# Patient Record
Sex: FEMALE | Race: WHITE | Employment: UNEMPLOYED | URBAN - METROPOLITAN AREA
[De-identification: names, ages, dates, MRNs, and addresses within clinical notes are randomized per-mention and may not be internally consistent; named-entity substitution may affect disease eponyms.]

---

## 2017-02-01 ENCOUNTER — OFFICE VISIT (OUTPATIENT)
Dept: OBGYN CLINIC | Facility: CLINIC | Age: 57
End: 2017-02-01

## 2017-02-01 ENCOUNTER — TELEPHONE (OUTPATIENT)
Dept: OBGYN CLINIC | Facility: CLINIC | Age: 57
End: 2017-02-01

## 2017-02-01 VITALS — BODY MASS INDEX: 28 KG/M2 | WEIGHT: 145 LBS

## 2017-02-01 DIAGNOSIS — N95.0 POSTMENOPAUSAL BLEEDING: Primary | ICD-10-CM

## 2017-02-01 DIAGNOSIS — Z12.4 SCREENING FOR MALIGNANT NEOPLASM OF CERVIX: ICD-10-CM

## 2017-02-01 PROCEDURE — 88305 TISSUE EXAM BY PATHOLOGIST: CPT | Performed by: OBSTETRICS & GYNECOLOGY

## 2017-02-01 PROCEDURE — 58100 BIOPSY OF UTERUS LINING: CPT | Performed by: OBSTETRICS & GYNECOLOGY

## 2017-02-01 RX ORDER — DOXYCYCLINE HYCLATE 100 MG
100 TABLET ORAL 2 TIMES DAILY
Qty: 60 TABLET | Refills: 1 | Status: ON HOLD | OUTPATIENT
Start: 2017-02-01 | End: 2018-09-28

## 2017-02-01 RX ORDER — CITALOPRAM 20 MG/1
TABLET ORAL
COMMUNITY
Start: 2017-01-20 | End: 2018-09-27

## 2017-02-01 NOTE — TELEPHONE ENCOUNTER
NO ANSWER AT HOME NUMBER, TALKED WITH PT AT WORK #. STATES SHE HAS NOT HAD A PERIOD FOR SEVERAL YEARS AND IS NOW BLEEDING LIKE A PERIOD. STARTED YESTERDAY AND IS HAVING SOME CRAMPING. ADVISED TO BE SEEN.   THERE WAS AN OPENING THIS EVENING WHICH SHE ACCE

## 2017-02-02 LAB — HPV I/H RISK 1 DNA SPEC QL NAA+PROBE: NEGATIVE

## 2017-02-03 NOTE — PROGRESS NOTES
Endometrial Biopsy    Pre-Procedure Care:   Consent was obtained. Procedure/risks were explained. Questions were answered. Correct patient was identified. Correct side and site were confirmed.     Pregnancy Results: n/a  Birth control method(s) used: PM

## 2017-02-06 ENCOUNTER — TELEPHONE (OUTPATIENT)
Dept: OBGYN CLINIC | Facility: CLINIC | Age: 57
End: 2017-02-06

## 2017-02-06 DIAGNOSIS — N95.0 POST-MENOPAUSAL BLEEDING: Primary | ICD-10-CM

## 2017-02-06 LAB
LAST PAP RESULT: NORMAL
PAP HISTORY (OTHER THAN LAST PAP): NORMAL

## 2017-02-06 NOTE — TELEPHONE ENCOUNTER
Spoke with Joshua El and discussed benign and path and scant tissue as expected. US ordered and we'll discuss plan after that.

## 2017-02-06 NOTE — TELEPHONE ENCOUNTER
Pt is calling for her EMBX results that she had done on 2/1 by MICHELLE. Pt also stated she is still having some vaginal bleeding. Pt informed it is not uncommon to have some bleeding after having an EMBX.  Pt stated the bleeding is not heavy and the flow is usu

## 2017-02-09 ENCOUNTER — HOSPITAL ENCOUNTER (OUTPATIENT)
Dept: ULTRASOUND IMAGING | Age: 57
Discharge: HOME | End: 2017-02-09
Attending: OBSTETRICS & GYNECOLOGY
Payer: COMMERCIAL

## 2017-02-09 DIAGNOSIS — N95.0 POST-MENOPAUSAL BLEEDING: ICD-10-CM

## 2017-02-09 PROCEDURE — 76856 US EXAM PELVIC COMPLETE: CPT

## 2017-02-09 PROCEDURE — 76830 TRANSVAGINAL US NON-OB: CPT

## 2017-02-24 ENCOUNTER — TELEPHONE (OUTPATIENT)
Dept: OBGYN CLINIC | Facility: CLINIC | Age: 57
End: 2017-02-24

## 2017-02-24 NOTE — TELEPHONE ENCOUNTER
Informed pt that MICHELLE will be back on Monday, 2/27/17.   Informed pt that a message will be sent to MICHELLE.

## 2017-09-05 ENCOUNTER — LAB ENCOUNTER (OUTPATIENT)
Dept: LAB | Facility: HOSPITAL | Age: 57
End: 2017-09-05
Attending: INTERNAL MEDICINE
Payer: COMMERCIAL

## 2017-09-05 DIAGNOSIS — I10 ESSENTIAL HYPERTENSION, MALIGNANT: ICD-10-CM

## 2017-09-05 DIAGNOSIS — E78.00 PURE HYPERCHOLESTEROLEMIA: Primary | ICD-10-CM

## 2017-09-05 LAB
ALBUMIN SERPL BCP-MCNC: 4.2 G/DL (ref 3.5–4.8)
ALBUMIN/GLOB SERPL: 1.7 {RATIO} (ref 1–2)
ALP SERPL-CCNC: 67 U/L (ref 32–100)
ALT SERPL-CCNC: 67 U/L (ref 14–54)
ANION GAP SERPL CALC-SCNC: 7 MMOL/L (ref 0–18)
AST SERPL-CCNC: 70 U/L (ref 15–41)
BASOPHILS # BLD: 0 K/UL (ref 0–0.2)
BASOPHILS NFR BLD: 1 %
BILIRUB SERPL-MCNC: 1.2 MG/DL (ref 0.3–1.2)
BUN SERPL-MCNC: 14 MG/DL (ref 8–20)
BUN/CREAT SERPL: 17.7 (ref 10–20)
CALCIUM SERPL-MCNC: 9.3 MG/DL (ref 8.5–10.5)
CHLORIDE SERPL-SCNC: 104 MMOL/L (ref 95–110)
CHOLEST SERPL-MCNC: 332 MG/DL (ref 110–200)
CO2 SERPL-SCNC: 27 MMOL/L (ref 22–32)
CREAT SERPL-MCNC: 0.79 MG/DL (ref 0.5–1.5)
EOSINOPHIL # BLD: 0.1 K/UL (ref 0–0.7)
EOSINOPHIL NFR BLD: 2 %
ERYTHROCYTE [DISTWIDTH] IN BLOOD BY AUTOMATED COUNT: 13.8 % (ref 11–15)
GLOBULIN PLAS-MCNC: 2.5 G/DL (ref 2.5–3.7)
GLUCOSE SERPL-MCNC: 84 MG/DL (ref 70–99)
HCT VFR BLD AUTO: 45.2 % (ref 35–48)
HDLC SERPL-MCNC: 80 MG/DL
HGB BLD-MCNC: 15 G/DL (ref 12–16)
LDLC SERPL CALC-MCNC: 238 MG/DL (ref 0–99)
LYMPHOCYTES # BLD: 0.8 K/UL (ref 1–4)
LYMPHOCYTES NFR BLD: 18 %
MCH RBC QN AUTO: 30.2 PG (ref 27–32)
MCHC RBC AUTO-ENTMCNC: 33.1 G/DL (ref 32–37)
MCV RBC AUTO: 91.2 FL (ref 80–100)
MONOCYTES # BLD: 0.4 K/UL (ref 0–1)
MONOCYTES NFR BLD: 9 %
NEUTROPHILS # BLD AUTO: 3.3 K/UL (ref 1.8–7.7)
NEUTROPHILS NFR BLD: 72 %
NONHDLC SERPL-MCNC: 252 MG/DL
OSMOLALITY UR CALC.SUM OF ELEC: 286 MOSM/KG (ref 275–295)
PLATELET # BLD AUTO: 219 K/UL (ref 140–400)
PMV BLD AUTO: 7.6 FL (ref 7.4–10.3)
POTASSIUM SERPL-SCNC: 4.6 MMOL/L (ref 3.3–5.1)
PROT SERPL-MCNC: 6.7 G/DL (ref 5.9–8.4)
RBC # BLD AUTO: 4.95 M/UL (ref 3.7–5.4)
SODIUM SERPL-SCNC: 138 MMOL/L (ref 136–144)
TRIGL SERPL-MCNC: 71 MG/DL (ref 1–149)
TSH SERPL-ACNC: 0.39 UIU/ML (ref 0.45–5.33)
WBC # BLD AUTO: 4.6 K/UL (ref 4–11)

## 2017-09-05 PROCEDURE — 85025 COMPLETE CBC W/AUTO DIFF WBC: CPT

## 2017-09-05 PROCEDURE — 84443 ASSAY THYROID STIM HORMONE: CPT

## 2017-09-05 PROCEDURE — 80053 COMPREHEN METABOLIC PANEL: CPT

## 2017-09-05 PROCEDURE — 36415 COLL VENOUS BLD VENIPUNCTURE: CPT

## 2017-09-05 PROCEDURE — 80061 LIPID PANEL: CPT

## 2018-09-27 ENCOUNTER — HOSPITAL ENCOUNTER (INPATIENT)
Facility: HOSPITAL | Age: 58
LOS: 1 days | Discharge: HOME | DRG: 087 | End: 2018-09-28
Attending: EMERGENCY MEDICINE | Admitting: INTERNAL MEDICINE
Payer: COMMERCIAL

## 2018-09-27 ENCOUNTER — APPOINTMENT (OUTPATIENT)
Dept: CT IMAGING | Facility: HOSPITAL | Age: 58
DRG: 087 | End: 2018-09-27
Attending: EMERGENCY MEDICINE
Payer: COMMERCIAL

## 2018-09-27 DIAGNOSIS — S01.81XA FOREHEAD LACERATION, INITIAL ENCOUNTER: ICD-10-CM

## 2018-09-27 DIAGNOSIS — S06.5X9A ACUTE SUBDURAL HEMATOMA (HCC): Primary | ICD-10-CM

## 2018-09-27 PROBLEM — S06.5XAA ACUTE SUBDURAL HEMATOMA (HCC): Status: ACTIVE | Noted: 2018-09-27

## 2018-09-27 PROBLEM — S06.5XAA ACUTE SUBDURAL HEMATOMA: Status: ACTIVE | Noted: 2018-09-27

## 2018-09-27 PROCEDURE — 70450 CT HEAD/BRAIN W/O DYE: CPT | Performed by: EMERGENCY MEDICINE

## 2018-09-27 PROCEDURE — 3E0234Z INTRODUCTION OF SERUM, TOXOID AND VACCINE INTO MUSCLE, PERCUTANEOUS APPROACH: ICD-10-PCS | Performed by: INTERNAL MEDICINE

## 2018-09-27 PROCEDURE — 99223 1ST HOSP IP/OBS HIGH 75: CPT | Performed by: INTERNAL MEDICINE

## 2018-09-27 RX ORDER — ACETAMINOPHEN 325 MG/1
650 TABLET ORAL EVERY 6 HOURS PRN
Status: DISCONTINUED | OUTPATIENT
Start: 2018-09-27 | End: 2018-09-28

## 2018-09-27 RX ORDER — ATORVASTATIN CALCIUM 20 MG/1
20 TABLET, FILM COATED ORAL DAILY
COMMUNITY

## 2018-09-27 RX ORDER — LOSARTAN POTASSIUM 100 MG/1
TABLET ORAL DAILY
COMMUNITY

## 2018-09-27 RX ORDER — SODIUM CHLORIDE 0.9 % (FLUSH) 0.9 %
3 SYRINGE (ML) INJECTION AS NEEDED
Status: DISCONTINUED | OUTPATIENT
Start: 2018-09-27 | End: 2018-09-28

## 2018-09-27 RX ORDER — ACETAMINOPHEN 500 MG
1000 TABLET ORAL ONCE
Status: COMPLETED | OUTPATIENT
Start: 2018-09-27 | End: 2018-09-27

## 2018-09-27 RX ORDER — LIDOCAINE HYDROCHLORIDE AND EPINEPHRINE 20; 5 MG/ML; UG/ML
20 INJECTION, SOLUTION EPIDURAL; INFILTRATION; INTRACAUDAL; PERINEURAL ONCE
Status: COMPLETED | OUTPATIENT
Start: 2018-09-27 | End: 2018-09-27

## 2018-09-27 RX ORDER — MORPHINE SULFATE 2 MG/ML
2 INJECTION, SOLUTION INTRAMUSCULAR; INTRAVENOUS EVERY 2 HOUR PRN
Status: DISCONTINUED | OUTPATIENT
Start: 2018-09-27 | End: 2018-09-28

## 2018-09-27 RX ORDER — MINOCYCLINE HYDROCHLORIDE 50 MG/1
50 CAPSULE ORAL 2 TIMES DAILY
COMMUNITY

## 2018-09-27 RX ORDER — LIDOCAINE HYDROCHLORIDE AND EPINEPHRINE 20; 5 MG/ML; UG/ML
INJECTION, SOLUTION EPIDURAL; INFILTRATION; INTRACAUDAL; PERINEURAL
Status: COMPLETED
Start: 2018-09-27 | End: 2018-09-27

## 2018-09-27 NOTE — H&P
[facility] \[facility] [place] [facility]\"" - [facility]    History & Physical    Date:  9/27/2018   Date of Admission:  9/27/2018      Chief Complaint:   Kathi Post is a(n) 63year old female with question of intracranial hemorrhage and laceration.     HPI:   The patient is a history Maternal Aunt 76   • Breast Cancer Paternal Grandmother      Social History: Single, no children, no tobacco, alcohol as above, RN  Social History    Tobacco Use      Smoking status: Never Smoker      Smokeless tobacco: Never Used    Alcohol use:  Yes intraparenchymal contusion, or intracranial hemorrhage. 2. Numerous cutaneous and subcutaneous lesions are noted throughout the scalp. Correlation with physical examination findings is suggested. 3. Large vessel atherosclerosis.      4. Lesser incid

## 2018-09-27 NOTE — CONSULTS
Chief Complaint:   Kathi Post is a(n) 63year old female with question of intracranial hemorrhage and laceration.     HPI:   The patient is a history of alcohol consumption drinking up to a bottle of wine per evening.   She fell in her bathtub yesterday e    Social History: Single, no children, no tobacco, alcohol as above, RN  Social History    Tobacco Use      Smoking status: Never Smoker      Smokeless tobacco: Never Used    Alcohol use:  Yes      Alcohol/week: 3.0 oz      Types: 5 Standard drinks or eq coup/contrecoup intraparenchymal contusion, or intracranial hemorrhage.     2. Numerous cutaneous and subcutaneous lesions are noted throughout the scalp. Correlation with physical examination findings is suggested.     3. Large vessel atherosclerosis.

## 2018-09-27 NOTE — ED NOTES
Assumed care of patient. Resting on cart, bandage intact to forehead. Awake and alert. C/o mild headache. Patient made aware of admission to PCU. Dr. Chris Durbin currently at bedside. VSS.

## 2018-09-27 NOTE — CM/SW NOTE
9/27-MD orders received in regards to ETOH/Substance abuse. This Writer made a referral to psych liaison for consult.      [id number]

## 2018-09-27 NOTE — PAYOR COMM NOTE
--------------  ADMISSION REVIEW     Payor: Dolores Davis  Subscriber #:  [id number]  Authorization Number: [id number]    Admit date: 9/27/18  Admit time: 4459       Admitting Physician: Yelena Tse MD  Attending Physician:  Saritha Lerma History:  2008: BREAST RECONSTRUCTION; Right      Comment:  right breast reconstructed - ductal, BRCA neg  2007: CHEMOTHERAPY  No date: FOOT SURGERY;  Right      Comment:  tumor rt foot / excision/excision of bone                spur/arthrodesis w/screw fix Alcohol/week: 3.0 oz      Types: 5 Standard drinks or equivalent per week      Comment: Socially.   (Per [employer]:  5 days weekly.)    Drug use: No      Review of Systems  Constitutional: no fever  Cardiovascular: no chest pain  Respiratory: no shortness of subdural hematoma without any obvious midline shift or swelling. I recommended admission we establish IV we will draw blood and do EKG. Procedure note: Patient was anesthetized with total 11 mL of 1% lidocaine with epinephrine.   I placed a total of 13 axes as noted on EKG Report.   Rate: EKG shows rate 74 normal sinus rhythm  no ST elevation no ST depression seen              MDM     98% Normal  Pulse oximetry    Cardiac Monitor: Normal sinus rhythm    Disposition and Plan     We recommend that you Breast cancer ([facility] [address] 03/02/2012    MASTECTOMY / RECONSTRUCTION   • Encounter for insertion of venous access port 2007    portacath insertion / venous access   • History of breast cancer in female    • Menorrhagia 2007    HYSTEROSCOPY / D&C / endomentrial bi 1\" (1.549 m), weight 130 lb (59 kg), SpO2 99 %. Alert white female  HEENT examination is unremarkable with pupils equal round and reactive to light and accommodation. There is a 5 inch laceration on the right brow. Sutured.   Neck without adenopathy, taking. 3.  Neurofibromatosis    4. DVT prophylaxis–SCDs    I am delighted to assist with Aleisha's care.           Jerry Salazar MD  Medical Director, Critical Care, [facility]  Medical Director, [facility]  Pager: [phone]    Palmira Cheadle

## 2018-09-27 NOTE — ED PROVIDER NOTES
Patient Seen in: [facility] AND [facility] Emergency Department    History   Patient presents with:  Fall (musculoskeletal, neurologic)    Stated Complaint: Fall    HPI    Patient presents to the emergency department after fall last night.   At approximately 9 PM Oral Tab,  Take 1 tablet (100 mg total) by mouth 2 (two) times daily. Tamoxifen Citrate 20 MG Oral Tab,  Take 1 tablet (20 mg total) by mouth daily. There will be no additional refills unless seen by Dr. Ilda Yates.    Multiple Vitamin (MULTIVITAMINS) Oral Ca HPI.    Physical Exam     ED Triage Vitals [09/27/18 0549]   BP (!) 145/96   Pulse 82   Resp 16   Temp 98.7 °F (37.1 °C)   Temp src Oral   SpO2 99 %   O2 Device None (Room air)       Current:BP (!) 155/91   Pulse 84   Temp 98.7 °F (37.1 °C) (Oral)   Resp 2 obtaining history, performing a physical exam, bedside monitoring of interventions, collecting and interpreting tests and discussion with consultants but not including time spent performing separately billable procedures.         ED Course     Labs Reviewed specified.     Medications Prescribed:  Current Discharge Medication List        Present on Admission  Date Reviewed: 1/16/2015          ICD-10-CM Noted POA    Acute subdural hematoma ([facility] [address] S06.5X9A 9/27/2018 Unknown

## 2018-09-28 ENCOUNTER — APPOINTMENT (OUTPATIENT)
Dept: CT IMAGING | Facility: HOSPITAL | Age: 58
DRG: 087 | End: 2018-09-28
Attending: INTERNAL MEDICINE
Payer: COMMERCIAL

## 2018-09-28 VITALS
BODY MASS INDEX: 24.55 KG/M2 | TEMPERATURE: 99 F | SYSTOLIC BLOOD PRESSURE: 147 MMHG | RESPIRATION RATE: 15 BRPM | WEIGHT: 130 LBS | HEIGHT: 61 IN | OXYGEN SATURATION: 100 % | DIASTOLIC BLOOD PRESSURE: 93 MMHG | HEART RATE: 101 BPM

## 2018-09-28 PROCEDURE — 99233 SBSQ HOSP IP/OBS HIGH 50: CPT | Performed by: INTERNAL MEDICINE

## 2018-09-28 PROCEDURE — 70450 CT HEAD/BRAIN W/O DYE: CPT | Performed by: INTERNAL MEDICINE

## 2018-09-28 RX ORDER — ACETAMINOPHEN 325 MG/1
650 TABLET ORAL EVERY 6 HOURS PRN
Refills: 0 | Status: SHIPPED | COMMUNITY
Start: 2018-09-28 | End: 2020-09-21

## 2018-09-28 RX ORDER — LOSARTAN POTASSIUM 100 MG/1
100 TABLET ORAL DAILY
Status: DISCONTINUED | OUTPATIENT
Start: 2018-09-28 | End: 2018-09-28

## 2018-09-28 RX ORDER — ATORVASTATIN CALCIUM 20 MG/1
20 TABLET, FILM COATED ORAL DAILY
Status: DISCONTINUED | OUTPATIENT
Start: 2018-09-28 | End: 2018-09-28

## 2018-09-28 NOTE — DISCHARGE SUMMARY
[place] [facility] - [facility]  Discharge Summary    Saurav Love Patient Status:  Inpatient    3/1/1960 MRN [id number]   Location [facility] 2W/SW Attending Jose Miguel Pinto MD   Hosp Day # 1 PCP Jocy Sanchez     Date of Admission: 2018    Raymond Huyen Oseguera  9/28/2018  3:38 PM

## 2018-09-28 NOTE — BH PROGRESS NOTE
Behavioral Health Note  CHIEF COMPLAINT   Fall  REASON FOR ADMISSION  Fall    SOCIAL HISTORY  Shana Weber lives alone and works full time. The patient has a h/o etoh use.    PAST PSYCHIATRIC HISTORY  Shana Weber reports a mental health history, but we did not discuss at

## 2018-09-28 NOTE — PROGRESS NOTES
Pulmonary/Critical Care Follow Up Note    HPI:   Christopher Gonzalez is a 63year old female with Patient presents with:  Fall (musculoskeletal, neurologic)      PCP Cortney Johnson  Admission Attending Anna Avalos MD    Hospital Day #1    Mild HA- not worse  N 25 09/28/2018    GLU 93 09/28/2018    CA 9.0 09/28/2018           ASSESSMENT/PLAN:     1.  Subdural hematoma  S/p fall while intoxicated  CT head stable/improved today  Plan no ASA or anti plt agents   Follow exam   Other recs per neurosurgery   Scalp woun

## 2018-10-01 NOTE — PAYOR COMM NOTE
--------------  DISCHARGE REVIEW    Payor: Joesph Arreola  Subscriber #:  [id number]  Authorization Number: [id number]    Admit date: 9/27/18  Admit time:  [address]  Discharge Date: 9/28/2018  5:30 PM     Admitting Physician: Marc Fernandez Tab  Take by mouth daily. Multiple Vitamin (MULTIVITAMINS) Oral Cap  Take  by mouth. take 1 capsule by oral route  every day    Calcium-Vitamin D (EQL CALCIUM/VITAMIN D) 600-400 MG-UNIT Oral Tab  Take  by mouth.  Take one tablet by mouth two times per da

## 2018-10-04 ENCOUNTER — APPOINTMENT (OUTPATIENT)
Dept: LAB | Facility: HOSPITAL | Age: 58
End: 2018-10-04
Attending: PHYSICIAN ASSISTANT
Payer: COMMERCIAL

## 2018-10-04 DIAGNOSIS — F10.10 ALCOHOL ABUSE, CONTINUOUS: ICD-10-CM

## 2018-10-04 PROCEDURE — 36415 COLL VENOUS BLD VENIPUNCTURE: CPT

## 2018-10-04 PROCEDURE — 80076 HEPATIC FUNCTION PANEL: CPT

## 2018-10-12 ENCOUNTER — HOSPITAL ENCOUNTER (OUTPATIENT)
Dept: CT IMAGING | Facility: HOSPITAL | Age: 58
Discharge: HOME | End: 2018-10-12
Attending: INTERNAL MEDICINE
Payer: COMMERCIAL

## 2018-10-12 DIAGNOSIS — S06.5X9A SUBDURAL HEMATOMA (HCC): ICD-10-CM

## 2018-10-12 PROCEDURE — 70450 CT HEAD/BRAIN W/O DYE: CPT | Performed by: INTERNAL MEDICINE

## 2020-09-18 ENCOUNTER — APPOINTMENT (OUTPATIENT)
Dept: CT IMAGING | Facility: HOSPITAL | Age: 60
End: 2020-09-18
Attending: EMERGENCY MEDICINE
Payer: COMMERCIAL

## 2020-09-18 ENCOUNTER — TELEPHONE (OUTPATIENT)
Dept: HEMATOLOGY/ONCOLOGY | Facility: HOSPITAL | Age: 60
End: 2020-09-18

## 2020-09-18 ENCOUNTER — HOSPITAL ENCOUNTER (EMERGENCY)
Facility: HOSPITAL | Age: 60
Discharge: HOME | End: 2020-09-18
Attending: EMERGENCY MEDICINE
Payer: COMMERCIAL

## 2020-09-18 VITALS
SYSTOLIC BLOOD PRESSURE: 160 MMHG | HEART RATE: 78 BPM | DIASTOLIC BLOOD PRESSURE: 92 MMHG | OXYGEN SATURATION: 96 % | BODY MASS INDEX: 25.86 KG/M2 | HEIGHT: 61 IN | RESPIRATION RATE: 18 BRPM | WEIGHT: 137 LBS | TEMPERATURE: 98 F

## 2020-09-18 DIAGNOSIS — R91.8 LUNG MASS: ICD-10-CM

## 2020-09-18 DIAGNOSIS — R22.1 NECK MASS: Primary | ICD-10-CM

## 2020-09-18 LAB
ANION GAP SERPL CALC-SCNC: 6 MMOL/L (ref 0–18)
BASOPHILS # BLD AUTO: 0.04 X10(3) UL (ref 0–0.2)
BASOPHILS NFR BLD AUTO: 0.6 %
BUN BLD-MCNC: 14 MG/DL (ref 7–18)
BUN/CREAT SERPL: 13.7 (ref 10–20)
CALCIUM BLD-MCNC: 9.4 MG/DL (ref 8.5–10.1)
CHLORIDE SERPL-SCNC: 108 MMOL/L (ref 98–112)
CO2 SERPL-SCNC: 28 MMOL/L (ref 21–32)
CREAT BLD-MCNC: 1.02 MG/DL (ref 0.55–1.02)
DEPRECATED RDW RBC AUTO: 45.1 FL (ref 35.1–46.3)
EOSINOPHIL # BLD AUTO: 0.06 X10(3) UL (ref 0–0.7)
EOSINOPHIL NFR BLD AUTO: 1 %
ERYTHROCYTE [DISTWIDTH] IN BLOOD BY AUTOMATED COUNT: 13.2 % (ref 11–15)
GLUCOSE BLD-MCNC: 84 MG/DL (ref 70–99)
HCT VFR BLD AUTO: 45.7 % (ref 35–48)
HGB BLD-MCNC: 14.8 G/DL (ref 12–16)
IMM GRANULOCYTES # BLD AUTO: 0.04 X10(3) UL (ref 0–1)
IMM GRANULOCYTES NFR BLD: 0.6 %
LYMPHOCYTES # BLD AUTO: 0.6 X10(3) UL (ref 1–4)
LYMPHOCYTES NFR BLD AUTO: 9.7 %
MCH RBC QN AUTO: 30.3 PG (ref 26–34)
MCHC RBC AUTO-ENTMCNC: 32.4 G/DL (ref 31–37)
MCV RBC AUTO: 93.5 FL (ref 80–100)
MONOCYTES # BLD AUTO: 0.51 X10(3) UL (ref 0.1–1)
MONOCYTES NFR BLD AUTO: 8.2 %
NEUTROPHILS # BLD AUTO: 4.96 X10 (3) UL (ref 1.5–7.7)
NEUTROPHILS # BLD AUTO: 4.96 X10(3) UL (ref 1.5–7.7)
NEUTROPHILS NFR BLD AUTO: 79.9 %
OSMOLALITY SERPL CALC.SUM OF ELEC: 294 MOSM/KG (ref 275–295)
PLATELET # BLD AUTO: 274 10(3)UL (ref 150–450)
POTASSIUM SERPL-SCNC: 4.2 MMOL/L (ref 3.5–5.1)
RBC # BLD AUTO: 4.89 X10(6)UL (ref 3.8–5.3)
SODIUM SERPL-SCNC: 142 MMOL/L (ref 136–145)
WBC # BLD AUTO: 6.2 X10(3) UL (ref 4–11)

## 2020-09-18 PROCEDURE — 80048 BASIC METABOLIC PNL TOTAL CA: CPT | Performed by: EMERGENCY MEDICINE

## 2020-09-18 PROCEDURE — 70491 CT SOFT TISSUE NECK W/DYE: CPT | Performed by: EMERGENCY MEDICINE

## 2020-09-18 PROCEDURE — 71260 CT THORAX DX C+: CPT | Performed by: EMERGENCY MEDICINE

## 2020-09-18 PROCEDURE — 36415 COLL VENOUS BLD VENIPUNCTURE: CPT

## 2020-09-18 PROCEDURE — 85025 COMPLETE CBC W/AUTO DIFF WBC: CPT | Performed by: EMERGENCY MEDICINE

## 2020-09-18 PROCEDURE — 99284 EMERGENCY DEPT VISIT MOD MDM: CPT

## 2020-09-18 NOTE — TELEPHONE ENCOUNTER
Call from Dr. Elina Altman in the ER - patient seen for neck pain and new onset cough   History of breast cancer 9/11/2007 treated here   Now with abnormal CT scans  Can she be seen at 3:30 Monday?    RN inpatient

## 2020-09-18 NOTE — ED PROVIDER NOTES
Patient Seen in: [facility] AND [facility] Emergency Department      History   Patient presents with:  Neck Pain    Stated Complaint: growth on neck +pain     HPI    The patient is a 52-year-old female with a history of breast cancer and neurofibromatosis who pr Positive for stated complaint: growth on neck +pain   Other systems are as noted in HPI. Constitutional and vital signs reviewed. All other systems reviewed and negative except as noted above.     Physical Exam     ED Triage Vitals [09/18/20 1027]   B General: No focal deficit present. Mental Status: She is alert and oriented to person, place, and time. Deep Tendon Reflexes: Reflexes are normal and symmetric.    Psychiatric:         Judgment: Judgment normal.       Differential diagnosis inc CONCLUSION:  1. Development of a spiculated soft tissue density in the right supraclavicular region extending to the lower right side of the neck, as detailed above.   There is involvement of the right scalene and sternocleidomastoid muscles with abutment o CONCLUSION:  1. No evidence of pulmonary embolism. 2. CHF with pulmonary interstitial edema and left greater than right pleural effusions. 3. Mass or masslike region of consolidation in the lingula.   If patient clinically has pneumonia, short interval foll

## 2020-09-18 NOTE — ED INITIAL ASSESSMENT (HPI)
Patient presents to ER with c/o cough and neck pain over the last few weeks. Patient c/o swelling to right sided of neck that has worsened this week.  Denies fevers

## 2020-09-18 NOTE — ED NOTES
Patient cleared for discharge by MD. Angeless with patient. Patient discharge instructions reviewed with patient including when and how to follow up  with healthcare provider and when to seek medical treatment. Peripheral IV removed.

## 2020-09-21 ENCOUNTER — OFFICE VISIT (OUTPATIENT)
Dept: HEMATOLOGY/ONCOLOGY | Facility: HOSPITAL | Age: 60
End: 2020-09-21
Attending: INTERNAL MEDICINE
Payer: COMMERCIAL

## 2020-09-21 ENCOUNTER — TELEPHONE (OUTPATIENT)
Dept: HEMATOLOGY/ONCOLOGY | Facility: HOSPITAL | Age: 60
End: 2020-09-21

## 2020-09-21 VITALS
TEMPERATURE: 98 F | RESPIRATION RATE: 16 BRPM | WEIGHT: 136 LBS | HEART RATE: 96 BPM | OXYGEN SATURATION: 98 % | HEIGHT: 61 IN | BODY MASS INDEX: 25.68 KG/M2 | SYSTOLIC BLOOD PRESSURE: 146 MMHG | DIASTOLIC BLOOD PRESSURE: 85 MMHG

## 2020-09-21 DIAGNOSIS — Z90.11 HISTORY OF RIGHT MASTECTOMY: ICD-10-CM

## 2020-09-21 DIAGNOSIS — J90 CHRONIC BILATERAL PLEURAL EFFUSIONS: ICD-10-CM

## 2020-09-21 DIAGNOSIS — R05.9 COUGH: Primary | ICD-10-CM

## 2020-09-21 DIAGNOSIS — R22.1 MASS OF RIGHT SIDE OF NECK: ICD-10-CM

## 2020-09-21 DIAGNOSIS — Q85.00 CLINICAL NEUROFIBROMATOSIS (HCC): ICD-10-CM

## 2020-09-21 DIAGNOSIS — C50.411 MALIGNANT NEOPLASM OF UPPER-OUTER QUADRANT OF RIGHT BREAST IN FEMALE, ESTROGEN RECEPTOR POSITIVE (HCC): ICD-10-CM

## 2020-09-21 DIAGNOSIS — Z17.0 MALIGNANT NEOPLASM OF UPPER-OUTER QUADRANT OF RIGHT BREAST IN FEMALE, ESTROGEN RECEPTOR POSITIVE (HCC): ICD-10-CM

## 2020-09-21 PROCEDURE — 99205 OFFICE O/P NEW HI 60 MIN: CPT | Performed by: INTERNAL MEDICINE

## 2020-09-21 RX ORDER — IBUPROFEN 600 MG/1
600 TABLET ORAL EVERY 6 HOURS PRN
COMMUNITY
End: 2020-12-24 | Stop reason: ALTCHOICE

## 2020-09-21 RX ORDER — HYDROCODONE POLISTIREX AND CHLORPHENIRAMINE POLISTIREX 10; 8 MG/5ML; MG/5ML
5 SUSPENSION, EXTENDED RELEASE ORAL 2 TIMES DAILY PRN
Qty: 120 ML | Refills: 0 | Status: SHIPPED | OUTPATIENT
Start: 2020-09-21 | End: 2020-09-21

## 2020-09-21 RX ORDER — HYDROCODONE POLISTIREX AND CHLORPHENIRAMINE POLISTIREX 10; 8 MG/5ML; MG/5ML
5 SUSPENSION, EXTENDED RELEASE ORAL 2 TIMES DAILY PRN
Status: DISCONTINUED | OUTPATIENT
Start: 2020-09-21 | End: 2020-09-21

## 2020-09-21 RX ORDER — HYDROCODONE POLISTIREX AND CHLORPHENIRAMINE POLISTIREX 10; 8 MG/5ML; MG/5ML
5 SUSPENSION, EXTENDED RELEASE ORAL 2 TIMES DAILY PRN
Qty: 120 ML | Refills: 0 | Status: SHIPPED | OUTPATIENT
Start: 2020-09-21 | End: 2020-10-02

## 2020-09-21 NOTE — CONSULTS
HPI   9/21/2020  Christopher Gonzalez is a 62year old female who presents with a history of right neck swelling and nonproductive cough beginning approximately 3 weeks ago.   Patient saw her primary care physician Dr. Meg Goncalves and was scheduled to have CT sc Completion right mastectomy, axillary node sampling, immediate reconstruction with a tissue expander -2 separate foci of microinvasive ductal carcinoma, moderately differentiated measuring 1 mm in maximum dimension, residual multifocal ductal carcinoma in Neurofibromatosis lesions with associated itching   Psychiatric/Behavioral: Positive for dysphoric mood. The patient is nervous/anxious.          Patient obviously anxious and upset with the CT scan findings         Current Outpatient Medications   Med tumor rt foot / excision/excision of bone spur/arthrodesis w/screw fixation   • IR PORT A CATH PROCEDURE  2007    Right subclavian port of cath.    • MASTECTOMY,SIMPLE  2007     Social History    Socioeconomic History      Marital status: Single      Spous Stress Concern: Not Asked        Weight Concern: Not Asked        Special Diet: Not Asked        Back Care: Not Asked        Exercise: Not Asked        Bike Helmet: Not Asked        Seat Belt: Not Asked        Self-Exams: Not Asked    Social History Neurological: She is alert and oriented to person, place, and time. No cranial nerve deficit. Skin: Skin is warm and dry. neurofibromatosis diffuse   Psychiatric: She has a normal mood and affect.  Her behavior is normal. Judgment and thought content no Basophils Absolute      0.00 - 0.20 x10(3) uL 0.04   Immature Granulocyte Absolute      0.00 - 1.00 x10(3) uL 0.04   Neutrophils %      % 79.9   Lymphocytes %      % 9.7   Monocytes %      % 8.2   Eosinophils %      % 1.0   Basophils %      % 0.6   Immatur 1. No evidence of pulmonary embolism. 2. CHF with pulmonary interstitial edema and left greater than right pleural effusions. 3. Mass or masslike region of consolidation in the lingula.   If patient clinically has pneumonia, short interval follow-up CT sc

## 2020-09-21 NOTE — H&P (VIEW-ONLY)
HPI   9/21/2020  Meryl Barnett is a 62year old female who presents with a history of right neck swelling and nonproductive cough beginning approximately 3 weeks ago.   Patient saw her primary care physician Dr. Arielle Saini and was scheduled to have CT sc Completion right mastectomy, axillary node sampling, immediate reconstruction with a tissue expander -2 separate foci of microinvasive ductal carcinoma, moderately differentiated measuring 1 mm in maximum dimension, residual multifocal ductal carcinoma in Neurofibromatosis lesions with associated itching   Psychiatric/Behavioral: Positive for dysphoric mood. The patient is nervous/anxious.          Patient obviously anxious and upset with the CT scan findings         Current Outpatient Medications   Med tumor rt foot / excision/excision of bone spur/arthrodesis w/screw fixation   • IR PORT A CATH PROCEDURE  2007    Right subclavian port of cath.    • MASTECTOMY,SIMPLE  2007     Social History    Socioeconomic History      Marital status: Single      Spous Stress Concern: Not Asked        Weight Concern: Not Asked        Special Diet: Not Asked        Back Care: Not Asked        Exercise: Not Asked        Bike Helmet: Not Asked        Seat Belt: Not Asked        Self-Exams: Not Asked    Social History Neurological: She is alert and oriented to person, place, and time. No cranial nerve deficit. Skin: Skin is warm and dry. neurofibromatosis diffuse   Psychiatric: She has a normal mood and affect.  Her behavior is normal. Judgment and thought content no Basophils Absolute      0.00 - 0.20 x10(3) uL 0.04   Immature Granulocyte Absolute      0.00 - 1.00 x10(3) uL 0.04   Neutrophils %      % 79.9   Lymphocytes %      % 9.7   Monocytes %      % 8.2   Eosinophils %      % 1.0   Basophils %      % 0.6   Immatur 1. No evidence of pulmonary embolism. 2. CHF with pulmonary interstitial edema and left greater than right pleural effusions. 3. Mass or masslike region of consolidation in the lingula.   If patient clinically has pneumonia, short interval follow-up CT sc

## 2020-09-21 NOTE — TELEPHONE ENCOUNTER
Patient called saying she was told to make an appointment today. I would like to confirm what time to place the patienyt on the schedule.

## 2020-09-22 ENCOUNTER — TELEPHONE (OUTPATIENT)
Dept: PULMONOLOGY | Facility: CLINIC | Age: 60
End: 2020-09-22

## 2020-09-22 ENCOUNTER — APPOINTMENT (OUTPATIENT)
Dept: LAB | Age: 60
End: 2020-09-22
Attending: INTERNAL MEDICINE
Payer: COMMERCIAL

## 2020-09-22 DIAGNOSIS — J90 PLEURAL EFFUSION: Primary | ICD-10-CM

## 2020-09-22 DIAGNOSIS — Z11.59 ENCOUNTER FOR SCREENING FOR OTHER VIRAL DISEASES: ICD-10-CM

## 2020-09-22 LAB — SARS-COV-2 RNA RESP QL NAA+PROBE: NOT DETECTED

## 2020-09-22 NOTE — TELEPHONE ENCOUNTER
Darlin Spence spoke with Evelio Khan who was able to schedule this procedure per below. Spoke with Evelio Khan who states that pt will need a rapid COVID test today before procedure. Spoke with pt who was informed of procedure time and date per blow.  Pt informe

## 2020-09-22 NOTE — TELEPHONE ENCOUNTER
Per Kathy Andujar, pt to be scheduled for Left Thora on 9/23 at 10A. M  Reached out to Dianne/[facility]adi[facility], Left message to call back.

## 2020-09-23 ENCOUNTER — HOSPITAL ENCOUNTER (OUTPATIENT)
Dept: GENERAL RADIOLOGY | Facility: HOSPITAL | Age: 60
Discharge: HOME | End: 2020-09-23
Attending: INTERNAL MEDICINE
Payer: COMMERCIAL

## 2020-09-23 ENCOUNTER — TELEPHONE (OUTPATIENT)
Dept: PULMONOLOGY | Facility: CLINIC | Age: 60
End: 2020-09-23

## 2020-09-23 ENCOUNTER — HOSPITAL ENCOUNTER (OUTPATIENT)
Dept: CT IMAGING | Facility: HOSPITAL | Age: 60
End: 2020-09-23
Attending: INTERNAL MEDICINE
Payer: COMMERCIAL

## 2020-09-23 ENCOUNTER — HOSPITAL ENCOUNTER (OUTPATIENT)
Dept: ULTRASOUND IMAGING | Facility: HOSPITAL | Age: 60
Discharge: HOME | End: 2020-09-23
Attending: INTERNAL MEDICINE
Payer: COMMERCIAL

## 2020-09-23 VITALS
DIASTOLIC BLOOD PRESSURE: 87 MMHG | BODY MASS INDEX: 25.49 KG/M2 | HEART RATE: 89 BPM | OXYGEN SATURATION: 96 % | SYSTOLIC BLOOD PRESSURE: 127 MMHG | WEIGHT: 135 LBS | HEIGHT: 61 IN

## 2020-09-23 DIAGNOSIS — J90 PLEURAL EFFUSION: ICD-10-CM

## 2020-09-23 LAB
BASOPHILS NFR FLD: 0 %
COLOR FLD: YELLOW
EOSINOPHIL NFR FLD: 0 %
GLUCOSE PLR-MCNC: 82 MG/DL
LDH FLD L TO P-CCNC: 250 U/L
LYMPHOCYTES NFR FLD: 62 %
MONOCYTES NFR FLD: 27 %
NEUTROPHILS NFR FLD: 10 %
PROT PLR-MCNC: 3.5 G/DL
RBC # FLD: 324 /CUMM (ref ?–1)
TURBIDITY CSF QL: CLEAR
WBC # FLD: 344 /CUMM (ref ?–1)
WBC OTHER NFR FLD: 1 %

## 2020-09-23 PROCEDURE — 99214 OFFICE O/P EST MOD 30 MIN: CPT | Performed by: INTERNAL MEDICINE

## 2020-09-23 PROCEDURE — 32555 ASPIRATE PLEURA W/ IMAGING: CPT | Performed by: INTERNAL MEDICINE

## 2020-09-23 PROCEDURE — 71045 X-RAY EXAM CHEST 1 VIEW: CPT | Performed by: INTERNAL MEDICINE

## 2020-09-23 RX ORDER — BENZONATATE 100 MG/1
100 CAPSULE ORAL 3 TIMES DAILY PRN
COMMUNITY
End: 2020-10-02

## 2020-09-23 NOTE — IMAGING NOTE
PT TO ULTRASOUND ROOM  SCANS COMPLETED BY Na Suarez RT     HX TAKEN PROCEDURE EXPLAINED QUESTIONS ANSWERED. Dana Jones is a 62year old female who presents with a history of right neck swelling and nonproductive cough beginning approximately 3 weeks glasses/Normal vision: sees adequately in most situations; can see medication labels, newsprint

## 2020-09-23 NOTE — CONSULTS
[facility]    Consult Note    Date:  9/23/2020  Date of Admission:  9/23/2020      Chief Complaint:   Elliott Wu is a(n) 62year old female with pleural effusion.     HPI:   The patient has a history of breast cancer status post mastectomy Right subclavian port of cath.    • MASTECTOMY,SIMPLE  2007     Family History   Problem Relation Age of Onset   • Neurological Disorder Other         neurofibromatosis   • Breast Cancer Mother 79        bilateral mastectomy   • Breast Cancer Maternal Aunt the lingula. If patient clinically has pneumonia, short interval follow-up CT scan in 4-6 weeks recommended after treatment for pneumonia.   4. Few small pulmonary nodules in the right lower lung some of which are stable relative to 207, and 1 which is new

## 2020-09-23 NOTE — PROCEDURES
[place] [facility] - [facility]  Procedure Note  20    Medhat Rose Patient Status:  Outpatient    3/1/1960 MRN [id number]   Location [place] [address] Attending Ivan Hernandez MD   Hosp Day # 0 PCP Maurice Mathews     Procedure: Ultrasound-

## 2020-09-24 ENCOUNTER — TELEPHONE (OUTPATIENT)
Dept: PULMONOLOGY | Facility: CLINIC | Age: 60
End: 2020-09-24

## 2020-09-24 DIAGNOSIS — R06.02 SOB (SHORTNESS OF BREATH): Primary | ICD-10-CM

## 2020-09-24 NOTE — TELEPHONE ENCOUNTER
Patient is calling in again to get test results. She would like to get the results as soon as possible.  Please advise thank you

## 2020-09-24 NOTE — TELEPHONE ENCOUNTER
RN, I spoke with the patient regarding results of her pleural fluid cytology which demonstrated findings consistent with metastatic breast cancer.   Please place orders for a PA and lateral chest x-ray to be performed on the day of her follow-up office visi

## 2020-09-25 ENCOUNTER — HOSPITAL ENCOUNTER (OUTPATIENT)
Dept: MAMMOGRAPHY | Facility: HOSPITAL | Age: 60
Discharge: HOME | End: 2020-09-25
Attending: INTERNAL MEDICINE
Payer: COMMERCIAL

## 2020-09-25 ENCOUNTER — LAB ENCOUNTER (OUTPATIENT)
Dept: LAB | Facility: HOSPITAL | Age: 60
End: 2020-09-25
Attending: RADIOLOGY
Payer: COMMERCIAL

## 2020-09-25 ENCOUNTER — APPOINTMENT (OUTPATIENT)
Dept: LAB | Facility: HOSPITAL | Age: 60
End: 2020-09-25
Attending: INTERNAL MEDICINE
Payer: COMMERCIAL

## 2020-09-25 DIAGNOSIS — R22.1 MASS OF RIGHT SIDE OF NECK: ICD-10-CM

## 2020-09-25 DIAGNOSIS — Z90.11 HISTORY OF RIGHT MASTECTOMY: ICD-10-CM

## 2020-09-25 DIAGNOSIS — Z17.0 MALIGNANT NEOPLASM OF UPPER-OUTER QUADRANT OF RIGHT BREAST IN FEMALE, ESTROGEN RECEPTOR POSITIVE (HCC): ICD-10-CM

## 2020-09-25 DIAGNOSIS — C50.411 MALIGNANT NEOPLASM OF UPPER-OUTER QUADRANT OF RIGHT BREAST IN FEMALE, ESTROGEN RECEPTOR POSITIVE (HCC): ICD-10-CM

## 2020-09-25 DIAGNOSIS — J91.0 MALIGNANT PLEURAL EFFUSION: ICD-10-CM

## 2020-09-25 DIAGNOSIS — Z01.812 PRE-PROCEDURAL LABORATORY EXAMINATION: ICD-10-CM

## 2020-09-25 DIAGNOSIS — Q85.00 CLINICAL NEUROFIBROMATOSIS (HCC): ICD-10-CM

## 2020-09-25 DIAGNOSIS — Z01.818 PRE-OP TESTING: ICD-10-CM

## 2020-09-25 PROCEDURE — 86300 IMMUNOASSAY TUMOR CA 15-3: CPT

## 2020-09-25 PROCEDURE — 85610 PROTHROMBIN TIME: CPT

## 2020-09-25 PROCEDURE — 77066 DX MAMMO INCL CAD BI: CPT | Performed by: INTERNAL MEDICINE

## 2020-09-25 PROCEDURE — 85730 THROMBOPLASTIN TIME PARTIAL: CPT

## 2020-09-25 PROCEDURE — 36415 COLL VENOUS BLD VENIPUNCTURE: CPT

## 2020-09-25 PROCEDURE — 77062 BREAST TOMOSYNTHESIS BI: CPT | Performed by: INTERNAL MEDICINE

## 2020-09-25 PROCEDURE — 83615 LACTATE (LD) (LDH) ENZYME: CPT

## 2020-09-25 PROCEDURE — 80053 COMPREHEN METABOLIC PANEL: CPT

## 2020-09-25 NOTE — TELEPHONE ENCOUNTER
Discussed Dr. Iglesia Rivers orders below w/ pt. She stts she spoke to oncologist this am & she is having mammogram now as well as blood work drawn. Scheduled pt f/u appt w/ Dr. Zuleima Ruiz on 10/26 4:45 pm [facility]. Appt info given.  Explained she will have to schedule CXR

## 2020-09-28 ENCOUNTER — HOSPITAL ENCOUNTER (OUTPATIENT)
Dept: INTERVENTIONAL RADIOLOGY/VASCULAR | Facility: HOSPITAL | Age: 60
Discharge: HOME | End: 2020-09-28
Attending: INTERNAL MEDICINE | Admitting: RADIOLOGY
Payer: COMMERCIAL

## 2020-09-28 VITALS
TEMPERATURE: 98 F | RESPIRATION RATE: 20 BRPM | SYSTOLIC BLOOD PRESSURE: 113 MMHG | OXYGEN SATURATION: 97 % | BODY MASS INDEX: 26 KG/M2 | DIASTOLIC BLOOD PRESSURE: 78 MMHG | HEART RATE: 75 BPM | WEIGHT: 135 LBS

## 2020-09-28 DIAGNOSIS — R22.2 SUPRACLAVICULAR MASS: ICD-10-CM

## 2020-09-28 DIAGNOSIS — Z01.818 PRE-OP TESTING: Primary | ICD-10-CM

## 2020-09-28 PROCEDURE — 36415 COLL VENOUS BLD VENIPUNCTURE: CPT

## 2020-09-28 PROCEDURE — 88305 TISSUE EXAM BY PATHOLOGIST: CPT | Performed by: INTERNAL MEDICINE

## 2020-09-28 PROCEDURE — 88341 IMHCHEM/IMCYTCHM EA ADD ANTB: CPT | Performed by: INTERNAL MEDICINE

## 2020-09-28 PROCEDURE — 38505 NEEDLE BIOPSY LYMPH NODES: CPT

## 2020-09-28 PROCEDURE — 88333 PATH CONSLTJ SURG CYTO XM 1: CPT | Performed by: INTERNAL MEDICINE

## 2020-09-28 PROCEDURE — 88360 TUMOR IMMUNOHISTOCHEM/MANUAL: CPT | Performed by: INTERNAL MEDICINE

## 2020-09-28 PROCEDURE — 76942 ECHO GUIDE FOR BIOPSY: CPT

## 2020-09-28 PROCEDURE — 88342 IMHCHEM/IMCYTCHM 1ST ANTB: CPT | Performed by: INTERNAL MEDICINE

## 2020-09-28 PROCEDURE — 07B13ZX EXCISION OF RIGHT NECK LYMPHATIC, PERCUTANEOUS APPROACH, DIAGNOSTIC: ICD-10-PCS | Performed by: RADIOLOGY

## 2020-09-28 PROCEDURE — 99152 MOD SED SAME PHYS/QHP 5/>YRS: CPT

## 2020-09-28 PROCEDURE — BW4FZZZ ULTRASONOGRAPHY OF NECK: ICD-10-PCS | Performed by: RADIOLOGY

## 2020-09-28 RX ORDER — MIDAZOLAM HYDROCHLORIDE 1 MG/ML
INJECTION INTRAMUSCULAR; INTRAVENOUS
Status: COMPLETED
Start: 2020-09-28 | End: 2020-09-28

## 2020-09-28 RX ORDER — MIDAZOLAM HYDROCHLORIDE 1 MG/ML
INJECTION INTRAMUSCULAR; INTRAVENOUS
Status: DISCONTINUED
Start: 2020-09-28 | End: 2020-09-28

## 2020-09-28 RX ORDER — SODIUM CHLORIDE 9 MG/ML
INJECTION, SOLUTION INTRAVENOUS CONTINUOUS
Status: DISCONTINUED | OUTPATIENT
Start: 2020-09-28 | End: 2020-09-28

## 2020-09-28 NOTE — INTERVAL H&P NOTE
The above referenced H&P was reviewed by Munir Baxter MD on 9/28/2020, the patient was examined and no significant changes have occurred in the patient's condition since the H&P was performed.   Risks, benefits, alternative treatments and consequences

## 2020-09-28 NOTE — PROCEDURES
[facility] - [facility]  Procedure Note    Arthurine Irvine Patient Status:  Outpatient in a Bed    3/1/1960 MRN [id number]   Location [facility] Attending Bettina Zacarias MD   Hosp Day # 0 PCP Shital Apodaca     Procedure:

## 2020-09-28 NOTE — PRE-SEDATION ASSESSMENT
[facility]  IR Pre-Procedure Sedation Assessment    History of snoring or sleep or apnea?    No    History of previous problems with anesthesia or sedation  No    Physical Findings:  Neck: Limited ROM due to right supraclavicular mass  CV: R

## 2020-09-29 ENCOUNTER — TELEPHONE (OUTPATIENT)
Dept: HEMATOLOGY/ONCOLOGY | Facility: HOSPITAL | Age: 60
End: 2020-09-29

## 2020-09-30 ENCOUNTER — TELEPHONE (OUTPATIENT)
Dept: HEMATOLOGY/ONCOLOGY | Facility: HOSPITAL | Age: 60
End: 2020-09-30

## 2020-09-30 ENCOUNTER — SOCIAL WORK SERVICES (OUTPATIENT)
Dept: HEMATOLOGY/ONCOLOGY | Facility: HOSPITAL | Age: 60
End: 2020-09-30

## 2020-09-30 NOTE — TELEPHONE ENCOUNTER
Answering service message from patient that she missed her MRI. She rescheduled to Monday 10/5, and is asking if there is any way  could help her to get in sooner.  Call back number  [phone]

## 2020-09-30 NOTE — TELEPHONE ENCOUNTER
Phoned patient.   Breast MRI scheduled for tomorrow at [address] patient to arrive at 7:30am.  Patient denies questions concerning exam.

## 2020-09-30 NOTE — PROGRESS NOTES
SW received paperwork from [facility] for pt's request of absence beginning 09/25/2020. The pt came to [facility] ED a few weeks ago on 09/18 for neck pain and throat swelling, and had several tests at that point and onward, also thoracentesis by [facility] 09/23.

## 2020-10-01 ENCOUNTER — HOSPITAL ENCOUNTER (OUTPATIENT)
Dept: MRI IMAGING | Facility: HOSPITAL | Age: 60
Discharge: HOME | End: 2020-10-01
Attending: INTERNAL MEDICINE
Payer: COMMERCIAL

## 2020-10-01 ENCOUNTER — GENETICS ENCOUNTER (OUTPATIENT)
Dept: GENETICS | Facility: HOSPITAL | Age: 60
End: 2020-10-01
Attending: INTERNAL MEDICINE
Payer: COMMERCIAL

## 2020-10-01 ENCOUNTER — SOCIAL WORK SERVICES (OUTPATIENT)
Dept: HEMATOLOGY/ONCOLOGY | Facility: HOSPITAL | Age: 60
End: 2020-10-01

## 2020-10-01 ENCOUNTER — NURSE ONLY (OUTPATIENT)
Dept: HEMATOLOGY/ONCOLOGY | Facility: HOSPITAL | Age: 60
End: 2020-10-01
Attending: INTERNAL MEDICINE
Payer: COMMERCIAL

## 2020-10-01 DIAGNOSIS — Z17.0 MALIGNANT NEOPLASM OF RIGHT BREAST IN FEMALE, ESTROGEN RECEPTOR POSITIVE, UNSPECIFIED SITE OF BREAST (HCC): ICD-10-CM

## 2020-10-01 DIAGNOSIS — Z17.0 MALIGNANT NEOPLASM OF UPPER-OUTER QUADRANT OF RIGHT BREAST IN FEMALE, ESTROGEN RECEPTOR POSITIVE (HCC): ICD-10-CM

## 2020-10-01 DIAGNOSIS — Z80.3 FAMILY HISTORY OF MALIGNANT NEOPLASM OF BREAST: ICD-10-CM

## 2020-10-01 DIAGNOSIS — C50.919 BREAST CANCER (HCC): Primary | ICD-10-CM

## 2020-10-01 DIAGNOSIS — C50.911 MALIGNANT NEOPLASM OF RIGHT BREAST IN FEMALE, ESTROGEN RECEPTOR POSITIVE, UNSPECIFIED SITE OF BREAST (HCC): ICD-10-CM

## 2020-10-01 DIAGNOSIS — C50.411 MALIGNANT NEOPLASM OF UPPER-OUTER QUADRANT OF RIGHT BREAST IN FEMALE, ESTROGEN RECEPTOR POSITIVE (HCC): ICD-10-CM

## 2020-10-01 DIAGNOSIS — J91.0 MALIGNANT PLEURAL EFFUSION: ICD-10-CM

## 2020-10-01 DIAGNOSIS — R22.1 MASS OF RIGHT SIDE OF NECK: ICD-10-CM

## 2020-10-01 PROCEDURE — A9575 INJ GADOTERATE MEGLUMI 0.1ML: HCPCS | Performed by: INTERNAL MEDICINE

## 2020-10-01 PROCEDURE — 36415 COLL VENOUS BLD VENIPUNCTURE: CPT

## 2020-10-01 PROCEDURE — 96040 HC GENETIC COUNSELING EA 30 MIN: CPT | Performed by: GENETIC COUNSELOR, MS

## 2020-10-01 PROCEDURE — 77049 MRI BREAST C-+ W/CAD BI: CPT | Performed by: INTERNAL MEDICINE

## 2020-10-01 NOTE — PROGRESS NOTES
Medical History: Ms. Waleska Rivers is a 60-year-old woman referred due to her recent diagnosis of metastatic breast cancer.   Ms. Waleska Rivers was diagnosed at age 47 with a right-sided ER+ breast cancer and underwent a unilateral mastectomy, reconstruction and chemothera aunt with breast cancer, a maternal uncle with bladder cancer, a maternal uncle with prostate cancer and two maternal cousins with cancer; one with breast cancer and the other with a childhood-onset leukemia.   On her paternal side, she reports her paternal gene mutations can exist in males and females and can be passed on to both male and female offspring.   We did review that genetic testing performed prior to 2013 is considered obsolete based upon additional genes that are now available for testing as well these mutations.   There are panels that assess for mutations in only “high risk” and clinically actionable breast cancer genes as well as larger panels that assess for mutations in high, moderate and unknown-penetrance breast cancer (and other cancer) gene follow-up counseling may be pursued at that time. 3. Recommendations for Ms. Carlin and family members will depend on above test results. Send to: Dr. Chelly Rivas  Time spent with patient: 50 minutes.

## 2020-10-01 NOTE — PROGRESS NOTES
SW met with the pt in the consultation room to discuss her [facility] paperwork. Pt states she has not been to work since 9/25 as she is waiting on results of her tests and a plan going forward.  Pt is wondering if she may need a surgery regarding the mass in her will be included in the adjustments to her paperwork for the leave of absence, as appropriate. Pt states she was at work when she got a call about some of the other results, and went home and \"manuel jocelyn'd\" and then called her family to discuss it also.

## 2020-10-02 ENCOUNTER — OFFICE VISIT (OUTPATIENT)
Dept: HEMATOLOGY/ONCOLOGY | Facility: HOSPITAL | Age: 60
End: 2020-10-02
Attending: INTERNAL MEDICINE
Payer: COMMERCIAL

## 2020-10-02 VITALS
DIASTOLIC BLOOD PRESSURE: 88 MMHG | OXYGEN SATURATION: 95 % | WEIGHT: 132 LBS | HEIGHT: 61 IN | BODY MASS INDEX: 24.92 KG/M2 | TEMPERATURE: 97 F | HEART RATE: 110 BPM | SYSTOLIC BLOOD PRESSURE: 123 MMHG | RESPIRATION RATE: 16 BRPM

## 2020-10-02 VITALS
BODY MASS INDEX: 25 KG/M2 | SYSTOLIC BLOOD PRESSURE: 123 MMHG | RESPIRATION RATE: 16 BRPM | DIASTOLIC BLOOD PRESSURE: 88 MMHG | OXYGEN SATURATION: 95 % | TEMPERATURE: 97 F | WEIGHT: 132 LBS | HEART RATE: 110 BPM

## 2020-10-02 DIAGNOSIS — C50.811 MALIGNANT NEOPLASM OF OVERLAPPING SITES OF RIGHT BREAST IN FEMALE, ESTROGEN RECEPTOR POSITIVE (HCC): ICD-10-CM

## 2020-10-02 DIAGNOSIS — Z90.11 HISTORY OF RIGHT MASTECTOMY: ICD-10-CM

## 2020-10-02 DIAGNOSIS — J91.0 MALIGNANT PLEURAL EFFUSION: Primary | ICD-10-CM

## 2020-10-02 DIAGNOSIS — R22.1 MASS OF RIGHT SIDE OF NECK: ICD-10-CM

## 2020-10-02 DIAGNOSIS — C50.919 METASTATIC BREAST CANCER (HCC): ICD-10-CM

## 2020-10-02 DIAGNOSIS — J91.0 MALIGNANT PLEURAL EFFUSION: ICD-10-CM

## 2020-10-02 DIAGNOSIS — C50.411 MALIGNANT NEOPLASM OF UPPER-OUTER QUADRANT OF RIGHT BREAST IN FEMALE, ESTROGEN RECEPTOR POSITIVE (HCC): Primary | ICD-10-CM

## 2020-10-02 DIAGNOSIS — Z17.0 MALIGNANT NEOPLASM OF UPPER-OUTER QUADRANT OF RIGHT BREAST IN FEMALE, ESTROGEN RECEPTOR POSITIVE (HCC): Primary | ICD-10-CM

## 2020-10-02 DIAGNOSIS — Z17.0 MALIGNANT NEOPLASM OF OVERLAPPING SITES OF RIGHT BREAST IN FEMALE, ESTROGEN RECEPTOR POSITIVE (HCC): ICD-10-CM

## 2020-10-02 DIAGNOSIS — Q85.00 CLINICAL NEUROFIBROMATOSIS (HCC): ICD-10-CM

## 2020-10-02 DIAGNOSIS — Z45.2 ENCOUNTER FOR ADJUSTMENT AND MANAGEMENT OF VASCULAR ACCESS DEVICE: ICD-10-CM

## 2020-10-02 PROCEDURE — 99214 OFFICE O/P EST MOD 30 MIN: CPT | Performed by: INTERNAL MEDICINE

## 2020-10-02 PROCEDURE — 96523 IRRIG DRUG DELIVERY DEVICE: CPT

## 2020-10-02 PROCEDURE — 96402 CHEMO HORMON ANTINEOPL SQ/IM: CPT

## 2020-10-02 RX ORDER — 0.9 % SODIUM CHLORIDE 0.9 %
10 VIAL (ML) INJECTION ONCE
Status: CANCELLED | OUTPATIENT
Start: 2020-10-02

## 2020-10-02 RX ORDER — LAMOTRIGINE 25 MG/1
500 TABLET ORAL ONCE
Status: COMPLETED | OUTPATIENT
Start: 2020-10-02 | End: 2020-10-02

## 2020-10-02 RX ORDER — HEPARIN SODIUM (PORCINE) LOCK FLUSH IV SOLN 100 UNIT/ML 100 UNIT/ML
5 SOLUTION INTRAVENOUS ONCE
Status: COMPLETED | OUTPATIENT
Start: 2020-10-02 | End: 2020-10-02

## 2020-10-02 RX ORDER — BENZONATATE 100 MG/1
100 CAPSULE ORAL
Qty: 90 CAPSULE | Refills: 0 | Status: SHIPPED | OUTPATIENT
Start: 2020-10-02 | End: 2020-10-13

## 2020-10-02 RX ORDER — LAMOTRIGINE 25 MG/1
500 TABLET ORAL ONCE
Status: CANCELLED | OUTPATIENT
Start: 2020-10-02

## 2020-10-02 RX ORDER — HEPARIN SODIUM (PORCINE) LOCK FLUSH IV SOLN 100 UNIT/ML 100 UNIT/ML
SOLUTION INTRAVENOUS
Status: DISCONTINUED
Start: 2020-10-02 | End: 2020-10-02

## 2020-10-02 RX ORDER — HYDROCODONE POLISTIREX AND CHLORPHENIRAMINE POLISTIREX 10; 8 MG/5ML; MG/5ML
5 SUSPENSION, EXTENDED RELEASE ORAL 2 TIMES DAILY
Qty: 300 ML | Refills: 0 | Status: SHIPPED | OUTPATIENT
Start: 2020-10-02 | End: 2020-11-01

## 2020-10-02 RX ORDER — HEPARIN SODIUM (PORCINE) LOCK FLUSH IV SOLN 100 UNIT/ML 100 UNIT/ML
5 SOLUTION INTRAVENOUS ONCE
Status: CANCELLED | OUTPATIENT
Start: 2020-10-02

## 2020-10-02 RX ORDER — LAMOTRIGINE 25 MG/1
500 TABLET ORAL ONCE
Status: CANCELLED | OUTPATIENT
Start: 2020-10-16

## 2020-10-02 RX ORDER — 0.9 % SODIUM CHLORIDE 0.9 %
VIAL (ML) INJECTION
Status: DISCONTINUED
Start: 2020-10-02 | End: 2020-10-02

## 2020-10-02 RX ADMIN — HEPARIN SODIUM (PORCINE) LOCK FLUSH IV SOLN 100 UNIT/ML 500 UNITS: 100 SOLUTION INTRAVENOUS at 12:19:00

## 2020-10-02 RX ADMIN — LAMOTRIGINE 500 MG: 25 TABLET ORAL at 12:19:00

## 2020-10-02 NOTE — PROGRESS NOTES
Pt here for C1 D1 Faslodex and Port flush  Pt is an ortho RN here at [employer]  She was told briefly about Faslodex from the dr.  Has had her port x 10 years, last flush about 10 yrs ago as well.     Port flushed easily - no blood return despite flushing with 40m

## 2020-10-06 ENCOUNTER — SOCIAL WORK SERVICES (OUTPATIENT)
Dept: HEMATOLOGY/ONCOLOGY | Facility: HOSPITAL | Age: 60
End: 2020-10-06

## 2020-10-06 ENCOUNTER — TELEPHONE (OUTPATIENT)
Dept: HEMATOLOGY/ONCOLOGY | Facility: HOSPITAL | Age: 60
End: 2020-10-06

## 2020-10-06 ENCOUNTER — HOSPITAL ENCOUNTER (OUTPATIENT)
Dept: CT IMAGING | Facility: HOSPITAL | Age: 60
Discharge: HOME | End: 2020-10-06
Attending: INTERNAL MEDICINE
Payer: COMMERCIAL

## 2020-10-06 DIAGNOSIS — Z17.0 MALIGNANT NEOPLASM OF UPPER-OUTER QUADRANT OF RIGHT BREAST IN FEMALE, ESTROGEN RECEPTOR POSITIVE (HCC): Primary | ICD-10-CM

## 2020-10-06 DIAGNOSIS — C50.919 METASTATIC BREAST CANCER (HCC): ICD-10-CM

## 2020-10-06 DIAGNOSIS — C50.411 MALIGNANT NEOPLASM OF UPPER-OUTER QUADRANT OF RIGHT BREAST IN FEMALE, ESTROGEN RECEPTOR POSITIVE (HCC): ICD-10-CM

## 2020-10-06 DIAGNOSIS — J91.0 MALIGNANT PLEURAL EFFUSION: ICD-10-CM

## 2020-10-06 DIAGNOSIS — C50.411 MALIGNANT NEOPLASM OF UPPER-OUTER QUADRANT OF RIGHT BREAST IN FEMALE, ESTROGEN RECEPTOR POSITIVE (HCC): Primary | ICD-10-CM

## 2020-10-06 DIAGNOSIS — Z17.0 MALIGNANT NEOPLASM OF UPPER-OUTER QUADRANT OF RIGHT BREAST IN FEMALE, ESTROGEN RECEPTOR POSITIVE (HCC): ICD-10-CM

## 2020-10-06 PROCEDURE — 74177 CT ABD & PELVIS W/CONTRAST: CPT | Performed by: INTERNAL MEDICINE

## 2020-10-06 RX ORDER — MIRTAZAPINE 7.5 MG/1
7.5 TABLET, FILM COATED ORAL NIGHTLY
Qty: 30 TABLET | Refills: 3 | Status: SHIPPED | OUTPATIENT
Start: 2020-10-06 | End: 2020-10-20

## 2020-10-06 NOTE — PROGRESS NOTES
10/6: XIOMARA spoke with Oncologist, Onc RN, and RN Breast Navigator for current plans. It is currently indicated the pt will have oral chemotherapy Evelene Bhakta) and immunotherapy injections (faslodex). There is no current plan for surgery or radiation.  There are

## 2020-10-06 NOTE — TELEPHONE ENCOUNTER
Phoned patient regarding care coordination. Dr. Agnieszka Paez has placed orders for CT abdomen and pelvis. This exam has been authorized by insurance. Discussed scheduling. Patient asking if today is possible as she is going out of town.   Phoned Radiology a

## 2020-10-06 NOTE — TELEPHONE ENCOUNTER
Call to Ashley Hair to discuss the CT scan with 2 small liver lesions.     Dr. Roxy Major to review the CT tomorrow -  MRI with and without ordered presuming the biopsies would be difficult   palbociclib therapy ordered and pending   Patient will be taking time off

## 2020-10-08 ENCOUNTER — TELEPHONE (OUTPATIENT)
Dept: HEMATOLOGY/ONCOLOGY | Facility: HOSPITAL | Age: 60
End: 2020-10-08

## 2020-10-08 NOTE — TELEPHONE ENCOUNTER
[place] Must [employer] called asking for the Dx code and to clarify the cycle for the Ibrance.

## 2020-10-09 ENCOUNTER — APPOINTMENT (OUTPATIENT)
Dept: LAB | Age: 60
End: 2020-10-09
Attending: RADIOLOGY
Payer: COMMERCIAL

## 2020-10-09 DIAGNOSIS — Z01.818 PRE-OP TESTING: ICD-10-CM

## 2020-10-12 ENCOUNTER — TELEPHONE (OUTPATIENT)
Dept: HEMATOLOGY/ONCOLOGY | Facility: HOSPITAL | Age: 60
End: 2020-10-12

## 2020-10-12 ENCOUNTER — HOSPITAL ENCOUNTER (OUTPATIENT)
Dept: INTERVENTIONAL RADIOLOGY/VASCULAR | Facility: HOSPITAL | Age: 60
Discharge: HOME | End: 2020-10-12
Attending: RADIOLOGY | Admitting: RADIOLOGY
Payer: COMMERCIAL

## 2020-10-12 VITALS
TEMPERATURE: 99 F | HEART RATE: 88 BPM | OXYGEN SATURATION: 93 % | WEIGHT: 132 LBS | DIASTOLIC BLOOD PRESSURE: 64 MMHG | RESPIRATION RATE: 20 BRPM | BODY MASS INDEX: 25 KG/M2 | SYSTOLIC BLOOD PRESSURE: 97 MMHG

## 2020-10-12 VITALS
DIASTOLIC BLOOD PRESSURE: 68 MMHG | OXYGEN SATURATION: 95 % | HEART RATE: 85 BPM | RESPIRATION RATE: 18 BRPM | SYSTOLIC BLOOD PRESSURE: 100 MMHG

## 2020-10-12 DIAGNOSIS — J90 CHRONIC BILATERAL PLEURAL EFFUSIONS: ICD-10-CM

## 2020-10-12 DIAGNOSIS — K76.9 LIVER LESION: ICD-10-CM

## 2020-10-12 DIAGNOSIS — Z01.818 PRE-OP TESTING: Primary | ICD-10-CM

## 2020-10-12 PROCEDURE — 88333 PATH CONSLTJ SURG CYTO XM 1: CPT | Performed by: INTERNAL MEDICINE

## 2020-10-12 PROCEDURE — 99152 MOD SED SAME PHYS/QHP 5/>YRS: CPT

## 2020-10-12 PROCEDURE — BF45ZZZ ULTRASONOGRAPHY OF LIVER: ICD-10-PCS | Performed by: RADIOLOGY

## 2020-10-12 PROCEDURE — 47000 NEEDLE BIOPSY OF LIVER PERQ: CPT

## 2020-10-12 PROCEDURE — 88313 SPECIAL STAINS GROUP 2: CPT | Performed by: INTERNAL MEDICINE

## 2020-10-12 PROCEDURE — BB4BZZZ ULTRASONOGRAPHY OF PLEURA: ICD-10-PCS | Performed by: RADIOLOGY

## 2020-10-12 PROCEDURE — 76942 ECHO GUIDE FOR BIOPSY: CPT

## 2020-10-12 PROCEDURE — 0FD23ZX EXTRACTION OF LEFT LOBE LIVER, PERCUTANEOUS APPROACH, DIAGNOSTIC: ICD-10-PCS | Performed by: RADIOLOGY

## 2020-10-12 PROCEDURE — 88307 TISSUE EXAM BY PATHOLOGIST: CPT | Performed by: INTERNAL MEDICINE

## 2020-10-12 PROCEDURE — 32555 ASPIRATE PLEURA W/ IMAGING: CPT

## 2020-10-12 PROCEDURE — 0W9B3ZZ DRAINAGE OF LEFT PLEURAL CAVITY, PERCUTANEOUS APPROACH: ICD-10-PCS | Performed by: RADIOLOGY

## 2020-10-12 RX ORDER — HEPARIN SODIUM (PORCINE) LOCK FLUSH IV SOLN 100 UNIT/ML 100 UNIT/ML
SOLUTION INTRAVENOUS
Status: COMPLETED
Start: 2020-10-12 | End: 2020-10-12

## 2020-10-12 RX ORDER — SODIUM CHLORIDE 9 MG/ML
INJECTION, SOLUTION INTRAVENOUS CONTINUOUS
Status: DISCONTINUED | OUTPATIENT
Start: 2020-10-12 | End: 2020-10-12

## 2020-10-12 RX ORDER — MIDAZOLAM HYDROCHLORIDE 1 MG/ML
INJECTION INTRAMUSCULAR; INTRAVENOUS
Status: DISCONTINUED
Start: 2020-10-12 | End: 2020-10-12

## 2020-10-12 RX ADMIN — SODIUM CHLORIDE: 9 INJECTION, SOLUTION INTRAVENOUS at 11:00:00

## 2020-10-12 RX ADMIN — HEPARIN SODIUM (PORCINE) LOCK FLUSH IV SOLN 100 UNIT/ML 500 UNITS: 100 SOLUTION INTRAVENOUS at 13:45:00

## 2020-10-12 NOTE — PROCEDURES
[facility] - [facility]  Procedure Note    Loyd Heck Patient Status:  Outpatient in a Bed    3/1/1960 MRN [id number]   Location [facility] Attending Mary Falk MD   Hosp Day # 0 PCP AMAURI ERNANDEZ     Proced

## 2020-10-12 NOTE — PRE-SEDATION ASSESSMENT
[place] [facility]  IR Pre-Procedure Sedation Assessment    History of snoring or sleep or apnea?    No    History of previous problems with anesthesia or sedation  No    Physical Findings:  Neck: nl ROM  CV: RRR  PULM: normal respiratory rate/effor

## 2020-10-12 NOTE — INTERVAL H&P NOTE
Liver lesion now visualized on CT. Dr. Suzy Anand requests biopsy of liver lesion as it would alter planned medical therapy.     The above referenced H&P was reviewed by Jose Guadalupe Ace MD on 10/12/2020, the patient was examined and no significant changes h

## 2020-10-12 NOTE — PROGRESS NOTES
Discharge instructions reviewed with patient at this time. All questions and concerns addressed. All parties verbalized understanding of plan of care via teach back method.  At discharge vital signs were stable on room air, incision dressing was clean, dry,

## 2020-10-12 NOTE — TELEPHONE ENCOUNTER
Call to Domi Davis regarding the verbal report of positive pathology from the liver biopsy as well as the cytology from her lung and the supraclavicular node.     She will be seen tomorrow with her brother 10AM.

## 2020-10-12 NOTE — PROCEDURES
[facility] HOSP - [facility]  Procedure Note    Whitney Haile Patient Status:  Outpatient in a Bed    3/1/1960 MRN [id number]   Location [facility] Attending Rustam Wong MD   Hosp Day # 0 PCP AMAURI ERNANDEZ     Proced

## 2020-10-12 NOTE — INTERVAL H&P NOTE
The above referenced H&P was reviewed by Debbie English MD on 10/12/2020, the patient was examined and no significant changes have occurred in the patient's condition since the H&P was performed.   Risks, benefits, alternative treatments and consequence

## 2020-10-12 NOTE — PROGRESS NOTES
Patient in IR for Thoracentesis completed in holding rm 8. Timeout/universal protocol completed. 5ml of 2% lidocaine given by JORDAN Wise MD to Left mid back. Patient monitored, VSS during procedure. 800 ml of fluid removed.  Patient tolerated procedur

## 2020-10-13 ENCOUNTER — TELEPHONE (OUTPATIENT)
Dept: HEMATOLOGY/ONCOLOGY | Facility: HOSPITAL | Age: 60
End: 2020-10-13

## 2020-10-13 ENCOUNTER — OFFICE VISIT (OUTPATIENT)
Dept: HEMATOLOGY/ONCOLOGY | Facility: HOSPITAL | Age: 60
End: 2020-10-13
Attending: INTERNAL MEDICINE
Payer: COMMERCIAL

## 2020-10-13 VITALS
HEIGHT: 60.98 IN | WEIGHT: 133 LBS | RESPIRATION RATE: 20 BRPM | DIASTOLIC BLOOD PRESSURE: 85 MMHG | OXYGEN SATURATION: 97 % | BODY MASS INDEX: 25.11 KG/M2 | HEART RATE: 94 BPM | TEMPERATURE: 98 F | SYSTOLIC BLOOD PRESSURE: 164 MMHG

## 2020-10-13 DIAGNOSIS — J91.0 MALIGNANT PLEURAL EFFUSION: ICD-10-CM

## 2020-10-13 DIAGNOSIS — Q85.00 CLINICAL NEUROFIBROMATOSIS (HCC): ICD-10-CM

## 2020-10-13 DIAGNOSIS — C50.919 METASTATIC BREAST CANCER (HCC): ICD-10-CM

## 2020-10-13 DIAGNOSIS — R22.1 MASS OF RIGHT SIDE OF NECK: ICD-10-CM

## 2020-10-13 DIAGNOSIS — Z17.0 MALIGNANT NEOPLASM OF UPPER-OUTER QUADRANT OF RIGHT BREAST IN FEMALE, ESTROGEN RECEPTOR POSITIVE (HCC): Primary | ICD-10-CM

## 2020-10-13 DIAGNOSIS — C50.411 MALIGNANT NEOPLASM OF UPPER-OUTER QUADRANT OF RIGHT BREAST IN FEMALE, ESTROGEN RECEPTOR POSITIVE (HCC): Primary | ICD-10-CM

## 2020-10-13 DIAGNOSIS — Z90.11 HISTORY OF RIGHT MASTECTOMY: ICD-10-CM

## 2020-10-13 PROCEDURE — 99214 OFFICE O/P EST MOD 30 MIN: CPT | Performed by: INTERNAL MEDICINE

## 2020-10-13 RX ORDER — BENZONATATE 100 MG/1
100 CAPSULE ORAL
Qty: 90 CAPSULE | Refills: 3 | Status: SHIPPED | OUTPATIENT
Start: 2020-10-13 | End: 2020-12-24 | Stop reason: ALTCHOICE

## 2020-10-13 NOTE — TELEPHONE ENCOUNTER
Spoke with Jessica Blackman, let her know that her faslodex has been moved to 10/16 at 9 am-- MDV for Monday cancelled. Let pt know script for ibrance has been moved to [facility]-- she will call if she does not hear anything re: copay/delivery.

## 2020-10-13 NOTE — PROGRESS NOTES
HPI   10/2/2020  Kathryn Carias is a [address] old female with metastatic breast cancer as documented by biopsies of the right neck mass, cytology from left pleural fluid, and liver biopsy.   Patient is seen with her brother today to review treatment opti nodular density in the left upper outer quadrant  Ultrasound -highly suspicious areas in the right breast upper outer quadrant near the nipple     9/11/2007 Biopsy    Right breast aspirate and core biopsy -ductal carcinoma in situ, solid type     9/26/2007 Taxotere plus Cytoxan 4 cycles     4/4/2008 Surgery    Plastic surgery with removal of the right expander implant placement with silicone gel implant, left breast reconstruction with silicone implant, reduction of left nipple complex     9/18/2020 Progress carcinoma with metastasis to three axillary lymph nodes (pT1,N1) ([id number]; 11/16/07).   More recent examination of the patient's left pleural effusion ([id number]; 9/23/20) demonstrated metastatic adenocarcinoma consistent with the patient's known breast pr day. Patient has a malignant pleural effusion. 300 mL 0   • Cholecalciferol (VITAMIN D3) 250 MCG (77969 UT) Oral Cap Take 1 capsule by mouth daily. • ibuprofen 600 MG Oral Tab Take 600 mg by mouth every 6 (six) hours as needed for Pain.      • atorvasta tumor rt foot / excision/excision of bone spur/arthrodesis w/screw fixation   • IR PORT A CATH PROCEDURE  2007    Right subclavian port of cath.    • MASTECTOMY RIGHT      2012   • [facility]  2007     Social History    Socioeconomic History Concern: Not Asked        Stress Concern: Not Asked        Weight Concern: Not Asked        Special Diet: Not Asked        Back Care: Not Asked        Exercise: Not Asked        Bike Helmet: Not Asked        Seat Belt: Not Asked        Self-Exams: Not Aske Left breast exhibits no inverted nipple, no mass, no nipple discharge and no tenderness. Breasts are asymmetrical.   Decreased breath sounds in the bases left >>> right   Abdominal: Soft. Bowel sounds are normal. She exhibits no distension and no mass.  The chemotherapy treatment versus continuing hormonal therapy. Patient has not received the palbociclib and will be scheduled for the second dose of fulvestrant on 10/16/2020. Patient saw Riri Lee on 10/2/2020 and genetic testing is pending.     Mamm Dioxide, Total      21.0 - 32.0 mmol/L 28.0   BUN      7 - 18 mg/dL 14   CREATININE      0.55 - 1.02 mg/dL 1.02   CALCIUM      8.5 - 10.1 mg/dL 9.4   BUN/CREAT Ratio      10.0 - 20.0 13.7   ANION GAP      0 - 18 mmol/L 6   CALCULATED OSMOLALITY      275 - evaluated, follow-up thyroid ultrasound recommended. 9. Right Port-A-Cath with tip near the RA SVC junction. 10. Soft tissue fullness in the right supraclavicular region which is incompletely evaluated at the edge of the field of view.   Correlate with neck

## 2020-10-13 NOTE — PROGRESS NOTES
HPI   10/2/2020  Clement Zuleta is a 62year old female who presents with a history of right neck swelling and nonproductive cough beginning approximately 3 weeks ago.   Patient saw her primary care physician Dr. Mary Ann Suh and was scheduled to have C washout suspicious for residual neoplasm at the margins.   8 x 7.3 mm enhancing mass superior to the lumpectomy site in the 12 o'clock position mid right breast suspicious contrast-enhancement indeterminate [facility])     11/16/2007 Surgery    Completion right m drainable subcutaneous abscess. 3. Stable multinodular thyroid gland. 4. Development of findings at the visualized lung apices concerning for CHF/fluid overload. 5. Lesser incidental findings as above.            9/23/2020 Biopsy    Left pleural fluid; Right foot and ankle swelling   Gastrointestinal: Negative for constipation, diarrhea, nausea and vomiting. Genitourinary: Negative for dysuria and hematuria. \"Bladder leak\"   Musculoskeletal: Positive for arthralgias.         Right ankle in female    • Hyperlipidemia    • Malignant neoplasm of overlapping sites of breast in female, estrogen receptor positive ([facility] [place] [address] 1/16/2015   • Malignant neoplasm of upper-outer quadrant of right breast in female, estrogen receptor positive ([facility] [place] [address] 1/16/2015 Lifestyle      Physical activity        Days per week: Not on file        Minutes per session: Not on file      Stress: Not on file    Relationships      Social connections        Talks on phone: Not on file        Gets together: Not on file        Attends Position: Sitting, Cuff Size: adult)   Pulse 110   Temp 97.2 °F (36.2 °C) (Temporal)   Resp 16   Ht 1.549 m (5' 1\")   Wt 59.9 kg (132 lb)   SpO2 95%   BMI 24.94 kg/m²     Physical Exam   Constitutional: She is oriented to person, place, and time.  She appe had an increasing nonproductive cough. The CT scan of the chest on 9/18/2020 revealed masslike region of the consolidation in the lingula, interstitial edema with left greater than right pleural effusions and no evidence for pulmonary embolus.       Pathol VOLUME      7.4 - 10.3 fL    Glucose      70 - 99 mg/dL 84   Sodium      136 - 145 mmol/L 142   Potassium      3.5 - 5.1 mmol/L 4.2   Chloride      98 - 112 mmol/L 108   Carbon Dioxide, Total      21.0 - 32.0 mmol/L 28.0   BUN      7 - 18 mg/dL 14   CREATI compatible with neurofibromatosis. 8. Multiple thyroid nodules with the largest nodule in the thyroid isthmus measuring 1.9 cm and previously measuring 1.1 cm. Not previously evaluated, follow-up thyroid ultrasound recommended.  9. Right Port-A-Cath with t

## 2020-10-13 NOTE — PATIENT INSTRUCTIONS
Faslodex injection to be moved to 10/16    Please call when ibrance delivery is scheduled-- or if you have any issues with this process

## 2020-10-15 ENCOUNTER — SOCIAL WORK SERVICES (OUTPATIENT)
Dept: HEMATOLOGY/ONCOLOGY | Facility: HOSPITAL | Age: 60
End: 2020-10-15

## 2020-10-15 NOTE — PROGRESS NOTES
10/15: [facility] receipt for new paperwork. SW placed callback to [facility] [phone] who stated the pt requested leave until 11/30.  New paperwork obtained and drafted with the updated diagnoses of:

## 2020-10-16 ENCOUNTER — TELEPHONE (OUTPATIENT)
Dept: HEMATOLOGY/ONCOLOGY | Facility: HOSPITAL | Age: 60
End: 2020-10-16

## 2020-10-16 ENCOUNTER — NURSE ONLY (OUTPATIENT)
Dept: HEMATOLOGY/ONCOLOGY | Facility: HOSPITAL | Age: 60
End: 2020-10-16
Attending: INTERNAL MEDICINE
Payer: COMMERCIAL

## 2020-10-16 VITALS
HEART RATE: 89 BPM | TEMPERATURE: 98 F | OXYGEN SATURATION: 98 % | SYSTOLIC BLOOD PRESSURE: 131 MMHG | DIASTOLIC BLOOD PRESSURE: 90 MMHG | RESPIRATION RATE: 16 BRPM

## 2020-10-16 DIAGNOSIS — J91.0 MALIGNANT PLEURAL EFFUSION: Primary | ICD-10-CM

## 2020-10-16 DIAGNOSIS — Z17.0 MALIGNANT NEOPLASM OF OVERLAPPING SITES OF RIGHT BREAST IN FEMALE, ESTROGEN RECEPTOR POSITIVE (HCC): ICD-10-CM

## 2020-10-16 DIAGNOSIS — R22.1 MASS OF RIGHT SIDE OF NECK: ICD-10-CM

## 2020-10-16 DIAGNOSIS — C50.811 MALIGNANT NEOPLASM OF OVERLAPPING SITES OF RIGHT BREAST IN FEMALE, ESTROGEN RECEPTOR POSITIVE (HCC): ICD-10-CM

## 2020-10-16 DIAGNOSIS — Z90.11 HISTORY OF RIGHT MASTECTOMY: ICD-10-CM

## 2020-10-16 PROCEDURE — 96523 IRRIG DRUG DELIVERY DEVICE: CPT

## 2020-10-16 PROCEDURE — 96402 CHEMO HORMON ANTINEOPL SQ/IM: CPT

## 2020-10-16 RX ORDER — 0.9 % SODIUM CHLORIDE 0.9 %
VIAL (ML) INJECTION
Status: DISCONTINUED
Start: 2020-10-16 | End: 2020-10-16

## 2020-10-16 RX ORDER — LAMOTRIGINE 25 MG/1
500 TABLET ORAL ONCE
Status: COMPLETED | OUTPATIENT
Start: 2020-10-16 | End: 2020-10-16

## 2020-10-16 RX ADMIN — LAMOTRIGINE 500 MG: 25 TABLET ORAL at 09:18:00

## 2020-10-16 NOTE — PROGRESS NOTES
Pt here for C1 D15 Faslodex and Port flush    Michaelsoni Pily arrived ambulatory, she reports SOB which is baseline for her. Oxygen Sats 97%   She had concerns about not receiving her Ibrance  via mail order. Contact Laura Walker Rn.  She will follow up and contac

## 2020-10-16 NOTE — TELEPHONE ENCOUNTER
Called [facility]-- was on hold for 1 hr 29 mins prior to being disconnected-- called again -- spoke with pharmacist and verified script-- 3 refills-- states pt will be called in 24 hours to set up copay assist/delivery.

## 2020-10-19 ENCOUNTER — APPOINTMENT (OUTPATIENT)
Dept: HEMATOLOGY/ONCOLOGY | Facility: HOSPITAL | Age: 60
End: 2020-10-19
Attending: INTERNAL MEDICINE
Payer: COMMERCIAL

## 2020-10-19 ENCOUNTER — TELEPHONE (OUTPATIENT)
Dept: GENETICS | Facility: HOSPITAL | Age: 60
End: 2020-10-19

## 2020-10-19 ENCOUNTER — HOSPITAL ENCOUNTER (OUTPATIENT)
Dept: INTERVENTIONAL RADIOLOGY/VASCULAR | Facility: HOSPITAL | Age: 60
Discharge: HOME | End: 2020-10-19
Attending: RADIOLOGY | Admitting: RADIOLOGY
Payer: COMMERCIAL

## 2020-10-19 VITALS
WEIGHT: 130 LBS | HEART RATE: 88 BPM | DIASTOLIC BLOOD PRESSURE: 70 MMHG | BODY MASS INDEX: 24.55 KG/M2 | RESPIRATION RATE: 16 BRPM | HEIGHT: 61 IN | TEMPERATURE: 98 F | SYSTOLIC BLOOD PRESSURE: 106 MMHG | OXYGEN SATURATION: 94 %

## 2020-10-19 DIAGNOSIS — J90 PLEURAL EFFUSION: ICD-10-CM

## 2020-10-19 PROCEDURE — 99153 MOD SED SAME PHYS/QHP EA: CPT

## 2020-10-19 PROCEDURE — 99152 MOD SED SAME PHYS/QHP 5/>YRS: CPT

## 2020-10-19 PROCEDURE — 32550 INSERT PLEURAL CATH: CPT

## 2020-10-19 PROCEDURE — 75989 ABSCESS DRAINAGE UNDER X-RAY: CPT

## 2020-10-19 PROCEDURE — 0W9B00Z DRAINAGE OF LEFT PLEURAL CAVITY WITH DRAINAGE DEVICE, OPEN APPROACH: ICD-10-PCS | Performed by: RADIOLOGY

## 2020-10-19 RX ORDER — HYDROCODONE BITARTRATE AND ACETAMINOPHEN 7.5; 325 MG/1; MG/1
TABLET ORAL
Status: DISCONTINUED
Start: 2020-10-19 | End: 2020-10-19

## 2020-10-19 RX ORDER — HYDROCODONE BITARTRATE AND ACETAMINOPHEN 7.5; 325 MG/1; MG/1
1 TABLET ORAL EVERY 6 HOURS PRN
Status: DISCONTINUED | OUTPATIENT
Start: 2020-10-19 | End: 2020-10-19

## 2020-10-19 RX ORDER — HYDROCODONE BITARTRATE AND ACETAMINOPHEN 10; 325 MG/1; MG/1
1 TABLET ORAL EVERY 4 HOURS PRN
Status: DISCONTINUED | OUTPATIENT
Start: 2020-10-19 | End: 2020-10-19

## 2020-10-19 RX ORDER — MIDAZOLAM HYDROCHLORIDE 1 MG/ML
INJECTION INTRAMUSCULAR; INTRAVENOUS
Status: COMPLETED
Start: 2020-10-19 | End: 2020-10-19

## 2020-10-19 RX ORDER — HEPARIN SODIUM (PORCINE) LOCK FLUSH IV SOLN 100 UNIT/ML 100 UNIT/ML
SOLUTION INTRAVENOUS
Status: DISCONTINUED
Start: 2020-10-19 | End: 2020-10-19

## 2020-10-19 RX ORDER — HYDROCODONE BITARTRATE AND ACETAMINOPHEN 5; 325 MG/1; MG/1
1-2 TABLET ORAL EVERY 6 HOURS PRN
Qty: 20 TABLET | Refills: 0 | Status: SHIPPED | OUTPATIENT
Start: 2020-10-19 | End: 2020-10-20

## 2020-10-19 RX ORDER — LIDOCAINE HYDROCHLORIDE 20 MG/ML
INJECTION, SOLUTION EPIDURAL; INFILTRATION; INTRACAUDAL; PERINEURAL
Status: COMPLETED
Start: 2020-10-19 | End: 2020-10-19

## 2020-10-19 RX ORDER — SODIUM CHLORIDE 9 MG/ML
INJECTION, SOLUTION INTRAVENOUS CONTINUOUS
Status: DISCONTINUED | OUTPATIENT
Start: 2020-10-19 | End: 2020-10-19

## 2020-10-19 RX ADMIN — HYDROCODONE BITARTRATE AND ACETAMINOPHEN 1 TABLET: 7.5; 325 TABLET ORAL at 15:47:00

## 2020-10-19 NOTE — TELEPHONE ENCOUNTER
LM for Kirby Merida that her genetic testing results have yielded negative testing results for all 35 genes studied. She opted for a myRisk panel through [employer]. I will mail her a copy of these results. Asked her to call me with any questions.

## 2020-10-19 NOTE — PRE-SEDATION ASSESSMENT
[facility]  IR Pre-Procedure Sedation Assessment    History of snoring or sleep or apnea?    No    History of previous problems with anesthesia or sedation  No    Physical Findings:  Neck: nl ROM  CV: RRR  PULM: diminished L side breath soun

## 2020-10-19 NOTE — INTERVAL H&P NOTE
The above referenced H&P was reviewed by Gunnar Taylor MD on 10/19/2020, the patient was examined and no significant changes have occurred in the patient's condition since the H&P was performed.   Risks, benefits, alternative treatments and consequence

## 2020-10-19 NOTE — PROCEDURES
[facility] - [facility]  Procedure Note    Junior Holliday Patient Status:  Outpatient in a Bed    3/1/1960 MRN [id number]   Location [facility] Attending Anika Rocha MD   Hosp Day # 0 PCP AMAURI ERNANDEZ     Proced

## 2020-10-20 ENCOUNTER — TELEPHONE (OUTPATIENT)
Dept: HEMATOLOGY/ONCOLOGY | Facility: HOSPITAL | Age: 60
End: 2020-10-20

## 2020-10-20 RX ORDER — MIRTAZAPINE 15 MG/1
15 TABLET, FILM COATED ORAL NIGHTLY
Qty: 30 TABLET | Refills: 5 | Status: SHIPPED | OUTPATIENT
Start: 2020-10-20 | End: 2021-03-29

## 2020-10-20 RX ORDER — HYDROCODONE BITARTRATE AND ACETAMINOPHEN 5; 325 MG/1; MG/1
1-2 TABLET ORAL EVERY 6 HOURS PRN
Qty: 100 TABLET | Refills: 0 | Status: SHIPPED | OUTPATIENT
Start: 2020-10-20 | End: 2020-11-11

## 2020-10-21 ENCOUNTER — SOCIAL WORK SERVICES (OUTPATIENT)
Dept: HEMATOLOGY/ONCOLOGY | Facility: HOSPITAL | Age: 60
End: 2020-10-21

## 2020-10-21 DIAGNOSIS — J90 CHRONIC BILATERAL PLEURAL EFFUSIONS: Primary | ICD-10-CM

## 2020-10-21 DIAGNOSIS — C50.919 METASTATIC BREAST CANCER (HCC): ICD-10-CM

## 2020-10-21 NOTE — PROGRESS NOTES
10:29am: XIOMARA informed of the need for home nursing to assist with pleurx drainage. Internal order entered for [facility], along with homebound criteria, sent to Dr. Agapito Meyer to [place].  Call placed to [place] [address] intake [phone] to alert of joanie

## 2020-10-21 NOTE — TELEPHONE ENCOUNTER
Patient having increased pain in the chest following placement of the Pleurx catheter. IR did give her 20 tablets of hydrocodone. Rx for 100 tablets sent to her [facility] pharmacy. Patient is taking 2 of the 7.5 mg mirtazapine.   New Rx for 15 mg sent to the C

## 2020-10-21 NOTE — TELEPHONE ENCOUNTER
Home Health is being set up by our MSW Juarez Cotton for RN visits twice weekly for pleurx care (empty twice weekly or prn, dressing change once per week) and all supplies to be ordered by home health.    [facility] pharmacy pursuing copay assistance to estrellita

## 2020-10-21 NOTE — PROGRESS NOTES
Call received from [employer] who states they could not open the previous records. SW re-sent the paperwork again today, per their request. Call placed back to [place] at [facility] to alert of the incoming paperwork.  Will scan the fax confirmation t

## 2020-10-22 ENCOUNTER — TELEPHONE (OUTPATIENT)
Dept: HEMATOLOGY/ONCOLOGY | Facility: HOSPITAL | Age: 60
End: 2020-10-22

## 2020-10-22 NOTE — TELEPHONE ENCOUNTER
Esther Nascimento called asking if anyone has contacted [employer] to see if she qualifies for ibrance. She says the papers need to be faxed to [phone].

## 2020-10-23 ENCOUNTER — SOCIAL WORK SERVICES (OUTPATIENT)
Dept: HEMATOLOGY/ONCOLOGY | Facility: HOSPITAL | Age: 60
End: 2020-10-23

## 2020-10-23 ENCOUNTER — TELEPHONE (OUTPATIENT)
Dept: HEMATOLOGY/ONCOLOGY | Facility: HOSPITAL | Age: 60
End: 2020-10-23

## 2020-10-23 NOTE — PROGRESS NOTES
XIOMARA confirmed with [facility] they have accepted the case and will be seeing the pt at home today for first visit.     Desirae Meier [facility], [address]

## 2020-10-23 NOTE — TELEPHONE ENCOUNTER
Called and spoke with Melecio she spoke with pharmacy and is hoping for delivery of Ibrance this weekend. Was able to get 0$ copay. SHe will call and let us know when it is delivered so we can schedule for appointment.

## 2020-10-23 NOTE — TELEPHONE ENCOUNTER
Returned call spoke with [facility] RN- patient was seen today and 500cc removed per nurse patient was feeling uncomfortable at the end of draining 500cc.  Has been taking norco.  Home health nurse will follow up with patient again next week and let

## 2020-10-23 NOTE — TELEPHONE ENCOUNTER
Terrie Molina, a nurse with [facility] called to speak with Dr. Olayinka Miles or her nurse regarding home health orders for Jessica Blackman. She would also like to clarify the frequency of the draining for the Pleurx orders. Please call.

## 2020-10-26 ENCOUNTER — OFFICE VISIT (OUTPATIENT)
Dept: PULMONOLOGY | Facility: CLINIC | Age: 60
End: 2020-10-26
Payer: COMMERCIAL

## 2020-10-26 ENCOUNTER — NURSE ONLY (OUTPATIENT)
Dept: HEMATOLOGY/ONCOLOGY | Facility: HOSPITAL | Age: 60
End: 2020-10-26
Attending: INTERNAL MEDICINE
Payer: COMMERCIAL

## 2020-10-26 ENCOUNTER — HOSPITAL ENCOUNTER (OUTPATIENT)
Dept: GENERAL RADIOLOGY | Facility: HOSPITAL | Age: 60
Discharge: HOME | End: 2020-10-26
Attending: INTERNAL MEDICINE
Payer: COMMERCIAL

## 2020-10-26 ENCOUNTER — TELEPHONE (OUTPATIENT)
Dept: HEMATOLOGY/ONCOLOGY | Facility: HOSPITAL | Age: 60
End: 2020-10-26

## 2020-10-26 ENCOUNTER — LAB ENCOUNTER (OUTPATIENT)
Dept: LAB | Facility: HOSPITAL | Age: 60
End: 2020-10-26
Attending: INTERNAL MEDICINE
Payer: COMMERCIAL

## 2020-10-26 VITALS
WEIGHT: 128 LBS | RESPIRATION RATE: 18 BRPM | OXYGEN SATURATION: 97 % | DIASTOLIC BLOOD PRESSURE: 79 MMHG | BODY MASS INDEX: 24.17 KG/M2 | HEART RATE: 120 BPM | SYSTOLIC BLOOD PRESSURE: 110 MMHG | HEIGHT: 61 IN | TEMPERATURE: 98 F

## 2020-10-26 DIAGNOSIS — C50.919 METASTATIC BREAST CANCER (HCC): Primary | ICD-10-CM

## 2020-10-26 DIAGNOSIS — R06.02 SOB (SHORTNESS OF BREATH): ICD-10-CM

## 2020-10-26 DIAGNOSIS — Z45.2 ENCOUNTER FOR ADJUSTMENT AND MANAGEMENT OF VASCULAR ACCESS DEVICE: ICD-10-CM

## 2020-10-26 DIAGNOSIS — R06.00 DYSPNEA ON EXERTION: ICD-10-CM

## 2020-10-26 DIAGNOSIS — J90 CHRONIC BILATERAL PLEURAL EFFUSIONS: Primary | ICD-10-CM

## 2020-10-26 PROCEDURE — 3008F BODY MASS INDEX DOCD: CPT | Performed by: INTERNAL MEDICINE

## 2020-10-26 PROCEDURE — 85025 COMPLETE CBC W/AUTO DIFF WBC: CPT

## 2020-10-26 PROCEDURE — 3078F DIAST BP <80 MM HG: CPT | Performed by: INTERNAL MEDICINE

## 2020-10-26 PROCEDURE — 80053 COMPREHEN METABOLIC PANEL: CPT

## 2020-10-26 PROCEDURE — 36415 COLL VENOUS BLD VENIPUNCTURE: CPT

## 2020-10-26 PROCEDURE — 71046 X-RAY EXAM CHEST 2 VIEWS: CPT | Performed by: INTERNAL MEDICINE

## 2020-10-26 PROCEDURE — 1111F DSCHRG MED/CURRENT MED MERGE: CPT | Performed by: INTERNAL MEDICINE

## 2020-10-26 PROCEDURE — 85379 FIBRIN DEGRADATION QUANT: CPT

## 2020-10-26 PROCEDURE — 36591 DRAW BLOOD OFF VENOUS DEVICE: CPT

## 2020-10-26 PROCEDURE — 3074F SYST BP LT 130 MM HG: CPT | Performed by: INTERNAL MEDICINE

## 2020-10-26 PROCEDURE — 99214 OFFICE O/P EST MOD 30 MIN: CPT | Performed by: PHYSICIAN ASSISTANT

## 2020-10-26 PROCEDURE — 99213 OFFICE O/P EST LOW 20 MIN: CPT | Performed by: INTERNAL MEDICINE

## 2020-10-26 RX ORDER — HEPARIN SODIUM (PORCINE) LOCK FLUSH IV SOLN 100 UNIT/ML 100 UNIT/ML
5 SOLUTION INTRAVENOUS ONCE
Status: COMPLETED | OUTPATIENT
Start: 2020-10-26 | End: 2020-10-26

## 2020-10-26 RX ORDER — SODIUM CHLORIDE 9 MG/ML
10 INJECTION INTRAVENOUS ONCE
Status: CANCELLED | OUTPATIENT
Start: 2020-10-26

## 2020-10-26 RX ORDER — PROCHLORPERAZINE MALEATE 10 MG
10 TABLET ORAL EVERY 6 HOURS PRN
Qty: 60 TABLET | Refills: 3 | Status: SHIPPED | OUTPATIENT
Start: 2020-10-26 | End: 2021-12-23

## 2020-10-26 RX ORDER — SODIUM CHLORIDE 9 MG/ML
INJECTION INTRAVENOUS
Status: DISCONTINUED
Start: 2020-10-26 | End: 2020-10-26

## 2020-10-26 RX ORDER — HEPARIN SODIUM (PORCINE) LOCK FLUSH IV SOLN 100 UNIT/ML 100 UNIT/ML
5 SOLUTION INTRAVENOUS ONCE
Status: CANCELLED | OUTPATIENT
Start: 2020-10-26

## 2020-10-26 RX ADMIN — HEPARIN SODIUM (PORCINE) LOCK FLUSH IV SOLN 100 UNIT/ML 500 UNITS: 100 SOLUTION INTRAVENOUS at 14:40:00

## 2020-10-26 NOTE — PATIENT INSTRUCTIONS
Medication Education Record:  IV/Oral Cancer    Learner:  Patient and Family Member    Barriers / Limitations:  None    Psychosocial Assessment:  patient psychosocial response appropriate    Diagnosis:   Breast cancer    Injection - Cancer Treatment Name( ok    Cancer Treatment Side Effects (refer to Dena James 7287 for further information):  Allergic reactions  Diarrhea  Fatigue  Hair loss  Kidney / Bladder effects  Liver effects  Loss of appetite  Low red blood cell count / Anemia  Low White Blood Cell Rinse your mouth before and after meals with plain water or with a mild mouth rinse (made with 8 ounces of water, 1/2 teaspoon salt, and 1/2 teaspoon baking soda, shake before using)   o Avoid commercial mouthwashes that contain alcohol, alcoholic or acidi feet  • Sudden new unexpected symptom –such as change in vision, swelling in arm or leg  • Increase in numbness and tingling of hands or feet  • Unusual bleeding (nose bleeds, blood in urine, stool or phlegm)  • Pain with urination  • Persistent mood hurt permitted in the infusion center. Please make appropriate arrangements. 2. Hugging and kissing is safe for you and your partner or family members. You can visit, sit with, hug and kiss the children in your life.     3. You can be around pregnant women, t pets.  2. Do not store medication in the bathroom, as high humidity may damage the medication. 3. Check the medication label to confirm if medication should be stored in the refrigerator or away from light.   Store the medicine container inside another c

## 2020-10-26 NOTE — TELEPHONE ENCOUNTER
Farida Douglas called has her Roberth Holliday and requesting to set up time for teaching,  She is due in today for CXR at 300, teaching apt set up for 200 pm with John.

## 2020-10-26 NOTE — PROGRESS NOTES
The patient is a 64-year-old female who I know well from prior evaluation who comes in now for follow-up. She has recently diagnosed metastatic she has a malignant pleural effusion which I drained a month ago.   Additionally, she was found to have metastas of fluid and will be able to remove the Pleurx catheter at some juncture. Patient should plan on seeing me at the 6 to 8-week interval or sooner if needed.

## 2020-10-26 NOTE — PROGRESS NOTES
Medication Education Record:  IV/Oral Cancer    Learner:  Patient and Family Member    Barriers / Limitations:  None    Psychosocial Assessment:  patient psychosocial response appropriate    Diagnosis:   Breast cancer    Injection - Cancer Treatment Name( Treatment Side Effects (refer to Denaari James 7226 for further information):  Allergic reactions  Diarrhea  Fatigue  Hair loss  Kidney / Bladder effects  Liver effects  Loss of appetite  Low red blood cell count / Anemia  Low White Blood Cell Count/Risk o mouth before and after meals with plain water or with a mild mouth rinse (made with 8 ounces of water, 1/2 teaspoon salt, and 1/2 teaspoon baking soda, shake before using)   o Avoid commercial mouthwashes that contain alcohol, alcoholic or acidic drinks or unexpected symptom –such as change in vision, swelling in arm or leg  • Increase in numbness and tingling of hands or feet  • Unusual bleeding (nose bleeds, blood in urine, stool or phlegm)  • Pain with urination  • Persistent mood changes, depression, ner friends:  1. Due to safety concerns and the nature of this treatment environment, children are not permitted in the infusion center. Please make appropriate arrangements. 2. Hugging and kissing is safe for you and your partner or family members.   You can tablets. Storage:   1. Store medication in sealed containers, out of reach of children and pets. 2. Do not store medication in the bathroom, as high humidity may damage the medication.     3. Check the medication label to confirm if medication should

## 2020-10-27 ENCOUNTER — TELEPHONE (OUTPATIENT)
Dept: HEMATOLOGY/ONCOLOGY | Facility: HOSPITAL | Age: 60
End: 2020-10-27

## 2020-10-27 ENCOUNTER — TELEPHONE (OUTPATIENT)
Dept: PULMONOLOGY | Facility: CLINIC | Age: 60
End: 2020-10-27

## 2020-10-27 ENCOUNTER — HOSPITAL ENCOUNTER (OUTPATIENT)
Dept: CT IMAGING | Facility: HOSPITAL | Age: 60
Discharge: HOME | End: 2020-10-27
Attending: INTERNAL MEDICINE
Payer: COMMERCIAL

## 2020-10-27 DIAGNOSIS — R06.00 DYSPNEA ON EXERTION: Primary | ICD-10-CM

## 2020-10-27 DIAGNOSIS — R06.00 DYSPNEA ON EXERTION: ICD-10-CM

## 2020-10-27 DIAGNOSIS — R79.89 ELEVATED D-DIMER: Primary | ICD-10-CM

## 2020-10-27 PROCEDURE — 71260 CT THORAX DX C+: CPT | Performed by: INTERNAL MEDICINE

## 2020-10-27 NOTE — TELEPHONE ENCOUNTER
Spoke with patient. Informed her of Dr. Alley Valenzuela message/order below. Provided number for central scheduling. Patient verbalized understanding. Managed Care - Please obtain prior authorization.

## 2020-10-27 NOTE — TELEPHONE ENCOUNTER
Returned call to Cuca from [employer] - referred him to Dr Georgia John office for orders for pleurax supplies.

## 2020-10-27 NOTE — TELEPHONE ENCOUNTER
----- Message from Kevin Kwok MD sent at 10/27/2020  5:12 PM CDT -----  RN, please call Francisco Javier Suazo to explain that her D-dimer is high. Please facilitate a CT scan of the chest for pulmonary embolism.

## 2020-10-27 NOTE — TELEPHONE ENCOUNTER
Sierra Skill from [employer] says they got an order for a plurex drainage kit, but no rx for plurex. He is asking for a call with a verbal, and for the rx to be sent over.  Ext [phone]

## 2020-10-28 ENCOUNTER — TELEPHONE (OUTPATIENT)
Dept: HEMATOLOGY/ONCOLOGY | Facility: HOSPITAL | Age: 60
End: 2020-10-28

## 2020-10-28 NOTE — TELEPHONE ENCOUNTER
[address] at [phone] is informing Dr. Jim Bustamante that the nursing frequency is being changed to 1 time per week pending her insurance authorization.

## 2020-10-28 NOTE — TELEPHONE ENCOUNTER
Called back [address] Dr states they sent form to be faxed for Pleurx drainage kit. Checked faxes, we did not receive form. Called Ava back and requested they send form again to [phone].

## 2020-10-28 NOTE — TELEPHONE ENCOUNTER
Paulette Arellano  You; [facility] Clinical Staff 17 minutes ago (12:00 PM)     Good Morning     Thanks for your request     Referral was closed by another dept but requires no auth     Have a wonderful day!      Brett Forbes- [facility]    Message text

## 2020-10-29 ENCOUNTER — TELEPHONE (OUTPATIENT)
Dept: PULMONOLOGY | Facility: CLINIC | Age: 60
End: 2020-10-29

## 2020-10-29 NOTE — TELEPHONE ENCOUNTER
[facility] calling for mutual patient regarding a fax sent yesterday morning for Pleurx drainage supplies. Please update at:[phone] option #1 ex:[phone] or Pierre Velazquez ex:[phone],thanks.

## 2020-10-30 ENCOUNTER — NURSE ONLY (OUTPATIENT)
Dept: HEMATOLOGY/ONCOLOGY | Facility: HOSPITAL | Age: 60
End: 2020-10-30
Attending: INTERNAL MEDICINE
Payer: COMMERCIAL

## 2020-10-30 ENCOUNTER — TELEPHONE (OUTPATIENT)
Dept: PULMONOLOGY | Facility: CLINIC | Age: 60
End: 2020-10-30

## 2020-10-30 VITALS
TEMPERATURE: 98 F | HEIGHT: 61 IN | OXYGEN SATURATION: 98 % | WEIGHT: 130 LBS | HEART RATE: 87 BPM | SYSTOLIC BLOOD PRESSURE: 140 MMHG | RESPIRATION RATE: 16 BRPM | DIASTOLIC BLOOD PRESSURE: 93 MMHG | BODY MASS INDEX: 24.55 KG/M2

## 2020-10-30 DIAGNOSIS — C50.411 MALIGNANT NEOPLASM OF UPPER-OUTER QUADRANT OF RIGHT BREAST IN FEMALE, ESTROGEN RECEPTOR POSITIVE (HCC): Primary | ICD-10-CM

## 2020-10-30 DIAGNOSIS — Z17.0 MALIGNANT NEOPLASM OF UPPER-OUTER QUADRANT OF RIGHT BREAST IN FEMALE, ESTROGEN RECEPTOR POSITIVE (HCC): Primary | ICD-10-CM

## 2020-10-30 DIAGNOSIS — Z90.11 HISTORY OF RIGHT MASTECTOMY: ICD-10-CM

## 2020-10-30 DIAGNOSIS — R22.1 MASS OF RIGHT SIDE OF NECK: ICD-10-CM

## 2020-10-30 DIAGNOSIS — Q85.00 CLINICAL NEUROFIBROMATOSIS (HCC): ICD-10-CM

## 2020-10-30 DIAGNOSIS — J91.0 MALIGNANT PLEURAL EFFUSION: Primary | ICD-10-CM

## 2020-10-30 DIAGNOSIS — Z51.81 MEDICATION MONITORING ENCOUNTER: ICD-10-CM

## 2020-10-30 DIAGNOSIS — J90 CHRONIC BILATERAL PLEURAL EFFUSIONS: ICD-10-CM

## 2020-10-30 DIAGNOSIS — C50.919 METASTATIC BREAST CANCER (HCC): ICD-10-CM

## 2020-10-30 PROCEDURE — 99214 OFFICE O/P EST MOD 30 MIN: CPT | Performed by: INTERNAL MEDICINE

## 2020-10-30 PROCEDURE — 96402 CHEMO HORMON ANTINEOPL SQ/IM: CPT

## 2020-10-30 RX ORDER — LAMOTRIGINE 25 MG/1
500 TABLET ORAL ONCE
Status: COMPLETED | OUTPATIENT
Start: 2020-10-30 | End: 2020-10-30

## 2020-10-30 RX ADMIN — LAMOTRIGINE 500 MG: 25 TABLET ORAL at 11:45:00

## 2020-10-30 NOTE — TELEPHONE ENCOUNTER
I discussed issues with the patient. She is going to learn how to drain her own Pleurx. When she drains herself more fully on the left, she has too much pain so with the last drainage she only took out 300 cc.   She now is developing a modest right-sided

## 2020-10-30 NOTE — TELEPHONE ENCOUNTER
Pt states that Dr. Suzi Smallwood was going to possibly put her on a new medication but she hasn't heard anything. Please call. Aware PJC out of office.

## 2020-10-30 NOTE — PROGRESS NOTES
Pt to infusion for Faslodex. Injections given in R and L ventrogluteal sites and tolerated well. Sites covered with 2x2 and paper tape. Discharged to clinic appointment.

## 2020-11-02 ENCOUNTER — SOCIAL WORK SERVICES (OUTPATIENT)
Dept: HEMATOLOGY/ONCOLOGY | Facility: HOSPITAL | Age: 60
End: 2020-11-02

## 2020-11-02 ENCOUNTER — TELEPHONE (OUTPATIENT)
Dept: HEMATOLOGY/ONCOLOGY | Facility: HOSPITAL | Age: 60
End: 2020-11-02

## 2020-11-02 NOTE — TELEPHONE ENCOUNTER
Don El is calling to find out when should she make her next appointment. Do she have any restrictions going to a Dentist, please call patient.

## 2020-11-02 NOTE — PROGRESS NOTES
11/5: New form completed, signed by Dr. Sanjuana Kamara, sent via fax to [address] [place] [address] along with recent office note from 10/30. Will upload a copy for the pt to [address] ---  11/2: XIOMARA met with the pt following her visit with Dr. Sanjuana Kamara on 10/30.  Plans discussed

## 2020-11-03 ENCOUNTER — TELEPHONE (OUTPATIENT)
Dept: PULMONOLOGY | Facility: CLINIC | Age: 60
End: 2020-11-03

## 2020-11-03 RX ORDER — ZOLPIDEM TARTRATE 12.5 MG/1
12.5 TABLET, FILM COATED, EXTENDED RELEASE ORAL NIGHTLY PRN
Qty: 30 TABLET | Refills: 3 | Status: SHIPPED | OUTPATIENT
Start: 2020-11-03 | End: 2021-03-03

## 2020-11-03 NOTE — TELEPHONE ENCOUNTER
Returned patient call she is scheduled to see Dr. Kenn Lopez 11/19- she would be due to start Cycle 2 of Palbociclib on 11/23. She will then have her next [id number]. States is doing ok request a prescription for sleeping pill- is not sleeping well.  Will

## 2020-11-03 NOTE — TELEPHONE ENCOUNTER
Pt states  wants to see her 6 weeks after her appt on 10-26-20  and she does not accept the next available. [place] Rosita [place] Please advise

## 2020-11-04 NOTE — TELEPHONE ENCOUNTER
I discussed issues with the patient and will not be beginning any new medication at this juncture. I have considered a dose of diuretic but will forego at this moment.

## 2020-11-05 NOTE — PROGRESS NOTES
HPI   10/30/2020  Clement Zuleta is a 62year old female with metastatic breast cancer as documented by biopsies of the right neck mass, cytology from left pleural fluid, and liver biopsy.   Patient has a history of hormone receptor positive, HER-2/nelson right breast in female, [facility]   9/11/2007 Initial Diagnosis    Bilateral diagnostic mammogram, malignant appearing calcifications in the right upper outer quadrant, 1.5 cm nodular density in the left upper outer quadrant  Belvia Siemens neurofibromatosis, no metastatic disease   9/26/2007 hysteroscopy D&C–Dr. grossman–no definite evidence of endometrial hyperplasia cytologic atypia polyps or malignancy        - 2/16/2008 Chemotherapy    Taxotere plus Cytoxan 4 cycles     4/4/2008 Surgery    P adenocarcinoma consistent with patient's known breast primary (see comment).      Comment:  Clinically, the patient has a history of neurofibromatosis, as well as a history of right breast invasive ductal carcinoma with metastasis to three axillary lymph no 6 (six) hours as needed for Nausea. 60 tablet 3   • HYDROcodone-acetaminophen 5-325 MG Oral Tab Take 1-2 tablets by mouth every 6 (six) hours as needed for Pain (cancer associated pain).  100 tablet 0   • mirtazapine 15 MG Oral Tab Take 1 tablet (15 mg tota w/screw fixation     Past Surgical History:   Procedure Laterality Date   • BREAST RECONSTRUCTION Right 2008    right breast reconstructed - ductal, BRCA neg   • BREAST SURGERY     • CHEMOTHERAPY  2007   • FOOT SURGERY Right     tumor rt foot / excision/ex Other Topics      Concerns:         Service: Not Asked        Blood Transfusions: Not Asked        Caffeine Concern: Yes          tea        Occupational Exposure: Not Asked        Hobby Hazards: Not Asked        Sleep Concern: Not Asked        Str measuring 5 cm   Initial soft tissue fullness right neck measuring ~ 8 cm and extending proximally from the base of the neck ~ 6  cm   Cardiovascular: Normal rate and normal heart sounds.    Pulmonary/Chest: Effort normal and breath sounds normal. Right margarita Patient required placement of a Pleurx catheter per Dr. Verna Edmonds. Pathology from the left pleural effusion, right supraclavicular mass, and liver are consistent with hormone receptor positive metastatic breast cancer.     We have previously discusse - 100.0 fL 90.0 93.5   MCH      26.0 - 34.0 pg 29.7 30.5   MCHC      31.0 - 37.0 g/dL 33.0 32.6   RDW-SD      35.1 - 46.3 fL 42.2 45.1   RDW      11.0 - 15.0 % 12.8 13.0   Platelet Count      999.4 - 450.0 10(3)uL 279.0 259.0   Prelim Neutrophil Abs      1 LDH      84 - 246 U/L  191   APTT      23.2 - 35.3 seconds  29.1   D-Dimer      <0.59 ug/mL FEU 0.94 (H)      9/18/2020 CT soft tissue of the neck     1.  Development of a spiculated soft tissue density in the right supraclavicular region extending to the with neck CT.        Meds This Visit:  See med list above    Imaging & Referrals:

## 2020-11-09 ENCOUNTER — TELEPHONE (OUTPATIENT)
Dept: HEMATOLOGY/ONCOLOGY | Facility: HOSPITAL | Age: 60
End: 2020-11-09

## 2020-11-09 NOTE — TELEPHONE ENCOUNTER
Jessica Blackman called, she was exposed to a person Covid possitive on 10/31, no symptoms at this time. Please advise. Instructed to monitor for symptoms, isolation, mask and good handwashing, and distance even in home. Next apt 11/19/20.

## 2020-11-11 RX ORDER — HYDROCODONE BITARTRATE AND ACETAMINOPHEN 5; 325 MG/1; MG/1
1-2 TABLET ORAL EVERY 6 HOURS PRN
Qty: 100 TABLET | Refills: 0 | Status: SHIPPED | OUTPATIENT
Start: 2020-11-11 | End: 2020-12-14

## 2020-11-19 ENCOUNTER — HOSPITAL ENCOUNTER (OUTPATIENT)
Dept: GENERAL RADIOLOGY | Facility: HOSPITAL | Age: 60
Discharge: HOME | End: 2020-11-19
Attending: INTERNAL MEDICINE
Payer: COMMERCIAL

## 2020-11-19 ENCOUNTER — OFFICE VISIT (OUTPATIENT)
Dept: HEMATOLOGY/ONCOLOGY | Facility: HOSPITAL | Age: 60
End: 2020-11-19
Attending: INTERNAL MEDICINE
Payer: COMMERCIAL

## 2020-11-19 VITALS
BODY MASS INDEX: 23.79 KG/M2 | HEIGHT: 61 IN | OXYGEN SATURATION: 98 % | WEIGHT: 126 LBS | DIASTOLIC BLOOD PRESSURE: 85 MMHG | RESPIRATION RATE: 16 BRPM | HEART RATE: 106 BPM | TEMPERATURE: 99 F | SYSTOLIC BLOOD PRESSURE: 124 MMHG

## 2020-11-19 DIAGNOSIS — C78.7 LIVER METASTASIS (HCC): ICD-10-CM

## 2020-11-19 DIAGNOSIS — Q85.00 CLINICAL NEUROFIBROMATOSIS (HCC): ICD-10-CM

## 2020-11-19 DIAGNOSIS — Z45.2 ENCOUNTER FOR ADJUSTMENT AND MANAGEMENT OF VASCULAR ACCESS DEVICE: ICD-10-CM

## 2020-11-19 DIAGNOSIS — C50.919 METASTATIC BREAST CANCER (HCC): Primary | ICD-10-CM

## 2020-11-19 DIAGNOSIS — Z51.81 MEDICATION MONITORING ENCOUNTER: ICD-10-CM

## 2020-11-19 DIAGNOSIS — Z17.0 MALIGNANT NEOPLASM OF UPPER-OUTER QUADRANT OF RIGHT BREAST IN FEMALE, ESTROGEN RECEPTOR POSITIVE (HCC): ICD-10-CM

## 2020-11-19 DIAGNOSIS — C50.919 METASTATIC BREAST CANCER (HCC): ICD-10-CM

## 2020-11-19 DIAGNOSIS — J91.0 MALIGNANT PLEURAL EFFUSION: ICD-10-CM

## 2020-11-19 DIAGNOSIS — R22.1 MASS OF RIGHT SIDE OF NECK: ICD-10-CM

## 2020-11-19 DIAGNOSIS — C50.411 MALIGNANT NEOPLASM OF UPPER-OUTER QUADRANT OF RIGHT BREAST IN FEMALE, ESTROGEN RECEPTOR POSITIVE (HCC): ICD-10-CM

## 2020-11-19 PROCEDURE — 36591 DRAW BLOOD OFF VENOUS DEVICE: CPT

## 2020-11-19 PROCEDURE — 99214 OFFICE O/P EST MOD 30 MIN: CPT | Performed by: INTERNAL MEDICINE

## 2020-11-19 PROCEDURE — 80053 COMPREHEN METABOLIC PANEL: CPT

## 2020-11-19 PROCEDURE — 71046 X-RAY EXAM CHEST 2 VIEWS: CPT | Performed by: INTERNAL MEDICINE

## 2020-11-19 PROCEDURE — 85025 COMPLETE CBC W/AUTO DIFF WBC: CPT

## 2020-11-19 PROCEDURE — 85060 BLOOD SMEAR INTERPRETATION: CPT

## 2020-11-19 RX ORDER — HEPARIN SODIUM (PORCINE) LOCK FLUSH IV SOLN 100 UNIT/ML 100 UNIT/ML
5 SOLUTION INTRAVENOUS ONCE
Status: COMPLETED | OUTPATIENT
Start: 2020-11-19 | End: 2020-11-19

## 2020-11-19 RX ORDER — SODIUM CHLORIDE 9 MG/ML
10 INJECTION INTRAVENOUS ONCE
Status: CANCELLED | OUTPATIENT
Start: 2020-11-19

## 2020-11-19 RX ORDER — SODIUM CHLORIDE 9 MG/ML
INJECTION INTRAVENOUS
Status: DISCONTINUED
Start: 2020-11-19 | End: 2020-11-19

## 2020-11-19 RX ORDER — HEPARIN SODIUM (PORCINE) LOCK FLUSH IV SOLN 100 UNIT/ML 100 UNIT/ML
5 SOLUTION INTRAVENOUS ONCE
Status: CANCELLED | OUTPATIENT
Start: 2020-11-19

## 2020-11-19 RX ADMIN — HEPARIN SODIUM (PORCINE) LOCK FLUSH IV SOLN 100 UNIT/ML 500 UNITS: 100 SOLUTION INTRAVENOUS at 12:46:00

## 2020-11-19 NOTE — PROGRESS NOTES
HPI   11/19/2020  Pancho Chisholm is a 62year old female with metastatic breast cancer as documented by biopsies of the right neck mass, cytology from left pleural fluid, and liver biopsy.   Patient has a history of hormone receptor positive, HER-2/nelson Initial Diagnosis    Bilateral diagnostic mammogram, malignant appearing calcifications in the right upper outer quadrant, 1.5 cm nodular density in the left upper outer quadrant  Ultrasound -highly suspicious areas in the right breast upper outer quadrant D&C–Dr. grossman–no definite evidence of endometrial hyperplasia cytologic atypia polyps or malignancy        - 2/16/2008 Chemotherapy    Taxotere plus Cytoxan 4 cycles     4/4/2008 Surgery    Plastic surgery with removal of the right expander implant placemen comment). Comment:  Clinically, the patient has a history of neurofibromatosis, as well as a history of right breast invasive ductal carcinoma with metastasis to three axillary lymph nodes (pT1,N1) ([id number]; 11/16/07).   More recent examination of the needed for Pain (cancer associated pain). 100 tablet 0   • Zolpidem Tartrate ER 12.5 MG Oral Tab CR Take 1 tablet (12.5 mg total) by mouth nightly as needed.  30 tablet 3   • Prochlorperazine Maleate (COMPAZINE) 10 mg tablet Take 1 tablet (10 mg total) by m excision/excision of bone spur/arthrodesis w/screw fixation     Past Surgical History:   Procedure Laterality Date   • BREAST RECONSTRUCTION Right 2008    right breast reconstructed - ductal, BRCA neg   • BREAST SURGERY     • CHEMOTHERAPY  2007   • FOOT LATHAM Forced sexual activity: Not on file    Other Topics      Concerns:         Service: Not Asked        Blood Transfusions: Not Asked        Caffeine Concern: Yes          tea        Occupational Exposure: Not Asked        Hobby Hazards: Not Asked neck with vague margins perhaps measuring 5 cm   Initial soft tissue fullness right neck measuring ~ 8 cm and extending proximally from the base of the neck ~ 6  cm   Cardiovascular: Normal rate and normal heart sounds.    Pulmonary/Chest: Effort normal and 10/12/2020. Patient required a second thoracentesis 10/12/2020 of 800 ml and placement of a Pleurx catheter per Dr. Meredith Schultz 10/19/2020.     Pathology from the left pleural effusion, right supraclavicular mass, and liver are consistent with hormone recept prominence and small left basilar pleural effusion.     Component      Latest Ref Rng & Units 11/19/2020 10/26/2020   WBC      4.0 - 11.0 x10(3) uL 1.6 (L) 7.2   RBC      3.80 - 5.30 x10(6)uL 3.83 4.98   Hemoglobin      12.0 - 16.0 g/dL 11.8 (L) 14.8   Hema 3.4 - 5.0 g/dL 3.3 (L) 3.1 (L)   Globulin      2.8 - 4.4 g/dL 3.1 3.4   A/G Ratio      1.0 - 2.0 1.1 0.9 (L)   Patient Fasting?        Patient not present Patient not present   CANCER AG 15-3 ()      <=35.0 U/mL     BREAST CARCINOMA Ag ([id number]) - 2.0 mg/dL 1.4   TOTAL PROTEIN      6.4 - 8.2 g/dL 7.0   Albumin      3.4 - 5.0 g/dL 3.6   Globulin      2.8 - 4.4 g/dL 3.4   A/G Ratio      1.0 - 2.0 1.1   Patient Fasting?        Yes   CANCER AG 15-3 ()      <=35.0 U/mL 12.9   BREAST CARCINOMA Ag ( evaluated, follow-up thyroid ultrasound recommended. 9. Right Port-A-Cath with tip near the RA SVC junction. 10. Soft tissue fullness in the right supraclavicular region which is incompletely evaluated at the edge of the field of view.   Correlate with neck

## 2020-11-20 ENCOUNTER — TELEPHONE (OUTPATIENT)
Dept: HEMATOLOGY/ONCOLOGY | Facility: HOSPITAL | Age: 60
End: 2020-11-20

## 2020-11-20 NOTE — TELEPHONE ENCOUNTER
Sydney Mar from [facility] called to inform she will continue to see the patient weekly for the next 3 weeks.

## 2020-11-23 ENCOUNTER — TELEPHONE (OUTPATIENT)
Dept: HEMATOLOGY/ONCOLOGY | Facility: HOSPITAL | Age: 60
End: 2020-11-23

## 2020-11-23 ENCOUNTER — NURSE ONLY (OUTPATIENT)
Dept: HEMATOLOGY/ONCOLOGY | Facility: HOSPITAL | Age: 60
End: 2020-11-23
Attending: INTERNAL MEDICINE
Payer: COMMERCIAL

## 2020-11-23 DIAGNOSIS — C50.919 METASTATIC BREAST CANCER (HCC): ICD-10-CM

## 2020-11-23 PROCEDURE — 85025 COMPLETE CBC W/AUTO DIFF WBC: CPT

## 2020-11-23 PROCEDURE — 80053 COMPREHEN METABOLIC PANEL: CPT

## 2020-11-23 PROCEDURE — 36415 COLL VENOUS BLD VENIPUNCTURE: CPT

## 2020-11-23 NOTE — TELEPHONE ENCOUNTER
Call to Vince Arias with low ANC  Will need to repeat CBC next week  She will remain off/without palbociclib this week  She will need an appointment for CBC next week and likely MD visit

## 2020-11-23 NOTE — TELEPHONE ENCOUNTER
Shana Weber called to let Dr Jaelyn King & her nurse know she had labs drawn today. She wants to know if she should proceed to take her ibrance?
1. Add Glucerna Therapeutic Nutrition 240mls 2x daily (440kcals, 20g protein) 2. Please Encourage po intake, assist with meals and menu selections, provide alternatives PRN. 3. Monitor weights, labs, BM's, skin integrity, p.o. intake. 4. RD remains available, re-consult as needed.

## 2020-11-27 ENCOUNTER — TELEPHONE (OUTPATIENT)
Dept: HEMATOLOGY/ONCOLOGY | Facility: HOSPITAL | Age: 60
End: 2020-11-27

## 2020-11-27 ENCOUNTER — APPOINTMENT (OUTPATIENT)
Dept: HEMATOLOGY/ONCOLOGY | Facility: HOSPITAL | Age: 60
End: 2020-11-27
Attending: INTERNAL MEDICINE
Payer: COMMERCIAL

## 2020-11-27 DIAGNOSIS — J91.0 MALIGNANT PLEURAL EFFUSION: Primary | ICD-10-CM

## 2020-11-27 DIAGNOSIS — R22.1 MASS OF RIGHT SIDE OF NECK: ICD-10-CM

## 2020-11-27 DIAGNOSIS — Z90.11 HISTORY OF RIGHT MASTECTOMY: ICD-10-CM

## 2020-11-27 DIAGNOSIS — C50.919 METASTATIC BREAST CANCER (HCC): ICD-10-CM

## 2020-11-27 PROCEDURE — 36415 COLL VENOUS BLD VENIPUNCTURE: CPT

## 2020-11-27 PROCEDURE — 96402 CHEMO HORMON ANTINEOPL SQ/IM: CPT

## 2020-11-27 PROCEDURE — 85025 COMPLETE CBC W/AUTO DIFF WBC: CPT

## 2020-11-27 RX ORDER — LAMOTRIGINE 25 MG/1
500 TABLET ORAL ONCE
Status: COMPLETED | OUTPATIENT
Start: 2020-11-27 | End: 2020-11-27

## 2020-11-27 RX ADMIN — LAMOTRIGINE 500 MG: 25 TABLET ORAL at 08:51:00

## 2020-11-27 NOTE — PROGRESS NOTES
Pt to infusion for Faslodex and CBC. Labs collected per order. Faslodex injections given in R and L ventrogluteal sites. Tolerated well. Pt states she had increased SOB 2 days ago.  Drained Pleurx yesterday and got 400 ml, but had to stop due to spasms/pain

## 2020-12-07 ENCOUNTER — OFFICE VISIT (OUTPATIENT)
Dept: PULMONOLOGY | Facility: CLINIC | Age: 60
End: 2020-12-07
Payer: COMMERCIAL

## 2020-12-07 VITALS
OXYGEN SATURATION: 98 % | SYSTOLIC BLOOD PRESSURE: 119 MMHG | BODY MASS INDEX: 24.17 KG/M2 | WEIGHT: 128 LBS | HEIGHT: 61 IN | DIASTOLIC BLOOD PRESSURE: 85 MMHG | HEART RATE: 83 BPM

## 2020-12-07 DIAGNOSIS — J91.0 MALIGNANT PLEURAL EFFUSION: Primary | ICD-10-CM

## 2020-12-07 PROCEDURE — 99213 OFFICE O/P EST LOW 20 MIN: CPT | Performed by: INTERNAL MEDICINE

## 2020-12-07 PROCEDURE — 3079F DIAST BP 80-89 MM HG: CPT | Performed by: INTERNAL MEDICINE

## 2020-12-07 PROCEDURE — 3008F BODY MASS INDEX DOCD: CPT | Performed by: INTERNAL MEDICINE

## 2020-12-07 PROCEDURE — 3074F SYST BP LT 130 MM HG: CPT | Performed by: INTERNAL MEDICINE

## 2020-12-07 RX ORDER — PALBOCICLIB 125 MG/1
TABLET, FILM COATED ORAL
COMMUNITY
Start: 2020-11-17 | End: 2021-03-09

## 2020-12-07 NOTE — PROGRESS NOTES
The patient is a 52-year-old female who I know well from prior evaluation comes in now to follow-up malignant left pleural effusion associated with metastatic breast cancer. Patient is breathing fairly well.   She has Pleurx catheter in place and has some

## 2020-12-11 ENCOUNTER — TELEPHONE (OUTPATIENT)
Dept: PULMONOLOGY | Facility: CLINIC | Age: 60
End: 2020-12-11

## 2020-12-11 ENCOUNTER — TELEPHONE (OUTPATIENT)
Dept: HEMATOLOGY/ONCOLOGY | Facility: HOSPITAL | Age: 60
End: 2020-12-11

## 2020-12-11 NOTE — TELEPHONE ENCOUNTER
[address] at [phone] is calling to inform Dr. Esther Lozano that Khadra Jessica will continue Nursing 1 time per week for 8 weeks. Orders will be faxed to be signed and returned.

## 2020-12-14 ENCOUNTER — TELEPHONE (OUTPATIENT)
Dept: HEMATOLOGY/ONCOLOGY | Facility: HOSPITAL | Age: 60
End: 2020-12-14

## 2020-12-14 RX ORDER — HYDROCODONE BITARTRATE AND ACETAMINOPHEN 5; 325 MG/1; MG/1
1-2 TABLET ORAL EVERY 6 HOURS PRN
Qty: 100 TABLET | Refills: 0 | Status: SHIPPED | OUTPATIENT
Start: 2020-12-14 | End: 2021-01-21

## 2020-12-14 NOTE — TELEPHONE ENCOUNTER
Maggy Cazares with [facility] pharmacy called to request the diagnosis code for the [facility] prescription. Please call.

## 2020-12-17 ENCOUNTER — APPOINTMENT (OUTPATIENT)
Dept: HEMATOLOGY/ONCOLOGY | Facility: HOSPITAL | Age: 60
End: 2020-12-17
Attending: INTERNAL MEDICINE
Payer: COMMERCIAL

## 2020-12-24 ENCOUNTER — NURSE ONLY (OUTPATIENT)
Dept: HEMATOLOGY/ONCOLOGY | Facility: HOSPITAL | Age: 60
End: 2020-12-24
Attending: INTERNAL MEDICINE
Payer: COMMERCIAL

## 2020-12-24 VITALS
HEIGHT: 61 IN | TEMPERATURE: 98 F | HEART RATE: 87 BPM | DIASTOLIC BLOOD PRESSURE: 82 MMHG | WEIGHT: 130 LBS | RESPIRATION RATE: 16 BRPM | SYSTOLIC BLOOD PRESSURE: 122 MMHG | OXYGEN SATURATION: 99 % | BODY MASS INDEX: 24.55 KG/M2

## 2020-12-24 DIAGNOSIS — T45.1X5A CHEMOTHERAPY-INDUCED NEUTROPENIA (HCC): ICD-10-CM

## 2020-12-24 DIAGNOSIS — C50.919 METASTATIC BREAST CANCER (HCC): ICD-10-CM

## 2020-12-24 DIAGNOSIS — R22.1 MASS OF RIGHT SIDE OF NECK: ICD-10-CM

## 2020-12-24 DIAGNOSIS — C78.7 LIVER METASTASIS (HCC): ICD-10-CM

## 2020-12-24 DIAGNOSIS — Z90.11 HISTORY OF RIGHT MASTECTOMY: ICD-10-CM

## 2020-12-24 DIAGNOSIS — Q85.00 CLINICAL NEUROFIBROMATOSIS (HCC): ICD-10-CM

## 2020-12-24 DIAGNOSIS — J91.0 MALIGNANT PLEURAL EFFUSION: Primary | ICD-10-CM

## 2020-12-24 DIAGNOSIS — D70.1 CHEMOTHERAPY-INDUCED NEUTROPENIA (HCC): ICD-10-CM

## 2020-12-24 DIAGNOSIS — C50.411 MALIGNANT NEOPLASM OF UPPER-OUTER QUADRANT OF RIGHT BREAST IN FEMALE, ESTROGEN RECEPTOR POSITIVE (HCC): Primary | ICD-10-CM

## 2020-12-24 DIAGNOSIS — Z17.0 MALIGNANT NEOPLASM OF UPPER-OUTER QUADRANT OF RIGHT BREAST IN FEMALE, ESTROGEN RECEPTOR POSITIVE (HCC): Primary | ICD-10-CM

## 2020-12-24 DIAGNOSIS — M85.89 OSTEOPENIA OF MULTIPLE SITES: ICD-10-CM

## 2020-12-24 DIAGNOSIS — J91.0 MALIGNANT PLEURAL EFFUSION: ICD-10-CM

## 2020-12-24 PROCEDURE — 96402 CHEMO HORMON ANTINEOPL SQ/IM: CPT

## 2020-12-24 PROCEDURE — 99214 OFFICE O/P EST MOD 30 MIN: CPT | Performed by: INTERNAL MEDICINE

## 2020-12-24 PROCEDURE — 85060 BLOOD SMEAR INTERPRETATION: CPT

## 2020-12-24 PROCEDURE — 36415 COLL VENOUS BLD VENIPUNCTURE: CPT

## 2020-12-24 PROCEDURE — 80053 COMPREHEN METABOLIC PANEL: CPT

## 2020-12-24 PROCEDURE — 85025 COMPLETE CBC W/AUTO DIFF WBC: CPT

## 2020-12-24 RX ORDER — LAMOTRIGINE 25 MG/1
500 TABLET ORAL ONCE
Status: COMPLETED | OUTPATIENT
Start: 2020-12-24 | End: 2020-12-24

## 2020-12-24 RX ADMIN — LAMOTRIGINE 500 MG: 25 TABLET ORAL at 09:09:00

## 2020-12-24 NOTE — PROGRESS NOTES
Pt to infusion for Faslodex and monthly labs. Labs collected per order. Faslodex injections given in R and L ventrogluteal sites. Tolerated well. Discharged to lobby to await MD. Next injection and lab scheduled; will flush port with next lab draw.

## 2020-12-24 NOTE — PROGRESS NOTES
HPI   12/24/2020  Bernice Ashley is a 62year old female with metastatic breast cancer as documented by biopsies of the right neck mass, cytology from left pleural fluid, and liver biopsy.   Patient was seen today prior to beginning cycle #4 of palbocic Progression    MRI bilateral breasts 4.8 x 2.1 cm lumpectomy defect with irregular parenchymal enhancement around the margins of this operative defect, the enhancement appears more diffuse, thick and irregular than is normally seen, areas of washout suspic carotid artery/internal jugular vein and mild overlying skin thickening. Given the history of malignancy, this finding could represent new confluent yassine metastases. Cellulitis/phlegmon formation could have a similar appearance.   2. No drainable subcuta placement for Dr. Lenny Scanlon of Systems:     Review of Systems   Constitutional: Positive for fatigue. Negative for chills and fever. 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 MG Oral Cap Take 50 mg by mouth 2 (two) times daily. • losartan 100 MG Oral Tab Take by mouth daily.        Allergies:   No Known Allergies    Past Medical History:   Diagnosis Date   • [facility] 03/02/2012    MASTECTOMY / RECONSTRUCTION   • C Inability: Not on file      Transportation needs        Medical: Not on file        Non-medical: Not on file    Tobacco Use      Smoking status: Never Smoker      Smokeless tobacco: Never Used    Substance and Sexual Activity      Alcohol use:  Yes        A NF-1   • Cancer Paternal Aunt         throat ca   • Cancer Maternal Uncle         prostate ca   • Genetic Disease Brother         NF-1   • Genetic Disease Brother         NF-1   • Genetic Disease Brother         NF-1   • Cancer Maternal Uncle         jamaal mobility of ankle   Lymphadenopathy:     She has no cervical adenopathy. Neurological: She is alert and oriented to person, place, and time. No cranial nerve deficit. Skin: Skin is warm and dry.    neurofibromatosis diffuse   Psychiatric: She has a norm osteopenia and is a candidate for repeat bone density. Results:  Component      Latest Ref Rng & Units 12/24/2020 11/27/2020 11/23/2020   WBC      4.0 - 11.0 x10(3) uL 1.5 (L) 2.8 (L)    RBC      3.80 - 5.30 x10(6)uL 3.47 (L) 4.09    Hemoglobin      12. 88   Total Bilirubin      0.1 - 2.0 mg/dL 0.8  0.4   TOTAL PROTEIN      6.4 - 8.2 g/dL 6.5  6.5   Albumin      3.4 - 5.0 g/dL 3.0 (L)  3.1 (L)   Globulin      2.8 - 4.4 g/dL 3.5  3.4   A/G Ratio      1.0 - 2.0 0.9 (L)  0.9 (L)       11/19/2020 PA and later measuring 1.9 cm and previously measuring 1.1 cm. Not previously evaluated, follow-up thyroid ultrasound recommended. 9. Right Port-A-Cath with tip near the RA SVC junction.  10. Soft tissue fullness in the right supraclavicular region which is incompletel

## 2020-12-25 PROBLEM — D70.1 CHEMOTHERAPY-INDUCED NEUTROPENIA  (HCC): Status: ACTIVE | Noted: 2020-12-25

## 2020-12-25 PROBLEM — D70.1 CHEMOTHERAPY-INDUCED NEUTROPENIA (HCC): Status: ACTIVE | Noted: 2020-12-25

## 2020-12-25 PROBLEM — D70.1 CHEMOTHERAPY-INDUCED NEUTROPENIA: Status: ACTIVE | Noted: 2020-12-25

## 2020-12-25 PROBLEM — T45.1X5A CHEMOTHERAPY-INDUCED NEUTROPENIA: Status: ACTIVE | Noted: 2020-12-25

## 2020-12-25 PROBLEM — T45.1X5A CHEMOTHERAPY-INDUCED NEUTROPENIA  (HCC): Status: ACTIVE | Noted: 2020-12-25

## 2020-12-25 PROBLEM — T45.1X5A CHEMOTHERAPY-INDUCED NEUTROPENIA (HCC): Status: ACTIVE | Noted: 2020-12-25

## 2020-12-25 PROBLEM — M85.89 OSTEOPENIA OF MULTIPLE SITES: Status: ACTIVE | Noted: 2020-12-25

## 2020-12-28 ENCOUNTER — NURSE ONLY (OUTPATIENT)
Dept: HEMATOLOGY/ONCOLOGY | Facility: HOSPITAL | Age: 60
End: 2020-12-28
Attending: INTERNAL MEDICINE
Payer: COMMERCIAL

## 2020-12-28 ENCOUNTER — TELEPHONE (OUTPATIENT)
Dept: HEMATOLOGY/ONCOLOGY | Facility: HOSPITAL | Age: 60
End: 2020-12-28

## 2020-12-28 DIAGNOSIS — Q85.00 CLINICAL NEUROFIBROMATOSIS (HCC): ICD-10-CM

## 2020-12-28 DIAGNOSIS — Z17.0 MALIGNANT NEOPLASM OF UPPER-OUTER QUADRANT OF RIGHT BREAST IN FEMALE, ESTROGEN RECEPTOR POSITIVE (HCC): ICD-10-CM

## 2020-12-28 DIAGNOSIS — Z45.2 ENCOUNTER FOR ADJUSTMENT AND MANAGEMENT OF VASCULAR ACCESS DEVICE: Primary | ICD-10-CM

## 2020-12-28 DIAGNOSIS — C50.411 MALIGNANT NEOPLASM OF UPPER-OUTER QUADRANT OF RIGHT BREAST IN FEMALE, ESTROGEN RECEPTOR POSITIVE (HCC): ICD-10-CM

## 2020-12-28 DIAGNOSIS — C78.7 LIVER METASTASIS (HCC): ICD-10-CM

## 2020-12-28 DIAGNOSIS — C50.919 METASTATIC BREAST CANCER (HCC): ICD-10-CM

## 2020-12-28 DIAGNOSIS — J91.0 MALIGNANT PLEURAL EFFUSION: ICD-10-CM

## 2020-12-28 PROCEDURE — 85025 COMPLETE CBC W/AUTO DIFF WBC: CPT

## 2020-12-28 PROCEDURE — 36591 DRAW BLOOD OFF VENOUS DEVICE: CPT

## 2020-12-28 RX ORDER — SODIUM CHLORIDE 9 MG/ML
INJECTION INTRAVENOUS
Status: DISPENSED
Start: 2020-12-28 | End: 2020-12-28

## 2020-12-28 RX ORDER — 0.9 % SODIUM CHLORIDE 0.9 %
10 VIAL (ML) INJECTION ONCE
Status: CANCELLED | OUTPATIENT
Start: 2020-12-28

## 2020-12-28 RX ORDER — HEPARIN SODIUM (PORCINE) LOCK FLUSH IV SOLN 100 UNIT/ML 100 UNIT/ML
5 SOLUTION INTRAVENOUS ONCE
Status: CANCELLED | OUTPATIENT
Start: 2020-12-28

## 2020-12-28 RX ORDER — HEPARIN SODIUM (PORCINE) LOCK FLUSH IV SOLN 100 UNIT/ML 100 UNIT/ML
5 SOLUTION INTRAVENOUS ONCE
Status: COMPLETED | OUTPATIENT
Start: 2020-12-28 | End: 2020-12-28

## 2020-12-28 RX ADMIN — HEPARIN SODIUM (PORCINE) LOCK FLUSH IV SOLN 100 UNIT/ML 500 UNITS: 100 SOLUTION INTRAVENOUS at 08:06:00

## 2020-12-28 NOTE — TELEPHONE ENCOUNTER
Call to patient to recheck CBC on 12/31/2020 and then consider restarting palbociclib - discussed potential decrease in dose based on several delays in therapy  She is potentially a candidate for COVID vaccine, but is currently not working

## 2020-12-28 NOTE — TELEPHONE ENCOUNTER
Melecio called to let Dr Agnieszka Paez know that she had her labs drawn today. She wants to know if it is ok to go ahead and take her Ibrance? Melecio can be reached at [phone] or [phone].

## 2020-12-29 DIAGNOSIS — T45.1X5A CHEMOTHERAPY-INDUCED NEUTROPENIA (HCC): ICD-10-CM

## 2020-12-29 DIAGNOSIS — Z45.2 ENCOUNTER FOR ADJUSTMENT AND MANAGEMENT OF VASCULAR ACCESS DEVICE: Primary | ICD-10-CM

## 2020-12-29 DIAGNOSIS — D70.1 CHEMOTHERAPY-INDUCED NEUTROPENIA (HCC): ICD-10-CM

## 2020-12-31 ENCOUNTER — NURSE ONLY (OUTPATIENT)
Dept: HEMATOLOGY/ONCOLOGY | Facility: HOSPITAL | Age: 60
End: 2020-12-31
Attending: INTERNAL MEDICINE
Payer: COMMERCIAL

## 2020-12-31 ENCOUNTER — TELEPHONE (OUTPATIENT)
Dept: HEMATOLOGY/ONCOLOGY | Facility: HOSPITAL | Age: 60
End: 2020-12-31

## 2020-12-31 DIAGNOSIS — D70.1 CHEMOTHERAPY-INDUCED NEUTROPENIA (HCC): Primary | ICD-10-CM

## 2020-12-31 DIAGNOSIS — Z45.2 ENCOUNTER FOR ADJUSTMENT AND MANAGEMENT OF VASCULAR ACCESS DEVICE: ICD-10-CM

## 2020-12-31 DIAGNOSIS — T45.1X5A CHEMOTHERAPY-INDUCED NEUTROPENIA (HCC): Primary | ICD-10-CM

## 2020-12-31 PROCEDURE — 85025 COMPLETE CBC W/AUTO DIFF WBC: CPT

## 2020-12-31 PROCEDURE — 36415 COLL VENOUS BLD VENIPUNCTURE: CPT

## 2020-12-31 RX ORDER — 0.9 % SODIUM CHLORIDE 0.9 %
10 VIAL (ML) INJECTION ONCE
Status: CANCELLED | OUTPATIENT
Start: 2020-12-31

## 2020-12-31 RX ORDER — HEPARIN SODIUM (PORCINE) LOCK FLUSH IV SOLN 100 UNIT/ML 100 UNIT/ML
5 SOLUTION INTRAVENOUS ONCE
Status: DISCONTINUED | OUTPATIENT
Start: 2020-12-31 | End: 2020-12-31

## 2020-12-31 RX ORDER — HEPARIN SODIUM (PORCINE) LOCK FLUSH IV SOLN 100 UNIT/ML 100 UNIT/ML
5 SOLUTION INTRAVENOUS ONCE
Status: CANCELLED | OUTPATIENT
Start: 2020-12-31

## 2020-12-31 NOTE — TELEPHONE ENCOUNTER
Spoke with Saverio Mcardle of CBC results-ANC 1.43 and WBC 2.9. Per Dr. Esther Lozano Patient to Re Start Roberth [place]. Return in two weeks for CBC. Farida Douglas verbalized understanding.

## 2021-01-06 ENCOUNTER — SOCIAL WORK SERVICES (OUTPATIENT)
Dept: HEMATOLOGY/ONCOLOGY | Facility: HOSPITAL | Age: 61
End: 2021-01-06

## 2021-01-06 ENCOUNTER — TELEPHONE (OUTPATIENT)
Dept: HEMATOLOGY/ONCOLOGY | Facility: HOSPITAL | Age: 61
End: 2021-01-06

## 2021-01-06 NOTE — TELEPHONE ENCOUNTER
Returned call to patient reviewed when to come in for labs and when to see MD.  Also discussed disability paperwork the Dr. Iglesia Mcmullen needs to sign.   Patient stated understanding of date and times of appointments will see us on 1/13 for lab work will call if

## 2021-01-06 NOTE — PROGRESS NOTES
01/06: XIOMARA received paperwork from [facility] for records request from Physician. During previous discussion with the pt in Nov 2020, pt's intent was to request shorter timeframes as she was hopeful to return to work.  XIOMARA left message to the pt's cell to inf

## 2021-01-13 ENCOUNTER — NURSE ONLY (OUTPATIENT)
Dept: HEMATOLOGY/ONCOLOGY | Facility: HOSPITAL | Age: 61
End: 2021-01-13
Attending: INTERNAL MEDICINE
Payer: COMMERCIAL

## 2021-01-13 ENCOUNTER — TELEPHONE (OUTPATIENT)
Dept: HEMATOLOGY/ONCOLOGY | Facility: HOSPITAL | Age: 61
End: 2021-01-13

## 2021-01-13 DIAGNOSIS — Z45.2 ENCOUNTER FOR ADJUSTMENT AND MANAGEMENT OF VASCULAR ACCESS DEVICE: Primary | ICD-10-CM

## 2021-01-13 DIAGNOSIS — C50.919 METASTATIC BREAST CANCER (HCC): ICD-10-CM

## 2021-01-13 LAB
ALBUMIN SERPL-MCNC: 3.2 G/DL (ref 3.4–5)
ALBUMIN/GLOB SERPL: 1 {RATIO} (ref 1–2)
ALP LIVER SERPL-CCNC: 88 U/L
ALT SERPL-CCNC: 28 U/L
ANION GAP SERPL CALC-SCNC: 3 MMOL/L (ref 0–18)
AST SERPL-CCNC: 17 U/L (ref 15–37)
BASOPHILS # BLD AUTO: 0.02 X10(3) UL (ref 0–0.2)
BASOPHILS NFR BLD AUTO: 0.9 %
BILIRUB SERPL-MCNC: 1.2 MG/DL (ref 0.1–2)
BUN BLD-MCNC: 23 MG/DL (ref 7–18)
BUN/CREAT SERPL: 20.5 (ref 10–20)
CALCIUM BLD-MCNC: 8.9 MG/DL (ref 8.5–10.1)
CHLORIDE SERPL-SCNC: 109 MMOL/L (ref 98–112)
CO2 SERPL-SCNC: 28 MMOL/L (ref 21–32)
CREAT BLD-MCNC: 1.12 MG/DL
DEPRECATED RDW RBC AUTO: 61 FL (ref 35.1–46.3)
EOSINOPHIL # BLD AUTO: 0.04 X10(3) UL (ref 0–0.7)
EOSINOPHIL NFR BLD AUTO: 1.9 %
ERYTHROCYTE [DISTWIDTH] IN BLOOD BY AUTOMATED COUNT: 16.5 % (ref 11–15)
GLOBULIN PLAS-MCNC: 3.3 G/DL (ref 2.8–4.4)
GLUCOSE BLD-MCNC: 82 MG/DL (ref 70–99)
HCT VFR BLD AUTO: 35.7 %
HGB BLD-MCNC: 11.8 G/DL
IMM GRANULOCYTES # BLD AUTO: 0.01 X10(3) UL (ref 0–1)
IMM GRANULOCYTES NFR BLD: 0.5 %
LYMPHOCYTES # BLD AUTO: 0.59 X10(3) UL (ref 1–4)
LYMPHOCYTES NFR BLD AUTO: 27.6 %
M PROTEIN MFR SERPL ELPH: 6.5 G/DL (ref 6.4–8.2)
MCH RBC QN AUTO: 33 PG (ref 26–34)
MCHC RBC AUTO-ENTMCNC: 33.1 G/DL (ref 31–37)
MCV RBC AUTO: 99.7 FL
MONOCYTES # BLD AUTO: 0.16 X10(3) UL (ref 0.1–1)
MONOCYTES NFR BLD AUTO: 7.5 %
NEUTROPHILS # BLD AUTO: 1.32 X10 (3) UL (ref 1.5–7.7)
NEUTROPHILS # BLD AUTO: 1.32 X10(3) UL (ref 1.5–7.7)
NEUTROPHILS NFR BLD AUTO: 61.6 %
OSMOLALITY SERPL CALC.SUM OF ELEC: 293 MOSM/KG (ref 275–295)
PLATELET # BLD AUTO: 278 10(3)UL (ref 150–450)
POTASSIUM SERPL-SCNC: 3.8 MMOL/L (ref 3.5–5.1)
RBC # BLD AUTO: 3.58 X10(6)UL
SODIUM SERPL-SCNC: 140 MMOL/L (ref 136–145)
WBC # BLD AUTO: 2.1 X10(3) UL (ref 4–11)

## 2021-01-13 PROCEDURE — 80053 COMPREHEN METABOLIC PANEL: CPT

## 2021-01-13 PROCEDURE — 85025 COMPLETE CBC W/AUTO DIFF WBC: CPT

## 2021-01-13 PROCEDURE — 36415 COLL VENOUS BLD VENIPUNCTURE: CPT

## 2021-01-13 RX ORDER — 0.9 % SODIUM CHLORIDE 0.9 %
10 VIAL (ML) INJECTION ONCE
Status: CANCELLED | OUTPATIENT
Start: 2021-01-13

## 2021-01-13 RX ORDER — HEPARIN SODIUM (PORCINE) LOCK FLUSH IV SOLN 100 UNIT/ML 100 UNIT/ML
5 SOLUTION INTRAVENOUS ONCE
Status: DISCONTINUED | OUTPATIENT
Start: 2021-01-13 | End: 2021-01-13

## 2021-01-13 RX ORDER — HEPARIN SODIUM (PORCINE) LOCK FLUSH IV SOLN 100 UNIT/ML 100 UNIT/ML
5 SOLUTION INTRAVENOUS ONCE
Status: CANCELLED | OUTPATIENT
Start: 2021-01-13

## 2021-01-13 RX ORDER — SODIUM CHLORIDE 9 MG/ML
INJECTION INTRAVENOUS
Status: DISCONTINUED
Start: 2021-01-13 | End: 2021-01-13 | Stop reason: WASHOUT

## 2021-01-13 NOTE — TELEPHONE ENCOUNTER
Letha Garay with results of her CBC ok to continue on Ibrance- also reviewed Breast MRI which is scheduled tomorrow was to be in six months but due to patient having some new pain in her breast she scheduled early. Dr. Agapito Meyer is aware.   Reviewed next appo

## 2021-01-14 ENCOUNTER — HOSPITAL ENCOUNTER (OUTPATIENT)
Dept: MRI IMAGING | Facility: HOSPITAL | Age: 61
Discharge: HOME | End: 2021-01-14
Attending: INTERNAL MEDICINE
Payer: COMMERCIAL

## 2021-01-14 DIAGNOSIS — T85.43XD RUPTURE OF IMPLANT OF RIGHT BREAST, SUBSEQUENT ENCOUNTER: ICD-10-CM

## 2021-01-14 PROCEDURE — 77047 MRI BREAST C- BILATERAL: CPT | Performed by: INTERNAL MEDICINE

## 2021-01-17 ENCOUNTER — IMMUNIZATION (OUTPATIENT)
Dept: LAB | Facility: HOSPITAL | Age: 61
End: 2021-01-17
Attending: EMERGENCY MEDICINE
Payer: COMMERCIAL

## 2021-01-17 DIAGNOSIS — Z23 NEED FOR VACCINATION: Primary | ICD-10-CM

## 2021-01-17 PROCEDURE — 0011A SARSCOV2 VAC 100MCG/0.5ML IM: CPT

## 2021-01-19 ENCOUNTER — TELEPHONE (OUTPATIENT)
Dept: HEMATOLOGY/ONCOLOGY | Facility: HOSPITAL | Age: 61
End: 2021-01-19

## 2021-01-19 NOTE — TELEPHONE ENCOUNTER
After hours message- Re seeing PT 1x/week for 2 weeks and plan to discharge due to limited insurance issues.

## 2021-01-20 RX ORDER — LAMOTRIGINE 25 MG/1
500 TABLET ORAL ONCE
Status: CANCELLED | OUTPATIENT
Start: 2021-01-22

## 2021-01-21 ENCOUNTER — OFFICE VISIT (OUTPATIENT)
Dept: HEMATOLOGY/ONCOLOGY | Facility: HOSPITAL | Age: 61
End: 2021-01-21
Attending: INTERNAL MEDICINE
Payer: COMMERCIAL

## 2021-01-21 VITALS
WEIGHT: 128 LBS | HEIGHT: 61 IN | RESPIRATION RATE: 16 BRPM | BODY MASS INDEX: 24.17 KG/M2 | HEART RATE: 76 BPM | TEMPERATURE: 98 F | OXYGEN SATURATION: 100 % | DIASTOLIC BLOOD PRESSURE: 82 MMHG | SYSTOLIC BLOOD PRESSURE: 128 MMHG

## 2021-01-21 DIAGNOSIS — Z90.11 HISTORY OF RIGHT MASTECTOMY: ICD-10-CM

## 2021-01-21 DIAGNOSIS — J91.0 MALIGNANT PLEURAL EFFUSION: Primary | ICD-10-CM

## 2021-01-21 DIAGNOSIS — R22.1 MASS OF RIGHT SIDE OF NECK: Primary | ICD-10-CM

## 2021-01-21 DIAGNOSIS — C50.411 MALIGNANT NEOPLASM OF UPPER-OUTER QUADRANT OF RIGHT BREAST IN FEMALE, ESTROGEN RECEPTOR POSITIVE (HCC): ICD-10-CM

## 2021-01-21 DIAGNOSIS — Q85.00 CLINICAL NEUROFIBROMATOSIS (HCC): ICD-10-CM

## 2021-01-21 DIAGNOSIS — T45.1X5A CHEMOTHERAPY-INDUCED NEUTROPENIA (HCC): ICD-10-CM

## 2021-01-21 DIAGNOSIS — R22.1 MASS OF RIGHT SIDE OF NECK: ICD-10-CM

## 2021-01-21 DIAGNOSIS — M85.89 OSTEOPENIA OF MULTIPLE SITES: ICD-10-CM

## 2021-01-21 DIAGNOSIS — C78.7 LIVER METASTASIS (HCC): ICD-10-CM

## 2021-01-21 DIAGNOSIS — C50.919 METASTATIC BREAST CANCER (HCC): ICD-10-CM

## 2021-01-21 DIAGNOSIS — J91.0 MALIGNANT PLEURAL EFFUSION: ICD-10-CM

## 2021-01-21 DIAGNOSIS — Z17.0 MALIGNANT NEOPLASM OF UPPER-OUTER QUADRANT OF RIGHT BREAST IN FEMALE, ESTROGEN RECEPTOR POSITIVE (HCC): ICD-10-CM

## 2021-01-21 DIAGNOSIS — Z17.0 MALIGNANT NEOPLASM OF UPPER-OUTER QUADRANT OF RIGHT BREAST IN FEMALE, ESTROGEN RECEPTOR POSITIVE (HCC): Primary | ICD-10-CM

## 2021-01-21 DIAGNOSIS — C50.411 MALIGNANT NEOPLASM OF UPPER-OUTER QUADRANT OF RIGHT BREAST IN FEMALE, ESTROGEN RECEPTOR POSITIVE (HCC): Primary | ICD-10-CM

## 2021-01-21 DIAGNOSIS — D70.1 CHEMOTHERAPY-INDUCED NEUTROPENIA (HCC): ICD-10-CM

## 2021-01-21 LAB
ALBUMIN SERPL-MCNC: 3 G/DL (ref 3.4–5)
ALBUMIN/GLOB SERPL: 0.9 {RATIO} (ref 1–2)
ALP LIVER SERPL-CCNC: 96 U/L
ALT SERPL-CCNC: 25 U/L
ANION GAP SERPL CALC-SCNC: 7 MMOL/L (ref 0–18)
AST SERPL-CCNC: 15 U/L (ref 15–37)
BASOPHILS # BLD AUTO: 0.02 X10(3) UL (ref 0–0.2)
BASOPHILS NFR BLD AUTO: 1.4 %
BILIRUB SERPL-MCNC: 1 MG/DL (ref 0.1–2)
BUN BLD-MCNC: 20 MG/DL (ref 7–18)
BUN/CREAT SERPL: 17.2 (ref 10–20)
CALCIUM BLD-MCNC: 9 MG/DL (ref 8.5–10.1)
CHLORIDE SERPL-SCNC: 109 MMOL/L (ref 98–112)
CO2 SERPL-SCNC: 25 MMOL/L (ref 21–32)
CREAT BLD-MCNC: 1.16 MG/DL
DEPRECATED RDW RBC AUTO: 60.1 FL (ref 35.1–46.3)
EOSINOPHIL # BLD AUTO: 0.04 X10(3) UL (ref 0–0.7)
EOSINOPHIL NFR BLD AUTO: 2.9 %
ERYTHROCYTE [DISTWIDTH] IN BLOOD BY AUTOMATED COUNT: 16.3 % (ref 11–15)
GLOBULIN PLAS-MCNC: 3.5 G/DL (ref 2.8–4.4)
GLUCOSE BLD-MCNC: 106 MG/DL (ref 70–99)
HCT VFR BLD AUTO: 35.5 %
HGB BLD-MCNC: 11.7 G/DL
IMM GRANULOCYTES # BLD AUTO: 0 X10(3) UL (ref 0–1)
IMM GRANULOCYTES NFR BLD: 0 %
LYMPHOCYTES # BLD AUTO: 0.36 X10(3) UL (ref 1–4)
LYMPHOCYTES NFR BLD AUTO: 25.9 %
M PROTEIN MFR SERPL ELPH: 6.5 G/DL (ref 6.4–8.2)
MCH RBC QN AUTO: 33.4 PG (ref 26–34)
MCHC RBC AUTO-ENTMCNC: 33 G/DL (ref 31–37)
MCV RBC AUTO: 101.4 FL
MONOCYTES # BLD AUTO: 0.15 X10(3) UL (ref 0.1–1)
MONOCYTES NFR BLD AUTO: 10.8 %
NEUTROPHILS # BLD AUTO: 0.82 X10 (3) UL (ref 1.5–7.7)
NEUTROPHILS # BLD AUTO: 0.82 X10(3) UL (ref 1.5–7.7)
NEUTROPHILS NFR BLD AUTO: 59 %
OSMOLALITY SERPL CALC.SUM OF ELEC: 295 MOSM/KG (ref 275–295)
PLATELET # BLD AUTO: 147 10(3)UL (ref 150–450)
POTASSIUM SERPL-SCNC: 4 MMOL/L (ref 3.5–5.1)
RBC # BLD AUTO: 3.5 X10(6)UL
SODIUM SERPL-SCNC: 141 MMOL/L (ref 136–145)
WBC # BLD AUTO: 1.4 X10(3) UL (ref 4–11)

## 2021-01-21 PROCEDURE — 96402 CHEMO HORMON ANTINEOPL SQ/IM: CPT

## 2021-01-21 PROCEDURE — 36415 COLL VENOUS BLD VENIPUNCTURE: CPT

## 2021-01-21 PROCEDURE — 99214 OFFICE O/P EST MOD 30 MIN: CPT | Performed by: INTERNAL MEDICINE

## 2021-01-21 PROCEDURE — 85025 COMPLETE CBC W/AUTO DIFF WBC: CPT

## 2021-01-21 PROCEDURE — 80053 COMPREHEN METABOLIC PANEL: CPT

## 2021-01-21 RX ORDER — LAMOTRIGINE 25 MG/1
500 TABLET ORAL ONCE
Status: COMPLETED | OUTPATIENT
Start: 2021-01-21 | End: 2021-01-21

## 2021-01-21 RX ORDER — HYDROCODONE BITARTRATE AND ACETAMINOPHEN 5; 325 MG/1; MG/1
1-2 TABLET ORAL EVERY 6 HOURS PRN
Qty: 100 TABLET | Refills: 0 | Status: SHIPPED | OUTPATIENT
Start: 2021-01-21 | End: 2021-07-08

## 2021-01-21 RX ADMIN — LAMOTRIGINE 500 MG: 25 TABLET ORAL at 09:08:00

## 2021-01-21 NOTE — PROGRESS NOTES
HPI   1/21/2021  Alisia Boas is a 62year old female with metastatic breast cancer as documented by biopsies of the right neck mass, cytology from left pleural fluid, and liver biopsy.   Patient received Faslodex today 500 mg IM and took her last pal defect with irregular parenchymal enhancement around the margins of this operative defect, the enhancement appears more diffuse, thick and irregular than is normally seen, areas of washout suspicious for residual neoplasm at the margins.   8 x 7.3 mm enhanc thickening. Given the history of malignancy, this finding could represent new confluent yassine metastases. Cellulitis/phlegmon formation could have a similar appearance. 2. No drainable subcutaneous abscess. 3. Stable multinodular thyroid gland.    4. [employer]   Constitutional: Positive for fatigue. Negative for chills and fever. 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 HENT: Negative for dental problem.     Eyes: Po 5   • Cholecalciferol (VITAMIN D3) 250 MCG (11144 UT) Oral Cap Take 1 capsule by mouth daily. • atorvastatin 20 MG Oral Tab Take 20 mg by mouth daily. • Minocycline HCl 50 MG Oral Cap Take 50 mg by mouth 2 (two) times daily.      • losartan 100 MG O education level: Not on file    Occupational History      Not on file    Social Needs      Financial resource strain: Not on file      Food insecurity        Worry: Not on file        Inability: Not on file      Transportation needs        Medical: Not on bilateral mastectomy   • Genetic Disease Mother         NF-1   • Breast Cancer Maternal Aunt 76   • Breast Cancer Paternal Grandmother    • Breast Cancer Self 46   • Genetic Disease Self         NF-1   • Cancer Paternal Aunt         throat ca   • Cancer Ma distension and no mass. There is no abdominal tenderness. Musculoskeletal:         General: No edema.       Comments: Right ankle deformity - extensive surgical scars -limited mobility of ankle  Right hip discomfort - Would not take the hydrocodone for th MRI of the breast April 2021 for follow-up of possible intracapsular rupture of the implant on the right breast and repeat evaluation of the left implant. Patient does not have discomfort.   Patient's mammogram 9/25/2020 was benign    Patient has a history and 1 which is new. These could be re-evaluated on follow-up CT. 5. Small hiatal hernia. 6. Small pericardial effusion. 7. Cutaneous and subcutaneous nodules compatible with neurofibromatosis.  8. Multiple thyroid nodules with the largest nodule in the thy

## 2021-01-26 ENCOUNTER — TELEPHONE (OUTPATIENT)
Dept: HEMATOLOGY/ONCOLOGY | Facility: HOSPITAL | Age: 61
End: 2021-01-26

## 2021-01-26 NOTE — TELEPHONE ENCOUNTER
[facility] [facility] at [phone] is discharging this patient from Home health as of 1/26/21.

## 2021-01-27 ENCOUNTER — NURSE ONLY (OUTPATIENT)
Dept: HEMATOLOGY/ONCOLOGY | Facility: HOSPITAL | Age: 61
End: 2021-01-27
Attending: INTERNAL MEDICINE
Payer: COMMERCIAL

## 2021-01-27 ENCOUNTER — TELEPHONE (OUTPATIENT)
Dept: HEMATOLOGY/ONCOLOGY | Facility: HOSPITAL | Age: 61
End: 2021-01-27

## 2021-01-27 DIAGNOSIS — D70.1 CHEMOTHERAPY-INDUCED NEUTROPENIA (HCC): ICD-10-CM

## 2021-01-27 DIAGNOSIS — Z17.0 MALIGNANT NEOPLASM OF UPPER-OUTER QUADRANT OF RIGHT BREAST IN FEMALE, ESTROGEN RECEPTOR POSITIVE (HCC): ICD-10-CM

## 2021-01-27 DIAGNOSIS — C50.411 MALIGNANT NEOPLASM OF UPPER-OUTER QUADRANT OF RIGHT BREAST IN FEMALE, ESTROGEN RECEPTOR POSITIVE (HCC): ICD-10-CM

## 2021-01-27 DIAGNOSIS — T45.1X5A CHEMOTHERAPY-INDUCED NEUTROPENIA (HCC): ICD-10-CM

## 2021-01-27 DIAGNOSIS — Q85.00 CLINICAL NEUROFIBROMATOSIS (HCC): ICD-10-CM

## 2021-01-27 DIAGNOSIS — J91.0 MALIGNANT PLEURAL EFFUSION: ICD-10-CM

## 2021-01-27 DIAGNOSIS — C78.7 LIVER METASTASIS (HCC): ICD-10-CM

## 2021-01-27 LAB
BASOPHILS # BLD AUTO: 0.02 X10(3) UL (ref 0–0.2)
BASOPHILS NFR BLD AUTO: 0.9 %
DEPRECATED RDW RBC AUTO: 59.8 FL (ref 35.1–46.3)
EOSINOPHIL # BLD AUTO: 0.03 X10(3) UL (ref 0–0.7)
EOSINOPHIL NFR BLD AUTO: 1.4 %
ERYTHROCYTE [DISTWIDTH] IN BLOOD BY AUTOMATED COUNT: 16.2 % (ref 11–15)
HCT VFR BLD AUTO: 35.5 %
HGB BLD-MCNC: 12 G/DL
IMM GRANULOCYTES # BLD AUTO: 0 X10(3) UL (ref 0–1)
IMM GRANULOCYTES NFR BLD: 0 %
LYMPHOCYTES # BLD AUTO: 0.79 X10(3) UL (ref 1–4)
LYMPHOCYTES NFR BLD AUTO: 36.9 %
MCH RBC QN AUTO: 34 PG (ref 26–34)
MCHC RBC AUTO-ENTMCNC: 33.8 G/DL (ref 31–37)
MCV RBC AUTO: 100.6 FL
MONOCYTES # BLD AUTO: 0.32 X10(3) UL (ref 0.1–1)
MONOCYTES NFR BLD AUTO: 15 %
NEUTROPHILS # BLD AUTO: 0.98 X10 (3) UL (ref 1.5–7.7)
NEUTROPHILS # BLD AUTO: 0.98 X10(3) UL (ref 1.5–7.7)
NEUTROPHILS NFR BLD AUTO: 45.8 %
PLATELET # BLD AUTO: 163 10(3)UL (ref 150–450)
RBC # BLD AUTO: 3.53 X10(6)UL
WBC # BLD AUTO: 2.1 X10(3) UL (ref 4–11)

## 2021-01-27 PROCEDURE — 85025 COMPLETE CBC W/AUTO DIFF WBC: CPT

## 2021-01-27 PROCEDURE — 36415 COLL VENOUS BLD VENIPUNCTURE: CPT

## 2021-01-27 RX ORDER — SODIUM CHLORIDE 9 MG/ML
INJECTION INTRAVENOUS
Status: DISCONTINUED
Start: 2021-01-27 | End: 2021-01-27 | Stop reason: WASHOUT

## 2021-01-27 NOTE — TELEPHONE ENCOUNTER
Joey Funes after reviewing CBC with Dr. Agnieszka Paez- continue to hold palbociclib- scheduled patient for CBC and MD follow up 2/2 to review CT scan scheduled for 2/1.   Patient stated understanding and is in agreement with date and times of appointments

## 2021-02-01 ENCOUNTER — HOSPITAL ENCOUNTER (OUTPATIENT)
Dept: BONE DENSITY | Facility: HOSPITAL | Age: 61
Discharge: HOME | End: 2021-02-01
Attending: INTERNAL MEDICINE
Payer: COMMERCIAL

## 2021-02-01 ENCOUNTER — HOSPITAL ENCOUNTER (OUTPATIENT)
Dept: CT IMAGING | Facility: HOSPITAL | Age: 61
Discharge: HOME | End: 2021-02-01
Attending: INTERNAL MEDICINE
Payer: COMMERCIAL

## 2021-02-01 DIAGNOSIS — T45.1X5A CHEMOTHERAPY-INDUCED NEUTROPENIA (HCC): ICD-10-CM

## 2021-02-01 DIAGNOSIS — Z17.0 MALIGNANT NEOPLASM OF UPPER-OUTER QUADRANT OF RIGHT BREAST IN FEMALE, ESTROGEN RECEPTOR POSITIVE (HCC): ICD-10-CM

## 2021-02-01 DIAGNOSIS — D70.1 CHEMOTHERAPY-INDUCED NEUTROPENIA (HCC): ICD-10-CM

## 2021-02-01 DIAGNOSIS — R22.1 MASS OF RIGHT SIDE OF NECK: ICD-10-CM

## 2021-02-01 DIAGNOSIS — M85.89 OSTEOPENIA OF MULTIPLE SITES: ICD-10-CM

## 2021-02-01 DIAGNOSIS — C50.411 MALIGNANT NEOPLASM OF UPPER-OUTER QUADRANT OF RIGHT BREAST IN FEMALE, ESTROGEN RECEPTOR POSITIVE (HCC): Primary | ICD-10-CM

## 2021-02-01 DIAGNOSIS — C50.919 METASTATIC BREAST CANCER (HCC): ICD-10-CM

## 2021-02-01 DIAGNOSIS — J91.0 MALIGNANT PLEURAL EFFUSION: ICD-10-CM

## 2021-02-01 DIAGNOSIS — Z17.0 MALIGNANT NEOPLASM OF UPPER-OUTER QUADRANT OF RIGHT BREAST IN FEMALE, ESTROGEN RECEPTOR POSITIVE (HCC): Primary | ICD-10-CM

## 2021-02-01 DIAGNOSIS — C50.411 MALIGNANT NEOPLASM OF UPPER-OUTER QUADRANT OF RIGHT BREAST IN FEMALE, ESTROGEN RECEPTOR POSITIVE (HCC): ICD-10-CM

## 2021-02-01 PROCEDURE — 71260 CT THORAX DX C+: CPT | Performed by: INTERNAL MEDICINE

## 2021-02-01 PROCEDURE — 70491 CT SOFT TISSUE NECK W/DYE: CPT | Performed by: INTERNAL MEDICINE

## 2021-02-01 PROCEDURE — 74177 CT ABD & PELVIS W/CONTRAST: CPT | Performed by: INTERNAL MEDICINE

## 2021-02-01 PROCEDURE — 77080 DXA BONE DENSITY AXIAL: CPT | Performed by: INTERNAL MEDICINE

## 2021-02-02 ENCOUNTER — OFFICE VISIT (OUTPATIENT)
Dept: HEMATOLOGY/ONCOLOGY | Facility: HOSPITAL | Age: 61
End: 2021-02-02
Attending: INTERNAL MEDICINE
Payer: COMMERCIAL

## 2021-02-02 ENCOUNTER — SOCIAL WORK SERVICES (OUTPATIENT)
Dept: HEMATOLOGY/ONCOLOGY | Facility: HOSPITAL | Age: 61
End: 2021-02-02

## 2021-02-02 VITALS
BODY MASS INDEX: 24.92 KG/M2 | OXYGEN SATURATION: 98 % | RESPIRATION RATE: 16 BRPM | TEMPERATURE: 98 F | HEART RATE: 68 BPM | WEIGHT: 132 LBS | SYSTOLIC BLOOD PRESSURE: 121 MMHG | HEIGHT: 61 IN | DIASTOLIC BLOOD PRESSURE: 76 MMHG

## 2021-02-02 DIAGNOSIS — Q85.00 CLINICAL NEUROFIBROMATOSIS (HCC): ICD-10-CM

## 2021-02-02 DIAGNOSIS — D70.1 CHEMOTHERAPY-INDUCED NEUTROPENIA (HCC): ICD-10-CM

## 2021-02-02 DIAGNOSIS — C50.411 MALIGNANT NEOPLASM OF UPPER-OUTER QUADRANT OF RIGHT BREAST IN FEMALE, ESTROGEN RECEPTOR POSITIVE (HCC): ICD-10-CM

## 2021-02-02 DIAGNOSIS — M85.89 OSTEOPENIA OF MULTIPLE SITES: ICD-10-CM

## 2021-02-02 DIAGNOSIS — Z17.0 MALIGNANT NEOPLASM OF UPPER-OUTER QUADRANT OF RIGHT BREAST IN FEMALE, ESTROGEN RECEPTOR POSITIVE (HCC): Primary | ICD-10-CM

## 2021-02-02 DIAGNOSIS — T45.1X5A CHEMOTHERAPY-INDUCED NEUTROPENIA (HCC): ICD-10-CM

## 2021-02-02 DIAGNOSIS — C50.411 MALIGNANT NEOPLASM OF UPPER-OUTER QUADRANT OF RIGHT BREAST IN FEMALE, ESTROGEN RECEPTOR POSITIVE (HCC): Primary | ICD-10-CM

## 2021-02-02 DIAGNOSIS — Z90.11 HISTORY OF RIGHT MASTECTOMY: ICD-10-CM

## 2021-02-02 DIAGNOSIS — R22.1 MASS OF RIGHT SIDE OF NECK: ICD-10-CM

## 2021-02-02 DIAGNOSIS — C50.919 METASTATIC BREAST CANCER (HCC): ICD-10-CM

## 2021-02-02 DIAGNOSIS — C78.7 LIVER METASTASIS (HCC): ICD-10-CM

## 2021-02-02 DIAGNOSIS — J91.0 MALIGNANT PLEURAL EFFUSION: ICD-10-CM

## 2021-02-02 DIAGNOSIS — Z17.0 MALIGNANT NEOPLASM OF UPPER-OUTER QUADRANT OF RIGHT BREAST IN FEMALE, ESTROGEN RECEPTOR POSITIVE (HCC): ICD-10-CM

## 2021-02-02 PROBLEM — M81.8 OTHER OSTEOPOROSIS WITHOUT CURRENT PATHOLOGICAL FRACTURE: Status: ACTIVE | Noted: 2021-02-02

## 2021-02-02 LAB
BASOPHILS # BLD AUTO: 0.05 X10(3) UL (ref 0–0.2)
BASOPHILS NFR BLD AUTO: 1.6 %
DEPRECATED RDW RBC AUTO: 60.7 FL (ref 35.1–46.3)
EOSINOPHIL # BLD AUTO: 0.02 X10(3) UL (ref 0–0.7)
EOSINOPHIL NFR BLD AUTO: 0.6 %
ERYTHROCYTE [DISTWIDTH] IN BLOOD BY AUTOMATED COUNT: 15.9 % (ref 11–15)
HCT VFR BLD AUTO: 37.4 %
HGB BLD-MCNC: 12.3 G/DL
IMM GRANULOCYTES # BLD AUTO: 0.02 X10(3) UL (ref 0–1)
IMM GRANULOCYTES NFR BLD: 0.6 %
LYMPHOCYTES # BLD AUTO: 0.63 X10(3) UL (ref 1–4)
LYMPHOCYTES NFR BLD AUTO: 19.6 %
MCH RBC QN AUTO: 33.5 PG (ref 26–34)
MCHC RBC AUTO-ENTMCNC: 32.9 G/DL (ref 31–37)
MCV RBC AUTO: 101.9 FL
MONOCYTES # BLD AUTO: 0.56 X10(3) UL (ref 0.1–1)
MONOCYTES NFR BLD AUTO: 17.4 %
NEUTROPHILS # BLD AUTO: 1.94 X10 (3) UL (ref 1.5–7.7)
NEUTROPHILS # BLD AUTO: 1.94 X10(3) UL (ref 1.5–7.7)
NEUTROPHILS NFR BLD AUTO: 60.2 %
PLATELET # BLD AUTO: 237 10(3)UL (ref 150–450)
RBC # BLD AUTO: 3.67 X10(6)UL
WBC # BLD AUTO: 3.2 X10(3) UL (ref 4–11)

## 2021-02-02 PROCEDURE — 85025 COMPLETE CBC W/AUTO DIFF WBC: CPT

## 2021-02-02 PROCEDURE — 99215 OFFICE O/P EST HI 40 MIN: CPT | Performed by: INTERNAL MEDICINE

## 2021-02-02 PROCEDURE — 36415 COLL VENOUS BLD VENIPUNCTURE: CPT

## 2021-02-02 NOTE — PROGRESS NOTES
HPI   2/2/2021  Bear Moya is a 62year old female with metastatic breast cancer as documented by biopsies of the right neck mass, cytology from left pleural fluid, and liver biopsy. Patient received Faslodex today 500 mg IM.   Palbociclib has been the enhancement appears more diffuse, thick and irregular than is normally seen, areas of washout suspicious for residual neoplasm at the margins.   8 x 7.3 mm enhancing mass superior to the lumpectomy site in the 12 o'clock position mid right breast suspic metastases. Cellulitis/phlegmon formation could have a similar appearance. 2. No drainable subcutaneous abscess. 3. Stable multinodular thyroid gland. 4. Development of findings at the visualized lung apices concerning for CHF/fluid overload.   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 HENT: Negative for dental problem. Eyes: Positive for visual disturbance.         Glaucoma, cataracts, vitreous hemorrhage   Respiratory: • atorvastatin 20 MG Oral Tab Take 20 mg by mouth daily. • Minocycline HCl 50 MG Oral Cap Take 50 mg by mouth 2 (two) times daily. • losartan 100 MG Oral Tab Take by mouth daily.        Allergies:   No Known Allergies    Past Medical History: resource strain: Not on file      Food insecurity        Worry: Not on file        Inability: Not on file      Transportation needs        Medical: Not on file        Non-medical: Not on file    Tobacco Use      Smoking status: Never Smoker      Smokeless Cancer Paternal Grandmother    • Breast Cancer Self 46   • Genetic Disease Self         NF-1   • Cancer Paternal Aunt         throat ca   • Cancer Maternal Uncle         prostate ca   • Genetic Disease Brother         NF-1   • Genetic Disease Brother mass. There is no abdominal tenderness. Musculoskeletal:         General: No edema.       Comments: Right ankle deformity - extensive surgical scars -limited mobility of ankle  Right hip discomfort - Would not take the hydrocodone for this  Partial fusion based on the laboratory results. Will be delayed an extra week if the CBC reveals neutropenia.   Fulvestrant is scheduled for 2/19/2021    The patient is advised to have a repeat MRI of the breast April 2021 for follow-up of possible intracapsular rupture 4.00 x10(3) uL 0.63 (L) 0.79 (L) 0.36 (L)   Monocytes Absolute      0.10 - 1.00 x10(3) uL 0.56 0.32 0.15   Eosinophils Absolute      0.00 - 0.70 x10(3) uL 0.02 0.03 0.04   Basophils Absolute      0.00 - 0.20 x10(3) uL 0.05 0.02 0.02   Immature Granulocyte Multiple heterogeneous low-density thyroid nodules, the largest of which measures up to 1.5 x 1.4 cm in the left inferior isthmus. Some nodules demonstrate marginal calcification. No significant interval change since prior.   Submandibular   and parotid g in extent since prior. No pneumothorax. Tiny 2 mm right apical perivascular nodule (series 6, image 24),   which is likely retrospectively unchanged. Likewise, an additional right upper lobe subpleural micronodule (series 6, image 38), also unchanged. since prior. This includes a reference 0.8 cm sclerotic focus within the posterior aspect of the L4 vertebral body (series 11, image 58).   There is also a   reference 0.8 cm sclerotic focus within the anterior aspect of the T11 vertebral body (series 11, possibility of lymphangitic spread of neoplasm is raised. Overall, these findings have likely slightly improved since prior with no definite progression. Continued close attention on   anticipated follow-up is advised.   4. Small sclerotic foci throughout Stable appearance of diffuse interstitial prominence and small left basilar pleural effusion.  =================================================  9/18/2020 CT soft tissue of the neck     1.  Development of a spiculated soft tissue density in the right supra edge of the field of view. Correlate with neck CT.        Meds This Visit:  See med list above    Imaging & Referrals:

## 2021-02-02 NOTE — PROGRESS NOTES
SW met with the pt, per her request, in the clinic area. Pt states she does not think she will be able to return to work, and is requesting more information about Longterm Disability benefits.  The previous Short Term Disability had been indicated until 03/

## 2021-02-04 RX ORDER — PALBOCICLIB 125 MG/1
TABLET, FILM COATED ORAL
OUTPATIENT
Start: 2021-02-04

## 2021-02-04 NOTE — TELEPHONE ENCOUNTER
[address] [place] , [address] [phone], [phone] is asking for a new Rx for Ibrance. Paperwork in Dr. Citlalli Chin.

## 2021-02-05 ENCOUNTER — TELEPHONE (OUTPATIENT)
Dept: HEMATOLOGY/ONCOLOGY | Facility: HOSPITAL | Age: 61
End: 2021-02-05

## 2021-02-05 NOTE — TELEPHONE ENCOUNTER
[address] with pharmacist advised that Cloteal Asencio cycle is 5 weeks. Leo Larry takes Kaitlin for 3 weeks and then off two weeks. Phamacist verbalized understanding and had no further questions.

## 2021-02-05 NOTE — TELEPHONE ENCOUNTER
[facility] pharmacy sent a fax for clarification on the patient's Ibrance. It would like to verify the cycle days/resting period. Please call.

## 2021-02-12 ENCOUNTER — HOSPITAL ENCOUNTER (OUTPATIENT)
Dept: GENERAL RADIOLOGY | Age: 61
Discharge: HOME | End: 2021-02-12
Attending: INTERNAL MEDICINE
Payer: COMMERCIAL

## 2021-02-12 DIAGNOSIS — J91.0 MALIGNANT PLEURAL EFFUSION: ICD-10-CM

## 2021-02-12 PROCEDURE — 71046 X-RAY EXAM CHEST 2 VIEWS: CPT | Performed by: INTERNAL MEDICINE

## 2021-02-14 ENCOUNTER — IMMUNIZATION (OUTPATIENT)
Dept: LAB | Facility: HOSPITAL | Age: 61
End: 2021-02-14
Attending: PREVENTIVE MEDICINE
Payer: COMMERCIAL

## 2021-02-14 DIAGNOSIS — Z23 NEED FOR VACCINATION: Primary | ICD-10-CM

## 2021-02-14 PROCEDURE — 0012A SARSCOV2 VAC 100MCG/0.5ML IM: CPT

## 2021-02-18 ENCOUNTER — OFFICE VISIT (OUTPATIENT)
Dept: HEMATOLOGY/ONCOLOGY | Facility: HOSPITAL | Age: 61
End: 2021-02-18
Attending: INTERNAL MEDICINE
Payer: COMMERCIAL

## 2021-02-18 ENCOUNTER — SOCIAL WORK SERVICES (OUTPATIENT)
Dept: HEMATOLOGY/ONCOLOGY | Facility: HOSPITAL | Age: 61
End: 2021-02-18

## 2021-02-18 VITALS
RESPIRATION RATE: 16 BRPM | TEMPERATURE: 98 F | SYSTOLIC BLOOD PRESSURE: 112 MMHG | DIASTOLIC BLOOD PRESSURE: 71 MMHG | HEART RATE: 89 BPM | OXYGEN SATURATION: 100 %

## 2021-02-18 DIAGNOSIS — J91.0 MALIGNANT PLEURAL EFFUSION: ICD-10-CM

## 2021-02-18 DIAGNOSIS — M81.8 OTHER OSTEOPOROSIS WITHOUT CURRENT PATHOLOGICAL FRACTURE: Primary | ICD-10-CM

## 2021-02-18 DIAGNOSIS — Z45.2 ENCOUNTER FOR ADJUSTMENT AND MANAGEMENT OF VASCULAR ACCESS DEVICE: ICD-10-CM

## 2021-02-18 DIAGNOSIS — Z90.11 HISTORY OF RIGHT MASTECTOMY: ICD-10-CM

## 2021-02-18 DIAGNOSIS — M85.89 OSTEOPENIA OF MULTIPLE SITES: ICD-10-CM

## 2021-02-18 DIAGNOSIS — R22.1 MASS OF RIGHT SIDE OF NECK: ICD-10-CM

## 2021-02-18 DIAGNOSIS — C50.919 METASTATIC BREAST CANCER (HCC): ICD-10-CM

## 2021-02-18 LAB
ALBUMIN SERPL-MCNC: 3 G/DL (ref 3.4–5)
ALBUMIN/GLOB SERPL: 0.9 {RATIO} (ref 1–2)
ALP LIVER SERPL-CCNC: 111 U/L
ALT SERPL-CCNC: 29 U/L
ANION GAP SERPL CALC-SCNC: 4 MMOL/L (ref 0–18)
AST SERPL-CCNC: 17 U/L (ref 15–37)
BASOPHILS # BLD AUTO: 0.02 X10(3) UL (ref 0–0.2)
BASOPHILS NFR BLD AUTO: 0.9 %
BILIRUB SERPL-MCNC: 0.8 MG/DL (ref 0.1–2)
BUN BLD-MCNC: 21 MG/DL (ref 7–18)
BUN/CREAT SERPL: 22.1 (ref 10–20)
CALCIUM BLD-MCNC: 9.3 MG/DL (ref 8.5–10.1)
CHLORIDE SERPL-SCNC: 107 MMOL/L (ref 98–112)
CO2 SERPL-SCNC: 27 MMOL/L (ref 21–32)
CREAT BLD-MCNC: 0.95 MG/DL
DEPRECATED RDW RBC AUTO: 52 FL (ref 35.1–46.3)
EOSINOPHIL # BLD AUTO: 0.06 X10(3) UL (ref 0–0.7)
EOSINOPHIL NFR BLD AUTO: 2.8 %
ERYTHROCYTE [DISTWIDTH] IN BLOOD BY AUTOMATED COUNT: 14.2 % (ref 11–15)
GLOBULIN PLAS-MCNC: 3.2 G/DL (ref 2.8–4.4)
GLUCOSE BLD-MCNC: 80 MG/DL (ref 70–99)
HCT VFR BLD AUTO: 37.1 %
HGB BLD-MCNC: 12.1 G/DL
IMM GRANULOCYTES # BLD AUTO: 0.01 X10(3) UL (ref 0–1)
IMM GRANULOCYTES NFR BLD: 0.5 %
LYMPHOCYTES # BLD AUTO: 0.45 X10(3) UL (ref 1–4)
LYMPHOCYTES NFR BLD AUTO: 20.7 %
M PROTEIN MFR SERPL ELPH: 6.2 G/DL (ref 6.4–8.2)
MCH RBC QN AUTO: 32.8 PG (ref 26–34)
MCHC RBC AUTO-ENTMCNC: 32.6 G/DL (ref 31–37)
MCV RBC AUTO: 100.5 FL
MONOCYTES # BLD AUTO: 0.16 X10(3) UL (ref 0.1–1)
MONOCYTES NFR BLD AUTO: 7.4 %
NEUTROPHILS # BLD AUTO: 1.47 X10 (3) UL (ref 1.5–7.7)
NEUTROPHILS # BLD AUTO: 1.47 X10(3) UL (ref 1.5–7.7)
NEUTROPHILS NFR BLD AUTO: 67.7 %
OSMOLALITY SERPL CALC.SUM OF ELEC: 288 MOSM/KG (ref 275–295)
PATIENT FASTING Y/N/NP: NO
PLATELET # BLD AUTO: 236 10(3)UL (ref 150–450)
POTASSIUM SERPL-SCNC: 3.9 MMOL/L (ref 3.5–5.1)
RBC # BLD AUTO: 3.69 X10(6)UL
SODIUM SERPL-SCNC: 138 MMOL/L (ref 136–145)
WBC # BLD AUTO: 2.2 X10(3) UL (ref 4–11)

## 2021-02-18 PROCEDURE — 80053 COMPREHEN METABOLIC PANEL: CPT

## 2021-02-18 PROCEDURE — A4216 STERILE WATER/SALINE, 10 ML: HCPCS

## 2021-02-18 PROCEDURE — 96365 THER/PROPH/DIAG IV INF INIT: CPT

## 2021-02-18 PROCEDURE — 96402 CHEMO HORMON ANTINEOPL SQ/IM: CPT

## 2021-02-18 PROCEDURE — 36593 DECLOT VASCULAR DEVICE: CPT

## 2021-02-18 PROCEDURE — 85060 BLOOD SMEAR INTERPRETATION: CPT

## 2021-02-18 PROCEDURE — 85025 COMPLETE CBC W/AUTO DIFF WBC: CPT

## 2021-02-18 PROCEDURE — 96375 TX/PRO/DX INJ NEW DRUG ADDON: CPT

## 2021-02-18 RX ORDER — 0.9 % SODIUM CHLORIDE 0.9 %
10 VIAL (ML) INJECTION ONCE
Status: CANCELLED | OUTPATIENT
Start: 2021-02-18

## 2021-02-18 RX ORDER — HEPARIN SODIUM (PORCINE) LOCK FLUSH IV SOLN 100 UNIT/ML 100 UNIT/ML
SOLUTION INTRAVENOUS
Status: DISCONTINUED
Start: 2021-02-18 | End: 2021-02-18

## 2021-02-18 RX ORDER — SODIUM CHLORIDE 9 MG/ML
INJECTION INTRAVENOUS
Status: DISCONTINUED
Start: 2021-02-18 | End: 2021-02-18

## 2021-02-18 RX ORDER — LAMOTRIGINE 25 MG/1
500 TABLET ORAL ONCE
Status: COMPLETED | OUTPATIENT
Start: 2021-02-18 | End: 2021-02-18

## 2021-02-18 RX ORDER — HEPARIN SODIUM (PORCINE) LOCK FLUSH IV SOLN 100 UNIT/ML 100 UNIT/ML
5 SOLUTION INTRAVENOUS ONCE
Status: CANCELLED | OUTPATIENT
Start: 2021-02-18

## 2021-02-18 RX ADMIN — LAMOTRIGINE 500 MG: 25 TABLET ORAL at 10:44:00

## 2021-02-18 NOTE — PROGRESS NOTES
Pt to infusion for Faslodex , Labs collected and Zometa   discuss medication profile , possible SE   Paty Astudillo denies any upcoming dental work , jaw pain  Alok in formation sheet provided   Port accessed, flushed well with no blood return  0950  TPA placed an

## 2021-02-18 NOTE — PROGRESS NOTES
XIOMARA received paperwork from [facility] requesting updated office notes. Last notes sent 01/07/2021. XIOMARA sent office notes and lab visits from 01/07/2021 until today via fax to [facility] [phone].      GRAY Stauffer, [facility]  Outpatient Oncology [facility]

## 2021-02-23 ENCOUNTER — OFFICE VISIT (OUTPATIENT)
Dept: HEMATOLOGY/ONCOLOGY | Facility: HOSPITAL | Age: 61
End: 2021-02-23
Attending: INTERNAL MEDICINE
Payer: COMMERCIAL

## 2021-02-23 VITALS
HEIGHT: 61 IN | OXYGEN SATURATION: 100 % | TEMPERATURE: 98 F | DIASTOLIC BLOOD PRESSURE: 81 MMHG | HEART RATE: 96 BPM | WEIGHT: 130 LBS | SYSTOLIC BLOOD PRESSURE: 119 MMHG | RESPIRATION RATE: 16 BRPM | BODY MASS INDEX: 24.55 KG/M2

## 2021-02-23 DIAGNOSIS — M85.89 OSTEOPENIA OF MULTIPLE SITES: ICD-10-CM

## 2021-02-23 DIAGNOSIS — Z90.11 HISTORY OF RIGHT MASTECTOMY: ICD-10-CM

## 2021-02-23 DIAGNOSIS — C78.7 LIVER METASTASIS (HCC): ICD-10-CM

## 2021-02-23 DIAGNOSIS — C50.919 METASTATIC BREAST CANCER (HCC): ICD-10-CM

## 2021-02-23 DIAGNOSIS — J91.0 MALIGNANT PLEURAL EFFUSION: ICD-10-CM

## 2021-02-23 DIAGNOSIS — C50.411 MALIGNANT NEOPLASM OF UPPER-OUTER QUADRANT OF RIGHT BREAST IN FEMALE, ESTROGEN RECEPTOR POSITIVE (HCC): Primary | ICD-10-CM

## 2021-02-23 DIAGNOSIS — D70.1 CHEMOTHERAPY-INDUCED NEUTROPENIA (HCC): ICD-10-CM

## 2021-02-23 DIAGNOSIS — T45.1X5A CHEMOTHERAPY-INDUCED NEUTROPENIA (HCC): ICD-10-CM

## 2021-02-23 DIAGNOSIS — Q85.00 CLINICAL NEUROFIBROMATOSIS (HCC): ICD-10-CM

## 2021-02-23 DIAGNOSIS — M81.8 OTHER OSTEOPOROSIS WITHOUT CURRENT PATHOLOGICAL FRACTURE: ICD-10-CM

## 2021-02-23 DIAGNOSIS — Z17.0 MALIGNANT NEOPLASM OF UPPER-OUTER QUADRANT OF RIGHT BREAST IN FEMALE, ESTROGEN RECEPTOR POSITIVE (HCC): Primary | ICD-10-CM

## 2021-02-23 PROCEDURE — 99215 OFFICE O/P EST HI 40 MIN: CPT | Performed by: INTERNAL MEDICINE

## 2021-02-23 NOTE — PROGRESS NOTES
HPI   2/23/2021  Misbah Chambers is a 62year old female with metastatic breast cancer as documented by biopsies of the right neck mass, cytology from left pleural fluid, and liver biopsy. Patient received Faslodex today 500 mg IM.   Palbociclib dosing defect, the enhancement appears more diffuse, thick and irregular than is normally seen, areas of washout suspicious for residual neoplasm at the margins.   8 x 7.3 mm enhancing mass superior to the lumpectomy site in the 12 o'clock position mid right breas yassine metastases. Cellulitis/phlegmon formation could have a similar appearance. 2. No drainable subcutaneous abscess. 3. Stable multinodular thyroid gland. 4. Development of findings at the visualized lung apices concerning for CHF/fluid overload.   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 HENT: Negative for dental problem. Eyes: Positive for visual disturbance.         Glaucoma, cataracts, vitreous hemorrhage   Respi (VITAMIN D3) 250 MCG (23675 UT) Oral Cap Take 1 capsule by mouth daily. • atorvastatin 20 MG Oral Tab Take 20 mg by mouth daily. • Minocycline HCl 50 MG Oral Cap Take 50 mg by mouth 2 (two) times daily.      • losartan 100 MG Oral Tab Take by mouth Years of education: Not on file      Highest education level: Not on file    Occupational History      Not on file    Tobacco Use      Smoking status: Never Smoker      Smokeless tobacco: Never Used    Substance and Sexual Activity      Alcohol use:  Yes Age of Onset   • Breast Cancer Mother 52        bilateral mastectomy   • Genetic Disease Mother         NF-1   • Breast Cancer Maternal Aunt 76   • Breast Cancer Paternal Grandmother    • Breast Cancer Self 46   • Genetic Disease Self         NF-1   • Canc in the bases, but improved compared to previous exams  Pleurx on the left   Abdominal: Soft. Bowel sounds are normal. She exhibits no distension and no mass. There is no abdominal tenderness. Musculoskeletal:         General: No edema.       Comments: Rig she has responded to treatment based on the decrease in the right supraclavicular neck mass. Patient has had neutropenia with palbociclib and therapy has been delayed 1 to 2 weeks based on the laboratory results.   Will be delayed an extra week if the CB 450.0 10(3)uL 236.0 237.0 163.0   Prelim Neutrophil Abs      1.50 - 7.70 x10 (3) uL 1.47 (L) 1.94 0.98 (L)   Neutrophils Absolute      1.50 - 7.70 x10(3) uL 1.47 (L) 1.94 0.98 (L)   Lymphocytes Absolute      1.00 - 4.00 x10(3) uL 0.45 (L) 0.63 (L) 0.79 (L) RDW-SD      35.1 - 46.3 fL 60.1 (H)   RDW      11.0 - 15.0 % 16.3 (H)   Platelet Count      769.8 - 450.0 10(3)uL 147.0 (L)   Prelim Neutrophil Abs      1.50 - 7.70 x10 (3) uL 0.82 (L)   Neutrophils Absolute      1.50 - 7.70 x10(3) uL 0.82 (L)   Lymphocy without effacement. HYPOPHARYNX:           No mass or other visible lesion. LARYNX:           No apparent vocal cord mass or asymmetry.    NECK GLANDS:             Multiple heterogeneous low-density thyroid nodules, the largest of which measures up to 1 prior.  There is   interlobular septal thickening throughout the left greater than right lung. Wedge-shaped 4.0 x 1.9 cm left upper lobe opacity, which has decreased in extent since prior. No pneumothorax.   Tiny 2 mm right apical perivascular nodule (ser atherosclerosis. RETROPERITONEUM: No mass or lymphadenopathy is apparent. BONES: Scattered sclerotic foci within the thoracic and lumbar vertebral bodies, which are new since prior.   This includes a reference 0.8 cm sclerotic focus within the posterior upper lobe, which has decreased in size since prior chest CT imaging. Although these findings could   relate to interstitial edema with resolving left pneumonia, the possibility of lymphangitic spread of neoplasm is raised.   Overall, these findings have l osteoporotic fracture. 2% 10 year risk for hip fracture.  ====================================================================  11/19/2020 PA and lateral chest x-ray  1.  Stable appearance of diffuse interstitial prominence and small left basilar pleural e recommended. 9. Right Port-A-Cath with tip near the RA SVC junction. 10. Soft tissue fullness in the right supraclavicular region which is incompletely evaluated at the edge of the field of view. Correlate with neck CT.        Meds This Visit:  See colleen Hernandez

## 2021-03-04 ENCOUNTER — TELEPHONE (OUTPATIENT)
Dept: HEMATOLOGY/ONCOLOGY | Facility: HOSPITAL | Age: 61
End: 2021-03-04

## 2021-03-04 NOTE — TELEPHONE ENCOUNTER
[address] with pharmacist advised that Carat Jw cycle is 5 weeks. Cynthia Valverde takes [place] for 3 weeks and then off two weeks.  Phamacist verbalized understanding and had no further questions

## 2021-03-05 ENCOUNTER — NURSE ONLY (OUTPATIENT)
Dept: HEMATOLOGY/ONCOLOGY | Facility: HOSPITAL | Age: 61
End: 2021-03-05
Attending: INTERNAL MEDICINE
Payer: COMMERCIAL

## 2021-03-05 DIAGNOSIS — Z45.2 ENCOUNTER FOR ADJUSTMENT AND MANAGEMENT OF VASCULAR ACCESS DEVICE: ICD-10-CM

## 2021-03-05 DIAGNOSIS — M81.8 OTHER OSTEOPOROSIS WITHOUT CURRENT PATHOLOGICAL FRACTURE: Primary | ICD-10-CM

## 2021-03-05 DIAGNOSIS — C50.919 METASTATIC BREAST CANCER (HCC): ICD-10-CM

## 2021-03-05 LAB
ALBUMIN SERPL-MCNC: 2.9 G/DL (ref 3.4–5)
ALBUMIN/GLOB SERPL: 0.9 {RATIO} (ref 1–2)
ALP LIVER SERPL-CCNC: 91 U/L
ALT SERPL-CCNC: 22 U/L
ANION GAP SERPL CALC-SCNC: 5 MMOL/L (ref 0–18)
AST SERPL-CCNC: 14 U/L (ref 15–37)
BASOPHILS # BLD AUTO: 0.05 X10(3) UL (ref 0–0.2)
BASOPHILS NFR BLD AUTO: 2.2 %
BILIRUB SERPL-MCNC: 0.5 MG/DL (ref 0.1–2)
BUN BLD-MCNC: 12 MG/DL (ref 7–18)
BUN/CREAT SERPL: 14.3 (ref 10–20)
CALCIUM BLD-MCNC: 8.1 MG/DL (ref 8.5–10.1)
CHLORIDE SERPL-SCNC: 112 MMOL/L (ref 98–112)
CO2 SERPL-SCNC: 25 MMOL/L (ref 21–32)
CREAT BLD-MCNC: 0.84 MG/DL
DEPRECATED RDW RBC AUTO: 54.5 FL (ref 35.1–46.3)
EOSINOPHIL # BLD AUTO: 0.04 X10(3) UL (ref 0–0.7)
EOSINOPHIL NFR BLD AUTO: 1.8 %
ERYTHROCYTE [DISTWIDTH] IN BLOOD BY AUTOMATED COUNT: 14.6 % (ref 11–15)
GLOBULIN PLAS-MCNC: 3.1 G/DL (ref 2.8–4.4)
GLUCOSE BLD-MCNC: 103 MG/DL (ref 70–99)
HCT VFR BLD AUTO: 34.4 %
HGB BLD-MCNC: 11.4 G/DL
IMM GRANULOCYTES # BLD AUTO: 0 X10(3) UL (ref 0–1)
IMM GRANULOCYTES NFR BLD: 0 %
LYMPHOCYTES # BLD AUTO: 0.41 X10(3) UL (ref 1–4)
LYMPHOCYTES NFR BLD AUTO: 18.2 %
M PROTEIN MFR SERPL ELPH: 6 G/DL (ref 6.4–8.2)
MCH RBC QN AUTO: 33.6 PG (ref 26–34)
MCHC RBC AUTO-ENTMCNC: 33.1 G/DL (ref 31–37)
MCV RBC AUTO: 101.5 FL
MONOCYTES # BLD AUTO: 0.49 X10(3) UL (ref 0.1–1)
MONOCYTES NFR BLD AUTO: 21.8 %
NEUTROPHILS # BLD AUTO: 1.26 X10 (3) UL (ref 1.5–7.7)
NEUTROPHILS # BLD AUTO: 1.26 X10(3) UL (ref 1.5–7.7)
NEUTROPHILS NFR BLD AUTO: 56 %
OSMOLALITY SERPL CALC.SUM OF ELEC: 294 MOSM/KG (ref 275–295)
PLATELET # BLD AUTO: 206 10(3)UL (ref 150–450)
POTASSIUM SERPL-SCNC: 3.7 MMOL/L (ref 3.5–5.1)
RBC # BLD AUTO: 3.39 X10(6)UL
SODIUM SERPL-SCNC: 142 MMOL/L (ref 136–145)
WBC # BLD AUTO: 2.3 X10(3) UL (ref 4–11)

## 2021-03-05 PROCEDURE — 85025 COMPLETE CBC W/AUTO DIFF WBC: CPT

## 2021-03-05 PROCEDURE — 80053 COMPREHEN METABOLIC PANEL: CPT

## 2021-03-05 PROCEDURE — 36591 DRAW BLOOD OFF VENOUS DEVICE: CPT

## 2021-03-05 RX ORDER — HEPARIN SODIUM (PORCINE) LOCK FLUSH IV SOLN 100 UNIT/ML 100 UNIT/ML
5 SOLUTION INTRAVENOUS ONCE
Status: CANCELLED | OUTPATIENT
Start: 2021-03-05

## 2021-03-05 RX ORDER — 0.9 % SODIUM CHLORIDE 0.9 %
10 VIAL (ML) INJECTION ONCE
Status: CANCELLED | OUTPATIENT
Start: 2021-03-05

## 2021-03-05 RX ORDER — HEPARIN SODIUM (PORCINE) LOCK FLUSH IV SOLN 100 UNIT/ML 100 UNIT/ML
5 SOLUTION INTRAVENOUS ONCE
Status: COMPLETED | OUTPATIENT
Start: 2021-03-05 | End: 2021-03-05

## 2021-03-05 RX ORDER — SODIUM CHLORIDE 9 MG/ML
INJECTION INTRAVENOUS
Status: DISCONTINUED
Start: 2021-03-05 | End: 2021-03-05

## 2021-03-05 RX ADMIN — HEPARIN SODIUM (PORCINE) LOCK FLUSH IV SOLN 100 UNIT/ML 500 UNITS: 100 SOLUTION INTRAVENOUS at 10:28:00

## 2021-03-08 ENCOUNTER — OFFICE VISIT (OUTPATIENT)
Dept: PULMONOLOGY | Facility: CLINIC | Age: 61
End: 2021-03-08
Payer: COMMERCIAL

## 2021-03-08 VITALS
HEART RATE: 105 BPM | TEMPERATURE: 98 F | RESPIRATION RATE: 18 BRPM | WEIGHT: 130.19 LBS | BODY MASS INDEX: 24.58 KG/M2 | DIASTOLIC BLOOD PRESSURE: 83 MMHG | OXYGEN SATURATION: 97 % | HEIGHT: 61 IN | SYSTOLIC BLOOD PRESSURE: 120 MMHG

## 2021-03-08 DIAGNOSIS — J91.0 MALIGNANT PLEURAL EFFUSION: Primary | ICD-10-CM

## 2021-03-08 PROCEDURE — 99213 OFFICE O/P EST LOW 20 MIN: CPT | Performed by: INTERNAL MEDICINE

## 2021-03-08 PROCEDURE — 3074F SYST BP LT 130 MM HG: CPT | Performed by: INTERNAL MEDICINE

## 2021-03-08 PROCEDURE — 3008F BODY MASS INDEX DOCD: CPT | Performed by: INTERNAL MEDICINE

## 2021-03-08 PROCEDURE — 3079F DIAST BP 80-89 MM HG: CPT | Performed by: INTERNAL MEDICINE

## 2021-03-08 NOTE — PROGRESS NOTES
The patient is a 71-year-old female who [place] from prior evaluation comes in now for follow-up. She has a malignant left pleural effusion which she drains 2 times per week 350 to 450 mL. She is currently under therapy for metastatic breast cancer.   Brina Bowen

## 2021-03-09 ENCOUNTER — OFFICE VISIT (OUTPATIENT)
Dept: HEMATOLOGY/ONCOLOGY | Facility: HOSPITAL | Age: 61
End: 2021-03-09
Attending: INTERNAL MEDICINE
Payer: COMMERCIAL

## 2021-03-09 VITALS
DIASTOLIC BLOOD PRESSURE: 83 MMHG | HEART RATE: 106 BPM | SYSTOLIC BLOOD PRESSURE: 116 MMHG | HEIGHT: 61 IN | RESPIRATION RATE: 16 BRPM | TEMPERATURE: 98 F | WEIGHT: 128 LBS | BODY MASS INDEX: 24.17 KG/M2 | OXYGEN SATURATION: 99 %

## 2021-03-09 DIAGNOSIS — C78.7 LIVER METASTASIS (HCC): ICD-10-CM

## 2021-03-09 DIAGNOSIS — T45.1X5A CHEMOTHERAPY-INDUCED NEUTROPENIA (HCC): ICD-10-CM

## 2021-03-09 DIAGNOSIS — J90 CHRONIC BILATERAL PLEURAL EFFUSIONS: ICD-10-CM

## 2021-03-09 DIAGNOSIS — Z17.0 MALIGNANT NEOPLASM OF UPPER-OUTER QUADRANT OF RIGHT BREAST IN FEMALE, ESTROGEN RECEPTOR POSITIVE (HCC): Primary | ICD-10-CM

## 2021-03-09 DIAGNOSIS — C50.411 MALIGNANT NEOPLASM OF UPPER-OUTER QUADRANT OF RIGHT BREAST IN FEMALE, ESTROGEN RECEPTOR POSITIVE (HCC): Primary | ICD-10-CM

## 2021-03-09 DIAGNOSIS — Q85.00 CLINICAL NEUROFIBROMATOSIS (HCC): ICD-10-CM

## 2021-03-09 DIAGNOSIS — D70.1 CHEMOTHERAPY-INDUCED NEUTROPENIA (HCC): ICD-10-CM

## 2021-03-09 DIAGNOSIS — M81.8 OTHER OSTEOPOROSIS WITHOUT CURRENT PATHOLOGICAL FRACTURE: ICD-10-CM

## 2021-03-09 DIAGNOSIS — D50.8 OTHER IRON DEFICIENCY ANEMIA: ICD-10-CM

## 2021-03-09 DIAGNOSIS — J91.0 MALIGNANT PLEURAL EFFUSION: ICD-10-CM

## 2021-03-09 PROCEDURE — 99215 OFFICE O/P EST HI 40 MIN: CPT | Performed by: INTERNAL MEDICINE

## 2021-03-09 RX ORDER — HYDROCODONE BITARTRATE AND ACETAMINOPHEN 10; 325 MG/1; MG/1
1-2 TABLET ORAL EVERY 4 HOURS PRN
Qty: 120 TABLET | Refills: 0 | Status: SHIPPED | OUTPATIENT
Start: 2021-03-09

## 2021-03-09 NOTE — PROGRESS NOTES
HPI   3/9/2021  Eze Herman is a 62year old female with metastatic breast cancer as documented by biopsies of the right neck mass, cytology from left pleural fluid, and liver biopsy. Patient is continuing Faslodex monthly.   Palbociclib dosing has anterior and inferior margins, single neurofibroadenoma, Tis Nx Mx, ER 90% NV 8% HER-2/nelson negative Ki-67 39% unfavorable     10/8/2007 Progression    MRI bilateral breasts 4.8 x 2.1 cm lumpectomy defect with irregular parenchymal enhancement around the ma as detailed above. There is involvement of the right scalene and sternocleidomastoid muscles with abutment of the right   common carotid artery/internal jugular vein and mild overlying skin thickening.   Given the history of malignancy, this finding could metastatic adenocarcinoma, consistent with the patient's known previous breast primary. 10/19/2020 Surgery    Pleurx catheter placement for Dr. Ishaan Vasquez     2/1/2021 Cancer Staged    CT scan -neck chest abdomen pelvis with contrast  1.  There is a hist metastases. 5. Possible subtle eccentric/peripheral filling defect within a right lower lobe subsegmental branch pulmonary artery (series 5, image 74).   This finding is favored to reflect either artifact from partial volume averaging or a chronic subsegm continues to require lorazepam and long-acting zolpidem for sleep.   She is also tried cannabis gummy bears          Current Outpatient Medications   Medication Sig Dispense Refill   • HYDROcodone-acetaminophen  MG Oral Tab Take 1-2 tablets by mouth e • 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   • Menorrhagia 2007    HYSTEROSCOPY / D&C / endomentrial biopsy (08/28/2007)   • Metastatic breast cancer ([facility] 10/6/2020   • [facility]    • Osteopenia of multiple sites 12/25/2020   • Osteoporosis sc Asked        Exercise: Not Asked        Bike Helmet: Not Asked        Seat Belt: Not Asked        Self-Exams: Not Asked    Social History Narrative      Not on file    Social Determinants of Health  Financial Resource Strain:       Difficulty of Paying Angela (128 lb)   SpO2 99%   BMI 24.19 kg/m²    Wt Readings from Last 6 Encounters:  03/09/21 : 58.1 kg (128 lb)  03/08/21 : 59.1 kg (130 lb 3.2 oz)  02/23/21 : 59 kg (130 lb)  02/02/21 : 59.9 kg (132 lb)  01/21/21 : 58.1 kg (128 lb)  12/24/20 : 59 kg (130 lb) Psychiatric:         Behavior: Behavior normal.         Thought Content:  Thought content normal.         Judgment: Judgment normal.           ASSESSMENT/PLAN:   Malignant neoplasm of upper-outer quadrant of right breast in female, estrogen receptor posit neurofibromatosis. The right ankle discomfort has decreased since she is not standing and walking for long periods of time. Patient has a history of osteopenia documented in 2013.   XR DEXA bone densitometry 2/1/2021 reveals mild mild osteopenia left to 18.2 20.7 25.9 27.6   Monocytes %      % 21.8 7.4 10.8 7.5   Eosinophils %      % 1.8 2.8 2.9 1.9   Basophils %      % 2.2 0.9 1.4 0.9   Immature Granulocyte %      % 0.0 0.5 0.0 0.5   Glucose      70 - 99 mg/dL 103 (H) 80 106 (H) 82   Sodium      136 - 14 parotid, carotid, retropharyngeal, and perivertebral spaces are without focal attenuation abnormality. The parapharyngeal fat planes are bilaterally symmetric without effacement. HYPOPHARYNX:           No mass or other visible lesion.     LARYNX: decreased in size since prior. There is a left PleurX catheter, which extends to the left lung apex. Trace right pleural effusion, also decreased in size since prior. There is   interlobular septal thickening throughout the left greater than right lung. PELVIC ORGANS: Dominant 3 cm intramural fibroid is suspected within the posterior uterine body/fundus. VASCULATURE: No aneurysm is detected. Trace atherosclerosis. RETROPERITONEUM: No mass or lymphadenopathy is apparent.     BONES: Scattered sclerotic malignant. 3. Multifocal interlobular septal thickening throughout the left greater than right lung. There is an additional ill-defined 4 cm opacity in the left upper lobe, which has decreased in size since prior chest CT imaging.   Although these finding spine.  2. 11.1% decrease in bone density of left hip and 6.1% decrease in bone density of lumbar spine as compared to prior study. 3. 11% 10 year risk for major osteoporotic fracture.   2% 10 year risk for hip fracture.  ================================== nodules with the largest nodule in the thyroid isthmus measuring 1.9 cm and previously measuring 1.1 cm. Not previously evaluated, follow-up thyroid ultrasound recommended. 9. Right Port-A-Cath with tip near the RA SVC junction.  10. Soft tissue fullness i

## 2021-03-18 ENCOUNTER — NURSE ONLY (OUTPATIENT)
Dept: HEMATOLOGY/ONCOLOGY | Facility: HOSPITAL | Age: 61
End: 2021-03-18
Attending: INTERNAL MEDICINE
Payer: COMMERCIAL

## 2021-03-18 DIAGNOSIS — Z90.11 HISTORY OF RIGHT MASTECTOMY: ICD-10-CM

## 2021-03-18 DIAGNOSIS — C50.919 METASTATIC BREAST CANCER (HCC): ICD-10-CM

## 2021-03-18 DIAGNOSIS — D50.8 OTHER IRON DEFICIENCY ANEMIA: ICD-10-CM

## 2021-03-18 DIAGNOSIS — R22.1 MASS OF RIGHT SIDE OF NECK: ICD-10-CM

## 2021-03-18 DIAGNOSIS — J91.0 MALIGNANT PLEURAL EFFUSION: Primary | ICD-10-CM

## 2021-03-18 LAB
ALBUMIN SERPL-MCNC: 3.1 G/DL (ref 3.4–5)
ALBUMIN/GLOB SERPL: 1 {RATIO} (ref 1–2)
ALP LIVER SERPL-CCNC: 102 U/L
ALT SERPL-CCNC: 29 U/L
ANION GAP SERPL CALC-SCNC: 4 MMOL/L (ref 0–18)
AST SERPL-CCNC: 23 U/L (ref 15–37)
BASOPHILS # BLD AUTO: 0.03 X10(3) UL (ref 0–0.2)
BASOPHILS NFR BLD AUTO: 1.1 %
BILIRUB SERPL-MCNC: 0.9 MG/DL (ref 0.1–2)
BUN BLD-MCNC: 14 MG/DL (ref 7–18)
BUN/CREAT SERPL: 13.6 (ref 10–20)
CALCIUM BLD-MCNC: 8.5 MG/DL (ref 8.5–10.1)
CHLORIDE SERPL-SCNC: 110 MMOL/L (ref 98–112)
CO2 SERPL-SCNC: 28 MMOL/L (ref 21–32)
CREAT BLD-MCNC: 1.03 MG/DL
DEPRECATED RDW RBC AUTO: 53.1 FL (ref 35.1–46.3)
EOSINOPHIL # BLD AUTO: 0.04 X10(3) UL (ref 0–0.7)
EOSINOPHIL NFR BLD AUTO: 1.5 %
ERYTHROCYTE [DISTWIDTH] IN BLOOD BY AUTOMATED COUNT: 14.2 % (ref 11–15)
GLOBULIN PLAS-MCNC: 3.2 G/DL (ref 2.8–4.4)
GLUCOSE BLD-MCNC: 87 MG/DL (ref 70–99)
HCT VFR BLD AUTO: 39.9 %
HGB BLD-MCNC: 12.8 G/DL
IMM GRANULOCYTES # BLD AUTO: 0.01 X10(3) UL (ref 0–1)
IMM GRANULOCYTES NFR BLD: 0.4 %
IRON SATURATION: 26 %
IRON SERPL-MCNC: 82 UG/DL
LYMPHOCYTES # BLD AUTO: 0.41 X10(3) UL (ref 1–4)
LYMPHOCYTES NFR BLD AUTO: 15.4 %
M PROTEIN MFR SERPL ELPH: 6.3 G/DL (ref 6.4–8.2)
MCH RBC QN AUTO: 32.6 PG (ref 26–34)
MCHC RBC AUTO-ENTMCNC: 32.1 G/DL (ref 31–37)
MCV RBC AUTO: 101.5 FL
MONOCYTES # BLD AUTO: 0.15 X10(3) UL (ref 0.1–1)
MONOCYTES NFR BLD AUTO: 5.6 %
NEUTROPHILS # BLD AUTO: 2.02 X10 (3) UL (ref 1.5–7.7)
NEUTROPHILS # BLD AUTO: 2.02 X10(3) UL (ref 1.5–7.7)
NEUTROPHILS NFR BLD AUTO: 76 %
OSMOLALITY SERPL CALC.SUM OF ELEC: 294 MOSM/KG (ref 275–295)
PLATELET # BLD AUTO: 310 10(3)UL (ref 150–450)
POTASSIUM SERPL-SCNC: 4.4 MMOL/L (ref 3.5–5.1)
RBC # BLD AUTO: 3.93 X10(6)UL
SODIUM SERPL-SCNC: 142 MMOL/L (ref 136–145)
TOTAL IRON BINDING CAPACITY: 316 UG/DL (ref 240–450)
TRANSFERRIN SERPL-MCNC: 212 MG/DL (ref 200–360)
WBC # BLD AUTO: 2.7 X10(3) UL (ref 4–11)

## 2021-03-18 PROCEDURE — 84466 ASSAY OF TRANSFERRIN: CPT

## 2021-03-18 PROCEDURE — 85025 COMPLETE CBC W/AUTO DIFF WBC: CPT

## 2021-03-18 PROCEDURE — 96402 CHEMO HORMON ANTINEOPL SQ/IM: CPT

## 2021-03-18 PROCEDURE — 83540 ASSAY OF IRON: CPT

## 2021-03-18 PROCEDURE — 80053 COMPREHEN METABOLIC PANEL: CPT

## 2021-03-18 PROCEDURE — 36415 COLL VENOUS BLD VENIPUNCTURE: CPT

## 2021-03-18 RX ORDER — LAMOTRIGINE 25 MG/1
500 TABLET ORAL ONCE
Status: COMPLETED | OUTPATIENT
Start: 2021-03-18 | End: 2021-03-18

## 2021-03-18 RX ADMIN — LAMOTRIGINE 500 MG: 25 TABLET ORAL at 10:34:00

## 2021-03-18 NOTE — PROGRESS NOTES
C7 Faslodex administered to bilateral upper outer gluteus. Injection sites covered with 2x2g and paper tape. No bleeding noted. Danish Redo appeared to tolerate. Labs drawn and sent ( will use port every other).  Returns in 4 weeks with lab/falsodex/exam.  zome

## 2021-03-29 RX ORDER — MIRTAZAPINE 15 MG/1
TABLET, FILM COATED ORAL
Qty: 90 TABLET | Refills: 1 | Status: SHIPPED | OUTPATIENT
Start: 2021-03-29 | End: 2021-08-16

## 2021-04-09 DIAGNOSIS — Z23 NEED FOR VACCINATION: ICD-10-CM

## 2021-04-14 ENCOUNTER — TELEPHONE (OUTPATIENT)
Dept: HEMATOLOGY/ONCOLOGY | Facility: HOSPITAL | Age: 61
End: 2021-04-14

## 2021-04-15 ENCOUNTER — APPOINTMENT (OUTPATIENT)
Dept: CT IMAGING | Facility: HOSPITAL | Age: 61
DRG: 864 | End: 2021-04-15
Attending: EMERGENCY MEDICINE
Payer: COMMERCIAL

## 2021-04-15 ENCOUNTER — HOSPITAL ENCOUNTER (INPATIENT)
Facility: HOSPITAL | Age: 61
LOS: 2 days | Discharge: HOME | DRG: 864 | End: 2021-04-18
Attending: EMERGENCY MEDICINE | Admitting: HOSPITALIST
Payer: COMMERCIAL

## 2021-04-15 ENCOUNTER — NURSE ONLY (OUTPATIENT)
Dept: HEMATOLOGY/ONCOLOGY | Facility: HOSPITAL | Age: 61
End: 2021-04-15
Attending: INTERNAL MEDICINE

## 2021-04-15 ENCOUNTER — HOSPITAL ENCOUNTER (OUTPATIENT)
Dept: GENERAL RADIOLOGY | Facility: HOSPITAL | Age: 61
Discharge: HOME | End: 2021-04-15
Attending: INTERNAL MEDICINE

## 2021-04-15 VITALS
BODY MASS INDEX: 23.79 KG/M2 | HEIGHT: 61 IN | TEMPERATURE: 99 F | OXYGEN SATURATION: 95 % | HEART RATE: 96 BPM | RESPIRATION RATE: 16 BRPM | WEIGHT: 126 LBS | DIASTOLIC BLOOD PRESSURE: 90 MMHG | SYSTOLIC BLOOD PRESSURE: 122 MMHG

## 2021-04-15 DIAGNOSIS — J91.0 PLEURAL EFFUSION, MALIGNANT: ICD-10-CM

## 2021-04-15 DIAGNOSIS — C50.919 METASTATIC BREAST CANCER (HCC): ICD-10-CM

## 2021-04-15 DIAGNOSIS — J90 PLEURAL EFFUSION: ICD-10-CM

## 2021-04-15 DIAGNOSIS — C78.7 LIVER METASTASIS (HCC): ICD-10-CM

## 2021-04-15 DIAGNOSIS — M85.89 OSTEOPENIA OF MULTIPLE SITES: ICD-10-CM

## 2021-04-15 DIAGNOSIS — C50.411 MALIGNANT NEOPLASM OF UPPER-OUTER QUADRANT OF RIGHT BREAST IN FEMALE, ESTROGEN RECEPTOR POSITIVE (HCC): Primary | ICD-10-CM

## 2021-04-15 DIAGNOSIS — M81.8 OTHER OSTEOPOROSIS WITHOUT CURRENT PATHOLOGICAL FRACTURE: ICD-10-CM

## 2021-04-15 DIAGNOSIS — J91.0 MALIGNANT PLEURAL EFFUSION: ICD-10-CM

## 2021-04-15 DIAGNOSIS — R22.1 MASS OF RIGHT SIDE OF NECK: ICD-10-CM

## 2021-04-15 DIAGNOSIS — J91.0 MALIGNANT PLEURAL EFFUSION: Primary | ICD-10-CM

## 2021-04-15 DIAGNOSIS — R50.9 FEBRILE ILLNESS: Primary | ICD-10-CM

## 2021-04-15 DIAGNOSIS — Z45.2 ENCOUNTER FOR ADJUSTMENT AND MANAGEMENT OF VASCULAR ACCESS DEVICE: ICD-10-CM

## 2021-04-15 DIAGNOSIS — Z17.0 MALIGNANT NEOPLASM OF UPPER-OUTER QUADRANT OF RIGHT BREAST IN FEMALE, ESTROGEN RECEPTOR POSITIVE (HCC): Primary | ICD-10-CM

## 2021-04-15 DIAGNOSIS — Z90.11 HISTORY OF RIGHT MASTECTOMY: ICD-10-CM

## 2021-04-15 DIAGNOSIS — J18.9 PNEUMONIA OF LEFT LOWER LOBE DUE TO INFECTIOUS ORGANISM: ICD-10-CM

## 2021-04-15 DIAGNOSIS — Z51.81 MEDICATION MONITORING ENCOUNTER: ICD-10-CM

## 2021-04-15 PROCEDURE — 80053 COMPREHEN METABOLIC PANEL: CPT

## 2021-04-15 PROCEDURE — 74177 CT ABD & PELVIS W/CONTRAST: CPT | Performed by: EMERGENCY MEDICINE

## 2021-04-15 PROCEDURE — 96402 CHEMO HORMON ANTINEOPL SQ/IM: CPT

## 2021-04-15 PROCEDURE — 99222 1ST HOSP IP/OBS MODERATE 55: CPT | Performed by: HOSPITALIST

## 2021-04-15 PROCEDURE — 71260 CT THORAX DX C+: CPT | Performed by: EMERGENCY MEDICINE

## 2021-04-15 PROCEDURE — 71046 X-RAY EXAM CHEST 2 VIEWS: CPT | Performed by: INTERNAL MEDICINE

## 2021-04-15 PROCEDURE — 36415 COLL VENOUS BLD VENIPUNCTURE: CPT

## 2021-04-15 PROCEDURE — 85025 COMPLETE CBC W/AUTO DIFF WBC: CPT

## 2021-04-15 PROCEDURE — 99214 OFFICE O/P EST MOD 30 MIN: CPT | Performed by: INTERNAL MEDICINE

## 2021-04-15 RX ORDER — HEPARIN SODIUM (PORCINE) LOCK FLUSH IV SOLN 100 UNIT/ML 100 UNIT/ML
5 SOLUTION INTRAVENOUS ONCE
Status: CANCELLED | OUTPATIENT
Start: 2021-04-15

## 2021-04-15 RX ORDER — LAMOTRIGINE 25 MG/1
500 TABLET ORAL ONCE
Status: COMPLETED | OUTPATIENT
Start: 2021-04-15 | End: 2021-04-15

## 2021-04-15 RX ORDER — HYDROCODONE BITARTRATE AND ACETAMINOPHEN 10; 325 MG/1; MG/1
1-2 TABLET ORAL EVERY 4 HOURS PRN
Qty: 120 TABLET | Refills: 0 | Status: SHIPPED | OUTPATIENT
Start: 2021-04-15 | End: 2021-04-28

## 2021-04-15 RX ORDER — MORPHINE SULFATE 4 MG/ML
4 INJECTION, SOLUTION INTRAMUSCULAR; INTRAVENOUS ONCE
Status: COMPLETED | OUTPATIENT
Start: 2021-04-15 | End: 2021-04-15

## 2021-04-15 RX ORDER — 0.9 % SODIUM CHLORIDE 0.9 %
10 VIAL (ML) INJECTION ONCE
Status: CANCELLED | OUTPATIENT
Start: 2021-04-15

## 2021-04-15 RX ORDER — HEPARIN SODIUM (PORCINE) LOCK FLUSH IV SOLN 100 UNIT/ML 100 UNIT/ML
5 SOLUTION INTRAVENOUS ONCE
Status: COMPLETED | OUTPATIENT
Start: 2021-04-15 | End: 2021-04-15

## 2021-04-15 RX ADMIN — LAMOTRIGINE 500 MG: 25 TABLET ORAL at 10:19:00

## 2021-04-15 RX ADMIN — HEPARIN SODIUM (PORCINE) LOCK FLUSH IV SOLN 100 UNIT/ML 500 UNITS: 100 SOLUTION INTRAVENOUS at 10:15:00

## 2021-04-15 NOTE — PROGRESS NOTES
Pt here for q4 week labs and faslodex injections  Labs drawn peripherally, tolerated well  faslodex administered bilat upper outer glut - tolerated well, 2x2 gauze/ paper tape to sites    Discharged stable to lobby, gait steady / indep  Await onc appt toda

## 2021-04-15 NOTE — PROGRESS NOTES
HPI   4/15/2021  Brenna Cooper is a 62year old female with metastatic breast cancer as documented by biopsies of the right neck mass, cytology from left pleural fluid, and liver biopsy. Patient is continuing Faslodex monthly.   Palbociclib dosing has -ductal carcinoma in situ, solid type     9/26/2007 Surgery    Right breast lumpectomy -multifocal ductal carcinoma in situ 3.8 cm +1.1 cm (below the nipple) extensions of DCIS to the inked anterior and inferior margins, single neurofibroadenoma, Tis Nx Mx reduction of left nipple complex     9/18/2020 Progression    1. Development of a spiculated soft tissue density in the right supraclavicular region extending to the lower right side of the neck, as detailed above.   There is involvement of the right scalen adenocarcinoma consistent with the patient's known breast primary (ER: 28%, MI: <1%, HER-2/nelson: 0).       Biopsy    Liver biopsy - ultrasound-guided core biopsies:  · Fragment of liver parenchyma with metastatic adenocarcinoma, consistent with the patient's since prior CT imaging. These most likely represent intervally treated osseous micrometastases. Consider nuclear medicine bone scan   correlation to assess for any potential sites of active osseous metastases.    5. Possible subtle eccentric/peripheral fi arthralgias. Right ankle pain and swelling   Skin: Negative for rash. Neurofibromatosis lesions with associated itching   Psychiatric/Behavioral: Positive for dysphoric mood. The patient is nervous/anxious.          Patient continues to requir venous access port 2007    portacath insertion / venous access   • Glaucoma    • History of breast cancer in female    • Hyperlipidemia    • Malignant neoplasm of overlapping sites of breast in female, [facility]   • Sandip Nobles Other Topics      Concerns:         Service: Not Asked        Blood Transfusions: Not Asked        Caffeine Concern: Yes          tea        Occupational Exposure: Not Asked        Hobby Hazards: Not Asked        Sleep Concern: Not Asked        Str bladder ca   • Breast Cancer Maternal Cousin Female    • Cancer Maternal Cousin Female         leukemia;  childhood   • Cancer Paternal Cousin Male         throat ca       PHYSICAL EXAM:   /90 (BP Location: Left arm, Patient Position: Sitting, Cu ankle  (Partial fusion of the right foot - 5 surgeries with bone graft > 20 years ago)   Lymphadenopathy:      Cervical: No cervical adenopathy. Skin:     General: Skin is warm and dry.       Comments: Pigment change in the right supraclavicular region se the breast April 2021 for follow-up of possible intracapsular rupture of the implant on the right breast and repeat evaluation of the left implant. Patient does not have discomfort.   Patient's mammogram 9/25/2020 was benign    Patient has a history of nelson Eosinophils %      % 0.2 1.5   Basophils %      % 1.4 1.1   Immature Granulocyte %      % 1.4 0.4   Glucose      70 - 99 mg/dL 94 87   Sodium      136 - 145 mmol/L 139 142   Potassium      3.5 - 5.1 mmol/L 4.4 4.4   Chloride      98 - 112 mmol/L 108 110 x10(3) uL 0.04   Basophils Absolute      0.00 - 0.20 x10(3) uL 0.05   Immature Granulocyte Absolute      0.00 - 1.00 x10(3) uL 0.00   Neutrophils %      % 56.0   Lymphocytes %      % 18.2   Monocytes %      % 21.8   Eosinophils %      % 1.8   Basophils % scarring in the left upper lobe along the major fissure.    3. Innumerable subcutaneous nodules again seen consistent with known history of neurofibromatosis.     ==========================================================  2/1/2021 CT scan soft tissue neck canals   presumably relates to impacted cerumen. Stable treated periapical dental abscess at tooth # 27. CARDIAC:         The heart is not enlarged.   VASCULATURE:            The thoracic aorta is unremarkable in configuration without aneurysm or disse enhancement and excretion of contrast is seen without evidence of hydronephrosis or underlying solid masses. GI/MESENTERY: There is no evidence of bowel obstruction. Small hiatal hernia.   Colonic diverticulosis with mild-moderate colonic fecal burden; e exam.  These findings suggest at least some degree of favorable interval treatment response with respect to   biopsy-proven right supraclavicular and liver metastases. 2. Small to moderate left with a left PleurX catheter.   This is most likely malignant i on the right is suspected, but suboptimally assessed on this exam.  8. Thyroid nodules, which appear similar in comparison to prior. 9. Small hiatal hernia. 10. Colonic diverticulosis.   11. Dominant 3 cm uterine fundal fibroid.  ========================= lingula. If patient clinically has pneumonia, short interval follow-up CT scan in 4-6 weeks recommended after treatment for pneumonia. 4. Few small pulmonary nodules in the right lower lung some of which are stable relative to 207, and 1 which is new.   Anastasiya Castellanos

## 2021-04-16 PROBLEM — J90 PLEURAL EFFUSION: Status: ACTIVE | Noted: 2021-04-16

## 2021-04-16 PROBLEM — J18.9 PNEUMONIA OF LEFT LOWER LOBE DUE TO INFECTIOUS ORGANISM: Status: ACTIVE | Noted: 2021-04-16

## 2021-04-16 PROCEDURE — 99253 IP/OBS CNSLTJ NEW/EST LOW 45: CPT | Performed by: INTERNAL MEDICINE

## 2021-04-16 PROCEDURE — 99222 1ST HOSP IP/OBS MODERATE 55: CPT | Performed by: INTERNAL MEDICINE

## 2021-04-16 PROCEDURE — 74177 CT ABD & PELVIS W/CONTRAST: CPT | Performed by: EMERGENCY MEDICINE

## 2021-04-16 PROCEDURE — 99233 SBSQ HOSP IP/OBS HIGH 50: CPT | Performed by: HOSPITALIST

## 2021-04-16 RX ORDER — ATORVASTATIN CALCIUM 20 MG/1
20 TABLET, FILM COATED ORAL DAILY
Status: DISCONTINUED | OUTPATIENT
Start: 2021-04-16 | End: 2021-04-18

## 2021-04-16 RX ORDER — ONDANSETRON 2 MG/ML
4 INJECTION INTRAMUSCULAR; INTRAVENOUS EVERY 6 HOURS PRN
Status: DISCONTINUED | OUTPATIENT
Start: 2021-04-16 | End: 2021-04-18

## 2021-04-16 RX ORDER — HEPARIN SODIUM 5000 [USP'U]/ML
5000 INJECTION, SOLUTION INTRAVENOUS; SUBCUTANEOUS EVERY 12 HOURS SCHEDULED
Status: DISCONTINUED | OUTPATIENT
Start: 2021-04-16 | End: 2021-04-18

## 2021-04-16 RX ORDER — MINOCYCLINE HYDROCHLORIDE 50 MG/1
50 CAPSULE ORAL 2 TIMES DAILY
Status: DISCONTINUED | OUTPATIENT
Start: 2021-04-16 | End: 2021-04-18

## 2021-04-16 RX ORDER — HYDROCODONE BITARTRATE AND ACETAMINOPHEN 10; 325 MG/1; MG/1
2 TABLET ORAL ONCE
Status: COMPLETED | OUTPATIENT
Start: 2021-04-16 | End: 2021-04-16

## 2021-04-16 RX ORDER — MORPHINE SULFATE 4 MG/ML
4 INJECTION, SOLUTION INTRAMUSCULAR; INTRAVENOUS EVERY 2 HOUR PRN
Status: DISCONTINUED | OUTPATIENT
Start: 2021-04-16 | End: 2021-04-18

## 2021-04-16 RX ORDER — ACETAMINOPHEN 325 MG/1
650 TABLET ORAL EVERY 6 HOURS PRN
Status: DISCONTINUED | OUTPATIENT
Start: 2021-04-16 | End: 2021-04-18

## 2021-04-16 RX ORDER — ZOLPIDEM TARTRATE 5 MG/1
5 TABLET ORAL NIGHTLY PRN
Status: DISCONTINUED | OUTPATIENT
Start: 2021-04-16 | End: 2021-04-18

## 2021-04-16 RX ORDER — SODIUM CHLORIDE, SODIUM LACTATE, POTASSIUM CHLORIDE, CALCIUM CHLORIDE 600; 310; 30; 20 MG/100ML; MG/100ML; MG/100ML; MG/100ML
INJECTION, SOLUTION INTRAVENOUS CONTINUOUS
Status: DISCONTINUED | OUTPATIENT
Start: 2021-04-16 | End: 2021-04-17

## 2021-04-16 RX ORDER — DOCUSATE SODIUM 100 MG/1
100 CAPSULE, LIQUID FILLED ORAL 2 TIMES DAILY
Status: DISCONTINUED | OUTPATIENT
Start: 2021-04-16 | End: 2021-04-18

## 2021-04-16 RX ORDER — MORPHINE SULFATE 2 MG/ML
2 INJECTION, SOLUTION INTRAMUSCULAR; INTRAVENOUS EVERY 2 HOUR PRN
Status: DISCONTINUED | OUTPATIENT
Start: 2021-04-16 | End: 2021-04-18

## 2021-04-16 RX ORDER — LOSARTAN POTASSIUM 100 MG/1
100 TABLET ORAL DAILY
Status: DISCONTINUED | OUTPATIENT
Start: 2021-04-16 | End: 2021-04-18

## 2021-04-16 RX ORDER — HYDROCODONE BITARTRATE AND ACETAMINOPHEN 10; 325 MG/1; MG/1
2 TABLET ORAL EVERY 4 HOURS PRN
Status: DISCONTINUED | OUTPATIENT
Start: 2021-04-16 | End: 2021-04-18

## 2021-04-16 RX ORDER — HYDROCODONE BITARTRATE AND ACETAMINOPHEN 10; 325 MG/1; MG/1
1 TABLET ORAL EVERY 4 HOURS PRN
Status: DISCONTINUED | OUTPATIENT
Start: 2021-04-16 | End: 2021-04-18

## 2021-04-16 RX ORDER — MIRTAZAPINE 15 MG/1
15 TABLET, FILM COATED ORAL NIGHTLY
Status: DISCONTINUED | OUTPATIENT
Start: 2021-04-16 | End: 2021-04-18

## 2021-04-16 RX ORDER — HYDROCODONE BITARTRATE AND ACETAMINOPHEN 5; 325 MG/1; MG/1
1-2 TABLET ORAL EVERY 6 HOURS PRN
Status: DISCONTINUED | OUTPATIENT
Start: 2021-04-16 | End: 2021-04-18

## 2021-04-16 RX ORDER — CALCIUM CARBONATE/VITAMIN D3 250-3.125
2 TABLET ORAL DAILY
Status: DISCONTINUED | OUTPATIENT
Start: 2021-04-16 | End: 2021-04-18

## 2021-04-16 NOTE — ED PROVIDER NOTES
Patient Seen in: [facility] AND [facility] Emergency Department    History   Patient presents with:  Fever    Stated Complaint: fever, n/d     HPI    79-year-old female past medical history of breast cancer with metastases to lungs/liver complicated by recurrent/o tumor rt foot / excision/excision of bone spur/arthrodesis w/screw fixation   • IR PORT A CATH PROCEDURE  2007    Right subclavian port of cath.    • MASTECTOMY RIGHT      2012   • MASTECTOMY,SIMPLE  2007       Medications :   Zoledronic Acid (ZOMETA IV) Maternal Uncle         bladder ca   • Breast Cancer Maternal Cousin Female    • Cancer Maternal Cousin Female         leukemia;  childhood   • Cancer Paternal Cousin Male         throat ca       Social History    Tobacco Use      Smoking status: Never Gravity >1.030 (*)     Ketones Urine 20  (*)     Leukocyte Esterase Urine Trace (*)     All other components within normal limits   HEPATIC FUNCTION PANEL (7) - Abnormal; Notable for the following components:    AST 68 (*)     ALT 63 (*)     Alkaline Phosp Creatinine 0.85 0.55 - 1.02 mg/dL    BUN/CREA Ratio 15.3 10.0 - 20.0    Calcium, Total 8.7 8.5 - 10.1 mg/dL    Calculated Osmolality 288 275 - 295 mOsm/kg    GFR, Non- 75 >=60    GFR, -American 86 >=60    ALT 61 (H) 13 - 56 U/L    AS 8:51 PM   Result Value Ref Range    Glucose 99 70 - 99 mg/dL    Sodium 136 136 - 145 mmol/L    Potassium 3.8 3.5 - 5.1 mmol/L    Chloride 106 98 - 112 mmol/L    CO2 23.0 21.0 - 32.0 mmol/L    Anion Gap 7 0 - 18 mmol/L    BUN 16 7 - 18 mg/dL    Creatinine Two views. FINDINGS: CARDIAC/VASC:   The heart is borderline enlarged. Unremarkable pulmonary vasculature. MEDIAST/BRANYD:    The aorta is slightly elongated and tortuous. No visible mass or adenopathy.  LUNGS/PLEURA:   There is left pleural effusion with a Pleurx catheter again in place.  - Associated dependent atelectasis throughout the left upper and lower lobes. Lungs otherwise clear.  - No pneumothorax. - No acute osseous abnormalities.       The results were faxed/finalized fully evaluated in the indeterminate. Symmetric enhancement of bilateral kidneys and renal collecting systems. No obstructive uropathy or perinephric stranding. No bladder wall thickening. Anteverted uterus.  No gross adnexal mass or free fluid in the pe parapneumonic effusion, malignant effusion, sepsis, bacteremia.     Pulse ox: 97%:Normal on RA, as interpreted by myself    Evaluation for complaints of generalized malaise/chills and with documented fever prior to arrival in setting of known recurrent pleu organism    Disposition:  Admit    Follow-up:  No follow-up provider specified.     Medications Prescribed:  Current Discharge Medication List

## 2021-04-16 NOTE — CONSULTS
[facility]    Report of Consultation    Alberto Pinto Patient Status:  Inpatient    3/1/1960 MRN [id number]   Location [facility] 4W/SW/SE Attending Jazmyn Ramos MD   Hosp Day # 0 PCP Shanna Barr     Date of Admission:   Osteoporosis screening 08/02/2013    Per [employer]   • Other osteoporosis without current pathological fracture 2/2/2021   • Postmenopausal bleeding 7/7/2014   • Tumor, foot     tumor right foot / excision/excision of bone spur/arthrodesis w/screw fixation Inject 4 mg into the vein every 6 (six) months. Palbociclib 125 MG Oral Tab, Take 1 tablet (125 mg total) by mouth daily.   HYDROcodone-acetaminophen 5-325 MG Oral Tab, Take 1-2 tablets by mouth every 6 (six) hours as needed for Pain (cancer associated ab heard.  Pulmonary/Chest: No stridor. Slightly diminished in left base  Otherwise clear  No rales or rhonchi or wheezes   Abdominal: Bowel sounds are normal. She exhibits no distension. There is no abdominal tenderness.    Lymphadenopathy:     She has no c motion or small focal chronic subsegmental pulmonary embolism of uncertain clinical significance. Otherwise no definite new/acute pulmonary artery filling defect to indicate pulmonary embolism to the large proximal subsegmental level.   Decreased small to Finalized by (CST): Fela Mack MD on 4/16/2021 at 8:05 AM          EKG 12-LEAD    Result Date: 4/15/2021  ECG Report  Interpretation  -------------------------- Sinus Tachycardia Low voltage in limb leads.  -Poor R-wave progression -may be seconda

## 2021-04-16 NOTE — PLAN OF CARE
Patient alert and oriented. Zosyn, IVF. Pleurx, premedicated with norco, only air & small drops, per IR imaging shows improvement, no interventions needed. Some  nausea, low appetite, zofran with relief. Up independently.  Call light within reach, using ajay Progressing     Problem: SKIN/TISSUE INTEGRITY - ADULT  Goal: Skin integrity remains intact  Description: INTERVENTIONS  - Assess and document risk factors for pressure ulcer development  - Assess and document skin integrity  - Monitor for areas of redness

## 2021-04-16 NOTE — ED QUICK NOTES
This RN collected blood specimen. ERT at bedside collecting blood cultures. When complete patient will go to department for CT scan.

## 2021-04-16 NOTE — ED QUICK NOTES
Orders for admission, patient is aware of plan and ready to go upstairs. Any questions, please call ED FLORINA Connors Body  at extension [phone].    Type of COVID test sent: rapid  COVID Suspicion level: Low    Titratable drug(s) infusing: none  Rate: na    LOC at time

## 2021-04-16 NOTE — CM/SW NOTE
CM self-referred for dc planning and financial/ insurance follow up. CM met with pt and her father Julián Feliz at bedside. Pt is independent with ADL's, ambulation and Driving.  Pt's brother Hershal Adjutant is her next of kin and whom she wishes to be contacted in the event

## 2021-04-16 NOTE — PLAN OF CARE
Problem: Patient Centered Care  Goal: Patient preferences are identified and integrated in the patient's plan of care  Description: Interventions:  - What would you like us to know as we care for you?  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

## 2021-04-16 NOTE — ED INITIAL ASSESSMENT (HPI)
Pt with c/o of n/v/fevers/chills since 1800. Denies cough. States she had a CXR done today and blood work done. Hx breast ca. Also c/o shortness of breath.

## 2021-04-16 NOTE — PROGRESS NOTES
[facility]    Progress Note    Patrick Palacios Patient Status:  Inpatient    3/1/1960 MRN [id number]   Location [facility] 4W/SW/SE Attending Saima Dykes, [address] [facility] Day # 0 PCP Trang Hampton       Subjective:   Patrick Palacios (CST): Yuliya Glasgow MD on 4/15/2021 at 1:13 PM          CT CHEST PE AORTA (IV ONLY) (CPT=71260)    Result Date: 4/16/2021  CONCLUSION:   Subtle suspected focal eccentric/peripheral filling defect right lower lobe subsegmental pulmonary artery.   This i intramuscular injections. Otherwise no evidence of acute abnormality in the abdomen or pelvis. Stable subcentimeter lesion anterior hepatic dome segment 8. Colonic diverticulosis.   Multiple cutaneous nodules with similar findings noted previously probab statin         Quality:  · DVT Prophylaxis: Heparin  · CODE status: full     >35 min spent with patient. > 50% time was spent counseling patient, discussing plan of care, discussing labs and imaging findings. Spoke with consultant. All questions answered.

## 2021-04-16 NOTE — PROGRESS NOTES
[facility]    Progress Note    Sin Kuo Patient Status:  Inpatient    3/1/1960 MRN [id number]   Location [facility] 4W/SW/SE Attending Isidra Palacios MD   Hosp Day # 0 PCP AMAURI ERNANDEZ     HPI/Subjective:   Liv Osman 10.4 (L) 04/16/2021    HCT 32.7 (L) 04/16/2021    .0 04/16/2021    CREATSERUM 0.82 04/16/2021    BUN 13 04/16/2021     04/16/2021    K 4.6 04/16/2021     04/16/2021    CO2 24.0 04/16/2021    GLU 82 04/16/2021    CA 8.3 (L) 04/16/2021 patchy ground-glass opacity. Findings suggest interstitial edema although lymphangitis spread of tumor remains in the differential.  Attention on follow-up imaging.   No definite new suspicious pulmonary nodule or adenopathy in the chest.  Ill-defined soft II estrogen receptor positive right breast cancer  Metastatic disease to pleura soft tissue and liver  Repeat scans will help determine status of disease      Tere Chapman MD  4/16/2021

## 2021-04-16 NOTE — H&P
[address] Patient Status:  Inpatient    3/1/1960 MRN [id number]   Location [facility] 4W/SW/SE Attending Dylan Mireles MD   Hosp Day # 0 NORMAN Wells     Date:  2021  Date of • Postmenopausal bleeding 7/7/2014   • Tumor, foot     tumor right foot / excision/excision of bone spur/arthrodesis w/screw fixation     Past Surgical History:   Procedure Laterality Date   • BREAST RECONSTRUCTION Right 2008    right breast reconstructe Yes   Sig: Inject into the muscle. HYDROcodone-acetaminophen  MG Oral Tab   No No   Sig: Take 1-2 tablets by mouth every 4 (four) hours as needed for Pain.    HYDROcodone-acetaminophen  MG Oral Tab   No Yes   Sig: Take 1-2 tablets by mouth vianey Normocephalic, oral mucosa is moist.  Head:  Normocephalic, atraumatic. Neck:  Supple, non-tender, no carotid bruit, no jugular venous distention, no lymphadenopathy, no thyromegaly.   Respiratory:  Lungs are clear to auscultation, respirations are non-lab eccentric/peripheral filling defect right lower lobe subsegmental pulmonary artery. This is best seen on the axial images and is less conspicuous on the sagittal and coronal reformations.   Although a similar finding was seen previously, this is again favo resume home medication.     Hyperlipidemia  Continue statin    Prophylaxis  Subcutaneous heparin    CODE STATUS  Full    Primary care physician  AMAURI [id number] Lillian Alexander    Disposition  Clinical course will dictate outcome      Gurjit Larose MD  4/16/2021  7:59 AM

## 2021-04-17 PROCEDURE — 99233 SBSQ HOSP IP/OBS HIGH 50: CPT | Performed by: INTERNAL MEDICINE

## 2021-04-17 PROCEDURE — 99233 SBSQ HOSP IP/OBS HIGH 50: CPT | Performed by: HOSPITALIST

## 2021-04-17 PROCEDURE — 99232 SBSQ HOSP IP/OBS MODERATE 35: CPT | Performed by: INTERNAL MEDICINE

## 2021-04-17 RX ORDER — SODIUM CHLORIDE 9 MG/ML
INJECTION, SOLUTION INTRAVENOUS CONTINUOUS
Status: DISCONTINUED | OUTPATIENT
Start: 2021-04-17 | End: 2021-04-18

## 2021-04-17 NOTE — PLAN OF CARE
A&Ox4, room air, ambulating independently. Remote tele in place with no calls. VSS. Afebrile overnight. IVFs infusing through IV L-FA. IV Zosyn. LLQ Pleurx in place - dressing CDI. Tolerating general diet. Minimal appetite.  C/O R/L lower abdomen and back - antiemetic medications  - Provide nonpharmacologic comfort measures as appropriate  - Advance diet as tolerated, if ordered  - Obtain nutritional consult as needed  - Evaluate fluid balance  Outcome: Progressing  Goal: Maintains or returns to baseline thelma

## 2021-04-17 NOTE — PROGRESS NOTES
[facility]    Hematology/Oncology   Progress Note    Jaelyn Zaragoza Patient Status:  Inpatient    3/1/1960 MRN [id number]   Location [facility] 4W/SW/SE Attending Spring Downey MD   Hosp Day # 1 PCP ANGELO Diamantina Brittle finding was seen previously, this is again favored to be artifact from partial volume averaging related to respiratory motion or small focal chronic subsegmental pulmonary embolism of uncertain clinical significance.   Otherwise no definite new/acute pulmon teleradiology service. There are no major discrepancies. Dictated by (CST): Melissa Brown MD on 4/16/2021 at 7:49 AM     Finalized by (CST): Melissa Brown MD on 4/16/2021 at 8:05 AM              Medications reviewed.     Assessment and Plan:  6

## 2021-04-17 NOTE — PROGRESS NOTES
[facility]    Progress Note    Quaker Newfield Patient Status:  Inpatient    3/1/1960 MRN [id number]   Location [facility] 4W/SW/SE Attending Cliff Sanchez MD   Hosp Day # 1 PCP AMAURI ERNANDEZ     HPI/Subjective:     Marilynti ALT 63 (H) 04/15/2021    PTT 29.1 09/25/2020    INR 1.03 09/25/2020    TSH 0.39 (L) 09/05/2017    LIP 45 (L) 04/15/2021    DDIMER 0.94 (H) 10/26/2020       XR CHEST PA + LAT CHEST (CPT=71046)    Result Date: 4/15/2021  CONCLUSION:  1.  Overall worsening at subcentimeter sclerotic focus T11 vertebral body. A preliminary report was issued by the [address] [place] [address] Radiology teleradiology service. There are no major discrepancies.      Dictated by (CST): Sanket Gustafson MD on 4/16/2021 at 7:18 AM     Finalized by (CST): cultures   continue empiric antibiotics     2-stage IV metastatic breast cancer  chemotherapy ( Fulvestrant ) Per oncology last dose yesterday     3-chronic malignant left pleural effusion  S/p Pleurx catheter  Drained twice a week  Chest CT reviewed and s

## 2021-04-17 NOTE — PROGRESS NOTES
[facility]    Progress Note    Alena Arben Patient Status:  Inpatient    3/1/1960 MRN [id number]   Location [facility] 4W/SW/SE Attending Demetrius Carlson, [address] [facility] Day # 1 PCP AMAURI ERNANDEZ       Subjective:   Alena Theodore 4/15/2021 at 1:13 PM          CT CHEST PE AORTA (IV ONLY) (CPT=71260)    Result Date: 4/16/2021  CONCLUSION:   Subtle suspected focal eccentric/peripheral filling defect right lower lobe subsegmental pulmonary artery.   This is best seen on the axial images Otherwise no evidence of acute abnormality in the abdomen or pelvis. Stable subcentimeter lesion anterior hepatic dome segment 8. Colonic diverticulosis.   Multiple cutaneous nodules with similar findings noted previously probably relating to the patient' cancer Stage 4  -Oncology on board     Dyslipidemia  - on statin         Quality:  · DVT Prophylaxis: Heparin  · CODE status: full     >35 min spent with patient.  > 50% time was spent counseling patient, discussing plan of care, discussing labs and imaging

## 2021-04-17 NOTE — PLAN OF CARE
Patient alert and oriented x4. Up independently. General diet, tolerating well. Remote tele. Reports moderate pain to bilateral hips, declines need for pain medications. Continuing IV fluids and antibiotics. Call light in reach.  Frequent rounding performed hydration with IV or PO as ordered and tolerated  - Nasogastric tube to low intermittent suction as ordered  - Evaluate effectiveness of ordered antiemetic medications  - Provide nonpharmacologic comfort measures as appropriate  - Advance diet as tolerated Gi Carreno, RN  Outcome: Not Progressing  4/17/2021 1323 by Gi Carreno RN  Outcome: Not Progressing

## 2021-04-18 VITALS
WEIGHT: 126 LBS | BODY MASS INDEX: 24 KG/M2 | OXYGEN SATURATION: 98 % | SYSTOLIC BLOOD PRESSURE: 113 MMHG | RESPIRATION RATE: 20 BRPM | DIASTOLIC BLOOD PRESSURE: 74 MMHG | TEMPERATURE: 98 F | HEART RATE: 78 BPM

## 2021-04-18 PROCEDURE — 99239 HOSP IP/OBS DSCHRG MGMT >30: CPT | Performed by: HOSPITALIST

## 2021-04-18 NOTE — PLAN OF CARE
Patient alert and oriented x4. Up independently. General diet, tolerating well. Remote tele. Reports moderate pain to bilateral hips, declines need for pain medications. IV fluids and antibiotics completed. Call light in reach. Frequent rounding performed. nonpharmacologic comfort measures as appropriate  - Advance diet as tolerated, if ordered  - Obtain nutritional consult as needed  - Evaluate fluid balance  Outcome: Adequate for Discharge  Goal: Maintains or returns to baseline bowel function  Description

## 2021-04-18 NOTE — PLAN OF CARE
Problem: Patient Centered Care  Goal: Patient preferences are identified and integrated in the patient's plan of care  Description: Interventions:  - What would you like us to know as we care for you?  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

## 2021-04-18 NOTE — DISCHARGE SUMMARY
[facility]    Discharge Summary    Corry Gallegos Patient Status:  Inpatient    3/1/1960 MRN [id number]   Location [facility] 4W/SW/SE Attending Yaz Jamison MD   Hosp Day # 2 PCP Cornelia Chiang     Date of Admission: 4/15/2021 Moves all extremities.     Hospital Course:     Febrile illness  Now resolved  Blood cultures negative  Most likely related to chemo  Patient unlikely has pna   Antibiotics disocontinued       Pleural effusion  Patient has a known malignant fusion loculated total) by mouth every 6 (six) hours as needed for Nausea. Cholecalciferol (VITAMIN D3) 250 MCG (41319 UT) Oral Cap  Take 1 capsule by mouth daily. atorvastatin 20 MG Oral Tab  Take 20 mg by mouth daily.     Minocycline HCl 50 MG Oral Cap  Take 50 mg b 90 tablet  Refills: 1     Palbociclib 125 MG Tabs  Commonly known as: IBRANCE      Take 1 tablet (125 mg total) by mouth daily.    Quantity: 21 tablet  Refills: 5     Prochlorperazine Maleate 10 mg tablet  Commonly known as: COMPAZINE      Take 1 tablet (10

## 2021-04-19 ENCOUNTER — PATIENT OUTREACH (OUTPATIENT)
Dept: CASE MANAGEMENT | Age: 61
End: 2021-04-19

## 2021-04-19 NOTE — PROGRESS NOTES
1st attempt [facility] apt request    [facility]  [address]  [phone]  [address]    Patient declined scheduling with [facility] at this time.  Patient request assistance scheduling apt with [facility]

## 2021-04-28 ENCOUNTER — SOCIAL WORK SERVICES (OUTPATIENT)
Dept: HEMATOLOGY/ONCOLOGY | Facility: HOSPITAL | Age: 61
End: 2021-04-28

## 2021-04-28 ENCOUNTER — OFFICE VISIT (OUTPATIENT)
Dept: HEMATOLOGY/ONCOLOGY | Facility: HOSPITAL | Age: 61
End: 2021-04-28
Attending: INTERNAL MEDICINE

## 2021-04-28 VITALS
SYSTOLIC BLOOD PRESSURE: 142 MMHG | OXYGEN SATURATION: 99 % | DIASTOLIC BLOOD PRESSURE: 90 MMHG | BODY MASS INDEX: 23.41 KG/M2 | TEMPERATURE: 97 F | HEIGHT: 61 IN | HEART RATE: 81 BPM | RESPIRATION RATE: 16 BRPM | WEIGHT: 124 LBS

## 2021-04-28 DIAGNOSIS — M85.89 OSTEOPENIA OF MULTIPLE SITES: ICD-10-CM

## 2021-04-28 DIAGNOSIS — C78.7 LIVER METASTASIS (HCC): ICD-10-CM

## 2021-04-28 DIAGNOSIS — R22.1 MASS OF RIGHT SIDE OF NECK: ICD-10-CM

## 2021-04-28 DIAGNOSIS — Z90.11 HISTORY OF RIGHT MASTECTOMY: ICD-10-CM

## 2021-04-28 DIAGNOSIS — J91.0 MALIGNANT PLEURAL EFFUSION: ICD-10-CM

## 2021-04-28 DIAGNOSIS — C50.919 METASTATIC BREAST CANCER (HCC): ICD-10-CM

## 2021-04-28 DIAGNOSIS — Z17.0 MALIGNANT NEOPLASM OF UPPER-OUTER QUADRANT OF RIGHT BREAST IN FEMALE, ESTROGEN RECEPTOR POSITIVE (HCC): Primary | ICD-10-CM

## 2021-04-28 DIAGNOSIS — C50.411 MALIGNANT NEOPLASM OF UPPER-OUTER QUADRANT OF RIGHT BREAST IN FEMALE, ESTROGEN RECEPTOR POSITIVE (HCC): Primary | ICD-10-CM

## 2021-04-28 DIAGNOSIS — Q85.00 CLINICAL NEUROFIBROMATOSIS (HCC): ICD-10-CM

## 2021-04-28 PROCEDURE — 99214 OFFICE O/P EST MOD 30 MIN: CPT | Performed by: INTERNAL MEDICINE

## 2021-04-28 NOTE — PROGRESS NOTES
HPI   4/28/2021  Alberto Pinto is a 62year old female with metastatic breast cancer as documented by biopsies of the right neck mass, cytology from left pleural fluid, and liver biopsy. Patient has continued Faslodex monthly.   Palbociclib dosing has BRCA2     Malignant neoplasm of upper-outer quadrant of right breast in female, estrogen receptor positive ([facility] [address]   9/11/2007 Initial Diagnosis    Bilateral diagnostic mammogram, malignant appearing calcifications in the right upper outer quadrant, 1.5 cm n bursitis about the greater trochanters  10/28/2007 CT chest neurofibromatosis, no metastatic disease   9/26/2007 hysteroscopy [facility]–Dr. grossman–no definite evidence of endometrial hyperplasia cytologic atypia polyps or malignancy        - 2/16/2008 Chemotherapy (Negative)    Right supraclavicular mass; core biopsy:  · Metastatic adenocarcinoma consistent with patient's known breast primary (see comment).      Comment:  Clinically, the patient has a history of neurofibromatosis, as well as a history of right breast size since prior chest CT imaging. Although these findings could   relate to interstitial edema with resolving left pneumonia, the possibility of lymphangitic spread of neoplasm is raised.   Overall, these findings have likely slightly improved since prior stairs or at the end of the day if on the phone  Patient denies significant discomfort on the right chest wall reconstruction and would like to delay MRI scan   Cardiovascular: Negative for chest pain.    Gastrointestinal: Negative for constipation, diarrhe mg by mouth 2 (two) times daily. • losartan 100 MG Oral Tab Take by mouth daily.        Allergies:   No Known Allergies    Past Medical History:   Diagnosis Date   • [facility] 03/02/2012    MASTECTOMY / RECONSTRUCTION   • Chemotherapy-induced tobacco: Never Used    Substance and Sexual Activity      Alcohol use: Yes        Alcohol/week: 5.0 standard drinks        Types: 5 Standard drinks or equivalent per week        Comment: Socially.   (Per [employer]:  5 days weekly.)      Drug use: No      Sexu Grandmother    • Breast Cancer Self 47   • Genetic Disease Self         NF-1   • Cancer Paternal Aunt         throat ca   • Cancer Maternal Uncle         prostate ca   • Genetic Disease Brother         NF-1   • Genetic Disease Brother         NF-1   • Gene General: Bowel sounds are normal. There is no distension. Palpations: Abdomen is soft. There is no mass. Tenderness: There is no abdominal tenderness. Musculoskeletal:      Cervical back: Normal range of motion and neck supple.       Comments: R was normal, urinalysis 1-5 WBCs with no bacteria, and blood cultures no growth 5 days.   Her alkaline phosphatase was elevated at 155 AST 68 ALT 63.  CT scan abdomen and pelvis did reveal a 0.8 cm sclerotic focus in the posterior aspect of L4 vertebral body every 6 months based on her bone density study but monthly if she develops lytic lesions. Social service saw patient today and will assist in obtaining disability.   Patricio Nichole will also contact employee assistance program to facilitate the insurance definite new suspicious pulmonary nodule or adenopathy in the chest.  Ill-defined soft tissue right supraclavicular region does not appear to be significantly changed since the prior study 02/01/2021.     Stable subcentimeter sclerotic focus T11 vertebral Carbon Dioxide, Total      21.0 - 32.0 mmol/L 25.0 28.0   ANION GAP      0 - 18 mmol/L 6 4   BUN      7 - 18 mg/dL 13 14   CREATININE      0.55 - 1.02 mg/dL 0.85 1.03 (H)   BUN/CREAT Ratio      10.0 - 20.0 15.3 13.6   CALCIUM      8.5 - 10.1 mg/dL 8.7 8. 2.2   Immature Granulocyte %      % 0.0   Glucose      70 - 99 mg/dL 103 (H)   Sodium      136 - 145 mmol/L 142   Potassium      3.5 - 5.1 mmol/L 3.7   Chloride      98 - 112 mmol/L 112   Carbon Dioxide, Total      21.0 - 32.0 mmol/L 25.0   ANION GAP or other visible lesion. LARYNX:           No apparent vocal cord mass or asymmetry. NECK GLANDS:             Multiple heterogeneous low-density thyroid nodules, the largest of which measures up to 1.5 x 1.4 cm in the left inferior isthmus.   Some nodu throughout the left greater than right lung. Wedge-shaped 4.0 x 1.9 cm left upper lobe opacity, which has decreased in extent since prior. No pneumothorax.   Tiny 2 mm right apical perivascular nodule (series 6, image 24),   which is likely retrospectivel lymphadenopathy is apparent. BONES: Scattered sclerotic foci within the thoracic and lumbar vertebral bodies, which are new since prior.   This includes a reference 0.8 cm sclerotic focus within the posterior aspect of the L4 vertebral body (series 11, i prior chest CT imaging. Although these findings could   relate to interstitial edema with resolving left pneumonia, the possibility of lymphangitic spread of neoplasm is raised.   Overall, these findings have likely slightly improved since prior with no de fracture.  ====================================================================  11/19/2020 PA and lateral chest x-ray  1.  Stable appearance of diffuse interstitial prominence and small left basilar pleural effusion.  ====================================== the RA SVC junction. 10. Soft tissue fullness in the right supraclavicular region which is incompletely evaluated at the edge of the field of view. Correlate with neck CT. Meds This Visit:  See med list above    Imaging & Referrals:   We will delay a

## 2021-04-28 NOTE — PROGRESS NOTES
XIOMARA met with the pt, per her request, to discuss her disability benefits. SW informed the pt had been hospitalized recently, and had been told her insurance was no longer active.  Pt states she had been working with [employer] for the continued insurance, but isn

## 2021-04-29 ENCOUNTER — TELEPHONE (OUTPATIENT)
Dept: PULMONOLOGY | Facility: CLINIC | Age: 61
End: 2021-04-29

## 2021-04-30 NOTE — TELEPHONE ENCOUNTER
Dr. Sherwin Stevens- patient requesting appointment to remove pleurx catheter. Should I make office visit appointment? Please advise.

## 2021-05-03 NOTE — TELEPHONE ENCOUNTER
Patient called back again and states she would need to change drain on Wednesday and doesn't have any more equipment. Patient reports decreased output for over 1 week. Dr. Austin Kaplan wondering if drain can be removed in the office? Please advise.

## 2021-05-04 NOTE — TELEPHONE ENCOUNTER
Per Dr. Felipe Fernandez notes dated 4/15/21 re: surgery- pleurx catheter placement for Dr. Meredith Schultz 10/19/20.

## 2021-05-04 NOTE — TELEPHONE ENCOUNTER
Pt informed of Dr. Sinclair Standing orders below. Explained per Dr. Hyde Spray notes dated 4/15/21 re: surgery- pleurx catheter placement was done by Dr. Tyler De Anda on 10/19/20 & she may further d/w Dr. Arnie Manrique. Provided pt w/ phone # for Dr. Loli Howard.  [place]

## 2021-05-06 ENCOUNTER — HOSPITAL ENCOUNTER (OUTPATIENT)
Dept: INTERVENTIONAL RADIOLOGY/VASCULAR | Facility: HOSPITAL | Age: 61
Discharge: HOME | End: 2021-05-06
Attending: CLINICAL NURSE SPECIALIST | Admitting: RADIOLOGY
Payer: COMMERCIAL

## 2021-05-06 ENCOUNTER — HOSPITAL ENCOUNTER (OUTPATIENT)
Dept: INTERVENTIONAL RADIOLOGY/VASCULAR | Facility: HOSPITAL | Age: 61
Discharge: HOME | End: 2021-05-06
Attending: RADIOLOGY | Admitting: RADIOLOGY
Payer: COMMERCIAL

## 2021-05-06 ENCOUNTER — HOSPITAL ENCOUNTER (OUTPATIENT)
Dept: GENERAL RADIOLOGY | Facility: HOSPITAL | Age: 61
Discharge: HOME | End: 2021-05-06
Attending: CLINICAL NURSE SPECIALIST | Admitting: RADIOLOGY
Payer: COMMERCIAL

## 2021-05-06 VITALS
DIASTOLIC BLOOD PRESSURE: 77 MMHG | WEIGHT: 124 LBS | HEART RATE: 82 BPM | SYSTOLIC BLOOD PRESSURE: 107 MMHG | OXYGEN SATURATION: 95 % | TEMPERATURE: 98 F | RESPIRATION RATE: 24 BRPM | BODY MASS INDEX: 23.41 KG/M2 | HEIGHT: 61 IN

## 2021-05-06 DIAGNOSIS — J90 PLEURAL EFFUSION: ICD-10-CM

## 2021-05-06 DIAGNOSIS — C50.919 METASTATIC BREAST CANCER (HCC): ICD-10-CM

## 2021-05-06 DIAGNOSIS — C50.919 BREAST CANCER (HCC): ICD-10-CM

## 2021-05-06 PROCEDURE — 32552 REMOVE LUNG CATHETER: CPT

## 2021-05-06 PROCEDURE — A4216 STERILE WATER/SALINE, 10 ML: HCPCS

## 2021-05-06 PROCEDURE — 0WPG03Z REMOVAL OF INFUSION DEVICE FROM PERITONEAL CAVITY, OPEN APPROACH: ICD-10-PCS | Performed by: RADIOLOGY

## 2021-05-06 PROCEDURE — 71045 X-RAY EXAM CHEST 1 VIEW: CPT | Performed by: CLINICAL NURSE SPECIALIST

## 2021-05-06 PROCEDURE — 99152 MOD SED SAME PHYS/QHP 5/>YRS: CPT

## 2021-05-06 RX ORDER — MIDAZOLAM HYDROCHLORIDE 1 MG/ML
INJECTION INTRAMUSCULAR; INTRAVENOUS
Status: COMPLETED
Start: 2021-05-06 | End: 2021-05-06

## 2021-05-06 RX ORDER — HEPARIN SODIUM (PORCINE) LOCK FLUSH IV SOLN 100 UNIT/ML 100 UNIT/ML
SOLUTION INTRAVENOUS
Status: DISCONTINUED
Start: 2021-05-06 | End: 2021-05-06

## 2021-05-06 RX ORDER — LIDOCAINE HYDROCHLORIDE 20 MG/ML
INJECTION, SOLUTION EPIDURAL; INFILTRATION; INTRACAUDAL; PERINEURAL
Status: COMPLETED
Start: 2021-05-06 | End: 2021-05-06

## 2021-05-06 NOTE — INTERVAL H&P NOTE
The above referenced H&P was reviewed by Sandy Larose MD on 5/6/2021, the patient was examined and no significant changes have occurred in the patient's condition since the H&P was performed.   Risks, benefits, alternative treatments and consequences of no

## 2021-05-06 NOTE — IVS NOTE
DISCHARGE NOTE     Pt is able to sit up and ambulate without difficulty. Pt voided and tolerated fluids and food. Procedural site remains dry and intact. No signs and symptoms of bleeding noted.    Instruction provided, patient/family verbalizes unders

## 2021-05-13 ENCOUNTER — NURSE ONLY (OUTPATIENT)
Dept: HEMATOLOGY/ONCOLOGY | Facility: HOSPITAL | Age: 61
End: 2021-05-13
Attending: INTERNAL MEDICINE
Payer: COMMERCIAL

## 2021-05-13 ENCOUNTER — TELEPHONE (OUTPATIENT)
Dept: HEMATOLOGY/ONCOLOGY | Facility: HOSPITAL | Age: 61
End: 2021-05-13

## 2021-05-13 DIAGNOSIS — Z90.11 HISTORY OF RIGHT MASTECTOMY: ICD-10-CM

## 2021-05-13 DIAGNOSIS — M81.8 OTHER OSTEOPOROSIS WITHOUT CURRENT PATHOLOGICAL FRACTURE: ICD-10-CM

## 2021-05-13 DIAGNOSIS — J91.0 MALIGNANT PLEURAL EFFUSION: Primary | ICD-10-CM

## 2021-05-13 DIAGNOSIS — Z45.2 ENCOUNTER FOR ADJUSTMENT AND MANAGEMENT OF VASCULAR ACCESS DEVICE: ICD-10-CM

## 2021-05-13 DIAGNOSIS — C50.919 METASTATIC BREAST CANCER (HCC): ICD-10-CM

## 2021-05-13 DIAGNOSIS — R22.1 MASS OF RIGHT SIDE OF NECK: ICD-10-CM

## 2021-05-13 PROCEDURE — 96402 CHEMO HORMON ANTINEOPL SQ/IM: CPT

## 2021-05-13 PROCEDURE — 36591 DRAW BLOOD OFF VENOUS DEVICE: CPT

## 2021-05-13 PROCEDURE — 85060 BLOOD SMEAR INTERPRETATION: CPT

## 2021-05-13 PROCEDURE — 85025 COMPLETE CBC W/AUTO DIFF WBC: CPT

## 2021-05-13 PROCEDURE — 80053 COMPREHEN METABOLIC PANEL: CPT

## 2021-05-13 RX ORDER — HEPARIN SODIUM (PORCINE) LOCK FLUSH IV SOLN 100 UNIT/ML 100 UNIT/ML
5 SOLUTION INTRAVENOUS ONCE
Status: COMPLETED | OUTPATIENT
Start: 2021-05-13 | End: 2021-05-13

## 2021-05-13 RX ORDER — 0.9 % SODIUM CHLORIDE 0.9 %
10 VIAL (ML) INJECTION ONCE
Status: CANCELLED | OUTPATIENT
Start: 2021-05-13

## 2021-05-13 RX ORDER — LAMOTRIGINE 25 MG/1
500 TABLET ORAL ONCE
Status: COMPLETED | OUTPATIENT
Start: 2021-05-13 | End: 2021-05-13

## 2021-05-13 RX ORDER — SODIUM CHLORIDE 9 MG/ML
INJECTION INTRAVENOUS
Status: DISCONTINUED
Start: 2021-05-13 | End: 2021-05-13

## 2021-05-13 RX ORDER — HEPARIN SODIUM (PORCINE) LOCK FLUSH IV SOLN 100 UNIT/ML 100 UNIT/ML
5 SOLUTION INTRAVENOUS ONCE
Status: CANCELLED | OUTPATIENT
Start: 2021-05-13

## 2021-05-13 RX ORDER — LAMOTRIGINE 25 MG/1
500 TABLET ORAL ONCE
Status: CANCELLED | OUTPATIENT
Start: 2021-05-14

## 2021-05-13 RX ADMIN — HEPARIN SODIUM (PORCINE) LOCK FLUSH IV SOLN 100 UNIT/ML 500 UNITS: 100 SOLUTION INTRAVENOUS at 10:25:00

## 2021-05-13 RX ADMIN — LAMOTRIGINE 500 MG: 25 TABLET ORAL at 10:39:00

## 2021-05-13 NOTE — PROGRESS NOTES
Patient here for Labs and Faslodex injection. Patient states after last Faslodex she was inpatient with fever. States buttocks was extremely sore after last Faslodex injection. Patient worried this might happen after today injection.   Patient reports pl

## 2021-06-10 ENCOUNTER — NURSE ONLY (OUTPATIENT)
Dept: HEMATOLOGY/ONCOLOGY | Facility: HOSPITAL | Age: 61
End: 2021-06-10
Attending: INTERNAL MEDICINE
Payer: COMMERCIAL

## 2021-06-10 VITALS
RESPIRATION RATE: 16 BRPM | HEART RATE: 63 BPM | BODY MASS INDEX: 23.79 KG/M2 | HEIGHT: 60.98 IN | SYSTOLIC BLOOD PRESSURE: 118 MMHG | OXYGEN SATURATION: 100 % | DIASTOLIC BLOOD PRESSURE: 81 MMHG | TEMPERATURE: 98 F | WEIGHT: 126 LBS

## 2021-06-10 DIAGNOSIS — M81.8 OTHER OSTEOPOROSIS WITHOUT CURRENT PATHOLOGICAL FRACTURE: ICD-10-CM

## 2021-06-10 DIAGNOSIS — Z51.81 MEDICATION MONITORING ENCOUNTER: ICD-10-CM

## 2021-06-10 DIAGNOSIS — J91.0 MALIGNANT PLEURAL EFFUSION: ICD-10-CM

## 2021-06-10 DIAGNOSIS — Z90.11 HISTORY OF RIGHT MASTECTOMY: ICD-10-CM

## 2021-06-10 DIAGNOSIS — C50.411 MALIGNANT NEOPLASM OF UPPER-OUTER QUADRANT OF RIGHT BREAST IN FEMALE, ESTROGEN RECEPTOR POSITIVE (HCC): Primary | ICD-10-CM

## 2021-06-10 DIAGNOSIS — R22.1 MASS OF RIGHT SIDE OF NECK: ICD-10-CM

## 2021-06-10 DIAGNOSIS — C50.919 METASTATIC BREAST CANCER (HCC): ICD-10-CM

## 2021-06-10 DIAGNOSIS — Q85.00 CLINICAL NEUROFIBROMATOSIS (HCC): ICD-10-CM

## 2021-06-10 DIAGNOSIS — J91.0 MALIGNANT PLEURAL EFFUSION: Primary | ICD-10-CM

## 2021-06-10 DIAGNOSIS — Z17.0 MALIGNANT NEOPLASM OF UPPER-OUTER QUADRANT OF RIGHT BREAST IN FEMALE, ESTROGEN RECEPTOR POSITIVE (HCC): Primary | ICD-10-CM

## 2021-06-10 DIAGNOSIS — C78.7 LIVER METASTASIS (HCC): ICD-10-CM

## 2021-06-10 DIAGNOSIS — Z45.2 ENCOUNTER FOR ADJUSTMENT AND MANAGEMENT OF VASCULAR ACCESS DEVICE: ICD-10-CM

## 2021-06-10 PROCEDURE — 96402 CHEMO HORMON ANTINEOPL SQ/IM: CPT

## 2021-06-10 PROCEDURE — 99214 OFFICE O/P EST MOD 30 MIN: CPT | Performed by: INTERNAL MEDICINE

## 2021-06-10 PROCEDURE — 85025 COMPLETE CBC W/AUTO DIFF WBC: CPT

## 2021-06-10 PROCEDURE — 80053 COMPREHEN METABOLIC PANEL: CPT

## 2021-06-10 PROCEDURE — 36415 COLL VENOUS BLD VENIPUNCTURE: CPT

## 2021-06-10 RX ORDER — HEPARIN SODIUM (PORCINE) LOCK FLUSH IV SOLN 100 UNIT/ML 100 UNIT/ML
5 SOLUTION INTRAVENOUS ONCE
Status: CANCELLED | OUTPATIENT
Start: 2021-06-10

## 2021-06-10 RX ORDER — LAMOTRIGINE 25 MG/1
500 TABLET ORAL ONCE
Status: COMPLETED | OUTPATIENT
Start: 2021-06-10 | End: 2021-06-10

## 2021-06-10 RX ORDER — 0.9 % SODIUM CHLORIDE 0.9 %
10 VIAL (ML) INJECTION ONCE
Status: CANCELLED | OUTPATIENT
Start: 2021-06-10

## 2021-06-10 RX ORDER — HEPARIN SODIUM (PORCINE) LOCK FLUSH IV SOLN 100 UNIT/ML 100 UNIT/ML
5 SOLUTION INTRAVENOUS ONCE
Status: DISCONTINUED | OUTPATIENT
Start: 2021-06-10 | End: 2021-06-10

## 2021-06-10 RX ORDER — SODIUM CHLORIDE 9 MG/ML
INJECTION INTRAVENOUS
Status: DISCONTINUED
Start: 2021-06-10 | End: 2021-06-10 | Stop reason: WASHOUT

## 2021-06-10 RX ORDER — ZOLPIDEM TARTRATE 5 MG/1
TABLET ORAL
Qty: 60 TABLET | Refills: 3 | Status: SHIPPED | OUTPATIENT
Start: 2021-06-10 | End: 2022-01-21 | Stop reason: DRUGHIGH

## 2021-06-10 RX ORDER — ZOLPIDEM TARTRATE 12.5 MG/1
TABLET, FILM COATED, EXTENDED RELEASE ORAL
COMMUNITY
Start: 2021-05-18 | End: 2021-06-10 | Stop reason: ALTCHOICE

## 2021-06-10 RX ADMIN — LAMOTRIGINE 500 MG: 25 TABLET ORAL at 10:14:00

## 2021-06-10 NOTE — PROGRESS NOTES
HPI   6/10/2021  Alisia Boas is a 62year old female with metastatic breast cancer as documented by biopsies of the right neck mass, cytology from left pleural fluid, and liver biopsy. Patient has continued Faslodex monthly and received #10 today. and core biopsy -ductal carcinoma in situ, solid type     9/26/2007 Surgery    Right breast lumpectomy -multifocal ductal carcinoma in situ 3.8 cm +1.1 cm (below the nipple) extensions of DCIS to the inked anterior and inferior margins, single neurofibroad silicone implant, reduction of left nipple complex     9/18/2020 Progression    1. Development of a spiculated soft tissue density in the right supraclavicular region extending to the lower right side of the neck, as detailed above.   There is involvement o metastatic adenocarcinoma consistent with the patient's known breast primary (ER: 28%, AR: <1%, HER-2/nelson: 0).       Biopsy    Liver biopsy - ultrasound-guided core biopsies:  · Fragment of liver parenchyma with metastatic adenocarcinoma, consistent with th but are new since prior CT imaging. These most likely represent intervally treated osseous micrometastases. Consider nuclear medicine bone scan   correlation to assess for any potential sites of active osseous metastases.    5. Possible subtle eccentric/p Right ankle pain and swelling   Skin: Negative for rash. Neurofibromatosis lesions with associated itching   Psychiatric/Behavioral: Positive for dysphoric mood. The patient is nervous/anxious.          Patient continues to require lorazepam a Malignant neoplasm of overlapping sites of breast in female, estrogen receptor positive ([facility] [address] 1/16/2015   • Malignant neoplasm of upper-outer quadrant of right breast in female, estrogen receptor positive ([facility] [address] 1/16/2015   • Malignant pleural effusion 9/25 Occupational Exposure: Not Asked        Hobby Hazards: Not Asked        Sleep Concern: Not Asked        Stress Concern: Not Asked        Weight Concern: Not Asked        Special Diet: Not Asked        Back Care: Not Asked        Exercise: Not Asked Cousin Male         throat ca       PHYSICAL EXAM:   /81 (BP Location: Left arm, Patient Position: Sitting, Cuff Size: adult)   Pulse 63   Temp 97.9 °F (36.6 °C) (Oral)   Resp 16   Ht 1.549 m (5' 0.98\")   Wt 57.2 kg (126 lb)   SpO2 100%   BMI 23.82 adenopathy. Skin:     General: Skin is warm and dry.       Comments: Pigment change in the right supraclavicular region secondary to radiation therapy  Neurofibromatosis diffuse nodules   Neurological:      Mental Status: She is alert and oriented to pers definite new suspicious pulmonary nodules or adenopathy. The soft tissue in the right supraclavicular region did not appear to be significantly changed compared to 2/1/2021.   Patient was seen by Dr. José Antonio Leon who indicated that she did not have increasing pu coverage.     Results:  Component      Latest Ref Rng & Units 6/10/2021 5/13/2021 4/15/2021   WBC      4.0 - 11.0 x10(3) uL 2.9 (L) 1.5 (L) 4.9   RBC      3.80 - 5.30 x10(6)uL 3.81 3.44 (L) 3.80   Hemoglobin      12.0 - 16.0 g/dL 12.0 10.9 (L) 12.5   Hemato 67 142 (H)   Total Bilirubin      0.1 - 2.0 mg/dL  1.0 1.2   TOTAL PROTEIN      6.4 - 8.2 g/dL  6.7 6.9   Albumin      3.4 - 5.0 g/dL  3.4 3.1 (L)   Globulin      2.8 - 4.4 g/dL  3.3 3.8   A/G Ratio      1.0 - 2.0  1.0 0.8 (L)       4/16/2021 CT abdomen pe adenopathy in the chest.  Ill-defined soft tissue right supraclavicular region does not appear to be significantly changed since the prior study 02/01/2021.     Stable subcentimeter sclerotic focus T11 vertebral body.   ==================================== tissue fullness in the right supraclavicular fossa has significantly decreased in extent, with residual now measuring approximately 4.7 x 2.2 x 3.1 cm as compared with 6.0 x 2.5 x 3.8 cm previously. No definite new or enlarging   cervical lymphadenopathy. upper lobe (series 6, image 49). AIRWAYS:         The tracheobronchial tree is without central mass or obstructing lesion. MEDIASTINUM/BRANDY:    No mass or lymphadenopathy.   CHEST WALL:  Bilateral breast implants with suspected intracapsular rupture on th focus within the right upper L3 vertebral body (series 10, image 42). Stable chronic deformity of the   right iliac wing. Sclerotic foci within both proximal femurs are unchanged and most likely represent bone islands.   ABDOMINAL WALL: No mass or hernia in size, but are new since prior CT imaging. These most likely represent intervally treated osseous micrometastases. Consider nuclear medicine bone scan   correlation to assess for any potential sites of active osseous metastases.   5. Possible subtle ecc neck, as detailed above. There is involvement of the right scalene and sternocleidomastoid muscles with abutment of the right  common carotid artery/internal jugular vein and mild overlying skin thickening.   Given the history of malignancy, this finding c

## 2021-06-10 NOTE — PROGRESS NOTES
Patient arrives for every 4 week faslodex and labs. Labs collected from left [facility], tolerated well. Patient reports she switches every other month for labs to be drawn from her port. Faslodex given in bilateral upper outer gluteus, patient tolerated well.  Dis

## 2021-07-08 ENCOUNTER — TELEPHONE (OUTPATIENT)
Dept: HEMATOLOGY/ONCOLOGY | Facility: HOSPITAL | Age: 61
End: 2021-07-08

## 2021-07-08 ENCOUNTER — NURSE ONLY (OUTPATIENT)
Dept: HEMATOLOGY/ONCOLOGY | Facility: HOSPITAL | Age: 61
End: 2021-07-08
Attending: INTERNAL MEDICINE
Payer: COMMERCIAL

## 2021-07-08 VITALS
WEIGHT: 128 LBS | SYSTOLIC BLOOD PRESSURE: 122 MMHG | HEART RATE: 67 BPM | HEIGHT: 61 IN | BODY MASS INDEX: 24.17 KG/M2 | DIASTOLIC BLOOD PRESSURE: 73 MMHG | OXYGEN SATURATION: 98 % | RESPIRATION RATE: 16 BRPM | TEMPERATURE: 98 F

## 2021-07-08 DIAGNOSIS — Q85.00 CLINICAL NEUROFIBROMATOSIS (HCC): ICD-10-CM

## 2021-07-08 DIAGNOSIS — T45.1X5A CHEMOTHERAPY-INDUCED NEUTROPENIA (HCC): ICD-10-CM

## 2021-07-08 DIAGNOSIS — C50.411 MALIGNANT NEOPLASM OF UPPER-OUTER QUADRANT OF RIGHT BREAST IN FEMALE, ESTROGEN RECEPTOR POSITIVE (HCC): Primary | ICD-10-CM

## 2021-07-08 DIAGNOSIS — C50.919 METASTATIC BREAST CANCER (HCC): ICD-10-CM

## 2021-07-08 DIAGNOSIS — Z51.81 MEDICATION MONITORING ENCOUNTER: ICD-10-CM

## 2021-07-08 DIAGNOSIS — R22.1 MASS OF RIGHT SIDE OF NECK: ICD-10-CM

## 2021-07-08 DIAGNOSIS — J91.0 MALIGNANT PLEURAL EFFUSION: ICD-10-CM

## 2021-07-08 DIAGNOSIS — Z90.11 HISTORY OF RIGHT MASTECTOMY: ICD-10-CM

## 2021-07-08 DIAGNOSIS — J91.0 MALIGNANT PLEURAL EFFUSION: Primary | ICD-10-CM

## 2021-07-08 DIAGNOSIS — C78.7 LIVER METASTASIS (HCC): ICD-10-CM

## 2021-07-08 DIAGNOSIS — Z17.0 MALIGNANT NEOPLASM OF UPPER-OUTER QUADRANT OF RIGHT BREAST IN FEMALE, ESTROGEN RECEPTOR POSITIVE (HCC): Primary | ICD-10-CM

## 2021-07-08 DIAGNOSIS — D70.1 CHEMOTHERAPY-INDUCED NEUTROPENIA (HCC): ICD-10-CM

## 2021-07-08 LAB
BASOPHILS # BLD AUTO: 0.04 X10(3) UL (ref 0–0.2)
BASOPHILS NFR BLD AUTO: 2.1 %
DEPRECATED RDW RBC AUTO: 64.6 FL (ref 35.1–46.3)
EOSINOPHIL # BLD AUTO: 0.01 X10(3) UL (ref 0–0.7)
EOSINOPHIL NFR BLD AUTO: 0.5 %
ERYTHROCYTE [DISTWIDTH] IN BLOOD BY AUTOMATED COUNT: 17.4 % (ref 11–15)
HCT VFR BLD AUTO: 37.3 %
HGB BLD-MCNC: 12.1 G/DL
IMM GRANULOCYTES # BLD AUTO: 0 X10(3) UL (ref 0–1)
IMM GRANULOCYTES NFR BLD: 0 %
LYMPHOCYTES # BLD AUTO: 0.61 X10(3) UL (ref 1–4)
LYMPHOCYTES NFR BLD AUTO: 31.9 %
MCH RBC QN AUTO: 32.3 PG (ref 26–34)
MCHC RBC AUTO-ENTMCNC: 32.4 G/DL (ref 31–37)
MCV RBC AUTO: 99.5 FL
MONOCYTES # BLD AUTO: 0.17 X10(3) UL (ref 0.1–1)
MONOCYTES NFR BLD AUTO: 8.9 %
NEUTROPHILS # BLD AUTO: 1.08 X10 (3) UL (ref 1.5–7.7)
NEUTROPHILS # BLD AUTO: 1.08 X10(3) UL (ref 1.5–7.7)
NEUTROPHILS NFR BLD AUTO: 56.6 %
PLATELET # BLD AUTO: 258 10(3)UL (ref 150–450)
RBC # BLD AUTO: 3.75 X10(6)UL
WBC # BLD AUTO: 1.9 X10(3) UL (ref 4–11)

## 2021-07-08 PROCEDURE — 96402 CHEMO HORMON ANTINEOPL SQ/IM: CPT

## 2021-07-08 PROCEDURE — 99215 OFFICE O/P EST HI 40 MIN: CPT | Performed by: INTERNAL MEDICINE

## 2021-07-08 PROCEDURE — 85060 BLOOD SMEAR INTERPRETATION: CPT

## 2021-07-08 PROCEDURE — 36415 COLL VENOUS BLD VENIPUNCTURE: CPT

## 2021-07-08 PROCEDURE — 85025 COMPLETE CBC W/AUTO DIFF WBC: CPT

## 2021-07-08 RX ORDER — SODIUM CHLORIDE 9 MG/ML
INJECTION INTRAVENOUS
Status: DISCONTINUED
Start: 2021-07-08 | End: 2021-07-08 | Stop reason: WASHOUT

## 2021-07-08 RX ORDER — LAMOTRIGINE 25 MG/1
500 TABLET ORAL ONCE
Status: CANCELLED | OUTPATIENT
Start: 2021-07-09

## 2021-07-08 RX ORDER — LAMOTRIGINE 25 MG/1
500 TABLET ORAL ONCE
Status: COMPLETED | OUTPATIENT
Start: 2021-07-08 | End: 2021-07-08

## 2021-07-08 RX ADMIN — LAMOTRIGINE 500 MG: 25 TABLET ORAL at 10:13:00

## 2021-07-08 NOTE — TELEPHONE ENCOUNTER
Patient called and said that she saw Dr. Val Giron this morning. Dr. Val Giron said that she would call in a prescription for the patient. Patient called at 1:30 on 7/8/21 and patient does not remember the name of the medication. Please call in medication.  Jose Garcia

## 2021-07-08 NOTE — PROGRESS NOTES
Patient here for Labs and Faslodex injection. Patient states she takes Benadryl and Tylenol to avoid reaction to Faslodex injections. Faslodex given in bilat Ventrogluteal muscle. Sites covered with gauze and paper tape per patient preference.   Lab draw

## 2021-07-08 NOTE — PROGRESS NOTES
HPI   7/8/2021  Amado Vega is a 62year old female with metastatic breast cancer as documented by biopsies of the right neck mass, cytology from left pleural fluid, and liver biopsy.   Patient also has a T11 vertebral body subcentimeter sclerotic fo having frustration with the disability and insurance.        Oncology History Overview Note   10/1/2020 genetic testing 35 genes studied [facility] panel through [employer] negative for BRCA1 and BRCA2     Malignant neoplasm of upper-outer quadrant of right breast lymph node     11/16/2007 Cancer Staged    pT1 N1 M0 Stage IIA  10/3/2007 Bone scan - no definite metastasis, mild plantar fasciitis left foot, traction injuries are mild trochanteric bursitis about the greater trochanters  10/28/2007 CT chest neurofibroma (Positive, strong intensity)  Progesterone receptor (Leica, monoclonal, clone 16) status:  < 1%  (Negative)  HER-2/nelson (Leica, monoclonal, clone CB11) status:  1+  (Negative)    Right supraclavicular mass; core biopsy:  · Metastatic adenocarcinoma consist interlobular septal thickening throughout the left greater than right lung. There is an additional ill-defined 4 cm opacity in the left upper lobe, which has decreased in size since prior chest CT imaging.   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 Negative for dental problem. Eyes: Positive for visual disturbance. Glaucoma, cataracts   Respiratory: Positive for cough.          SOB intermittently if extremely tablet (10 mg total) by mouth every 6 (six) hours as needed for Nausea. 60 tablet 3   • Cholecalciferol (VITAMIN D3) 250 MCG (56401 UT) Oral Cap Take 1 capsule by mouth daily. • atorvastatin 20 MG Oral Tab Take 20 mg by mouth daily.      • Minocycline H History      Marital status: Single      Spouse name: Not on file      Number of children: Not on file      Years of education: Not on file      Highest education level: Not on file    Occupational History      Not on file    Tobacco Use      Smoking statu     Emotionally Abused:       Physically Abused:       Sexually Abused:   Family History   Problem Relation Age of Onset   • Breast Cancer Mother 52        bilateral mastectomy   • Genetic Disease Mother         NF-1   • Breast Cancer Maternal Aunt 76 decreased breath sounds in the left posterior lower lung field  Chest:      Breasts: Breasts are asymmetrical.         Right: Skin change present. No mass. Left: Skin change present. No inverted nipple, mass, nipple discharge or tenderness.    Abdom liver are consistent with hormone receptor positive metastatic breast cancer. Patient's CT scan of the soft tissue neck chest abdomen and pelvis 2/1/2021 documented a response to treatment although she had the persistent but left effusion.   Patient had a possible intracapsular rupture of the implant on the right breast and repeat evaluation of the left implant. Patient does not have discomfort. Patient's mammogram 9/25/2020 was benign.    The MRI scan 1/14/2021 suggested an intracapsular rupture of the ri 0.70 x10(3) uL 0.01   Basophils Absolute      0.00 - 0.20 x10(3) uL 0.04   Immature Granulocyte Absolute      0.00 - 1.00 x10(3) uL 0.00   Neutrophils %      % 56.6   Lymphocytes %      % 31.9   Monocytes %      % 8.9   Eosinophils %      % 0.5   Basophils AMERICAN      >=60 61 67   eGFR       >=60 70 77   ALT (SGPT)      13 - 56 U/L 22 15   AST (SGOT)      15 - 37 U/L 12 (L) 11 (L)   ALKALINE PHOSPHATASE      46 - 118 U/L 70 67   Total Bilirubin      0.1 - 2.0 mg/dL 1.3 1.0   TOTAL PROTEIN uncertain clinical significance.  Otherwise no definite new/acute pulmonary artery   filling defect to indicate pulmonary embolism to the large proximal subsegmental level.     Decreased small to moderate partially loculated left pleural effusion with stabl retropharyngeal, and perivertebral spaces are without focal attenuation abnormality. The parapharyngeal fat planes are bilaterally symmetric without effacement. HYPOPHARYNX:           No mass or other visible lesion.     LARYNX:           No apparent vocal since prior. There is a left PleurX catheter, which extends to the left lung apex. Trace right pleural effusion, also decreased in size since prior. There is   interlobular septal thickening throughout the left greater than right lung. Wedge-shaped 4. 0 Dominant 3 cm intramural fibroid is suspected within the posterior uterine body/fundus. VASCULATURE: No aneurysm is detected. Trace atherosclerosis. RETROPERITONEUM: No mass or lymphadenopathy is apparent.     BONES: Scattered sclerotic foci within the t density of left hip and 6.1% decrease in bone density of lumbar spine as compared to prior study. 3. 11% 10 year risk for major osteoporotic fracture.   2% 10 year risk for hip fracture.  ==================================================================== in the thyroid isthmus measuring 1.9 cm and previously measuring 1.1 cm. Not previously evaluated, follow-up thyroid ultrasound recommended. 9. Right Port-A-Cath with tip near the RA SVC junction.  10. Soft tissue fullness in the right supraclavicular yolanda

## 2021-07-08 NOTE — TELEPHONE ENCOUNTER
Called patient -I am concerned with her multiple medications with zolpidem mirtazapine and adding lorazepam with a risk for falls. She understands and will continue her usual chronic medications including her zolpidem that she picked up today.

## 2021-07-21 ENCOUNTER — HOSPITAL ENCOUNTER (OUTPATIENT)
Dept: CT IMAGING | Facility: HOSPITAL | Age: 61
Discharge: HOME | End: 2021-07-21
Attending: INTERNAL MEDICINE
Payer: COMMERCIAL

## 2021-07-21 DIAGNOSIS — C78.7 LIVER METASTASIS (HCC): ICD-10-CM

## 2021-07-21 DIAGNOSIS — C50.919 METASTATIC BREAST CANCER (HCC): ICD-10-CM

## 2021-07-21 DIAGNOSIS — Z17.0 MALIGNANT NEOPLASM OF UPPER-OUTER QUADRANT OF RIGHT BREAST IN FEMALE, ESTROGEN RECEPTOR POSITIVE (HCC): ICD-10-CM

## 2021-07-21 DIAGNOSIS — C50.411 MALIGNANT NEOPLASM OF UPPER-OUTER QUADRANT OF RIGHT BREAST IN FEMALE, ESTROGEN RECEPTOR POSITIVE (HCC): ICD-10-CM

## 2021-07-21 LAB — CREAT BLD-MCNC: 1 MG/DL

## 2021-07-21 PROCEDURE — 74177 CT ABD & PELVIS W/CONTRAST: CPT | Performed by: INTERNAL MEDICINE

## 2021-07-21 PROCEDURE — 82565 ASSAY OF CREATININE: CPT

## 2021-07-21 PROCEDURE — 71260 CT THORAX DX C+: CPT | Performed by: INTERNAL MEDICINE

## 2021-07-22 ENCOUNTER — TELEPHONE (OUTPATIENT)
Dept: HEMATOLOGY/ONCOLOGY | Facility: HOSPITAL | Age: 61
End: 2021-07-22

## 2021-07-23 NOTE — TELEPHONE ENCOUNTER
Called Aleisha JIMENEZ advised her CT is stable.  Advised her to call our office if she has further questions or concerns

## 2021-08-04 DIAGNOSIS — Z17.0 MALIGNANT NEOPLASM OF UPPER-OUTER QUADRANT OF RIGHT BREAST IN FEMALE, ESTROGEN RECEPTOR POSITIVE (HCC): Primary | ICD-10-CM

## 2021-08-04 DIAGNOSIS — C50.411 MALIGNANT NEOPLASM OF UPPER-OUTER QUADRANT OF RIGHT BREAST IN FEMALE, ESTROGEN RECEPTOR POSITIVE (HCC): Primary | ICD-10-CM

## 2021-08-04 DIAGNOSIS — C50.919 METASTATIC BREAST CANCER (HCC): ICD-10-CM

## 2021-08-04 DIAGNOSIS — Z51.81 MEDICATION MONITORING ENCOUNTER: ICD-10-CM

## 2021-08-05 ENCOUNTER — OFFICE VISIT (OUTPATIENT)
Dept: HEMATOLOGY/ONCOLOGY | Facility: HOSPITAL | Age: 61
End: 2021-08-05
Attending: INTERNAL MEDICINE
Payer: COMMERCIAL

## 2021-08-05 VITALS
DIASTOLIC BLOOD PRESSURE: 79 MMHG | OXYGEN SATURATION: 100 % | HEART RATE: 69 BPM | TEMPERATURE: 98 F | RESPIRATION RATE: 16 BRPM | SYSTOLIC BLOOD PRESSURE: 130 MMHG

## 2021-08-05 VITALS
HEIGHT: 61 IN | TEMPERATURE: 98 F | SYSTOLIC BLOOD PRESSURE: 130 MMHG | DIASTOLIC BLOOD PRESSURE: 79 MMHG | BODY MASS INDEX: 25.3 KG/M2 | RESPIRATION RATE: 16 BRPM | HEART RATE: 69 BPM | OXYGEN SATURATION: 100 % | WEIGHT: 134 LBS

## 2021-08-05 DIAGNOSIS — Z87.01 HISTORY OF PNEUMONIA: ICD-10-CM

## 2021-08-05 DIAGNOSIS — M85.89 OSTEOPENIA OF MULTIPLE SITES: ICD-10-CM

## 2021-08-05 DIAGNOSIS — R93.89 ABNORMAL IMAGING OF THYROID: ICD-10-CM

## 2021-08-05 DIAGNOSIS — Z90.11 HISTORY OF RIGHT MASTECTOMY: ICD-10-CM

## 2021-08-05 DIAGNOSIS — R22.1 MASS OF RIGHT SIDE OF NECK: ICD-10-CM

## 2021-08-05 DIAGNOSIS — F51.04 PSYCHOPHYSIOLOGICAL INSOMNIA: ICD-10-CM

## 2021-08-05 DIAGNOSIS — Z17.0 MALIGNANT NEOPLASM OF UPPER-OUTER QUADRANT OF RIGHT BREAST IN FEMALE, ESTROGEN RECEPTOR POSITIVE (HCC): Primary | ICD-10-CM

## 2021-08-05 DIAGNOSIS — Z80.3 FAMILY HISTORY OF MALIGNANT NEOPLASM OF BREAST: ICD-10-CM

## 2021-08-05 DIAGNOSIS — C50.411 MALIGNANT NEOPLASM OF UPPER-OUTER QUADRANT OF RIGHT BREAST IN FEMALE, ESTROGEN RECEPTOR POSITIVE (HCC): Primary | ICD-10-CM

## 2021-08-05 DIAGNOSIS — Z51.81 MEDICATION MONITORING ENCOUNTER: ICD-10-CM

## 2021-08-05 DIAGNOSIS — C78.7 LIVER METASTASIS (HCC): ICD-10-CM

## 2021-08-05 DIAGNOSIS — J91.0 MALIGNANT PLEURAL EFFUSION: ICD-10-CM

## 2021-08-05 DIAGNOSIS — C50.919 METASTATIC BREAST CANCER (HCC): ICD-10-CM

## 2021-08-05 DIAGNOSIS — Q85.00 CLINICAL NEUROFIBROMATOSIS (HCC): ICD-10-CM

## 2021-08-05 DIAGNOSIS — T45.1X5A CHEMOTHERAPY-INDUCED NEUTROPENIA (HCC): ICD-10-CM

## 2021-08-05 DIAGNOSIS — Z45.2 ENCOUNTER FOR ADJUSTMENT AND MANAGEMENT OF VASCULAR ACCESS DEVICE: ICD-10-CM

## 2021-08-05 DIAGNOSIS — M81.8 OTHER OSTEOPOROSIS WITHOUT CURRENT PATHOLOGICAL FRACTURE: ICD-10-CM

## 2021-08-05 DIAGNOSIS — D70.1 CHEMOTHERAPY-INDUCED NEUTROPENIA (HCC): ICD-10-CM

## 2021-08-05 LAB
ALBUMIN SERPL-MCNC: 3.5 G/DL (ref 3.4–5)
ALBUMIN/GLOB SERPL: 1.1 {RATIO} (ref 1–2)
ALP LIVER SERPL-CCNC: 57 U/L
ALT SERPL-CCNC: 22 U/L
ANION GAP SERPL CALC-SCNC: 5 MMOL/L (ref 0–18)
AST SERPL-CCNC: 13 U/L (ref 15–37)
BASOPHILS # BLD AUTO: 0.05 X10(3) UL (ref 0–0.2)
BASOPHILS NFR BLD AUTO: 1.8 %
BILIRUB SERPL-MCNC: 1.4 MG/DL (ref 0.1–2)
BUN BLD-MCNC: 24 MG/DL (ref 7–18)
BUN/CREAT SERPL: 22.6 (ref 10–20)
CALCIUM BLD-MCNC: 8.1 MG/DL (ref 8.5–10.1)
CHLORIDE SERPL-SCNC: 109 MMOL/L (ref 98–112)
CO2 SERPL-SCNC: 26 MMOL/L (ref 21–32)
CREAT BLD-MCNC: 1.06 MG/DL
DEPRECATED RDW RBC AUTO: 54.4 FL (ref 35.1–46.3)
EOSINOPHIL # BLD AUTO: 0.06 X10(3) UL (ref 0–0.7)
EOSINOPHIL NFR BLD AUTO: 2.2 %
ERYTHROCYTE [DISTWIDTH] IN BLOOD BY AUTOMATED COUNT: 15.2 % (ref 11–15)
GLOBULIN PLAS-MCNC: 3.1 G/DL (ref 2.8–4.4)
GLUCOSE BLD-MCNC: 81 MG/DL (ref 70–99)
HCT VFR BLD AUTO: 33.3 %
HGB BLD-MCNC: 11.3 G/DL
IMM GRANULOCYTES # BLD AUTO: 0.01 X10(3) UL (ref 0–1)
IMM GRANULOCYTES NFR BLD: 0.4 %
LYMPHOCYTES # BLD AUTO: 0.55 X10(3) UL (ref 1–4)
LYMPHOCYTES NFR BLD AUTO: 20 %
M PROTEIN MFR SERPL ELPH: 6.6 G/DL (ref 6.4–8.2)
MCH RBC QN AUTO: 33.2 PG (ref 26–34)
MCHC RBC AUTO-ENTMCNC: 33.9 G/DL (ref 31–37)
MCV RBC AUTO: 97.9 FL
MONOCYTES # BLD AUTO: 0.15 X10(3) UL (ref 0.1–1)
MONOCYTES NFR BLD AUTO: 5.5 %
NEUTROPHILS # BLD AUTO: 1.93 X10 (3) UL (ref 1.5–7.7)
NEUTROPHILS # BLD AUTO: 1.93 X10(3) UL (ref 1.5–7.7)
NEUTROPHILS NFR BLD AUTO: 70.1 %
OSMOLALITY SERPL CALC.SUM OF ELEC: 293 MOSM/KG (ref 275–295)
PATIENT FASTING Y/N/NP: NO
PLATELET # BLD AUTO: 279 10(3)UL (ref 150–450)
POTASSIUM SERPL-SCNC: 4 MMOL/L (ref 3.5–5.1)
RBC # BLD AUTO: 3.4 X10(6)UL
SODIUM SERPL-SCNC: 140 MMOL/L (ref 136–145)
TSI SER-ACNC: 1.2 MIU/ML (ref 0.36–3.74)
WBC # BLD AUTO: 2.8 X10(3) UL (ref 4–11)

## 2021-08-05 PROCEDURE — 96365 THER/PROPH/DIAG IV INF INIT: CPT

## 2021-08-05 PROCEDURE — 85025 COMPLETE CBC W/AUTO DIFF WBC: CPT

## 2021-08-05 PROCEDURE — 96402 CHEMO HORMON ANTINEOPL SQ/IM: CPT

## 2021-08-05 PROCEDURE — 80053 COMPREHEN METABOLIC PANEL: CPT

## 2021-08-05 PROCEDURE — 99214 OFFICE O/P EST MOD 30 MIN: CPT | Performed by: INTERNAL MEDICINE

## 2021-08-05 RX ORDER — LAMOTRIGINE 25 MG/1
500 TABLET ORAL ONCE
Status: COMPLETED | OUTPATIENT
Start: 2021-08-05 | End: 2021-08-05

## 2021-08-05 RX ORDER — HEPARIN SODIUM (PORCINE) LOCK FLUSH IV SOLN 100 UNIT/ML 100 UNIT/ML
5 SOLUTION INTRAVENOUS ONCE
Status: COMPLETED | OUTPATIENT
Start: 2021-08-05 | End: 2021-08-05

## 2021-08-05 RX ORDER — HEPARIN SODIUM (PORCINE) LOCK FLUSH IV SOLN 100 UNIT/ML 100 UNIT/ML
5 SOLUTION INTRAVENOUS ONCE
Status: CANCELLED | OUTPATIENT
Start: 2021-08-05

## 2021-08-05 RX ORDER — HEPARIN SODIUM (PORCINE) LOCK FLUSH IV SOLN 100 UNIT/ML 100 UNIT/ML
SOLUTION INTRAVENOUS
Status: COMPLETED
Start: 2021-08-05 | End: 2021-08-05

## 2021-08-05 RX ORDER — LAMOTRIGINE 25 MG/1
500 TABLET ORAL ONCE
Status: CANCELLED | OUTPATIENT
Start: 2021-08-06

## 2021-08-05 RX ORDER — 0.9 % SODIUM CHLORIDE 0.9 %
10 VIAL (ML) INJECTION ONCE
Status: CANCELLED | OUTPATIENT
Start: 2021-08-05

## 2021-08-05 RX ORDER — SODIUM CHLORIDE 9 MG/ML
INJECTION INTRAVENOUS
Status: DISCONTINUED
Start: 2021-08-05 | End: 2021-08-05

## 2021-08-05 RX ORDER — ZOLPIDEM TARTRATE 10 MG/1
10 TABLET ORAL NIGHTLY PRN
Qty: 30 TABLET | Refills: 3 | Status: SHIPPED | OUTPATIENT
Start: 2021-08-05 | End: 2022-01-21

## 2021-08-05 RX ORDER — PALBOCICLIB 125 MG/1
125 TABLET, FILM COATED ORAL
COMMUNITY
Start: 2021-07-17 | End: 2021-09-20

## 2021-08-05 RX ADMIN — HEPARIN SODIUM (PORCINE) LOCK FLUSH IV SOLN 100 UNIT/ML 500 UNITS: 100 SOLUTION INTRAVENOUS at 10:18:00

## 2021-08-05 RX ADMIN — LAMOTRIGINE 500 MG: 25 TABLET ORAL at 11:38:00

## 2021-08-05 NOTE — PROGRESS NOTES
HPI   8/5/2021  Patrick Palacios is a 62year old female with metastatic breast cancer as documented by biopsies of the right neck mass, cytology from left pleural fluid, and liver biopsy. Patient also had multiple osseous lesions.   Patient has continue of neurofibromatosis, extensive surgery to her right foot / ankle beginning at age 7. 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 Patient is having frustration with the disability and insurance.        Oncol comedonecrosis largest focus measuring 1.1 cm, metastatic carcinoma involving 3 lymph nodes out of 5 sampled with the largest focus measuring 1.4 cm with extracapsular extension seen in 1 lymph node     11/16/2007 Cancer Staged    pT1 N1 M0 Stage IIA  10/3 using the ASCO/CAP scoring criteria, performed at [facility] on formalin-fixed paraffin-embedded tissue:      Estrogen receptor (Leica, monoclonal, clone 6 F11) status:  98%  (Positive, strong intensity)  Progesterone receptor (Leica, monoc decreased in extent since prior chest CT imaging from October, 2020. Trace right pleural effusion, which has also decreased in extent   since prior and is likely malignant.    3. Multifocal interlobular septal thickening throughout the left greater than rig 4/16/2021 ER  No clear progression of disease  7/21/2021 CT repeat CT scan chest abdomen and pelvis  No clear progression       Review of Systems:     Review of Systems   Constitutional: Positive for fatigue. Negative for chills and fever.         Patient o by mouth every 4 (four) hours as needed for Pain. 120 tablet 0   • Zoledronic Acid (ZOMETA IV) Inject 4 mg into the vein every 6 (six) months. • CALCIUM-MAGNESIUM OR Take 1 tablet by mouth daily. • Fulvestrant (FASLODEX IM) Inject into the muscle. fixation     Past Surgical History:   Procedure Laterality Date   • BREAST RECONSTRUCTION Right 2008    right breast reconstructed - ductal, BRCA neg   • BREAST SURGERY     • CHEMOTHERAPY  2007   • FOOT SURGERY Right     tumor rt foot / excision/excision o   Days of Exercise per Week:       Minutes of Exercise per Session:   Stress:       Feeling of Stress :   Social Connections:       Frequency of Communication with Friends and Family:       Frequency of Social Gatherings with Friends and Family:       Deondre Eyes:      General: No scleral icterus. Conjunctiva/sclera: Conjunctivae normal.      Pupils: Pupils are equal, round, and reactive to light.    Neck:      Comments: Now difficult to measure discrete mass in the right neck -appears to be a diffuse ful metastasis (hcc)  Mass of right side of neck  Chemotherapy-induced neutropenia (hcc)  Medication monitoring encounter  History of right mastectomy  Family history of malignant neoplasm of breast  Clinical neurofibromatosis (hcc)  Other osteoporosis without kristiee segment 8. Patient has had neutropenia with palbociclib and therapy has previously been delayed 1 to 2 weeks based on the laboratory results. Will complete the 2-week cycle of palbociclib which began on 6/30/2021.   She is well aware of the low ANC mg and allow patient to take 5 to 10 mg nightly as needed for sleep. Social service, Harrison Barajas will see patient today regarding Social Security and insurance.       Results:  Component      Latest Ref Rng & Units 8/5/2021 7/8/2021 6/10/2021 5/13/2021 CREATININE      0.55 - 1.02 mg/dL 1.06 (H)  1.01 0.93   BUN/CREAT Ratio      10.0 - 20.0 22.6 (H)  22.8 (H) 24.7 (H)   CALCIUM      8.5 - 10.1 mg/dL 8.1 (L)  8.6 8.8   CALCULATED OSMOLALITY      275 - 295 mOsm/kg 293  289 295   eGFR NON-AFR.  AMERICAN Multiple other incidental findings as described in the body of the report which are unchanged.   ========================================================  5/6/2021 removal of Pleurx catheter per IR  ======================================================= compressive passive lingula and left lower lobe.     Bilateral smooth appearing interlobular septal thickening with some mild patchy ground-glass opacity.  Findings suggest interstitial edema although lymphangitis spread of tumor remains in the differentia Multiple heterogeneous low-density thyroid nodules, the largest of which measures up to 1.5 x 1.4 cm in the left inferior isthmus. Some nodules demonstrate marginal calcification. No significant interval change since prior.   Submandibular   an has decreased in extent since prior. No pneumothorax. Tiny 2 mm right apical perivascular nodule (series 6, image 24),   which is likely retrospectively unchanged.   Likewise, an additional right upper lobe subpleural micronodule (series 6, image 38), als which are new since prior. This includes a reference 0.8 cm sclerotic focus within the posterior aspect of the L4 vertebral body (series 11, image 58).   There is also a   reference 0.8 cm sclerotic focus within the anterior aspect of the T11 vertebral bod pneumonia, the possibility of lymphangitic spread of neoplasm is raised. Overall, these findings have likely slightly improved since prior with no definite progression. Continued close attention on   anticipated follow-up is advised.   4. Small sclerotic x-ray  1. Stable appearance of diffuse interstitial prominence and small left basilar pleural effusion.  =================================================  9/18/2020 CT soft tissue of the neck     1.  Development of a spiculated soft tissue density in the r at the edge of the field of view. Correlate with neck CT. Meds This Visit:  See med list above    Imaging & Referrals: We will delay additional imaging pending symptom change or laboratory change.

## 2021-08-05 NOTE — PROGRESS NOTES
Patient here for labs from port, Zometa and Faslodex injections. Patient states she takes Benadryl and Tylenol to avoid reaction to Faslodex injections. Faslodex given in bilat Ventrogluteal muscle.   Sites covered with gauze and paper tape per patient pr

## 2021-08-16 RX ORDER — MIRTAZAPINE 15 MG/1
TABLET, FILM COATED ORAL
Qty: 90 TABLET | Refills: 1 | Status: SHIPPED | OUTPATIENT
Start: 2021-08-16 | End: 2022-01-21

## 2021-08-30 ENCOUNTER — OFFICE VISIT (OUTPATIENT)
Dept: PULMONOLOGY | Facility: CLINIC | Age: 61
End: 2021-08-30
Payer: COMMERCIAL

## 2021-08-30 VITALS
SYSTOLIC BLOOD PRESSURE: 109 MMHG | TEMPERATURE: 98 F | WEIGHT: 139 LBS | BODY MASS INDEX: 26.24 KG/M2 | OXYGEN SATURATION: 99 % | DIASTOLIC BLOOD PRESSURE: 72 MMHG | RESPIRATION RATE: 20 BRPM | HEART RATE: 74 BPM | HEIGHT: 61 IN

## 2021-08-30 DIAGNOSIS — J91.0 MALIGNANT PLEURAL EFFUSION: Primary | ICD-10-CM

## 2021-08-30 PROCEDURE — 99213 OFFICE O/P EST LOW 20 MIN: CPT | Performed by: INTERNAL MEDICINE

## 2021-08-30 PROCEDURE — 3074F SYST BP LT 130 MM HG: CPT | Performed by: INTERNAL MEDICINE

## 2021-08-30 PROCEDURE — 3008F BODY MASS INDEX DOCD: CPT | Performed by: INTERNAL MEDICINE

## 2021-08-30 PROCEDURE — 3078F DIAST BP <80 MM HG: CPT | Performed by: INTERNAL MEDICINE

## 2021-09-01 RX ORDER — LAMOTRIGINE 25 MG/1
500 TABLET ORAL ONCE
Status: CANCELLED | OUTPATIENT
Start: 2021-09-03

## 2021-09-02 ENCOUNTER — OFFICE VISIT (OUTPATIENT)
Dept: HEMATOLOGY/ONCOLOGY | Facility: HOSPITAL | Age: 61
End: 2021-09-02
Attending: INTERNAL MEDICINE
Payer: COMMERCIAL

## 2021-09-02 VITALS
SYSTOLIC BLOOD PRESSURE: 126 MMHG | RESPIRATION RATE: 16 BRPM | HEART RATE: 74 BPM | OXYGEN SATURATION: 100 % | TEMPERATURE: 98 F | WEIGHT: 138 LBS | HEIGHT: 61 IN | BODY MASS INDEX: 26.06 KG/M2 | DIASTOLIC BLOOD PRESSURE: 68 MMHG

## 2021-09-02 VITALS
DIASTOLIC BLOOD PRESSURE: 63 MMHG | OXYGEN SATURATION: 100 % | HEART RATE: 76 BPM | SYSTOLIC BLOOD PRESSURE: 116 MMHG | TEMPERATURE: 98 F

## 2021-09-02 DIAGNOSIS — C50.411 MALIGNANT NEOPLASM OF UPPER-OUTER QUADRANT OF RIGHT BREAST IN FEMALE, ESTROGEN RECEPTOR POSITIVE (HCC): Primary | ICD-10-CM

## 2021-09-02 DIAGNOSIS — J91.0 MALIGNANT PLEURAL EFFUSION: ICD-10-CM

## 2021-09-02 DIAGNOSIS — C78.7 LIVER METASTASIS (HCC): ICD-10-CM

## 2021-09-02 DIAGNOSIS — Q85.00 NEUROFIBROMATOSIS (HCC): ICD-10-CM

## 2021-09-02 DIAGNOSIS — R22.1 MASS OF RIGHT SIDE OF NECK: ICD-10-CM

## 2021-09-02 DIAGNOSIS — Z90.11 HISTORY OF RIGHT MASTECTOMY: ICD-10-CM

## 2021-09-02 DIAGNOSIS — G47.9 SLEEP DISORDER: ICD-10-CM

## 2021-09-02 DIAGNOSIS — M85.89 OSTEOPENIA OF MULTIPLE SITES: ICD-10-CM

## 2021-09-02 DIAGNOSIS — Z51.81 MEDICATION MONITORING ENCOUNTER: Primary | ICD-10-CM

## 2021-09-02 DIAGNOSIS — J91.0 MALIGNANT PLEURAL EFFUSION: Primary | ICD-10-CM

## 2021-09-02 DIAGNOSIS — Z51.81 MEDICATION MONITORING ENCOUNTER: ICD-10-CM

## 2021-09-02 DIAGNOSIS — Z17.0 MALIGNANT NEOPLASM OF UPPER-OUTER QUADRANT OF RIGHT BREAST IN FEMALE, ESTROGEN RECEPTOR POSITIVE (HCC): Primary | ICD-10-CM

## 2021-09-02 DIAGNOSIS — Z17.0 MALIGNANT NEOPLASM OF UPPER-OUTER QUADRANT OF RIGHT BREAST IN FEMALE, ESTROGEN RECEPTOR POSITIVE (HCC): ICD-10-CM

## 2021-09-02 DIAGNOSIS — C50.919 METASTATIC BREAST CANCER (HCC): ICD-10-CM

## 2021-09-02 DIAGNOSIS — C50.411 MALIGNANT NEOPLASM OF UPPER-OUTER QUADRANT OF RIGHT BREAST IN FEMALE, ESTROGEN RECEPTOR POSITIVE (HCC): ICD-10-CM

## 2021-09-02 LAB
ALBUMIN SERPL-MCNC: 3.5 G/DL (ref 3.4–5)
ALBUMIN/GLOB SERPL: 1.1 {RATIO} (ref 1–2)
ALP LIVER SERPL-CCNC: 60 U/L
ALT SERPL-CCNC: 29 U/L
ANION GAP SERPL CALC-SCNC: 3 MMOL/L (ref 0–18)
AST SERPL-CCNC: 17 U/L (ref 15–37)
BASOPHILS # BLD AUTO: 0.04 X10(3) UL (ref 0–0.2)
BASOPHILS NFR BLD AUTO: 1.6 %
BILIRUB SERPL-MCNC: 0.9 MG/DL (ref 0.1–2)
BUN BLD-MCNC: 19 MG/DL (ref 7–18)
BUN/CREAT SERPL: 18.3 (ref 10–20)
CALCIUM BLD-MCNC: 8.2 MG/DL (ref 8.5–10.1)
CHLORIDE SERPL-SCNC: 112 MMOL/L (ref 98–112)
CO2 SERPL-SCNC: 28 MMOL/L (ref 21–32)
CREAT BLD-MCNC: 1.04 MG/DL
DEPRECATED RDW RBC AUTO: 54.5 FL (ref 35.1–46.3)
EOSINOPHIL # BLD AUTO: 0.02 X10(3) UL (ref 0–0.7)
EOSINOPHIL NFR BLD AUTO: 0.8 %
ERYTHROCYTE [DISTWIDTH] IN BLOOD BY AUTOMATED COUNT: 14.6 % (ref 11–15)
GLOBULIN PLAS-MCNC: 3.1 G/DL (ref 2.8–4.4)
GLUCOSE BLD-MCNC: 91 MG/DL (ref 70–99)
HCT VFR BLD AUTO: 35.3 %
HGB BLD-MCNC: 11.9 G/DL
IMM GRANULOCYTES # BLD AUTO: 0.04 X10(3) UL (ref 0–1)
IMM GRANULOCYTES NFR BLD: 1.6 %
LYMPHOCYTES # BLD AUTO: 0.63 X10(3) UL (ref 1–4)
LYMPHOCYTES NFR BLD AUTO: 25.7 %
M PROTEIN MFR SERPL ELPH: 6.6 G/DL (ref 6.4–8.2)
MCH RBC QN AUTO: 34.2 PG (ref 26–34)
MCHC RBC AUTO-ENTMCNC: 33.7 G/DL (ref 31–37)
MCV RBC AUTO: 101.4 FL
MONOCYTES # BLD AUTO: 0.52 X10(3) UL (ref 0.1–1)
MONOCYTES NFR BLD AUTO: 21.2 %
NEUTROPHILS # BLD AUTO: 1.2 X10 (3) UL (ref 1.5–7.7)
NEUTROPHILS # BLD AUTO: 1.2 X10(3) UL (ref 1.5–7.7)
NEUTROPHILS NFR BLD AUTO: 49.1 %
OSMOLALITY SERPL CALC.SUM OF ELEC: 298 MOSM/KG (ref 275–295)
PLATELET # BLD AUTO: 234 10(3)UL (ref 150–450)
POTASSIUM SERPL-SCNC: 3.7 MMOL/L (ref 3.5–5.1)
RBC # BLD AUTO: 3.48 X10(6)UL
SODIUM SERPL-SCNC: 143 MMOL/L (ref 136–145)
WBC # BLD AUTO: 2.5 X10(3) UL (ref 4–11)

## 2021-09-02 PROCEDURE — 85025 COMPLETE CBC W/AUTO DIFF WBC: CPT

## 2021-09-02 PROCEDURE — 96402 CHEMO HORMON ANTINEOPL SQ/IM: CPT

## 2021-09-02 PROCEDURE — 99215 OFFICE O/P EST HI 40 MIN: CPT | Performed by: INTERNAL MEDICINE

## 2021-09-02 PROCEDURE — 80053 COMPREHEN METABOLIC PANEL: CPT

## 2021-09-02 PROCEDURE — 36415 COLL VENOUS BLD VENIPUNCTURE: CPT

## 2021-09-02 RX ORDER — LAMOTRIGINE 25 MG/1
500 TABLET ORAL ONCE
Status: COMPLETED | OUTPATIENT
Start: 2021-09-02 | End: 2021-09-02

## 2021-09-02 RX ORDER — LORAZEPAM 0.5 MG/1
0.5 TABLET ORAL EVERY 6 HOURS PRN
Qty: 30 TABLET | Refills: 1 | Status: SHIPPED | OUTPATIENT
Start: 2021-09-02

## 2021-09-02 RX ADMIN — LAMOTRIGINE 500 MG: 25 TABLET ORAL at 09:29:00

## 2021-09-02 NOTE — PROGRESS NOTES
HPI   9/2/2021  Alisia Boas is a 62year old female with metastatic breast cancer as documented by biopsies of the right neck mass, cytology from left pleural fluid, and liver biopsy. Patient also had multiple osseous lesions.   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        Oncology History Overview Note   10/1/2020 genetic testing 35 genes studied [facility] panel through Myr of 5 sampled with the largest focus measuring 1.4 cm with extracapsular extension seen in 1 lymph node     11/16/2007 Cancer Staged    pT1 N1 M0 Stage IIA  10/3/2007 Bone scan - no definite metastasis, mild plantar fasciitis left foot, traction injuries ar paraffin-embedded tissue:      Estrogen receptor (Leica, monoclonal, clone 6 F11) status:  98%  (Positive, strong intensity)  Progesterone receptor (Leica, monoclonal, clone 16) status:  < 1%  (Negative)  HER-2/nelson (Leica, monoclonal, clone CB11) status: which has also decreased in extent   since prior and is likely malignant. 3. Multifocal interlobular septal thickening throughout the left greater than right lung.   There is an additional ill-defined 4 cm opacity in the left upper lobe, which has decreas pelvis  No clear progression       Review of Systems:     Review of Systems   Constitutional: Positive for fatigue. Negative for chills and fever.         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 30 tablet 3   • zolpidem 5 MG Oral Tab Take one or two tablets at bedtime.  60 tablet 3   • HYDROcodone-acetaminophen  MG Oral Tab Take 1-2 tablets by mouth ever • Other osteoporosis without current pathological fracture 2/2/2021   • Postmenopausal bleeding 7/7/2014   • Psychophysiological insomnia 8/5/2021   • Tumor, foot     tumor right foot / excision/excision of bone spur/arthrodesis w/screw fixation     Past Insecurity:       Worried About [employer] [employer] in the Last Year:       Ran Out of Food in the Last Year:   Transportation Needs:       Lack of Transportation (Medical):       Lack of Transportation (Non-Medical):   Physical Activity:       Days of Exe and nursing note reviewed. Constitutional:       General: She is not in acute distress. Appearance: She is well-developed. HENT:      Head: Normocephalic and atraumatic. Mouth/Throat:      Pharynx: No oropharyngeal exudate.    Eyes:      Eris Benedict ASSESSMENT/PLAN:   Malignant neoplasm of upper-outer quadrant of right breast in female, estrogen receptor positive (hcc)  (primary encounter diagnosis)  Metastatic breast cancer (hcc)  Liver metastasis (hcc)  Malignant pleural effusion  Osteopen 8.    Patient has had neutropenia with palbociclib and therapy has previously been delayed 1 to 2 weeks based on the laboratory results.      Patient's repeat CT scan chest abdomen and pelvis to document the current status of her disease although clinically nightly as needed for sleep. Small dose of lorazepam was added. Inez Jarvis, , will see patient today regarding Social Security and insurance.       Results:    Component      Latest Ref Rng & Units 9/2/2021 8/5/2021 7/8/2021   WBC OSMOLALITY      275 - 295 mOsm/kg 298 (H) 293    eGFR NON-AFR.  AMERICAN      >=60 59 (L) 57 (L)    eGFR       >=60 67 66    ALT (SGPT)      13 - 56 U/L 29 22    AST (SGOT)      15 - 37 U/L 17 13 (L)    ALKALINE PHOSPHATASE      46 - 118 U/L =======================================================  5/6/2021 removal of Pleurx catheter per IR  =======================================================  5/6/2021 portable chest x-ray single view  1.  Extensive pleural parenchymal abnormality mid and thickening with some mild patchy ground-glass opacity.  Findings suggest interstitial edema although lymphangitis spread of tumor remains in the differential.  Attention on follow-up imaging.     No definite new suspicious pulmonary nodule or adenopathy in 1.5 x 1.4 cm in the left inferior isthmus. Some nodules demonstrate marginal calcification. No significant interval change since prior. Submandibular   and parotid glands appear unremarkable.   LYMPH NODES:            Soft tissue fullness in the right wong (series 6, image 24),   which is likely retrospectively unchanged. Likewise, an additional right upper lobe subpleural micronodule (series 6, image 38), also unchanged.   Stable additional subpleural micronodule in the right upper lobe (series 6, image 49) aspect of the L4 vertebral body (series 11, image 58). There is also a   reference 0.8 cm sclerotic focus within the anterior aspect of the T11 vertebral body (series 11, image 57).   There is also a reference 1 cm sclerotic focus within the right upper L3 likely slightly improved since prior with no definite progression. Continued close attention on   anticipated follow-up is advised.   4. Small sclerotic foci throughout the thoracic and lumbar spine, which measure 1 cm or less in size, but are new since pr effusion.  =================================================  9/18/2020 CT soft tissue of the neck     1. Development of a spiculated soft tissue density in the right supraclavicular region extending to the lower right side of the neck, as detailed above. list above    Imaging & Referrals: We will delay additional imaging pending symptom change or laboratory change.

## 2021-09-13 ENCOUNTER — IMMUNIZATION (OUTPATIENT)
Dept: LAB | Facility: HOSPITAL | Age: 61
End: 2021-09-13
Attending: EMERGENCY MEDICINE
Payer: OTHER GOVERNMENT

## 2021-09-13 DIAGNOSIS — Z23 NEED FOR VACCINATION: Primary | ICD-10-CM

## 2021-09-13 PROCEDURE — 0013A SARSCOV2 VAC 100MCG/0.5ML IM: CPT

## 2021-09-14 ENCOUNTER — HOSPITAL ENCOUNTER (OUTPATIENT)
Dept: MAMMOGRAPHY | Facility: HOSPITAL | Age: 61
Discharge: HOME | End: 2021-09-14
Attending: INTERNAL MEDICINE
Payer: COMMERCIAL

## 2021-09-14 ENCOUNTER — HOSPITAL ENCOUNTER (OUTPATIENT)
Dept: ULTRASOUND IMAGING | Facility: HOSPITAL | Age: 61
Discharge: HOME | End: 2021-09-14
Attending: INTERNAL MEDICINE
Payer: COMMERCIAL

## 2021-09-14 DIAGNOSIS — Q85.00 CLINICAL NEUROFIBROMATOSIS (HCC): ICD-10-CM

## 2021-09-14 DIAGNOSIS — C50.919 METASTATIC BREAST CANCER (HCC): ICD-10-CM

## 2021-09-14 DIAGNOSIS — Z17.0 MALIGNANT NEOPLASM OF UPPER-OUTER QUADRANT OF RIGHT BREAST IN FEMALE, ESTROGEN RECEPTOR POSITIVE (HCC): ICD-10-CM

## 2021-09-14 DIAGNOSIS — R93.89 ABNORMAL IMAGING OF THYROID: ICD-10-CM

## 2021-09-14 DIAGNOSIS — C50.411 MALIGNANT NEOPLASM OF UPPER-OUTER QUADRANT OF RIGHT BREAST IN FEMALE, ESTROGEN RECEPTOR POSITIVE (HCC): ICD-10-CM

## 2021-09-14 PROCEDURE — 77063 BREAST TOMOSYNTHESIS BI: CPT | Performed by: INTERNAL MEDICINE

## 2021-09-14 PROCEDURE — 77067 SCR MAMMO BI INCL CAD: CPT | Performed by: INTERNAL MEDICINE

## 2021-09-14 PROCEDURE — 76536 US EXAM OF HEAD AND NECK: CPT | Performed by: INTERNAL MEDICINE

## 2021-09-16 ENCOUNTER — HOSPITAL ENCOUNTER (OUTPATIENT)
Dept: CT IMAGING | Facility: HOSPITAL | Age: 61
Discharge: HOME | End: 2021-09-16
Attending: INTERNAL MEDICINE
Payer: COMMERCIAL

## 2021-09-16 DIAGNOSIS — C50.919 METASTATIC BREAST CANCER (HCC): ICD-10-CM

## 2021-09-16 PROCEDURE — 71260 CT THORAX DX C+: CPT | Performed by: INTERNAL MEDICINE

## 2021-09-16 PROCEDURE — 74177 CT ABD & PELVIS W/CONTRAST: CPT | Performed by: INTERNAL MEDICINE

## 2021-09-20 RX ORDER — PALBOCICLIB 125 MG/1
TABLET, FILM COATED ORAL
Qty: 21 TABLET | Refills: 0 | Status: SHIPPED | OUTPATIENT
Start: 2021-09-20 | End: 2022-01-10

## 2021-09-24 ENCOUNTER — TELEPHONE (OUTPATIENT)
Dept: HEMATOLOGY/ONCOLOGY | Facility: HOSPITAL | Age: 61
End: 2021-09-24

## 2021-09-28 ENCOUNTER — TELEPHONE (OUTPATIENT)
Dept: HEMATOLOGY/ONCOLOGY | Facility: HOSPITAL | Age: 61
End: 2021-09-28

## 2021-09-28 NOTE — TELEPHONE ENCOUNTER
09/28: [facility] from 03/01/2021 to present. XIOMARA sent these records via [employer] Fax to [phone], #[id number]. Will monitor printout tracking to verify completion.   --  09/29: Right Fax indicate

## 2021-09-29 ENCOUNTER — TELEPHONE (OUTPATIENT)
Dept: PULMONOLOGY | Facility: CLINIC | Age: 61
End: 2021-09-29

## 2021-09-29 DIAGNOSIS — Z51.81 MEDICATION MONITORING ENCOUNTER: Primary | ICD-10-CM

## 2021-09-29 RX ORDER — LAMOTRIGINE 25 MG/1
500 TABLET ORAL ONCE
Status: CANCELLED | OUTPATIENT
Start: 2021-09-30

## 2021-09-29 RX ORDER — 0.9 % SODIUM CHLORIDE 0.9 %
10 VIAL (ML) INJECTION ONCE
Status: CANCELLED | OUTPATIENT
Start: 2021-09-29

## 2021-09-29 RX ORDER — HEPARIN SODIUM (PORCINE) LOCK FLUSH IV SOLN 100 UNIT/ML 100 UNIT/ML
5 SOLUTION INTRAVENOUS ONCE
Status: CANCELLED | OUTPATIENT
Start: 2021-09-29

## 2021-09-29 NOTE — TELEPHONE ENCOUNTER
Received fax from [id number] [facility] #[id number]. Emailed fax to Forms Dept.   Original in [id number] SASCHA folder

## 2021-09-30 ENCOUNTER — OFFICE VISIT (OUTPATIENT)
Dept: HEMATOLOGY/ONCOLOGY | Facility: HOSPITAL | Age: 61
End: 2021-09-30
Attending: INTERNAL MEDICINE
Payer: COMMERCIAL

## 2021-09-30 VITALS
HEART RATE: 74 BPM | OXYGEN SATURATION: 99 % | HEIGHT: 61 IN | RESPIRATION RATE: 16 BRPM | BODY MASS INDEX: 25.49 KG/M2 | DIASTOLIC BLOOD PRESSURE: 79 MMHG | TEMPERATURE: 99 F | SYSTOLIC BLOOD PRESSURE: 121 MMHG | WEIGHT: 135 LBS

## 2021-09-30 DIAGNOSIS — C50.411 MALIGNANT NEOPLASM OF UPPER-OUTER QUADRANT OF RIGHT BREAST IN FEMALE, ESTROGEN RECEPTOR POSITIVE (HCC): Primary | ICD-10-CM

## 2021-09-30 DIAGNOSIS — F51.04 PSYCHOPHYSIOLOGICAL INSOMNIA: ICD-10-CM

## 2021-09-30 DIAGNOSIS — Z51.81 MEDICATION MONITORING ENCOUNTER: ICD-10-CM

## 2021-09-30 DIAGNOSIS — R22.1 MASS OF RIGHT SIDE OF NECK: ICD-10-CM

## 2021-09-30 DIAGNOSIS — Z45.2 ENCOUNTER FOR ADJUSTMENT AND MANAGEMENT OF VASCULAR ACCESS DEVICE: ICD-10-CM

## 2021-09-30 DIAGNOSIS — Z17.0 MALIGNANT NEOPLASM OF UPPER-OUTER QUADRANT OF RIGHT BREAST IN FEMALE, ESTROGEN RECEPTOR POSITIVE (HCC): Primary | ICD-10-CM

## 2021-09-30 DIAGNOSIS — C78.7 LIVER METASTASIS (HCC): ICD-10-CM

## 2021-09-30 DIAGNOSIS — Q85.00 CLINICAL NEUROFIBROMATOSIS (HCC): ICD-10-CM

## 2021-09-30 DIAGNOSIS — Z17.0 MALIGNANT NEOPLASM OF UPPER-OUTER QUADRANT OF RIGHT BREAST IN FEMALE, ESTROGEN RECEPTOR POSITIVE (HCC): ICD-10-CM

## 2021-09-30 DIAGNOSIS — C50.411 MALIGNANT NEOPLASM OF UPPER-OUTER QUADRANT OF RIGHT BREAST IN FEMALE, ESTROGEN RECEPTOR POSITIVE (HCC): ICD-10-CM

## 2021-09-30 DIAGNOSIS — M81.8 OTHER OSTEOPOROSIS WITHOUT CURRENT PATHOLOGICAL FRACTURE: ICD-10-CM

## 2021-09-30 DIAGNOSIS — J91.0 MALIGNANT PLEURAL EFFUSION: ICD-10-CM

## 2021-09-30 DIAGNOSIS — J91.0 MALIGNANT PLEURAL EFFUSION: Primary | ICD-10-CM

## 2021-09-30 DIAGNOSIS — Z90.11 HISTORY OF RIGHT MASTECTOMY: ICD-10-CM

## 2021-09-30 DIAGNOSIS — R93.89 ABNORMAL IMAGING OF THYROID: ICD-10-CM

## 2021-09-30 DIAGNOSIS — C50.919 METASTATIC BREAST CANCER (HCC): ICD-10-CM

## 2021-09-30 DIAGNOSIS — C79.9 METASTASIS FROM BREAST CANCER (HCC): ICD-10-CM

## 2021-09-30 DIAGNOSIS — C50.919 METASTASIS FROM BREAST CANCER (HCC): ICD-10-CM

## 2021-09-30 DIAGNOSIS — M85.89 OSTEOPENIA OF MULTIPLE SITES: ICD-10-CM

## 2021-09-30 LAB
ALBUMIN SERPL-MCNC: 3.3 G/DL (ref 3.4–5)
ALBUMIN/GLOB SERPL: 1 {RATIO} (ref 1–2)
ALP LIVER SERPL-CCNC: 48 U/L
ALT SERPL-CCNC: 15 U/L
ANION GAP SERPL CALC-SCNC: 6 MMOL/L (ref 0–18)
AST SERPL-CCNC: 14 U/L (ref 15–37)
BASOPHILS # BLD AUTO: 0.04 X10(3) UL (ref 0–0.2)
BASOPHILS NFR BLD AUTO: 2.4 %
BILIRUB SERPL-MCNC: 1 MG/DL (ref 0.1–2)
BUN BLD-MCNC: 20 MG/DL (ref 7–18)
BUN/CREAT SERPL: 22.7 (ref 10–20)
CALCIUM BLD-MCNC: 8.8 MG/DL (ref 8.5–10.1)
CHLORIDE SERPL-SCNC: 110 MMOL/L (ref 98–112)
CO2 SERPL-SCNC: 25 MMOL/L (ref 21–32)
CREAT BLD-MCNC: 0.88 MG/DL
DEPRECATED RDW RBC AUTO: 53.8 FL (ref 35.1–46.3)
EOSINOPHIL # BLD AUTO: 0.04 X10(3) UL (ref 0–0.7)
EOSINOPHIL NFR BLD AUTO: 2.4 %
ERYTHROCYTE [DISTWIDTH] IN BLOOD BY AUTOMATED COUNT: 14.4 % (ref 11–15)
GLOBULIN PLAS-MCNC: 3.2 G/DL (ref 2.8–4.4)
GLUCOSE BLD-MCNC: 74 MG/DL (ref 70–99)
HCT VFR BLD AUTO: 32.8 %
HGB BLD-MCNC: 11 G/DL
IMM GRANULOCYTES # BLD AUTO: 0.01 X10(3) UL (ref 0–1)
IMM GRANULOCYTES NFR BLD: 0.6 %
LYMPHOCYTES # BLD AUTO: 0.61 X10(3) UL (ref 1–4)
LYMPHOCYTES NFR BLD AUTO: 36.1 %
MCH RBC QN AUTO: 34 PG (ref 26–34)
MCHC RBC AUTO-ENTMCNC: 33.5 G/DL (ref 31–37)
MCV RBC AUTO: 101.2 FL
MONOCYTES # BLD AUTO: 0.33 X10(3) UL (ref 0.1–1)
MONOCYTES NFR BLD AUTO: 19.5 %
NEUTROPHILS # BLD AUTO: 0.66 X10 (3) UL (ref 1.5–7.7)
NEUTROPHILS # BLD AUTO: 0.66 X10(3) UL (ref 1.5–7.7)
NEUTROPHILS NFR BLD AUTO: 39 %
OSMOLALITY SERPL CALC.SUM OF ELEC: 293 MOSM/KG (ref 275–295)
PLATELET # BLD AUTO: 151 10(3)UL (ref 150–450)
POTASSIUM SERPL-SCNC: 3.7 MMOL/L (ref 3.5–5.1)
PROT SERPL-MCNC: 6.5 G/DL (ref 6.4–8.2)
RBC # BLD AUTO: 3.24 X10(6)UL
SODIUM SERPL-SCNC: 141 MMOL/L (ref 136–145)
WBC # BLD AUTO: 1.7 X10(3) UL (ref 4–11)

## 2021-09-30 PROCEDURE — 85025 COMPLETE CBC W/AUTO DIFF WBC: CPT

## 2021-09-30 PROCEDURE — 99214 OFFICE O/P EST MOD 30 MIN: CPT | Performed by: INTERNAL MEDICINE

## 2021-09-30 PROCEDURE — 96402 CHEMO HORMON ANTINEOPL SQ/IM: CPT

## 2021-09-30 PROCEDURE — 36591 DRAW BLOOD OFF VENOUS DEVICE: CPT

## 2021-09-30 PROCEDURE — 80053 COMPREHEN METABOLIC PANEL: CPT

## 2021-09-30 PROCEDURE — 85060 BLOOD SMEAR INTERPRETATION: CPT

## 2021-09-30 RX ORDER — LAMOTRIGINE 25 MG/1
500 TABLET ORAL ONCE
Status: COMPLETED | OUTPATIENT
Start: 2021-09-30 | End: 2021-09-30

## 2021-09-30 RX ORDER — HEPARIN SODIUM (PORCINE) LOCK FLUSH IV SOLN 100 UNIT/ML 100 UNIT/ML
5 SOLUTION INTRAVENOUS ONCE
Status: COMPLETED | OUTPATIENT
Start: 2021-09-30 | End: 2021-09-30

## 2021-09-30 RX ORDER — 0.9 % SODIUM CHLORIDE 0.9 %
10 VIAL (ML) INJECTION ONCE
Status: CANCELLED | OUTPATIENT
Start: 2021-09-30

## 2021-09-30 RX ORDER — SODIUM CHLORIDE 9 MG/ML
INJECTION INTRAVENOUS
Status: DISCONTINUED
Start: 2021-09-30 | End: 2021-09-30

## 2021-09-30 RX ORDER — HEPARIN SODIUM (PORCINE) LOCK FLUSH IV SOLN 100 UNIT/ML 100 UNIT/ML
5 SOLUTION INTRAVENOUS ONCE
Status: CANCELLED | OUTPATIENT
Start: 2021-09-30

## 2021-09-30 RX ADMIN — LAMOTRIGINE 500 MG: 25 TABLET ORAL at 09:11:00

## 2021-09-30 RX ADMIN — HEPARIN SODIUM (PORCINE) LOCK FLUSH IV SOLN 100 UNIT/ML 500 UNITS: 100 SOLUTION INTRAVENOUS at 09:00:00

## 2021-09-30 NOTE — PROGRESS NOTES
HPI   9/30/2021  Junior Holliday is a 62year old female with metastatic breast cancer as documented by biopsies of the right neck mass, cytology from left pleural fluid, and liver biopsy.   Patient also had multiple sclerotic osseous lesions per CT scan wing for reconstruction of her right ankle. Patient had the MRI of bilateral breast (implants) which suggested intracapsular rupture of the right implant on 1/15/2021. She is not having increased discomfort.       Patient has a history of neurofibromato reconstruction with a tissue expander -2 separate foci of microinvasive ductal carcinoma, moderately differentiated measuring 1 mm in maximum dimension, residual multifocal ductal carcinoma in situ solid and cribriform with multiple areas of comedonecrosis evaluation  General Category: Positive for malignancy  Diagnosis:  · Cytomorphologic features consistent with metastatic adenocarcinoma, compatible with breast primary      Biopsy    Tumor Markers by Immunostaining (Polymer based detection method) using th degree of favorable interval treatment response with respect to   biopsy-proven right supraclavicular and liver metastases. 2. Small to moderate left with a left PleurX catheter. This is most likely malignant in nature.   Pleural fluid has decreased in e suboptimally assessed on this exam.   8. Thyroid nodules, which appear similar in comparison to prior. 9. Small hiatal hernia. 10. Colonic diverticulosis. 11. Dominant 3 cm uterine fundal fibroid.    Repeat CT scans chest abdomen and pelvis 4/16/2021 DAYS ,EVERY  35 DAY CYCLE 21 tablet 0   • LORazepam 0.5 MG Oral Tab Take 1 tablet (0.5 mg total) by mouth every 6 (six) hours as needed for Anxiety.  30 tablet 1   • MIRTAZAPINE 15 MG Oral Tab TAKE 1 TABLET BY MOUTH EVERY DAY AT NIGHT 90 tablet 1   • zolpid HYSTEROSCOPY / D&C / endomentrial biopsy (08/28/2007)   • [facility] 10/6/2020   • Neurofibromatosis ([facility]    • Osteopenia of multiple sites 12/25/2020   • Osteopenia of multiple sites 2/2/2021   • Osteoporosis screening 8/2/13   • Oste Back Care: Not Asked        Exercise: Not Asked        Bike Helmet: Not Asked        Seat Belt: Not Asked        Self-Exams: Not Asked    Social History Narrative      Not on file    Social Determinants of Health  Financial Resource Strain:       Diffi NF-1   • Cancer Maternal Uncle         bladder ca   • Breast Cancer Maternal Cousin Female    • Cancer Maternal Cousin Female         leukemia;  childhood   • Cancer Paternal Cousin Male         throat ca     PHYSICAL EXAM:   Wt Readings from Last tenderness. There is no guarding. Musculoskeletal:      Cervical back: Normal range of motion and neck supple.       Comments: Right ankle deformity - extensive surgical scars -limited mobility of ankle  (Partial fusion of the right foot - 5 surgeries wit Patient's repeat CT scan chest abdomen pelvis 7/21/2021 reveals essentially stable disease. Repeat CT scan chest abdomen and pelvis 9/16/2021 was reviewed with the patient today and is significant for positive response to current treatment.     Patient was lesions 0.8 cm at L4 and the aspect of T11. She has not had evidence for lytic lesions. Patient is a candidate for ongoing treatment with zoledronic acid.   She would be a candidate for 5 mg treatment every 12 months based on her bone density study but mo Basophils %      % 2.4 1.6 1.8   Immature Granulocyte %      % 0.6 1.6 0.4   Glucose      70 - 99 mg/dL 74 91 81   Sodium      136 - 145 mmol/L 141 143 140   Potassium      3.5 - 5.1 mmol/L 3.7 3.7 4.0   Chloride      98 - 112 mmol/L 110 112 109   Carbon intracapsular rupture.     9. Nonspecific wall thickening and mucosal enhancement in the rectum may be accentuated by underdistention, but is not well assessed by CT technique. 10. Distal colonic diverticulosis without CT evidence of acute complication. right lower lobe pulmonary artery is unchanged and suspicious for a chronic appearing pulmonary embolus.     Ill-defined soft tissue right supraclavicular mass is unchanged since the prior exams.     Diffuse interlobular septal thickening within the bilate reflect gluteal intramuscular injections. Otherwise no evidence of acute abnormality in the abdomen or pelvis.     Stable subcentimeter lesion anterior hepatic dome segment 8.     Colonic diverticulosis.     Multiple cutaneous nodules with similar findings fissure on the left. 3. Left chest tube. 4. Catheter in superior vena cava. 5. Tortuous aorta. 6. Borderline cardiomegaly.   ========================================================  2/12/2021 PA lateral chest x-ray  1.  Small left and trace right p C1 arch, normal variant. Multilevel cervical spine degenerative changes, which are most pronounced at C6-C7.   OTHER:             Stable left paramedian retro cerebellar arachnoid cyst.  Numerous cutaneous/subcutaneous nodules are noted throughout the neck current exam.  No suspicious new or enlarging liver lesions identified on this single   phase exam.  Main portal vein is patent. BILIARY: The gallbladder is present. PANCREAS: No lesion, fluid collection, ductal dilatation, or atrophy.     SPLEEN: No enla metastatic breast carcinoma. Ill-defined metastatic right supraclavicular mass persists, but has significantly decreased in size since prior neck CT. Likewise, an ill-defined subcentimeter right hepatic lobe lesion has   decreased in size since prior.   A branch   pulmonary embolism and is of uncertain clinical significance. 6. Numerous cutaneous and subcutaneous nodules throughout the imaged soft tissues of the neck, thorax, abdomen, and pelvis.   These appear similar in comparison to prior and most likely concerning for CHF/fluid overload. 5. Lesser incidental findings as above.  ======================================================  9 /18/2020 CT scan chest PE aorta IV only    1. No evidence of pulmonary embolism.  2. CHF with pulmonary interstitial edema

## 2021-09-30 NOTE — PROGRESS NOTES
Cbc,cmp drawn from port then sent to lab. Faslodex given in bilateral upper outer gluteal muscle. Tolerated injection well. Both sites cover gauze and paper tape/    Discharged back to lobby. Will see Dr Esther Lozano this morning.

## 2021-10-13 ENCOUNTER — TELEPHONE (OUTPATIENT)
Dept: HEMATOLOGY/ONCOLOGY | Facility: HOSPITAL | Age: 61
End: 2021-10-13

## 2021-10-13 NOTE — TELEPHONE ENCOUNTER
I went [place] on line and I was unable to do an authorization due to eligibility was termed 4/1/2021-10/11/2021, I looked up [place] and it showed [place] termed 6/7/4862-6/33/2895 and it showed it was COBRA. I called the patient and made her aware and I gave her the phone number on the back of the card for her to [place], she made me aware that she is on disability.  I gave her my number to call me back

## 2021-10-20 ENCOUNTER — TELEPHONE (OUTPATIENT)
Dept: HEMATOLOGY/ONCOLOGY | Facility: HOSPITAL | Age: 61
End: 2021-10-20

## 2021-10-21 ENCOUNTER — TELEPHONE (OUTPATIENT)
Dept: HEMATOLOGY/ONCOLOGY | Facility: HOSPITAL | Age: 61
End: 2021-10-21

## 2021-10-21 NOTE — TELEPHONE ENCOUNTER
10/14/2021   I received a voicemail this morning from the patient and she had [place] and she said they were doing an emergency update, and if I had any problems to let her know

## 2021-10-21 NOTE — TELEPHONE ENCOUNTER
10/15/2021    I went onto [employer]ove[employer]ity/BC/BS of [employer] and check her eligibility and it has not been updated, I called the patient and acknowledged that I received her phone message. I I told her that I would just try calling the eligibility number on her insurance card, I was on hold for 10 mins and the system stated that there was no one available at this time to answer the call. I told her I would keep looking on [employer] and her appointment is not until next Friday.

## 2021-10-21 NOTE — TELEPHONE ENCOUNTER
10/19/2021    I [place] today and the patient still shows in active, but I notice that the patient has [address] uninsured testing and treatment fund as her insurance. I called Joan Gonzalez CMSW to see if they were aware of this coverage and they were not. I sent a webex message/email to Emelia Huerta, financial assistance to see if she was aware and I will await her response.

## 2021-10-21 NOTE — TELEPHONE ENCOUNTER
10/21/2021    I received a call from the patient, she had not heard from [facility] and she wants to proceed with the Nuclear Medicine bone scan for tomorrow.

## 2021-10-21 NOTE — TELEPHONE ENCOUNTER
10/20/2021    I heard from Manoj Leo and she stated that there is no active insurance at this time, I asked her about the covid 19 coverage and that only applies to uninsured patient's who have covid 19. She gave me the information for financial assistance    she can go on line print and full out the application, and email it to [email or web address] she can print out the cover sheet and application    I called the patient and made her aware that there is no active coverage, she told me that she called [employer] [phone] and she spoke to [place] and she shared with me that she was told that she was covered through 12/31/2021. I tried calling [employer] for her and I spoke to [facility] and she stated that the patient would need to call, she would not give me any information.      I called the patient back and made her aware, she was going to call them

## 2021-10-21 NOTE — TELEPHONE ENCOUNTER
I gave her the number for price estimate and I spoke to Duke Trinidad and also Rosalba today. I gave her the number [phone]    I know Yuni Platt is working with [address] [place] [address] and also financial assistance to see if they can provide any services for this patient.

## 2021-10-21 NOTE — TELEPHONE ENCOUNTER
These are the conversations that I have had with this patient with regards to her insurance coverage:    10/13/2021 I went [place] and I was unable to do an authorization due to eligibility was termed 4/1/2021-10/11/2021, I looked up [place] and it showed [place] termed 2/1/9693-4/67/3348 and it showed it was COBRA. I called the patient and made her aware and I gave her the phone number on the back of the card for her [place], she made me aware that she is on disability.  I gave her my number to call me back

## 2021-10-21 NOTE — TELEPHONE ENCOUNTER
10/18/2021    I went [place] to [place] and it still shows inactive, I will call the patient and let her know

## 2021-10-21 NOTE — TELEPHONE ENCOUNTER
10/21/2021    I called the patient this morning to see if she heard anything from [facility] third party [employer], and she had not, I explained to her that I would need to cancel the NM bone scan by this afternoon, she will call this morning    I called price estimate [id number] and I spoke to Henry Rizzo and she gave me the estimated cost for the NM bone scan $1117.00 and the patient would be responsible for 25% $279.00 at the time of service. I called her back and she is on the phone with them and I asked her to call me back. Noemi/Karen were also asking about her insurance issues, she is scheduled on the October 28th and she will need to get in touch with [facility] to apply for Medicaid and also shared with her the information yesterday for financial assistance.  She is currently in treatment Jessie is a 8yo female with acute onset ataxia of unknown origin. 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 Opsoclonus-myoclonus was also discussed but I did not observe any involuntary movements of the eyes or limbs. We will have to see if further workup is helpful from an etiologic standpoint. With regard to her current function, we will continue to involve PT and OT to address strength, bed mobility, balance, gait, and independence and safety in daily functional tasks. Depending on the current workup and any potential findings, she may require ongoing therapy as an inpatient or outpatient. We will continue to follow closely in order to make further recommendations toward disposition.     Plan was reviewed with family as described above and all questions answered accordingly. Family demonstrated understanding of the therapy options and was in agreement with the treatment plan. Thank you for the consultation and pediatric PM&R will continue to follow the patient to monitor mobility, any pain, therapy intervention, and disposition planning. 10 yo F with ADHD, multiple food allergies and mild intermittent asthma presents with headache for the last 3 days associated with worsening tremors/shaking/unsteadiness. Exam positive for mild Dysmetria ; mild tremors b/l ; Shaking episodes with active movements and ataxic gait; unable to support her self. MRI brain normal.   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 Alonso   1) EEG   2) IR guided  for Cells, Protein glucose, Culture and PCR

## 2021-10-22 ENCOUNTER — HOSPITAL ENCOUNTER (OUTPATIENT)
Dept: NUCLEAR MEDICINE | Facility: HOSPITAL | Age: 61
Discharge: HOME | End: 2021-10-22
Attending: INTERNAL MEDICINE
Payer: COMMERCIAL

## 2021-10-22 DIAGNOSIS — C50.919 METASTASIS FROM BREAST CANCER (HCC): ICD-10-CM

## 2021-10-22 DIAGNOSIS — C79.9 METASTASIS FROM BREAST CANCER (HCC): ICD-10-CM

## 2021-10-22 PROCEDURE — 78306 BONE IMAGING WHOLE BODY: CPT | Performed by: INTERNAL MEDICINE

## 2021-10-26 DIAGNOSIS — C50.919 METASTASIS FROM BREAST CANCER (HCC): Primary | ICD-10-CM

## 2021-10-26 DIAGNOSIS — C79.9 METASTASIS FROM BREAST CANCER (HCC): Primary | ICD-10-CM

## 2021-10-26 DIAGNOSIS — Z51.81 MEDICATION MONITORING ENCOUNTER: ICD-10-CM

## 2021-10-28 ENCOUNTER — NURSE ONLY (OUTPATIENT)
Dept: HEMATOLOGY/ONCOLOGY | Facility: HOSPITAL | Age: 61
End: 2021-10-28
Attending: INTERNAL MEDICINE
Payer: COMMERCIAL

## 2021-10-28 VITALS
TEMPERATURE: 99 F | HEIGHT: 61 IN | BODY MASS INDEX: 26.06 KG/M2 | SYSTOLIC BLOOD PRESSURE: 126 MMHG | HEART RATE: 69 BPM | OXYGEN SATURATION: 99 % | RESPIRATION RATE: 16 BRPM | DIASTOLIC BLOOD PRESSURE: 78 MMHG | WEIGHT: 138 LBS

## 2021-10-28 DIAGNOSIS — Z51.81 MEDICATION MONITORING ENCOUNTER: ICD-10-CM

## 2021-10-28 DIAGNOSIS — J91.0 MALIGNANT PLEURAL EFFUSION: ICD-10-CM

## 2021-10-28 DIAGNOSIS — D70.1 CHEMOTHERAPY-INDUCED NEUTROPENIA (HCC): ICD-10-CM

## 2021-10-28 DIAGNOSIS — C50.919 METASTASIS FROM BREAST CANCER (HCC): Primary | ICD-10-CM

## 2021-10-28 DIAGNOSIS — C50.411 MALIGNANT NEOPLASM OF UPPER-OUTER QUADRANT OF RIGHT BREAST IN FEMALE, ESTROGEN RECEPTOR POSITIVE (HCC): Primary | ICD-10-CM

## 2021-10-28 DIAGNOSIS — M85.89 OSTEOPENIA OF MULTIPLE SITES: ICD-10-CM

## 2021-10-28 DIAGNOSIS — Q85.00 CLINICAL NEUROFIBROMATOSIS (HCC): ICD-10-CM

## 2021-10-28 DIAGNOSIS — C79.9 METASTASIS FROM BREAST CANCER (HCC): Primary | ICD-10-CM

## 2021-10-28 DIAGNOSIS — Z90.11 HISTORY OF RIGHT MASTECTOMY: ICD-10-CM

## 2021-10-28 DIAGNOSIS — C78.7 LIVER METASTASIS (HCC): ICD-10-CM

## 2021-10-28 DIAGNOSIS — D50.8 OTHER IRON DEFICIENCY ANEMIA: ICD-10-CM

## 2021-10-28 DIAGNOSIS — Z17.0 MALIGNANT NEOPLASM OF UPPER-OUTER QUADRANT OF RIGHT BREAST IN FEMALE, ESTROGEN RECEPTOR POSITIVE (HCC): Primary | ICD-10-CM

## 2021-10-28 DIAGNOSIS — R22.1 MASS OF RIGHT SIDE OF NECK: ICD-10-CM

## 2021-10-28 DIAGNOSIS — T45.1X5A CHEMOTHERAPY-INDUCED NEUTROPENIA (HCC): ICD-10-CM

## 2021-10-28 PROCEDURE — 36591 DRAW BLOOD OFF VENOUS DEVICE: CPT

## 2021-10-28 PROCEDURE — 96402 CHEMO HORMON ANTINEOPL SQ/IM: CPT

## 2021-10-28 PROCEDURE — 80053 COMPREHEN METABOLIC PANEL: CPT

## 2021-10-28 PROCEDURE — 85025 COMPLETE CBC W/AUTO DIFF WBC: CPT

## 2021-10-28 PROCEDURE — 99214 OFFICE O/P EST MOD 30 MIN: CPT | Performed by: INTERNAL MEDICINE

## 2021-10-28 PROCEDURE — 85060 BLOOD SMEAR INTERPRETATION: CPT

## 2021-10-28 RX ORDER — SODIUM CHLORIDE 9 MG/ML
INJECTION INTRAVENOUS
Status: DISCONTINUED
Start: 2021-10-28 | End: 2021-10-28

## 2021-10-28 RX ORDER — LAMOTRIGINE 25 MG/1
500 TABLET ORAL ONCE
Status: CANCELLED | OUTPATIENT
Start: 2021-10-29

## 2021-10-28 RX ORDER — LAMOTRIGINE 25 MG/1
500 TABLET ORAL ONCE
Status: COMPLETED | OUTPATIENT
Start: 2021-10-28 | End: 2021-10-28

## 2021-10-28 RX ADMIN — LAMOTRIGINE 500 MG: 25 TABLET ORAL at 09:10:00

## 2021-10-28 NOTE — PROGRESS NOTES
Encounter Date: 11/4/2019    SCRIBE #1 NOTE: I, Ortiz Perla, am scribing for, and in the presence of, Javier Case MD.       History     Chief Complaint   Patient presents with    Head Injury     Patient presents to the ED via  EMS from work. Patient reports having been carrying multiple heavy empty baking sheets that fell backwards while carrying them and hit her in the head. Patient reports having felt lightheaded but denies any LOC.      38 year-old female presents to the ED via EMS for evaluation. Patient was at work carrying multiple heavy empty baking sheets down steps when she slipped and fell backwards. The trays fell with her and struck her in the head. States she momentarily loss consciousness. She admits to light-headedness, headache, neck pain, blurred vision and nausea but denies any vomiting.        The history is provided by the patient. The history is limited by a language barrier. A  was used (SANDRA Bolaños).     Review of patient's allergies indicates:  Allergies not on file  No past medical history on file.  No past surgical history on file.  No family history on file.  Social History     Tobacco Use    Smoking status: Not on file   Substance Use Topics    Alcohol use: Not on file    Drug use: Not on file     Review of Systems   Constitutional: Negative for fever.   HENT: Negative for sore throat.    Eyes: Positive for visual disturbance (blurred).   Respiratory: Negative for shortness of breath.    Cardiovascular: Negative for chest pain.   Gastrointestinal: Positive for nausea. Negative for vomiting.   Genitourinary: Negative for dysuria.   Musculoskeletal: Positive for neck pain. Negative for back pain.   Skin: Negative for rash.   Neurological: Positive for light-headedness and headaches. Negative for syncope and weakness.   Hematological: Does not bruise/bleed easily.   All other systems reviewed and are negative.      Physical Exam     Initial Vitals [11/04/19 0634]  HPI   10/28/2021  Titi Chavez is a 62year old female with metastatic breast cancer as documented by biopsies of the right neck mass, cytology from left pleural fluid, and liver biopsy.   Patient also had multiple sclerotic osseous lesions per CT sca   BP Pulse Resp Temp SpO2   (!) 142/89 93 18 98.5 °F (36.9 °C) 98 %      MAP       --         Physical Exam    Nursing note and vitals reviewed.  Constitutional: She appears well-developed and well-nourished. No distress.   HENT:   Head: Normocephalic and atraumatic.   Eyes: EOM are normal. Pupils are equal, round, and reactive to light.   Pupils equal bilaterally   Neck: Neck supple.   C-collar in place   Cardiovascular: Normal rate, regular rhythm, normal heart sounds and intact distal pulses.   Pulmonary/Chest: Breath sounds normal. No respiratory distress. She has no wheezes.   Lungs clear to auscultation   Abdominal: Soft. She exhibits no distension. There is no tenderness.   Musculoskeletal: Normal range of motion. She exhibits tenderness. She exhibits no edema.   Midline cervical vertebral tenderness.  No deformities.   No obvious injuries   Neurological: She is alert and oriented to person, place, and time. GCS score is 15. GCS eye subscore is 4. GCS verbal subscore is 5. GCS motor subscore is 6.   Skin: Skin is warm and dry.         ED Course   Procedures  Labs Reviewed   POCT URINE PREGNANCY          Imaging Results          CT Head Without Contrast (Final result)  Result time 11/04/19 08:09:36    Final result by Bebeto Hyatt MD (11/04/19 08:09:36)                 Impression:      No evidence of acute intracranial pathology.      Electronically signed by: Bebeto Hyatt MD  Date:    11/04/2019  Time:    08:09             Narrative:    EXAMINATION:  CT HEAD WITHOUT CONTRAST    CLINICAL HISTORY:  Head trauma, visual loss;    TECHNIQUE:  Low dose axial CT images obtained throughout the head without the use of intravenous contrast.  Axial, sagittal and coronal reconstructions were performed.    COMPARISON:  None.    FINDINGS:  Intracranial compartment:    Ventricles and sulci are normal in size for age without evidence of hydrocephalus.    The brain parenchyma appears within normal limits.  No parenchymal  of the left femoral neck. She will continue treatment to improve her bone density. Patient may require monthly zoledronic acid if bone lesions are documented on nuclear medicine bone scan and subsequently MRI scan of the thoracic and lumbar spine.   Ruben areas of washout suspicious for residual neoplasm at the margins.   8 x 7.3 mm enhancing mass superior to the lumpectomy site in the 12 o'clock position mid right breast suspicious contrast-enhancement indeterminate [facility])     11/16/2007 Surgery    Completio mass, hemorrhage, edema or major vascular distribution infarct.    No extra-axial blood or fluid collections.    Skull/extracranial contents (limited evaluation):    No fracture. Mastoid air cells are clear. Trace secretions in the paranasal sinuses.                               X-Ray Cervical Spine AP And Lateral (Final result)  Result time 11/04/19 07:37:47    Final result by Terry Rajput MD (11/04/19 07:37:47)                 Impression:      No radiographic evidence of acute fracture or traumatic subluxation of the cervical spine for      Electronically signed by: Terry Rajput MD  Date:    11/04/2019  Time:    07:37             Narrative:    EXAMINATION:  XR CERVICAL SPINE AP LATERAL    CLINICAL HISTORY:  fall;    TECHNIQUE:  AP, lateral and open mouth views of the cervical spine were performed.    COMPARISON:  None.    FINDINGS:  There is slight straightening of the normal cervical lordosis which may relate to patient positioning or muscle spasm.  Alignment otherwise appears within normal limits.  Cervical vertebral body heights appear appropriately maintained.  No definite acute displaced fracture identified.  Intervertebral disc space heights appear within normal limits.  Prevertebral soft tissues are unremarkable.                                 Medical Decision Making:   Clinical Tests:   Lab Tests: Reviewed and Ordered  Radiological Study: Reviewed and Ordered  ED Management:  38-year-old female who fell backwards striking her head on a hard floor.  Patient says she sustained a brief loss of consciousness therefore head CT was done, showing no abnormalities.  She also had neck pain. Cervical spine x-rays without evidence of fracture.  No signs of extremity injury. Patient will be discharged with a prescription for ibuprofen to take for pain and advised to follow up as needed.                      Clinical Impression:       ICD-10-CM ICD-9-CM   1. Injury of head, initial encounter S09.90XA 959.01    2. Fall, initial encounter W19.XXXA E888.9           Disposition:   Disposition: Discharged  Condition: Stable       I, Dr. Javier Rivera, personally performed the services described in this documentation. All medical record entries made by the scribe were at my direction and in my presence. I have reviewed the chart and agree that the record reflects my personal performance and is accurate and complete. Javier Rivera MD.  9:52 AM 11/04/2019                   Javier Rivera MD  11/04/19 0955     drainable subcutaneous abscess. 3. Stable multinodular thyroid gland. 4. Development of findings at the visualized lung apices concerning for CHF/fluid overload. 5. Lesser incidental findings as above.            9/23/2020 Biopsy    Left pleural fluid; decreased in size since prior neck CT. Likewise, an ill-defined subcentimeter right hepatic lobe lesion has   decreased in size since prior.   Additional previously noted ill-defined hypoenhancing left hepatic lobe is not definitively identified on current nodules throughout the imaged soft tissues of the neck, thorax, abdomen, and pelvis. These appear similar in comparison to prior and most likely relate to the patient's known neurofibromatosis. 7. Bilateral breast implants.   Intracapsular rupture of the zolpidem and mirtazapine  She does sleep well with cannabis snooze but it is expensive.   She did not think the lorazepam was helpful           Current Outpatient Medications   Medication Sig Dispense Refill   • IBRANCE 125 MG Oral Tab TAKE 1 TABLET BY HARPREET Malignant neoplasm of overlapping sites of breast in female, estrogen receptor positive ([facility] [address] 1/16/2015   • Malignant neoplasm of upper-outer quadrant of right breast in female, estrogen receptor positive ([facility] [address] 1/16/2015   • Malignant pleural effusion 9/25 Asked        Blood Transfusions: Not Asked        Caffeine Concern: Yes          tea        Occupational Exposure: Not Asked        Hobby Hazards: Not Asked        Sleep Concern: Not Asked        Stress Concern: Not Asked        Weight Concern: Not Asked Paternal Grandmother    • Breast Cancer Self 47   • Genetic Disease Self         NF-1   • Cancer Paternal Aunt         throat ca   • Cancer Maternal Uncle         prostate ca   • Genetic Disease Brother         NF-1   • Genetic Disease Brother         NF-1 are asymmetrical.      Right: No mass. Left: No inverted nipple, mass, nipple discharge or tenderness. Abdominal:      General: Bowel sounds are normal. There is no distension. Palpations: Abdomen is soft. There is no mass.       Tendernes scan of the soft tissue neck chest abdomen and pelvis 2/1/2021 documented a response to treatment although she had the persistent but left effusion. Patient had a follow-up PA and lateral chest x-ray 4/15/2021 prior to admission.   Patient's repeat CT scan symptoms. Patient has a history of neurofibromatosis. The right ankle discomfort has decreased since she is not standing and walking for long periods of time, but remains troublesome (first surgery at age 7).       Patient has a history of osteopenia do 7.70 x10(3) uL 0.95 (L) 0.66 (L)   Lymphocytes Absolute      1.00 - 4.00 x10(3) uL 0.48 (L) 0.61 (L)   Monocytes Absolute      0.10 - 1.00 x10(3) uL 0.19 0.33   Eosinophils Absolute      0.00 - 0.70 x10(3) uL 0.02 0.04   Basophils Absolute      0.00 - 0.20 Neutrophil Abs      1.50 - 7.70 x10 (3) uL 1.20 (L)   Neutrophils Absolute      1.50 - 7.70 x10(3) uL 1.20 (L)   Lymphocytes Absolute      1.00 - 4.00 x10(3) uL 0.63 (L)   Monocytes Absolute      0.10 - 1.00 x10(3) uL 0.52   Eosinophils Absolute      0.00 6. A previously seen peripheral filling defect in a right lower lobe pulmonary artery branch is not well seen on the current exam.     7.  Innumerable cutaneous and subcutaneous lesions, compatible with new fibromatosis.     8. Bilateral breast implants a mildly suspicious Greater than or equal to 1.5cm follow-up, greater than or equal to 2.5cm FNA: follow up: 1, 2, 3 and 5 years.  ======================================================  7/7/21/2021 CT scan chest abdomen pelvisSmall filling defect within the pneumothorax.  =======================================================  4/16/2021 CT abdomen pelvis  Bilateral of subcutaneous gluteal fat stranding as well as mild soft tissue emphysema within the bilateral gluteal musculature.  Findings are new since the sclerotic focus T11 vertebral body.   ========================================================  4/15/2021 PA lateral chest x-ray  1. Overall worsening at the left lung base from February 12, 2021 with increasing pleural and parenchymal opacification here. definite new or enlarging   cervical lymphadenopathy. VASCULATURE:            Limited views are notable for a diminutive right vertebral artery. SINUSES/MASTOIDS:   Limited views show no significant fluid or mucosal thickening.     BONES:             In implants with suspected intracapsular rupture on the right. Right chest port with tip in the superior vena cava. LIVER: Ill-defined hypoenhancing 0.7 cm segment VIII lesion, which previously measured 1.1 cm.   Additional segment II hypoenhancing lesion a bone islands. ABDOMINAL WALL: No mass or hernia is perceived. Innumerable cutaneous and subcutaneous nodular foci throughout the soft tissues of the chest and abdomen.   OTHER: No free air or fluid is seen in the abdomen or pelvis.    =====  CONCLUSION: active osseous metastases. 5. Possible subtle eccentric/peripheral filling defect within a right lower lobe subsegmental branch pulmonary artery (series 5, image 74).   This finding is favored to reflect either artifact from partial volume averaging or a c malignancy, this finding could represent new confluent yassine metastases. Cellulitis/phlegmon formation could have a similar appearance. 2. No drainable subcutaneous abscess. 3. Stable multinodular thyroid gland.  4. Development of findings at the visualize

## 2021-11-01 ENCOUNTER — TELEPHONE (OUTPATIENT)
Dept: HEMATOLOGY/ONCOLOGY | Facility: HOSPITAL | Age: 61
End: 2021-11-01

## 2021-11-01 NOTE — TELEPHONE ENCOUNTER
SW received paperwork from Prior Auth Team regarding the pt's ongoing disability claim and insurance coverage options. SW drafted the paperwork from the pt's recent office visits and hospitalizations. Paperwork provided to Hem/Onc APRN to review.     Stefani

## 2021-11-05 ENCOUNTER — TELEPHONE (OUTPATIENT)
Dept: HEMATOLOGY/ONCOLOGY | Facility: HOSPITAL | Age: 61
End: 2021-11-05

## 2021-11-05 NOTE — TELEPHONE ENCOUNTER
Called pt for assistance with forms for [facility] [place] disability. Forms completed and given back to SW.

## 2021-11-08 ENCOUNTER — TELEPHONE (OUTPATIENT)
Dept: HEMATOLOGY/ONCOLOGY | Facility: HOSPITAL | Age: 61
End: 2021-11-08

## 2021-11-08 NOTE — TELEPHONE ENCOUNTER
SW placed call to the pt regarding the pending financial application for the [facility] financial program. Pt has an appt on Thursday 11/11 and will complete her portion at that time. Pt is aware the paperwork is waiting at the  when she arrives.     Onc

## 2021-11-11 ENCOUNTER — TELEPHONE (OUTPATIENT)
Dept: HEMATOLOGY/ONCOLOGY | Facility: HOSPITAL | Age: 61
End: 2021-11-11

## 2021-11-11 ENCOUNTER — OFFICE VISIT (OUTPATIENT)
Dept: HEMATOLOGY/ONCOLOGY | Facility: HOSPITAL | Age: 61
End: 2021-11-11
Attending: INTERNAL MEDICINE
Payer: COMMERCIAL

## 2021-11-11 VITALS
HEIGHT: 61 IN | TEMPERATURE: 99 F | DIASTOLIC BLOOD PRESSURE: 85 MMHG | HEART RATE: 80 BPM | OXYGEN SATURATION: 98 % | BODY MASS INDEX: 26.43 KG/M2 | WEIGHT: 140 LBS | SYSTOLIC BLOOD PRESSURE: 143 MMHG | RESPIRATION RATE: 16 BRPM

## 2021-11-11 DIAGNOSIS — M85.89 OSTEOPENIA OF MULTIPLE SITES: ICD-10-CM

## 2021-11-11 DIAGNOSIS — C50.411 MALIGNANT NEOPLASM OF UPPER-OUTER QUADRANT OF RIGHT BREAST IN FEMALE, ESTROGEN RECEPTOR POSITIVE (HCC): Primary | ICD-10-CM

## 2021-11-11 DIAGNOSIS — E55.9 VITAMIN D DEFICIENCY: ICD-10-CM

## 2021-11-11 DIAGNOSIS — J91.0 MALIGNANT PLEURAL EFFUSION: ICD-10-CM

## 2021-11-11 DIAGNOSIS — T45.1X5A CHEMOTHERAPY-INDUCED NEUTROPENIA (HCC): ICD-10-CM

## 2021-11-11 DIAGNOSIS — C50.919 METASTATIC BREAST CANCER (HCC): ICD-10-CM

## 2021-11-11 DIAGNOSIS — Z90.11 HISTORY OF RIGHT MASTECTOMY: ICD-10-CM

## 2021-11-11 DIAGNOSIS — Z17.0 MALIGNANT NEOPLASM OF UPPER-OUTER QUADRANT OF RIGHT BREAST IN FEMALE, ESTROGEN RECEPTOR POSITIVE (HCC): Primary | ICD-10-CM

## 2021-11-11 DIAGNOSIS — Q85.00 NEUROFIBROMATOSIS (HCC): ICD-10-CM

## 2021-11-11 DIAGNOSIS — C78.7 LIVER METASTASIS (HCC): ICD-10-CM

## 2021-11-11 DIAGNOSIS — Z86.39 HISTORY OF IRON DEFICIENCY: ICD-10-CM

## 2021-11-11 DIAGNOSIS — Z17.0 MALIGNANT NEOPLASM OF UPPER-OUTER QUADRANT OF RIGHT BREAST IN FEMALE, ESTROGEN RECEPTOR POSITIVE (HCC): ICD-10-CM

## 2021-11-11 DIAGNOSIS — C50.411 MALIGNANT NEOPLASM OF UPPER-OUTER QUADRANT OF RIGHT BREAST IN FEMALE, ESTROGEN RECEPTOR POSITIVE (HCC): ICD-10-CM

## 2021-11-11 DIAGNOSIS — D70.1 CHEMOTHERAPY-INDUCED NEUTROPENIA (HCC): ICD-10-CM

## 2021-11-11 DIAGNOSIS — Z51.81 MEDICATION MONITORING ENCOUNTER: ICD-10-CM

## 2021-11-11 PROCEDURE — 85025 COMPLETE CBC W/AUTO DIFF WBC: CPT

## 2021-11-11 PROCEDURE — 82306 VITAMIN D 25 HYDROXY: CPT

## 2021-11-11 PROCEDURE — 99214 OFFICE O/P EST MOD 30 MIN: CPT | Performed by: INTERNAL MEDICINE

## 2021-11-11 PROCEDURE — 36415 COLL VENOUS BLD VENIPUNCTURE: CPT

## 2021-11-11 NOTE — TELEPHONE ENCOUNTER
SW met with the pt to review her portion of financial paperwork. Pt completed her portion, sent back to the Prior Auth team who will forward to [address] [place] [address] in [employer].    Pt also asking about obtaining her pension.  Pt directed to [facility]

## 2021-11-11 NOTE — TELEPHONE ENCOUNTER
Patient is asking for a call back from [facility] with questions regarding forms she was given to fill out.

## 2021-11-11 NOTE — PROGRESS NOTES
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   Patient also had multiple sclerotic osseous lesions per CT outer quadrant, 1.5 cm nodular density in the left upper outer quadrant  Ultrasound -highly suspicious areas in the right breast upper outer quadrant near the nipple     9/11/2007 Biopsy    Right breast aspirate and core biopsy -ductal carcinoma in situ, s 2/16/2008 Chemotherapy    Taxotere plus Cytoxan 4 cycles     4/4/2008 Surgery    Plastic surgery with removal of the right expander implant placement with silicone gel implant, left breast reconstruction with silicone implant, reduction of left nipple comp breast invasive ductal carcinoma with metastasis to three axillary lymph nodes (pT1,N1) ([id number]; 11/16/07).   More recent examination of the patient's left pleural effusion ([id number]; 9/23/20) demonstrated metastatic adenocarcinoma consistent with the pa prior with no definite progression. Continued close attention on   anticipated follow-up is advised. 4. Small sclerotic foci throughout the thoracic and lumbar spine, which measure 1 cm or less in size, but are new since prior CT imaging.   These most li some shortness of breath. She does not exercise in any other way. P 02 > 95% patient pulse Ox at home and here in the office   Cardiovascular: Negative for chest pain. Gastrointestinal: Negative for constipation, diarrhea, nausea and vomiting.         O (COMPAZINE) 10 mg tablet Take 1 tablet (10 mg total) by mouth every 6 (six) hours as needed for Nausea. 60 tablet 3   • Cholecalciferol (VITAMIN D3) 250 MCG (35759 UT) Oral Cap Take 1 capsule by mouth daily.      • atorvastatin 20 MG Oral Tab Take 20 mg by port of cath.    • MASTECTOMY RIGHT      2012   • [facility]  2007     Social History    Socioeconomic History      Marital status: Single      Spouse name: Not on file      Number of children: Not on file      Years of education: Not on file      Hi NF-1   • Genetic Disease Brother         NF-1   • Genetic Disease Brother         NF-1   • Cancer Maternal Uncle         bladder ca   • Breast Cancer Maternal Cousin Female    • Cancer Maternal Cousin Female         leukemia;  childhood   • Cancer [facility] distension. Palpations: Abdomen is soft. There is no mass. Tenderness: There is no abdominal tenderness. There is no guarding. Musculoskeletal:      Cervical back: Normal range of motion and neck supple.       Comments: Right ankle deformity - e a follow-up PA and lateral chest x-ray 4/15/2021 prior to admission. Patient's repeat CT scan chest abdomen pelvis 7/21/2021 revealed essentially stable disease.   Repeat CT scan chest abdomen and pelvis 9/16/2021 was reviewed with the patient  and was sig DEXA bone densitometry 2/1/2021 revealed mild osteopenia left total hip and mild osteoporosis of the left femoral neck. CT scans during her recent admission revealed sclerotic lesions 0.8 cm at L4 and the aspect of T11.   She has not had evidence for lytic Eosinophils Absolute      0.00 - 0.70 x10(3) uL 0.02 0.02   Basophils Absolute      0.00 - 0.20 x10(3) uL 0.04 0.02   Immature Granulocyte Absolute      0.00 - 1.00 x10(3) uL 0.02 0.01   Neutrophils %      % 53.8 56.9   Lymphocytes %      % 23.1 28.7   M 3. Decreasing left pleural effusion; the previously seen left PleurX catheter appears to have been removed.     4. Improving basilar pulmonary interstitial edema.     5.  Osteoblastic metastatic lesions predominantly involving the vertebral column appear Multinodular thyroid.     1.7 cm thyroid isthmus nodule corresponding with TR 3.  Recommend follow-up thyroid ultrasound in 12 months.     Additional smaller thyroid nodules which do not meet ACR criteria for follow-up imaging or fine-needle aspiration. IR  =======================================================  5/6/2021 portable chest x-ray single view  1. Extensive pleural parenchymal abnormality mid and lower left hemithorax has improved.    2. Left-sided pleural drain in place extends towards the apex lymphangitis spread of tumor remains in the differential.  Attention on follow-up imaging.     No definite new suspicious pulmonary nodule or adenopathy in the chest.  Ill-defined soft tissue right supraclavicular region does not appear to be significantly significant interval change since prior. Submandibular   and parotid glands appear unremarkable.   LYMPH NODES:            Soft tissue fullness in the right supraclavicular fossa has significantly decreased in extent, with residual now measuring approximat upper lobe subpleural micronodule (series 6, image 38), also unchanged. Stable additional subpleural micronodule in the right upper lobe (series 6, image 49). AIRWAYS:         The tracheobronchial tree is without central mass or obstructing lesion.   MEDI focus within the anterior aspect of the T11 vertebral body (series 11, image 57). There is also a reference 1 cm sclerotic focus within the right upper L3 vertebral body (series 10, image 42). Stable chronic deformity of the   right iliac wing.   Scleroti anticipated follow-up is advised. 4. Small sclerotic foci throughout the thoracic and lumbar spine, which measure 1 cm or less in size, but are new since prior CT imaging. These most likely represent intervally treated osseous micrometastases.   Conside spiculated soft tissue density in the right supraclavicular region extending to the lower right side of the neck, as detailed above.   There is involvement of the right scalene and sternocleidomastoid muscles with abutment of the right  common carotid arter pending symptom change or laboratory change.

## 2021-11-23 DIAGNOSIS — C50.411 MALIGNANT NEOPLASM OF UPPER-OUTER QUADRANT OF RIGHT BREAST IN FEMALE, ESTROGEN RECEPTOR POSITIVE (HCC): ICD-10-CM

## 2021-11-23 DIAGNOSIS — Z17.0 MALIGNANT NEOPLASM OF UPPER-OUTER QUADRANT OF RIGHT BREAST IN FEMALE, ESTROGEN RECEPTOR POSITIVE (HCC): ICD-10-CM

## 2021-11-23 DIAGNOSIS — Z51.81 MEDICATION MONITORING ENCOUNTER: Primary | ICD-10-CM

## 2021-11-24 RX ORDER — LAMOTRIGINE 25 MG/1
500 TABLET ORAL ONCE
Status: CANCELLED | OUTPATIENT
Start: 2021-11-27

## 2021-11-30 ENCOUNTER — NURSE ONLY (OUTPATIENT)
Dept: HEMATOLOGY/ONCOLOGY | Facility: HOSPITAL | Age: 61
End: 2021-11-30
Attending: INTERNAL MEDICINE
Payer: COMMERCIAL

## 2021-11-30 DIAGNOSIS — M81.8 OTHER OSTEOPOROSIS WITHOUT CURRENT PATHOLOGICAL FRACTURE: ICD-10-CM

## 2021-11-30 DIAGNOSIS — C50.919 METASTASIS FROM BREAST CANCER (HCC): Primary | ICD-10-CM

## 2021-11-30 DIAGNOSIS — Z86.39 HISTORY OF IRON DEFICIENCY: ICD-10-CM

## 2021-11-30 DIAGNOSIS — C50.919 METASTATIC BREAST CANCER (HCC): ICD-10-CM

## 2021-11-30 DIAGNOSIS — Z51.81 MEDICATION MONITORING ENCOUNTER: ICD-10-CM

## 2021-11-30 DIAGNOSIS — Z17.0 MALIGNANT NEOPLASM OF UPPER-OUTER QUADRANT OF RIGHT BREAST IN FEMALE, ESTROGEN RECEPTOR POSITIVE (HCC): ICD-10-CM

## 2021-11-30 DIAGNOSIS — Z45.2 ENCOUNTER FOR ADJUSTMENT AND MANAGEMENT OF VASCULAR ACCESS DEVICE: ICD-10-CM

## 2021-11-30 DIAGNOSIS — C50.411 MALIGNANT NEOPLASM OF UPPER-OUTER QUADRANT OF RIGHT BREAST IN FEMALE, ESTROGEN RECEPTOR POSITIVE (HCC): ICD-10-CM

## 2021-11-30 DIAGNOSIS — C79.9 METASTASIS FROM BREAST CANCER (HCC): Primary | ICD-10-CM

## 2021-11-30 PROCEDURE — 84466 ASSAY OF TRANSFERRIN: CPT

## 2021-11-30 PROCEDURE — 83540 ASSAY OF IRON: CPT

## 2021-11-30 PROCEDURE — 96402 CHEMO HORMON ANTINEOPL SQ/IM: CPT

## 2021-11-30 PROCEDURE — 85025 COMPLETE CBC W/AUTO DIFF WBC: CPT

## 2021-11-30 PROCEDURE — 85060 BLOOD SMEAR INTERPRETATION: CPT

## 2021-11-30 PROCEDURE — 36415 COLL VENOUS BLD VENIPUNCTURE: CPT

## 2021-11-30 PROCEDURE — 80053 COMPREHEN METABOLIC PANEL: CPT

## 2021-11-30 RX ORDER — HEPARIN SODIUM (PORCINE) LOCK FLUSH IV SOLN 100 UNIT/ML 100 UNIT/ML
5 SOLUTION INTRAVENOUS ONCE
Status: CANCELLED | OUTPATIENT
Start: 2021-11-30

## 2021-11-30 RX ORDER — HEPARIN SODIUM (PORCINE) LOCK FLUSH IV SOLN 100 UNIT/ML 100 UNIT/ML
5 SOLUTION INTRAVENOUS ONCE
Status: DISCONTINUED | OUTPATIENT
Start: 2021-11-30 | End: 2021-11-30

## 2021-11-30 RX ORDER — SODIUM CHLORIDE 9 MG/ML
INJECTION INTRAVENOUS
Status: DISCONTINUED
Start: 2021-11-30 | End: 2021-11-30 | Stop reason: WASHOUT

## 2021-11-30 RX ORDER — LAMOTRIGINE 25 MG/1
500 TABLET ORAL ONCE
Status: COMPLETED | OUTPATIENT
Start: 2021-11-30 | End: 2021-11-30

## 2021-11-30 RX ORDER — 0.9 % SODIUM CHLORIDE 0.9 %
10 VIAL (ML) INJECTION ONCE
OUTPATIENT
Start: 2021-11-30

## 2021-11-30 RX ADMIN — LAMOTRIGINE 500 MG: 25 TABLET ORAL at 07:42:00

## 2021-11-30 NOTE — PROGRESS NOTES
c16 faslodex administered. Suellen Holiday appeared to tolerate. No bleeding noted at injection sites which were covered with 2x2g and paper tape. Labs drawn and sent. Discharged stable.  Has appointments for December with lab and exam with ;missed exam for

## 2021-12-05 ENCOUNTER — TELEPHONE (OUTPATIENT)
Dept: HEMATOLOGY/ONCOLOGY | Facility: HOSPITAL | Age: 61
End: 2021-12-05

## 2021-12-06 NOTE — TELEPHONE ENCOUNTER
Call to patient   She is taking Vitamin D 6000 units daily and a multivitamin that contains 1 mg of folic acid  We will need to recheck her folate, B12 and vitamin D 25-hydroxy levels

## 2021-12-20 ENCOUNTER — TELEPHONE (OUTPATIENT)
Dept: HEMATOLOGY/ONCOLOGY | Facility: HOSPITAL | Age: 61
End: 2021-12-20

## 2021-12-20 NOTE — TELEPHONE ENCOUNTER
Called LM for Sandip Gilbert on her VM to call back - need to change provider for appointment on 12/23 asking her if she has any preference. Instructed to call back.

## 2021-12-21 ENCOUNTER — TELEPHONE (OUTPATIENT)
Dept: HEMATOLOGY/ONCOLOGY | Facility: HOSPITAL | Age: 61
End: 2021-12-21

## 2021-12-21 NOTE — TELEPHONE ENCOUNTER
Called patient moved appointment on 12/23 to see Dr. Fátima Best as Dr. Gustafson Lot not available. Patient stated understanding.

## 2021-12-22 DIAGNOSIS — Z51.81 MEDICATION MONITORING ENCOUNTER: Primary | ICD-10-CM

## 2021-12-23 ENCOUNTER — OFFICE VISIT (OUTPATIENT)
Dept: HEMATOLOGY/ONCOLOGY | Facility: HOSPITAL | Age: 61
End: 2021-12-23
Attending: INTERNAL MEDICINE
Payer: COMMERCIAL

## 2021-12-23 VITALS
SYSTOLIC BLOOD PRESSURE: 111 MMHG | HEART RATE: 72 BPM | RESPIRATION RATE: 16 BRPM | OXYGEN SATURATION: 97 % | DIASTOLIC BLOOD PRESSURE: 70 MMHG | WEIGHT: 143 LBS | TEMPERATURE: 99 F | HEIGHT: 61 IN | BODY MASS INDEX: 27 KG/M2

## 2021-12-23 DIAGNOSIS — Z90.11 HISTORY OF RIGHT MASTECTOMY: ICD-10-CM

## 2021-12-23 DIAGNOSIS — C50.919 METASTASIS FROM BREAST CANCER (HCC): ICD-10-CM

## 2021-12-23 DIAGNOSIS — Z17.0 MALIGNANT NEOPLASM OF UPPER-OUTER QUADRANT OF RIGHT BREAST IN FEMALE, ESTROGEN RECEPTOR POSITIVE (HCC): ICD-10-CM

## 2021-12-23 DIAGNOSIS — T45.1X5A CHEMOTHERAPY-INDUCED NEUTROPENIA (HCC): ICD-10-CM

## 2021-12-23 DIAGNOSIS — C79.9 METASTASIS FROM BREAST CANCER (HCC): ICD-10-CM

## 2021-12-23 DIAGNOSIS — Z17.0 MALIGNANT NEOPLASM OF UPPER-OUTER QUADRANT OF RIGHT BREAST IN FEMALE, ESTROGEN RECEPTOR POSITIVE (HCC): Primary | ICD-10-CM

## 2021-12-23 DIAGNOSIS — J91.0 MALIGNANT PLEURAL EFFUSION: ICD-10-CM

## 2021-12-23 DIAGNOSIS — R22.1 MASS OF RIGHT SIDE OF NECK: ICD-10-CM

## 2021-12-23 DIAGNOSIS — D70.1 CHEMOTHERAPY-INDUCED NEUTROPENIA (HCC): ICD-10-CM

## 2021-12-23 DIAGNOSIS — C50.411 MALIGNANT NEOPLASM OF UPPER-OUTER QUADRANT OF RIGHT BREAST IN FEMALE, ESTROGEN RECEPTOR POSITIVE (HCC): Primary | ICD-10-CM

## 2021-12-23 DIAGNOSIS — E78.00 PURE HYPERCHOLESTEROLEMIA: Primary | ICD-10-CM

## 2021-12-23 DIAGNOSIS — Z51.11 CHEMOTHERAPY MANAGEMENT, ENCOUNTER FOR: ICD-10-CM

## 2021-12-23 DIAGNOSIS — C50.411 MALIGNANT NEOPLASM OF UPPER-OUTER QUADRANT OF RIGHT BREAST IN FEMALE, ESTROGEN RECEPTOR POSITIVE (HCC): ICD-10-CM

## 2021-12-23 PROBLEM — Q85.00: Status: RESOLVED | Noted: 2020-09-21 | Resolved: 2021-12-23

## 2021-12-23 PROBLEM — S01.81XA FOREHEAD LACERATION, INITIAL ENCOUNTER: Status: RESOLVED | Noted: 2018-09-27 | Resolved: 2021-12-23

## 2021-12-23 PROBLEM — J90 CHRONIC BILATERAL PLEURAL EFFUSIONS: Status: RESOLVED | Noted: 2020-09-21 | Resolved: 2021-12-23

## 2021-12-23 PROBLEM — J90 PLEURAL EFFUSION: Status: RESOLVED | Noted: 2021-04-16 | Resolved: 2021-12-23

## 2021-12-23 PROBLEM — R05.9 COUGH: Status: RESOLVED | Noted: 2020-09-21 | Resolved: 2021-12-23

## 2021-12-23 PROBLEM — Z80.3 FAMILY HISTORY OF MALIGNANT NEOPLASM OF BREAST: Status: RESOLVED | Noted: 2020-10-01 | Resolved: 2021-12-23

## 2021-12-23 PROCEDURE — 83540 ASSAY OF IRON: CPT

## 2021-12-23 PROCEDURE — 82607 VITAMIN B-12: CPT

## 2021-12-23 PROCEDURE — 82746 ASSAY OF FOLIC ACID SERUM: CPT

## 2021-12-23 PROCEDURE — 80061 LIPID PANEL: CPT

## 2021-12-23 PROCEDURE — 36415 COLL VENOUS BLD VENIPUNCTURE: CPT

## 2021-12-23 PROCEDURE — 80053 COMPREHEN METABOLIC PANEL: CPT

## 2021-12-23 PROCEDURE — 82728 ASSAY OF FERRITIN: CPT

## 2021-12-23 PROCEDURE — 82306 VITAMIN D 25 HYDROXY: CPT | Performed by: INTERNAL MEDICINE

## 2021-12-23 PROCEDURE — 99215 OFFICE O/P EST HI 40 MIN: CPT | Performed by: INTERNAL MEDICINE

## 2021-12-23 PROCEDURE — 96402 CHEMO HORMON ANTINEOPL SQ/IM: CPT

## 2021-12-23 PROCEDURE — 85025 COMPLETE CBC W/AUTO DIFF WBC: CPT

## 2021-12-23 PROCEDURE — 84466 ASSAY OF TRANSFERRIN: CPT

## 2021-12-23 RX ORDER — LAMOTRIGINE 25 MG/1
500 TABLET ORAL ONCE
Status: CANCELLED | OUTPATIENT
Start: 2021-12-26

## 2021-12-23 RX ORDER — LAMOTRIGINE 25 MG/1
500 TABLET ORAL ONCE
Status: COMPLETED | OUTPATIENT
Start: 2021-12-23 | End: 2021-12-23

## 2021-12-23 RX ADMIN — LAMOTRIGINE 500 MG: 25 TABLET ORAL at 09:27:00

## 2021-12-23 NOTE — PROGRESS NOTES
Cancer Center Progress Note    Patient Name: Kris Meléndez   YOB: 1960   Medical Record Number: [id number]   Attending Physician: Belinda Khoury M.D.      Chief Complaint:  Breast cancer  Chemotherapy    Oncology History:  62year old history prostate ca   • Genetic Disease Brother         NF-1   • Genetic Disease Brother         NF-1   • Genetic Disease Brother         NF-1   • Cancer Maternal Uncle         bladder ca   • Breast Cancer Maternal Cousin Female    • Cancer Maternal Cousin Female Disp: , Rfl:     Allergies:  No Known Allergies     Review of Systems:  Oncology specific ROS negative except as per HPI    Vital Signs:  /70 (BP Location: Left arm, Patient Position: Sitting, Cuff Size: adult)   Pulse 72   Temp 98.8 °F (37.1 °C) (Or reviewed, stable, continue treatment as above    Ibrance induced neutropenia and mild TCP  - asymptomatic. She is on two week of schedule, will discuss lower dose at 100mg but q 21days with 7 day off schedule.      Macrocytosis- B12, folate on low side, she

## 2021-12-30 ENCOUNTER — TELEPHONE (OUTPATIENT)
Dept: HEMATOLOGY/ONCOLOGY | Facility: HOSPITAL | Age: 61
End: 2021-12-30

## 2021-12-30 NOTE — TELEPHONE ENCOUNTER
Nataliia Abrams calling say's her new ins with [employer] silver or gold plan will not cover Fulvestrant (FASLODEX IM) and she wants to know the cost out of pocket. azar

## 2021-12-30 NOTE — TELEPHONE ENCOUNTER
Patient had called earlier to ask a question regarding cost of medication and she was calling back to see if there were any answers yet

## 2022-01-06 ENCOUNTER — TELEPHONE (OUTPATIENT)
Dept: HEMATOLOGY/ONCOLOGY | Facility: HOSPITAL | Age: 62
End: 2022-01-06

## 2022-01-10 ENCOUNTER — TELEPHONE (OUTPATIENT)
Dept: HEMATOLOGY/ONCOLOGY | Facility: HOSPITAL | Age: 62
End: 2022-01-10

## 2022-01-10 NOTE — TELEPHONE ENCOUNTER
Called and let patient know new prescription was sent for Palbociclib 100mg daily for 21 days then stop for 7 days - repeat every 28 days.

## 2022-01-14 ENCOUNTER — TELEPHONE (OUTPATIENT)
Dept: HEMATOLOGY/ONCOLOGY | Facility: HOSPITAL | Age: 62
End: 2022-01-14

## 2022-01-14 NOTE — TELEPHONE ENCOUNTER
Called patient she is to start her next cycle of Palbociclib 1/19 she will come for labs and MD visit on 1/17- confirmed with patient that she did get the new dose of palbociclib (100mg) that she will be starting on 1/19- patient in agreement with date and

## 2022-01-17 ENCOUNTER — NURSE ONLY (OUTPATIENT)
Dept: HEMATOLOGY/ONCOLOGY | Facility: HOSPITAL | Age: 62
End: 2022-01-17
Attending: INTERNAL MEDICINE
Payer: COMMERCIAL

## 2022-01-17 VITALS
TEMPERATURE: 98 F | HEART RATE: 75 BPM | SYSTOLIC BLOOD PRESSURE: 149 MMHG | DIASTOLIC BLOOD PRESSURE: 83 MMHG | RESPIRATION RATE: 16 BRPM | OXYGEN SATURATION: 99 %

## 2022-01-17 DIAGNOSIS — C50.411 MALIGNANT NEOPLASM OF UPPER-OUTER QUADRANT OF RIGHT BREAST IN FEMALE, ESTROGEN RECEPTOR POSITIVE (HCC): Primary | ICD-10-CM

## 2022-01-17 DIAGNOSIS — M81.8 OTHER OSTEOPOROSIS WITHOUT CURRENT PATHOLOGICAL FRACTURE: Primary | ICD-10-CM

## 2022-01-17 DIAGNOSIS — Z51.11 CHEMOTHERAPY MANAGEMENT, ENCOUNTER FOR: ICD-10-CM

## 2022-01-17 DIAGNOSIS — T45.1X5A CHEMOTHERAPY-INDUCED NEUTROPENIA (HCC): ICD-10-CM

## 2022-01-17 DIAGNOSIS — Z45.2 ENCOUNTER FOR ADJUSTMENT AND MANAGEMENT OF VASCULAR ACCESS DEVICE: ICD-10-CM

## 2022-01-17 DIAGNOSIS — D70.1 CHEMOTHERAPY-INDUCED NEUTROPENIA (HCC): ICD-10-CM

## 2022-01-17 DIAGNOSIS — Z17.0 MALIGNANT NEOPLASM OF UPPER-OUTER QUADRANT OF RIGHT BREAST IN FEMALE, ESTROGEN RECEPTOR POSITIVE (HCC): Primary | ICD-10-CM

## 2022-01-17 DIAGNOSIS — C50.411 MALIGNANT NEOPLASM OF UPPER-OUTER QUADRANT OF RIGHT BREAST IN FEMALE, ESTROGEN RECEPTOR POSITIVE (HCC): ICD-10-CM

## 2022-01-17 DIAGNOSIS — Z17.0 MALIGNANT NEOPLASM OF UPPER-OUTER QUADRANT OF RIGHT BREAST IN FEMALE, ESTROGEN RECEPTOR POSITIVE (HCC): ICD-10-CM

## 2022-01-17 PROBLEM — J18.9 PNEUMONIA OF LEFT LOWER LOBE DUE TO INFECTIOUS ORGANISM: Status: RESOLVED | Noted: 2021-04-16 | Resolved: 2022-01-17

## 2022-01-17 PROBLEM — M85.89 OSTEOPENIA OF MULTIPLE SITES: Status: RESOLVED | Noted: 2020-12-25 | Resolved: 2022-01-17

## 2022-01-17 PROBLEM — R93.89 ABNORMAL IMAGING OF THYROID: Status: RESOLVED | Noted: 2021-08-05 | Resolved: 2022-01-17

## 2022-01-17 PROBLEM — R22.1 MASS OF RIGHT SIDE OF NECK: Status: RESOLVED | Noted: 2020-09-21 | Resolved: 2022-01-17

## 2022-01-17 PROBLEM — R50.9 FEBRILE ILLNESS: Status: RESOLVED | Noted: 2021-04-15 | Resolved: 2022-01-17

## 2022-01-17 PROBLEM — G47.9 SLEEP DISORDER: Status: RESOLVED | Noted: 2021-09-02 | Resolved: 2022-01-17

## 2022-01-17 PROBLEM — F51.04 PSYCHOPHYSIOLOGICAL INSOMNIA: Status: RESOLVED | Noted: 2021-08-05 | Resolved: 2022-01-17

## 2022-01-17 PROBLEM — Z86.39 HISTORY OF IRON DEFICIENCY: Status: RESOLVED | Noted: 2021-11-11 | Resolved: 2022-01-17

## 2022-01-17 LAB
ALBUMIN SERPL-MCNC: 3.5 G/DL (ref 3.4–5)
ALBUMIN/GLOB SERPL: 1.2 {RATIO} (ref 1–2)
ALP LIVER SERPL-CCNC: 53 U/L
ALT SERPL-CCNC: 17 U/L
ANION GAP SERPL CALC-SCNC: 3 MMOL/L (ref 0–18)
AST SERPL-CCNC: 12 U/L (ref 15–37)
BASOPHILS # BLD AUTO: 0.02 X10(3) UL (ref 0–0.2)
BASOPHILS NFR BLD AUTO: 1.3 %
BILIRUB SERPL-MCNC: 1.3 MG/DL (ref 0.1–2)
BUN BLD-MCNC: 21 MG/DL (ref 7–18)
BUN/CREAT SERPL: 19.6 (ref 10–20)
CALCIUM BLD-MCNC: 9.2 MG/DL (ref 8.5–10.1)
CHLORIDE SERPL-SCNC: 112 MMOL/L (ref 98–112)
CHOLEST SERPL-MCNC: 213 MG/DL (ref ?–200)
CO2 SERPL-SCNC: 28 MMOL/L (ref 21–32)
CREAT BLD-MCNC: 1.07 MG/DL
DEPRECATED HBV CORE AB SER IA-ACNC: 77.9 NG/ML
DEPRECATED RDW RBC AUTO: 56.5 FL (ref 35.1–46.3)
EOSINOPHIL # BLD AUTO: 0.01 X10(3) UL (ref 0–0.7)
EOSINOPHIL NFR BLD AUTO: 0.7 %
ERYTHROCYTE [DISTWIDTH] IN BLOOD BY AUTOMATED COUNT: 15.6 % (ref 11–15)
FOLATE SERPL-MCNC: 18 NG/ML (ref 8.7–?)
GLOBULIN PLAS-MCNC: 3 G/DL (ref 2.8–4.4)
GLUCOSE BLD-MCNC: 109 MG/DL (ref 70–99)
HCT VFR BLD AUTO: 36.7 %
HDLC SERPL-MCNC: 77 MG/DL (ref 40–59)
HGB BLD-MCNC: 12.2 G/DL
IMM GRANULOCYTES # BLD AUTO: 0.01 X10(3) UL (ref 0–1)
IMM GRANULOCYTES NFR BLD: 0.7 %
IRON SATN MFR SERPL: 29 %
IRON SERPL-MCNC: 105 UG/DL
LDLC SERPL CALC-MCNC: 119 MG/DL (ref ?–100)
LYMPHOCYTES # BLD AUTO: 0.36 X10(3) UL (ref 1–4)
LYMPHOCYTES NFR BLD AUTO: 24 %
MCH RBC QN AUTO: 33.7 PG (ref 26–34)
MCHC RBC AUTO-ENTMCNC: 33.2 G/DL (ref 31–37)
MCV RBC AUTO: 101.4 FL
MONOCYTES # BLD AUTO: 0.24 X10(3) UL (ref 0.1–1)
MONOCYTES NFR BLD AUTO: 16 %
NEUTROPHILS # BLD AUTO: 0.86 X10 (3) UL (ref 1.5–7.7)
NEUTROPHILS # BLD AUTO: 0.86 X10(3) UL (ref 1.5–7.7)
NEUTROPHILS NFR BLD AUTO: 57.3 %
NONHDLC SERPL-MCNC: 136 MG/DL (ref ?–130)
OSMOLALITY SERPL CALC.SUM OF ELEC: 300 MOSM/KG (ref 275–295)
PLATELET # BLD AUTO: 120 10(3)UL (ref 150–450)
POTASSIUM SERPL-SCNC: 4.4 MMOL/L (ref 3.5–5.1)
PROT SERPL-MCNC: 6.5 G/DL (ref 6.4–8.2)
RBC # BLD AUTO: 3.62 X10(6)UL
SODIUM SERPL-SCNC: 143 MMOL/L (ref 136–145)
TIBC SERPL-MCNC: 359 UG/DL (ref 240–450)
TRANSFERRIN SERPL-MCNC: 241 MG/DL (ref 200–360)
TRIGL SERPL-MCNC: 95 MG/DL (ref 30–149)
VIT B12 SERPL-MCNC: 312 PG/ML (ref 193–986)
VLDLC SERPL CALC-MCNC: 17 MG/DL (ref 0–30)
WBC # BLD AUTO: 1.5 X10(3) UL (ref 4–11)

## 2022-01-17 PROCEDURE — 80061 LIPID PANEL: CPT

## 2022-01-17 PROCEDURE — 82746 ASSAY OF FOLIC ACID SERUM: CPT

## 2022-01-17 PROCEDURE — 82728 ASSAY OF FERRITIN: CPT

## 2022-01-17 PROCEDURE — 85060 BLOOD SMEAR INTERPRETATION: CPT

## 2022-01-17 PROCEDURE — 99215 OFFICE O/P EST HI 40 MIN: CPT | Performed by: INTERNAL MEDICINE

## 2022-01-17 PROCEDURE — 80053 COMPREHEN METABOLIC PANEL: CPT

## 2022-01-17 PROCEDURE — 85025 COMPLETE CBC W/AUTO DIFF WBC: CPT

## 2022-01-17 PROCEDURE — 82607 VITAMIN B-12: CPT

## 2022-01-17 PROCEDURE — 83540 ASSAY OF IRON: CPT

## 2022-01-17 PROCEDURE — 36415 COLL VENOUS BLD VENIPUNCTURE: CPT

## 2022-01-17 PROCEDURE — 36593 DECLOT VASCULAR DEVICE: CPT

## 2022-01-17 PROCEDURE — 84466 ASSAY OF TRANSFERRIN: CPT

## 2022-01-17 RX ORDER — HEPARIN SODIUM (PORCINE) LOCK FLUSH IV SOLN 100 UNIT/ML 100 UNIT/ML
5 SOLUTION INTRAVENOUS ONCE
Status: COMPLETED | OUTPATIENT
Start: 2022-01-17 | End: 2022-01-17

## 2022-01-17 RX ORDER — HEPARIN SODIUM (PORCINE) LOCK FLUSH IV SOLN 100 UNIT/ML 100 UNIT/ML
5 SOLUTION INTRAVENOUS ONCE
OUTPATIENT
Start: 2022-01-17

## 2022-01-17 RX ORDER — 0.9 % SODIUM CHLORIDE 0.9 %
10 VIAL (ML) INJECTION ONCE
OUTPATIENT
Start: 2022-01-17

## 2022-01-17 RX ADMIN — HEPARIN SODIUM (PORCINE) LOCK FLUSH IV SOLN 100 UNIT/ML 5 ML: 100 SOLUTION INTRAVENOUS at 13:19:00

## 2022-01-17 NOTE — PROGRESS NOTES
Cancer Center Progress Note    Patient Name: Estefania Story   YOB: 1960   Medical Record Number: [id number]   Attending Physician: Sheri Amaya M.D.      Chief Complaint:  Breast cancer  Chemotherapy    Oncology History:  62year old history Relation Age of Onset   • Breast Cancer Mother 52        bilateral mastectomy   • Genetic Disease Mother         NF-1   • Breast Cancer Maternal Aunt 76   • Breast Cancer Paternal Grandmother    • Breast Cancer Self 46   • Genetic Disease Self         NF-1 , Disp: , Rfl:   •  Cholecalciferol (VITAMIN D3) 250 MCG (59376 UT) Oral Cap, Take 1 capsule by mouth daily. , Disp: , Rfl:   •  atorvastatin 20 MG Oral Tab, Take 20 mg by mouth daily.  (Patient not taking: No sig reported), Disp: , Rfl:   •  Minocycline HCl neutropenic fever and Ibrance induced neutropenia grade 3. She is on her first dose reduction at 100mg D1-21 q 28 days. ANC is 0.86 today, which appears better than prior joss. Repeat on Thursday, when she starts her next cycle.   If ANC close to 1000, w

## 2022-01-18 RX ORDER — LAMOTRIGINE 25 MG/1
500 TABLET ORAL ONCE
Status: CANCELLED | OUTPATIENT
Start: 2022-01-23

## 2022-01-20 ENCOUNTER — NURSE ONLY (OUTPATIENT)
Dept: HEMATOLOGY/ONCOLOGY | Facility: HOSPITAL | Age: 62
End: 2022-01-20
Attending: INTERNAL MEDICINE
Payer: COMMERCIAL

## 2022-01-20 ENCOUNTER — TELEPHONE (OUTPATIENT)
Dept: HEMATOLOGY/ONCOLOGY | Facility: HOSPITAL | Age: 62
End: 2022-01-20

## 2022-01-20 VITALS
TEMPERATURE: 98 F | DIASTOLIC BLOOD PRESSURE: 83 MMHG | SYSTOLIC BLOOD PRESSURE: 132 MMHG | HEART RATE: 80 BPM | RESPIRATION RATE: 16 BRPM | OXYGEN SATURATION: 99 %

## 2022-01-20 DIAGNOSIS — C50.411 MALIGNANT NEOPLASM OF UPPER-OUTER QUADRANT OF RIGHT BREAST IN FEMALE, ESTROGEN RECEPTOR POSITIVE (HCC): Primary | ICD-10-CM

## 2022-01-20 DIAGNOSIS — C50.919 METASTASIS FROM BREAST CANCER (HCC): Primary | ICD-10-CM

## 2022-01-20 DIAGNOSIS — C79.9 METASTASIS FROM BREAST CANCER (HCC): Primary | ICD-10-CM

## 2022-01-20 DIAGNOSIS — Z17.0 MALIGNANT NEOPLASM OF UPPER-OUTER QUADRANT OF RIGHT BREAST IN FEMALE, ESTROGEN RECEPTOR POSITIVE (HCC): Primary | ICD-10-CM

## 2022-01-20 DIAGNOSIS — Z17.0 MALIGNANT NEOPLASM OF UPPER-OUTER QUADRANT OF RIGHT BREAST IN FEMALE, ESTROGEN RECEPTOR POSITIVE (HCC): ICD-10-CM

## 2022-01-20 DIAGNOSIS — C50.411 MALIGNANT NEOPLASM OF UPPER-OUTER QUADRANT OF RIGHT BREAST IN FEMALE, ESTROGEN RECEPTOR POSITIVE (HCC): ICD-10-CM

## 2022-01-20 LAB
BASOPHILS # BLD AUTO: 0.03 X10(3) UL (ref 0–0.2)
BASOPHILS NFR BLD AUTO: 2 %
DEPRECATED RDW RBC AUTO: 56.8 FL (ref 35.1–46.3)
EOSINOPHIL # BLD AUTO: 0.02 X10(3) UL (ref 0–0.7)
EOSINOPHIL NFR BLD AUTO: 1.3 %
ERYTHROCYTE [DISTWIDTH] IN BLOOD BY AUTOMATED COUNT: 15.9 % (ref 11–15)
HCT VFR BLD AUTO: 38.1 %
HGB BLD-MCNC: 12.7 G/DL
IMM GRANULOCYTES # BLD AUTO: 0.01 X10(3) UL (ref 0–1)
IMM GRANULOCYTES NFR BLD: 0.7 %
LYMPHOCYTES # BLD AUTO: 0.5 X10(3) UL (ref 1–4)
LYMPHOCYTES NFR BLD AUTO: 32.7 %
MCH RBC QN AUTO: 33.3 PG (ref 26–34)
MCHC RBC AUTO-ENTMCNC: 33.3 G/DL (ref 31–37)
MCV RBC AUTO: 100 FL
MONOCYTES # BLD AUTO: 0.36 X10(3) UL (ref 0.1–1)
MONOCYTES NFR BLD AUTO: 23.5 %
NEUTROPHILS # BLD AUTO: 0.61 X10 (3) UL (ref 1.5–7.7)
NEUTROPHILS # BLD AUTO: 0.61 X10(3) UL (ref 1.5–7.7)
NEUTROPHILS NFR BLD AUTO: 39.8 %
PLATELET # BLD AUTO: 152 10(3)UL (ref 150–450)
RBC # BLD AUTO: 3.81 X10(6)UL
WBC # BLD AUTO: 1.5 X10(3) UL (ref 4–11)

## 2022-01-20 PROCEDURE — 36415 COLL VENOUS BLD VENIPUNCTURE: CPT

## 2022-01-20 PROCEDURE — 85025 COMPLETE CBC W/AUTO DIFF WBC: CPT

## 2022-01-20 PROCEDURE — 96402 CHEMO HORMON ANTINEOPL SQ/IM: CPT

## 2022-01-20 RX ORDER — LAMOTRIGINE 25 MG/1
500 TABLET ORAL ONCE
Status: COMPLETED | OUTPATIENT
Start: 2022-01-20 | End: 2022-01-20

## 2022-01-20 RX ADMIN — LAMOTRIGINE 500 MG: 25 TABLET ORAL at 08:09:00

## 2022-01-20 NOTE — TELEPHONE ENCOUNTER
Spoke with Tanvi Riley. Confirmed with Tanvi Riley that last cycle she was taking 125 mg of Palbo. She now has the 100 mg Palbo that she was going to start today. Spoke with Dr Veronica Simon. ANC too low to start. Repeat labs in 1 week, we will call with results.   She zehra

## 2022-01-20 NOTE — PROGRESS NOTES
Patient here for repeat Cbc and Faslodex injection. [address] on 1/17 was 0.86. Patient states she is hoping [address] is improved to she can start Ibrance. Patient denies fever or chills at home, denies any signs of injection. Cbc drawn then sent to lab.     Fas

## 2022-01-20 NOTE — TELEPHONE ENCOUNTER
Dr Criselda Obrien patient - metastatic breast cancer seen by Dr Lyn Knows on 1/17/2022. Jdoi Haile had labs drawn today. She wants to know if she should start taking her next cycle of palbociclib? Jodi Haile can be reached on her cell number today.

## 2022-01-21 RX ORDER — ZOLPIDEM TARTRATE 10 MG/1
10 TABLET ORAL NIGHTLY PRN
Qty: 30 TABLET | Refills: 0 | Status: SHIPPED | OUTPATIENT
Start: 2022-01-21 | End: 2022-02-21

## 2022-01-21 RX ORDER — MIRTAZAPINE 15 MG/1
TABLET, FILM COATED ORAL
Qty: 90 TABLET | Refills: 1 | Status: SHIPPED | OUTPATIENT
Start: 2022-01-21 | End: 2022-05-27

## 2022-01-24 ENCOUNTER — APPOINTMENT (OUTPATIENT)
Dept: HEMATOLOGY/ONCOLOGY | Facility: HOSPITAL | Age: 62
End: 2022-01-24
Attending: INTERNAL MEDICINE
Payer: COMMERCIAL

## 2022-01-25 ENCOUNTER — HOSPITAL ENCOUNTER (OUTPATIENT)
Dept: CT IMAGING | Facility: HOSPITAL | Age: 62
Discharge: HOME | End: 2022-01-25
Attending: INTERNAL MEDICINE
Payer: COMMERCIAL

## 2022-01-25 ENCOUNTER — TELEPHONE (OUTPATIENT)
Dept: HEMATOLOGY/ONCOLOGY | Facility: HOSPITAL | Age: 62
End: 2022-01-25

## 2022-01-25 DIAGNOSIS — Z17.0 MALIGNANT NEOPLASM OF UPPER-OUTER QUADRANT OF RIGHT BREAST IN FEMALE, ESTROGEN RECEPTOR POSITIVE (HCC): ICD-10-CM

## 2022-01-25 DIAGNOSIS — R06.00 DYSPNEA ON EXERTION: Primary | ICD-10-CM

## 2022-01-25 DIAGNOSIS — C50.411 MALIGNANT NEOPLASM OF UPPER-OUTER QUADRANT OF RIGHT BREAST IN FEMALE, ESTROGEN RECEPTOR POSITIVE (HCC): ICD-10-CM

## 2022-01-25 PROCEDURE — 74177 CT ABD & PELVIS W/CONTRAST: CPT | Performed by: INTERNAL MEDICINE

## 2022-01-25 PROCEDURE — 71260 CT THORAX DX C+: CPT | Performed by: INTERNAL MEDICINE

## 2022-01-25 RX ORDER — METRONIDAZOLE 500 MG/1
500 TABLET ORAL 3 TIMES DAILY
Qty: 30 TABLET | Refills: 0 | Status: SHIPPED | OUTPATIENT
Start: 2022-01-25 | End: 2022-02-04

## 2022-01-25 RX ORDER — CIPROFLOXACIN 500 MG/1
500 TABLET, FILM COATED ORAL 2 TIMES DAILY
Qty: 20 TABLET | Refills: 0 | Status: SHIPPED | OUTPATIENT
Start: 2022-01-25 | End: 2022-02-04

## 2022-01-25 NOTE — PROGRESS NOTES
Can you get a CT PE protocol soon please. Tomorrow? Thur ok. Would like to see her on thrsday, either me or John please.

## 2022-01-25 NOTE — TELEPHONE ENCOUNTER
Imaging reviewed, noted for diverticulitis. Patient have some abd pain and diarrhea- will start oral abx.  If sx do not improve or worse with fever,s gerry is to get to the ER    cipro 500 BID flagyl three day 10 days    She is also noted to have some changes

## 2022-01-26 ENCOUNTER — TELEPHONE (OUTPATIENT)
Dept: GASTROENTEROLOGY | Facility: CLINIC | Age: 62
End: 2022-01-26

## 2022-01-26 ENCOUNTER — TELEPHONE (OUTPATIENT)
Dept: HEMATOLOGY/ONCOLOGY | Facility: HOSPITAL | Age: 62
End: 2022-01-26

## 2022-01-26 ENCOUNTER — HOSPITAL ENCOUNTER (OUTPATIENT)
Dept: CT IMAGING | Facility: HOSPITAL | Age: 62
Discharge: HOME | End: 2022-01-26
Attending: INTERNAL MEDICINE
Payer: COMMERCIAL

## 2022-01-26 DIAGNOSIS — R06.00 DYSPNEA ON EXERTION: ICD-10-CM

## 2022-01-26 PROCEDURE — 71260 CT THORAX DX C+: CPT | Performed by: INTERNAL MEDICINE

## 2022-01-26 NOTE — TELEPHONE ENCOUNTER
New consult in clinic in 2-4 weeks to discuss colonoscopy -- recent diverticulitis consult from Dr. Nora Smyth

## 2022-01-26 NOTE — TELEPHONE ENCOUNTER
Spoke with Fidel Salcedo. CT scheduled for today at 3 pm, diagnostic main. She verbalized understanding.

## 2022-01-26 NOTE — TELEPHONE ENCOUNTER
Dr. Strong Rukhsana--    Your next available 1 pm double book appt for Wednesday is first week of March. Is it ok to wait till than?      Thank you

## 2022-01-26 NOTE — TELEPHONE ENCOUNTER
Spoke with Tanvi Riley. Verified . Accepted consult for 2022 at 2:00 pm.     Verified time, location and to arrive 15 minutes early. Patient expressed understanding with no further questions or concerns at this time.      Future Appointments   Raymond

## 2022-01-27 ENCOUNTER — NURSE ONLY (OUTPATIENT)
Dept: HEMATOLOGY/ONCOLOGY | Facility: HOSPITAL | Age: 62
End: 2022-01-27
Attending: INTERNAL MEDICINE
Payer: COMMERCIAL

## 2022-01-27 VITALS
HEIGHT: 61 IN | WEIGHT: 141 LBS | OXYGEN SATURATION: 100 % | TEMPERATURE: 98 F | BODY MASS INDEX: 26.62 KG/M2 | HEART RATE: 87 BPM | SYSTOLIC BLOOD PRESSURE: 149 MMHG | RESPIRATION RATE: 16 BRPM | DIASTOLIC BLOOD PRESSURE: 87 MMHG

## 2022-01-27 DIAGNOSIS — C50.411 MALIGNANT NEOPLASM OF UPPER-OUTER QUADRANT OF RIGHT BREAST IN FEMALE, ESTROGEN RECEPTOR POSITIVE (HCC): ICD-10-CM

## 2022-01-27 DIAGNOSIS — D70.1 CHEMOTHERAPY-INDUCED NEUTROPENIA (HCC): ICD-10-CM

## 2022-01-27 DIAGNOSIS — Z17.0 MALIGNANT NEOPLASM OF UPPER-OUTER QUADRANT OF RIGHT BREAST IN FEMALE, ESTROGEN RECEPTOR POSITIVE (HCC): Primary | ICD-10-CM

## 2022-01-27 DIAGNOSIS — T45.1X5A CHEMOTHERAPY-INDUCED NEUTROPENIA (HCC): ICD-10-CM

## 2022-01-27 DIAGNOSIS — Z51.11 CHEMOTHERAPY MANAGEMENT, ENCOUNTER FOR: ICD-10-CM

## 2022-01-27 DIAGNOSIS — C50.411 MALIGNANT NEOPLASM OF UPPER-OUTER QUADRANT OF RIGHT BREAST IN FEMALE, ESTROGEN RECEPTOR POSITIVE (HCC): Primary | ICD-10-CM

## 2022-01-27 DIAGNOSIS — Z17.0 MALIGNANT NEOPLASM OF UPPER-OUTER QUADRANT OF RIGHT BREAST IN FEMALE, ESTROGEN RECEPTOR POSITIVE (HCC): ICD-10-CM

## 2022-01-27 LAB
ALBUMIN SERPL-MCNC: 3.3 G/DL (ref 3.4–5)
ALBUMIN/GLOB SERPL: 0.9 {RATIO} (ref 1–2)
ALP LIVER SERPL-CCNC: 62 U/L
ALT SERPL-CCNC: 14 U/L
ANION GAP SERPL CALC-SCNC: 5 MMOL/L (ref 0–18)
AST SERPL-CCNC: 16 U/L (ref 15–37)
BASOPHILS # BLD AUTO: 0.05 X10(3) UL (ref 0–0.2)
BASOPHILS NFR BLD AUTO: 1.9 %
BILIRUB SERPL-MCNC: 0.6 MG/DL (ref 0.1–2)
BUN BLD-MCNC: 19 MG/DL (ref 7–18)
BUN/CREAT SERPL: 18.4 (ref 10–20)
CALCIUM BLD-MCNC: 9.3 MG/DL (ref 8.5–10.1)
CHLORIDE SERPL-SCNC: 105 MMOL/L (ref 98–112)
CO2 SERPL-SCNC: 28 MMOL/L (ref 21–32)
CREAT BLD-MCNC: 1.03 MG/DL
DEPRECATED RDW RBC AUTO: 55.8 FL (ref 35.1–46.3)
EOSINOPHIL # BLD AUTO: 0.02 X10(3) UL (ref 0–0.7)
EOSINOPHIL NFR BLD AUTO: 0.8 %
ERYTHROCYTE [DISTWIDTH] IN BLOOD BY AUTOMATED COUNT: 15.2 % (ref 11–15)
GLOBULIN PLAS-MCNC: 3.5 G/DL (ref 2.8–4.4)
GLUCOSE BLD-MCNC: 112 MG/DL (ref 70–99)
HCT VFR BLD AUTO: 36.3 %
HGB BLD-MCNC: 12.2 G/DL
IMM GRANULOCYTES # BLD AUTO: 0.09 X10(3) UL (ref 0–1)
IMM GRANULOCYTES NFR BLD: 3.5 %
LYMPHOCYTES # BLD AUTO: 0.46 X10(3) UL (ref 1–4)
LYMPHOCYTES NFR BLD AUTO: 17.7 %
MCH RBC QN AUTO: 33.4 PG (ref 26–34)
MCHC RBC AUTO-ENTMCNC: 33.6 G/DL (ref 31–37)
MCV RBC AUTO: 99.5 FL
MONOCYTES # BLD AUTO: 0.54 X10(3) UL (ref 0.1–1)
MONOCYTES NFR BLD AUTO: 20.8 %
NEUTROPHILS # BLD AUTO: 1.44 X10 (3) UL (ref 1.5–7.7)
NEUTROPHILS # BLD AUTO: 1.44 X10(3) UL (ref 1.5–7.7)
NEUTROPHILS NFR BLD AUTO: 55.3 %
OSMOLALITY SERPL CALC.SUM OF ELEC: 289 MOSM/KG (ref 275–295)
PLATELET # BLD AUTO: 243 10(3)UL (ref 150–450)
POTASSIUM SERPL-SCNC: 3.5 MMOL/L (ref 3.5–5.1)
PROT SERPL-MCNC: 6.8 G/DL (ref 6.4–8.2)
RBC # BLD AUTO: 3.65 X10(6)UL
SODIUM SERPL-SCNC: 138 MMOL/L (ref 136–145)
WBC # BLD AUTO: 2.6 X10(3) UL (ref 4–11)

## 2022-01-27 PROCEDURE — 99215 OFFICE O/P EST HI 40 MIN: CPT | Performed by: INTERNAL MEDICINE

## 2022-01-27 PROCEDURE — 85025 COMPLETE CBC W/AUTO DIFF WBC: CPT

## 2022-01-27 PROCEDURE — 36415 COLL VENOUS BLD VENIPUNCTURE: CPT

## 2022-01-27 PROCEDURE — 80053 COMPREHEN METABOLIC PANEL: CPT

## 2022-01-27 NOTE — PROGRESS NOTES
Cancer Center Progress Note    Patient Name: Jes Lloyd   YOB: 1960   Medical Record Number: [id number]   Attending Physician: Carolynn Blanton M.D.      Chief Complaint:  Breast cancer  Chemotherapy    Oncology History:  62year old history History:  Family History   Problem Relation Age of Onset   • Breast Cancer Mother 52        bilateral mastectomy   • Genetic Disease Mother         NF-1   • Breast Cancer Maternal Aunt 76   • Breast Cancer Paternal Grandmother    • Breast Cancer Self 47 Fulvestrant (FASLODEX IM), Inject into the muscle., Disp: , Rfl:   •  Calcium Carb-Cholecalciferol (CALCIUM 500 + D) 500-200 MG-UNIT Oral Tab, Take by mouth daily.   , Disp: , Rfl:   •  Cholecalciferol (VITAMIN D3) 250 MCG (75012 UT) Oral Cap, Take 1 capsul IV ([place], [address], M1) - Signed by Bridget Jane MD on 12/23/2021  Metastatic breast cancer to the pleura, neck, liver and bone  - Continued on monthly faslodex and Ibrance c/p neutropenic fever and Ibrance induced neutropenia grade 3.  She is on her first dose red

## 2022-02-03 ENCOUNTER — APPOINTMENT (OUTPATIENT)
Dept: HEMATOLOGY/ONCOLOGY | Facility: HOSPITAL | Age: 62
End: 2022-02-03
Attending: INTERNAL MEDICINE
Payer: COMMERCIAL

## 2022-02-09 ENCOUNTER — OFFICE VISIT (OUTPATIENT)
Dept: GASTROENTEROLOGY | Facility: CLINIC | Age: 62
End: 2022-02-09
Payer: COMMERCIAL

## 2022-02-09 ENCOUNTER — TELEPHONE (OUTPATIENT)
Dept: GASTROENTEROLOGY | Facility: CLINIC | Age: 62
End: 2022-02-09

## 2022-02-09 VITALS
HEIGHT: 61 IN | DIASTOLIC BLOOD PRESSURE: 90 MMHG | SYSTOLIC BLOOD PRESSURE: 128 MMHG | WEIGHT: 139 LBS | BODY MASS INDEX: 26.24 KG/M2

## 2022-02-09 DIAGNOSIS — C50.911 MALIGNANT NEOPLASM OF RIGHT BREAST, STAGE 4 (HCC): Primary | ICD-10-CM

## 2022-02-09 DIAGNOSIS — K57.32 DIVERTICULITIS LARGE INTESTINE W/O PERFORATION OR ABSCESS W/O BLEEDING: ICD-10-CM

## 2022-02-09 PROCEDURE — 3080F DIAST BP >= 90 MM HG: CPT | Performed by: INTERNAL MEDICINE

## 2022-02-09 PROCEDURE — 3074F SYST BP LT 130 MM HG: CPT | Performed by: INTERNAL MEDICINE

## 2022-02-09 PROCEDURE — 3008F BODY MASS INDEX DOCD: CPT | Performed by: INTERNAL MEDICINE

## 2022-02-09 PROCEDURE — 99244 OFF/OP CNSLTJ NEW/EST MOD 40: CPT | Performed by: INTERNAL MEDICINE

## 2022-02-09 NOTE — PATIENT INSTRUCTIONS
# Diverticulitis  Completed treatment with improvement    # metastatic breast CA  Discussed benefit and risk and discussed given metastatic CA and possible long term survival and if colonoscopy should be done  Discussed with Dr. Reinaldo Ritchie and would like her to proceed  Pleurex removed 5/2021  Discussed low risk of perforation and colostomy to would not want that  She does not know her prognosis but wants to proceed      Recommend:  - Schedule colonoscopy w/ MAC sedation at [facility] in 6 weeks from diverticulitis  - Prep: Split dose Colyte or equivalent    ** If MAC @ EM/[facility]:    - HOLD ACE/ARBs the night before and the day of the procedure(s)    - NO alcohol, recreational drugs nor erectile dysfunction mediations 24 hours before procedure(s)   - NO herbal supplements or weight loss medications x 7 days prior to the procedure(s)    ** If MAC @ [facility] or IV twilight - continue all medications as prescribed    Need COVID test 72 hours before procedure

## 2022-02-09 NOTE — TELEPHONE ENCOUNTER
Scheduled for:  Colonoscopy [address]   Provider Name:  Dr. Devin Figueroa  Date:  3/15/2022  Location:  [facility]  Sedation:  MAC  Time:  9:00 am, (pt is aware that [facility] will call the day before to confirm arrival time)  Prep:  Trilyte  Meds/Allergies Reconciled?:  Physician reviewed  Diagnosis with codes:  Diverticulitis of large intestine w/o perforation or abscess w/o bleeding K57.32  Was patient informed to call insurance with codes (Y/N): Yes    Referral sent?:  Yes  [facility] or [facility] notified?:  Electronic case request was sent to [facility] via [employer]. Medication Orders:  Hold Losartan the morning of procedure. Misc Orders:  Patient was informed that they will need a COVID 19 test prior to their procedure. Patient verbally understood & will await a phone call from [facility] to schedule.      Further instructions given by staff:  I provided patient with prep instruction sheet and AVS.

## 2022-02-10 RX ORDER — POLYETHYLENE GLYCOL 3350, SODIUM CHLORIDE, SODIUM BICARBONATE, POTASSIUM CHLORIDE 420; 11.2; 5.72; 1.48 G/4L; G/4L; G/4L; G/4L
POWDER, FOR SOLUTION ORAL
Qty: 1 EACH | Refills: 0 | Status: SHIPPED | OUTPATIENT
Start: 2022-02-10

## 2022-02-17 ENCOUNTER — NURSE ONLY (OUTPATIENT)
Dept: HEMATOLOGY/ONCOLOGY | Facility: HOSPITAL | Age: 62
End: 2022-02-17
Attending: INTERNAL MEDICINE
Payer: COMMERCIAL

## 2022-02-17 VITALS
TEMPERATURE: 98 F | DIASTOLIC BLOOD PRESSURE: 81 MMHG | HEART RATE: 91 BPM | OXYGEN SATURATION: 98 % | RESPIRATION RATE: 16 BRPM | SYSTOLIC BLOOD PRESSURE: 131 MMHG

## 2022-02-17 DIAGNOSIS — C50.411 MALIGNANT NEOPLASM OF UPPER-OUTER QUADRANT OF RIGHT BREAST IN FEMALE, ESTROGEN RECEPTOR POSITIVE (HCC): Primary | ICD-10-CM

## 2022-02-17 DIAGNOSIS — Z79.899 ENCOUNTER FOR MEDICATION MANAGEMENT: ICD-10-CM

## 2022-02-17 DIAGNOSIS — Z51.11 CHEMOTHERAPY MANAGEMENT, ENCOUNTER FOR: ICD-10-CM

## 2022-02-17 DIAGNOSIS — Z17.0 MALIGNANT NEOPLASM OF UPPER-OUTER QUADRANT OF RIGHT BREAST IN FEMALE, ESTROGEN RECEPTOR POSITIVE (HCC): Primary | ICD-10-CM

## 2022-02-17 DIAGNOSIS — Z45.2 ENCOUNTER FOR CENTRAL LINE CARE: Primary | ICD-10-CM

## 2022-02-17 DIAGNOSIS — M81.8 OTHER OSTEOPOROSIS WITHOUT CURRENT PATHOLOGICAL FRACTURE: ICD-10-CM

## 2022-02-17 LAB
ALBUMIN SERPL-MCNC: 3.6 G/DL (ref 3.4–5)
ALBUMIN/GLOB SERPL: 1.1 {RATIO} (ref 1–2)
ALP LIVER SERPL-CCNC: 43 U/L
ALT SERPL-CCNC: 21 U/L
ANION GAP SERPL CALC-SCNC: 5 MMOL/L (ref 0–18)
AST SERPL-CCNC: 16 U/L (ref 15–37)
BASOPHILS # BLD AUTO: 0.04 X10(3) UL (ref 0–0.2)
BASOPHILS NFR BLD AUTO: 2.7 %
BILIRUB SERPL-MCNC: 1.2 MG/DL (ref 0.1–2)
BUN BLD-MCNC: 21 MG/DL (ref 7–18)
BUN/CREAT SERPL: 18.9 (ref 10–20)
CALCIUM BLD-MCNC: 9.5 MG/DL (ref 8.5–10.1)
CHLORIDE SERPL-SCNC: 108 MMOL/L (ref 98–112)
CHOLEST SERPL-MCNC: 219 MG/DL (ref ?–200)
CO2 SERPL-SCNC: 26 MMOL/L (ref 21–32)
CREAT BLD-MCNC: 1.11 MG/DL
DEPRECATED HBV CORE AB SER IA-ACNC: 119.1 NG/ML
DEPRECATED RDW RBC AUTO: 53.9 FL (ref 35.1–46.3)
EOSINOPHIL # BLD AUTO: 0.03 X10(3) UL (ref 0–0.7)
EOSINOPHIL NFR BLD AUTO: 2 %
ERYTHROCYTE [DISTWIDTH] IN BLOOD BY AUTOMATED COUNT: 15.1 % (ref 11–15)
FOLATE SERPL-MCNC: >20 NG/ML (ref 8.7–?)
GLOBULIN PLAS-MCNC: 3.2 G/DL (ref 2.8–4.4)
HCT VFR BLD AUTO: 38.2 %
HDLC SERPL-MCNC: 53 MG/DL (ref 40–59)
HGB BLD-MCNC: 12.9 G/DL
IMM GRANULOCYTES # BLD AUTO: 0 X10(3) UL (ref 0–1)
IRON SATN MFR SERPL: 36 %
IRON SERPL-MCNC: 113 UG/DL
LDLC SERPL CALC-MCNC: 140 MG/DL (ref ?–100)
LYMPHOCYTES # BLD AUTO: 0.48 X10(3) UL (ref 1–4)
LYMPHOCYTES NFR BLD AUTO: 32.4 %
MCH RBC QN AUTO: 32.9 PG (ref 26–34)
MCHC RBC AUTO-ENTMCNC: 33.8 G/DL (ref 31–37)
MCV RBC AUTO: 97.4 FL
MONOCYTES # BLD AUTO: 0.16 X10(3) UL (ref 0.1–1)
MONOCYTES NFR BLD AUTO: 10.8 %
NEUTROPHILS # BLD AUTO: 0.77 X10 (3) UL (ref 1.5–7.7)
NEUTROPHILS # BLD AUTO: 0.77 X10(3) UL (ref 1.5–7.7)
NEUTROPHILS NFR BLD AUTO: 52.1 %
NONHDLC SERPL-MCNC: 166 MG/DL (ref ?–130)
OSMOLALITY SERPL CALC.SUM OF ELEC: 291 MOSM/KG (ref 275–295)
PLATELET # BLD AUTO: 133 10(3)UL (ref 150–450)
POTASSIUM SERPL-SCNC: 3.8 MMOL/L (ref 3.5–5.1)
PROT SERPL-MCNC: 6.8 G/DL (ref 6.4–8.2)
RBC # BLD AUTO: 3.92 X10(6)UL
SODIUM SERPL-SCNC: 139 MMOL/L (ref 136–145)
TIBC SERPL-MCNC: 317 UG/DL (ref 240–450)
TRANSFERRIN SERPL-MCNC: 213 MG/DL (ref 200–360)
TRIGL SERPL-MCNC: 144 MG/DL (ref 30–149)
VIT B12 SERPL-MCNC: 404 PG/ML (ref 193–986)
VIT D+METAB SERPL-MCNC: 26.4 NG/ML (ref 30–100)
VLDLC SERPL CALC-MCNC: 27 MG/DL (ref 0–30)
WBC # BLD AUTO: 1.5 X10(3) UL (ref 4–11)

## 2022-02-17 PROCEDURE — 80061 LIPID PANEL: CPT

## 2022-02-17 PROCEDURE — 82306 VITAMIN D 25 HYDROXY: CPT

## 2022-02-17 PROCEDURE — 82746 ASSAY OF FOLIC ACID SERUM: CPT

## 2022-02-17 PROCEDURE — 96365 THER/PROPH/DIAG IV INF INIT: CPT

## 2022-02-17 PROCEDURE — 82728 ASSAY OF FERRITIN: CPT

## 2022-02-17 PROCEDURE — 82607 VITAMIN B-12: CPT

## 2022-02-17 PROCEDURE — 96402 CHEMO HORMON ANTINEOPL SQ/IM: CPT

## 2022-02-17 PROCEDURE — 36591 DRAW BLOOD OFF VENOUS DEVICE: CPT

## 2022-02-17 PROCEDURE — 83540 ASSAY OF IRON: CPT

## 2022-02-17 PROCEDURE — 84466 ASSAY OF TRANSFERRIN: CPT

## 2022-02-17 PROCEDURE — 85060 BLOOD SMEAR INTERPRETATION: CPT

## 2022-02-17 PROCEDURE — 80053 COMPREHEN METABOLIC PANEL: CPT

## 2022-02-17 PROCEDURE — 85025 COMPLETE CBC W/AUTO DIFF WBC: CPT

## 2022-02-17 RX ORDER — LAMOTRIGINE 25 MG/1
500 TABLET ORAL ONCE
Status: CANCELLED | OUTPATIENT
Start: 2022-02-17

## 2022-02-17 RX ORDER — HEPARIN SODIUM (PORCINE) LOCK FLUSH IV SOLN 100 UNIT/ML 100 UNIT/ML
500 SOLUTION INTRAVENOUS ONCE
Status: CANCELLED | OUTPATIENT
Start: 2022-03-17

## 2022-02-17 RX ORDER — LAMOTRIGINE 25 MG/1
500 TABLET ORAL ONCE
Status: CANCELLED | OUTPATIENT
Start: 2022-03-17

## 2022-02-17 RX ORDER — HEPARIN SODIUM (PORCINE) LOCK FLUSH IV SOLN 100 UNIT/ML 100 UNIT/ML
500 SOLUTION INTRAVENOUS ONCE
Status: COMPLETED | OUTPATIENT
Start: 2022-02-17 | End: 2022-02-17

## 2022-02-17 RX ORDER — LAMOTRIGINE 25 MG/1
500 TABLET ORAL ONCE
Status: COMPLETED | OUTPATIENT
Start: 2022-02-17 | End: 2022-02-17

## 2022-02-17 RX ORDER — HEPARIN SODIUM (PORCINE) LOCK FLUSH IV SOLN 100 UNIT/ML 100 UNIT/ML
SOLUTION INTRAVENOUS
Status: COMPLETED
Start: 2022-02-17 | End: 2022-02-17

## 2022-02-17 RX ADMIN — HEPARIN SODIUM (PORCINE) LOCK FLUSH IV SOLN 100 UNIT/ML 500 UNITS: 100 SOLUTION INTRAVENOUS at 11:29:00

## 2022-02-17 RX ADMIN — LAMOTRIGINE 500 MG: 25 TABLET ORAL at 09:15:00

## 2022-02-17 NOTE — PROGRESS NOTES
Pt to infusion for Q6 month Zometa. Pt denies recent/planned dental work. Denies jaw pain. Labs today WNL for infusion today. Pt arrived with port accessed from lab today. Port flushed with good blood return noted. Pt tolerated Zometa infusion with no s/s adverse reactions noted during infusion. Encouraged to push PO fluids today. Pt verbalized understanding. Reviewed results of labs today with patient. Printed flowsheet of labs for her review. Port flushed and de accessed per protocol. Site covered with gauze and paper tape. Pt discharged stable and ambulatory. Aware of future appointments and f/u with Dr. Nava Diallo next week.

## 2022-02-17 NOTE — PROGRESS NOTES
Patient here for Labs/ Faslodex/ Zometa infusion. [facility]. Faslodex given in bilateral upper outer gluteal muscle. Tolerated injection well. No bleeding noted. Both sites covered with gauze and paper tape. Discharged back to [facility].

## 2022-02-21 RX ORDER — ZOLPIDEM TARTRATE 10 MG/1
10 TABLET ORAL NIGHTLY PRN
Qty: 30 TABLET | Refills: 0 | Status: SHIPPED | OUTPATIENT
Start: 2022-02-21 | End: 2022-02-21

## 2022-02-21 RX ORDER — ZOLPIDEM TARTRATE 10 MG/1
10 TABLET ORAL NIGHTLY PRN
Qty: 30 TABLET | Refills: 0 | Status: SHIPPED | OUTPATIENT
Start: 2022-02-21 | End: 2022-03-28

## 2022-02-21 RX ORDER — ZOLPIDEM TARTRATE 10 MG/1
TABLET ORAL
Qty: 30 TABLET | Refills: 0 | Status: SHIPPED | OUTPATIENT
Start: 2022-02-21 | End: 2022-02-21

## 2022-02-22 ENCOUNTER — HOSPITAL ENCOUNTER (OUTPATIENT)
Dept: NUCLEAR MEDICINE | Facility: HOSPITAL | Age: 62
Discharge: HOME | End: 2022-02-22
Attending: INTERNAL MEDICINE
Payer: COMMERCIAL

## 2022-02-22 DIAGNOSIS — Z17.0 MALIGNANT NEOPLASM OF UPPER-OUTER QUADRANT OF RIGHT BREAST IN FEMALE, ESTROGEN RECEPTOR POSITIVE (HCC): ICD-10-CM

## 2022-02-22 DIAGNOSIS — C50.411 MALIGNANT NEOPLASM OF UPPER-OUTER QUADRANT OF RIGHT BREAST IN FEMALE, ESTROGEN RECEPTOR POSITIVE (HCC): ICD-10-CM

## 2022-02-22 PROCEDURE — 78306 BONE IMAGING WHOLE BODY: CPT | Performed by: INTERNAL MEDICINE

## 2022-02-24 ENCOUNTER — NURSE ONLY (OUTPATIENT)
Dept: HEMATOLOGY/ONCOLOGY | Facility: HOSPITAL | Age: 62
End: 2022-02-24
Attending: INTERNAL MEDICINE
Payer: COMMERCIAL

## 2022-02-24 VITALS
DIASTOLIC BLOOD PRESSURE: 82 MMHG | BODY MASS INDEX: 26 KG/M2 | SYSTOLIC BLOOD PRESSURE: 141 MMHG | HEART RATE: 60 BPM | RESPIRATION RATE: 16 BRPM | TEMPERATURE: 98 F | OXYGEN SATURATION: 99 % | WEIGHT: 138 LBS

## 2022-02-24 DIAGNOSIS — Z51.11 CHEMOTHERAPY MANAGEMENT, ENCOUNTER FOR: ICD-10-CM

## 2022-02-24 DIAGNOSIS — T45.1X5A CHEMOTHERAPY-INDUCED NEUTROPENIA (HCC): Primary | ICD-10-CM

## 2022-02-24 DIAGNOSIS — Z17.0 MALIGNANT NEOPLASM OF UPPER-OUTER QUADRANT OF RIGHT BREAST IN FEMALE, ESTROGEN RECEPTOR POSITIVE (HCC): ICD-10-CM

## 2022-02-24 DIAGNOSIS — Z45.2 ENCOUNTER FOR CENTRAL LINE CARE: Primary | ICD-10-CM

## 2022-02-24 DIAGNOSIS — Z79.899 ENCOUNTER FOR MEDICATION MANAGEMENT: ICD-10-CM

## 2022-02-24 DIAGNOSIS — D70.1 CHEMOTHERAPY-INDUCED NEUTROPENIA (HCC): Primary | ICD-10-CM

## 2022-02-24 DIAGNOSIS — C50.919 METASTATIC BREAST CANCER (HCC): ICD-10-CM

## 2022-02-24 DIAGNOSIS — C50.411 MALIGNANT NEOPLASM OF UPPER-OUTER QUADRANT OF RIGHT BREAST IN FEMALE, ESTROGEN RECEPTOR POSITIVE (HCC): ICD-10-CM

## 2022-02-24 LAB
ALBUMIN SERPL-MCNC: 3.4 G/DL (ref 3.4–5)
ALBUMIN/GLOB SERPL: 1 {RATIO} (ref 1–2)
ALP LIVER SERPL-CCNC: 53 U/L
ALT SERPL-CCNC: 27 U/L
ANION GAP SERPL CALC-SCNC: 7 MMOL/L (ref 0–18)
AST SERPL-CCNC: 21 U/L (ref 15–37)
BASOPHILS # BLD AUTO: 0.03 X10(3) UL (ref 0–0.2)
BASOPHILS NFR BLD AUTO: 2.1 %
BILIRUB SERPL-MCNC: 0.8 MG/DL (ref 0.1–2)
BUN BLD-MCNC: 15 MG/DL (ref 7–18)
CALCIUM BLD-MCNC: 9.2 MG/DL (ref 8.5–10.1)
CHLORIDE SERPL-SCNC: 112 MMOL/L (ref 98–112)
CO2 SERPL-SCNC: 23 MMOL/L (ref 21–32)
CREAT BLD-MCNC: 1.16 MG/DL
EOSINOPHIL # BLD AUTO: 0.05 X10(3) UL (ref 0–0.7)
EOSINOPHIL NFR BLD AUTO: 3.5 %
ERYTHROCYTE [DISTWIDTH] IN BLOOD BY AUTOMATED COUNT: 15.1 % (ref 11–15)
GLOBULIN PLAS-MCNC: 3.3 G/DL (ref 2.8–4.4)
GLUCOSE BLD-MCNC: 107 MG/DL (ref 70–99)
HCT VFR BLD AUTO: 37.2 %
HGB BLD-MCNC: 12.4 G/DL
IMM GRANULOCYTES # BLD AUTO: 0 X10(3) UL (ref 0–1)
IMM GRANULOCYTES NFR BLD: 0 %
LYMPHOCYTES # BLD AUTO: 0.45 X10(3) UL (ref 1–4)
LYMPHOCYTES NFR BLD AUTO: 31.3 %
MCH RBC QN AUTO: 32.9 PG (ref 26–34)
MCHC RBC AUTO-ENTMCNC: 33.3 G/DL (ref 31–37)
MCV RBC AUTO: 98.7 FL
MONOCYTES # BLD AUTO: 0.29 X10(3) UL (ref 0.1–1)
MONOCYTES NFR BLD AUTO: 20.1 %
NEUTROPHILS # BLD AUTO: 0.62 X10 (3) UL (ref 1.5–7.7)
NEUTROPHILS # BLD AUTO: 0.62 X10(3) UL (ref 1.5–7.7)
NEUTROPHILS NFR BLD AUTO: 43 %
OSMOLALITY SERPL CALC.SUM OF ELEC: 295 MOSM/KG (ref 275–295)
PLATELET # BLD AUTO: 166 10(3)UL (ref 150–450)
POTASSIUM SERPL-SCNC: 3.6 MMOL/L (ref 3.5–5.1)
PROT SERPL-MCNC: 6.7 G/DL (ref 6.4–8.2)
RBC # BLD AUTO: 3.77 X10(6)UL
SODIUM SERPL-SCNC: 142 MMOL/L (ref 136–145)
WBC # BLD AUTO: 1.4 X10(3) UL (ref 4–11)

## 2022-02-24 PROCEDURE — 85025 COMPLETE CBC W/AUTO DIFF WBC: CPT

## 2022-02-24 PROCEDURE — 80053 COMPREHEN METABOLIC PANEL: CPT

## 2022-02-24 PROCEDURE — 99215 OFFICE O/P EST HI 40 MIN: CPT | Performed by: INTERNAL MEDICINE

## 2022-02-24 PROCEDURE — 36415 COLL VENOUS BLD VENIPUNCTURE: CPT

## 2022-02-24 RX ORDER — LAMOTRIGINE 25 MG/1
500 TABLET ORAL ONCE
Status: CANCELLED | OUTPATIENT
Start: 2022-03-17

## 2022-02-24 RX ORDER — HEPARIN SODIUM (PORCINE) LOCK FLUSH IV SOLN 100 UNIT/ML 100 UNIT/ML
500 SOLUTION INTRAVENOUS ONCE
OUTPATIENT
Start: 2022-03-17

## 2022-02-24 RX ORDER — HEPARIN SODIUM (PORCINE) LOCK FLUSH IV SOLN 100 UNIT/ML 100 UNIT/ML
500 SOLUTION INTRAVENOUS ONCE
Status: DISCONTINUED | OUTPATIENT
Start: 2022-02-24 | End: 2022-02-24

## 2022-03-03 ENCOUNTER — NURSE ONLY (OUTPATIENT)
Dept: HEMATOLOGY/ONCOLOGY | Facility: HOSPITAL | Age: 62
End: 2022-03-03
Attending: INTERNAL MEDICINE
Payer: COMMERCIAL

## 2022-03-03 ENCOUNTER — TELEPHONE (OUTPATIENT)
Dept: HEMATOLOGY/ONCOLOGY | Facility: HOSPITAL | Age: 62
End: 2022-03-03

## 2022-03-03 DIAGNOSIS — T45.1X5A CHEMOTHERAPY-INDUCED NEUTROPENIA (HCC): ICD-10-CM

## 2022-03-03 DIAGNOSIS — D70.1 CHEMOTHERAPY-INDUCED NEUTROPENIA (HCC): ICD-10-CM

## 2022-03-03 LAB
BASOPHILS # BLD AUTO: 0.05 X10(3) UL (ref 0–0.2)
BASOPHILS NFR BLD AUTO: 1.7 %
DEPRECATED RDW RBC AUTO: 54.6 FL (ref 35.1–46.3)
EOSINOPHIL # BLD AUTO: 0.03 X10(3) UL (ref 0–0.7)
ERYTHROCYTE [DISTWIDTH] IN BLOOD BY AUTOMATED COUNT: 14.9 % (ref 11–15)
HCT VFR BLD AUTO: 38.9 %
HGB BLD-MCNC: 12.5 G/DL
IMM GRANULOCYTES # BLD AUTO: 0.04 X10(3) UL (ref 0–1)
IMM GRANULOCYTES NFR BLD: 1.3 %
LYMPHOCYTES # BLD AUTO: 0.6 X10(3) UL (ref 1–4)
LYMPHOCYTES NFR BLD AUTO: 19.9 %
MCH RBC QN AUTO: 31.6 PG (ref 26–34)
MCHC RBC AUTO-ENTMCNC: 32.1 G/DL (ref 31–37)
MCV RBC AUTO: 98.5 FL
MONOCYTES NFR BLD AUTO: 15.9 %
NEUTROPHILS # BLD AUTO: 1.81 X10 (3) UL (ref 1.5–7.7)
NEUTROPHILS # BLD AUTO: 1.81 X10(3) UL (ref 1.5–7.7)
NEUTROPHILS NFR BLD AUTO: 60.2 %
PLATELET # BLD AUTO: 208 10(3)UL (ref 150–450)
WBC # BLD AUTO: 3 X10(3) UL (ref 4–11)

## 2022-03-03 PROCEDURE — 36415 COLL VENOUS BLD VENIPUNCTURE: CPT

## 2022-03-03 PROCEDURE — 85025 COMPLETE CBC W/AUTO DIFF WBC: CPT

## 2022-03-03 NOTE — TELEPHONE ENCOUNTER
Spoke with Girish Wu. Dr Wenceslao Hall wants her to restart her cycle at 100 mg daily. Repeat labs on 3/17 and 3/24. Follow up on 3/31 with labs and MD to start next cycle. Girish Wu repeated back information and verbalized understanding.

## 2022-03-12 ENCOUNTER — LAB REQUISITION (OUTPATIENT)
Dept: SURGERY | Age: 62
End: 2022-03-12
Payer: COMMERCIAL

## 2022-03-13 LAB — SARS-COV-2 RNA RESP QL NAA+PROBE: NOT DETECTED

## 2022-03-15 ENCOUNTER — SURGERY CENTER DOCUMENTATION (OUTPATIENT)
Dept: SURGERY | Age: 62
End: 2022-03-15

## 2022-03-15 ENCOUNTER — LAB REQUISITION (OUTPATIENT)
Dept: SURGERY | Age: 62
End: 2022-03-15
Payer: COMMERCIAL

## 2022-03-15 PROCEDURE — 45380 COLONOSCOPY AND BIOPSY: CPT | Performed by: INTERNAL MEDICINE

## 2022-03-15 PROCEDURE — 45385 COLONOSCOPY W/LESION REMOVAL: CPT | Performed by: INTERNAL MEDICINE

## 2022-03-15 PROCEDURE — 88305 TISSUE EXAM BY PATHOLOGIST: CPT | Performed by: INTERNAL MEDICINE

## 2022-03-17 ENCOUNTER — NURSE ONLY (OUTPATIENT)
Dept: HEMATOLOGY/ONCOLOGY | Facility: HOSPITAL | Age: 62
End: 2022-03-17
Attending: INTERNAL MEDICINE
Payer: COMMERCIAL

## 2022-03-17 VITALS
HEART RATE: 62 BPM | DIASTOLIC BLOOD PRESSURE: 85 MMHG | RESPIRATION RATE: 16 BRPM | OXYGEN SATURATION: 100 % | TEMPERATURE: 98 F | SYSTOLIC BLOOD PRESSURE: 129 MMHG

## 2022-03-17 DIAGNOSIS — Z17.0 MALIGNANT NEOPLASM OF UPPER-OUTER QUADRANT OF RIGHT BREAST IN FEMALE, ESTROGEN RECEPTOR POSITIVE (HCC): Primary | ICD-10-CM

## 2022-03-17 DIAGNOSIS — C50.411 MALIGNANT NEOPLASM OF UPPER-OUTER QUADRANT OF RIGHT BREAST IN FEMALE, ESTROGEN RECEPTOR POSITIVE (HCC): Primary | ICD-10-CM

## 2022-03-17 LAB
ALBUMIN SERPL-MCNC: 3.3 G/DL (ref 3.4–5)
ALBUMIN/GLOB SERPL: 1.1 {RATIO} (ref 1–2)
ALP LIVER SERPL-CCNC: 59 U/L
ALT SERPL-CCNC: 23 U/L
ANION GAP SERPL CALC-SCNC: 6 MMOL/L (ref 0–18)
AST SERPL-CCNC: 18 U/L (ref 15–37)
BASOPHILS # BLD AUTO: 0.02 X10(3) UL (ref 0–0.2)
BASOPHILS NFR BLD AUTO: 1.1 %
BILIRUB SERPL-MCNC: 1.1 MG/DL (ref 0.1–2)
BUN BLD-MCNC: 22 MG/DL (ref 7–18)
BUN/CREAT SERPL: 19.1 (ref 10–20)
CALCIUM BLD-MCNC: 8.4 MG/DL (ref 8.5–10.1)
CHLORIDE SERPL-SCNC: 110 MMOL/L (ref 98–112)
CO2 SERPL-SCNC: 24 MMOL/L (ref 21–32)
CREAT BLD-MCNC: 1.15 MG/DL
DEPRECATED RDW RBC AUTO: 53.3 FL (ref 35.1–46.3)
EOSINOPHIL # BLD AUTO: 0.03 X10(3) UL (ref 0–0.7)
EOSINOPHIL NFR BLD AUTO: 1.6 %
ERYTHROCYTE [DISTWIDTH] IN BLOOD BY AUTOMATED COUNT: 14.5 % (ref 11–15)
GLOBULIN PLAS-MCNC: 3.1 G/DL (ref 2.8–4.4)
GLUCOSE BLD-MCNC: 91 MG/DL (ref 70–99)
HCT VFR BLD AUTO: 38.3 %
HGB BLD-MCNC: 12 G/DL
IMM GRANULOCYTES # BLD AUTO: 0.01 X10(3) UL (ref 0–1)
IMM GRANULOCYTES NFR BLD: 0.5 %
LYMPHOCYTES # BLD AUTO: 0.4 X10(3) UL (ref 1–4)
LYMPHOCYTES NFR BLD AUTO: 21.1 %
MCH RBC QN AUTO: 31.3 PG (ref 26–34)
MCHC RBC AUTO-ENTMCNC: 31.3 G/DL (ref 31–37)
MCV RBC AUTO: 100 FL
MONOCYTES # BLD AUTO: 0.16 X10(3) UL (ref 0.1–1)
MONOCYTES NFR BLD AUTO: 8.4 %
NEUTROPHILS # BLD AUTO: 1.28 X10 (3) UL (ref 1.5–7.7)
NEUTROPHILS # BLD AUTO: 1.28 X10(3) UL (ref 1.5–7.7)
NEUTROPHILS NFR BLD AUTO: 67.3 %
OSMOLALITY SERPL CALC.SUM OF ELEC: 293 MOSM/KG (ref 275–295)
PLATELET # BLD AUTO: 247 10(3)UL (ref 150–450)
POTASSIUM SERPL-SCNC: 4.1 MMOL/L (ref 3.5–5.1)
PROT SERPL-MCNC: 6.4 G/DL (ref 6.4–8.2)
RBC # BLD AUTO: 3.83 X10(6)UL
SODIUM SERPL-SCNC: 140 MMOL/L (ref 136–145)
WBC # BLD AUTO: 1.9 X10(3) UL (ref 4–11)

## 2022-03-17 PROCEDURE — 36415 COLL VENOUS BLD VENIPUNCTURE: CPT

## 2022-03-17 PROCEDURE — 96402 CHEMO HORMON ANTINEOPL SQ/IM: CPT

## 2022-03-17 PROCEDURE — 85025 COMPLETE CBC W/AUTO DIFF WBC: CPT

## 2022-03-17 PROCEDURE — 80053 COMPREHEN METABOLIC PANEL: CPT

## 2022-03-17 RX ORDER — LAMOTRIGINE 25 MG/1
500 TABLET ORAL ONCE
OUTPATIENT
Start: 2022-04-14

## 2022-03-17 RX ORDER — LAMOTRIGINE 25 MG/1
500 TABLET ORAL ONCE
Status: COMPLETED | OUTPATIENT
Start: 2022-03-17 | End: 2022-03-17

## 2022-03-17 RX ORDER — HEPARIN SODIUM (PORCINE) LOCK FLUSH IV SOLN 100 UNIT/ML 100 UNIT/ML
500 SOLUTION INTRAVENOUS ONCE
OUTPATIENT
Start: 2022-04-14

## 2022-03-17 RX ADMIN — LAMOTRIGINE 500 MG: 25 TABLET ORAL at 09:53:00

## 2022-03-17 NOTE — PROGRESS NOTES
Pt here for q4 week labs and faslodex injections  VSS, reports feeling well  Labs drawn peripherally, tolerated well  faslodex administered bilat upper outer glut - tolerated well, 2x2 gauze/ paper tape to sites    Discharged stable to home, gait steady / indep  Has future appts, reviewed MyChart

## 2022-03-24 ENCOUNTER — NURSE ONLY (OUTPATIENT)
Dept: HEMATOLOGY/ONCOLOGY | Facility: HOSPITAL | Age: 62
End: 2022-03-24
Attending: NURSE PRACTITIONER
Payer: COMMERCIAL

## 2022-03-24 DIAGNOSIS — C50.411 MALIGNANT NEOPLASM OF UPPER-OUTER QUADRANT OF RIGHT BREAST IN FEMALE, ESTROGEN RECEPTOR POSITIVE (HCC): ICD-10-CM

## 2022-03-24 DIAGNOSIS — Z17.0 MALIGNANT NEOPLASM OF UPPER-OUTER QUADRANT OF RIGHT BREAST IN FEMALE, ESTROGEN RECEPTOR POSITIVE (HCC): ICD-10-CM

## 2022-03-24 LAB
ALBUMIN SERPL-MCNC: 3.5 G/DL (ref 3.4–5)
ALBUMIN/GLOB SERPL: 1.1 {RATIO} (ref 1–2)
ALP LIVER SERPL-CCNC: 67 U/L
ALT SERPL-CCNC: 28 U/L
ANION GAP SERPL CALC-SCNC: 7 MMOL/L (ref 0–18)
AST SERPL-CCNC: 18 U/L (ref 15–37)
BASOPHILS # BLD AUTO: 0.03 X10(3) UL (ref 0–0.2)
BASOPHILS NFR BLD AUTO: 1.5 %
BILIRUB SERPL-MCNC: 1.5 MG/DL (ref 0.1–2)
BUN BLD-MCNC: 22 MG/DL (ref 7–18)
CALCIUM BLD-MCNC: 9.4 MG/DL (ref 8.5–10.1)
CHLORIDE SERPL-SCNC: 112 MMOL/L (ref 98–112)
CO2 SERPL-SCNC: 25 MMOL/L (ref 21–32)
CREAT BLD-MCNC: 1.11 MG/DL
DEPRECATED RDW RBC AUTO: 52.8 FL (ref 35.1–46.3)
EOSINOPHIL # BLD AUTO: 0.04 X10(3) UL (ref 0–0.7)
EOSINOPHIL NFR BLD AUTO: 2 %
ERYTHROCYTE [DISTWIDTH] IN BLOOD BY AUTOMATED COUNT: 15 % (ref 11–15)
GLOBULIN PLAS-MCNC: 3.3 G/DL (ref 2.8–4.4)
GLUCOSE BLD-MCNC: 83 MG/DL (ref 70–99)
HCT VFR BLD AUTO: 38.2 %
HGB BLD-MCNC: 12.7 G/DL
IMM GRANULOCYTES # BLD AUTO: 0.01 X10(3) UL (ref 0–1)
IMM GRANULOCYTES NFR BLD: 0.5 %
LYMPHOCYTES # BLD AUTO: 0.44 X10(3) UL (ref 1–4)
LYMPHOCYTES NFR BLD AUTO: 22 %
MCH RBC QN AUTO: 32.2 PG (ref 26–34)
MCHC RBC AUTO-ENTMCNC: 33.2 G/DL (ref 31–37)
MCV RBC AUTO: 96.7 FL
MONOCYTES # BLD AUTO: 0.22 X10(3) UL (ref 0.1–1)
MONOCYTES NFR BLD AUTO: 11 %
NEUTROPHILS # BLD AUTO: 1.26 X10 (3) UL (ref 1.5–7.7)
NEUTROPHILS # BLD AUTO: 1.26 X10(3) UL (ref 1.5–7.7)
NEUTROPHILS NFR BLD AUTO: 63 %
OSMOLALITY SERPL CALC.SUM OF ELEC: 300 MOSM/KG (ref 275–295)
PLATELET # BLD AUTO: 147 10(3)UL (ref 150–450)
POTASSIUM SERPL-SCNC: 4.5 MMOL/L (ref 3.5–5.1)
PROT SERPL-MCNC: 6.8 G/DL (ref 6.4–8.2)
RBC # BLD AUTO: 3.95 X10(6)UL
SODIUM SERPL-SCNC: 144 MMOL/L (ref 136–145)
VIT D+METAB SERPL-MCNC: 26.5 NG/ML (ref 30–100)
WBC # BLD AUTO: 2 X10(3) UL (ref 4–11)

## 2022-03-24 PROCEDURE — 80053 COMPREHEN METABOLIC PANEL: CPT

## 2022-03-24 PROCEDURE — 36415 COLL VENOUS BLD VENIPUNCTURE: CPT

## 2022-03-24 PROCEDURE — 85025 COMPLETE CBC W/AUTO DIFF WBC: CPT

## 2022-03-24 PROCEDURE — 82306 VITAMIN D 25 HYDROXY: CPT

## 2022-03-25 ENCOUNTER — TELEPHONE (OUTPATIENT)
Dept: HEMATOLOGY/ONCOLOGY | Facility: HOSPITAL | Age: 62
End: 2022-03-25

## 2022-03-25 NOTE — TELEPHONE ENCOUNTER
Spoke with Teodoro Oliveira. Reviewed the labs with her. Asked about her vitamin D. She states that she hasn't been taking it, paused for her colonoscopy. She has been taking 6000 international units daily for about a week now. Encouraged her to keep taking the vitamin D consistently.

## 2022-03-28 RX ORDER — ZOLPIDEM TARTRATE 10 MG/1
TABLET ORAL
Qty: 30 TABLET | Refills: 0 | Status: SHIPPED | OUTPATIENT
Start: 2022-03-28

## 2022-03-31 ENCOUNTER — NURSE ONLY (OUTPATIENT)
Dept: HEMATOLOGY/ONCOLOGY | Facility: HOSPITAL | Age: 62
End: 2022-03-31
Attending: INTERNAL MEDICINE
Payer: COMMERCIAL

## 2022-03-31 VITALS
OXYGEN SATURATION: 97 % | BODY MASS INDEX: 25.86 KG/M2 | SYSTOLIC BLOOD PRESSURE: 143 MMHG | WEIGHT: 137 LBS | HEIGHT: 61 IN | RESPIRATION RATE: 16 BRPM | DIASTOLIC BLOOD PRESSURE: 94 MMHG | HEART RATE: 77 BPM | TEMPERATURE: 98 F

## 2022-03-31 DIAGNOSIS — J91.0 MALIGNANT PLEURAL EFFUSION: ICD-10-CM

## 2022-03-31 DIAGNOSIS — D70.1 CHEMOTHERAPY-INDUCED NEUTROPENIA (HCC): ICD-10-CM

## 2022-03-31 DIAGNOSIS — C50.411 MALIGNANT NEOPLASM OF UPPER-OUTER QUADRANT OF RIGHT BREAST IN FEMALE, ESTROGEN RECEPTOR POSITIVE (HCC): ICD-10-CM

## 2022-03-31 DIAGNOSIS — Z17.0 MALIGNANT NEOPLASM OF UPPER-OUTER QUADRANT OF RIGHT BREAST IN FEMALE, ESTROGEN RECEPTOR POSITIVE (HCC): ICD-10-CM

## 2022-03-31 DIAGNOSIS — T45.1X5A CHEMOTHERAPY-INDUCED NEUTROPENIA (HCC): ICD-10-CM

## 2022-03-31 DIAGNOSIS — Z51.11 CHEMOTHERAPY MANAGEMENT, ENCOUNTER FOR: Primary | ICD-10-CM

## 2022-03-31 DIAGNOSIS — C78.7 LIVER METASTASIS (HCC): ICD-10-CM

## 2022-03-31 LAB
ALBUMIN SERPL-MCNC: 3.7 G/DL (ref 3.4–5)
ALBUMIN/GLOB SERPL: 1.2 {RATIO} (ref 1–2)
ALP LIVER SERPL-CCNC: 57 U/L
ALT SERPL-CCNC: 21 U/L
ANION GAP SERPL CALC-SCNC: 5 MMOL/L (ref 0–18)
AST SERPL-CCNC: 17 U/L (ref 15–37)
BASOPHILS # BLD AUTO: 0.03 X10(3) UL (ref 0–0.2)
BASOPHILS NFR BLD AUTO: 1.7 %
BILIRUB SERPL-MCNC: 1.4 MG/DL (ref 0.1–2)
BUN/CREAT SERPL: 13.6 (ref 10–20)
CALCIUM BLD-MCNC: 8.6 MG/DL (ref 8.5–10.1)
CHLORIDE SERPL-SCNC: 109 MMOL/L (ref 98–112)
CO2 SERPL-SCNC: 26 MMOL/L (ref 21–32)
CREAT BLD-MCNC: 1.18 MG/DL
DEPRECATED RDW RBC AUTO: 58.3 FL (ref 35.1–46.3)
EOSINOPHIL # BLD AUTO: 0.03 X10(3) UL (ref 0–0.7)
EOSINOPHIL NFR BLD AUTO: 1.7 %
ERYTHROCYTE [DISTWIDTH] IN BLOOD BY AUTOMATED COUNT: 15.9 % (ref 11–15)
GLOBULIN PLAS-MCNC: 3.1 G/DL (ref 2.8–4.4)
GLUCOSE BLD-MCNC: 81 MG/DL (ref 70–99)
HCT VFR BLD AUTO: 39.2 %
HGB BLD-MCNC: 12.7 G/DL
IMM GRANULOCYTES # BLD AUTO: 0 X10(3) UL (ref 0–1)
IMM GRANULOCYTES NFR BLD: 0 %
LYMPHOCYTES # BLD AUTO: 0.49 X10(3) UL (ref 1–4)
LYMPHOCYTES NFR BLD AUTO: 27.7 %
MCH RBC QN AUTO: 32.6 PG (ref 26–34)
MCHC RBC AUTO-ENTMCNC: 32.4 G/DL (ref 31–37)
MCV RBC AUTO: 100.5 FL
MONOCYTES # BLD AUTO: 0.26 X10(3) UL (ref 0.1–1)
MONOCYTES NFR BLD AUTO: 14.7 %
NEUTROPHILS # BLD AUTO: 0.96 X10 (3) UL (ref 1.5–7.7)
NEUTROPHILS # BLD AUTO: 0.96 X10(3) UL (ref 1.5–7.7)
NEUTROPHILS NFR BLD AUTO: 54.2 %
OSMOLALITY SERPL CALC.SUM OF ELEC: 290 MOSM/KG (ref 275–295)
PLATELET # BLD AUTO: 136 10(3)UL (ref 150–450)
POTASSIUM SERPL-SCNC: 3.8 MMOL/L (ref 3.5–5.1)
PROT SERPL-MCNC: 6.8 G/DL (ref 6.4–8.2)
RBC # BLD AUTO: 3.9 X10(6)UL
SODIUM SERPL-SCNC: 140 MMOL/L (ref 136–145)
WBC # BLD AUTO: 1.8 X10(3) UL (ref 4–11)

## 2022-03-31 PROCEDURE — 85025 COMPLETE CBC W/AUTO DIFF WBC: CPT

## 2022-03-31 PROCEDURE — 80053 COMPREHEN METABOLIC PANEL: CPT

## 2022-03-31 PROCEDURE — 36415 COLL VENOUS BLD VENIPUNCTURE: CPT

## 2022-03-31 PROCEDURE — 99215 OFFICE O/P EST HI 40 MIN: CPT | Performed by: NURSE PRACTITIONER

## 2022-04-14 ENCOUNTER — NURSE ONLY (OUTPATIENT)
Dept: HEMATOLOGY/ONCOLOGY | Facility: HOSPITAL | Age: 62
End: 2022-04-14
Attending: INTERNAL MEDICINE
Payer: COMMERCIAL

## 2022-04-14 VITALS
WEIGHT: 134 LBS | RESPIRATION RATE: 16 BRPM | SYSTOLIC BLOOD PRESSURE: 134 MMHG | OXYGEN SATURATION: 97 % | HEIGHT: 61 IN | TEMPERATURE: 98 F | BODY MASS INDEX: 25.3 KG/M2 | DIASTOLIC BLOOD PRESSURE: 81 MMHG | HEART RATE: 76 BPM

## 2022-04-14 DIAGNOSIS — C50.919 METASTATIC BREAST CANCER (HCC): Primary | ICD-10-CM

## 2022-04-14 DIAGNOSIS — Z17.0 MALIGNANT NEOPLASM OF UPPER-OUTER QUADRANT OF RIGHT BREAST IN FEMALE, ESTROGEN RECEPTOR POSITIVE (HCC): Primary | ICD-10-CM

## 2022-04-14 DIAGNOSIS — J91.0 MALIGNANT PLEURAL EFFUSION: ICD-10-CM

## 2022-04-14 DIAGNOSIS — T45.1X5A CHEMOTHERAPY-INDUCED NEUTROPENIA (HCC): ICD-10-CM

## 2022-04-14 DIAGNOSIS — Z51.11 CHEMOTHERAPY MANAGEMENT, ENCOUNTER FOR: ICD-10-CM

## 2022-04-14 DIAGNOSIS — D70.1 CHEMOTHERAPY-INDUCED NEUTROPENIA (HCC): ICD-10-CM

## 2022-04-14 DIAGNOSIS — C50.411 MALIGNANT NEOPLASM OF UPPER-OUTER QUADRANT OF RIGHT BREAST IN FEMALE, ESTROGEN RECEPTOR POSITIVE (HCC): Primary | ICD-10-CM

## 2022-04-14 LAB
ALBUMIN SERPL-MCNC: 3.6 G/DL (ref 3.4–5)
ALBUMIN/GLOB SERPL: 1.1 {RATIO} (ref 1–2)
ALP LIVER SERPL-CCNC: 55 U/L
ALT SERPL-CCNC: 17 U/L
ANION GAP SERPL CALC-SCNC: 4 MMOL/L (ref 0–18)
AST SERPL-CCNC: 15 U/L (ref 15–37)
BASOPHILS # BLD AUTO: 0.02 X10(3) UL (ref 0–0.2)
BASOPHILS NFR BLD AUTO: 1.3 %
BILIRUB SERPL-MCNC: 1.5 MG/DL (ref 0.1–2)
BUN BLD-MCNC: 24 MG/DL (ref 7–18)
BUN/CREAT SERPL: 20 (ref 10–20)
CALCIUM BLD-MCNC: 8.8 MG/DL (ref 8.5–10.1)
CHLORIDE SERPL-SCNC: 108 MMOL/L (ref 98–112)
CHOLEST SERPL-MCNC: 286 MG/DL (ref ?–200)
CO2 SERPL-SCNC: 27 MMOL/L (ref 21–32)
CREAT BLD-MCNC: 1.2 MG/DL
DEPRECATED HBV CORE AB SER IA-ACNC: 107.4 NG/ML
DEPRECATED RDW RBC AUTO: 58.2 FL (ref 35.1–46.3)
EOSINOPHIL # BLD AUTO: 0.01 X10(3) UL (ref 0–0.7)
EOSINOPHIL NFR BLD AUTO: 0.6 %
ERYTHROCYTE [DISTWIDTH] IN BLOOD BY AUTOMATED COUNT: 15.5 % (ref 11–15)
FOLATE SERPL-MCNC: 14.6 NG/ML (ref 8.7–?)
GLOBULIN PLAS-MCNC: 3.2 G/DL (ref 2.8–4.4)
GLUCOSE BLD-MCNC: 86 MG/DL (ref 70–99)
HCT VFR BLD AUTO: 38.5 %
HDLC SERPL-MCNC: 71 MG/DL (ref 40–59)
HGB BLD-MCNC: 12.6 G/DL
IMM GRANULOCYTES # BLD AUTO: 0.01 X10(3) UL (ref 0–1)
IMM GRANULOCYTES NFR BLD: 0.6 %
IRON SATN MFR SERPL: 28 %
IRON SERPL-MCNC: 95 UG/DL
LDLC SERPL CALC-MCNC: 191 MG/DL (ref ?–100)
LYMPHOCYTES # BLD AUTO: 0.42 X10(3) UL (ref 1–4)
LYMPHOCYTES NFR BLD AUTO: 26.8 %
MCH RBC QN AUTO: 33.3 PG (ref 26–34)
MCHC RBC AUTO-ENTMCNC: 32.7 G/DL (ref 31–37)
MCV RBC AUTO: 101.9 FL
MONOCYTES # BLD AUTO: 0.17 X10(3) UL (ref 0.1–1)
MONOCYTES NFR BLD AUTO: 10.8 %
NEUTROPHILS # BLD AUTO: 0.94 X10 (3) UL (ref 1.5–7.7)
NEUTROPHILS # BLD AUTO: 0.94 X10(3) UL (ref 1.5–7.7)
NEUTROPHILS NFR BLD AUTO: 59.9 %
NONHDLC SERPL-MCNC: 215 MG/DL (ref ?–130)
OSMOLALITY SERPL CALC.SUM OF ELEC: 291 MOSM/KG (ref 275–295)
PLATELET # BLD AUTO: 205 10(3)UL (ref 150–450)
POTASSIUM SERPL-SCNC: 4.2 MMOL/L (ref 3.5–5.1)
PROT SERPL-MCNC: 6.8 G/DL (ref 6.4–8.2)
RBC # BLD AUTO: 3.78 X10(6)UL
SODIUM SERPL-SCNC: 139 MMOL/L (ref 136–145)
TIBC SERPL-MCNC: 334 UG/DL (ref 240–450)
TRANSFERRIN SERPL-MCNC: 224 MG/DL (ref 200–360)
TRIGL SERPL-MCNC: 134 MG/DL (ref 30–149)
VIT B12 SERPL-MCNC: 259 PG/ML (ref 193–986)
VLDLC SERPL CALC-MCNC: 28 MG/DL (ref 0–30)
WBC # BLD AUTO: 1.6 X10(3) UL (ref 4–11)

## 2022-04-14 PROCEDURE — 96402 CHEMO HORMON ANTINEOPL SQ/IM: CPT

## 2022-04-14 PROCEDURE — 82728 ASSAY OF FERRITIN: CPT

## 2022-04-14 PROCEDURE — 80061 LIPID PANEL: CPT

## 2022-04-14 PROCEDURE — 96523 IRRIG DRUG DELIVERY DEVICE: CPT

## 2022-04-14 PROCEDURE — 99215 OFFICE O/P EST HI 40 MIN: CPT | Performed by: INTERNAL MEDICINE

## 2022-04-14 PROCEDURE — 85025 COMPLETE CBC W/AUTO DIFF WBC: CPT

## 2022-04-14 PROCEDURE — 82746 ASSAY OF FOLIC ACID SERUM: CPT

## 2022-04-14 PROCEDURE — 80053 COMPREHEN METABOLIC PANEL: CPT

## 2022-04-14 PROCEDURE — 84466 ASSAY OF TRANSFERRIN: CPT

## 2022-04-14 PROCEDURE — 83540 ASSAY OF IRON: CPT

## 2022-04-14 PROCEDURE — 36415 COLL VENOUS BLD VENIPUNCTURE: CPT

## 2022-04-14 PROCEDURE — 82607 VITAMIN B-12: CPT

## 2022-04-14 RX ORDER — HEPARIN SODIUM (PORCINE) LOCK FLUSH IV SOLN 100 UNIT/ML 100 UNIT/ML
500 SOLUTION INTRAVENOUS ONCE
OUTPATIENT
Start: 2022-05-12

## 2022-04-14 RX ORDER — LAMOTRIGINE 25 MG/1
500 TABLET ORAL ONCE
Status: COMPLETED | OUTPATIENT
Start: 2022-04-14 | End: 2022-04-14

## 2022-04-14 RX ORDER — LAMOTRIGINE 25 MG/1
500 TABLET ORAL ONCE
OUTPATIENT
Start: 2022-05-12

## 2022-04-14 RX ADMIN — LAMOTRIGINE 500 MG: 25 TABLET ORAL at 09:59:00

## 2022-04-27 ENCOUNTER — TELEPHONE (OUTPATIENT)
Dept: GASTROENTEROLOGY | Facility: CLINIC | Age: 62
End: 2022-04-27

## 2022-04-27 RX ORDER — PROCHLORPERAZINE MALEATE 10 MG
10 TABLET ORAL EVERY 6 HOURS PRN
Qty: 90 TABLET | Refills: 2 | Status: SHIPPED | OUTPATIENT
Start: 2022-04-27 | End: 2023-09-24

## 2022-04-27 NOTE — TELEPHONE ENCOUNTER
Entered into Epic. Recall CLN in 5 years per Dr. Beltran Lat. Last CLN done 03/15/2022. Recall entered into Patient Outreach for 03/15/2027. Health Maintenance updated.

## 2022-04-27 NOTE — TELEPHONE ENCOUNTER
Dr. Ramone Carrera,    Please advise on results and recommended recall date for colonoscopy done 3/15/22. Thank you.

## 2022-04-28 ENCOUNTER — OFFICE VISIT (OUTPATIENT)
Dept: HEMATOLOGY/ONCOLOGY | Facility: HOSPITAL | Age: 62
End: 2022-04-28
Attending: NURSE PRACTITIONER
Payer: COMMERCIAL

## 2022-04-28 ENCOUNTER — TELEPHONE (OUTPATIENT)
Dept: HEMATOLOGY/ONCOLOGY | Facility: HOSPITAL | Age: 62
End: 2022-04-28

## 2022-04-28 ENCOUNTER — NURSE ONLY (OUTPATIENT)
Dept: HEMATOLOGY/ONCOLOGY | Facility: HOSPITAL | Age: 62
End: 2022-04-28
Attending: INTERNAL MEDICINE
Payer: COMMERCIAL

## 2022-04-28 VITALS
HEART RATE: 67 BPM | RESPIRATION RATE: 16 BRPM | OXYGEN SATURATION: 100 % | DIASTOLIC BLOOD PRESSURE: 79 MMHG | BODY MASS INDEX: 25.3 KG/M2 | HEIGHT: 61 IN | SYSTOLIC BLOOD PRESSURE: 133 MMHG | WEIGHT: 134 LBS | TEMPERATURE: 98 F

## 2022-04-28 DIAGNOSIS — C78.7 LIVER METASTASIS (HCC): ICD-10-CM

## 2022-04-28 DIAGNOSIS — D70.1 CHEMOTHERAPY-INDUCED NEUTROPENIA (HCC): ICD-10-CM

## 2022-04-28 DIAGNOSIS — C50.919 METASTATIC BREAST CANCER (HCC): ICD-10-CM

## 2022-04-28 DIAGNOSIS — Z51.11 CHEMOTHERAPY MANAGEMENT, ENCOUNTER FOR: Primary | ICD-10-CM

## 2022-04-28 DIAGNOSIS — T45.1X5A CHEMOTHERAPY-INDUCED NEUTROPENIA (HCC): ICD-10-CM

## 2022-04-28 DIAGNOSIS — J91.0 MALIGNANT PLEURAL EFFUSION: ICD-10-CM

## 2022-04-28 LAB
ALBUMIN SERPL-MCNC: 3.7 G/DL (ref 3.4–5)
ALBUMIN/GLOB SERPL: 1 {RATIO} (ref 1–2)
ALP LIVER SERPL-CCNC: 47 U/L
ALT SERPL-CCNC: 19 U/L
ANION GAP SERPL CALC-SCNC: 7 MMOL/L (ref 0–18)
AST SERPL-CCNC: 17 U/L (ref 15–37)
BASOPHILS # BLD AUTO: 0.04 X10(3) UL (ref 0–0.2)
BASOPHILS NFR BLD AUTO: 2.2 %
BILIRUB SERPL-MCNC: 1.1 MG/DL (ref 0.1–2)
BUN BLD-MCNC: 26 MG/DL (ref 7–18)
BUN/CREAT SERPL: 24.3 (ref 10–20)
CALCIUM BLD-MCNC: 8.9 MG/DL (ref 8.5–10.1)
CHLORIDE SERPL-SCNC: 105 MMOL/L (ref 98–112)
CO2 SERPL-SCNC: 25 MMOL/L (ref 21–32)
CREAT BLD-MCNC: 1.07 MG/DL
DEPRECATED HBV CORE AB SER IA-ACNC: 93.3 NG/ML
DEPRECATED RDW RBC AUTO: 56.6 FL (ref 35.1–46.3)
EOSINOPHIL # BLD AUTO: 0.01 X10(3) UL (ref 0–0.7)
EOSINOPHIL NFR BLD AUTO: 0.6 %
ERYTHROCYTE [DISTWIDTH] IN BLOOD BY AUTOMATED COUNT: 15.2 % (ref 11–15)
FOLATE SERPL-MCNC: 16.5 NG/ML (ref 8.7–?)
GLOBULIN PLAS-MCNC: 3.6 G/DL (ref 2.8–4.4)
GLUCOSE BLD-MCNC: 77 MG/DL (ref 70–99)
HCT VFR BLD AUTO: 39.9 %
HGB BLD-MCNC: 13.2 G/DL
IMM GRANULOCYTES # BLD AUTO: 0.01 X10(3) UL (ref 0–1)
IMM GRANULOCYTES NFR BLD: 0.6 %
IRON SATN MFR SERPL: 19 %
IRON SERPL-MCNC: 68 UG/DL
LYMPHOCYTES # BLD AUTO: 0.42 X10(3) UL (ref 1–4)
LYMPHOCYTES NFR BLD AUTO: 23.6 %
MCH RBC QN AUTO: 33.5 PG (ref 26–34)
MCHC RBC AUTO-ENTMCNC: 33.1 G/DL (ref 31–37)
MCV RBC AUTO: 101.3 FL
MONOCYTES # BLD AUTO: 0.33 X10(3) UL (ref 0.1–1)
MONOCYTES NFR BLD AUTO: 18.5 %
NEUTROPHILS # BLD AUTO: 0.97 X10 (3) UL (ref 1.5–7.7)
NEUTROPHILS # BLD AUTO: 0.97 X10(3) UL (ref 1.5–7.7)
NEUTROPHILS NFR BLD AUTO: 54.5 %
OSMOLALITY SERPL CALC.SUM OF ELEC: 288 MOSM/KG (ref 275–295)
PLATELET # BLD AUTO: 158 10(3)UL (ref 150–450)
POTASSIUM SERPL-SCNC: 4.1 MMOL/L (ref 3.5–5.1)
PROT SERPL-MCNC: 7.3 G/DL (ref 6.4–8.2)
RBC # BLD AUTO: 3.94 X10(6)UL
SODIUM SERPL-SCNC: 137 MMOL/L (ref 136–145)
TIBC SERPL-MCNC: 352 UG/DL (ref 240–450)
TRANSFERRIN SERPL-MCNC: 236 MG/DL (ref 200–360)
VIT B12 SERPL-MCNC: 250 PG/ML (ref 193–986)
WBC # BLD AUTO: 1.8 X10(3) UL (ref 4–11)

## 2022-04-28 PROCEDURE — 82746 ASSAY OF FOLIC ACID SERUM: CPT

## 2022-04-28 PROCEDURE — 82728 ASSAY OF FERRITIN: CPT

## 2022-04-28 PROCEDURE — 36415 COLL VENOUS BLD VENIPUNCTURE: CPT

## 2022-04-28 PROCEDURE — 99215 OFFICE O/P EST HI 40 MIN: CPT | Performed by: NURSE PRACTITIONER

## 2022-04-28 PROCEDURE — 80053 COMPREHEN METABOLIC PANEL: CPT

## 2022-04-28 PROCEDURE — 85025 COMPLETE CBC W/AUTO DIFF WBC: CPT

## 2022-04-28 PROCEDURE — 83540 ASSAY OF IRON: CPT

## 2022-04-28 PROCEDURE — 84466 ASSAY OF TRANSFERRIN: CPT

## 2022-04-28 PROCEDURE — 82607 VITAMIN B-12: CPT

## 2022-04-28 RX ORDER — LORAZEPAM 0.5 MG/1
0.5 TABLET ORAL EVERY 6 HOURS PRN
Qty: 30 TABLET | Refills: 1 | Status: SHIPPED | OUTPATIENT
Start: 2022-04-28

## 2022-04-28 NOTE — PATIENT INSTRUCTIONS
Folic acid 1mg every day, continue multivitamin and D3. Start new cycle, no changes in counts. Nausea  Use compazine as needed every 6 hours      Diarrhea     Use as needed immodium for any diarrhea.  Ok to take 2 tabs with first sign of loose stool, then 1 capsule with each loose stool

## 2022-04-28 NOTE — TELEPHONE ENCOUNTER
Patient called and she had an appointment with Regulo Gamboa. Patient said that she was going to call in the prescription Lorazepam 0.5 MG oral tab. Patient went to the pharmacy to get it and it was not there. Please call in when you have a chance. Thank you.

## 2022-04-29 ENCOUNTER — TELEPHONE (OUTPATIENT)
Dept: HEMATOLOGY/ONCOLOGY | Facility: HOSPITAL | Age: 62
End: 2022-04-29

## 2022-04-29 NOTE — TELEPHONE ENCOUNTER
Spoke with Tasneem Isaacs and informed her that Dr. Lalita Fong agrees with Sree Reynolds that she will need to have a CT CAP performed before she see's her in clinic in June. Patient stated understanding and was provided with the number to Central Scheduling. States she does not need help scheduling this scan. Patient was also instructed that she can call our office to be seen sooner if her breathing worsens. Patient thanked me for calling.

## 2022-05-12 ENCOUNTER — NURSE ONLY (OUTPATIENT)
Dept: HEMATOLOGY/ONCOLOGY | Facility: HOSPITAL | Age: 62
End: 2022-05-12
Attending: INTERNAL MEDICINE
Payer: COMMERCIAL

## 2022-05-12 DIAGNOSIS — C50.411 MALIGNANT NEOPLASM OF UPPER-OUTER QUADRANT OF RIGHT BREAST IN FEMALE, ESTROGEN RECEPTOR POSITIVE (HCC): Primary | ICD-10-CM

## 2022-05-12 DIAGNOSIS — Z17.0 MALIGNANT NEOPLASM OF UPPER-OUTER QUADRANT OF RIGHT BREAST IN FEMALE, ESTROGEN RECEPTOR POSITIVE (HCC): Primary | ICD-10-CM

## 2022-05-12 LAB
ALBUMIN SERPL-MCNC: 3.6 G/DL (ref 3.4–5)
ALBUMIN/GLOB SERPL: 1.1 {RATIO} (ref 1–2)
ALP LIVER SERPL-CCNC: 47 U/L
ALT SERPL-CCNC: 19 U/L
ANION GAP SERPL CALC-SCNC: 19 MMOL/L (ref 0–18)
AST SERPL-CCNC: 17 U/L (ref 15–37)
BASOPHILS # BLD AUTO: 0.02 X10(3) UL (ref 0–0.2)
BASOPHILS NFR BLD AUTO: 1.1 %
BILIRUB SERPL-MCNC: 1.2 MG/DL (ref 0.1–2)
BUN BLD-MCNC: 17 MG/DL (ref 7–18)
BUN/CREAT SERPL: 14.4 (ref 10–20)
CALCIUM BLD-MCNC: 9 MG/DL (ref 8.5–10.1)
CHLORIDE SERPL-SCNC: 102 MMOL/L (ref 98–112)
CO2 SERPL-SCNC: 25 MMOL/L (ref 21–32)
CREAT BLD-MCNC: 1.18 MG/DL
DEPRECATED RDW RBC AUTO: 55.1 FL (ref 35.1–46.3)
EOSINOPHIL # BLD AUTO: 0.03 X10(3) UL (ref 0–0.7)
EOSINOPHIL NFR BLD AUTO: 1.7 %
ERYTHROCYTE [DISTWIDTH] IN BLOOD BY AUTOMATED COUNT: 14.8 % (ref 11–15)
GLOBULIN PLAS-MCNC: 3.2 G/DL (ref 2.8–4.4)
GLUCOSE BLD-MCNC: 82 MG/DL (ref 70–99)
HCT VFR BLD AUTO: 39.5 %
HGB BLD-MCNC: 13 G/DL
IMM GRANULOCYTES # BLD AUTO: 0 X10(3) UL (ref 0–1)
IMM GRANULOCYTES NFR BLD: 0 %
LYMPHOCYTES # BLD AUTO: 0.44 X10(3) UL (ref 1–4)
LYMPHOCYTES NFR BLD AUTO: 25.1 %
MCH RBC QN AUTO: 33.6 PG (ref 26–34)
MCHC RBC AUTO-ENTMCNC: 32.9 G/DL (ref 31–37)
MCV RBC AUTO: 102.1 FL
MONOCYTES # BLD AUTO: 0.19 X10(3) UL (ref 0.1–1)
MONOCYTES NFR BLD AUTO: 10.9 %
NEUTROPHILS # BLD AUTO: 1.07 X10 (3) UL (ref 1.5–7.7)
NEUTROPHILS # BLD AUTO: 1.07 X10(3) UL (ref 1.5–7.7)
NEUTROPHILS NFR BLD AUTO: 61.2 %
OSMOLALITY SERPL CALC.SUM OF ELEC: 303 MOSM/KG (ref 275–295)
PLATELET # BLD AUTO: 246 10(3)UL (ref 150–450)
POTASSIUM SERPL-SCNC: 4.6 MMOL/L (ref 3.5–5.1)
PROT SERPL-MCNC: 6.8 G/DL (ref 6.4–8.2)
RBC # BLD AUTO: 3.87 X10(6)UL
SODIUM SERPL-SCNC: 146 MMOL/L (ref 136–145)
WBC # BLD AUTO: 1.8 X10(3) UL (ref 4–11)

## 2022-05-12 PROCEDURE — 96402 CHEMO HORMON ANTINEOPL SQ/IM: CPT

## 2022-05-12 PROCEDURE — 85025 COMPLETE CBC W/AUTO DIFF WBC: CPT

## 2022-05-12 PROCEDURE — 80053 COMPREHEN METABOLIC PANEL: CPT

## 2022-05-12 PROCEDURE — 36415 COLL VENOUS BLD VENIPUNCTURE: CPT

## 2022-05-12 RX ORDER — HEPARIN SODIUM (PORCINE) LOCK FLUSH IV SOLN 100 UNIT/ML 100 UNIT/ML
500 SOLUTION INTRAVENOUS ONCE
OUTPATIENT
Start: 2022-06-09

## 2022-05-12 RX ORDER — LAMOTRIGINE 25 MG/1
500 TABLET ORAL ONCE
OUTPATIENT
Start: 2022-06-09

## 2022-05-12 RX ORDER — LAMOTRIGINE 25 MG/1
500 TABLET ORAL ONCE
Status: COMPLETED | OUTPATIENT
Start: 2022-05-12 | End: 2022-05-12

## 2022-05-12 RX ADMIN — LAMOTRIGINE 500 MG: 25 TABLET ORAL at 09:52:00

## 2022-05-12 NOTE — PROGRESS NOTES
Pt here for q4 week labs and faslodex injections  reports feeling well  Labs drawn peripherally, tolerated well  faslodex administered bilat upper outer glut - tolerated well, 2x2 gauze/ paper tape to sites    Discharged stable to home, gait steady / indep  Has future appts, reviewed MyChart together

## 2022-05-27 RX ORDER — MIRTAZAPINE 15 MG/1
TABLET, FILM COATED ORAL
Qty: 90 TABLET | Refills: 1 | Status: SHIPPED | OUTPATIENT
Start: 2022-05-27 | End: 2022-11-17

## 2022-06-09 ENCOUNTER — NURSE ONLY (OUTPATIENT)
Dept: HEMATOLOGY/ONCOLOGY | Facility: HOSPITAL | Age: 62
End: 2022-06-09
Attending: INTERNAL MEDICINE
Payer: COMMERCIAL

## 2022-06-09 VITALS
DIASTOLIC BLOOD PRESSURE: 81 MMHG | OXYGEN SATURATION: 100 % | RESPIRATION RATE: 16 BRPM | TEMPERATURE: 98 F | SYSTOLIC BLOOD PRESSURE: 135 MMHG | HEART RATE: 78 BPM

## 2022-06-09 DIAGNOSIS — C50.411 MALIGNANT NEOPLASM OF UPPER-OUTER QUADRANT OF RIGHT BREAST IN FEMALE, ESTROGEN RECEPTOR POSITIVE (HCC): Primary | ICD-10-CM

## 2022-06-09 DIAGNOSIS — Z17.0 MALIGNANT NEOPLASM OF UPPER-OUTER QUADRANT OF RIGHT BREAST IN FEMALE, ESTROGEN RECEPTOR POSITIVE (HCC): Primary | ICD-10-CM

## 2022-06-09 PROCEDURE — 96402 CHEMO HORMON ANTINEOPL SQ/IM: CPT

## 2022-06-09 RX ORDER — HEPARIN SODIUM (PORCINE) LOCK FLUSH IV SOLN 100 UNIT/ML 100 UNIT/ML
500 SOLUTION INTRAVENOUS ONCE
OUTPATIENT
Start: 2022-07-07

## 2022-06-09 RX ORDER — LAMOTRIGINE 25 MG/1
500 TABLET ORAL ONCE
Status: COMPLETED | OUTPATIENT
Start: 2022-06-09 | End: 2022-06-09

## 2022-06-09 RX ORDER — LAMOTRIGINE 25 MG/1
500 TABLET ORAL ONCE
OUTPATIENT
Start: 2022-07-07

## 2022-06-09 RX ADMIN — LAMOTRIGINE 500 MG: 25 TABLET ORAL at 09:32:00

## 2022-06-24 ENCOUNTER — HOSPITAL ENCOUNTER (OUTPATIENT)
Dept: CT IMAGING | Facility: HOSPITAL | Age: 62
Discharge: HOME | End: 2022-06-24
Attending: NURSE PRACTITIONER
Payer: COMMERCIAL

## 2022-06-24 DIAGNOSIS — J91.0 MALIGNANT PLEURAL EFFUSION: ICD-10-CM

## 2022-06-24 DIAGNOSIS — C78.7 LIVER METASTASIS (HCC): ICD-10-CM

## 2022-06-24 DIAGNOSIS — Z51.11 CHEMOTHERAPY MANAGEMENT, ENCOUNTER FOR: ICD-10-CM

## 2022-06-24 DIAGNOSIS — C50.919 METASTATIC BREAST CANCER (HCC): ICD-10-CM

## 2022-06-24 LAB — CREAT BLD-MCNC: 0.9 MG/DL

## 2022-06-24 PROCEDURE — 74177 CT ABD & PELVIS W/CONTRAST: CPT | Performed by: NURSE PRACTITIONER

## 2022-06-24 PROCEDURE — 82565 ASSAY OF CREATININE: CPT

## 2022-06-24 PROCEDURE — 71260 CT THORAX DX C+: CPT | Performed by: NURSE PRACTITIONER

## 2022-06-30 ENCOUNTER — TELEPHONE (OUTPATIENT)
Dept: HEMATOLOGY/ONCOLOGY | Facility: HOSPITAL | Age: 62
End: 2022-06-30

## 2022-06-30 ENCOUNTER — OFFICE VISIT (OUTPATIENT)
Dept: HEMATOLOGY/ONCOLOGY | Facility: HOSPITAL | Age: 62
End: 2022-06-30
Attending: NURSE PRACTITIONER
Payer: COMMERCIAL

## 2022-06-30 VITALS
WEIGHT: 134 LBS | OXYGEN SATURATION: 100 % | RESPIRATION RATE: 16 BRPM | DIASTOLIC BLOOD PRESSURE: 82 MMHG | SYSTOLIC BLOOD PRESSURE: 118 MMHG | TEMPERATURE: 98 F | HEART RATE: 76 BPM | BODY MASS INDEX: 25.3 KG/M2 | HEIGHT: 61 IN

## 2022-06-30 DIAGNOSIS — T45.1X5A CHEMOTHERAPY-INDUCED NEUTROPENIA (HCC): ICD-10-CM

## 2022-06-30 DIAGNOSIS — Z79.899 ENCOUNTER FOR MEDICATION MANAGEMENT: ICD-10-CM

## 2022-06-30 DIAGNOSIS — Z17.0 MALIGNANT NEOPLASM OF UPPER-OUTER QUADRANT OF RIGHT BREAST IN FEMALE, ESTROGEN RECEPTOR POSITIVE (HCC): Primary | ICD-10-CM

## 2022-06-30 DIAGNOSIS — Z51.11 CHEMOTHERAPY MANAGEMENT, ENCOUNTER FOR: ICD-10-CM

## 2022-06-30 DIAGNOSIS — C50.411 MALIGNANT NEOPLASM OF UPPER-OUTER QUADRANT OF RIGHT BREAST IN FEMALE, ESTROGEN RECEPTOR POSITIVE (HCC): ICD-10-CM

## 2022-06-30 DIAGNOSIS — Z17.0 MALIGNANT NEOPLASM OF UPPER-OUTER QUADRANT OF RIGHT BREAST IN FEMALE, ESTROGEN RECEPTOR POSITIVE (HCC): ICD-10-CM

## 2022-06-30 DIAGNOSIS — C50.411 MALIGNANT NEOPLASM OF UPPER-OUTER QUADRANT OF RIGHT BREAST IN FEMALE, ESTROGEN RECEPTOR POSITIVE (HCC): Primary | ICD-10-CM

## 2022-06-30 DIAGNOSIS — Z45.2 ENCOUNTER FOR CENTRAL LINE CARE: Primary | ICD-10-CM

## 2022-06-30 DIAGNOSIS — D70.1 CHEMOTHERAPY-INDUCED NEUTROPENIA (HCC): ICD-10-CM

## 2022-06-30 DIAGNOSIS — J91.0 MALIGNANT PLEURAL EFFUSION: ICD-10-CM

## 2022-06-30 LAB
ALBUMIN SERPL-MCNC: 3.3 G/DL (ref 3.4–5)
ALBUMIN/GLOB SERPL: 1.1 {RATIO} (ref 1–2)
ALP LIVER SERPL-CCNC: 53 U/L
ALT SERPL-CCNC: 17 U/L
ANION GAP SERPL CALC-SCNC: 6 MMOL/L (ref 0–18)
AST SERPL-CCNC: 17 U/L (ref 15–37)
BASOPHILS # BLD AUTO: 0.02 X10(3) UL (ref 0–0.2)
BASOPHILS NFR BLD AUTO: 0.9 %
BILIRUB SERPL-MCNC: 0.8 MG/DL (ref 0.1–2)
BUN BLD-MCNC: 10 MG/DL (ref 7–18)
BUN/CREAT SERPL: 10.4 (ref 10–20)
CALCIUM BLD-MCNC: 9.1 MG/DL (ref 8.5–10.1)
CHLORIDE SERPL-SCNC: 111 MMOL/L (ref 98–112)
CHOLEST SERPL-MCNC: 285 MG/DL (ref ?–200)
CO2 SERPL-SCNC: 25 MMOL/L (ref 21–32)
CREAT BLD-MCNC: 0.96 MG/DL
DEPRECATED HBV CORE AB SER IA-ACNC: 86.8 NG/ML
DEPRECATED RDW RBC AUTO: 48.8 FL (ref 35.1–46.3)
EOSINOPHIL # BLD AUTO: 0.01 X10(3) UL (ref 0–0.7)
EOSINOPHIL NFR BLD AUTO: 0.4 %
ERYTHROCYTE [DISTWIDTH] IN BLOOD BY AUTOMATED COUNT: 12.7 % (ref 11–15)
FOLATE SERPL-MCNC: >20 NG/ML (ref 8.7–?)
GLOBULIN PLAS-MCNC: 3.1 G/DL (ref 2.8–4.4)
GLUCOSE BLD-MCNC: 81 MG/DL (ref 70–99)
HCT VFR BLD AUTO: 36.6 %
HDLC SERPL-MCNC: 46 MG/DL (ref 40–59)
HGB BLD-MCNC: 12.3 G/DL
IMM GRANULOCYTES # BLD AUTO: 0.03 X10(3) UL (ref 0–1)
IMM GRANULOCYTES NFR BLD: 1.3 %
IRON SATN MFR SERPL: 24 %
IRON SERPL-MCNC: 73 UG/DL
LDLC SERPL CALC-MCNC: 203 MG/DL (ref ?–100)
LYMPHOCYTES # BLD AUTO: 0.43 X10(3) UL (ref 1–4)
LYMPHOCYTES NFR BLD AUTO: 18.5 %
MCH RBC QN AUTO: 34.8 PG (ref 26–34)
MCHC RBC AUTO-ENTMCNC: 33.6 G/DL (ref 31–37)
MCV RBC AUTO: 103.7 FL
MONOCYTES # BLD AUTO: 0.26 X10(3) UL (ref 0.1–1)
MONOCYTES NFR BLD AUTO: 11.2 %
NEUTROPHILS # BLD AUTO: 1.58 X10 (3) UL (ref 1.5–7.7)
NEUTROPHILS # BLD AUTO: 1.58 X10(3) UL (ref 1.5–7.7)
NEUTROPHILS NFR BLD AUTO: 67.7 %
NONHDLC SERPL-MCNC: 239 MG/DL (ref ?–130)
OSMOLALITY SERPL CALC.SUM OF ELEC: 292 MOSM/KG (ref 275–295)
PLATELET # BLD AUTO: 293 10(3)UL (ref 150–450)
POTASSIUM SERPL-SCNC: 4.1 MMOL/L (ref 3.5–5.1)
PROT SERPL-MCNC: 6.4 G/DL (ref 6.4–8.2)
RBC # BLD AUTO: 3.53 X10(6)UL
SODIUM SERPL-SCNC: 142 MMOL/L (ref 136–145)
TIBC SERPL-MCNC: 307 UG/DL (ref 240–450)
TRANSFERRIN SERPL-MCNC: 206 MG/DL (ref 200–360)
TRIGL SERPL-MCNC: 190 MG/DL (ref 30–149)
VIT B12 SERPL-MCNC: 356 PG/ML (ref 193–986)
VLDLC SERPL CALC-MCNC: 41 MG/DL (ref 0–30)
WBC # BLD AUTO: 2.3 X10(3) UL (ref 4–11)

## 2022-06-30 PROCEDURE — 80053 COMPREHEN METABOLIC PANEL: CPT

## 2022-06-30 PROCEDURE — 85060 BLOOD SMEAR INTERPRETATION: CPT

## 2022-06-30 PROCEDURE — 82607 VITAMIN B-12: CPT

## 2022-06-30 PROCEDURE — 82728 ASSAY OF FERRITIN: CPT

## 2022-06-30 PROCEDURE — 85025 COMPLETE CBC W/AUTO DIFF WBC: CPT

## 2022-06-30 PROCEDURE — 83540 ASSAY OF IRON: CPT

## 2022-06-30 PROCEDURE — 84466 ASSAY OF TRANSFERRIN: CPT

## 2022-06-30 PROCEDURE — 36591 DRAW BLOOD OFF VENOUS DEVICE: CPT

## 2022-06-30 PROCEDURE — 80061 LIPID PANEL: CPT

## 2022-06-30 PROCEDURE — 99215 OFFICE O/P EST HI 40 MIN: CPT | Performed by: INTERNAL MEDICINE

## 2022-06-30 PROCEDURE — 82746 ASSAY OF FOLIC ACID SERUM: CPT

## 2022-06-30 RX ORDER — HEPARIN SODIUM (PORCINE) LOCK FLUSH IV SOLN 100 UNIT/ML 100 UNIT/ML
500 SOLUTION INTRAVENOUS ONCE
OUTPATIENT
Start: 2022-07-07

## 2022-06-30 RX ORDER — CODEINE PHOSPHATE AND GUAIFENESIN 10; 100 MG/5ML; MG/5ML
5 SOLUTION ORAL EVERY 6 HOURS PRN
Qty: 180 ML | Refills: 0 | Status: SHIPPED | OUTPATIENT
Start: 2022-06-30

## 2022-06-30 RX ORDER — FUROSEMIDE 20 MG/1
20 TABLET ORAL DAILY
Qty: 7 TABLET | Refills: 0 | Status: SHIPPED | OUTPATIENT
Start: 2022-06-30 | End: 2022-06-30

## 2022-06-30 RX ORDER — CODEINE PHOSPHATE AND GUAIFENESIN 10; 100 MG/5ML; MG/5ML
5 SOLUTION ORAL EVERY 6 HOURS PRN
Qty: 180 ML | Refills: 0 | Status: SHIPPED | OUTPATIENT
Start: 2022-06-30 | End: 2022-06-30

## 2022-06-30 RX ORDER — HEPARIN SODIUM (PORCINE) LOCK FLUSH IV SOLN 100 UNIT/ML 100 UNIT/ML
500 SOLUTION INTRAVENOUS ONCE
Status: COMPLETED | OUTPATIENT
Start: 2022-06-30 | End: 2022-06-30

## 2022-06-30 RX ORDER — LAMOTRIGINE 25 MG/1
500 TABLET ORAL ONCE
OUTPATIENT
Start: 2022-07-07

## 2022-06-30 RX ADMIN — HEPARIN SODIUM (PORCINE) LOCK FLUSH IV SOLN 100 UNIT/ML 500 UNITS: 100 SOLUTION INTRAVENOUS at 10:14:00

## 2022-06-30 NOTE — TELEPHONE ENCOUNTER
I called the [facility] pharmacist. He did receive the corrected guaifenisin-codeine script. He took a telephone order from me for the furosemide 20 mg take by mouth daily. QTY 7 tabs. No refills.

## 2022-06-30 NOTE — TELEPHONE ENCOUNTER
I called Darshana Corral and told her Dr Zaida Guy just resent the prescriptions. Darshana Corral said the [facility] pharmacy got the scripts, but they don't have the strength of the guaifenisin-codeine Dr Zaida Guy ordered 225-7.5 mg/5ml. They have guaifenisin-codeine 100-10 mg/5ml. They need a corrected script.

## 2022-06-30 NOTE — TELEPHONE ENCOUNTER
Jm Marmolejo called to let Dr Florida Mancuso know that [facility] in [facility] [address] did not receive the lasix or guaifenisin-codeine prescriptions. She believes Dr Florida Mancuso sent them the wrong pharmacy.

## 2022-07-06 ENCOUNTER — TELEPHONE (OUTPATIENT)
Dept: HEMATOLOGY/ONCOLOGY | Facility: HOSPITAL | Age: 62
End: 2022-07-06

## 2022-07-06 NOTE — TELEPHONE ENCOUNTER
Crystal from [employer] called and the pharmacist needs clarifacatopm on the Abemaciclib 200 MG Oral Tab      Please call Crystal [phone]. The Reference number is [id number]. Thank you.

## 2022-07-06 NOTE — TELEPHONE ENCOUNTER
Retunred call to [facility]. Approved a \"kit\" they send with the abemacicilb. Stated they were waiting on a prior auth for the abema. Will follow up.

## 2022-07-07 ENCOUNTER — NURSE ONLY (OUTPATIENT)
Dept: HEMATOLOGY/ONCOLOGY | Facility: HOSPITAL | Age: 62
End: 2022-07-07
Attending: INTERNAL MEDICINE
Payer: COMMERCIAL

## 2022-07-07 VITALS
DIASTOLIC BLOOD PRESSURE: 66 MMHG | RESPIRATION RATE: 16 BRPM | OXYGEN SATURATION: 100 % | HEART RATE: 68 BPM | TEMPERATURE: 98 F | SYSTOLIC BLOOD PRESSURE: 108 MMHG

## 2022-07-07 DIAGNOSIS — Z17.0 MALIGNANT NEOPLASM OF UPPER-OUTER QUADRANT OF RIGHT BREAST IN FEMALE, ESTROGEN RECEPTOR POSITIVE (HCC): Primary | ICD-10-CM

## 2022-07-07 DIAGNOSIS — C50.411 MALIGNANT NEOPLASM OF UPPER-OUTER QUADRANT OF RIGHT BREAST IN FEMALE, ESTROGEN RECEPTOR POSITIVE (HCC): Primary | ICD-10-CM

## 2022-07-07 PROCEDURE — 96402 CHEMO HORMON ANTINEOPL SQ/IM: CPT

## 2022-07-07 RX ORDER — HEPARIN SODIUM (PORCINE) LOCK FLUSH IV SOLN 100 UNIT/ML 100 UNIT/ML
500 SOLUTION INTRAVENOUS ONCE
OUTPATIENT
Start: 2022-08-01

## 2022-07-07 RX ORDER — LAMOTRIGINE 25 MG/1
500 TABLET ORAL ONCE
Status: COMPLETED | OUTPATIENT
Start: 2022-07-07 | End: 2022-07-07

## 2022-07-07 RX ORDER — LAMOTRIGINE 25 MG/1
500 TABLET ORAL ONCE
OUTPATIENT
Start: 2022-08-01

## 2022-07-07 RX ADMIN — LAMOTRIGINE 500 MG: 25 TABLET ORAL at 10:19:00

## 2022-07-07 NOTE — PROGRESS NOTES
Pt here for q4 week faslodex injections  reports feeling well overall  To start new daily oral chemo med - states specialty pharm called her at 0800 this am    faslodex administered bilat upper outer glut - tolerated well, 2x2 gauze/ paper tape to sites    Discharged stable to home, gait steady / indep  Has future appts, reviewed MyChart together

## 2022-07-18 ENCOUNTER — TELEPHONE (OUTPATIENT)
Dept: HEMATOLOGY/ONCOLOGY | Facility: HOSPITAL | Age: 62
End: 2022-07-18

## 2022-07-18 NOTE — TELEPHONE ENCOUNTER
Spoke with Vijay Tavarez. She received her abemaciclib and started it on Friday 7. 15. She has experienced some diarrhea today to which she took imodium. Will update Dr Jason Williamson and call her back with a plan.

## 2022-07-19 ENCOUNTER — TELEPHONE (OUTPATIENT)
Dept: HEMATOLOGY/ONCOLOGY | Facility: HOSPITAL | Age: 62
End: 2022-07-19

## 2022-07-19 DIAGNOSIS — Z17.0 MALIGNANT NEOPLASM OF UPPER-OUTER QUADRANT OF RIGHT BREAST IN FEMALE, ESTROGEN RECEPTOR POSITIVE (HCC): Primary | ICD-10-CM

## 2022-07-19 DIAGNOSIS — C50.411 MALIGNANT NEOPLASM OF UPPER-OUTER QUADRANT OF RIGHT BREAST IN FEMALE, ESTROGEN RECEPTOR POSITIVE (HCC): Primary | ICD-10-CM

## 2022-07-21 ENCOUNTER — NURSE ONLY (OUTPATIENT)
Dept: HEMATOLOGY/ONCOLOGY | Facility: HOSPITAL | Age: 62
End: 2022-07-21
Attending: INTERNAL MEDICINE
Payer: COMMERCIAL

## 2022-07-21 VITALS
DIASTOLIC BLOOD PRESSURE: 89 MMHG | RESPIRATION RATE: 16 BRPM | SYSTOLIC BLOOD PRESSURE: 142 MMHG | HEART RATE: 97 BPM | HEIGHT: 61 IN | BODY MASS INDEX: 25.3 KG/M2 | WEIGHT: 134 LBS | OXYGEN SATURATION: 99 % | TEMPERATURE: 99 F

## 2022-07-21 DIAGNOSIS — T45.1X5A CHEMOTHERAPY-INDUCED NEUTROPENIA (HCC): ICD-10-CM

## 2022-07-21 DIAGNOSIS — Z51.11 CHEMOTHERAPY MANAGEMENT, ENCOUNTER FOR: ICD-10-CM

## 2022-07-21 DIAGNOSIS — C50.411 MALIGNANT NEOPLASM OF UPPER-OUTER QUADRANT OF RIGHT BREAST IN FEMALE, ESTROGEN RECEPTOR POSITIVE (HCC): ICD-10-CM

## 2022-07-21 DIAGNOSIS — D70.1 CHEMOTHERAPY-INDUCED NEUTROPENIA (HCC): ICD-10-CM

## 2022-07-21 DIAGNOSIS — J91.0 MALIGNANT PLEURAL EFFUSION: Primary | ICD-10-CM

## 2022-07-21 DIAGNOSIS — Z17.0 MALIGNANT NEOPLASM OF UPPER-OUTER QUADRANT OF RIGHT BREAST IN FEMALE, ESTROGEN RECEPTOR POSITIVE (HCC): ICD-10-CM

## 2022-07-21 LAB
ALBUMIN SERPL-MCNC: 3.1 G/DL (ref 3.4–5)
ALBUMIN/GLOB SERPL: 0.9 {RATIO} (ref 1–2)
ALP LIVER SERPL-CCNC: 61 U/L
ALT SERPL-CCNC: 20 U/L
ANION GAP SERPL CALC-SCNC: 8 MMOL/L (ref 0–18)
AST SERPL-CCNC: 16 U/L (ref 15–37)
BASOPHILS # BLD AUTO: 0.03 X10(3) UL (ref 0–0.2)
BASOPHILS NFR BLD AUTO: 1.8 %
BILIRUB SERPL-MCNC: 1.8 MG/DL (ref 0.1–2)
BUN BLD-MCNC: 19 MG/DL (ref 7–18)
BUN/CREAT SERPL: 14.7 (ref 10–20)
CALCIUM BLD-MCNC: 8.9 MG/DL (ref 8.5–10.1)
CHLORIDE SERPL-SCNC: 107 MMOL/L (ref 98–112)
CO2 SERPL-SCNC: 23 MMOL/L (ref 21–32)
CREAT BLD-MCNC: 1.29 MG/DL
DEPRECATED RDW RBC AUTO: 47.2 FL (ref 35.1–46.3)
EOSINOPHIL # BLD AUTO: 0.03 X10(3) UL (ref 0–0.7)
EOSINOPHIL NFR BLD AUTO: 1.8 %
ERYTHROCYTE [DISTWIDTH] IN BLOOD BY AUTOMATED COUNT: 12.7 % (ref 11–15)
GLOBULIN PLAS-MCNC: 3.6 G/DL (ref 2.8–4.4)
GLUCOSE BLD-MCNC: 85 MG/DL (ref 70–99)
HCT VFR BLD AUTO: 36.2 %
HGB BLD-MCNC: 12 G/DL
IMM GRANULOCYTES # BLD AUTO: 0.01 X10(3) UL (ref 0–1)
IMM GRANULOCYTES NFR BLD: 0.6 %
LYMPHOCYTES # BLD AUTO: 0.43 X10(3) UL (ref 1–4)
LYMPHOCYTES NFR BLD AUTO: 25.4 %
MCH RBC QN AUTO: 34.8 PG (ref 26–34)
MCHC RBC AUTO-ENTMCNC: 33.1 G/DL (ref 31–37)
MCV RBC AUTO: 104.9 FL
MONOCYTES # BLD AUTO: 0.19 X10(3) UL (ref 0.1–1)
MONOCYTES NFR BLD AUTO: 11.2 %
NEUTROPHILS # BLD AUTO: 1 X10 (3) UL (ref 1.5–7.7)
NEUTROPHILS # BLD AUTO: 1 X10(3) UL (ref 1.5–7.7)
NEUTROPHILS NFR BLD AUTO: 59.2 %
OSMOLALITY SERPL CALC.SUM OF ELEC: 288 MOSM/KG (ref 275–295)
PLATELET # BLD AUTO: 143 10(3)UL (ref 150–450)
POTASSIUM SERPL-SCNC: 3.8 MMOL/L (ref 3.5–5.1)
PROT SERPL-MCNC: 6.7 G/DL (ref 6.4–8.2)
RBC # BLD AUTO: 3.45 X10(6)UL
SODIUM SERPL-SCNC: 138 MMOL/L (ref 136–145)
WBC # BLD AUTO: 1.7 X10(3) UL (ref 4–11)

## 2022-07-21 PROCEDURE — 36415 COLL VENOUS BLD VENIPUNCTURE: CPT

## 2022-07-21 PROCEDURE — 85060 BLOOD SMEAR INTERPRETATION: CPT

## 2022-07-21 PROCEDURE — 85025 COMPLETE CBC W/AUTO DIFF WBC: CPT

## 2022-07-21 PROCEDURE — 99211 OFF/OP EST MAY X REQ PHY/QHP: CPT

## 2022-07-21 PROCEDURE — 80053 COMPREHEN METABOLIC PANEL: CPT

## 2022-07-28 ENCOUNTER — NURSE ONLY (OUTPATIENT)
Dept: HEMATOLOGY/ONCOLOGY | Facility: HOSPITAL | Age: 62
End: 2022-07-28
Attending: INTERNAL MEDICINE
Payer: COMMERCIAL

## 2022-07-28 ENCOUNTER — TELEPHONE (OUTPATIENT)
Dept: HEMATOLOGY/ONCOLOGY | Facility: HOSPITAL | Age: 62
End: 2022-07-28

## 2022-07-28 DIAGNOSIS — J91.0 MALIGNANT PLEURAL EFFUSION: ICD-10-CM

## 2022-07-28 LAB
ALBUMIN SERPL-MCNC: 3.3 G/DL (ref 3.4–5)
ALBUMIN/GLOB SERPL: 1 {RATIO} (ref 1–2)
ALP LIVER SERPL-CCNC: 61 U/L
ALT SERPL-CCNC: 19 U/L
ANION GAP SERPL CALC-SCNC: 6 MMOL/L (ref 0–18)
AST SERPL-CCNC: 15 U/L (ref 15–37)
BASOPHILS # BLD AUTO: 0.03 X10(3) UL (ref 0–0.2)
BASOPHILS NFR BLD AUTO: 1.4 %
BILIRUB SERPL-MCNC: 0.7 MG/DL (ref 0.1–2)
BUN BLD-MCNC: 12 MG/DL (ref 7–18)
BUN/CREAT SERPL: 10.8 (ref 10–20)
CALCIUM BLD-MCNC: 9.4 MG/DL (ref 8.5–10.1)
CHLORIDE SERPL-SCNC: 110 MMOL/L (ref 98–112)
CO2 SERPL-SCNC: 26 MMOL/L (ref 21–32)
CREAT BLD-MCNC: 1.11 MG/DL
DEPRECATED RDW RBC AUTO: 52.9 FL (ref 35.1–46.3)
EOSINOPHIL # BLD AUTO: 0.02 X10(3) UL (ref 0–0.7)
EOSINOPHIL NFR BLD AUTO: 0.9 %
ERYTHROCYTE [DISTWIDTH] IN BLOOD BY AUTOMATED COUNT: 13.6 % (ref 11–15)
FASTING STATUS PATIENT QL REPORTED: NO
GLOBULIN PLAS-MCNC: 3.4 G/DL (ref 2.8–4.4)
GLUCOSE BLD-MCNC: 84 MG/DL (ref 70–99)
HCT VFR BLD AUTO: 37.3 %
HGB BLD-MCNC: 12.2 G/DL
IMM GRANULOCYTES # BLD AUTO: 0.01 X10(3) UL (ref 0–1)
IMM GRANULOCYTES NFR BLD: 0.5 %
LYMPHOCYTES # BLD AUTO: 0.49 X10(3) UL (ref 1–4)
LYMPHOCYTES NFR BLD AUTO: 22.5 %
MCH RBC QN AUTO: 34.7 PG (ref 26–34)
MCHC RBC AUTO-ENTMCNC: 32.7 G/DL (ref 31–37)
MCV RBC AUTO: 106 FL
MONOCYTES # BLD AUTO: 0.32 X10(3) UL (ref 0.1–1)
MONOCYTES NFR BLD AUTO: 14.7 %
NEUTROPHILS # BLD AUTO: 1.31 X10 (3) UL (ref 1.5–7.7)
NEUTROPHILS # BLD AUTO: 1.31 X10(3) UL (ref 1.5–7.7)
NEUTROPHILS NFR BLD AUTO: 60 %
OSMOLALITY SERPL CALC.SUM OF ELEC: 293 MOSM/KG (ref 275–295)
PLATELET # BLD AUTO: 185 10(3)UL (ref 150–450)
POTASSIUM SERPL-SCNC: 4.4 MMOL/L (ref 3.5–5.1)
PROT SERPL-MCNC: 6.7 G/DL (ref 6.4–8.2)
RBC # BLD AUTO: 3.52 X10(6)UL
SODIUM SERPL-SCNC: 142 MMOL/L (ref 136–145)
WBC # BLD AUTO: 2.2 X10(3) UL (ref 4–11)

## 2022-07-28 PROCEDURE — 80053 COMPREHEN METABOLIC PANEL: CPT

## 2022-07-28 PROCEDURE — 36415 COLL VENOUS BLD VENIPUNCTURE: CPT

## 2022-07-28 PROCEDURE — 85025 COMPLETE CBC W/AUTO DIFF WBC: CPT

## 2022-07-28 NOTE — TELEPHONE ENCOUNTER
Darshana Corral called to let Dr Zaida Guy know she had her labs drawn today. She wants to know when she is suppose to start her abemaciclib? I told Willa Jones would forward this message to JASE Diamond and Hany Castellanos RN now.

## 2022-07-29 ENCOUNTER — TELEPHONE (OUTPATIENT)
Dept: HEMATOLOGY/ONCOLOGY | Facility: HOSPITAL | Age: 62
End: 2022-07-29

## 2022-07-29 NOTE — TELEPHONE ENCOUNTER
Returned call to [place]. Reviewed instructions from Raudel Black NP for the abemaciclib. Devika Wardvalorie is gone. Okay to restart abema at 150 BID. Pt repeated back instructions and verbalized understanding.

## 2022-08-03 ENCOUNTER — HOSPITAL ENCOUNTER (OUTPATIENT)
Dept: CT IMAGING | Age: 62
Discharge: HOME | End: 2022-08-03
Attending: INTERNAL MEDICINE
Payer: COMMERCIAL

## 2022-08-03 DIAGNOSIS — C50.411 MALIGNANT NEOPLASM OF UPPER-OUTER QUADRANT OF RIGHT BREAST IN FEMALE, ESTROGEN RECEPTOR POSITIVE (HCC): ICD-10-CM

## 2022-08-03 DIAGNOSIS — Z17.0 MALIGNANT NEOPLASM OF UPPER-OUTER QUADRANT OF RIGHT BREAST IN FEMALE, ESTROGEN RECEPTOR POSITIVE (HCC): ICD-10-CM

## 2022-08-03 PROCEDURE — 71250 CT THORAX DX C-: CPT | Performed by: INTERNAL MEDICINE

## 2022-08-04 ENCOUNTER — OFFICE VISIT (OUTPATIENT)
Dept: HEMATOLOGY/ONCOLOGY | Facility: HOSPITAL | Age: 62
End: 2022-08-04
Attending: INTERNAL MEDICINE
Payer: COMMERCIAL

## 2022-08-04 VITALS
WEIGHT: 129.81 LBS | RESPIRATION RATE: 16 BRPM | SYSTOLIC BLOOD PRESSURE: 131 MMHG | HEART RATE: 98 BPM | HEIGHT: 61 IN | OXYGEN SATURATION: 99 % | DIASTOLIC BLOOD PRESSURE: 83 MMHG | BODY MASS INDEX: 24.51 KG/M2 | TEMPERATURE: 98 F

## 2022-08-04 DIAGNOSIS — T45.1X5A CHEMOTHERAPY-INDUCED NEUTROPENIA (HCC): ICD-10-CM

## 2022-08-04 DIAGNOSIS — C50.411 MALIGNANT NEOPLASM OF UPPER-OUTER QUADRANT OF RIGHT BREAST IN FEMALE, ESTROGEN RECEPTOR POSITIVE (HCC): Primary | ICD-10-CM

## 2022-08-04 DIAGNOSIS — Z51.11 CHEMOTHERAPY MANAGEMENT, ENCOUNTER FOR: ICD-10-CM

## 2022-08-04 DIAGNOSIS — C50.919 METASTATIC BREAST CANCER (HCC): Primary | ICD-10-CM

## 2022-08-04 DIAGNOSIS — Z17.0 MALIGNANT NEOPLASM OF UPPER-OUTER QUADRANT OF RIGHT BREAST IN FEMALE, ESTROGEN RECEPTOR POSITIVE (HCC): Primary | ICD-10-CM

## 2022-08-04 DIAGNOSIS — D70.1 CHEMOTHERAPY-INDUCED NEUTROPENIA (HCC): ICD-10-CM

## 2022-08-04 LAB
ALBUMIN SERPL-MCNC: 3.5 G/DL (ref 3.4–5)
ALBUMIN/GLOB SERPL: 1 {RATIO} (ref 1–2)
ALP LIVER SERPL-CCNC: 62 U/L
ALT SERPL-CCNC: 26 U/L
ANION GAP SERPL CALC-SCNC: 15 MMOL/L (ref 0–18)
AST SERPL-CCNC: 18 U/L (ref 15–37)
BASOPHILS # BLD AUTO: 0.04 X10(3) UL (ref 0–0.2)
BASOPHILS NFR BLD AUTO: 1.4 %
BILIRUB SERPL-MCNC: 1.4 MG/DL (ref 0.1–2)
BUN BLD-MCNC: 18 MG/DL (ref 7–18)
BUN/CREAT SERPL: 15.9 (ref 10–20)
CALCIUM BLD-MCNC: 9.2 MG/DL (ref 8.5–10.1)
CHLORIDE SERPL-SCNC: 106 MMOL/L (ref 98–112)
CHOLEST SERPL-MCNC: 326 MG/DL (ref ?–200)
CO2 SERPL-SCNC: 17 MMOL/L (ref 21–32)
CREAT BLD-MCNC: 1.13 MG/DL
DEPRECATED HBV CORE AB SER IA-ACNC: 146 NG/ML
DEPRECATED RDW RBC AUTO: 53.6 FL (ref 35.1–46.3)
EOSINOPHIL # BLD AUTO: 0.02 X10(3) UL (ref 0–0.7)
EOSINOPHIL NFR BLD AUTO: 0.7 %
ERYTHROCYTE [DISTWIDTH] IN BLOOD BY AUTOMATED COUNT: 13.3 % (ref 11–15)
FASTING PATIENT LIPID ANSWER: YES
FASTING STATUS PATIENT QL REPORTED: YES
FOLATE SERPL-MCNC: >20 NG/ML (ref 8.7–?)
GFR SERPLBLD BASED ON 1.73 SQ M-ARVRAT: 55 ML/MIN/1.73M2 (ref 60–?)
GLOBULIN PLAS-MCNC: 3.6 G/DL (ref 2.8–4.4)
GLUCOSE BLD-MCNC: 70 MG/DL (ref 70–99)
HCT VFR BLD AUTO: 39.9 %
HDLC SERPL-MCNC: 76 MG/DL (ref 40–59)
HGB BLD-MCNC: 12.9 G/DL
IMM GRANULOCYTES # BLD AUTO: 0.01 X10(3) UL (ref 0–1)
IMM GRANULOCYTES NFR BLD: 0.4 %
IRON SATN MFR SERPL: 33 %
IRON SERPL-MCNC: 94 UG/DL
LDLC SERPL CALC-MCNC: 216 MG/DL (ref ?–100)
LYMPHOCYTES # BLD AUTO: 0.47 X10(3) UL (ref 1–4)
LYMPHOCYTES NFR BLD AUTO: 16.5 %
MCH RBC QN AUTO: 35.1 PG (ref 26–34)
MCHC RBC AUTO-ENTMCNC: 32.3 G/DL (ref 31–37)
MCV RBC AUTO: 108.4 FL
MONOCYTES # BLD AUTO: 0.37 X10(3) UL (ref 0.1–1)
MONOCYTES NFR BLD AUTO: 13 %
NEUTROPHILS # BLD AUTO: 1.93 X10 (3) UL (ref 1.5–7.7)
NEUTROPHILS # BLD AUTO: 1.93 X10(3) UL (ref 1.5–7.7)
NEUTROPHILS NFR BLD AUTO: 68 %
NONHDLC SERPL-MCNC: 250 MG/DL (ref ?–130)
OSMOLALITY SERPL CALC.SUM OF ELEC: 286 MOSM/KG (ref 275–295)
PLATELET # BLD AUTO: 212 10(3)UL (ref 150–450)
POTASSIUM SERPL-SCNC: 4.5 MMOL/L (ref 3.5–5.1)
PROT SERPL-MCNC: 7.1 G/DL (ref 6.4–8.2)
RBC # BLD AUTO: 3.68 X10(6)UL
SODIUM SERPL-SCNC: 138 MMOL/L (ref 136–145)
TIBC SERPL-MCNC: 288 UG/DL (ref 240–450)
TRANSFERRIN SERPL-MCNC: 193 MG/DL (ref 200–360)
TRIGL SERPL-MCNC: 182 MG/DL (ref 30–149)
VIT B12 SERPL-MCNC: 377 PG/ML (ref 193–986)
VLDLC SERPL CALC-MCNC: 40 MG/DL (ref 0–30)
WBC # BLD AUTO: 2.8 X10(3) UL (ref 4–11)

## 2022-08-04 PROCEDURE — 82607 VITAMIN B-12: CPT

## 2022-08-04 PROCEDURE — 80053 COMPREHEN METABOLIC PANEL: CPT

## 2022-08-04 PROCEDURE — 80061 LIPID PANEL: CPT

## 2022-08-04 PROCEDURE — 85025 COMPLETE CBC W/AUTO DIFF WBC: CPT

## 2022-08-04 PROCEDURE — 99215 OFFICE O/P EST HI 40 MIN: CPT | Performed by: INTERNAL MEDICINE

## 2022-08-04 PROCEDURE — 83540 ASSAY OF IRON: CPT

## 2022-08-04 PROCEDURE — 84466 ASSAY OF TRANSFERRIN: CPT

## 2022-08-04 PROCEDURE — 96402 CHEMO HORMON ANTINEOPL SQ/IM: CPT

## 2022-08-04 PROCEDURE — 36415 COLL VENOUS BLD VENIPUNCTURE: CPT

## 2022-08-04 PROCEDURE — 82728 ASSAY OF FERRITIN: CPT

## 2022-08-04 PROCEDURE — 82746 ASSAY OF FOLIC ACID SERUM: CPT

## 2022-08-04 RX ORDER — LAMOTRIGINE 25 MG/1
500 TABLET ORAL ONCE
Status: COMPLETED | OUTPATIENT
Start: 2022-08-04 | End: 2022-08-04

## 2022-08-04 RX ORDER — HEPARIN SODIUM (PORCINE) LOCK FLUSH IV SOLN 100 UNIT/ML 100 UNIT/ML
500 SOLUTION INTRAVENOUS ONCE
OUTPATIENT
Start: 2022-09-01

## 2022-08-04 RX ORDER — LAMOTRIGINE 25 MG/1
500 TABLET ORAL ONCE
OUTPATIENT
Start: 2022-09-01

## 2022-08-04 RX ORDER — LEVOFLOXACIN 750 MG/1
750 TABLET ORAL DAILY
Qty: 7 TABLET | Refills: 0 | Status: SHIPPED | OUTPATIENT
Start: 2022-08-04

## 2022-08-04 RX ADMIN — LAMOTRIGINE 500 MG: 25 TABLET ORAL at 09:34:00

## 2022-08-18 ENCOUNTER — APPOINTMENT (OUTPATIENT)
Dept: HEMATOLOGY/ONCOLOGY | Facility: HOSPITAL | Age: 62
End: 2022-08-18
Attending: INTERNAL MEDICINE
Payer: COMMERCIAL

## 2022-08-30 ENCOUNTER — HOSPITAL ENCOUNTER (OUTPATIENT)
Dept: CT IMAGING | Facility: HOSPITAL | Age: 62
Discharge: HOME | End: 2022-08-30
Attending: INTERNAL MEDICINE
Payer: COMMERCIAL

## 2022-08-30 DIAGNOSIS — C50.919 METASTATIC BREAST CANCER (HCC): ICD-10-CM

## 2022-08-30 PROCEDURE — 74177 CT ABD & PELVIS W/CONTRAST: CPT | Performed by: INTERNAL MEDICINE

## 2022-08-30 PROCEDURE — 71260 CT THORAX DX C+: CPT | Performed by: INTERNAL MEDICINE

## 2022-09-01 ENCOUNTER — OFFICE VISIT (OUTPATIENT)
Dept: HEMATOLOGY/ONCOLOGY | Facility: HOSPITAL | Age: 62
End: 2022-09-01
Attending: INTERNAL MEDICINE
Payer: COMMERCIAL

## 2022-09-01 ENCOUNTER — TELEPHONE (OUTPATIENT)
Dept: PULMONOLOGY | Facility: CLINIC | Age: 62
End: 2022-09-01

## 2022-09-01 ENCOUNTER — TELEPHONE (OUTPATIENT)
Dept: HEMATOLOGY/ONCOLOGY | Facility: HOSPITAL | Age: 62
End: 2022-09-01

## 2022-09-01 ENCOUNTER — HOSPITAL ENCOUNTER (OUTPATIENT)
Dept: CV DIAGNOSTICS | Facility: HOSPITAL | Age: 62
Discharge: HOME | End: 2022-09-01
Attending: INTERNAL MEDICINE
Payer: COMMERCIAL

## 2022-09-01 VITALS
WEIGHT: 125 LBS | HEART RATE: 124 BPM | RESPIRATION RATE: 16 BRPM | SYSTOLIC BLOOD PRESSURE: 127 MMHG | OXYGEN SATURATION: 100 % | HEIGHT: 61 IN | BODY MASS INDEX: 23.6 KG/M2 | TEMPERATURE: 98 F | DIASTOLIC BLOOD PRESSURE: 79 MMHG

## 2022-09-01 VITALS
RESPIRATION RATE: 16 BRPM | WEIGHT: 125 LBS | OXYGEN SATURATION: 100 % | TEMPERATURE: 98 F | HEIGHT: 61 IN | BODY MASS INDEX: 23.6 KG/M2 | SYSTOLIC BLOOD PRESSURE: 127 MMHG | HEART RATE: 124 BPM | DIASTOLIC BLOOD PRESSURE: 79 MMHG

## 2022-09-01 DIAGNOSIS — C50.411 MALIGNANT NEOPLASM OF UPPER-OUTER QUADRANT OF RIGHT BREAST IN FEMALE, ESTROGEN RECEPTOR POSITIVE (HCC): ICD-10-CM

## 2022-09-01 DIAGNOSIS — Z45.2 ENCOUNTER FOR CENTRAL LINE CARE: Primary | ICD-10-CM

## 2022-09-01 DIAGNOSIS — C50.411 MALIGNANT NEOPLASM OF UPPER-OUTER QUADRANT OF RIGHT BREAST IN FEMALE, ESTROGEN RECEPTOR POSITIVE (HCC): Primary | ICD-10-CM

## 2022-09-01 DIAGNOSIS — Z79.899 ENCOUNTER FOR MEDICATION MANAGEMENT: ICD-10-CM

## 2022-09-01 DIAGNOSIS — R06.09 DYSPNEA ON EXERTION: ICD-10-CM

## 2022-09-01 DIAGNOSIS — Z51.11 CHEMOTHERAPY MANAGEMENT, ENCOUNTER FOR: ICD-10-CM

## 2022-09-01 DIAGNOSIS — Z17.0 MALIGNANT NEOPLASM OF UPPER-OUTER QUADRANT OF RIGHT BREAST IN FEMALE, ESTROGEN RECEPTOR POSITIVE (HCC): Primary | ICD-10-CM

## 2022-09-01 DIAGNOSIS — J91.0 MALIGNANT PLEURAL EFFUSION: ICD-10-CM

## 2022-09-01 DIAGNOSIS — M81.8 OTHER OSTEOPOROSIS WITHOUT CURRENT PATHOLOGICAL FRACTURE: ICD-10-CM

## 2022-09-01 DIAGNOSIS — Z17.0 MALIGNANT NEOPLASM OF UPPER-OUTER QUADRANT OF RIGHT BREAST IN FEMALE, ESTROGEN RECEPTOR POSITIVE (HCC): ICD-10-CM

## 2022-09-01 DIAGNOSIS — J90 PLEURAL EFFUSION: Primary | ICD-10-CM

## 2022-09-01 LAB
ALBUMIN SERPL-MCNC: 3 G/DL (ref 3.4–5)
ALBUMIN/GLOB SERPL: 0.9 {RATIO} (ref 1–2)
ALP LIVER SERPL-CCNC: 43 U/L
ALT SERPL-CCNC: 17 U/L
ANION GAP SERPL CALC-SCNC: 18 MMOL/L (ref 0–18)
AST SERPL-CCNC: 16 U/L (ref 15–37)
BASOPHILS # BLD AUTO: 0.03 X10(3) UL (ref 0–0.2)
BASOPHILS NFR BLD AUTO: 1 %
BILIRUB SERPL-MCNC: 0.9 MG/DL (ref 0.1–2)
BUN BLD-MCNC: 11 MG/DL (ref 7–18)
BUN/CREAT SERPL: 10.3 (ref 10–20)
CALCIUM BLD-MCNC: 9.1 MG/DL (ref 8.5–10.1)
CHLORIDE SERPL-SCNC: 108 MMOL/L (ref 98–112)
CO2 SERPL-SCNC: 16 MMOL/L (ref 21–32)
CREAT BLD-MCNC: 1.07 MG/DL
DEPRECATED RDW RBC AUTO: 48.9 FL (ref 35.1–46.3)
EOSINOPHIL # BLD AUTO: 0.01 X10(3) UL (ref 0–0.7)
EOSINOPHIL NFR BLD AUTO: 0.3 %
ERYTHROCYTE [DISTWIDTH] IN BLOOD BY AUTOMATED COUNT: 12.6 % (ref 11–15)
GFR SERPLBLD BASED ON 1.73 SQ M-ARVRAT: 59 ML/MIN/1.73M2 (ref 60–?)
GLOBULIN PLAS-MCNC: 3.4 G/DL (ref 2.8–4.4)
GLUCOSE BLD-MCNC: 66 MG/DL (ref 70–99)
HCT VFR BLD AUTO: 38.8 %
HGB BLD-MCNC: 12.7 G/DL
IMM GRANULOCYTES # BLD AUTO: 0.03 X10(3) UL (ref 0–1)
IMM GRANULOCYTES NFR BLD: 1 %
LYMPHOCYTES # BLD AUTO: 0.5 X10(3) UL (ref 1–4)
LYMPHOCYTES NFR BLD AUTO: 16 %
MCH RBC QN AUTO: 34.3 PG (ref 26–34)
MCHC RBC AUTO-ENTMCNC: 32.7 G/DL (ref 31–37)
MCV RBC AUTO: 104.9 FL
MONOCYTES # BLD AUTO: 0.5 X10(3) UL (ref 0.1–1)
MONOCYTES NFR BLD AUTO: 16 %
NEUTROPHILS # BLD AUTO: 2.05 X10 (3) UL (ref 1.5–7.7)
NEUTROPHILS # BLD AUTO: 2.05 X10(3) UL (ref 1.5–7.7)
NEUTROPHILS NFR BLD AUTO: 65.7 %
OSMOLALITY SERPL CALC.SUM OF ELEC: 292 MOSM/KG (ref 275–295)
PLATELET # BLD AUTO: 202 10(3)UL (ref 150–450)
POTASSIUM SERPL-SCNC: 4.2 MMOL/L (ref 3.5–5.1)
PROT SERPL-MCNC: 6.4 G/DL (ref 6.4–8.2)
RBC # BLD AUTO: 3.7 X10(6)UL
SODIUM SERPL-SCNC: 142 MMOL/L (ref 136–145)
WBC # BLD AUTO: 3.1 X10(3) UL (ref 4–11)

## 2022-09-01 PROCEDURE — 85025 COMPLETE CBC W/AUTO DIFF WBC: CPT

## 2022-09-01 PROCEDURE — 93306 TTE W/DOPPLER COMPLETE: CPT | Performed by: INTERNAL MEDICINE

## 2022-09-01 PROCEDURE — 80053 COMPREHEN METABOLIC PANEL: CPT

## 2022-09-01 PROCEDURE — 96365 THER/PROPH/DIAG IV INF INIT: CPT

## 2022-09-01 RX ORDER — LAMOTRIGINE 25 MG/1
500 TABLET ORAL ONCE
Status: DISCONTINUED | OUTPATIENT
Start: 2022-09-01 | End: 2022-09-01

## 2022-09-01 RX ORDER — HEPARIN SODIUM (PORCINE) LOCK FLUSH IV SOLN 100 UNIT/ML 100 UNIT/ML
500 SOLUTION INTRAVENOUS ONCE
OUTPATIENT
Start: 2022-09-29

## 2022-09-01 RX ORDER — HEPARIN SODIUM (PORCINE) LOCK FLUSH IV SOLN 100 UNIT/ML 100 UNIT/ML
500 SOLUTION INTRAVENOUS ONCE
Status: COMPLETED | OUTPATIENT
Start: 2022-09-01 | End: 2022-09-01

## 2022-09-01 RX ORDER — FUROSEMIDE 20 MG/1
20 TABLET ORAL 2 TIMES DAILY
Qty: 60 TABLET | Refills: 0 | Status: SHIPPED | OUTPATIENT
Start: 2022-09-01 | End: 2022-09-01

## 2022-09-01 RX ORDER — HEPARIN SODIUM (PORCINE) LOCK FLUSH IV SOLN 100 UNIT/ML 100 UNIT/ML
500 SOLUTION INTRAVENOUS ONCE
Status: CANCELLED | OUTPATIENT
Start: 2022-09-29

## 2022-09-01 RX ORDER — LAMOTRIGINE 25 MG/1
500 TABLET ORAL ONCE
OUTPATIENT
Start: 2022-09-29

## 2022-09-01 RX ORDER — HEPARIN SODIUM (PORCINE) LOCK FLUSH IV SOLN 100 UNIT/ML 100 UNIT/ML
SOLUTION INTRAVENOUS
Status: COMPLETED
Start: 2022-09-01 | End: 2022-09-01

## 2022-09-01 RX ORDER — LAMOTRIGINE 25 MG/1
500 TABLET ORAL ONCE
Status: CANCELLED | OUTPATIENT
Start: 2022-09-29

## 2022-09-01 RX ADMIN — HEPARIN SODIUM (PORCINE) LOCK FLUSH IV SOLN 100 UNIT/ML 500 UNITS: 100 SOLUTION INTRAVENOUS at 11:05:00

## 2022-09-01 NOTE — PROGRESS NOTES
Tanvi Riley arrives from a MD visit with Dr. Veronica Simon for planned Faslodex and Zometa. Tanvi Riley reports vomiting yesterday, lower extremity swelling and shortness of breath on exertion today. Aleisha's treatment will be changing. She only received Zometa today per Dr. Vernoica Simon. Tanvi Riley denies any planned dental work or recent dental work. She tolerated treatment well today and was discharged to have cardiac diagnostics today. Tanvi Riley uses [email or web address] for future appointments.

## 2022-09-01 NOTE — TELEPHONE ENCOUNTER
Returned call to [employer]. Insurance will only cover a 90 day supply of furosemide. New script sent for 90 day supply.

## 2022-09-02 NOTE — TELEPHONE ENCOUNTER
Spoke with Drea Velasquez in Radiology regarding thoracentesis. Per Drea Velasquez- 12:30PM not available. Spoke with Dr. Yusuf Nguyen. Per Dr. Yusuf Nguyen- schedule thoracentesis at [facility] on 9/7/22. Informed Drea Velasquez in Radiology. Thoracentesis scheduled for 9AM on 9/7/22. Patient to arrive at 8:30AM. Drea Velasquez to contact patient to schedule COVID test.     Attempted to contact patient, left message to call back.      Dr. Yusuf Nguyen- Hector Downing

## 2022-09-02 NOTE — TELEPHONE ENCOUNTER
Spoke with patient. Patient informed of thoracentesis: date 9/7/22. Arrival time: 8:30AM, procedure time 9AM, location, someone will need to drive her home, and someone will be contacting her to schedule COVID test. Patient verbalized understanding.

## 2022-09-02 NOTE — TELEPHONE ENCOUNTER
RN, please facilitate scheduling a right-sided ultrasound-guided thoracentesis for 12:30 PM on Wednesday, September 7. Please let me know if you get this time scheduled. Please let the patient know as well.

## 2022-09-02 NOTE — TELEPHONE ENCOUNTER
Informed patient of COVID test (date 9/4/22/time 2:15PM /location [facility]). Patient verbalized understanding.

## 2022-09-02 NOTE — TELEPHONE ENCOUNTER
----- Message from Rivas Montez RN sent at 9/2/2022  7:53 AM CDT -----  Regarding: FW: Thoracentesis with path  Pulmonary could you please arrange for Thoracentesis with cytology asap. Elver Knox  ----- Message -----  From: Aylin Stewart RN  Sent: 9/1/2022   5:42 PM CDT  To: Rivas Montez RN  Subject: Thoracentesis with path                          Karolyn Gallardo,      I tried reaching out to Jakob's office and no one called me back. This pt needs a thoracentesis WITH cytology asap. Can you please help arrange. Thanks!         Eula Machado

## 2022-09-04 ENCOUNTER — LAB ENCOUNTER (OUTPATIENT)
Dept: LAB | Facility: HOSPITAL | Age: 62
End: 2022-09-04
Attending: INTERNAL MEDICINE
Payer: COMMERCIAL

## 2022-09-04 DIAGNOSIS — J90 PLEURAL EFFUSION: ICD-10-CM

## 2022-09-05 LAB — SARS-COV-2 RNA RESP QL NAA+PROBE: NOT DETECTED

## 2022-09-06 ENCOUNTER — LAB REQUISITION (OUTPATIENT)
Dept: LAB | Facility: HOSPITAL | Age: 62
End: 2022-09-06
Payer: COMMERCIAL

## 2022-09-06 DIAGNOSIS — C50.919 MALIGNANT NEOPLASM OF UNSPECIFIED SITE OF UNSPECIFIED FEMALE BREAST (HCC): ICD-10-CM

## 2022-09-06 PROCEDURE — 88363 XM ARCHIVE TISSUE MOLEC ANAL: CPT | Performed by: INTERNAL MEDICINE

## 2022-09-07 ENCOUNTER — HOSPITAL ENCOUNTER (OUTPATIENT)
Dept: GENERAL RADIOLOGY | Facility: HOSPITAL | Age: 62
Discharge: HOME | End: 2022-09-07
Attending: INTERNAL MEDICINE
Payer: COMMERCIAL

## 2022-09-07 ENCOUNTER — HOSPITAL ENCOUNTER (OUTPATIENT)
Dept: ULTRASOUND IMAGING | Facility: HOSPITAL | Age: 62
Discharge: HOME | End: 2022-09-07
Attending: INTERNAL MEDICINE
Payer: COMMERCIAL

## 2022-09-07 VITALS — WEIGHT: 123 LBS | BODY MASS INDEX: 23.22 KG/M2 | HEIGHT: 61 IN

## 2022-09-07 DIAGNOSIS — R06.09 DYSPNEA ON EXERTION: ICD-10-CM

## 2022-09-07 DIAGNOSIS — Z17.0 MALIGNANT NEOPLASM OF UPPER-OUTER QUADRANT OF RIGHT BREAST IN FEMALE, ESTROGEN RECEPTOR POSITIVE (HCC): ICD-10-CM

## 2022-09-07 DIAGNOSIS — J90 PLEURAL EFFUSION: ICD-10-CM

## 2022-09-07 DIAGNOSIS — C50.919 TRIPLE NEGATIVE BREAST CANCER (HCC): Primary | ICD-10-CM

## 2022-09-07 DIAGNOSIS — C50.411 MALIGNANT NEOPLASM OF UPPER-OUTER QUADRANT OF RIGHT BREAST IN FEMALE, ESTROGEN RECEPTOR POSITIVE (HCC): ICD-10-CM

## 2022-09-07 LAB
BASOPHILS NFR PLR: 0 %
COLOR FLD: YELLOW
EOSINOPHIL NFR PLR: 0 %
GLUCOSE PLR-MCNC: 106 MG/DL
LDH FLD L TO P-CCNC: 267 U/L
LYMPHOCYTES NFR PLR: 91 %
MONOS+MACROS NFR PLR: 9 %
NEUTROPHILS NFR PLR: 0 %
PROT PLR-MCNC: 3.1 G/DL
RBC # FLD: 337 /CUMM (ref ?–1)
TOTAL CELLS COUNTED FLD: 100
TOTAL CELLS COUNTED PLR: 317 /CUMM (ref ?–1)
TURBIDITY CSF QL: CLEAR
WBC # PLR: 317 /CUMM

## 2022-09-07 PROCEDURE — 32555 ASPIRATE PLEURA W/ IMAGING: CPT | Performed by: INTERNAL MEDICINE

## 2022-09-07 NOTE — PROCEDURES
[facility] - IMELDA PAINTER  Procedure Note  22    Mason Poor Patient Status:  Outpatient    3/1/1960 MRN [id number]   Location [place] [address] Attending Kike Moreno MD   Hosp Day # 0 PCP Teto Mcduffie     Procedure: Right-sided ultrasound-guided thoracentesis    Pre-Procedure Diagnosis: Pleural effusion    Post-Procedure Diagnosis: Tiny pleural effusion, 10 cc of crystal-clear yellow fluid aspirated. Anesthesia:  Local    Finding and procedure: The right posterior axillary line was cleaned, draped, anesthetized, imaged with ultrasound and only a very tiny sliver of fluid was identified. This was aspirated with a 21-gauge needle. Chest x-ray is pending.     Specimens: Sent    Blood Loss: None    Tourniquet Time: None  Complications:  None  Drains: None    Secondary Diagnosis: None    Jaymie Kraft MD  Medical Director, Critical Care, [address] [place] [address]  Medical Director, [address] [place] [address] [phone]  Pager: [phone]

## 2022-09-09 ENCOUNTER — TELEPHONE (OUTPATIENT)
Dept: HEMATOLOGY/ONCOLOGY | Facility: HOSPITAL | Age: 62
End: 2022-09-09

## 2022-09-09 NOTE — TELEPHONE ENCOUNTER
Returned call to Vijay Tavarez. Wanted to know the next steps. Let her know that we are still waiting on a few results. She is scheduled to see Dr Sundar Dykes on Thursday the 15th. She verbalized understanding.

## 2022-09-11 PROBLEM — C50.919 TRIPLE NEGATIVE BREAST CANCER (HCC): Status: ACTIVE | Noted: 2022-09-11

## 2022-09-11 PROBLEM — Z17.421 TRIPLE NEGATIVE BREAST CANCER (HCC): Status: ACTIVE | Noted: 2022-09-11

## 2022-09-13 ENCOUNTER — LAB REQUISITION (OUTPATIENT)
Dept: LAB | Facility: HOSPITAL | Age: 62
End: 2022-09-13
Payer: COMMERCIAL

## 2022-09-13 DIAGNOSIS — C50.919 MALIGNANT NEOPLASM OF UNSPECIFIED SITE OF UNSPECIFIED FEMALE BREAST (HCC): ICD-10-CM

## 2022-09-13 PROCEDURE — 88363 XM ARCHIVE TISSUE MOLEC ANAL: CPT | Performed by: INTERNAL MEDICINE

## 2022-09-15 ENCOUNTER — TELEPHONE (OUTPATIENT)
Dept: HEMATOLOGY/ONCOLOGY | Facility: HOSPITAL | Age: 62
End: 2022-09-15

## 2022-09-15 ENCOUNTER — NURSE ONLY (OUTPATIENT)
Dept: HEMATOLOGY/ONCOLOGY | Facility: HOSPITAL | Age: 62
End: 2022-09-15
Attending: INTERNAL MEDICINE
Payer: COMMERCIAL

## 2022-09-15 VITALS
DIASTOLIC BLOOD PRESSURE: 84 MMHG | TEMPERATURE: 99 F | OXYGEN SATURATION: 98 % | BODY MASS INDEX: 23.6 KG/M2 | HEART RATE: 83 BPM | HEIGHT: 61 IN | RESPIRATION RATE: 16 BRPM | WEIGHT: 125 LBS | SYSTOLIC BLOOD PRESSURE: 143 MMHG

## 2022-09-15 DIAGNOSIS — J91.0 MALIGNANT PLEURAL EFFUSION: ICD-10-CM

## 2022-09-15 DIAGNOSIS — Z17.0 MALIGNANT NEOPLASM OF UPPER-OUTER QUADRANT OF RIGHT BREAST IN FEMALE, ESTROGEN RECEPTOR POSITIVE (HCC): ICD-10-CM

## 2022-09-15 DIAGNOSIS — C78.7 LIVER METASTASIS (HCC): ICD-10-CM

## 2022-09-15 DIAGNOSIS — J90 CHRONIC BILATERAL PLEURAL EFFUSIONS: ICD-10-CM

## 2022-09-15 DIAGNOSIS — C50.411 MALIGNANT NEOPLASM OF UPPER-OUTER QUADRANT OF RIGHT BREAST IN FEMALE, ESTROGEN RECEPTOR POSITIVE (HCC): ICD-10-CM

## 2022-09-15 DIAGNOSIS — C50.919 TRIPLE NEGATIVE BREAST CANCER (HCC): Primary | ICD-10-CM

## 2022-09-15 LAB
ALBUMIN SERPL-MCNC: 3 G/DL (ref 3.4–5)
ALBUMIN/GLOB SERPL: 0.8 {RATIO} (ref 1–2)
ALP LIVER SERPL-CCNC: 78 U/L
ALT SERPL-CCNC: 24 U/L
ANION GAP SERPL CALC-SCNC: 5 MMOL/L (ref 0–18)
AST SERPL-CCNC: 20 U/L (ref 15–37)
BASOPHILS # BLD AUTO: 0.06 X10(3) UL (ref 0–0.2)
BASOPHILS NFR BLD AUTO: 1.7 %
BILIRUB SERPL-MCNC: 0.9 MG/DL (ref 0.1–2)
BUN BLD-MCNC: 17 MG/DL (ref 7–18)
BUN/CREAT SERPL: 14.3 (ref 10–20)
CALCIUM BLD-MCNC: 8.7 MG/DL (ref 8.5–10.1)
CHLORIDE SERPL-SCNC: 107 MMOL/L (ref 98–112)
CO2 SERPL-SCNC: 30 MMOL/L (ref 21–32)
CREAT BLD-MCNC: 1.19 MG/DL
DEPRECATED RDW RBC AUTO: 47.3 FL (ref 35.1–46.3)
EOSINOPHIL # BLD AUTO: 0.02 X10(3) UL (ref 0–0.7)
EOSINOPHIL NFR BLD AUTO: 0.6 %
ERYTHROCYTE [DISTWIDTH] IN BLOOD BY AUTOMATED COUNT: 12.6 % (ref 11–15)
GFR SERPLBLD BASED ON 1.73 SQ M-ARVRAT: 52 ML/MIN/1.73M2 (ref 60–?)
GLOBULIN PLAS-MCNC: 3.7 G/DL (ref 2.8–4.4)
GLUCOSE BLD-MCNC: 85 MG/DL (ref 70–99)
HCT VFR BLD AUTO: 37.7 %
HGB BLD-MCNC: 12.6 G/DL
IMM GRANULOCYTES # BLD AUTO: 0.05 X10(3) UL (ref 0–1)
IMM GRANULOCYTES NFR BLD: 1.5 %
LYMPHOCYTES # BLD AUTO: 0.59 X10(3) UL (ref 1–4)
LYMPHOCYTES NFR BLD AUTO: 17.2 %
MCH RBC QN AUTO: 34 PG (ref 26–34)
MCHC RBC AUTO-ENTMCNC: 33.4 G/DL (ref 31–37)
MCV RBC AUTO: 101.6 FL
MONOCYTES # BLD AUTO: 0.63 X10(3) UL (ref 0.1–1)
MONOCYTES NFR BLD AUTO: 18.4 %
NEUTROPHILS # BLD AUTO: 2.08 X10 (3) UL (ref 1.5–7.7)
NEUTROPHILS # BLD AUTO: 2.08 X10(3) UL (ref 1.5–7.7)
NEUTROPHILS NFR BLD AUTO: 60.6 %
OSMOLALITY SERPL CALC.SUM OF ELEC: 295 MOSM/KG (ref 275–295)
PLATELET # BLD AUTO: 244 10(3)UL (ref 150–450)
POTASSIUM SERPL-SCNC: 4.8 MMOL/L (ref 3.5–5.1)
PROT SERPL-MCNC: 6.7 G/DL (ref 6.4–8.2)
RBC # BLD AUTO: 3.71 X10(6)UL
SODIUM SERPL-SCNC: 142 MMOL/L (ref 136–145)
WBC # BLD AUTO: 3.4 X10(3) UL (ref 4–11)

## 2022-09-15 PROCEDURE — 80053 COMPREHEN METABOLIC PANEL: CPT

## 2022-09-15 PROCEDURE — 85025 COMPLETE CBC W/AUTO DIFF WBC: CPT

## 2022-09-15 PROCEDURE — 36415 COLL VENOUS BLD VENIPUNCTURE: CPT

## 2022-09-15 PROCEDURE — 99215 OFFICE O/P EST HI 40 MIN: CPT | Performed by: INTERNAL MEDICINE

## 2022-09-18 RX ORDER — HYDROCODONE BITARTRATE AND ACETAMINOPHEN 10; 325 MG/1; MG/1
1-2 TABLET ORAL EVERY 4 HOURS PRN
Qty: 120 TABLET | Refills: 0 | Status: SHIPPED | OUTPATIENT
Start: 2022-09-18

## 2022-09-22 ENCOUNTER — NURSE ONLY (OUTPATIENT)
Dept: HEMATOLOGY/ONCOLOGY | Facility: HOSPITAL | Age: 62
End: 2022-09-22
Attending: INTERNAL MEDICINE
Payer: COMMERCIAL

## 2022-09-22 VITALS
HEIGHT: 61 IN | OXYGEN SATURATION: 97 % | WEIGHT: 126 LBS | SYSTOLIC BLOOD PRESSURE: 159 MMHG | BODY MASS INDEX: 23.79 KG/M2 | TEMPERATURE: 99 F | HEART RATE: 94 BPM | RESPIRATION RATE: 16 BRPM | DIASTOLIC BLOOD PRESSURE: 94 MMHG

## 2022-09-22 VITALS
DIASTOLIC BLOOD PRESSURE: 94 MMHG | BODY MASS INDEX: 23.79 KG/M2 | TEMPERATURE: 99 F | HEIGHT: 61 IN | RESPIRATION RATE: 16 BRPM | WEIGHT: 126 LBS | HEART RATE: 94 BPM | SYSTOLIC BLOOD PRESSURE: 159 MMHG | OXYGEN SATURATION: 97 %

## 2022-09-22 DIAGNOSIS — C50.411 MALIGNANT NEOPLASM OF UPPER-OUTER QUADRANT OF RIGHT BREAST IN FEMALE, ESTROGEN RECEPTOR POSITIVE (HCC): ICD-10-CM

## 2022-09-22 DIAGNOSIS — C50.919 METASTATIC BREAST CANCER (HCC): ICD-10-CM

## 2022-09-22 DIAGNOSIS — C78.7 LIVER METASTASIS (HCC): ICD-10-CM

## 2022-09-22 DIAGNOSIS — J91.0 MALIGNANT PLEURAL EFFUSION: ICD-10-CM

## 2022-09-22 DIAGNOSIS — Z45.2 ENCOUNTER FOR CENTRAL LINE CARE: ICD-10-CM

## 2022-09-22 DIAGNOSIS — Z17.0 MALIGNANT NEOPLASM OF UPPER-OUTER QUADRANT OF RIGHT BREAST IN FEMALE, ESTROGEN RECEPTOR POSITIVE (HCC): ICD-10-CM

## 2022-09-22 DIAGNOSIS — Z51.11 CHEMOTHERAPY MANAGEMENT, ENCOUNTER FOR: ICD-10-CM

## 2022-09-22 DIAGNOSIS — Z51.11 CHEMOTHERAPY MANAGEMENT, ENCOUNTER FOR: Primary | ICD-10-CM

## 2022-09-22 DIAGNOSIS — C50.919 TRIPLE NEGATIVE BREAST CANCER (HCC): Primary | ICD-10-CM

## 2022-09-22 DIAGNOSIS — Z79.899 ENCOUNTER FOR MEDICATION MANAGEMENT: ICD-10-CM

## 2022-09-22 PROCEDURE — 96415 CHEMO IV INFUSION ADDL HR: CPT

## 2022-09-22 PROCEDURE — 96375 TX/PRO/DX INJ NEW DRUG ADDON: CPT

## 2022-09-22 PROCEDURE — 96367 TX/PROPH/DG ADDL SEQ IV INF: CPT

## 2022-09-22 PROCEDURE — 99215 OFFICE O/P EST HI 40 MIN: CPT | Performed by: PHYSICIAN ASSISTANT

## 2022-09-22 PROCEDURE — S0028 INJECTION, FAMOTIDINE, 20 MG: HCPCS

## 2022-09-22 PROCEDURE — 96413 CHEMO IV INFUSION 1 HR: CPT

## 2022-09-22 RX ORDER — LAMOTRIGINE 25 MG/1
500 TABLET ORAL ONCE
OUTPATIENT
Start: 2022-10-27

## 2022-09-22 RX ORDER — ACETAMINOPHEN 325 MG/1
TABLET ORAL
Status: COMPLETED
Start: 2022-09-22 | End: 2022-09-22

## 2022-09-22 RX ORDER — ACETAMINOPHEN 325 MG/1
650 TABLET ORAL ONCE
Status: COMPLETED | OUTPATIENT
Start: 2022-09-22 | End: 2022-09-22

## 2022-09-22 RX ORDER — ATROPINE SULFATE 0.4 MG/ML
0.2 AMPUL (ML) INJECTION ONCE
Status: COMPLETED | OUTPATIENT
Start: 2022-09-22 | End: 2022-09-22

## 2022-09-22 RX ORDER — FAMOTIDINE 10 MG/ML
INJECTION, SOLUTION INTRAVENOUS
Status: COMPLETED
Start: 2022-09-22 | End: 2022-09-22

## 2022-09-22 RX ORDER — HEPARIN SODIUM (PORCINE) LOCK FLUSH IV SOLN 100 UNIT/ML 100 UNIT/ML
SOLUTION INTRAVENOUS
Status: COMPLETED
Start: 2022-09-22 | End: 2022-09-22

## 2022-09-22 RX ORDER — DIPHENHYDRAMINE HYDROCHLORIDE 50 MG/ML
INJECTION INTRAMUSCULAR; INTRAVENOUS
Status: COMPLETED
Start: 2022-09-22 | End: 2022-09-22

## 2022-09-22 RX ORDER — HEPARIN SODIUM (PORCINE) LOCK FLUSH IV SOLN 100 UNIT/ML 100 UNIT/ML
500 SOLUTION INTRAVENOUS ONCE
OUTPATIENT
Start: 2022-10-27

## 2022-09-22 RX ORDER — HEPARIN SODIUM (PORCINE) LOCK FLUSH IV SOLN 100 UNIT/ML 100 UNIT/ML
500 SOLUTION INTRAVENOUS ONCE
Status: COMPLETED | OUTPATIENT
Start: 2022-09-22 | End: 2022-09-22

## 2022-09-22 RX ORDER — DIPHENHYDRAMINE HYDROCHLORIDE 50 MG/ML
25 INJECTION INTRAMUSCULAR; INTRAVENOUS ONCE
Status: COMPLETED | OUTPATIENT
Start: 2022-09-22 | End: 2022-09-22

## 2022-09-22 RX ORDER — ATROPINE SULFATE 0.4 MG/ML
AMPUL (ML) INJECTION
Status: COMPLETED
Start: 2022-09-22 | End: 2022-09-22

## 2022-09-22 RX ORDER — FAMOTIDINE 10 MG/ML
20 INJECTION, SOLUTION INTRAVENOUS ONCE
Status: COMPLETED | OUTPATIENT
Start: 2022-09-22 | End: 2022-09-22

## 2022-09-22 RX ADMIN — DIPHENHYDRAMINE HYDROCHLORIDE 25 MG: 50 INJECTION INTRAMUSCULAR; INTRAVENOUS at 12:15:00

## 2022-09-22 RX ADMIN — HEPARIN SODIUM (PORCINE) LOCK FLUSH IV SOLN 100 UNIT/ML 500 UNITS: 100 SOLUTION INTRAVENOUS at 16:45:00

## 2022-09-22 RX ADMIN — ACETAMINOPHEN 650 MG: 325 TABLET ORAL at 12:09:00

## 2022-09-22 RX ADMIN — ATROPINE SULFATE 0.2 MG: 0.4 MG/ML AMPUL (ML) INJECTION at 12:09:00

## 2022-09-22 RX ADMIN — FAMOTIDINE 20 MG: 10 INJECTION, SOLUTION INTRAVENOUS at 12:12:00

## 2022-09-22 NOTE — PROGRESS NOTES
Pt here for C1D1 Trodelvy. Arrives Ambulating independently, accompanied by Self         Signed consent. Tolerated well over 3 hours, with a 30 min observation. Pregnancy screening: Denies possibility of pregnancy    Modifications in dose or schedule: No    Drugs/infusions dual verified for appearance and physical integrity. IV pump settings were dual verified: yes     Frequency of blood return and site check throughout administration: Prior to administration and At completion of therapy   Discharged to Home, Ambulating independently, accompanied by:Self    Outpatient Oncology Care Plan  Problem list:  anemia  dry mouth  thrombocytopenia  knowledge deficit  Problems related to:  chemotherapy  side effect of treatment  Interventions:  monitor for signs/symptoms of infection  patient/family supported  chemotherapy teaching  Expected outcomes:  family support/coping well  understands plan of care  Progress towards outcome:  making progress    Education Record    Learner:  Patient  Barriers / Limitations:  Drowsy from medication  Method:  Brief focused, Discussion and Printed material  Outcome:  Needs reinforcement  Comments:  Reinforced chemo education. When to call MD. Discussed premedications.

## 2022-09-23 ENCOUNTER — TELEPHONE (OUTPATIENT)
Dept: HEMATOLOGY/ONCOLOGY | Facility: HOSPITAL | Age: 62
End: 2022-09-23

## 2022-09-23 NOTE — TELEPHONE ENCOUNTER
Toxicities: C1 D1 Sacituzumab-govitecan-hziy on 9/22/2022    Arielle Cardoso said she feels \"good. \" No side effects at this time. I encouraged her to please call the office if she is not feeling well or she has any questions or concerns. She agreed and thanked me for checking on her.

## 2022-09-28 ENCOUNTER — TELEPHONE (OUTPATIENT)
Dept: HEMATOLOGY/ONCOLOGY | Facility: HOSPITAL | Age: 62
End: 2022-09-28

## 2022-09-28 NOTE — TELEPHONE ENCOUNTER
Armando Grady stated that the patient's testing has been paused because he have not been able to contact the care team to find out how to proceed.  Please call Armando Grady.

## 2022-09-29 ENCOUNTER — APPOINTMENT (OUTPATIENT)
Dept: HEMATOLOGY/ONCOLOGY | Facility: HOSPITAL | Age: 62
End: 2022-09-29
Attending: INTERNAL MEDICINE
Payer: COMMERCIAL

## 2022-09-29 VITALS
WEIGHT: 124.38 LBS | OXYGEN SATURATION: 98 % | HEIGHT: 61 IN | RESPIRATION RATE: 16 BRPM | BODY MASS INDEX: 23.48 KG/M2 | HEART RATE: 110 BPM | TEMPERATURE: 98 F | SYSTOLIC BLOOD PRESSURE: 127 MMHG | DIASTOLIC BLOOD PRESSURE: 87 MMHG

## 2022-09-29 DIAGNOSIS — Z79.899 ENCOUNTER FOR MEDICATION MANAGEMENT: ICD-10-CM

## 2022-09-29 DIAGNOSIS — J91.0 MALIGNANT PLEURAL EFFUSION: ICD-10-CM

## 2022-09-29 DIAGNOSIS — C78.7 LIVER METASTASIS (HCC): ICD-10-CM

## 2022-09-29 DIAGNOSIS — Z51.11 CHEMOTHERAPY MANAGEMENT, ENCOUNTER FOR: ICD-10-CM

## 2022-09-29 DIAGNOSIS — C50.919 TRIPLE NEGATIVE BREAST CANCER (HCC): Primary | ICD-10-CM

## 2022-09-29 DIAGNOSIS — Z45.2 ENCOUNTER FOR CENTRAL LINE CARE: ICD-10-CM

## 2022-09-29 DIAGNOSIS — D70.1 CHEMOTHERAPY-INDUCED NEUTROPENIA (HCC): ICD-10-CM

## 2022-09-29 DIAGNOSIS — T45.1X5A CHEMOTHERAPY-INDUCED NEUTROPENIA (HCC): ICD-10-CM

## 2022-09-29 LAB
ALBUMIN SERPL-MCNC: 3.2 G/DL (ref 3.4–5)
ALBUMIN/GLOB SERPL: 1 {RATIO} (ref 1–2)
ALP LIVER SERPL-CCNC: 87 U/L
ALT SERPL-CCNC: 60 U/L
ANION GAP SERPL CALC-SCNC: 7 MMOL/L (ref 0–18)
AST SERPL-CCNC: 39 U/L (ref 15–37)
BASOPHILS # BLD AUTO: 0.03 X10(3) UL (ref 0–0.2)
BASOPHILS NFR BLD AUTO: 2.8 %
BILIRUB SERPL-MCNC: 1.1 MG/DL (ref 0.1–2)
BUN BLD-MCNC: 25 MG/DL (ref 7–18)
BUN/CREAT SERPL: 25.5 (ref 10–20)
CALCIUM BLD-MCNC: 9 MG/DL (ref 8.5–10.1)
CHLORIDE SERPL-SCNC: 106 MMOL/L (ref 98–112)
CO2 SERPL-SCNC: 26 MMOL/L (ref 21–32)
CREAT BLD-MCNC: 0.98 MG/DL
DEPRECATED RDW RBC AUTO: 43.5 FL (ref 35.1–46.3)
EOSINOPHIL # BLD AUTO: 0.06 X10(3) UL (ref 0–0.7)
EOSINOPHIL NFR BLD AUTO: 5.5 %
ERYTHROCYTE [DISTWIDTH] IN BLOOD BY AUTOMATED COUNT: 11.9 % (ref 11–15)
GFR SERPLBLD BASED ON 1.73 SQ M-ARVRAT: 66 ML/MIN/1.73M2 (ref 60–?)
GLOBULIN PLAS-MCNC: 3.3 G/DL (ref 2.8–4.4)
GLUCOSE BLD-MCNC: 100 MG/DL (ref 70–99)
HCT VFR BLD AUTO: 33.6 %
HGB BLD-MCNC: 11.3 G/DL
IMM GRANULOCYTES # BLD AUTO: 0.01 X10(3) UL (ref 0–1)
IMM GRANULOCYTES NFR BLD: 0.9 %
LYMPHOCYTES # BLD AUTO: 0.25 X10(3) UL (ref 1–4)
LYMPHOCYTES NFR BLD AUTO: 22.9 %
MCH RBC QN AUTO: 33.2 PG (ref 26–34)
MCHC RBC AUTO-ENTMCNC: 33.6 G/DL (ref 31–37)
MCV RBC AUTO: 98.8 FL
MONOCYTES # BLD AUTO: 0.13 X10(3) UL (ref 0.1–1)
MONOCYTES NFR BLD AUTO: 11.9 %
NEUTROPHILS # BLD AUTO: 0.61 X10 (3) UL (ref 1.5–7.7)
NEUTROPHILS # BLD AUTO: 0.61 X10(3) UL (ref 1.5–7.7)
NEUTROPHILS NFR BLD AUTO: 56 %
OSMOLALITY SERPL CALC.SUM OF ELEC: 292 MOSM/KG (ref 275–295)
PLATELET # BLD AUTO: 226 10(3)UL (ref 150–450)
POTASSIUM SERPL-SCNC: 3.7 MMOL/L (ref 3.5–5.1)
PROT SERPL-MCNC: 6.5 G/DL (ref 6.4–8.2)
RBC # BLD AUTO: 3.4 X10(6)UL
SODIUM SERPL-SCNC: 139 MMOL/L (ref 136–145)
WBC # BLD AUTO: 1.1 X10(3) UL (ref 4–11)

## 2022-09-29 PROCEDURE — 80053 COMPREHEN METABOLIC PANEL: CPT

## 2022-09-29 PROCEDURE — 99215 OFFICE O/P EST HI 40 MIN: CPT | Performed by: INTERNAL MEDICINE

## 2022-09-29 PROCEDURE — 36593 DECLOT VASCULAR DEVICE: CPT

## 2022-09-29 PROCEDURE — 36415 COLL VENOUS BLD VENIPUNCTURE: CPT

## 2022-09-29 PROCEDURE — 85060 BLOOD SMEAR INTERPRETATION: CPT

## 2022-09-29 PROCEDURE — 85025 COMPLETE CBC W/AUTO DIFF WBC: CPT

## 2022-09-29 RX ORDER — ACETAMINOPHEN 325 MG/1
650 TABLET ORAL ONCE
OUTPATIENT
Start: 2022-10-13

## 2022-09-29 RX ORDER — ATROPINE SULFATE 0.4 MG/ML
0.2 AMPUL (ML) INJECTION ONCE
Status: DISCONTINUED | OUTPATIENT
Start: 2022-09-29 | End: 2022-09-29

## 2022-09-29 RX ORDER — HEPARIN SODIUM (PORCINE) LOCK FLUSH IV SOLN 100 UNIT/ML 100 UNIT/ML
500 SOLUTION INTRAVENOUS ONCE
OUTPATIENT
Start: 2022-10-27

## 2022-09-29 RX ORDER — HEPARIN SODIUM (PORCINE) LOCK FLUSH IV SOLN 100 UNIT/ML 100 UNIT/ML
500 SOLUTION INTRAVENOUS ONCE
Status: COMPLETED | OUTPATIENT
Start: 2022-09-29 | End: 2022-09-29

## 2022-09-29 RX ORDER — DIPHENHYDRAMINE HYDROCHLORIDE 50 MG/ML
25 INJECTION INTRAMUSCULAR; INTRAVENOUS ONCE
OUTPATIENT
Start: 2022-10-13

## 2022-09-29 RX ORDER — ATROPINE SULFATE 0.4 MG/ML
0.2 AMPUL (ML) INJECTION ONCE
OUTPATIENT
Start: 2022-10-13

## 2022-09-29 RX ORDER — LEVOFLOXACIN 500 MG/1
500 TABLET, FILM COATED ORAL DAILY
Qty: 7 TABLET | Refills: 0 | Status: SHIPPED | OUTPATIENT
Start: 2022-09-29 | End: 2022-10-06

## 2022-09-29 RX ORDER — DIPHENHYDRAMINE HYDROCHLORIDE 50 MG/ML
25 INJECTION INTRAMUSCULAR; INTRAVENOUS ONCE
Status: DISCONTINUED | OUTPATIENT
Start: 2022-09-29 | End: 2022-09-29

## 2022-09-29 RX ORDER — FAMOTIDINE 10 MG/ML
20 INJECTION, SOLUTION INTRAVENOUS ONCE
Status: DISCONTINUED | OUTPATIENT
Start: 2022-09-29 | End: 2022-09-29

## 2022-09-29 RX ORDER — LAMOTRIGINE 25 MG/1
500 TABLET ORAL ONCE
OUTPATIENT
Start: 2022-10-27

## 2022-09-29 RX ORDER — HEPARIN SODIUM (PORCINE) LOCK FLUSH IV SOLN 100 UNIT/ML 100 UNIT/ML
500 SOLUTION INTRAVENOUS ONCE
Status: CANCELLED | OUTPATIENT
Start: 2022-10-27

## 2022-09-29 RX ORDER — HEPARIN SODIUM (PORCINE) LOCK FLUSH IV SOLN 100 UNIT/ML 100 UNIT/ML
500 SOLUTION INTRAVENOUS ONCE
Status: DISCONTINUED | OUTPATIENT
Start: 2022-09-29 | End: 2022-09-29

## 2022-09-29 RX ORDER — ACETAMINOPHEN 325 MG/1
650 TABLET ORAL ONCE
Status: DISCONTINUED | OUTPATIENT
Start: 2022-09-29 | End: 2022-09-29

## 2022-09-29 RX ORDER — ALBUTEROL SULFATE 90 UG/1
1 AEROSOL, METERED RESPIRATORY (INHALATION) EVERY 6 HOURS PRN
Qty: 8 G | Refills: 3 | Status: SHIPPED | OUTPATIENT
Start: 2022-09-29

## 2022-09-29 RX ORDER — FAMOTIDINE 10 MG/ML
20 INJECTION, SOLUTION INTRAVENOUS ONCE
OUTPATIENT
Start: 2022-10-13

## 2022-09-29 RX ADMIN — HEPARIN SODIUM (PORCINE) LOCK FLUSH IV SOLN 100 UNIT/ML 500 UNITS: 100 SOLUTION INTRAVENOUS at 12:30:00

## 2022-09-29 NOTE — PROGRESS NOTES
tx held for neutropenia. Port flushed and deaccessed today in lab. Schedule to be adjusted for Jannie King to return in 2 weeks. Reviewed plan of care with Jannie King. Discharged stable.

## 2022-10-05 ENCOUNTER — TELEPHONE (OUTPATIENT)
Dept: HEMATOLOGY/ONCOLOGY | Facility: HOSPITAL | Age: 62
End: 2022-10-05

## 2022-10-05 NOTE — TELEPHONE ENCOUNTER
Patient calling, stated that her infusion schedule is wrong. Stated I would send her to infusion to get it corrected.   She stated she would rather talk with Dr Pastora Wheeler her PA

## 2022-10-05 NOTE — TELEPHONE ENCOUNTER
Spoke with Gm Pedroza. Dr Marylin Guevara does not want her to get another dose til 10/13. Gm Pedroza states that today is the first day she has started to feel better. Offered for her to come in for labs and a follow up, she would prefer to wait til 10/13.

## 2022-10-06 ENCOUNTER — APPOINTMENT (OUTPATIENT)
Dept: HEMATOLOGY/ONCOLOGY | Facility: HOSPITAL | Age: 62
End: 2022-10-06
Attending: INTERNAL MEDICINE
Payer: COMMERCIAL

## 2022-10-13 ENCOUNTER — APPOINTMENT (OUTPATIENT)
Dept: HEMATOLOGY/ONCOLOGY | Facility: HOSPITAL | Age: 62
End: 2022-10-13
Attending: INTERNAL MEDICINE
Payer: COMMERCIAL

## 2022-10-13 VITALS
TEMPERATURE: 98 F | DIASTOLIC BLOOD PRESSURE: 74 MMHG | SYSTOLIC BLOOD PRESSURE: 123 MMHG | OXYGEN SATURATION: 96 % | HEART RATE: 92 BPM | RESPIRATION RATE: 16 BRPM

## 2022-10-13 VITALS
OXYGEN SATURATION: 98 % | WEIGHT: 128 LBS | HEART RATE: 84 BPM | SYSTOLIC BLOOD PRESSURE: 135 MMHG | BODY MASS INDEX: 24.17 KG/M2 | RESPIRATION RATE: 16 BRPM | HEIGHT: 61 IN | DIASTOLIC BLOOD PRESSURE: 76 MMHG | TEMPERATURE: 98 F

## 2022-10-13 DIAGNOSIS — Z51.11 CHEMOTHERAPY MANAGEMENT, ENCOUNTER FOR: Primary | ICD-10-CM

## 2022-10-13 DIAGNOSIS — C50.919 TRIPLE NEGATIVE BREAST CANCER (HCC): Primary | ICD-10-CM

## 2022-10-13 DIAGNOSIS — Z79.899 ENCOUNTER FOR MEDICATION MANAGEMENT: ICD-10-CM

## 2022-10-13 DIAGNOSIS — T45.1X5A CHEMOTHERAPY-INDUCED NEUTROPENIA (HCC): ICD-10-CM

## 2022-10-13 DIAGNOSIS — J91.0 MALIGNANT PLEURAL EFFUSION: ICD-10-CM

## 2022-10-13 DIAGNOSIS — D70.1 CHEMOTHERAPY-INDUCED NEUTROPENIA (HCC): ICD-10-CM

## 2022-10-13 DIAGNOSIS — Z51.11 CHEMOTHERAPY MANAGEMENT, ENCOUNTER FOR: ICD-10-CM

## 2022-10-13 DIAGNOSIS — C50.919 TRIPLE NEGATIVE BREAST CANCER (HCC): ICD-10-CM

## 2022-10-13 DIAGNOSIS — Z45.2 ENCOUNTER FOR CENTRAL LINE CARE: ICD-10-CM

## 2022-10-13 LAB
ALBUMIN SERPL-MCNC: 2.8 G/DL (ref 3.4–5)
ALBUMIN/GLOB SERPL: 0.9 {RATIO} (ref 1–2)
ALP LIVER SERPL-CCNC: 55 U/L
ALT SERPL-CCNC: 16 U/L
ANION GAP SERPL CALC-SCNC: 7 MMOL/L (ref 0–18)
AST SERPL-CCNC: 17 U/L (ref 15–37)
BASOPHILS # BLD AUTO: 0.03 X10(3) UL (ref 0–0.2)
BASOPHILS NFR BLD AUTO: 0.6 %
BILIRUB SERPL-MCNC: 0.6 MG/DL (ref 0.1–2)
BUN BLD-MCNC: 19 MG/DL (ref 7–18)
BUN/CREAT SERPL: 19 (ref 10–20)
CALCIUM BLD-MCNC: 8.3 MG/DL (ref 8.5–10.1)
CHLORIDE SERPL-SCNC: 110 MMOL/L (ref 98–112)
CO2 SERPL-SCNC: 24 MMOL/L (ref 21–32)
CREAT BLD-MCNC: 1 MG/DL
DEPRECATED RDW RBC AUTO: 45.4 FL (ref 35.1–46.3)
EOSINOPHIL # BLD AUTO: 0.07 X10(3) UL (ref 0–0.7)
EOSINOPHIL NFR BLD AUTO: 1.4 %
ERYTHROCYTE [DISTWIDTH] IN BLOOD BY AUTOMATED COUNT: 12.7 % (ref 11–15)
GFR SERPLBLD BASED ON 1.73 SQ M-ARVRAT: 64 ML/MIN/1.73M2 (ref 60–?)
GLOBULIN PLAS-MCNC: 3 G/DL (ref 2.8–4.4)
GLUCOSE BLD-MCNC: 82 MG/DL (ref 70–99)
HCT VFR BLD AUTO: 33.7 %
HGB BLD-MCNC: 11.1 G/DL
IMM GRANULOCYTES # BLD AUTO: 0.21 X10(3) UL (ref 0–1)
IMM GRANULOCYTES NFR BLD: 4.1 %
LYMPHOCYTES # BLD AUTO: 0.63 X10(3) UL (ref 1–4)
LYMPHOCYTES NFR BLD AUTO: 12.4 %
MCH RBC QN AUTO: 32.7 PG (ref 26–34)
MCHC RBC AUTO-ENTMCNC: 32.9 G/DL (ref 31–37)
MCV RBC AUTO: 99.4 FL
MONOCYTES # BLD AUTO: 0.62 X10(3) UL (ref 0.1–1)
MONOCYTES NFR BLD AUTO: 12.2 %
NEUTROPHILS # BLD AUTO: 3.52 X10 (3) UL (ref 1.5–7.7)
NEUTROPHILS # BLD AUTO: 3.52 X10(3) UL (ref 1.5–7.7)
NEUTROPHILS NFR BLD AUTO: 69.3 %
OSMOLALITY SERPL CALC.SUM OF ELEC: 293 MOSM/KG (ref 275–295)
PLATELET # BLD AUTO: 191 10(3)UL (ref 150–450)
POTASSIUM SERPL-SCNC: 3.9 MMOL/L (ref 3.5–5.1)
PROT SERPL-MCNC: 5.8 G/DL (ref 6.4–8.2)
RBC # BLD AUTO: 3.39 X10(6)UL
SODIUM SERPL-SCNC: 141 MMOL/L (ref 136–145)
WBC # BLD AUTO: 5.1 X10(3) UL (ref 4–11)

## 2022-10-13 PROCEDURE — 96413 CHEMO IV INFUSION 1 HR: CPT

## 2022-10-13 PROCEDURE — 99215 OFFICE O/P EST HI 40 MIN: CPT | Performed by: INTERNAL MEDICINE

## 2022-10-13 PROCEDURE — 96375 TX/PRO/DX INJ NEW DRUG ADDON: CPT

## 2022-10-13 PROCEDURE — 80053 COMPREHEN METABOLIC PANEL: CPT

## 2022-10-13 PROCEDURE — 85025 COMPLETE CBC W/AUTO DIFF WBC: CPT

## 2022-10-13 PROCEDURE — S0028 INJECTION, FAMOTIDINE, 20 MG: HCPCS

## 2022-10-13 RX ORDER — LAMOTRIGINE 25 MG/1
500 TABLET ORAL ONCE
OUTPATIENT
Start: 2022-10-27

## 2022-10-13 RX ORDER — ACETAMINOPHEN 325 MG/1
TABLET ORAL
Status: COMPLETED
Start: 2022-10-13 | End: 2022-10-13

## 2022-10-13 RX ORDER — DIPHENHYDRAMINE HYDROCHLORIDE 50 MG/ML
25 INJECTION INTRAMUSCULAR; INTRAVENOUS ONCE
Status: COMPLETED | OUTPATIENT
Start: 2022-10-13 | End: 2022-10-13

## 2022-10-13 RX ORDER — DIPHENHYDRAMINE HYDROCHLORIDE 50 MG/ML
INJECTION INTRAMUSCULAR; INTRAVENOUS
Status: COMPLETED
Start: 2022-10-13 | End: 2022-10-13

## 2022-10-13 RX ORDER — FAMOTIDINE 10 MG/ML
20 INJECTION, SOLUTION INTRAVENOUS ONCE
Status: COMPLETED | OUTPATIENT
Start: 2022-10-13 | End: 2022-10-13

## 2022-10-13 RX ORDER — HEPARIN SODIUM (PORCINE) LOCK FLUSH IV SOLN 100 UNIT/ML 100 UNIT/ML
SOLUTION INTRAVENOUS
Status: COMPLETED
Start: 2022-10-13 | End: 2022-10-13

## 2022-10-13 RX ORDER — FAMOTIDINE 10 MG/ML
INJECTION, SOLUTION INTRAVENOUS
Status: COMPLETED
Start: 2022-10-13 | End: 2022-10-13

## 2022-10-13 RX ORDER — ATROPINE SULFATE 0.4 MG/ML
0.2 AMPUL (ML) INJECTION ONCE
Status: COMPLETED | OUTPATIENT
Start: 2022-10-13 | End: 2022-10-13

## 2022-10-13 RX ORDER — ATROPINE SULFATE 0.4 MG/ML
AMPUL (ML) INJECTION
Status: COMPLETED
Start: 2022-10-13 | End: 2022-10-13

## 2022-10-13 RX ORDER — ACETAMINOPHEN 325 MG/1
650 TABLET ORAL ONCE
Status: COMPLETED | OUTPATIENT
Start: 2022-10-13 | End: 2022-10-13

## 2022-10-13 RX ORDER — HEPARIN SODIUM (PORCINE) LOCK FLUSH IV SOLN 100 UNIT/ML 100 UNIT/ML
500 SOLUTION INTRAVENOUS ONCE
Status: COMPLETED | OUTPATIENT
Start: 2022-10-13 | End: 2022-10-13

## 2022-10-13 RX ORDER — HEPARIN SODIUM (PORCINE) LOCK FLUSH IV SOLN 100 UNIT/ML 100 UNIT/ML
500 SOLUTION INTRAVENOUS ONCE
OUTPATIENT
Start: 2022-10-27

## 2022-10-13 RX ADMIN — ATROPINE SULFATE 0.2 MG: 0.4 MG/ML AMPUL (ML) INJECTION at 13:42:00

## 2022-10-13 RX ADMIN — FAMOTIDINE 20 MG: 10 INJECTION, SOLUTION INTRAVENOUS at 12:46:00

## 2022-10-13 RX ADMIN — HEPARIN SODIUM (PORCINE) LOCK FLUSH IV SOLN 100 UNIT/ML 500 UNITS: 100 SOLUTION INTRAVENOUS at 15:31:00

## 2022-10-13 RX ADMIN — DIPHENHYDRAMINE HYDROCHLORIDE 25 MG: 50 INJECTION INTRAMUSCULAR; INTRAVENOUS at 12:42:00

## 2022-10-13 RX ADMIN — ACETAMINOPHEN 650 MG: 325 TABLET ORAL at 12:39:00

## 2022-10-13 NOTE — PROGRESS NOTES
Pt here for C2D1 Trodelvy. Arrives Ambulating independently, accompanied by Self           Tolerated well over 1  hour, with a 30 min observation. Pregnancy screening: Denies possibility of pregnancy    Modifications in dose or schedule: No    Drugs/infusions dual verified for appearance and physical integrity.  IV pump settings were dual verified: yes     Frequency of blood return and site check throughout administration: Prior to administration and At completion of therapy   Discharged to Home, Ambulating independently, accompanied by:Self    Outpatient Oncology Care Plan  Problem list:  anemia  dry mouth  thrombocytopenia  knowledge deficit  Problems related to:  chemotherapy  side effect of treatment  Interventions:  monitor for signs/symptoms of infection  patient/family supported  chemotherapy teaching  Expected outcomes:  family support/coping well  understands plan of care  Progress towards outcome:  making progress    Education Record    Learner:  Patient  Barriers / Limitations:  Drowsy from medication  Method:  Brief focused, Discussion and Printed material  Outcome:  Needs reinforcement  Comments:

## 2022-10-14 DIAGNOSIS — C50.919 METASTATIC BREAST CANCER: ICD-10-CM

## 2022-10-14 DIAGNOSIS — C78.7 LIVER METASTASIS: ICD-10-CM

## 2022-10-14 DIAGNOSIS — Z51.11 CHEMOTHERAPY MANAGEMENT, ENCOUNTER FOR: ICD-10-CM

## 2022-10-14 RX ORDER — LORAZEPAM 0.5 MG/1
TABLET ORAL
Qty: 30 TABLET | Refills: 1 | Status: SHIPPED | OUTPATIENT
Start: 2022-10-14 | End: 2022-11-03

## 2022-10-20 ENCOUNTER — APPOINTMENT (OUTPATIENT)
Dept: HEMATOLOGY/ONCOLOGY | Facility: HOSPITAL | Age: 62
End: 2022-10-20
Attending: INTERNAL MEDICINE
Payer: COMMERCIAL

## 2022-10-20 ENCOUNTER — HOSPITAL ENCOUNTER (OUTPATIENT)
Dept: GENERAL RADIOLOGY | Facility: HOSPITAL | Age: 62
Discharge: HOME | End: 2022-10-20
Attending: PHYSICIAN ASSISTANT
Payer: COMMERCIAL

## 2022-10-20 VITALS
BODY MASS INDEX: 23.98 KG/M2 | OXYGEN SATURATION: 99 % | TEMPERATURE: 98 F | DIASTOLIC BLOOD PRESSURE: 89 MMHG | HEART RATE: 77 BPM | SYSTOLIC BLOOD PRESSURE: 132 MMHG | HEIGHT: 61 IN | WEIGHT: 127 LBS | RESPIRATION RATE: 16 BRPM

## 2022-10-20 DIAGNOSIS — R06.00 DYSPNEA, UNSPECIFIED TYPE: ICD-10-CM

## 2022-10-20 DIAGNOSIS — Z51.11 CHEMOTHERAPY MANAGEMENT, ENCOUNTER FOR: ICD-10-CM

## 2022-10-20 DIAGNOSIS — C50.919 TRIPLE NEGATIVE BREAST CANCER (HCC): ICD-10-CM

## 2022-10-20 DIAGNOSIS — Z79.899 ENCOUNTER FOR MEDICATION MANAGEMENT: ICD-10-CM

## 2022-10-20 DIAGNOSIS — C50.919 TRIPLE NEGATIVE BREAST CANCER (HCC): Primary | ICD-10-CM

## 2022-10-20 DIAGNOSIS — J91.0 MALIGNANT PLEURAL EFFUSION: ICD-10-CM

## 2022-10-20 DIAGNOSIS — D50.8 OTHER IRON DEFICIENCY ANEMIA: ICD-10-CM

## 2022-10-20 DIAGNOSIS — Z45.2 ENCOUNTER FOR CENTRAL LINE CARE: ICD-10-CM

## 2022-10-20 DIAGNOSIS — R06.00 DYSPNEA, UNSPECIFIED TYPE: Primary | ICD-10-CM

## 2022-10-20 PROBLEM — E61.1 IRON DEFICIENCY: Status: ACTIVE | Noted: 2022-10-20

## 2022-10-20 PROBLEM — E53.8 B12 DEFICIENCY: Status: ACTIVE | Noted: 2022-10-20

## 2022-10-20 LAB
ALBUMIN SERPL-MCNC: 3 G/DL (ref 3.4–5)
ALBUMIN/GLOB SERPL: 1 {RATIO} (ref 1–2)
ALP LIVER SERPL-CCNC: 70 U/L
ALT SERPL-CCNC: 39 U/L
ANION GAP SERPL CALC-SCNC: 7 MMOL/L (ref 0–18)
AST SERPL-CCNC: 24 U/L (ref 15–37)
BASOPHILS # BLD AUTO: 0.04 X10(3) UL (ref 0–0.2)
BASOPHILS NFR BLD AUTO: 1.4 %
BILIRUB SERPL-MCNC: 0.6 MG/DL (ref 0.1–2)
BUN BLD-MCNC: 27 MG/DL (ref 7–18)
BUN/CREAT SERPL: 26.2 (ref 10–20)
CALCIUM BLD-MCNC: 8.5 MG/DL (ref 8.5–10.1)
CHLORIDE SERPL-SCNC: 110 MMOL/L (ref 98–112)
CO2 SERPL-SCNC: 25 MMOL/L (ref 21–32)
CREAT BLD-MCNC: 1.03 MG/DL
DEPRECATED HBV CORE AB SER IA-ACNC: 215.2 NG/ML
DEPRECATED RDW RBC AUTO: 44.1 FL (ref 35.1–46.3)
EOSINOPHIL # BLD AUTO: 0.04 X10(3) UL (ref 0–0.7)
EOSINOPHIL NFR BLD AUTO: 1.4 %
ERYTHROCYTE [DISTWIDTH] IN BLOOD BY AUTOMATED COUNT: 12.4 % (ref 11–15)
FOLATE SERPL-MCNC: 19.4 NG/ML (ref 8.7–?)
GFR SERPLBLD BASED ON 1.73 SQ M-ARVRAT: 62 ML/MIN/1.73M2 (ref 60–?)
GLOBULIN PLAS-MCNC: 3.1 G/DL (ref 2.8–4.4)
GLUCOSE BLD-MCNC: 98 MG/DL (ref 70–99)
HCT VFR BLD AUTO: 30.2 %
HGB BLD-MCNC: 10.1 G/DL
IMM GRANULOCYTES # BLD AUTO: 0.03 X10(3) UL (ref 0–1)
IMM GRANULOCYTES NFR BLD: 1 %
IRON SATN MFR SERPL: 12 %
IRON SERPL-MCNC: 37 UG/DL
LYMPHOCYTES # BLD AUTO: 0.51 X10(3) UL (ref 1–4)
LYMPHOCYTES NFR BLD AUTO: 17.8 %
MCH RBC QN AUTO: 32.6 PG (ref 26–34)
MCHC RBC AUTO-ENTMCNC: 33.4 G/DL (ref 31–37)
MCV RBC AUTO: 97.4 FL
MONOCYTES # BLD AUTO: 0.27 X10(3) UL (ref 0.1–1)
MONOCYTES NFR BLD AUTO: 9.4 %
NEUTROPHILS # BLD AUTO: 1.97 X10 (3) UL (ref 1.5–7.7)
NEUTROPHILS # BLD AUTO: 1.97 X10(3) UL (ref 1.5–7.7)
NEUTROPHILS NFR BLD AUTO: 69 %
OSMOLALITY SERPL CALC.SUM OF ELEC: 299 MOSM/KG (ref 275–295)
PLATELET # BLD AUTO: 251 10(3)UL (ref 150–450)
POTASSIUM SERPL-SCNC: 3.7 MMOL/L (ref 3.5–5.1)
PROT SERPL-MCNC: 6.1 G/DL (ref 6.4–8.2)
RBC # BLD AUTO: 3.1 X10(6)UL
SODIUM SERPL-SCNC: 142 MMOL/L (ref 136–145)
TIBC SERPL-MCNC: 316 UG/DL (ref 240–450)
TRANSFERRIN SERPL-MCNC: 212 MG/DL (ref 200–360)
VIT B12 SERPL-MCNC: 244 PG/ML (ref 193–986)
WBC # BLD AUTO: 2.9 X10(3) UL (ref 4–11)

## 2022-10-20 PROCEDURE — 85025 COMPLETE CBC W/AUTO DIFF WBC: CPT

## 2022-10-20 PROCEDURE — 96413 CHEMO IV INFUSION 1 HR: CPT

## 2022-10-20 PROCEDURE — S0028 INJECTION, FAMOTIDINE, 20 MG: HCPCS

## 2022-10-20 PROCEDURE — 82728 ASSAY OF FERRITIN: CPT

## 2022-10-20 PROCEDURE — 71046 X-RAY EXAM CHEST 2 VIEWS: CPT | Performed by: PHYSICIAN ASSISTANT

## 2022-10-20 PROCEDURE — 82746 ASSAY OF FOLIC ACID SERUM: CPT

## 2022-10-20 PROCEDURE — 80053 COMPREHEN METABOLIC PANEL: CPT

## 2022-10-20 PROCEDURE — 83540 ASSAY OF IRON: CPT

## 2022-10-20 PROCEDURE — 82607 VITAMIN B-12: CPT

## 2022-10-20 PROCEDURE — 99215 OFFICE O/P EST HI 40 MIN: CPT | Performed by: INTERNAL MEDICINE

## 2022-10-20 PROCEDURE — 96375 TX/PRO/DX INJ NEW DRUG ADDON: CPT

## 2022-10-20 PROCEDURE — 84466 ASSAY OF TRANSFERRIN: CPT

## 2022-10-20 RX ORDER — LAMOTRIGINE 25 MG/1
500 TABLET ORAL ONCE
Status: CANCELLED | OUTPATIENT
Start: 2022-10-27

## 2022-10-20 RX ORDER — DIPHENHYDRAMINE HYDROCHLORIDE 50 MG/ML
25 INJECTION INTRAMUSCULAR; INTRAVENOUS ONCE
Status: CANCELLED | OUTPATIENT
Start: 2022-10-20

## 2022-10-20 RX ORDER — HEPARIN SODIUM (PORCINE) LOCK FLUSH IV SOLN 100 UNIT/ML 100 UNIT/ML
SOLUTION INTRAVENOUS
Status: COMPLETED
Start: 2022-10-20 | End: 2022-10-20

## 2022-10-20 RX ORDER — FAMOTIDINE 10 MG/ML
INJECTION, SOLUTION INTRAVENOUS
Status: COMPLETED
Start: 2022-10-20 | End: 2022-10-20

## 2022-10-20 RX ORDER — ACETAMINOPHEN 325 MG/1
TABLET ORAL
Status: COMPLETED
Start: 2022-10-20 | End: 2022-10-20

## 2022-10-20 RX ORDER — HEPARIN SODIUM (PORCINE) LOCK FLUSH IV SOLN 100 UNIT/ML 100 UNIT/ML
500 SOLUTION INTRAVENOUS ONCE
OUTPATIENT
Start: 2022-10-27

## 2022-10-20 RX ORDER — CYANOCOBALAMIN 1000 UG/ML
1000 INJECTION INTRAMUSCULAR; SUBCUTANEOUS AS NEEDED
Start: 2022-11-01

## 2022-10-20 RX ORDER — HEPARIN SODIUM (PORCINE) LOCK FLUSH IV SOLN 100 UNIT/ML 100 UNIT/ML
500 SOLUTION INTRAVENOUS ONCE
Status: COMPLETED | OUTPATIENT
Start: 2022-10-20 | End: 2022-10-20

## 2022-10-20 RX ORDER — ACETAMINOPHEN 325 MG/1
650 TABLET ORAL ONCE
Status: COMPLETED | OUTPATIENT
Start: 2022-10-20 | End: 2022-10-20

## 2022-10-20 RX ORDER — ATROPINE SULFATE 0.4 MG/ML
AMPUL (ML) INJECTION
Status: COMPLETED
Start: 2022-10-20 | End: 2022-10-20

## 2022-10-20 RX ORDER — CYANOCOBALAMIN 1000 UG/ML
1000 INJECTION INTRAMUSCULAR; SUBCUTANEOUS AS NEEDED
Status: CANCELLED
Start: 2022-10-20

## 2022-10-20 RX ORDER — ACETAMINOPHEN 325 MG/1
650 TABLET ORAL ONCE
Status: CANCELLED | OUTPATIENT
Start: 2022-10-20

## 2022-10-20 RX ORDER — DIPHENHYDRAMINE HYDROCHLORIDE 50 MG/ML
INJECTION INTRAMUSCULAR; INTRAVENOUS
Status: COMPLETED
Start: 2022-10-20 | End: 2022-10-20

## 2022-10-20 RX ORDER — DIPHENHYDRAMINE HYDROCHLORIDE 50 MG/ML
25 INJECTION INTRAMUSCULAR; INTRAVENOUS ONCE
Status: COMPLETED | OUTPATIENT
Start: 2022-10-20 | End: 2022-10-20

## 2022-10-20 RX ORDER — FAMOTIDINE 10 MG/ML
20 INJECTION, SOLUTION INTRAVENOUS ONCE
Status: CANCELLED | OUTPATIENT
Start: 2022-10-20

## 2022-10-20 RX ORDER — ATROPINE SULFATE 0.4 MG/ML
0.2 AMPUL (ML) INJECTION ONCE
Status: COMPLETED | OUTPATIENT
Start: 2022-10-20 | End: 2022-10-20

## 2022-10-20 RX ORDER — LEVOFLOXACIN 500 MG/1
500 TABLET, FILM COATED ORAL DAILY
Qty: 7 TABLET | Refills: 0 | Status: SHIPPED | OUTPATIENT
Start: 2022-10-20 | End: 2022-10-27

## 2022-10-20 RX ORDER — FAMOTIDINE 10 MG/ML
20 INJECTION, SOLUTION INTRAVENOUS ONCE
Status: COMPLETED | OUTPATIENT
Start: 2022-10-20 | End: 2022-10-20

## 2022-10-20 RX ORDER — ATROPINE SULFATE 0.4 MG/ML
0.2 AMPUL (ML) INJECTION ONCE
Status: CANCELLED | OUTPATIENT
Start: 2022-10-20

## 2022-10-20 RX ADMIN — HEPARIN SODIUM (PORCINE) LOCK FLUSH IV SOLN 100 UNIT/ML 500 UNITS: 100 SOLUTION INTRAVENOUS at 13:00:00

## 2022-10-20 RX ADMIN — ACETAMINOPHEN 650 MG: 325 TABLET ORAL at 10:54:00

## 2022-10-20 RX ADMIN — ATROPINE SULFATE 0.2 MG: 0.4 MG/ML AMPUL (ML) INJECTION at 10:45:00

## 2022-10-20 RX ADMIN — DIPHENHYDRAMINE HYDROCHLORIDE 25 MG: 50 INJECTION INTRAMUSCULAR; INTRAVENOUS at 10:57:00

## 2022-10-20 RX ADMIN — FAMOTIDINE 20 MG: 10 INJECTION, SOLUTION INTRAVENOUS at 10:55:00

## 2022-10-20 NOTE — PROGRESS NOTES
Pt here for C2D8 trudelvy. Tolerated well previously. Given with one hour rate and observed  Post infusion for 30 minutes. Modifications in dose or schedule: Yes previously reduced    Drugs/infusion dual verified for appearance and physical integrity: yes     Port accessed in lab today, positive blood return noted.  Frequency of blood return and site check throughout administration: Prior to administration, Prior to each drug and At completion of therapy   Discharged with future appointments scheduled,     Outpatient Oncology Care Plan  Problem list:  anemia  Problems related to:    chemotherapy  side effect of treatment  Interventions:  monitor effect of therapy  monitor lab values  Expected outcomes:  optimal lab values  symptoms relieved/minimized  understands plan of care  verbalize how to care for self  Progress towards outcome:  making progress    Education Record    Learner:  Patient  Barriers / Limitations:  None  Method:  Discussion  Outcome:  Shows understanding

## 2022-10-21 ENCOUNTER — NURSE ONLY (OUTPATIENT)
Dept: HEMATOLOGY/ONCOLOGY | Facility: HOSPITAL | Age: 62
End: 2022-10-21
Attending: INTERNAL MEDICINE
Payer: COMMERCIAL

## 2022-10-21 ENCOUNTER — TELEPHONE (OUTPATIENT)
Dept: HEMATOLOGY/ONCOLOGY | Facility: HOSPITAL | Age: 62
End: 2022-10-21

## 2022-10-21 DIAGNOSIS — C50.919 TRIPLE NEGATIVE BREAST CANCER (HCC): Primary | ICD-10-CM

## 2022-10-21 DIAGNOSIS — J91.0 MALIGNANT PLEURAL EFFUSION: ICD-10-CM

## 2022-10-21 PROCEDURE — 96372 THER/PROPH/DIAG INJ SC/IM: CPT

## 2022-10-21 RX ORDER — HEPARIN SODIUM (PORCINE) LOCK FLUSH IV SOLN 100 UNIT/ML 100 UNIT/ML
500 SOLUTION INTRAVENOUS ONCE
OUTPATIENT
Start: 2022-11-01

## 2022-10-21 RX ORDER — CYANOCOBALAMIN 1000 UG/ML
1000 INJECTION INTRAMUSCULAR; SUBCUTANEOUS AS NEEDED
Start: 2022-11-01

## 2022-10-21 NOTE — TELEPHONE ENCOUNTER
CXR reviewed by Dr. Perri Martinez - looks stable. No acute findings. Will have pt return to office in 1 week for labs (cbc cmp), office visit and IV venofer and b12 injection. Did message sched.

## 2022-10-21 NOTE — PROGRESS NOTES
Pt here for C2 D9 Zarxio 300mcg  Hx \"neulasta gave me 100/100 pain to my scapula\"  Provided and reviewed [employer] info for med - she voiced understanding  She will get claritin and take preventatively, would use her norco prn pain if mod-severe    zarxio subcutaneous given left upper arm, tolerated well    Discharged stable to home with future appts - given AVS  Gait steady , indep

## 2022-10-27 ENCOUNTER — APPOINTMENT (OUTPATIENT)
Dept: HEMATOLOGY/ONCOLOGY | Facility: HOSPITAL | Age: 62
End: 2022-10-27
Attending: INTERNAL MEDICINE
Payer: COMMERCIAL

## 2022-10-27 ENCOUNTER — OFFICE VISIT (OUTPATIENT)
Dept: HEMATOLOGY/ONCOLOGY | Facility: HOSPITAL | Age: 62
End: 2022-10-27
Attending: PHYSICIAN ASSISTANT
Payer: COMMERCIAL

## 2022-10-27 VITALS
HEART RATE: 77 BPM | TEMPERATURE: 98 F | RESPIRATION RATE: 16 BRPM | SYSTOLIC BLOOD PRESSURE: 129 MMHG | OXYGEN SATURATION: 100 % | DIASTOLIC BLOOD PRESSURE: 78 MMHG

## 2022-10-27 VITALS
DIASTOLIC BLOOD PRESSURE: 88 MMHG | RESPIRATION RATE: 16 BRPM | BODY MASS INDEX: 23.6 KG/M2 | TEMPERATURE: 98 F | HEIGHT: 61 IN | OXYGEN SATURATION: 98 % | HEART RATE: 81 BPM | SYSTOLIC BLOOD PRESSURE: 142 MMHG | WEIGHT: 125 LBS

## 2022-10-27 DIAGNOSIS — J91.0 MALIGNANT PLEURAL EFFUSION: ICD-10-CM

## 2022-10-27 DIAGNOSIS — Z51.11 CHEMOTHERAPY MANAGEMENT, ENCOUNTER FOR: ICD-10-CM

## 2022-10-27 DIAGNOSIS — E53.8 B12 DEFICIENCY: ICD-10-CM

## 2022-10-27 DIAGNOSIS — Z79.899 ENCOUNTER FOR MEDICATION MANAGEMENT: ICD-10-CM

## 2022-10-27 DIAGNOSIS — Z45.2 ENCOUNTER FOR CENTRAL LINE CARE: ICD-10-CM

## 2022-10-27 DIAGNOSIS — D50.8 OTHER IRON DEFICIENCY ANEMIA: ICD-10-CM

## 2022-10-27 DIAGNOSIS — C50.919 TRIPLE NEGATIVE BREAST CANCER (HCC): Primary | ICD-10-CM

## 2022-10-27 DIAGNOSIS — Z51.11 CHEMOTHERAPY MANAGEMENT, ENCOUNTER FOR: Primary | ICD-10-CM

## 2022-10-27 DIAGNOSIS — C50.919 TRIPLE NEGATIVE BREAST CANCER (HCC): ICD-10-CM

## 2022-10-27 DIAGNOSIS — E61.1 IRON DEFICIENCY: Primary | ICD-10-CM

## 2022-10-27 LAB
ALBUMIN SERPL-MCNC: 3 G/DL (ref 3.4–5)
ALBUMIN/GLOB SERPL: 1.1 {RATIO} (ref 1–2)
ALP LIVER SERPL-CCNC: 59 U/L
ALT SERPL-CCNC: 35 U/L
ANION GAP SERPL CALC-SCNC: 8 MMOL/L (ref 0–18)
AST SERPL-CCNC: 17 U/L (ref 15–37)
BASOPHILS # BLD AUTO: 0.01 X10(3) UL (ref 0–0.2)
BASOPHILS NFR BLD AUTO: 0.7 %
BILIRUB SERPL-MCNC: 0.6 MG/DL (ref 0.1–2)
BUN BLD-MCNC: 25 MG/DL (ref 7–18)
BUN/CREAT SERPL: 26.9 (ref 10–20)
CALCIUM BLD-MCNC: 8.3 MG/DL (ref 8.5–10.1)
CHLORIDE SERPL-SCNC: 108 MMOL/L (ref 98–112)
CO2 SERPL-SCNC: 24 MMOL/L (ref 21–32)
CREAT BLD-MCNC: 0.93 MG/DL
DEPRECATED RDW RBC AUTO: 47.7 FL (ref 35.1–46.3)
EOSINOPHIL # BLD AUTO: 0.12 X10(3) UL (ref 0–0.7)
EOSINOPHIL NFR BLD AUTO: 8.9 %
ERYTHROCYTE [DISTWIDTH] IN BLOOD BY AUTOMATED COUNT: 13.1 % (ref 11–15)
GFR SERPLBLD BASED ON 1.73 SQ M-ARVRAT: 70 ML/MIN/1.73M2 (ref 60–?)
GLOBULIN PLAS-MCNC: 2.8 G/DL (ref 2.8–4.4)
GLUCOSE BLD-MCNC: 83 MG/DL (ref 70–99)
HCT VFR BLD AUTO: 29.1 %
HGB BLD-MCNC: 9.3 G/DL
IMM GRANULOCYTES # BLD AUTO: 0 X10(3) UL (ref 0–1)
IMM GRANULOCYTES NFR BLD: 0 %
LYMPHOCYTES # BLD AUTO: 0.42 X10(3) UL (ref 1–4)
LYMPHOCYTES NFR BLD AUTO: 31.1 %
MCH RBC QN AUTO: 32 PG (ref 26–34)
MCHC RBC AUTO-ENTMCNC: 32 G/DL (ref 31–37)
MCV RBC AUTO: 100 FL
MONOCYTES # BLD AUTO: 0.39 X10(3) UL (ref 0.1–1)
MONOCYTES NFR BLD AUTO: 28.9 %
NEUTROPHILS # BLD AUTO: 0.41 X10 (3) UL (ref 1.5–7.7)
NEUTROPHILS # BLD AUTO: 0.41 X10(3) UL (ref 1.5–7.7)
NEUTROPHILS NFR BLD AUTO: 30.4 %
OSMOLALITY SERPL CALC.SUM OF ELEC: 294 MOSM/KG (ref 275–295)
PLATELET # BLD AUTO: 230 10(3)UL (ref 150–450)
POTASSIUM SERPL-SCNC: 3.8 MMOL/L (ref 3.5–5.1)
PROT SERPL-MCNC: 5.8 G/DL (ref 6.4–8.2)
RBC # BLD AUTO: 2.91 X10(6)UL
SODIUM SERPL-SCNC: 140 MMOL/L (ref 136–145)
WBC # BLD AUTO: 1.4 X10(3) UL (ref 4–11)

## 2022-10-27 PROCEDURE — 85060 BLOOD SMEAR INTERPRETATION: CPT

## 2022-10-27 PROCEDURE — 85025 COMPLETE CBC W/AUTO DIFF WBC: CPT

## 2022-10-27 PROCEDURE — 96374 THER/PROPH/DIAG INJ IV PUSH: CPT

## 2022-10-27 PROCEDURE — 80053 COMPREHEN METABOLIC PANEL: CPT

## 2022-10-27 PROCEDURE — 96372 THER/PROPH/DIAG INJ SC/IM: CPT

## 2022-10-27 PROCEDURE — 99215 OFFICE O/P EST HI 40 MIN: CPT | Performed by: PHYSICIAN ASSISTANT

## 2022-10-27 RX ORDER — HEPARIN SODIUM (PORCINE) LOCK FLUSH IV SOLN 100 UNIT/ML 100 UNIT/ML
500 SOLUTION INTRAVENOUS ONCE
Status: COMPLETED | OUTPATIENT
Start: 2022-10-27 | End: 2022-10-27

## 2022-10-27 RX ORDER — CYANOCOBALAMIN 1000 UG/ML
INJECTION, SOLUTION INTRAMUSCULAR; SUBCUTANEOUS
Status: DISPENSED
Start: 2022-10-27 | End: 2022-10-27

## 2022-10-27 RX ORDER — CYANOCOBALAMIN 1000 UG/ML
1000 INJECTION, SOLUTION INTRAMUSCULAR; SUBCUTANEOUS AS NEEDED
Start: 2022-11-01

## 2022-10-27 RX ORDER — HEPARIN SODIUM (PORCINE) LOCK FLUSH IV SOLN 100 UNIT/ML 100 UNIT/ML
500 SOLUTION INTRAVENOUS ONCE
OUTPATIENT
Start: 2022-11-01

## 2022-10-27 RX ORDER — HEPARIN SODIUM (PORCINE) LOCK FLUSH IV SOLN 100 UNIT/ML 100 UNIT/ML
SOLUTION INTRAVENOUS
Status: DISPENSED
Start: 2022-10-27 | End: 2022-10-27

## 2022-10-27 RX ORDER — CYANOCOBALAMIN 1000 UG/ML
1000 INJECTION, SOLUTION INTRAMUSCULAR; SUBCUTANEOUS AS NEEDED
Status: DISCONTINUED | OUTPATIENT
Start: 2022-10-27 | End: 2022-10-27

## 2022-10-27 RX ADMIN — HEPARIN SODIUM (PORCINE) LOCK FLUSH IV SOLN 100 UNIT/ML 500 UNITS: 100 SOLUTION INTRAVENOUS at 12:07:00

## 2022-10-27 RX ADMIN — CYANOCOBALAMIN 1000 MCG: 1000 INJECTION, SOLUTION INTRAMUSCULAR; SUBCUTANEOUS at 11:28:00

## 2022-10-27 NOTE — PROGRESS NOTES
Pt here for Venofer 200mg and B12 Injection. Patient to infusion center ambulating independently by self after MDV. B12 Injection given to Left Deltoid. Venofer 200mg given over 4 minutes IVP via PORT. Patient observed for 30 minutes without sign's/symptom's of an allergic reaction present. VSS. PORT flushed and Heparin locked. Declines AVS as she uses [facility] for future appointment's.

## 2022-11-03 ENCOUNTER — HOSPITAL ENCOUNTER (OUTPATIENT)
Dept: GENERAL RADIOLOGY | Facility: HOSPITAL | Age: 62
Discharge: HOME | End: 2022-11-03
Attending: INTERNAL MEDICINE
Payer: COMMERCIAL

## 2022-11-03 ENCOUNTER — OFFICE VISIT (OUTPATIENT)
Dept: HEMATOLOGY/ONCOLOGY | Facility: HOSPITAL | Age: 62
End: 2022-11-03
Attending: INTERNAL MEDICINE
Payer: COMMERCIAL

## 2022-11-03 VITALS
RESPIRATION RATE: 16 BRPM | WEIGHT: 133 LBS | BODY MASS INDEX: 25.11 KG/M2 | HEART RATE: 78 BPM | HEIGHT: 61 IN | OXYGEN SATURATION: 97 % | TEMPERATURE: 98 F | SYSTOLIC BLOOD PRESSURE: 127 MMHG | DIASTOLIC BLOOD PRESSURE: 73 MMHG

## 2022-11-03 VITALS — RESPIRATION RATE: 16 BRPM | DIASTOLIC BLOOD PRESSURE: 78 MMHG | SYSTOLIC BLOOD PRESSURE: 115 MMHG | HEART RATE: 95 BPM

## 2022-11-03 DIAGNOSIS — J91.0 MALIGNANT PLEURAL EFFUSION: ICD-10-CM

## 2022-11-03 DIAGNOSIS — R05.3 CHRONIC COUGH: ICD-10-CM

## 2022-11-03 DIAGNOSIS — C50.919 METASTATIC BREAST CANCER (HCC): ICD-10-CM

## 2022-11-03 DIAGNOSIS — Z51.11 CHEMOTHERAPY MANAGEMENT, ENCOUNTER FOR: Primary | ICD-10-CM

## 2022-11-03 DIAGNOSIS — Z51.11 CHEMOTHERAPY MANAGEMENT, ENCOUNTER FOR: ICD-10-CM

## 2022-11-03 DIAGNOSIS — Z17.0 MALIGNANT NEOPLASM OF UPPER-OUTER QUADRANT OF RIGHT BREAST IN FEMALE, ESTROGEN RECEPTOR POSITIVE (HCC): Primary | ICD-10-CM

## 2022-11-03 DIAGNOSIS — Z79.899 ENCOUNTER FOR MEDICATION MANAGEMENT: ICD-10-CM

## 2022-11-03 DIAGNOSIS — E61.1 IRON DEFICIENCY: ICD-10-CM

## 2022-11-03 DIAGNOSIS — E53.8 B12 DEFICIENCY: ICD-10-CM

## 2022-11-03 DIAGNOSIS — C50.919 TRIPLE NEGATIVE BREAST CANCER (HCC): Primary | ICD-10-CM

## 2022-11-03 DIAGNOSIS — Z45.2 ENCOUNTER FOR CENTRAL LINE CARE: ICD-10-CM

## 2022-11-03 DIAGNOSIS — C50.411 MALIGNANT NEOPLASM OF UPPER-OUTER QUADRANT OF RIGHT BREAST IN FEMALE, ESTROGEN RECEPTOR POSITIVE (HCC): Primary | ICD-10-CM

## 2022-11-03 LAB
ALBUMIN SERPL-MCNC: 2.8 G/DL (ref 3.4–5)
ALBUMIN/GLOB SERPL: 0.9 {RATIO} (ref 1–2)
ALP LIVER SERPL-CCNC: 74 U/L
ALT SERPL-CCNC: 23 U/L
ANION GAP SERPL CALC-SCNC: 4 MMOL/L (ref 0–18)
AST SERPL-CCNC: 20 U/L (ref 15–37)
BASOPHILS # BLD AUTO: 0.03 X10(3) UL (ref 0–0.2)
BASOPHILS NFR BLD AUTO: 1.1 %
BILIRUB SERPL-MCNC: 0.8 MG/DL (ref 0.1–2)
BUN BLD-MCNC: 19 MG/DL (ref 7–18)
BUN/CREAT SERPL: 17.3 (ref 10–20)
CALCIUM BLD-MCNC: 8.5 MG/DL (ref 8.5–10.1)
CHLORIDE SERPL-SCNC: 110 MMOL/L (ref 98–112)
CO2 SERPL-SCNC: 26 MMOL/L (ref 21–32)
CREAT BLD-MCNC: 1.1 MG/DL
DEPRECATED RDW RBC AUTO: 53.6 FL (ref 35.1–46.3)
EOSINOPHIL # BLD AUTO: 0.06 X10(3) UL (ref 0–0.7)
EOSINOPHIL NFR BLD AUTO: 2.2 %
ERYTHROCYTE [DISTWIDTH] IN BLOOD BY AUTOMATED COUNT: 14.4 % (ref 11–15)
GFR SERPLBLD BASED ON 1.73 SQ M-ARVRAT: 57 ML/MIN/1.73M2 (ref 60–?)
GLOBULIN PLAS-MCNC: 3.1 G/DL (ref 2.8–4.4)
GLUCOSE BLD-MCNC: 120 MG/DL (ref 70–99)
HCT VFR BLD AUTO: 33.5 %
HGB BLD-MCNC: 10.5 G/DL
IMM GRANULOCYTES # BLD AUTO: 0.05 X10(3) UL (ref 0–1)
IMM GRANULOCYTES NFR BLD: 1.8 %
LYMPHOCYTES # BLD AUTO: 0.42 X10(3) UL (ref 1–4)
LYMPHOCYTES NFR BLD AUTO: 15.1 %
MCH RBC QN AUTO: 32.1 PG (ref 26–34)
MCHC RBC AUTO-ENTMCNC: 31.3 G/DL (ref 31–37)
MCV RBC AUTO: 102.4 FL
MONOCYTES # BLD AUTO: 0.41 X10(3) UL (ref 0.1–1)
MONOCYTES NFR BLD AUTO: 14.7 %
NEUTROPHILS # BLD AUTO: 1.81 X10 (3) UL (ref 1.5–7.7)
NEUTROPHILS # BLD AUTO: 1.81 X10(3) UL (ref 1.5–7.7)
NEUTROPHILS NFR BLD AUTO: 65.1 %
OSMOLALITY SERPL CALC.SUM OF ELEC: 293 MOSM/KG (ref 275–295)
PLATELET # BLD AUTO: 213 10(3)UL (ref 150–450)
POTASSIUM SERPL-SCNC: 3.8 MMOL/L (ref 3.5–5.1)
PROT SERPL-MCNC: 5.9 G/DL (ref 6.4–8.2)
RBC # BLD AUTO: 3.27 X10(6)UL
SODIUM SERPL-SCNC: 140 MMOL/L (ref 136–145)
WBC # BLD AUTO: 2.8 X10(3) UL (ref 4–11)

## 2022-11-03 PROCEDURE — 85025 COMPLETE CBC W/AUTO DIFF WBC: CPT

## 2022-11-03 PROCEDURE — 80053 COMPREHEN METABOLIC PANEL: CPT

## 2022-11-03 PROCEDURE — 96413 CHEMO IV INFUSION 1 HR: CPT

## 2022-11-03 PROCEDURE — 96375 TX/PRO/DX INJ NEW DRUG ADDON: CPT

## 2022-11-03 PROCEDURE — 99215 OFFICE O/P EST HI 40 MIN: CPT | Performed by: INTERNAL MEDICINE

## 2022-11-03 PROCEDURE — 71046 X-RAY EXAM CHEST 2 VIEWS: CPT | Performed by: INTERNAL MEDICINE

## 2022-11-03 PROCEDURE — S0028 INJECTION, FAMOTIDINE, 20 MG: HCPCS

## 2022-11-03 RX ORDER — DIPHENHYDRAMINE HYDROCHLORIDE 50 MG/ML
25 INJECTION INTRAMUSCULAR; INTRAVENOUS ONCE
Status: COMPLETED | OUTPATIENT
Start: 2022-11-03 | End: 2022-11-03

## 2022-11-03 RX ORDER — ACETAMINOPHEN 325 MG/1
650 TABLET ORAL ONCE
Status: COMPLETED | OUTPATIENT
Start: 2022-11-03 | End: 2022-11-03

## 2022-11-03 RX ORDER — DIPHENHYDRAMINE HYDROCHLORIDE 50 MG/ML
INJECTION INTRAMUSCULAR; INTRAVENOUS
Status: COMPLETED
Start: 2022-11-03 | End: 2022-11-03

## 2022-11-03 RX ORDER — HEPARIN SODIUM (PORCINE) LOCK FLUSH IV SOLN 100 UNIT/ML 100 UNIT/ML
SOLUTION INTRAVENOUS
Status: COMPLETED
Start: 2022-11-03 | End: 2022-11-03

## 2022-11-03 RX ORDER — FAMOTIDINE 10 MG/ML
20 INJECTION, SOLUTION INTRAVENOUS ONCE
OUTPATIENT
Start: 2022-11-10

## 2022-11-03 RX ORDER — ACETAMINOPHEN 325 MG/1
TABLET ORAL
Status: COMPLETED
Start: 2022-11-03 | End: 2022-11-03

## 2022-11-03 RX ORDER — ATROPINE SULFATE 0.4 MG/ML
AMPUL (ML) INJECTION
Status: COMPLETED
Start: 2022-11-03 | End: 2022-11-03

## 2022-11-03 RX ORDER — FAMOTIDINE 10 MG/ML
20 INJECTION, SOLUTION INTRAVENOUS ONCE
Status: COMPLETED | OUTPATIENT
Start: 2022-11-03 | End: 2022-11-03

## 2022-11-03 RX ORDER — ACETAMINOPHEN 325 MG/1
650 TABLET ORAL ONCE
OUTPATIENT
Start: 2022-11-10

## 2022-11-03 RX ORDER — FAMOTIDINE 10 MG/ML
INJECTION, SOLUTION INTRAVENOUS
Status: COMPLETED
Start: 2022-11-03 | End: 2022-11-03

## 2022-11-03 RX ORDER — ATROPINE SULFATE 0.4 MG/ML
0.2 AMPUL (ML) INJECTION ONCE
Status: CANCELLED | OUTPATIENT
Start: 2022-11-03

## 2022-11-03 RX ORDER — FAMOTIDINE 10 MG/ML
20 INJECTION, SOLUTION INTRAVENOUS ONCE
Status: CANCELLED | OUTPATIENT
Start: 2022-11-03

## 2022-11-03 RX ORDER — ATROPINE SULFATE 0.4 MG/ML
0.2 AMPUL (ML) INJECTION ONCE
Status: COMPLETED | OUTPATIENT
Start: 2022-11-03 | End: 2022-11-03

## 2022-11-03 RX ORDER — HEPARIN SODIUM (PORCINE) LOCK FLUSH IV SOLN 100 UNIT/ML 100 UNIT/ML
500 SOLUTION INTRAVENOUS ONCE
OUTPATIENT
Start: 2022-12-01

## 2022-11-03 RX ORDER — DIPHENHYDRAMINE HYDROCHLORIDE 50 MG/ML
25 INJECTION INTRAMUSCULAR; INTRAVENOUS ONCE
Status: CANCELLED | OUTPATIENT
Start: 2022-11-03

## 2022-11-03 RX ORDER — ACETAMINOPHEN 325 MG/1
650 TABLET ORAL ONCE
Status: CANCELLED | OUTPATIENT
Start: 2022-11-03

## 2022-11-03 RX ORDER — ATROPINE SULFATE 0.4 MG/ML
0.2 AMPUL (ML) INJECTION ONCE
OUTPATIENT
Start: 2022-11-10

## 2022-11-03 RX ORDER — CYANOCOBALAMIN 1000 UG/ML
1000 INJECTION, SOLUTION INTRAMUSCULAR; SUBCUTANEOUS AS NEEDED
Start: 2022-12-01

## 2022-11-03 RX ORDER — DIPHENHYDRAMINE HYDROCHLORIDE 50 MG/ML
25 INJECTION INTRAMUSCULAR; INTRAVENOUS ONCE
OUTPATIENT
Start: 2022-11-10

## 2022-11-03 RX ORDER — HEPARIN SODIUM (PORCINE) LOCK FLUSH IV SOLN 100 UNIT/ML 100 UNIT/ML
500 SOLUTION INTRAVENOUS ONCE
Status: COMPLETED | OUTPATIENT
Start: 2022-11-03 | End: 2022-11-03

## 2022-11-03 RX ADMIN — HEPARIN SODIUM (PORCINE) LOCK FLUSH IV SOLN 100 UNIT/ML 500 UNITS: 100 SOLUTION INTRAVENOUS at 17:07:00

## 2022-11-03 RX ADMIN — ATROPINE SULFATE 0.2 MG: 0.4 MG/ML AMPUL (ML) INJECTION at 15:24:00

## 2022-11-03 RX ADMIN — FAMOTIDINE 20 MG: 10 INJECTION, SOLUTION INTRAVENOUS at 14:14:00

## 2022-11-03 RX ADMIN — DIPHENHYDRAMINE HYDROCHLORIDE 25 MG: 50 INJECTION INTRAMUSCULAR; INTRAVENOUS at 14:14:00

## 2022-11-03 RX ADMIN — ACETAMINOPHEN 650 MG: 325 TABLET ORAL at 14:10:00

## 2022-11-03 NOTE — PROGRESS NOTES
Pt here for C3D1 Trodelvy. Arrives Ambulating independently, accompanied by Self from MD visit. Pregnancy screening: Denies possibility of pregnancy    Modifications in dose or schedule: No    Drugs/infusions dual verified for appearance and physical integrity. IV pump settings were dual verified: yes     Port accessed previously in lab - present blood return noted. Frequency of blood return and site check throughout administration: Prior to administration, Prior to each drug and At completion of therapy     Pt appeared to tolerate treatment well. 30 min obs completed post infusion. Port deaccessed and heparin locked - site covered with gauze and paper tape. Discharged to Home, Ambulating independently, accompanied by:Self. Copy of AVS provided.      Education Record    Learner:  Patient  Barriers / Limitations:  None  Method:  Reinforcement  Outcome:  Shows understanding  Comments:

## 2022-11-04 ENCOUNTER — APPOINTMENT (OUTPATIENT)
Dept: HEMATOLOGY/ONCOLOGY | Facility: HOSPITAL | Age: 62
End: 2022-11-04
Attending: INTERNAL MEDICINE
Payer: COMMERCIAL

## 2022-11-04 DIAGNOSIS — C50.919 TRIPLE NEGATIVE BREAST CANCER (HCC): Primary | ICD-10-CM

## 2022-11-04 DIAGNOSIS — J91.0 MALIGNANT PLEURAL EFFUSION: ICD-10-CM

## 2022-11-04 PROCEDURE — 96372 THER/PROPH/DIAG INJ SC/IM: CPT

## 2022-11-04 RX ORDER — LORAZEPAM 0.5 MG/1
0.5 TABLET ORAL EVERY 6 HOURS PRN
Qty: 30 TABLET | Refills: 1 | Status: SHIPPED | OUTPATIENT
Start: 2022-11-04

## 2022-11-04 RX ORDER — CODEINE PHOSPHATE AND GUAIFENESIN 10; 100 MG/5ML; MG/5ML
5 SOLUTION ORAL EVERY 6 HOURS PRN
Qty: 180 ML | Refills: 0 | Status: SHIPPED | OUTPATIENT
Start: 2022-11-04

## 2022-11-04 NOTE — PROGRESS NOTES
Pt here for C3 D2 Zarxio 300mcg  Will take her lasix and claritin today - did not take lasix yesterday    zarxio subcutaneous given left upper arm, tolerated well    Discharged stable to home with future appts  Gait steady , indep

## 2022-11-10 ENCOUNTER — APPOINTMENT (OUTPATIENT)
Dept: HEMATOLOGY/ONCOLOGY | Facility: HOSPITAL | Age: 62
End: 2022-11-10
Attending: INTERNAL MEDICINE
Payer: COMMERCIAL

## 2022-11-10 VITALS
TEMPERATURE: 98 F | BODY MASS INDEX: 24.35 KG/M2 | OXYGEN SATURATION: 97 % | HEIGHT: 61 IN | WEIGHT: 129 LBS | RESPIRATION RATE: 16 BRPM | SYSTOLIC BLOOD PRESSURE: 147 MMHG | DIASTOLIC BLOOD PRESSURE: 86 MMHG | HEART RATE: 81 BPM

## 2022-11-10 DIAGNOSIS — E61.1 IRON DEFICIENCY: Primary | ICD-10-CM

## 2022-11-10 DIAGNOSIS — C50.919 METASTATIC BREAST CANCER (HCC): ICD-10-CM

## 2022-11-10 DIAGNOSIS — Z45.2 ENCOUNTER FOR CENTRAL LINE CARE: ICD-10-CM

## 2022-11-10 DIAGNOSIS — C50.919 TRIPLE NEGATIVE BREAST CANCER (HCC): Primary | ICD-10-CM

## 2022-11-10 DIAGNOSIS — Z79.899 ENCOUNTER FOR MEDICATION MANAGEMENT: ICD-10-CM

## 2022-11-10 DIAGNOSIS — Z51.11 CHEMOTHERAPY MANAGEMENT, ENCOUNTER FOR: ICD-10-CM

## 2022-11-10 DIAGNOSIS — E53.8 B12 DEFICIENCY: ICD-10-CM

## 2022-11-10 DIAGNOSIS — Z51.11 CHEMOTHERAPY MANAGEMENT, ENCOUNTER FOR: Primary | ICD-10-CM

## 2022-11-10 DIAGNOSIS — J91.0 MALIGNANT PLEURAL EFFUSION: ICD-10-CM

## 2022-11-10 LAB
ALBUMIN SERPL-MCNC: 3 G/DL (ref 3.4–5)
ALBUMIN/GLOB SERPL: 0.9 {RATIO} (ref 1–2)
ALP LIVER SERPL-CCNC: 69 U/L
ALT SERPL-CCNC: 34 U/L
ANION GAP SERPL CALC-SCNC: 7 MMOL/L (ref 0–18)
AST SERPL-CCNC: 22 U/L (ref 15–37)
BASOPHILS # BLD AUTO: 0.03 X10(3) UL (ref 0–0.2)
BASOPHILS NFR BLD AUTO: 2 %
BILIRUB SERPL-MCNC: 0.8 MG/DL (ref 0.1–2)
BUN BLD-MCNC: 20 MG/DL (ref 7–18)
BUN/CREAT SERPL: 19.2 (ref 10–20)
CALCIUM BLD-MCNC: 8.4 MG/DL (ref 8.5–10.1)
CHLORIDE SERPL-SCNC: 113 MMOL/L (ref 98–112)
CO2 SERPL-SCNC: 24 MMOL/L (ref 21–32)
CREAT BLD-MCNC: 1.04 MG/DL
DEPRECATED RDW RBC AUTO: 52.3 FL (ref 35.1–46.3)
EOSINOPHIL # BLD AUTO: 0.03 X10(3) UL (ref 0–0.7)
EOSINOPHIL NFR BLD AUTO: 2 %
ERYTHROCYTE [DISTWIDTH] IN BLOOD BY AUTOMATED COUNT: 14.4 % (ref 11–15)
GFR SERPLBLD BASED ON 1.73 SQ M-ARVRAT: 61 ML/MIN/1.73M2 (ref 60–?)
GLOBULIN PLAS-MCNC: 3.2 G/DL (ref 2.8–4.4)
GLUCOSE BLD-MCNC: 90 MG/DL (ref 70–99)
HCT VFR BLD AUTO: 32.8 %
HGB BLD-MCNC: 10.5 G/DL
IMM GRANULOCYTES # BLD AUTO: 0.04 X10(3) UL (ref 0–1)
IMM GRANULOCYTES NFR BLD: 2.7 %
LYMPHOCYTES # BLD AUTO: 0.46 X10(3) UL (ref 1–4)
LYMPHOCYTES NFR BLD AUTO: 31.1 %
MCH RBC QN AUTO: 31.9 PG (ref 26–34)
MCHC RBC AUTO-ENTMCNC: 32 G/DL (ref 31–37)
MCV RBC AUTO: 99.7 FL
MONOCYTES # BLD AUTO: 0.48 X10(3) UL (ref 0.1–1)
MONOCYTES NFR BLD AUTO: 32.4 %
NEUTROPHILS # BLD AUTO: 0.44 X10 (3) UL (ref 1.5–7.7)
NEUTROPHILS # BLD AUTO: 0.44 X10(3) UL (ref 1.5–7.7)
NEUTROPHILS NFR BLD AUTO: 29.8 %
OSMOLALITY SERPL CALC.SUM OF ELEC: 300 MOSM/KG (ref 275–295)
PLATELET # BLD AUTO: 224 10(3)UL (ref 150–450)
POTASSIUM SERPL-SCNC: 3.9 MMOL/L (ref 3.5–5.1)
PROT SERPL-MCNC: 6.2 G/DL (ref 6.4–8.2)
RBC # BLD AUTO: 3.29 X10(6)UL
SODIUM SERPL-SCNC: 144 MMOL/L (ref 136–145)
WBC # BLD AUTO: 1.5 X10(3) UL (ref 4–11)

## 2022-11-10 PROCEDURE — 96374 THER/PROPH/DIAG INJ IV PUSH: CPT

## 2022-11-10 PROCEDURE — 85025 COMPLETE CBC W/AUTO DIFF WBC: CPT

## 2022-11-10 PROCEDURE — 85060 BLOOD SMEAR INTERPRETATION: CPT

## 2022-11-10 PROCEDURE — 80053 COMPREHEN METABOLIC PANEL: CPT

## 2022-11-10 PROCEDURE — 99215 OFFICE O/P EST HI 40 MIN: CPT | Performed by: INTERNAL MEDICINE

## 2022-11-10 RX ORDER — CYANOCOBALAMIN 1000 UG/ML
1000 INJECTION, SOLUTION INTRAMUSCULAR; SUBCUTANEOUS AS NEEDED
Start: 2022-12-01

## 2022-11-10 RX ORDER — HEPARIN SODIUM (PORCINE) LOCK FLUSH IV SOLN 100 UNIT/ML 100 UNIT/ML
500 SOLUTION INTRAVENOUS ONCE
Status: COMPLETED | OUTPATIENT
Start: 2022-11-10 | End: 2022-11-10

## 2022-11-10 RX ORDER — HEPARIN SODIUM (PORCINE) LOCK FLUSH IV SOLN 100 UNIT/ML 100 UNIT/ML
500 SOLUTION INTRAVENOUS ONCE
OUTPATIENT
Start: 2022-12-01

## 2022-11-10 RX ORDER — HEPARIN SODIUM (PORCINE) LOCK FLUSH IV SOLN 100 UNIT/ML 100 UNIT/ML
SOLUTION INTRAVENOUS
Status: COMPLETED
Start: 2022-11-10 | End: 2022-11-10

## 2022-11-10 RX ADMIN — HEPARIN SODIUM (PORCINE) LOCK FLUSH IV SOLN 100 UNIT/ML 500 UNITS: 100 SOLUTION INTRAVENOUS at 12:50:00

## 2022-11-10 NOTE — PROGRESS NOTES
Treatment held today for anc0.44  Returns next week for possible treatment- to be scheduled. venofer 200mg iv push slow with ns flowing.  + blood return noted throughout. Appeared to tolerate with no s/s of adverse reaction noted. Post 30 minutes vs wnl. Discharged stable.

## 2022-11-11 ENCOUNTER — HOSPITAL ENCOUNTER (OUTPATIENT)
Dept: CT IMAGING | Facility: HOSPITAL | Age: 62
Discharge: HOME | End: 2022-11-11
Attending: INTERNAL MEDICINE
Payer: COMMERCIAL

## 2022-11-11 DIAGNOSIS — R05.3 CHRONIC COUGH: ICD-10-CM

## 2022-11-11 PROCEDURE — 74177 CT ABD & PELVIS W/CONTRAST: CPT | Performed by: INTERNAL MEDICINE

## 2022-11-11 PROCEDURE — 71260 CT THORAX DX C+: CPT | Performed by: INTERNAL MEDICINE

## 2022-11-17 ENCOUNTER — NURSE ONLY (OUTPATIENT)
Dept: HEMATOLOGY/ONCOLOGY | Facility: HOSPITAL | Age: 62
End: 2022-11-17
Attending: INTERNAL MEDICINE
Payer: COMMERCIAL

## 2022-11-17 VITALS
HEART RATE: 76 BPM | SYSTOLIC BLOOD PRESSURE: 146 MMHG | OXYGEN SATURATION: 99 % | DIASTOLIC BLOOD PRESSURE: 76 MMHG | TEMPERATURE: 98 F | RESPIRATION RATE: 16 BRPM

## 2022-11-17 VITALS
TEMPERATURE: 99 F | HEART RATE: 76 BPM | RESPIRATION RATE: 16 BRPM | SYSTOLIC BLOOD PRESSURE: 142 MMHG | HEIGHT: 61 IN | BODY MASS INDEX: 24.13 KG/M2 | DIASTOLIC BLOOD PRESSURE: 81 MMHG | OXYGEN SATURATION: 99 % | WEIGHT: 127.81 LBS

## 2022-11-17 DIAGNOSIS — Z45.2 ENCOUNTER FOR CENTRAL LINE CARE: ICD-10-CM

## 2022-11-17 DIAGNOSIS — E61.1 IRON DEFICIENCY: ICD-10-CM

## 2022-11-17 DIAGNOSIS — Z51.11 CHEMOTHERAPY MANAGEMENT, ENCOUNTER FOR: ICD-10-CM

## 2022-11-17 DIAGNOSIS — C50.919 TRIPLE NEGATIVE BREAST CANCER (HCC): Primary | ICD-10-CM

## 2022-11-17 DIAGNOSIS — E53.8 B12 DEFICIENCY: ICD-10-CM

## 2022-11-17 DIAGNOSIS — J91.0 MALIGNANT PLEURAL EFFUSION: ICD-10-CM

## 2022-11-17 DIAGNOSIS — C50.411 MALIGNANT NEOPLASM OF UPPER-OUTER QUADRANT OF RIGHT BREAST IN FEMALE, ESTROGEN RECEPTOR POSITIVE (HCC): ICD-10-CM

## 2022-11-17 DIAGNOSIS — Z17.0 MALIGNANT NEOPLASM OF UPPER-OUTER QUADRANT OF RIGHT BREAST IN FEMALE, ESTROGEN RECEPTOR POSITIVE (HCC): ICD-10-CM

## 2022-11-17 DIAGNOSIS — Z79.899 ENCOUNTER FOR MEDICATION MANAGEMENT: ICD-10-CM

## 2022-11-17 DIAGNOSIS — Z51.11 CHEMOTHERAPY MANAGEMENT, ENCOUNTER FOR: Primary | ICD-10-CM

## 2022-11-17 LAB
ALBUMIN SERPL-MCNC: 3.1 G/DL (ref 3.4–5)
ALBUMIN/GLOB SERPL: 1 {RATIO} (ref 1–2)
ALP LIVER SERPL-CCNC: 63 U/L
ALT SERPL-CCNC: 24 U/L
ANION GAP SERPL CALC-SCNC: 5 MMOL/L (ref 0–18)
AST SERPL-CCNC: 22 U/L (ref 15–37)
BASOPHILS # BLD AUTO: 0.03 X10(3) UL (ref 0–0.2)
BASOPHILS NFR BLD AUTO: 1.3 %
BILIRUB SERPL-MCNC: 0.7 MG/DL (ref 0.1–2)
BUN BLD-MCNC: 22 MG/DL (ref 7–18)
BUN/CREAT SERPL: 24.4 (ref 10–20)
CALCIUM BLD-MCNC: 8.8 MG/DL (ref 8.5–10.1)
CHLORIDE SERPL-SCNC: 109 MMOL/L (ref 98–112)
CO2 SERPL-SCNC: 26 MMOL/L (ref 21–32)
CREAT BLD-MCNC: 0.9 MG/DL
DEPRECATED RDW RBC AUTO: 53 FL (ref 35.1–46.3)
EOSINOPHIL # BLD AUTO: 0.04 X10(3) UL (ref 0–0.7)
EOSINOPHIL NFR BLD AUTO: 1.7 %
ERYTHROCYTE [DISTWIDTH] IN BLOOD BY AUTOMATED COUNT: 14.5 % (ref 11–15)
GFR SERPLBLD BASED ON 1.73 SQ M-ARVRAT: 73 ML/MIN/1.73M2 (ref 60–?)
GLOBULIN PLAS-MCNC: 3.1 G/DL (ref 2.8–4.4)
GLUCOSE BLD-MCNC: 82 MG/DL (ref 70–99)
HCT VFR BLD AUTO: 35.8 %
HGB BLD-MCNC: 11.4 G/DL
IMM GRANULOCYTES # BLD AUTO: 0.04 X10(3) UL (ref 0–1)
IMM GRANULOCYTES NFR BLD: 1.7 %
LYMPHOCYTES # BLD AUTO: 0.5 X10(3) UL (ref 1–4)
LYMPHOCYTES NFR BLD AUTO: 20.9 %
MCH RBC QN AUTO: 31.6 PG (ref 26–34)
MCHC RBC AUTO-ENTMCNC: 31.8 G/DL (ref 31–37)
MCV RBC AUTO: 99.2 FL
MONOCYTES # BLD AUTO: 0.38 X10(3) UL (ref 0.1–1)
MONOCYTES NFR BLD AUTO: 15.9 %
NEUTROPHILS # BLD AUTO: 1.4 X10 (3) UL (ref 1.5–7.7)
NEUTROPHILS # BLD AUTO: 1.4 X10(3) UL (ref 1.5–7.7)
NEUTROPHILS NFR BLD AUTO: 58.5 %
OSMOLALITY SERPL CALC.SUM OF ELEC: 292 MOSM/KG (ref 275–295)
PLATELET # BLD AUTO: 271 10(3)UL (ref 150–450)
POTASSIUM SERPL-SCNC: 4.3 MMOL/L (ref 3.5–5.1)
PROT SERPL-MCNC: 6.2 G/DL (ref 6.4–8.2)
RBC # BLD AUTO: 3.61 X10(6)UL
SODIUM SERPL-SCNC: 140 MMOL/L (ref 136–145)
WBC # BLD AUTO: 2.4 X10(3) UL (ref 4–11)

## 2022-11-17 PROCEDURE — 99215 OFFICE O/P EST HI 40 MIN: CPT | Performed by: INTERNAL MEDICINE

## 2022-11-17 PROCEDURE — 96413 CHEMO IV INFUSION 1 HR: CPT

## 2022-11-17 PROCEDURE — 96375 TX/PRO/DX INJ NEW DRUG ADDON: CPT

## 2022-11-17 PROCEDURE — 85025 COMPLETE CBC W/AUTO DIFF WBC: CPT

## 2022-11-17 PROCEDURE — 80053 COMPREHEN METABOLIC PANEL: CPT

## 2022-11-17 RX ORDER — PROCHLORPERAZINE MALEATE 10 MG
10 TABLET ORAL ONCE
Status: COMPLETED | OUTPATIENT
Start: 2022-11-17 | End: 2022-11-17

## 2022-11-17 RX ORDER — MIRTAZAPINE 15 MG/1
15 TABLET, FILM COATED ORAL NIGHTLY
Qty: 90 TABLET | Refills: 1 | Status: SHIPPED | OUTPATIENT
Start: 2022-11-17

## 2022-11-17 RX ORDER — CYANOCOBALAMIN 1000 UG/ML
1000 INJECTION, SOLUTION INTRAMUSCULAR; SUBCUTANEOUS AS NEEDED
Start: 2022-12-01

## 2022-11-17 RX ORDER — PROCHLORPERAZINE MALEATE 10 MG
TABLET ORAL
Status: DISPENSED
Start: 2022-11-17 | End: 2022-11-18

## 2022-11-17 RX ORDER — PROCHLORPERAZINE MALEATE 10 MG
10 TABLET ORAL ONCE
Status: CANCELLED | OUTPATIENT
Start: 2022-11-17

## 2022-11-17 RX ORDER — ZOLPIDEM TARTRATE 10 MG/1
10 TABLET ORAL NIGHTLY PRN
Qty: 30 TABLET | Refills: 0 | Status: SHIPPED | OUTPATIENT
Start: 2022-11-17

## 2022-11-17 RX ORDER — CODEINE PHOSPHATE AND GUAIFENESIN 10; 100 MG/5ML; MG/5ML
5 SOLUTION ORAL EVERY 6 HOURS PRN
Qty: 473 ML | Refills: 0 | Status: SHIPPED | OUTPATIENT
Start: 2022-11-17

## 2022-11-17 RX ORDER — HEPARIN SODIUM (PORCINE) LOCK FLUSH IV SOLN 100 UNIT/ML 100 UNIT/ML
500 SOLUTION INTRAVENOUS ONCE
Status: COMPLETED | OUTPATIENT
Start: 2022-11-17 | End: 2022-11-17

## 2022-11-17 RX ORDER — HEPARIN SODIUM (PORCINE) LOCK FLUSH IV SOLN 100 UNIT/ML 100 UNIT/ML
500 SOLUTION INTRAVENOUS ONCE
OUTPATIENT
Start: 2022-12-01

## 2022-11-17 RX ORDER — HEPARIN SODIUM (PORCINE) LOCK FLUSH IV SOLN 100 UNIT/ML 100 UNIT/ML
SOLUTION INTRAVENOUS
Status: DISPENSED
Start: 2022-11-17 | End: 2022-11-18

## 2022-11-17 RX ADMIN — PROCHLORPERAZINE MALEATE 10 MG: 10 MG TABLET ORAL at 12:13:00

## 2022-11-17 RX ADMIN — HEPARIN SODIUM (PORCINE) LOCK FLUSH IV SOLN 100 UNIT/ML 500 UNITS: 100 SOLUTION INTRAVENOUS at 13:25:00

## 2022-11-17 NOTE — PROGRESS NOTES
Pt here for C1D1 Abraxane and Venofer 3 of 5 . Arrives Ambulating independently, accompanied by Self           Pregnancy screening: Not applicable    Modifications in dose or schedule: No    Drugs/infusions dual verified for appearance and physical integrity. IV pump settings were dual verified: yes     Frequency of blood return and site check throughout administration: Prior to administration, Prior to each drug and At completion of therapy   Discharged to Home, Ambulating independently, accompanied by:Self    Outpatient Oncology Care Plan  Problem list:  anemia  hair loss  neutropenia  respiratory  thrombocytopenia  fatigue  nausea and vomiting  Problems related to:  chemotherapy  side effect of treatment  Interventions:  emotional support given  maintain safe environment  nausea/vomiting teaching  maintain skin integrity  monitor effect of nausea/vomiting management  monitor lab values  promoted rest  Expected outcomes:  adequate sleep/rest  family support/coping well  free of infection  optimal lab values  patient supported/coping well  safe in environment  understands plan of care  Progress towards outcome:  making progress    Education Record    Learner:  Patient  Barriers / Limitations:  None  Method:  Brief focused and Discussion  Outcome:  Shows understanding  Comments:Aleisha tolerated Venofer well, Vital signs stable after 30 minute observation. Sandeep Duke also tolerated the Abraxane well. Future appointments scheduled.

## 2022-11-18 ENCOUNTER — TELEPHONE (OUTPATIENT)
Dept: HEMATOLOGY/ONCOLOGY | Facility: HOSPITAL | Age: 62
End: 2022-11-18

## 2022-11-18 ENCOUNTER — NURSE ONLY (OUTPATIENT)
Dept: HEMATOLOGY/ONCOLOGY | Facility: HOSPITAL | Age: 62
End: 2022-11-18
Attending: INTERNAL MEDICINE
Payer: COMMERCIAL

## 2022-11-18 DIAGNOSIS — C50.919 TRIPLE NEGATIVE BREAST CANCER (HCC): Primary | ICD-10-CM

## 2022-11-18 PROCEDURE — 96372 THER/PROPH/DIAG INJ SC/IM: CPT

## 2022-11-18 RX ORDER — PROCHLORPERAZINE MALEATE 10 MG
10 TABLET ORAL ONCE
OUTPATIENT
Start: 2022-11-24

## 2022-11-18 RX ORDER — PROCHLORPERAZINE MALEATE 10 MG
10 TABLET ORAL ONCE
OUTPATIENT
Start: 2022-12-01

## 2022-11-18 NOTE — PROGRESS NOTES
Zarxio given subcutaneous in left upper arm. Nataliia Abarms has had in the past.  I reviewed common side effect could be bone pain, she can take Clariton today and tomorrow to prevent bone pain. Nataliia Abrams verbalized understanding. Nataliia Abrams reports shortness of breath with exertion. When rest shortness of breath subsides. Discharged ambulatory with steady gait.

## 2022-11-23 ENCOUNTER — APPOINTMENT (OUTPATIENT)
Dept: HEMATOLOGY/ONCOLOGY | Facility: HOSPITAL | Age: 62
End: 2022-11-23
Attending: INTERNAL MEDICINE
Payer: COMMERCIAL

## 2022-11-23 ENCOUNTER — APPOINTMENT (OUTPATIENT)
Dept: HEMATOLOGY/ONCOLOGY | Facility: HOSPITAL | Age: 62
End: 2022-11-23
Attending: PHYSICIAN ASSISTANT
Payer: COMMERCIAL

## 2022-11-25 ENCOUNTER — OFFICE VISIT (OUTPATIENT)
Dept: HEMATOLOGY/ONCOLOGY | Facility: HOSPITAL | Age: 62
End: 2022-11-25
Attending: INTERNAL MEDICINE
Payer: COMMERCIAL

## 2022-11-25 VITALS
RESPIRATION RATE: 16 BRPM | HEART RATE: 68 BPM | DIASTOLIC BLOOD PRESSURE: 74 MMHG | SYSTOLIC BLOOD PRESSURE: 133 MMHG | TEMPERATURE: 98 F | HEIGHT: 61 IN | BODY MASS INDEX: 24.43 KG/M2 | OXYGEN SATURATION: 99 % | WEIGHT: 129.38 LBS

## 2022-11-25 DIAGNOSIS — J91.0 MALIGNANT PLEURAL EFFUSION: ICD-10-CM

## 2022-11-25 DIAGNOSIS — C50.919 TRIPLE NEGATIVE BREAST CANCER (HCC): Primary | ICD-10-CM

## 2022-11-25 DIAGNOSIS — E61.1 IRON DEFICIENCY: ICD-10-CM

## 2022-11-25 DIAGNOSIS — E53.8 B12 DEFICIENCY: ICD-10-CM

## 2022-11-25 DIAGNOSIS — Z51.11 CHEMOTHERAPY MANAGEMENT, ENCOUNTER FOR: Primary | ICD-10-CM

## 2022-11-25 DIAGNOSIS — C50.919 TRIPLE NEGATIVE BREAST CANCER (HCC): ICD-10-CM

## 2022-11-25 LAB
BASOPHILS # BLD: 0 X10(3) UL (ref 0–0.2)
BASOPHILS NFR BLD: 0 %
DEPRECATED RDW RBC AUTO: 52.7 FL (ref 35.1–46.3)
EOSINOPHIL # BLD: 0 X10(3) UL (ref 0–0.7)
EOSINOPHIL NFR BLD: 0 %
ERYTHROCYTE [DISTWIDTH] IN BLOOD BY AUTOMATED COUNT: 14.6 % (ref 11–15)
HCT VFR BLD AUTO: 35.1 %
HGB BLD-MCNC: 10.9 G/DL
LYMPHOCYTES NFR BLD: 0.63 X10(3) UL (ref 1–4)
LYMPHOCYTES NFR BLD: 19 %
MCH RBC QN AUTO: 31.1 PG (ref 26–34)
MCHC RBC AUTO-ENTMCNC: 31.1 G/DL (ref 31–37)
MCV RBC AUTO: 100.3 FL
MONOCYTES # BLD: 0.56 X10(3) UL (ref 0.1–1)
MONOCYTES NFR BLD: 17 %
MORPHOLOGY: NORMAL
NEUTROPHILS # BLD AUTO: 1.5 X10 (3) UL (ref 1.5–7.7)
NEUTROPHILS NFR BLD: 57 %
NEUTS BAND NFR BLD: 7 %
NEUTS HYPERSEG # BLD: 2.11 X10(3) UL (ref 1.5–7.7)
PLATELET # BLD AUTO: 223 10(3)UL (ref 150–450)
PLATELET MORPHOLOGY: NORMAL
RBC # BLD AUTO: 3.5 X10(6)UL
TOTAL CELLS COUNTED BLD: 100
WBC # BLD AUTO: 3.3 X10(3) UL (ref 4–11)

## 2022-11-25 PROCEDURE — 96375 TX/PRO/DX INJ NEW DRUG ADDON: CPT

## 2022-11-25 PROCEDURE — 96413 CHEMO IV INFUSION 1 HR: CPT

## 2022-11-25 PROCEDURE — 85007 BL SMEAR W/DIFF WBC COUNT: CPT

## 2022-11-25 PROCEDURE — 99215 OFFICE O/P EST HI 40 MIN: CPT | Performed by: PHYSICIAN ASSISTANT

## 2022-11-25 PROCEDURE — 85027 COMPLETE CBC AUTOMATED: CPT

## 2022-11-25 PROCEDURE — 85025 COMPLETE CBC W/AUTO DIFF WBC: CPT

## 2022-11-25 RX ORDER — PROCHLORPERAZINE MALEATE 10 MG
TABLET ORAL
Status: COMPLETED
Start: 2022-11-25 | End: 2022-11-25

## 2022-11-25 RX ORDER — PROCHLORPERAZINE MALEATE 10 MG
10 TABLET ORAL ONCE
Status: COMPLETED | OUTPATIENT
Start: 2022-11-25 | End: 2022-11-25

## 2022-11-25 RX ORDER — HEPARIN SODIUM (PORCINE) LOCK FLUSH IV SOLN 100 UNIT/ML 100 UNIT/ML
SOLUTION INTRAVENOUS
Status: COMPLETED
Start: 2022-11-25 | End: 2022-11-25

## 2022-11-25 RX ADMIN — PROCHLORPERAZINE MALEATE 10 MG: 10 MG TABLET ORAL at 12:34:00

## 2022-11-25 RX ADMIN — HEPARIN SODIUM (PORCINE) LOCK FLUSH IV SOLN 100 UNIT/ML 500 UNITS: 100 SOLUTION INTRAVENOUS at 13:39:00

## 2022-11-25 NOTE — PROGRESS NOTES
Pt here for C1D8 abraxane         Modifications in dose or schedule: No. Cbc wnl that was drawn today    Drugs/infusions dual verified for appearance and physical integrity.  IV pump settings were dual verified: yes     Frequency of blood return and site check throughout administration: Prior to administration, Prior to each drug and At completion of therapy   Discharged to Other stabe from infusion, Ambulating independently, accompanied by:Self    Outpatient Oncology Care Plan  Problem list:  fatigue  Problems related to:  chemotherapy  side effect of treatment  Interventions:  monitor effect of therapy  monitor lab values  Expected outcomes:  optimal lab values  symptoms relieved/minimized  understands plan of care  verbalize how to care for self  Progress towards outcome:  making progress    Education Record    Learner:  Patient  Barriers / Limitations:  None  Method:  Discussion  Outcome:  Shows understanding

## 2022-11-26 ENCOUNTER — NURSE ONLY (OUTPATIENT)
Dept: HEMATOLOGY/ONCOLOGY | Facility: HOSPITAL | Age: 62
End: 2022-11-26
Attending: INTERNAL MEDICINE
Payer: COMMERCIAL

## 2022-11-26 VITALS
TEMPERATURE: 98 F | SYSTOLIC BLOOD PRESSURE: 124 MMHG | DIASTOLIC BLOOD PRESSURE: 77 MMHG | OXYGEN SATURATION: 97 % | RESPIRATION RATE: 16 BRPM | HEART RATE: 77 BPM

## 2022-11-26 DIAGNOSIS — C50.919 TRIPLE NEGATIVE BREAST CANCER (HCC): Primary | ICD-10-CM

## 2022-11-26 DIAGNOSIS — J91.0 MALIGNANT PLEURAL EFFUSION: ICD-10-CM

## 2022-11-26 PROCEDURE — 96372 THER/PROPH/DIAG INJ SC/IM: CPT

## 2022-11-27 DIAGNOSIS — R06.09 DYSPNEA ON EXERTION: ICD-10-CM

## 2022-11-28 RX ORDER — FUROSEMIDE 20 MG/1
TABLET ORAL
Qty: 180 TABLET | Refills: 0 | Status: SHIPPED | OUTPATIENT
Start: 2022-11-28

## 2022-12-01 ENCOUNTER — APPOINTMENT (OUTPATIENT)
Dept: HEMATOLOGY/ONCOLOGY | Facility: HOSPITAL | Age: 62
End: 2022-12-01
Attending: INTERNAL MEDICINE
Payer: COMMERCIAL

## 2022-12-01 VITALS
DIASTOLIC BLOOD PRESSURE: 84 MMHG | WEIGHT: 126 LBS | HEART RATE: 103 BPM | TEMPERATURE: 98 F | HEIGHT: 61 IN | RESPIRATION RATE: 16 BRPM | OXYGEN SATURATION: 98 % | BODY MASS INDEX: 23.79 KG/M2 | SYSTOLIC BLOOD PRESSURE: 116 MMHG

## 2022-12-01 VITALS
RESPIRATION RATE: 16 BRPM | TEMPERATURE: 98 F | DIASTOLIC BLOOD PRESSURE: 72 MMHG | HEART RATE: 99 BPM | SYSTOLIC BLOOD PRESSURE: 128 MMHG

## 2022-12-01 DIAGNOSIS — E53.8 B12 DEFICIENCY: ICD-10-CM

## 2022-12-01 DIAGNOSIS — C50.411 MALIGNANT NEOPLASM OF UPPER-OUTER QUADRANT OF RIGHT BREAST IN FEMALE, ESTROGEN RECEPTOR POSITIVE (HCC): ICD-10-CM

## 2022-12-01 DIAGNOSIS — J91.0 MALIGNANT PLEURAL EFFUSION: ICD-10-CM

## 2022-12-01 DIAGNOSIS — E61.1 IRON DEFICIENCY: ICD-10-CM

## 2022-12-01 DIAGNOSIS — R06.09 DYSPNEA ON EXERTION: ICD-10-CM

## 2022-12-01 DIAGNOSIS — Z79.899 ENCOUNTER FOR MEDICATION MANAGEMENT: ICD-10-CM

## 2022-12-01 DIAGNOSIS — Z45.2 ENCOUNTER FOR CENTRAL LINE CARE: ICD-10-CM

## 2022-12-01 DIAGNOSIS — Z17.0 MALIGNANT NEOPLASM OF UPPER-OUTER QUADRANT OF RIGHT BREAST IN FEMALE, ESTROGEN RECEPTOR POSITIVE (HCC): ICD-10-CM

## 2022-12-01 DIAGNOSIS — Z51.11 CHEMOTHERAPY MANAGEMENT, ENCOUNTER FOR: ICD-10-CM

## 2022-12-01 DIAGNOSIS — R06.09 DYSPNEA ON EXERTION: Primary | ICD-10-CM

## 2022-12-01 DIAGNOSIS — C50.919 TRIPLE NEGATIVE BREAST CANCER (HCC): ICD-10-CM

## 2022-12-01 DIAGNOSIS — Z51.11 CHEMOTHERAPY MANAGEMENT, ENCOUNTER FOR: Primary | ICD-10-CM

## 2022-12-01 DIAGNOSIS — E78.00 PURE HYPERCHOLESTEROLEMIA: ICD-10-CM

## 2022-12-01 LAB
BASOPHILS # BLD: 0 X10(3) UL (ref 0–0.2)
BASOPHILS NFR BLD: 0 %
CHOLEST SERPL-MCNC: 251 MG/DL (ref ?–200)
DEPRECATED RDW RBC AUTO: 51.8 FL (ref 35.1–46.3)
EOSINOPHIL # BLD: 0 X10(3) UL (ref 0–0.7)
EOSINOPHIL NFR BLD: 0 %
ERYTHROCYTE [DISTWIDTH] IN BLOOD BY AUTOMATED COUNT: 14.3 % (ref 11–15)
FASTING PATIENT LIPID ANSWER: NO
HCT VFR BLD AUTO: 35.6 %
HDLC SERPL-MCNC: 51 MG/DL (ref 40–59)
HGB BLD-MCNC: 11.2 G/DL
LDLC SERPL CALC-MCNC: 167 MG/DL (ref ?–100)
LYMPHOCYTES NFR BLD: 0.53 X10(3) UL (ref 1–4)
LYMPHOCYTES NFR BLD: 14 %
MCH RBC QN AUTO: 30.9 PG (ref 26–34)
MCHC RBC AUTO-ENTMCNC: 31.5 G/DL (ref 31–37)
MCV RBC AUTO: 98.1 FL
MONOCYTES # BLD: 0.19 X10(3) UL (ref 0.1–1)
MONOCYTES NFR BLD: 5 %
MORPHOLOGY: NORMAL
NEUTROPHILS # BLD AUTO: 2.53 X10 (3) UL (ref 1.5–7.7)
NEUTROPHILS NFR BLD: 76 %
NEUTS BAND NFR BLD: 5 %
NEUTS HYPERSEG # BLD: 3.08 X10(3) UL (ref 1.5–7.7)
NONHDLC SERPL-MCNC: 200 MG/DL (ref ?–130)
PLATELET # BLD AUTO: 243 10(3)UL (ref 150–450)
PLATELET MORPHOLOGY: NORMAL
RBC # BLD AUTO: 3.63 X10(6)UL
TOTAL CELLS COUNTED BLD: 100
TRIGL SERPL-MCNC: 178 MG/DL (ref 30–149)
VLDLC SERPL CALC-MCNC: 35 MG/DL (ref 0–30)
WBC # BLD AUTO: 3.8 X10(3) UL (ref 4–11)

## 2022-12-01 PROCEDURE — 99215 OFFICE O/P EST HI 40 MIN: CPT | Performed by: INTERNAL MEDICINE

## 2022-12-01 PROCEDURE — 96413 CHEMO IV INFUSION 1 HR: CPT

## 2022-12-01 PROCEDURE — 85007 BL SMEAR W/DIFF WBC COUNT: CPT

## 2022-12-01 PROCEDURE — 80061 LIPID PANEL: CPT

## 2022-12-01 PROCEDURE — 85027 COMPLETE CBC AUTOMATED: CPT

## 2022-12-01 PROCEDURE — 85025 COMPLETE CBC W/AUTO DIFF WBC: CPT

## 2022-12-01 PROCEDURE — 96375 TX/PRO/DX INJ NEW DRUG ADDON: CPT

## 2022-12-01 RX ORDER — CYANOCOBALAMIN 1000 UG/ML
1000 INJECTION, SOLUTION INTRAMUSCULAR; SUBCUTANEOUS AS NEEDED
Start: 2023-01-01

## 2022-12-01 RX ORDER — HEPARIN SODIUM (PORCINE) LOCK FLUSH IV SOLN 100 UNIT/ML 100 UNIT/ML
SOLUTION INTRAVENOUS
Status: DISCONTINUED
Start: 2022-12-01 | End: 2022-12-01

## 2022-12-01 RX ORDER — PROCHLORPERAZINE MALEATE 10 MG
10 TABLET ORAL ONCE
Status: COMPLETED | OUTPATIENT
Start: 2022-12-01 | End: 2022-12-01

## 2022-12-01 RX ORDER — HEPARIN SODIUM (PORCINE) LOCK FLUSH IV SOLN 100 UNIT/ML 100 UNIT/ML
500 SOLUTION INTRAVENOUS ONCE
OUTPATIENT
Start: 2023-01-01

## 2022-12-01 RX ORDER — PROCHLORPERAZINE MALEATE 10 MG
TABLET ORAL
Status: COMPLETED
Start: 2022-12-01 | End: 2022-12-01

## 2022-12-01 RX ORDER — HEPARIN SODIUM (PORCINE) LOCK FLUSH IV SOLN 100 UNIT/ML 100 UNIT/ML
500 SOLUTION INTRAVENOUS ONCE
Status: DISCONTINUED | OUTPATIENT
Start: 2022-12-01 | End: 2022-12-01

## 2022-12-01 RX ADMIN — PROCHLORPERAZINE MALEATE 10 MG: 10 MG TABLET ORAL at 10:36:00

## 2022-12-01 NOTE — PROGRESS NOTES
Pt here for C1D15 abraxane and Venofer 4 of 5      Modifications in dose or schedule: No. Cbc drawn today and appropriate for treatment. Drugs/infusions dual verified for appearance and physical integrity. IV pump settings were dual verified: yes     Frequency of blood return and site check throughout administration: Prior to administration, Prior to each drug and At completion of therapy   Discharged to Other stabe from infusion, Ambulating independently, accompanied by:Self    Outpatient Oncology Care Plan  Problem list:  fatigue  Problems related to:  chemotherapy  side effect of treatment  Interventions:  monitor effect of therapy  monitor lab values  Expected outcomes:  optimal lab values  symptoms relieved/minimized  understands plan of care  verbalize how to care for self  Progress towards outcome:  making progress    Education Record    Learner:  Patient  Barriers / Limitations:  None  Method:  Discussion  Outcome:  Shows understanding   Per Syd ADAME she will not need Neupogen tomorrow and her appointment has been cancelled for tomorrow. Deborah Ryder uses [facility] for her appointments.

## 2022-12-02 ENCOUNTER — APPOINTMENT (OUTPATIENT)
Dept: HEMATOLOGY/ONCOLOGY | Facility: HOSPITAL | Age: 62
End: 2022-12-02
Attending: INTERNAL MEDICINE
Payer: COMMERCIAL

## 2022-12-12 ENCOUNTER — TELEPHONE (OUTPATIENT)
Dept: HEMATOLOGY/ONCOLOGY | Facility: HOSPITAL | Age: 62
End: 2022-12-12

## 2022-12-15 ENCOUNTER — OFFICE VISIT (OUTPATIENT)
Dept: HEMATOLOGY/ONCOLOGY | Facility: HOSPITAL | Age: 62
End: 2022-12-15
Attending: INTERNAL MEDICINE
Payer: COMMERCIAL

## 2022-12-15 VITALS
HEIGHT: 61 IN | TEMPERATURE: 98 F | RESPIRATION RATE: 16 BRPM | DIASTOLIC BLOOD PRESSURE: 91 MMHG | WEIGHT: 123 LBS | SYSTOLIC BLOOD PRESSURE: 142 MMHG | HEART RATE: 95 BPM | BODY MASS INDEX: 23.22 KG/M2 | OXYGEN SATURATION: 97 %

## 2022-12-15 DIAGNOSIS — C50.919 TRIPLE NEGATIVE BREAST CANCER (HCC): Primary | ICD-10-CM

## 2022-12-15 DIAGNOSIS — Z51.11 CHEMOTHERAPY MANAGEMENT, ENCOUNTER FOR: Primary | ICD-10-CM

## 2022-12-15 DIAGNOSIS — E53.8 B12 DEFICIENCY: ICD-10-CM

## 2022-12-15 DIAGNOSIS — C50.411 MALIGNANT NEOPLASM OF UPPER-OUTER QUADRANT OF RIGHT BREAST IN FEMALE, ESTROGEN RECEPTOR POSITIVE (HCC): ICD-10-CM

## 2022-12-15 DIAGNOSIS — J91.0 MALIGNANT PLEURAL EFFUSION: ICD-10-CM

## 2022-12-15 DIAGNOSIS — E61.1 IRON DEFICIENCY: ICD-10-CM

## 2022-12-15 DIAGNOSIS — Z17.0 MALIGNANT NEOPLASM OF UPPER-OUTER QUADRANT OF RIGHT BREAST IN FEMALE, ESTROGEN RECEPTOR POSITIVE (HCC): ICD-10-CM

## 2022-12-15 LAB
ALBUMIN SERPL-MCNC: 3.3 G/DL (ref 3.4–5)
ALBUMIN/GLOB SERPL: 1.2 {RATIO} (ref 1–2)
ALP LIVER SERPL-CCNC: 61 U/L
ALT SERPL-CCNC: 23 U/L
ANION GAP SERPL CALC-SCNC: 7 MMOL/L (ref 0–18)
AST SERPL-CCNC: 20 U/L (ref 15–37)
BASOPHILS # BLD AUTO: 0.06 X10(3) UL (ref 0–0.2)
BASOPHILS NFR BLD AUTO: 2 %
BILIRUB SERPL-MCNC: 0.9 MG/DL (ref 0.1–2)
BUN BLD-MCNC: 13 MG/DL (ref 7–18)
BUN/CREAT SERPL: 12.6 (ref 10–20)
CALCIUM BLD-MCNC: 8.6 MG/DL (ref 8.5–10.1)
CHLORIDE SERPL-SCNC: 111 MMOL/L (ref 98–112)
CO2 SERPL-SCNC: 24 MMOL/L (ref 21–32)
CREAT BLD-MCNC: 1.03 MG/DL
DEPRECATED RDW RBC AUTO: 53.5 FL (ref 35.1–46.3)
EOSINOPHIL # BLD AUTO: 0.07 X10(3) UL (ref 0–0.7)
EOSINOPHIL NFR BLD AUTO: 2.3 %
ERYTHROCYTE [DISTWIDTH] IN BLOOD BY AUTOMATED COUNT: 14.8 % (ref 11–15)
GFR SERPLBLD BASED ON 1.73 SQ M-ARVRAT: 62 ML/MIN/1.73M2 (ref 60–?)
GLOBULIN PLAS-MCNC: 2.8 G/DL (ref 2.8–4.4)
GLUCOSE BLD-MCNC: 121 MG/DL (ref 70–99)
HCT VFR BLD AUTO: 37.7 %
HGB BLD-MCNC: 12.2 G/DL
IMM GRANULOCYTES # BLD AUTO: 0.03 X10(3) UL (ref 0–1)
IMM GRANULOCYTES NFR BLD: 1 %
LYMPHOCYTES # BLD AUTO: 0.61 X10(3) UL (ref 1–4)
LYMPHOCYTES NFR BLD AUTO: 20.1 %
MCH RBC QN AUTO: 31.5 PG (ref 26–34)
MCHC RBC AUTO-ENTMCNC: 32.4 G/DL (ref 31–37)
MCV RBC AUTO: 97.4 FL
MONOCYTES # BLD AUTO: 0.63 X10(3) UL (ref 0.1–1)
MONOCYTES NFR BLD AUTO: 20.8 %
NEUTROPHILS # BLD AUTO: 1.63 X10 (3) UL (ref 1.5–7.7)
NEUTROPHILS # BLD AUTO: 1.63 X10(3) UL (ref 1.5–7.7)
NEUTROPHILS NFR BLD AUTO: 53.8 %
OSMOLALITY SERPL CALC.SUM OF ELEC: 295 MOSM/KG (ref 275–295)
PLATELET # BLD AUTO: 214 10(3)UL (ref 150–450)
POTASSIUM SERPL-SCNC: 3.8 MMOL/L (ref 3.5–5.1)
PROT SERPL-MCNC: 6.1 G/DL (ref 6.4–8.2)
RBC # BLD AUTO: 3.87 X10(6)UL
SODIUM SERPL-SCNC: 142 MMOL/L (ref 136–145)
WBC # BLD AUTO: 3 X10(3) UL (ref 4–11)

## 2022-12-15 PROCEDURE — 85025 COMPLETE CBC W/AUTO DIFF WBC: CPT

## 2022-12-15 PROCEDURE — 96375 TX/PRO/DX INJ NEW DRUG ADDON: CPT

## 2022-12-15 PROCEDURE — 99215 OFFICE O/P EST HI 40 MIN: CPT | Performed by: INTERNAL MEDICINE

## 2022-12-15 PROCEDURE — 96413 CHEMO IV INFUSION 1 HR: CPT

## 2022-12-15 PROCEDURE — 96372 THER/PROPH/DIAG INJ SC/IM: CPT

## 2022-12-15 PROCEDURE — 80053 COMPREHEN METABOLIC PANEL: CPT

## 2022-12-15 RX ORDER — PROCHLORPERAZINE MALEATE 10 MG
TABLET ORAL
Status: DISPENSED
Start: 2022-12-15 | End: 2022-12-15

## 2022-12-15 RX ORDER — HEPARIN SODIUM (PORCINE) LOCK FLUSH IV SOLN 100 UNIT/ML 100 UNIT/ML
500 SOLUTION INTRAVENOUS ONCE
OUTPATIENT
Start: 2023-01-01

## 2022-12-15 RX ORDER — PROCHLORPERAZINE MALEATE 10 MG
10 TABLET ORAL ONCE
Status: COMPLETED | OUTPATIENT
Start: 2022-12-15 | End: 2022-12-15

## 2022-12-15 RX ORDER — PROCHLORPERAZINE MALEATE 10 MG
10 TABLET ORAL ONCE
Status: CANCELLED | OUTPATIENT
Start: 2022-12-16

## 2022-12-15 RX ORDER — PROCHLORPERAZINE MALEATE 10 MG
10 TABLET ORAL ONCE
OUTPATIENT
Start: 2022-12-30

## 2022-12-15 RX ORDER — HEPARIN SODIUM (PORCINE) LOCK FLUSH IV SOLN 100 UNIT/ML 100 UNIT/ML
SOLUTION INTRAVENOUS
Status: DISPENSED
Start: 2022-12-15 | End: 2022-12-15

## 2022-12-15 RX ORDER — CYANOCOBALAMIN 1000 UG/ML
1000 INJECTION, SOLUTION INTRAMUSCULAR; SUBCUTANEOUS AS NEEDED
Start: 2023-01-01

## 2022-12-15 RX ORDER — CYANOCOBALAMIN 1000 UG/ML
INJECTION, SOLUTION INTRAMUSCULAR; SUBCUTANEOUS
Status: DISPENSED
Start: 2022-12-15 | End: 2022-12-15

## 2022-12-15 RX ORDER — CYANOCOBALAMIN 1000 UG/ML
1000 INJECTION, SOLUTION INTRAMUSCULAR; SUBCUTANEOUS AS NEEDED
Status: DISCONTINUED | OUTPATIENT
Start: 2022-12-15 | End: 2022-12-15

## 2022-12-15 RX ADMIN — PROCHLORPERAZINE MALEATE 10 MG: 10 MG TABLET ORAL at 09:53:00

## 2022-12-15 RX ADMIN — CYANOCOBALAMIN 1000 MCG: 1000 INJECTION, SOLUTION INTRAMUSCULAR; SUBCUTANEOUS at 09:57:00

## 2022-12-15 NOTE — PROGRESS NOTES
Pt here for C2D1 abraxane , monthly B 12 and Venofer 5 of 5      Modifications in dose or schedule: No.       Drugs/infusions dual verified for appearance and physical integrity.  IV pump settings were dual verified: yes     Frequency of blood return and site check throughout administration: Prior to administration, Prior to each drug and At completion of therapy   Discharged to Other stabe from infusion, Ambulating independently, accompanied by:Self    Outpatient Oncology Care Plan  Problem list:  fatigue  Problems related to:  chemotherapy  side effect of treatment  Interventions:  monitor effect of therapy  monitor lab values  Expected outcomes:  optimal lab values  symptoms relieved/minimized  understands plan of care  verbalize how to care for self  Progress towards outcome:  making progress    Education Record    Learner:  Patient  Barriers / Limitations:  None  Method:  Discussion  Outcome:  Shows understanding

## 2022-12-16 ENCOUNTER — APPOINTMENT (OUTPATIENT)
Dept: HEMATOLOGY/ONCOLOGY | Facility: HOSPITAL | Age: 62
End: 2022-12-16
Attending: INTERNAL MEDICINE
Payer: COMMERCIAL

## 2022-12-22 ENCOUNTER — APPOINTMENT (OUTPATIENT)
Dept: HEMATOLOGY/ONCOLOGY | Facility: HOSPITAL | Age: 62
End: 2022-12-22
Attending: INTERNAL MEDICINE
Payer: COMMERCIAL

## 2022-12-23 ENCOUNTER — APPOINTMENT (OUTPATIENT)
Dept: HEMATOLOGY/ONCOLOGY | Facility: HOSPITAL | Age: 62
End: 2022-12-23
Attending: INTERNAL MEDICINE
Payer: COMMERCIAL

## 2022-12-29 ENCOUNTER — OFFICE VISIT (OUTPATIENT)
Dept: HEMATOLOGY/ONCOLOGY | Facility: HOSPITAL | Age: 62
End: 2022-12-29
Attending: INTERNAL MEDICINE
Payer: COMMERCIAL

## 2022-12-29 VITALS
TEMPERATURE: 98 F | DIASTOLIC BLOOD PRESSURE: 80 MMHG | OXYGEN SATURATION: 98 % | SYSTOLIC BLOOD PRESSURE: 126 MMHG | HEART RATE: 77 BPM | RESPIRATION RATE: 16 BRPM

## 2022-12-29 VITALS
OXYGEN SATURATION: 98 % | HEART RATE: 77 BPM | RESPIRATION RATE: 16 BRPM | DIASTOLIC BLOOD PRESSURE: 80 MMHG | TEMPERATURE: 98 F | SYSTOLIC BLOOD PRESSURE: 126 MMHG

## 2022-12-29 DIAGNOSIS — Z51.11 CHEMOTHERAPY MANAGEMENT, ENCOUNTER FOR: ICD-10-CM

## 2022-12-29 DIAGNOSIS — E61.1 IRON DEFICIENCY: ICD-10-CM

## 2022-12-29 DIAGNOSIS — J91.0 MALIGNANT PLEURAL EFFUSION: ICD-10-CM

## 2022-12-29 DIAGNOSIS — Z51.11 CHEMOTHERAPY MANAGEMENT, ENCOUNTER FOR: Primary | ICD-10-CM

## 2022-12-29 DIAGNOSIS — E53.8 B12 DEFICIENCY: ICD-10-CM

## 2022-12-29 DIAGNOSIS — C50.919 TRIPLE NEGATIVE BREAST CANCER (HCC): Primary | ICD-10-CM

## 2022-12-29 DIAGNOSIS — Z79.899 ENCOUNTER FOR MEDICATION MANAGEMENT: ICD-10-CM

## 2022-12-29 DIAGNOSIS — Z17.0 MALIGNANT NEOPLASM OF UPPER-OUTER QUADRANT OF RIGHT BREAST IN FEMALE, ESTROGEN RECEPTOR POSITIVE (HCC): ICD-10-CM

## 2022-12-29 DIAGNOSIS — C50.411 MALIGNANT NEOPLASM OF UPPER-OUTER QUADRANT OF RIGHT BREAST IN FEMALE, ESTROGEN RECEPTOR POSITIVE (HCC): ICD-10-CM

## 2022-12-29 DIAGNOSIS — Z45.2 ENCOUNTER FOR CENTRAL LINE CARE: ICD-10-CM

## 2022-12-29 LAB
BASOPHILS # BLD AUTO: 0.03 X10(3) UL (ref 0–0.2)
BASOPHILS NFR BLD AUTO: 0.9 %
DEPRECATED HBV CORE AB SER IA-ACNC: 396.3 NG/ML
DEPRECATED RDW RBC AUTO: 51 FL (ref 35.1–46.3)
EOSINOPHIL # BLD AUTO: 0.09 X10(3) UL (ref 0–0.7)
EOSINOPHIL NFR BLD AUTO: 2.8 %
ERYTHROCYTE [DISTWIDTH] IN BLOOD BY AUTOMATED COUNT: 14.1 % (ref 11–15)
FOLATE SERPL-MCNC: 9.5 NG/ML (ref 8.7–?)
HCT VFR BLD AUTO: 36.6 %
HGB BLD-MCNC: 11.6 G/DL
IMM GRANULOCYTES # BLD AUTO: 0.02 X10(3) UL (ref 0–1)
IMM GRANULOCYTES NFR BLD: 0.6 %
IRON SATN MFR SERPL: 20 %
IRON SERPL-MCNC: 50 UG/DL
LYMPHOCYTES # BLD AUTO: 0.5 X10(3) UL (ref 1–4)
LYMPHOCYTES NFR BLD AUTO: 15.5 %
MCH RBC QN AUTO: 31.2 PG (ref 26–34)
MCHC RBC AUTO-ENTMCNC: 31.7 G/DL (ref 31–37)
MCV RBC AUTO: 98.4 FL
MONOCYTES # BLD AUTO: 0.4 X10(3) UL (ref 0.1–1)
MONOCYTES NFR BLD AUTO: 12.4 %
NEUTROPHILS # BLD AUTO: 2.18 X10 (3) UL (ref 1.5–7.7)
NEUTROPHILS # BLD AUTO: 2.18 X10(3) UL (ref 1.5–7.7)
NEUTROPHILS NFR BLD AUTO: 67.8 %
PLATELET # BLD AUTO: 198 10(3)UL (ref 150–450)
RBC # BLD AUTO: 3.72 X10(6)UL
TIBC SERPL-MCNC: 247 UG/DL (ref 240–450)
TRANSFERRIN SERPL-MCNC: 166 MG/DL (ref 200–360)
WBC # BLD AUTO: 3.2 X10(3) UL (ref 4–11)

## 2022-12-29 PROCEDURE — 82746 ASSAY OF FOLIC ACID SERUM: CPT

## 2022-12-29 PROCEDURE — 84466 ASSAY OF TRANSFERRIN: CPT

## 2022-12-29 PROCEDURE — 83540 ASSAY OF IRON: CPT

## 2022-12-29 PROCEDURE — 99215 OFFICE O/P EST HI 40 MIN: CPT | Performed by: INTERNAL MEDICINE

## 2022-12-29 PROCEDURE — 85025 COMPLETE CBC W/AUTO DIFF WBC: CPT

## 2022-12-29 PROCEDURE — 96375 TX/PRO/DX INJ NEW DRUG ADDON: CPT

## 2022-12-29 PROCEDURE — 96413 CHEMO IV INFUSION 1 HR: CPT

## 2022-12-29 PROCEDURE — 82728 ASSAY OF FERRITIN: CPT

## 2022-12-29 RX ORDER — HEPARIN SODIUM (PORCINE) LOCK FLUSH IV SOLN 100 UNIT/ML 100 UNIT/ML
500 SOLUTION INTRAVENOUS ONCE
Status: COMPLETED | OUTPATIENT
Start: 2022-12-29 | End: 2022-12-29

## 2022-12-29 RX ORDER — PROCHLORPERAZINE MALEATE 10 MG
10 TABLET ORAL ONCE
Status: COMPLETED | OUTPATIENT
Start: 2022-12-29 | End: 2022-12-29

## 2022-12-29 RX ORDER — HEPARIN SODIUM (PORCINE) LOCK FLUSH IV SOLN 100 UNIT/ML 100 UNIT/ML
SOLUTION INTRAVENOUS
Status: COMPLETED
Start: 2022-12-29 | End: 2022-12-29

## 2022-12-29 RX ORDER — PROCHLORPERAZINE MALEATE 10 MG
TABLET ORAL
Status: COMPLETED
Start: 2022-12-29 | End: 2022-12-29

## 2022-12-29 RX ORDER — HEPARIN SODIUM (PORCINE) LOCK FLUSH IV SOLN 100 UNIT/ML 100 UNIT/ML
500 SOLUTION INTRAVENOUS ONCE
OUTPATIENT
Start: 2023-01-01

## 2022-12-29 RX ORDER — CYANOCOBALAMIN 1000 UG/ML
1000 INJECTION, SOLUTION INTRAMUSCULAR; SUBCUTANEOUS AS NEEDED
Start: 2023-01-01

## 2022-12-29 RX ADMIN — PROCHLORPERAZINE MALEATE 10 MG: 10 MG TABLET ORAL at 09:47:00

## 2022-12-29 RX ADMIN — HEPARIN SODIUM (PORCINE) LOCK FLUSH IV SOLN 100 UNIT/ML 500 UNITS: 100 SOLUTION INTRAVENOUS at 11:26:00

## 2022-12-29 NOTE — PROGRESS NOTES
Pt here for C2D15 abraxane. Arrives without complaints. PAC accessed, labs drawn as ordered. Modifications in dose or schedule: No    Drugs/infusions dual verified for appearance and physical integrity. IV pump settings were dual verified: yes     Frequency of blood return and site check throughout administration: Prior to administration, Prior to each drug and At completion of therapy   Discharged to Other stabe from infusion, Ambulating independently, accompanied by:Self     PAC hep locked and decannulated. Dc'd ambulating.     Outpatient Oncology Care Plan  Problem list:  fatigue  Problems related to:  chemotherapy  side effect of treatment  Interventions:  monitor effect of therapy  monitor lab values  Expected outcomes:  optimal lab values  symptoms relieved/minimized  understands plan of care  verbalize how to care for self  Progress towards outcome:  making progress    Education Record    Learner:  Patient  Barriers / Limitations:  None  Method:  Discussion  Outcome:  Shows understanding

## 2022-12-30 ENCOUNTER — APPOINTMENT (OUTPATIENT)
Dept: HEMATOLOGY/ONCOLOGY | Facility: HOSPITAL | Age: 62
End: 2022-12-30
Attending: INTERNAL MEDICINE
Payer: COMMERCIAL

## 2023-01-12 ENCOUNTER — NURSE ONLY (OUTPATIENT)
Dept: HEMATOLOGY/ONCOLOGY | Facility: HOSPITAL | Age: 63
End: 2023-01-12
Attending: INTERNAL MEDICINE
Payer: COMMERCIAL

## 2023-01-12 VITALS
DIASTOLIC BLOOD PRESSURE: 83 MMHG | WEIGHT: 127 LBS | HEART RATE: 73 BPM | SYSTOLIC BLOOD PRESSURE: 143 MMHG | TEMPERATURE: 99 F | HEIGHT: 61 IN | RESPIRATION RATE: 16 BRPM | BODY MASS INDEX: 23.98 KG/M2 | OXYGEN SATURATION: 100 %

## 2023-01-12 DIAGNOSIS — Z17.0 MALIGNANT NEOPLASM OF UPPER-OUTER QUADRANT OF RIGHT BREAST IN FEMALE, ESTROGEN RECEPTOR POSITIVE (HCC): ICD-10-CM

## 2023-01-12 DIAGNOSIS — C50.919 METASTATIC BREAST CANCER (HCC): ICD-10-CM

## 2023-01-12 DIAGNOSIS — C50.919 TRIPLE NEGATIVE BREAST CANCER (HCC): Primary | ICD-10-CM

## 2023-01-12 DIAGNOSIS — J91.0 MALIGNANT PLEURAL EFFUSION: ICD-10-CM

## 2023-01-12 DIAGNOSIS — E61.1 IRON DEFICIENCY: ICD-10-CM

## 2023-01-12 DIAGNOSIS — Z51.11 CHEMOTHERAPY MANAGEMENT, ENCOUNTER FOR: Primary | ICD-10-CM

## 2023-01-12 DIAGNOSIS — Z45.2 ENCOUNTER FOR CENTRAL LINE CARE: ICD-10-CM

## 2023-01-12 DIAGNOSIS — Z79.899 ENCOUNTER FOR MEDICATION MANAGEMENT: ICD-10-CM

## 2023-01-12 DIAGNOSIS — C50.411 MALIGNANT NEOPLASM OF UPPER-OUTER QUADRANT OF RIGHT BREAST IN FEMALE, ESTROGEN RECEPTOR POSITIVE (HCC): ICD-10-CM

## 2023-01-12 DIAGNOSIS — Z51.11 CHEMOTHERAPY MANAGEMENT, ENCOUNTER FOR: ICD-10-CM

## 2023-01-12 DIAGNOSIS — E53.8 B12 DEFICIENCY: ICD-10-CM

## 2023-01-12 LAB
ALBUMIN SERPL-MCNC: 3.4 G/DL (ref 3.4–5)
ALBUMIN/GLOB SERPL: 1.2 {RATIO} (ref 1–2)
ALP LIVER SERPL-CCNC: 60 U/L
ALT SERPL-CCNC: 31 U/L
ANION GAP SERPL CALC-SCNC: 8 MMOL/L (ref 0–18)
AST SERPL-CCNC: 27 U/L (ref 15–37)
BASOPHILS # BLD AUTO: 0.03 X10(3) UL (ref 0–0.2)
BASOPHILS NFR BLD AUTO: 0.7 %
BILIRUB SERPL-MCNC: 0.6 MG/DL (ref 0.1–2)
BUN BLD-MCNC: 19 MG/DL (ref 7–18)
BUN/CREAT SERPL: 20.9 (ref 10–20)
CALCIUM BLD-MCNC: 8.8 MG/DL (ref 8.5–10.1)
CHLORIDE SERPL-SCNC: 112 MMOL/L (ref 98–112)
CO2 SERPL-SCNC: 25 MMOL/L (ref 21–32)
CREAT BLD-MCNC: 0.91 MG/DL
DEPRECATED RDW RBC AUTO: 48.8 FL (ref 35.1–46.3)
EOSINOPHIL # BLD AUTO: 0.07 X10(3) UL (ref 0–0.7)
EOSINOPHIL NFR BLD AUTO: 1.7 %
ERYTHROCYTE [DISTWIDTH] IN BLOOD BY AUTOMATED COUNT: 13.8 % (ref 11–15)
GFR SERPLBLD BASED ON 1.73 SQ M-ARVRAT: 72 ML/MIN/1.73M2 (ref 60–?)
GLOBULIN PLAS-MCNC: 2.8 G/DL (ref 2.8–4.4)
GLUCOSE BLD-MCNC: 85 MG/DL (ref 70–99)
HCT VFR BLD AUTO: 36.9 %
HGB BLD-MCNC: 12 G/DL
IMM GRANULOCYTES # BLD AUTO: 0.02 X10(3) UL (ref 0–1)
IMM GRANULOCYTES NFR BLD: 0.5 %
LYMPHOCYTES # BLD AUTO: 0.56 X10(3) UL (ref 1–4)
LYMPHOCYTES NFR BLD AUTO: 13.4 %
MCH RBC QN AUTO: 31.2 PG (ref 26–34)
MCHC RBC AUTO-ENTMCNC: 32.5 G/DL (ref 31–37)
MCV RBC AUTO: 95.8 FL
MONOCYTES # BLD AUTO: 0.52 X10(3) UL (ref 0.1–1)
MONOCYTES NFR BLD AUTO: 12.5 %
NEUTROPHILS # BLD AUTO: 2.97 X10 (3) UL (ref 1.5–7.7)
NEUTROPHILS # BLD AUTO: 2.97 X10(3) UL (ref 1.5–7.7)
NEUTROPHILS NFR BLD AUTO: 71.2 %
OSMOLALITY SERPL CALC.SUM OF ELEC: 302 MOSM/KG (ref 275–295)
PLATELET # BLD AUTO: 224 10(3)UL (ref 150–450)
POTASSIUM SERPL-SCNC: 4.2 MMOL/L (ref 3.5–5.1)
PROT SERPL-MCNC: 6.2 G/DL (ref 6.4–8.2)
RBC # BLD AUTO: 3.85 X10(6)UL
SODIUM SERPL-SCNC: 145 MMOL/L (ref 136–145)
WBC # BLD AUTO: 4.2 X10(3) UL (ref 4–11)

## 2023-01-12 PROCEDURE — 85025 COMPLETE CBC W/AUTO DIFF WBC: CPT

## 2023-01-12 PROCEDURE — 99215 OFFICE O/P EST HI 40 MIN: CPT | Performed by: INTERNAL MEDICINE

## 2023-01-12 PROCEDURE — 96413 CHEMO IV INFUSION 1 HR: CPT

## 2023-01-12 PROCEDURE — 80053 COMPREHEN METABOLIC PANEL: CPT

## 2023-01-12 PROCEDURE — 96375 TX/PRO/DX INJ NEW DRUG ADDON: CPT

## 2023-01-12 PROCEDURE — 96372 THER/PROPH/DIAG INJ SC/IM: CPT

## 2023-01-12 RX ORDER — PROCHLORPERAZINE MALEATE 10 MG
TABLET ORAL
Status: COMPLETED
Start: 2023-01-12 | End: 2023-01-12

## 2023-01-12 RX ORDER — PROCHLORPERAZINE MALEATE 10 MG
10 TABLET ORAL ONCE
Status: COMPLETED | OUTPATIENT
Start: 2023-01-12 | End: 2023-01-12

## 2023-01-12 RX ORDER — ZOLPIDEM TARTRATE 10 MG/1
10 TABLET ORAL NIGHTLY PRN
Qty: 30 TABLET | Refills: 0 | Status: SHIPPED | OUTPATIENT
Start: 2023-01-12

## 2023-01-12 RX ORDER — CYANOCOBALAMIN 1000 UG/ML
INJECTION, SOLUTION INTRAMUSCULAR; SUBCUTANEOUS
Status: COMPLETED
Start: 2023-01-12 | End: 2023-01-12

## 2023-01-12 RX ORDER — LORAZEPAM 0.5 MG/1
0.5 TABLET ORAL EVERY 6 HOURS PRN
Qty: 30 TABLET | Refills: 1 | Status: SHIPPED | OUTPATIENT
Start: 2023-01-12

## 2023-01-12 RX ORDER — PROCHLORPERAZINE MALEATE 10 MG
10 TABLET ORAL ONCE
Status: CANCELLED | OUTPATIENT
Start: 2023-01-13

## 2023-01-12 RX ORDER — MINOCYCLINE HYDROCHLORIDE 50 MG/1
50 CAPSULE ORAL DAILY
Qty: 90 CAPSULE | Refills: 3 | Status: SHIPPED | OUTPATIENT
Start: 2023-01-12 | End: 2023-04-12

## 2023-01-12 RX ORDER — CYANOCOBALAMIN 1000 UG/ML
1000 INJECTION, SOLUTION INTRAMUSCULAR; SUBCUTANEOUS AS NEEDED
Start: 2023-02-01

## 2023-01-12 RX ORDER — HEPARIN SODIUM (PORCINE) LOCK FLUSH IV SOLN 100 UNIT/ML 100 UNIT/ML
SOLUTION INTRAVENOUS
Status: COMPLETED
Start: 2023-01-12 | End: 2023-01-12

## 2023-01-12 RX ORDER — PROCHLORPERAZINE MALEATE 10 MG
10 TABLET ORAL ONCE
OUTPATIENT
Start: 2023-01-27

## 2023-01-12 RX ORDER — CYANOCOBALAMIN 1000 UG/ML
1000 INJECTION, SOLUTION INTRAMUSCULAR; SUBCUTANEOUS AS NEEDED
Status: DISCONTINUED | OUTPATIENT
Start: 2023-01-12 | End: 2023-01-12

## 2023-01-12 RX ORDER — HEPARIN SODIUM (PORCINE) LOCK FLUSH IV SOLN 100 UNIT/ML 100 UNIT/ML
500 SOLUTION INTRAVENOUS ONCE
Status: COMPLETED | OUTPATIENT
Start: 2023-01-12 | End: 2023-01-12

## 2023-01-12 RX ORDER — HEPARIN SODIUM (PORCINE) LOCK FLUSH IV SOLN 100 UNIT/ML 100 UNIT/ML
500 SOLUTION INTRAVENOUS ONCE
OUTPATIENT
Start: 2023-02-01

## 2023-01-12 RX ADMIN — HEPARIN SODIUM (PORCINE) LOCK FLUSH IV SOLN 100 UNIT/ML 500 UNITS: 100 SOLUTION INTRAVENOUS at 11:50:00

## 2023-01-12 RX ADMIN — CYANOCOBALAMIN 1000 MCG: 1000 INJECTION, SOLUTION INTRAMUSCULAR; SUBCUTANEOUS at 10:36:00

## 2023-01-12 RX ADMIN — PROCHLORPERAZINE MALEATE 10 MG: 10 MG TABLET ORAL at 10:32:00

## 2023-01-12 NOTE — PROGRESS NOTES
Pt here for C3D1 abraxane , monthly B 12     Modifications in dose or schedule: No.       Drugs/infusions dual verified for appearance and physical integrity.  IV pump settings were dual verified: yes     Frequency of blood return and site check throughout administration: Prior to administration, Prior to each drug and At completion of therapy   Discharged to Other stabe from infusion, Ambulating independently, accompanied by:Self    Outpatient Oncology Care Plan  Problem list:  fatigue  Problems related to:  chemotherapy  side effect of treatment  Interventions:  monitor effect of therapy  monitor lab values  Expected outcomes:  optimal lab values  symptoms relieved/minimized  understands plan of care  verbalize how to care for self  Progress towards outcome:  making progress    Education Record    Learner:  Patient  Barriers / Limitations:  None  Method:  Discussion  Outcome:  Shows understanding   patient using gel pack gloves for prevention

## 2023-01-13 ENCOUNTER — APPOINTMENT (OUTPATIENT)
Dept: HEMATOLOGY/ONCOLOGY | Facility: HOSPITAL | Age: 63
End: 2023-01-13
Attending: INTERNAL MEDICINE
Payer: COMMERCIAL

## 2023-01-20 ENCOUNTER — TELEPHONE (OUTPATIENT)
Dept: HEMATOLOGY/ONCOLOGY | Facility: HOSPITAL | Age: 63
End: 2023-01-20

## 2023-01-20 NOTE — TELEPHONE ENCOUNTER
Cory Goltz was having issues getting Ct scheduled prior to tx apts. I spoke to scheduling and was able to get her 1/25/23 at 0830 am in [place], she verbalizes understanding.

## 2023-01-25 ENCOUNTER — HOSPITAL ENCOUNTER (OUTPATIENT)
Dept: CT IMAGING | Age: 63
Discharge: HOME | End: 2023-01-25
Attending: INTERNAL MEDICINE
Payer: COMMERCIAL

## 2023-01-25 ENCOUNTER — TELEPHONE (OUTPATIENT)
Dept: HEMATOLOGY/ONCOLOGY | Facility: HOSPITAL | Age: 63
End: 2023-01-25

## 2023-01-25 DIAGNOSIS — Z17.0 MALIGNANT NEOPLASM OF UPPER-OUTER QUADRANT OF RIGHT BREAST IN FEMALE, ESTROGEN RECEPTOR POSITIVE (HCC): ICD-10-CM

## 2023-01-25 DIAGNOSIS — C50.411 MALIGNANT NEOPLASM OF UPPER-OUTER QUADRANT OF RIGHT BREAST IN FEMALE, ESTROGEN RECEPTOR POSITIVE (HCC): ICD-10-CM

## 2023-01-25 DIAGNOSIS — J91.0 MALIGNANT PLEURAL EFFUSION: ICD-10-CM

## 2023-01-25 PROCEDURE — 71260 CT THORAX DX C+: CPT | Performed by: INTERNAL MEDICINE

## 2023-01-25 PROCEDURE — 74177 CT ABD & PELVIS W/CONTRAST: CPT | Performed by: INTERNAL MEDICINE

## 2023-01-25 NOTE — TELEPHONE ENCOUNTER
Dr Mi Lloyd spoke with Christopher Nickerson and reviewed the scans. Recommended a liver biopsy. If liver biopsy is not immediately feasible, will consider another chemotherapy.

## 2023-01-26 ENCOUNTER — APPOINTMENT (OUTPATIENT)
Dept: HEMATOLOGY/ONCOLOGY | Facility: HOSPITAL | Age: 63
End: 2023-01-26
Attending: INTERNAL MEDICINE
Payer: COMMERCIAL

## 2023-01-27 ENCOUNTER — APPOINTMENT (OUTPATIENT)
Dept: HEMATOLOGY/ONCOLOGY | Facility: HOSPITAL | Age: 63
End: 2023-01-27
Attending: INTERNAL MEDICINE
Payer: COMMERCIAL

## 2023-02-01 ENCOUNTER — LAB ENCOUNTER (OUTPATIENT)
Dept: LAB | Age: 63
End: 2023-02-01
Attending: RADIOLOGY
Payer: COMMERCIAL

## 2023-02-01 DIAGNOSIS — Z01.818 PRE-OP TESTING: ICD-10-CM

## 2023-02-01 LAB
INR BLD: 0.94 (ref 0.85–1.16)
PROTHROMBIN TIME: 12.5 SECONDS (ref 11.6–14.8)
SARS-COV-2 RNA RESP QL NAA+PROBE: NOT DETECTED

## 2023-02-01 PROCEDURE — 36415 COLL VENOUS BLD VENIPUNCTURE: CPT

## 2023-02-01 PROCEDURE — 85610 PROTHROMBIN TIME: CPT

## 2023-02-03 ENCOUNTER — HOSPITAL ENCOUNTER (OUTPATIENT)
Dept: INTERVENTIONAL RADIOLOGY/VASCULAR | Facility: HOSPITAL | Age: 63
Discharge: HOME | End: 2023-02-03
Attending: INTERNAL MEDICINE | Admitting: RADIOLOGY
Payer: COMMERCIAL

## 2023-02-03 VITALS
DIASTOLIC BLOOD PRESSURE: 78 MMHG | BODY MASS INDEX: 23.22 KG/M2 | HEIGHT: 61 IN | SYSTOLIC BLOOD PRESSURE: 114 MMHG | HEART RATE: 76 BPM | WEIGHT: 123 LBS | OXYGEN SATURATION: 97 % | RESPIRATION RATE: 21 BRPM | TEMPERATURE: 99 F

## 2023-02-03 DIAGNOSIS — Z01.818 PRE-OP TESTING: Primary | ICD-10-CM

## 2023-02-03 DIAGNOSIS — C78.7 LIVER METASTASES (HCC): ICD-10-CM

## 2023-02-03 DIAGNOSIS — C50.919 TRIPLE NEGATIVE BREAST CANCER (HCC): ICD-10-CM

## 2023-02-03 PROCEDURE — 47000 NEEDLE BIOPSY OF LIVER PERQ: CPT

## 2023-02-03 PROCEDURE — 88307 TISSUE EXAM BY PATHOLOGIST: CPT | Performed by: INTERNAL MEDICINE

## 2023-02-03 PROCEDURE — 99152 MOD SED SAME PHYS/QHP 5/>YRS: CPT

## 2023-02-03 PROCEDURE — 0FB13ZX EXCISION OF RIGHT LOBE LIVER, PERCUTANEOUS APPROACH, DIAGNOSTIC: ICD-10-PCS | Performed by: RADIOLOGY

## 2023-02-03 PROCEDURE — 88333 PATH CONSLTJ SURG CYTO XM 1: CPT | Performed by: INTERNAL MEDICINE

## 2023-02-03 PROCEDURE — 76942 ECHO GUIDE FOR BIOPSY: CPT

## 2023-02-03 PROCEDURE — 36415 COLL VENOUS BLD VENIPUNCTURE: CPT

## 2023-02-03 PROCEDURE — 88334 PATH CONSLTJ SURG CYTO XM EA: CPT | Performed by: INTERNAL MEDICINE

## 2023-02-03 RX ORDER — MIDAZOLAM HYDROCHLORIDE 1 MG/ML
INJECTION INTRAMUSCULAR; INTRAVENOUS
Status: DISCONTINUED
Start: 2023-02-03 | End: 2023-02-03

## 2023-02-03 RX ORDER — DIPHENHYDRAMINE HYDROCHLORIDE 50 MG/ML
INJECTION INTRAMUSCULAR; INTRAVENOUS
Status: DISCONTINUED
Start: 2023-02-03 | End: 2023-02-03

## 2023-02-03 RX ORDER — SODIUM CHLORIDE 9 MG/ML
INJECTION, SOLUTION INTRAVENOUS CONTINUOUS
Status: DISCONTINUED | OUTPATIENT
Start: 2023-02-03 | End: 2023-02-03

## 2023-02-03 RX ADMIN — SODIUM CHLORIDE: 9 INJECTION, SOLUTION INTRAVENOUS at 10:15:00

## 2023-02-03 NOTE — PROGRESS NOTES
Patient was identified and prepped in sterile fashion, time out completed. Ultrasound was utilized for visualization, 2% lidocaine 10 ml was administered by subcutaneous infiltration by Dr. Irasema Shelton, 4 mg of versed and 100 mcg of fentanyl, 50 mg of diphenhydramine was administered intravenous push, patient was monitored throughout the procedure and tolerated procedure well, report given to MELANIA. Patient stable at this time. 3 samples given directly to pathology.

## 2023-02-03 NOTE — DISCHARGE INSTRUCTIONS
Pr-14  4.2  [phone]     Patient Name:  Michelle Dale    Procedure:  Liver Lesion Biopsy    Site Care: Remove your band-aid or dressing in 24 hours. Gently wash area with soap and water. Activity/Diet  No heavy lifting or strenuous activity for 48 hours. Drink plenty of fluids, unless you have otherwise been told to restrict your fluid intake. Do not drink alcohol for 24 hours. Do not drive,  operate heavy machinery, make important decisions or sign legal documents today. Medications: Take acetaminophen if needed for pain. Do not exceed 4000mg of acetaminophen in a 24-hour period. , Do not take blood thinners, aspirin, or Plavix for 24 hours. , and Make no changes to your existing medications. Contact Interventional Radiology at [phone] if you have severe/unrelieved pain, fever, chills, dizziness/lightheadedness, or drainage/bleeding from your incision site.

## 2023-02-03 NOTE — IVS NOTE
DISCHARGE NOTE     Pt is able to sit up and ambulate without difficulty. Pt voided and tolerated fluids and food. Liver lesion biopsy procedural site remains dry and intact. No signs and symptoms of bleeding/hematoma noted. Pt denies any pain or discomfort at this time. IV access removed  Instruction provided, patient/family verbalizes understanding. Dr. Keli Hein spoke with patient/family post procedure.      Pt discharge via wheelchair to [facility]     Follow up Appointment: n/a    New Prescription: n/a

## 2023-02-03 NOTE — INTERVAL H&P NOTE
The above referenced H&P was reviewed by Julianna Pike MD on 2/3/2023, the patient was examined and no significant changes have occurred in the patient's condition since the H&P was performed. Risks, benefits, alternative treatments and consequences of no treatment were discussed. We will proceed with procedure as planned.       Julianna Pike MD  2/3/2023  12:08 PM

## 2023-02-03 NOTE — PROCEDURES
[facility]  Brief IR Post-Procedure Note    Namrata Hess Patient Status:  Outpatient    3/1/1960 MRN [id number]   Location [facility] Attending Neymar Lyman MD   Hosp Day # 0 PCP [place]     Procedure: Liver lesion biopsy    Pre-Procedure Diagnosis: 65year old female with history of breast cancer with new liver lesions    Post-Procedure Diagnosis: Same    Anesthesia:  Sedation    Findings:  RUQ prepped and draped. 1% lidocaine for anesthesia. Under US guidance 3 18G cores obtained. Tissue sampling deemed adequate by on-site cytopathology review. Hemostasis with manual compression. Sterile dressing applied    Specimen: Specimens obtained were submitted to pathology    Blood Loss:  <9RU      Complications:  None    Plan: Patient to follow up with referring physician for biopsy results.      Linda Fulton MD  2/3/2023

## 2023-02-03 NOTE — PRE-SEDATION ASSESSMENT
[facility]  IR Pre-Procedure Sedation Assessment    History of snoring or sleep or apnea? No    History of previous problems with anesthesia or sedation  No    Physical Findings:  Neck: nl ROM  CV: RRR  PULM: normal respiratory rate/effort    Mallampati Score:  II (hard and soft palate, upper portion of tonsils anduvula visible)    ASA Classification:   2.  Patient with mild systemic disease    Plan:   -IV moderate sedation    Genna Gardner MD

## 2023-02-06 ENCOUNTER — TELEPHONE (OUTPATIENT)
Dept: HEMATOLOGY/ONCOLOGY | Facility: HOSPITAL | Age: 63
End: 2023-02-06

## 2023-02-06 DIAGNOSIS — C50.919 METASTATIC BREAST CANCER (HCC): ICD-10-CM

## 2023-02-06 DIAGNOSIS — C50.919 METASTASIS FROM BREAST CANCER (HCC): ICD-10-CM

## 2023-02-06 DIAGNOSIS — C50.919 TRIPLE NEGATIVE BREAST CANCER (HCC): Primary | ICD-10-CM

## 2023-02-06 DIAGNOSIS — C79.9 METASTASIS FROM BREAST CANCER (HCC): ICD-10-CM

## 2023-02-06 DIAGNOSIS — C78.7 LIVER METASTASIS (HCC): ICD-10-CM

## 2023-02-06 NOTE — TELEPHONE ENCOUNTER
Spoke with patient and informed her of scheduled appointment's on 2/8 and 2/9. Patient is all set to have labs 2/8 at 3:45pm and will be seeing Dr. Wilber Pappas at 4:45pm. Pt will have treatment 2/9 at 7:30am. Patient also instructed on scheduled PET scan 2/9 at 10:30am. Patient thanked me for calling and coordination of appointment's.

## 2023-02-08 ENCOUNTER — OFFICE VISIT (OUTPATIENT)
Dept: HEMATOLOGY/ONCOLOGY | Facility: HOSPITAL | Age: 63
End: 2023-02-08
Attending: INTERNAL MEDICINE
Payer: COMMERCIAL

## 2023-02-08 VITALS
WEIGHT: 123.81 LBS | OXYGEN SATURATION: 95 % | SYSTOLIC BLOOD PRESSURE: 135 MMHG | HEIGHT: 61 IN | DIASTOLIC BLOOD PRESSURE: 97 MMHG | HEART RATE: 71 BPM | RESPIRATION RATE: 16 BRPM | TEMPERATURE: 99 F | BODY MASS INDEX: 23.38 KG/M2

## 2023-02-08 DIAGNOSIS — T45.1X5A CHEMOTHERAPY-INDUCED NEUTROPENIA (HCC): ICD-10-CM

## 2023-02-08 DIAGNOSIS — C50.411 MALIGNANT NEOPLASM OF UPPER-OUTER QUADRANT OF RIGHT BREAST IN FEMALE, ESTROGEN RECEPTOR POSITIVE (HCC): ICD-10-CM

## 2023-02-08 DIAGNOSIS — C50.411 MALIGNANT NEOPLASM OF UPPER-OUTER QUADRANT OF RIGHT BREAST IN FEMALE, ESTROGEN RECEPTOR POSITIVE (HCC): Primary | ICD-10-CM

## 2023-02-08 DIAGNOSIS — Z51.11 CHEMOTHERAPY MANAGEMENT, ENCOUNTER FOR: ICD-10-CM

## 2023-02-08 DIAGNOSIS — D70.1 CHEMOTHERAPY-INDUCED NEUTROPENIA (HCC): ICD-10-CM

## 2023-02-08 DIAGNOSIS — Z17.0 MALIGNANT NEOPLASM OF UPPER-OUTER QUADRANT OF RIGHT BREAST IN FEMALE, ESTROGEN RECEPTOR POSITIVE (HCC): ICD-10-CM

## 2023-02-08 DIAGNOSIS — J91.0 MALIGNANT PLEURAL EFFUSION: Primary | ICD-10-CM

## 2023-02-08 DIAGNOSIS — Z17.0 MALIGNANT NEOPLASM OF UPPER-OUTER QUADRANT OF RIGHT BREAST IN FEMALE, ESTROGEN RECEPTOR POSITIVE (HCC): Primary | ICD-10-CM

## 2023-02-08 LAB
ALBUMIN SERPL-MCNC: 3.5 G/DL (ref 3.4–5)
ALBUMIN/GLOB SERPL: 0.9 {RATIO} (ref 1–2)
ALP LIVER SERPL-CCNC: 89 U/L
ALT SERPL-CCNC: 35 U/L
ANION GAP SERPL CALC-SCNC: 6 MMOL/L (ref 0–18)
AST SERPL-CCNC: 33 U/L (ref 15–37)
BASOPHILS # BLD AUTO: 0.04 X10(3) UL (ref 0–0.2)
BASOPHILS NFR BLD AUTO: 0.7 %
BILIRUB SERPL-MCNC: 0.7 MG/DL (ref 0.1–2)
BUN BLD-MCNC: 25 MG/DL (ref 7–18)
BUN/CREAT SERPL: 21.6 (ref 10–20)
CALCIUM BLD-MCNC: 9.3 MG/DL (ref 8.5–10.1)
CHLORIDE SERPL-SCNC: 103 MMOL/L (ref 98–112)
CO2 SERPL-SCNC: 32 MMOL/L (ref 21–32)
CREAT BLD-MCNC: 1.16 MG/DL
DEPRECATED RDW RBC AUTO: 47.1 FL (ref 35.1–46.3)
EOSINOPHIL # BLD AUTO: 0.09 X10(3) UL (ref 0–0.7)
EOSINOPHIL NFR BLD AUTO: 1.5 %
ERYTHROCYTE [DISTWIDTH] IN BLOOD BY AUTOMATED COUNT: 13.4 % (ref 11–15)
GFR SERPLBLD BASED ON 1.73 SQ M-ARVRAT: 54 ML/MIN/1.73M2 (ref 60–?)
GLOBULIN PLAS-MCNC: 3.8 G/DL (ref 2.8–4.4)
GLUCOSE BLD-MCNC: 110 MG/DL (ref 70–99)
HCT VFR BLD AUTO: 44.3 %
HGB BLD-MCNC: 14.3 G/DL
IMM GRANULOCYTES # BLD AUTO: 0.02 X10(3) UL (ref 0–1)
IMM GRANULOCYTES NFR BLD: 0.3 %
LYMPHOCYTES # BLD AUTO: 0.61 X10(3) UL (ref 1–4)
LYMPHOCYTES NFR BLD AUTO: 10.3 %
MCH RBC QN AUTO: 30.8 PG (ref 26–34)
MCHC RBC AUTO-ENTMCNC: 32.3 G/DL (ref 31–37)
MCV RBC AUTO: 95.5 FL
MONOCYTES # BLD AUTO: 0.52 X10(3) UL (ref 0.1–1)
MONOCYTES NFR BLD AUTO: 8.8 %
NEUTROPHILS # BLD AUTO: 4.64 X10 (3) UL (ref 1.5–7.7)
NEUTROPHILS # BLD AUTO: 4.64 X10(3) UL (ref 1.5–7.7)
NEUTROPHILS NFR BLD AUTO: 78.4 %
OSMOLALITY SERPL CALC.SUM OF ELEC: 297 MOSM/KG (ref 275–295)
PLATELET # BLD AUTO: 238 10(3)UL (ref 150–450)
POTASSIUM SERPL-SCNC: 3.3 MMOL/L (ref 3.5–5.1)
PROT SERPL-MCNC: 7.3 G/DL (ref 6.4–8.2)
RBC # BLD AUTO: 4.64 X10(6)UL
SODIUM SERPL-SCNC: 141 MMOL/L (ref 136–145)
WBC # BLD AUTO: 5.9 X10(3) UL (ref 4–11)

## 2023-02-08 PROCEDURE — 80053 COMPREHEN METABOLIC PANEL: CPT

## 2023-02-08 PROCEDURE — 85025 COMPLETE CBC W/AUTO DIFF WBC: CPT

## 2023-02-08 PROCEDURE — 99215 OFFICE O/P EST HI 40 MIN: CPT | Performed by: INTERNAL MEDICINE

## 2023-02-08 PROCEDURE — 36415 COLL VENOUS BLD VENIPUNCTURE: CPT

## 2023-02-08 RX ORDER — ZOLPIDEM TARTRATE 10 MG/1
10 TABLET ORAL NIGHTLY PRN
Qty: 30 TABLET | Refills: 0 | Status: SHIPPED | OUTPATIENT
Start: 2023-02-08

## 2023-02-09 ENCOUNTER — HOSPITAL ENCOUNTER (OUTPATIENT)
Dept: NUCLEAR MEDICINE | Facility: HOSPITAL | Age: 63
Discharge: HOME | End: 2023-02-09
Attending: INTERNAL MEDICINE
Payer: COMMERCIAL

## 2023-02-09 ENCOUNTER — NURSE ONLY (OUTPATIENT)
Dept: HEMATOLOGY/ONCOLOGY | Facility: HOSPITAL | Age: 63
End: 2023-02-09
Attending: INTERNAL MEDICINE
Payer: COMMERCIAL

## 2023-02-09 VITALS
DIASTOLIC BLOOD PRESSURE: 68 MMHG | HEART RATE: 66 BPM | TEMPERATURE: 99 F | OXYGEN SATURATION: 97 % | RESPIRATION RATE: 16 BRPM | SYSTOLIC BLOOD PRESSURE: 125 MMHG

## 2023-02-09 DIAGNOSIS — E61.1 IRON DEFICIENCY: ICD-10-CM

## 2023-02-09 DIAGNOSIS — Z51.11 CHEMOTHERAPY MANAGEMENT, ENCOUNTER FOR: ICD-10-CM

## 2023-02-09 DIAGNOSIS — J91.0 MALIGNANT PLEURAL EFFUSION: Primary | ICD-10-CM

## 2023-02-09 DIAGNOSIS — C50.919 METASTASIS FROM BREAST CANCER (HCC): ICD-10-CM

## 2023-02-09 DIAGNOSIS — C79.9 METASTASIS FROM BREAST CANCER (HCC): ICD-10-CM

## 2023-02-09 DIAGNOSIS — E53.8 B12 DEFICIENCY: ICD-10-CM

## 2023-02-09 DIAGNOSIS — C78.7 LIVER METASTASIS (HCC): ICD-10-CM

## 2023-02-09 DIAGNOSIS — C50.919 METASTATIC BREAST CANCER (HCC): ICD-10-CM

## 2023-02-09 DIAGNOSIS — C50.919 TRIPLE NEGATIVE BREAST CANCER (HCC): ICD-10-CM

## 2023-02-09 DIAGNOSIS — Z45.2 ENCOUNTER FOR CENTRAL LINE CARE: ICD-10-CM

## 2023-02-09 DIAGNOSIS — Z17.0 MALIGNANT NEOPLASM OF UPPER-OUTER QUADRANT OF RIGHT BREAST IN FEMALE, ESTROGEN RECEPTOR POSITIVE (HCC): ICD-10-CM

## 2023-02-09 DIAGNOSIS — C50.411 MALIGNANT NEOPLASM OF UPPER-OUTER QUADRANT OF RIGHT BREAST IN FEMALE, ESTROGEN RECEPTOR POSITIVE (HCC): ICD-10-CM

## 2023-02-09 DIAGNOSIS — Z79.899 ENCOUNTER FOR MEDICATION MANAGEMENT: ICD-10-CM

## 2023-02-09 LAB — GLUCOSE BLDC GLUCOMTR-MCNC: 102 MG/DL (ref 70–99)

## 2023-02-09 PROCEDURE — 96417 CHEMO IV INFUS EACH ADDL SEQ: CPT

## 2023-02-09 PROCEDURE — 96413 CHEMO IV INFUSION 1 HR: CPT

## 2023-02-09 PROCEDURE — 82962 GLUCOSE BLOOD TEST: CPT

## 2023-02-09 PROCEDURE — 78815 PET IMAGE W/CT SKULL-THIGH: CPT | Performed by: INTERNAL MEDICINE

## 2023-02-09 PROCEDURE — 96377 APPLICATON ON-BODY INJECTOR: CPT

## 2023-02-09 PROCEDURE — 96367 TX/PROPH/DG ADDL SEQ IV INF: CPT

## 2023-02-09 RX ORDER — CYANOCOBALAMIN 1000 UG/ML
1000 INJECTION, SOLUTION INTRAMUSCULAR; SUBCUTANEOUS AS NEEDED
Start: 2023-03-01

## 2023-02-09 RX ORDER — HEPARIN SODIUM (PORCINE) LOCK FLUSH IV SOLN 100 UNIT/ML 100 UNIT/ML
SOLUTION INTRAVENOUS
Status: COMPLETED
Start: 2023-02-09 | End: 2023-02-09

## 2023-02-09 RX ORDER — HEPARIN SODIUM (PORCINE) LOCK FLUSH IV SOLN 100 UNIT/ML 100 UNIT/ML
500 SOLUTION INTRAVENOUS ONCE
Status: COMPLETED | OUTPATIENT
Start: 2023-02-09 | End: 2023-02-09

## 2023-02-09 RX ORDER — HEPARIN SODIUM (PORCINE) LOCK FLUSH IV SOLN 100 UNIT/ML 100 UNIT/ML
500 SOLUTION INTRAVENOUS ONCE
OUTPATIENT
Start: 2023-03-01

## 2023-02-09 RX ADMIN — HEPARIN SODIUM (PORCINE) LOCK FLUSH IV SOLN 100 UNIT/ML 500 UNITS: 100 SOLUTION INTRAVENOUS at 10:14:00

## 2023-02-09 NOTE — PROGRESS NOTES
Pt here for C1D1 Gemzar, Carboplatin and Neulasta OnPro. Arrives Ambulating independently, accompanied by Self           Pregnancy screening: Denies possibility of pregnancy    Modifications in dose or schedule: No. 2nd line Tx for patient. Drugs/infusions dual verified for appearance and physical integrity. IV pump settings were dual verified: yes     PORT accessed with good blood return noted. Frequency of blood return and site check throughout administration: Prior to administration, Prior to each drug and At completion of therapy   Discharged to Home, Ambulating independently, accompanied by:Self     Neulasta OnPro applied to patient's Left Arm. AVS with printed instructions for injector. Outpatient Oncology Care Plan  Problem list:  knowledge deficit  nausea and vomiting  Problems related to:  chemotherapy  disease/disease progression  side effect of treatment  Interventions:  monitor for signs/symptoms of infection  nausea/vomiting teaching  patient/family supported  chemotherapy teaching  monitor effect of nausea/vomiting management  monitor lab values  provided general teaching  Expected outcomes:  optimal lab values  patient supported/coping well  safe in environment  understands plan of care  Progress towards outcome:  making progress    Education Record    Learner:  Patient  Barriers / Limitations:  None  Method:  Discussion and Printed material  Outcome:  Shows understanding  Comments: Gemzar and Carboplatin handouts provided to patient from [employer].  Also provided patient with Emend handout from [employer].

## 2023-02-09 NOTE — PATIENT INSTRUCTIONS
On-body Injector for Neulasta Patient Instructions       Your On-Body Injection device was applied on this day:  2/9/23 at this time 10:00am.    Your dose of medication will start on this day: 2/10/23 at this time 1:00pm.    Safe time to remove this device will be on this day: 2/10/23 at this time 3:00pm.      Important information to remember about the Neulasta Injector:     1. The On-Body Neulasta Injector begins to deliver the dose of Neulasta 27 hours after it is activated at the cancer center. Beeping at that time indicates  that the dose will be delivered in 2 minutes. It takes 45 minutes for the dose to  be completely delivered. DO NOT REMOVE during this time. 2. Check the light periodically for a slow flashing GREEN light, this is normal.     3. If the light is flashing RED or comes off prior to the delivery time, during regular business hours 8am, please call and ask for the charge nurse. If this is after hours or a holiday, please contact the [employer] @ [phone], a support person is available 24/7.      4. Please keep the device at least 4 inches away from electrical equipment, such as CELL PHONES, microwaves, cordless phones. Do NOT have Xrays, MRI, CT without informing the technician. 5. If you are traveling, needing to go through airport security, please notify the TSA. You may still travel, just avoid the xray security. 6. Avoid bumping or sleeping directly on the injector. 7. Avoid hot tubs, saunas and direct sunlight. Keep the injector dry 3 hours prior to the dose delivery time. 8. Remove the injector device at the directed time above and place in a hard container for disposal and place in trash.

## 2023-02-10 ENCOUNTER — TELEPHONE (OUTPATIENT)
Dept: HEMATOLOGY/ONCOLOGY | Facility: HOSPITAL | Age: 63
End: 2023-02-10

## 2023-02-10 NOTE — TELEPHONE ENCOUNTER
Called patient post C1D1 new tx. Other then some constipation of which she is working on, doing well. No complaint or concerns. Reinforced plan and to call for any issues. She verbalized understanding.

## 2023-03-02 ENCOUNTER — OFFICE VISIT (OUTPATIENT)
Dept: HEMATOLOGY/ONCOLOGY | Facility: HOSPITAL | Age: 63
End: 2023-03-02
Attending: INTERNAL MEDICINE
Payer: MEDICARE

## 2023-03-02 VITALS
WEIGHT: 123 LBS | OXYGEN SATURATION: 99 % | TEMPERATURE: 99 F | HEART RATE: 62 BPM | DIASTOLIC BLOOD PRESSURE: 91 MMHG | BODY MASS INDEX: 23.22 KG/M2 | RESPIRATION RATE: 16 BRPM | SYSTOLIC BLOOD PRESSURE: 130 MMHG | HEIGHT: 61 IN

## 2023-03-02 DIAGNOSIS — M81.8 OTHER OSTEOPOROSIS WITHOUT CURRENT PATHOLOGICAL FRACTURE: ICD-10-CM

## 2023-03-02 DIAGNOSIS — J91.0 MALIGNANT PLEURAL EFFUSION: Primary | ICD-10-CM

## 2023-03-02 DIAGNOSIS — Z17.0 MALIGNANT NEOPLASM OF UPPER-OUTER QUADRANT OF RIGHT BREAST IN FEMALE, ESTROGEN RECEPTOR POSITIVE (HCC): ICD-10-CM

## 2023-03-02 DIAGNOSIS — C50.411 MALIGNANT NEOPLASM OF UPPER-OUTER QUADRANT OF RIGHT BREAST IN FEMALE, ESTROGEN RECEPTOR POSITIVE (HCC): Primary | ICD-10-CM

## 2023-03-02 DIAGNOSIS — Z17.0 MALIGNANT NEOPLASM OF UPPER-OUTER QUADRANT OF RIGHT BREAST IN FEMALE, ESTROGEN RECEPTOR POSITIVE (HCC): Primary | ICD-10-CM

## 2023-03-02 DIAGNOSIS — Z51.11 CHEMOTHERAPY MANAGEMENT, ENCOUNTER FOR: ICD-10-CM

## 2023-03-02 DIAGNOSIS — C50.411 MALIGNANT NEOPLASM OF UPPER-OUTER QUADRANT OF RIGHT BREAST IN FEMALE, ESTROGEN RECEPTOR POSITIVE (HCC): ICD-10-CM

## 2023-03-02 LAB
ALBUMIN SERPL-MCNC: 3.2 G/DL (ref 3.4–5)
ALBUMIN/GLOB SERPL: 1.1 {RATIO} (ref 1–2)
ALP LIVER SERPL-CCNC: 94 U/L
ALT SERPL-CCNC: 36 U/L
ANION GAP SERPL CALC-SCNC: 7 MMOL/L (ref 0–18)
AST SERPL-CCNC: 27 U/L (ref 15–37)
BASOPHILS # BLD AUTO: 0.07 X10(3) UL (ref 0–0.2)
BASOPHILS NFR BLD AUTO: 0.6 %
BILIRUB SERPL-MCNC: 0.5 MG/DL (ref 0.1–2)
BUN BLD-MCNC: 16 MG/DL (ref 7–18)
BUN/CREAT SERPL: 15.7 (ref 10–20)
CALCIUM BLD-MCNC: 9 MG/DL (ref 8.5–10.1)
CHLORIDE SERPL-SCNC: 107 MMOL/L (ref 98–112)
CO2 SERPL-SCNC: 27 MMOL/L (ref 21–32)
CREAT BLD-MCNC: 1.02 MG/DL
DEPRECATED RDW RBC AUTO: 45.7 FL (ref 35.1–46.3)
EOSINOPHIL # BLD AUTO: 0 X10(3) UL (ref 0–0.7)
EOSINOPHIL NFR BLD AUTO: 0 %
ERYTHROCYTE [DISTWIDTH] IN BLOOD BY AUTOMATED COUNT: 13.8 % (ref 11–15)
GFR SERPLBLD BASED ON 1.73 SQ M-ARVRAT: 62 ML/MIN/1.73M2 (ref 60–?)
GLOBULIN PLAS-MCNC: 2.9 G/DL (ref 2.8–4.4)
GLUCOSE BLD-MCNC: 75 MG/DL (ref 70–99)
HCT VFR BLD AUTO: 34.3 %
HGB BLD-MCNC: 11.6 G/DL
IMM GRANULOCYTES # BLD AUTO: 0.47 X10(3) UL (ref 0–1)
IMM GRANULOCYTES NFR BLD: 4.2 %
LYMPHOCYTES # BLD AUTO: 0.85 X10(3) UL (ref 1–4)
LYMPHOCYTES NFR BLD AUTO: 7.6 %
MCH RBC QN AUTO: 31.4 PG (ref 26–34)
MCHC RBC AUTO-ENTMCNC: 33.8 G/DL (ref 31–37)
MCV RBC AUTO: 93 FL
MONOCYTES # BLD AUTO: 1.25 X10(3) UL (ref 0.1–1)
MONOCYTES NFR BLD AUTO: 11.1 %
NEUTROPHILS # BLD AUTO: 8.58 X10 (3) UL (ref 1.5–7.7)
NEUTROPHILS # BLD AUTO: 8.58 X10(3) UL (ref 1.5–7.7)
NEUTROPHILS NFR BLD AUTO: 76.5 %
OSMOLALITY SERPL CALC.SUM OF ELEC: 292 MOSM/KG (ref 275–295)
PLATELET # BLD AUTO: 282 10(3)UL (ref 150–450)
POTASSIUM SERPL-SCNC: 3.5 MMOL/L (ref 3.5–5.1)
PROT SERPL-MCNC: 6.1 G/DL (ref 6.4–8.2)
RBC # BLD AUTO: 3.69 X10(6)UL
SODIUM SERPL-SCNC: 141 MMOL/L (ref 136–145)
WBC # BLD AUTO: 11.2 X10(3) UL (ref 4–11)

## 2023-03-02 PROCEDURE — 96417 CHEMO IV INFUS EACH ADDL SEQ: CPT

## 2023-03-02 PROCEDURE — 96413 CHEMO IV INFUSION 1 HR: CPT

## 2023-03-02 PROCEDURE — 85025 COMPLETE CBC W/AUTO DIFF WBC: CPT

## 2023-03-02 PROCEDURE — 99215 OFFICE O/P EST HI 40 MIN: CPT | Performed by: INTERNAL MEDICINE

## 2023-03-02 PROCEDURE — 80053 COMPREHEN METABOLIC PANEL: CPT

## 2023-03-02 PROCEDURE — 96375 TX/PRO/DX INJ NEW DRUG ADDON: CPT

## 2023-03-02 PROCEDURE — 96367 TX/PROPH/DG ADDL SEQ IV INF: CPT

## 2023-03-02 RX ORDER — HEPARIN SODIUM (PORCINE) LOCK FLUSH IV SOLN 100 UNIT/ML 100 UNIT/ML
SOLUTION INTRAVENOUS
Status: COMPLETED
Start: 2023-03-02 | End: 2023-03-02

## 2023-03-02 RX ADMIN — HEPARIN SODIUM (PORCINE) LOCK FLUSH IV SOLN 100 UNIT/ML 500 UNITS: 100 SOLUTION INTRAVENOUS at 12:23:00

## 2023-03-02 NOTE — PROGRESS NOTES
Pt here for C2D1 Gemzar/Carbo and Zometa. Arrives Ambulating independently, accompanied by Self           Pregnancy screening: Not applicable    Modifications in dose or schedule: Yes, Gemzar dose increased today by 25% from last cycle. Glendon Goltz denies having had any recent or planned upcoming dental work. Drugs/infusions dual verified for appearance and physical integrity.  IV pump settings were dual verified: yes     Frequency of blood return and site check throughout administration: Prior to administration, Prior to each drug and At completion of therapy   Discharged to Home, Ambulating independently, accompanied by:Self    Outpatient Oncology Care Plan  Problem list:  anemia  neutropenia  thrombocytopenia  fatigue  knowledge deficit  nausea and vomiting  Problems related to:  chemotherapy  Interventions:  monitor for signs/symptoms of infection  nausea/vomiting teaching  patient/family supported  monitor effect of nausea/vomiting management  monitor lab values  Expected outcomes:  free of infection  optimal lab values  understands plan of care  Progress towards outcome:  making progress    Education Record    Learner:  Patient  Barriers / Limitations:  None  Method:  Brief focused and Discussion  Outcome:  Shows understanding  Comments:

## 2023-03-03 ENCOUNTER — NURSE ONLY (OUTPATIENT)
Dept: HEMATOLOGY/ONCOLOGY | Facility: HOSPITAL | Age: 63
End: 2023-03-03
Attending: INTERNAL MEDICINE
Payer: MEDICARE

## 2023-03-03 DIAGNOSIS — C50.411 MALIGNANT NEOPLASM OF UPPER-OUTER QUADRANT OF RIGHT BREAST IN FEMALE, ESTROGEN RECEPTOR POSITIVE (HCC): ICD-10-CM

## 2023-03-03 DIAGNOSIS — Z17.0 MALIGNANT NEOPLASM OF UPPER-OUTER QUADRANT OF RIGHT BREAST IN FEMALE, ESTROGEN RECEPTOR POSITIVE (HCC): ICD-10-CM

## 2023-03-03 DIAGNOSIS — J91.0 MALIGNANT PLEURAL EFFUSION: Primary | ICD-10-CM

## 2023-03-03 PROCEDURE — 96372 THER/PROPH/DIAG INJ SC/IM: CPT

## 2023-03-03 NOTE — PROGRESS NOTES
fulphila given today in Left upper arm. Tolerated injection well. Site left open to air. States she has issues with bone pain. Instructed to take Clariton for the next few days. Patient states she does have some pain medications at home as well. Discharged ambulatory with future appointments made.

## 2023-03-23 ENCOUNTER — NURSE ONLY (OUTPATIENT)
Dept: HEMATOLOGY/ONCOLOGY | Facility: HOSPITAL | Age: 63
End: 2023-03-23
Attending: INTERNAL MEDICINE
Payer: MEDICARE

## 2023-03-23 VITALS
WEIGHT: 121 LBS | RESPIRATION RATE: 16 BRPM | SYSTOLIC BLOOD PRESSURE: 135 MMHG | DIASTOLIC BLOOD PRESSURE: 77 MMHG | TEMPERATURE: 98 F | OXYGEN SATURATION: 97 % | HEART RATE: 85 BPM | BODY MASS INDEX: 22.84 KG/M2 | HEIGHT: 61 IN

## 2023-03-23 DIAGNOSIS — E53.8 B12 DEFICIENCY: ICD-10-CM

## 2023-03-23 DIAGNOSIS — J91.0 MALIGNANT PLEURAL EFFUSION: Primary | ICD-10-CM

## 2023-03-23 DIAGNOSIS — C50.411 MALIGNANT NEOPLASM OF UPPER-OUTER QUADRANT OF RIGHT BREAST IN FEMALE, ESTROGEN RECEPTOR POSITIVE (HCC): ICD-10-CM

## 2023-03-23 DIAGNOSIS — Z17.0 MALIGNANT NEOPLASM OF UPPER-OUTER QUADRANT OF RIGHT BREAST IN FEMALE, ESTROGEN RECEPTOR POSITIVE (HCC): ICD-10-CM

## 2023-03-23 DIAGNOSIS — Z45.2 ENCOUNTER FOR CENTRAL LINE CARE: ICD-10-CM

## 2023-03-23 DIAGNOSIS — Z51.11 CHEMOTHERAPY MANAGEMENT, ENCOUNTER FOR: ICD-10-CM

## 2023-03-23 DIAGNOSIS — E61.1 IRON DEFICIENCY: ICD-10-CM

## 2023-03-23 DIAGNOSIS — Z79.899 ENCOUNTER FOR MEDICATION MANAGEMENT: ICD-10-CM

## 2023-03-23 LAB
ALBUMIN SERPL-MCNC: 3.1 G/DL (ref 3.4–5)
ALBUMIN/GLOB SERPL: 0.9 {RATIO} (ref 1–2)
ALP LIVER SERPL-CCNC: 108 U/L
ALT SERPL-CCNC: 57 U/L
ANION GAP SERPL CALC-SCNC: 9 MMOL/L (ref 0–18)
AST SERPL-CCNC: 36 U/L (ref 15–37)
BASOPHILS # BLD AUTO: 0.04 X10(3) UL (ref 0–0.2)
BASOPHILS NFR BLD AUTO: 0.6 %
BILIRUB SERPL-MCNC: 0.4 MG/DL (ref 0.1–2)
BUN BLD-MCNC: 22 MG/DL (ref 7–18)
BUN/CREAT SERPL: 18 (ref 10–20)
CALCIUM BLD-MCNC: 9.1 MG/DL (ref 8.5–10.1)
CHLORIDE SERPL-SCNC: 109 MMOL/L (ref 98–112)
CO2 SERPL-SCNC: 25 MMOL/L (ref 21–32)
CREAT BLD-MCNC: 1.22 MG/DL
DEPRECATED RDW RBC AUTO: 52.5 FL (ref 35.1–46.3)
EOSINOPHIL # BLD AUTO: 0.01 X10(3) UL (ref 0–0.7)
EOSINOPHIL NFR BLD AUTO: 0.1 %
ERYTHROCYTE [DISTWIDTH] IN BLOOD BY AUTOMATED COUNT: 16.1 % (ref 11–15)
GFR SERPLBLD BASED ON 1.73 SQ M-ARVRAT: 50 ML/MIN/1.73M2 (ref 60–?)
GLOBULIN PLAS-MCNC: 3.3 G/DL (ref 2.8–4.4)
GLUCOSE BLD-MCNC: 137 MG/DL (ref 70–99)
HCT VFR BLD AUTO: 29 %
HGB BLD-MCNC: 9.6 G/DL
IMM GRANULOCYTES # BLD AUTO: 0.17 X10(3) UL (ref 0–1)
IMM GRANULOCYTES NFR BLD: 2.4 %
LYMPHOCYTES # BLD AUTO: 0.49 X10(3) UL (ref 1–4)
LYMPHOCYTES NFR BLD AUTO: 7 %
MCH RBC QN AUTO: 31.4 PG (ref 26–34)
MCHC RBC AUTO-ENTMCNC: 33.1 G/DL (ref 31–37)
MCV RBC AUTO: 94.8 FL
MONOCYTES # BLD AUTO: 0.8 X10(3) UL (ref 0.1–1)
MONOCYTES NFR BLD AUTO: 11.4 %
NEUTROPHILS # BLD AUTO: 5.48 X10 (3) UL (ref 1.5–7.7)
NEUTROPHILS # BLD AUTO: 5.48 X10(3) UL (ref 1.5–7.7)
NEUTROPHILS NFR BLD AUTO: 78.5 %
OSMOLALITY SERPL CALC.SUM OF ELEC: 301 MOSM/KG (ref 275–295)
PLATELET # BLD AUTO: 258 10(3)UL (ref 150–450)
POTASSIUM SERPL-SCNC: 3.3 MMOL/L (ref 3.5–5.1)
PROT SERPL-MCNC: 6.4 G/DL (ref 6.4–8.2)
RBC # BLD AUTO: 3.06 X10(6)UL
SODIUM SERPL-SCNC: 143 MMOL/L (ref 136–145)
WBC # BLD AUTO: 7 X10(3) UL (ref 4–11)

## 2023-03-23 PROCEDURE — 80053 COMPREHEN METABOLIC PANEL: CPT

## 2023-03-23 PROCEDURE — 96413 CHEMO IV INFUSION 1 HR: CPT

## 2023-03-23 PROCEDURE — 96375 TX/PRO/DX INJ NEW DRUG ADDON: CPT

## 2023-03-23 PROCEDURE — 85025 COMPLETE CBC W/AUTO DIFF WBC: CPT

## 2023-03-23 PROCEDURE — 96417 CHEMO IV INFUS EACH ADDL SEQ: CPT

## 2023-03-23 PROCEDURE — 96367 TX/PROPH/DG ADDL SEQ IV INF: CPT

## 2023-03-23 PROCEDURE — 99215 OFFICE O/P EST HI 40 MIN: CPT | Performed by: INTERNAL MEDICINE

## 2023-03-23 RX ORDER — SODIUM CHLORIDE 9 MG/ML
10 INJECTION INTRAVENOUS ONCE
OUTPATIENT
Start: 2023-03-23

## 2023-03-23 RX ORDER — HEPARIN SODIUM (PORCINE) LOCK FLUSH IV SOLN 100 UNIT/ML 100 UNIT/ML
SOLUTION INTRAVENOUS
Status: COMPLETED
Start: 2023-03-23 | End: 2023-03-23

## 2023-03-23 RX ORDER — HEPARIN SODIUM (PORCINE) LOCK FLUSH IV SOLN 100 UNIT/ML 100 UNIT/ML
5 SOLUTION INTRAVENOUS ONCE
Status: CANCELLED | OUTPATIENT
Start: 2023-03-23

## 2023-03-23 RX ORDER — HEPARIN SODIUM (PORCINE) LOCK FLUSH IV SOLN 100 UNIT/ML 100 UNIT/ML
5 SOLUTION INTRAVENOUS ONCE
Status: COMPLETED | OUTPATIENT
Start: 2023-03-23 | End: 2023-03-23

## 2023-03-23 RX ORDER — SODIUM CHLORIDE 9 MG/ML
10 INJECTION INTRAVENOUS ONCE
Status: CANCELLED | OUTPATIENT
Start: 2023-03-23

## 2023-03-23 RX ORDER — HEPARIN SODIUM (PORCINE) LOCK FLUSH IV SOLN 100 UNIT/ML 100 UNIT/ML
5 SOLUTION INTRAVENOUS ONCE
OUTPATIENT
Start: 2023-03-23

## 2023-03-23 RX ADMIN — HEPARIN SODIUM (PORCINE) LOCK FLUSH IV SOLN 100 UNIT/ML 500 UNITS: 100 SOLUTION INTRAVENOUS at 13:41:00

## 2023-03-23 NOTE — PROGRESS NOTES
Pt here for C3D1 Gemzar/Carbo. Arrives Ambulating independently, accompanied by Self from MD visit. Pregnancy screening: Not applicable    Modifications in dose or schedule: Yes - Gemzar dose reduced    Drugs/infusions dual verified for appearance and physical integrity. IV pump settings were dual verified: yes     Port accessed previously in lab - present blood return noted. Frequency of blood return and site check throughout administration: Prior to administration, Prior to each drug and At completion of therapy     Pt appeared to tolerate treatment well. Port deaccessed and heparin locked, site covered with gauze and paper tape. Discharged to Home, Ambulating independently, accompanied by:Self with future appointments scheduled. Copy of AVS provided.

## 2023-03-24 ENCOUNTER — NURSE ONLY (OUTPATIENT)
Dept: HEMATOLOGY/ONCOLOGY | Facility: HOSPITAL | Age: 63
End: 2023-03-24
Attending: INTERNAL MEDICINE
Payer: MEDICARE

## 2023-03-24 DIAGNOSIS — J91.0 MALIGNANT PLEURAL EFFUSION: Primary | ICD-10-CM

## 2023-03-24 DIAGNOSIS — C50.411 MALIGNANT NEOPLASM OF UPPER-OUTER QUADRANT OF RIGHT BREAST IN FEMALE, ESTROGEN RECEPTOR POSITIVE (HCC): ICD-10-CM

## 2023-03-24 DIAGNOSIS — Z17.0 MALIGNANT NEOPLASM OF UPPER-OUTER QUADRANT OF RIGHT BREAST IN FEMALE, ESTROGEN RECEPTOR POSITIVE (HCC): ICD-10-CM

## 2023-03-24 PROCEDURE — 96372 THER/PROPH/DIAG INJ SC/IM: CPT

## 2023-04-10 ENCOUNTER — TELEPHONE (OUTPATIENT)
Dept: HEMATOLOGY/ONCOLOGY | Facility: HOSPITAL | Age: 63
End: 2023-04-10

## 2023-04-10 NOTE — TELEPHONE ENCOUNTER
Spoke with Arielle Cardoso. Was able to move her CT scan to tomorrow, 6 pm at [address]. Pt stated that she feels more short of breath with exertion. Pt has SOB at baseline, but feels that it's getting worse. She denies cough, chest pain, swelling (on lasix). She has a decreased appetite, can only eat a little bit before feeling full. Arielle Cardoso is a nurse and endorses that the SOB is worse, but doesn't feel that she needs an ED evaluation. Reviewed worsening symptoms and encouraged her to go to ED if they should happen. Pt verbalized understanding.

## 2023-04-10 NOTE — TELEPHONE ENCOUNTER
patient is calling has CT scan scheduled on the same day as chemo 04/13/23. Is asking if CT appt can be scheduled earlier its scheduled for 3 pm. Is hoping she can get CT scan appt done a couple days before.

## 2023-04-11 ENCOUNTER — HOSPITAL ENCOUNTER (OUTPATIENT)
Dept: CT IMAGING | Age: 63
Discharge: HOME | End: 2023-04-11
Attending: INTERNAL MEDICINE
Payer: MEDICARE

## 2023-04-11 DIAGNOSIS — C50.411 MALIGNANT NEOPLASM OF UPPER-OUTER QUADRANT OF RIGHT BREAST IN FEMALE, ESTROGEN RECEPTOR POSITIVE (HCC): ICD-10-CM

## 2023-04-11 DIAGNOSIS — J91.0 MALIGNANT PLEURAL EFFUSION: ICD-10-CM

## 2023-04-11 DIAGNOSIS — Z17.0 MALIGNANT NEOPLASM OF UPPER-OUTER QUADRANT OF RIGHT BREAST IN FEMALE, ESTROGEN RECEPTOR POSITIVE (HCC): ICD-10-CM

## 2023-04-11 PROCEDURE — 71260 CT THORAX DX C+: CPT | Performed by: INTERNAL MEDICINE

## 2023-04-11 PROCEDURE — 74177 CT ABD & PELVIS W/CONTRAST: CPT | Performed by: INTERNAL MEDICINE

## 2023-04-13 ENCOUNTER — NURSE ONLY (OUTPATIENT)
Dept: HEMATOLOGY/ONCOLOGY | Facility: HOSPITAL | Age: 63
End: 2023-04-13
Attending: INTERNAL MEDICINE
Payer: MEDICARE

## 2023-04-13 ENCOUNTER — TELEPHONE (OUTPATIENT)
Dept: HEMATOLOGY/ONCOLOGY | Facility: HOSPITAL | Age: 63
End: 2023-04-13

## 2023-04-13 VITALS
SYSTOLIC BLOOD PRESSURE: 147 MMHG | HEIGHT: 61 IN | WEIGHT: 123 LBS | BODY MASS INDEX: 23.22 KG/M2 | OXYGEN SATURATION: 100 % | RESPIRATION RATE: 16 BRPM | DIASTOLIC BLOOD PRESSURE: 86 MMHG | HEART RATE: 76 BPM | TEMPERATURE: 98 F

## 2023-04-13 VITALS — SYSTOLIC BLOOD PRESSURE: 131 MMHG | RESPIRATION RATE: 16 BRPM | DIASTOLIC BLOOD PRESSURE: 80 MMHG | HEART RATE: 75 BPM

## 2023-04-13 DIAGNOSIS — Z17.0 MALIGNANT NEOPLASM OF UPPER-OUTER QUADRANT OF RIGHT BREAST IN FEMALE, ESTROGEN RECEPTOR POSITIVE (HCC): ICD-10-CM

## 2023-04-13 DIAGNOSIS — C78.7 MALIGNANT NEOPLASM METASTATIC TO LIVER (HCC): ICD-10-CM

## 2023-04-13 DIAGNOSIS — E53.8 B12 DEFICIENCY: ICD-10-CM

## 2023-04-13 DIAGNOSIS — Z79.899 ENCOUNTER FOR MEDICATION MANAGEMENT: ICD-10-CM

## 2023-04-13 DIAGNOSIS — Z51.11 CHEMOTHERAPY MANAGEMENT, ENCOUNTER FOR: Primary | ICD-10-CM

## 2023-04-13 DIAGNOSIS — C50.411 MALIGNANT NEOPLASM OF UPPER-OUTER QUADRANT OF RIGHT BREAST IN FEMALE, ESTROGEN RECEPTOR POSITIVE (HCC): ICD-10-CM

## 2023-04-13 DIAGNOSIS — J91.0 MALIGNANT PLEURAL EFFUSION: Primary | ICD-10-CM

## 2023-04-13 DIAGNOSIS — T45.1X5A CHEMOTHERAPY-INDUCED NEUTROPENIA (HCC): ICD-10-CM

## 2023-04-13 DIAGNOSIS — K90.9 MALABSORPTION OF IRON: ICD-10-CM

## 2023-04-13 DIAGNOSIS — E61.1 IRON DEFICIENCY: ICD-10-CM

## 2023-04-13 DIAGNOSIS — Z51.11 CHEMOTHERAPY MANAGEMENT, ENCOUNTER FOR: ICD-10-CM

## 2023-04-13 DIAGNOSIS — Z45.2 ENCOUNTER FOR CENTRAL LINE CARE: ICD-10-CM

## 2023-04-13 DIAGNOSIS — D70.1 CHEMOTHERAPY-INDUCED NEUTROPENIA (HCC): ICD-10-CM

## 2023-04-13 LAB
ALBUMIN SERPL-MCNC: 3 G/DL (ref 3.4–5)
ALBUMIN/GLOB SERPL: 1 {RATIO} (ref 1–2)
ALP LIVER SERPL-CCNC: 102 U/L
ALT SERPL-CCNC: 36 U/L
ANION GAP SERPL CALC-SCNC: 7 MMOL/L (ref 0–18)
AST SERPL-CCNC: 23 U/L (ref 15–37)
BASOPHILS # BLD AUTO: 0.02 X10(3) UL (ref 0–0.2)
BASOPHILS NFR BLD AUTO: 0.4 %
BILIRUB SERPL-MCNC: 0.4 MG/DL (ref 0.1–2)
BUN BLD-MCNC: 16 MG/DL (ref 7–18)
BUN/CREAT SERPL: 17 (ref 10–20)
CALCIUM BLD-MCNC: 8.6 MG/DL (ref 8.5–10.1)
CHLORIDE SERPL-SCNC: 111 MMOL/L (ref 98–112)
CO2 SERPL-SCNC: 25 MMOL/L (ref 21–32)
CREAT BLD-MCNC: 0.94 MG/DL
DEPRECATED HBV CORE AB SER IA-ACNC: 734.2 NG/ML
DEPRECATED RDW RBC AUTO: 66.9 FL (ref 35.1–46.3)
EOSINOPHIL # BLD AUTO: 0.01 X10(3) UL (ref 0–0.7)
EOSINOPHIL NFR BLD AUTO: 0.2 %
ERYTHROCYTE [DISTWIDTH] IN BLOOD BY AUTOMATED COUNT: 19.3 % (ref 11–15)
GFR SERPLBLD BASED ON 1.73 SQ M-ARVRAT: 69 ML/MIN/1.73M2 (ref 60–?)
GLOBULIN PLAS-MCNC: 2.9 G/DL (ref 2.8–4.4)
GLUCOSE BLD-MCNC: 80 MG/DL (ref 70–99)
HCT VFR BLD AUTO: 25.8 %
HGB BLD-MCNC: 8.1 G/DL
IMM GRANULOCYTES # BLD AUTO: 0.2 X10(3) UL (ref 0–1)
IMM GRANULOCYTES NFR BLD: 3.6 %
IRON SATN MFR SERPL: 20 %
IRON SERPL-MCNC: 54 UG/DL
LYMPHOCYTES # BLD AUTO: 0.62 X10(3) UL (ref 1–4)
LYMPHOCYTES NFR BLD AUTO: 11.2 %
MCH RBC QN AUTO: 31.2 PG (ref 26–34)
MCHC RBC AUTO-ENTMCNC: 31.4 G/DL (ref 31–37)
MCV RBC AUTO: 99.2 FL
MONOCYTES # BLD AUTO: 0.68 X10(3) UL (ref 0.1–1)
MONOCYTES NFR BLD AUTO: 12.3 %
NEUTROPHILS # BLD AUTO: 4.01 X10 (3) UL (ref 1.5–7.7)
NEUTROPHILS # BLD AUTO: 4.01 X10(3) UL (ref 1.5–7.7)
NEUTROPHILS NFR BLD AUTO: 72.3 %
OSMOLALITY SERPL CALC.SUM OF ELEC: 296 MOSM/KG (ref 275–295)
PLATELET # BLD AUTO: 246 10(3)UL (ref 150–450)
PLATELET MORPHOLOGY: NORMAL
POTASSIUM SERPL-SCNC: 3.6 MMOL/L (ref 3.5–5.1)
PROT SERPL-MCNC: 5.9 G/DL (ref 6.4–8.2)
RBC # BLD AUTO: 2.6 X10(6)UL
SODIUM SERPL-SCNC: 143 MMOL/L (ref 136–145)
TIBC SERPL-MCNC: 274 UG/DL (ref 240–450)
TRANSFERRIN SERPL-MCNC: 184 MG/DL (ref 200–360)
VIT B12 SERPL-MCNC: >2000 PG/ML (ref 193–986)
WBC # BLD AUTO: 5.5 X10(3) UL (ref 4–11)

## 2023-04-13 PROCEDURE — 96375 TX/PRO/DX INJ NEW DRUG ADDON: CPT

## 2023-04-13 PROCEDURE — 82728 ASSAY OF FERRITIN: CPT

## 2023-04-13 PROCEDURE — 84466 ASSAY OF TRANSFERRIN: CPT

## 2023-04-13 PROCEDURE — 80053 COMPREHEN METABOLIC PANEL: CPT

## 2023-04-13 PROCEDURE — 96417 CHEMO IV INFUS EACH ADDL SEQ: CPT

## 2023-04-13 PROCEDURE — 82607 VITAMIN B-12: CPT

## 2023-04-13 PROCEDURE — 85025 COMPLETE CBC W/AUTO DIFF WBC: CPT

## 2023-04-13 PROCEDURE — 96372 THER/PROPH/DIAG INJ SC/IM: CPT

## 2023-04-13 PROCEDURE — 96367 TX/PROPH/DG ADDL SEQ IV INF: CPT

## 2023-04-13 PROCEDURE — 96413 CHEMO IV INFUSION 1 HR: CPT

## 2023-04-13 PROCEDURE — 83540 ASSAY OF IRON: CPT

## 2023-04-13 RX ORDER — CYANOCOBALAMIN 1000 UG/ML
1000 INJECTION, SOLUTION INTRAMUSCULAR; SUBCUTANEOUS AS NEEDED
Start: 2023-05-01

## 2023-04-13 RX ORDER — HEPARIN SODIUM (PORCINE) LOCK FLUSH IV SOLN 100 UNIT/ML 100 UNIT/ML
5 SOLUTION INTRAVENOUS ONCE
OUTPATIENT
Start: 2023-05-01

## 2023-04-13 RX ORDER — CYANOCOBALAMIN 1000 UG/ML
1000 INJECTION, SOLUTION INTRAMUSCULAR; SUBCUTANEOUS AS NEEDED
Status: CANCELLED
Start: 2023-04-13

## 2023-04-13 RX ORDER — HEPARIN SODIUM (PORCINE) LOCK FLUSH IV SOLN 100 UNIT/ML 100 UNIT/ML
SOLUTION INTRAVENOUS
Status: COMPLETED
Start: 2023-04-13 | End: 2023-04-13

## 2023-04-13 RX ORDER — CYANOCOBALAMIN 1000 UG/ML
INJECTION, SOLUTION INTRAMUSCULAR; SUBCUTANEOUS
Status: COMPLETED
Start: 2023-04-13 | End: 2023-04-13

## 2023-04-13 RX ORDER — CYANOCOBALAMIN 1000 UG/ML
1000 INJECTION, SOLUTION INTRAMUSCULAR; SUBCUTANEOUS AS NEEDED
Status: CANCELLED
Start: 2023-05-01

## 2023-04-13 RX ORDER — HEPARIN SODIUM (PORCINE) LOCK FLUSH IV SOLN 100 UNIT/ML 100 UNIT/ML
5 SOLUTION INTRAVENOUS ONCE
Status: COMPLETED | OUTPATIENT
Start: 2023-04-13 | End: 2023-04-13

## 2023-04-13 RX ORDER — CYANOCOBALAMIN 1000 UG/ML
1000 INJECTION, SOLUTION INTRAMUSCULAR; SUBCUTANEOUS AS NEEDED
Status: DISCONTINUED | OUTPATIENT
Start: 2023-04-13 | End: 2023-04-13

## 2023-04-13 RX ORDER — SODIUM CHLORIDE 9 MG/ML
10 INJECTION INTRAVENOUS ONCE
OUTPATIENT
Start: 2023-05-01

## 2023-04-13 RX ADMIN — CYANOCOBALAMIN 1000 MCG: 1000 INJECTION, SOLUTION INTRAMUSCULAR; SUBCUTANEOUS at 12:10:00

## 2023-04-13 RX ADMIN — HEPARIN SODIUM (PORCINE) LOCK FLUSH IV SOLN 100 UNIT/ML 500 UNITS: 100 SOLUTION INTRAVENOUS at 14:27:00

## 2023-04-13 NOTE — TELEPHONE ENCOUNTER
Spoke with Coco Ordaz. MRI scheduled for tomorrow at 6 pm. Reviewed instructions. Pt verbalized understanding.

## 2023-04-13 NOTE — PROGRESS NOTES
Pt here for C4D1 Gemzar/Carbo + Venofer 200mg + Vit B12 injection. Arrives Ambulating independently, accompanied by Self from MD visit. Oral medications included in this regimen:  no    Patient confirms comprehension of cancer treatment schedule:  yes    Pregnancy screening:  Not applicable    Modifications in dose or schedule:  No    Medications appearance and physical integrity checked by RN. Chemotherapy IV pump settings verified by 2 RNs:  yes     Port accessed previously in lab - present blood return noted. Vit B12 given to left arm. Venofer given slow IVP, pt appeared to tolerate well. 30 min obs completed. Frequency of blood return and site check throughout administration: Prior to administration, Prior to each drug and At completion of therapy     Infusion/treatment outcome:  patient tolerated treatment without incident    Education Record    Learner:  Patient  Barriers / Limitations:  None  Method:  Reinforcement  Education / instructions given: Reinforced potential for reaction with iron and potential side effects  Outcome:  Shows understanding    Discharged Home, Ambulating independently, accompanied by:Self with future appointments scheduled.     Patient/family verbalized understanding of future appointments: by printed AVS

## 2023-04-14 ENCOUNTER — HOSPITAL ENCOUNTER (OUTPATIENT)
Dept: MRI IMAGING | Facility: HOSPITAL | Age: 63
Discharge: HOME | End: 2023-04-14
Attending: INTERNAL MEDICINE
Payer: MEDICARE

## 2023-04-14 ENCOUNTER — NURSE ONLY (OUTPATIENT)
Dept: HEMATOLOGY/ONCOLOGY | Facility: HOSPITAL | Age: 63
End: 2023-04-14
Attending: INTERNAL MEDICINE
Payer: MEDICARE

## 2023-04-14 DIAGNOSIS — Z17.0 MALIGNANT NEOPLASM OF UPPER-OUTER QUADRANT OF RIGHT BREAST IN FEMALE, ESTROGEN RECEPTOR POSITIVE (HCC): ICD-10-CM

## 2023-04-14 DIAGNOSIS — C50.411 MALIGNANT NEOPLASM OF UPPER-OUTER QUADRANT OF RIGHT BREAST IN FEMALE, ESTROGEN RECEPTOR POSITIVE (HCC): ICD-10-CM

## 2023-04-14 DIAGNOSIS — K76.9 LESION OF LIVER: ICD-10-CM

## 2023-04-14 DIAGNOSIS — J91.0 MALIGNANT PLEURAL EFFUSION: Primary | ICD-10-CM

## 2023-04-14 DIAGNOSIS — R93.89 ABNORMAL CT SCAN: ICD-10-CM

## 2023-04-14 PROBLEM — C78.7 MALIGNANT NEOPLASM METASTATIC TO LIVER (HCC): Status: ACTIVE | Noted: 2020-11-19

## 2023-04-14 PROCEDURE — 96372 THER/PROPH/DIAG INJ SC/IM: CPT

## 2023-04-14 PROCEDURE — A9575 INJ GADOTERATE MEGLUMI 0.1ML: HCPCS | Performed by: INTERNAL MEDICINE

## 2023-04-14 PROCEDURE — 74183 MRI ABD W/O CNTR FLWD CNTR: CPT | Performed by: INTERNAL MEDICINE

## 2023-04-14 RX ORDER — DIPHENHYDRAMINE HYDROCHLORIDE 50 MG/ML
10 INJECTION, SOLUTION INTRAMUSCULAR; INTRAVENOUS
Status: COMPLETED | OUTPATIENT
Start: 2023-04-14 | End: 2023-04-14

## 2023-04-14 RX ADMIN — DIPHENHYDRAMINE HYDROCHLORIDE 10 ML: 50 INJECTION, SOLUTION INTRAMUSCULAR; INTRAVENOUS at 19:10:00

## 2023-04-19 ENCOUNTER — HOSPITAL ENCOUNTER (OUTPATIENT)
Dept: INTERVENTIONAL RADIOLOGY/VASCULAR | Facility: HOSPITAL | Age: 63
Discharge: HOME | End: 2023-04-19
Attending: INTERNAL MEDICINE | Admitting: RADIOLOGY
Payer: MEDICARE

## 2023-04-19 VITALS
WEIGHT: 123 LBS | HEART RATE: 90 BPM | DIASTOLIC BLOOD PRESSURE: 81 MMHG | OXYGEN SATURATION: 99 % | SYSTOLIC BLOOD PRESSURE: 110 MMHG | BODY MASS INDEX: 23.22 KG/M2 | TEMPERATURE: 98 F | RESPIRATION RATE: 18 BRPM | HEIGHT: 61 IN

## 2023-04-19 DIAGNOSIS — Z17.0 MALIGNANT NEOPLASM OF UPPER-OUTER QUADRANT OF RIGHT BREAST IN FEMALE, ESTROGEN RECEPTOR POSITIVE (HCC): ICD-10-CM

## 2023-04-19 DIAGNOSIS — R93.89 ABNORMAL CT SCAN: ICD-10-CM

## 2023-04-19 DIAGNOSIS — K76.9 LESION OF LIVER: ICD-10-CM

## 2023-04-19 DIAGNOSIS — C50.411 MALIGNANT NEOPLASM OF UPPER-OUTER QUADRANT OF RIGHT BREAST IN FEMALE, ESTROGEN RECEPTOR POSITIVE (HCC): ICD-10-CM

## 2023-04-19 LAB
INR BLD: 1.01 (ref 0.85–1.16)
PROTHROMBIN TIME: 13.2 SECONDS (ref 11.6–14.8)

## 2023-04-19 PROCEDURE — 47000 NEEDLE BIOPSY OF LIVER PERQ: CPT | Performed by: RADIOLOGY

## 2023-04-19 PROCEDURE — 85610 PROTHROMBIN TIME: CPT | Performed by: RADIOLOGY

## 2023-04-19 PROCEDURE — 0FB13ZX EXCISION OF RIGHT LOBE LIVER, PERCUTANEOUS APPROACH, DIAGNOSTIC: ICD-10-PCS | Performed by: RADIOLOGY

## 2023-04-19 PROCEDURE — 99152 MOD SED SAME PHYS/QHP 5/>YRS: CPT | Performed by: RADIOLOGY

## 2023-04-19 PROCEDURE — 88360 TUMOR IMMUNOHISTOCHEM/MANUAL: CPT | Performed by: INTERNAL MEDICINE

## 2023-04-19 PROCEDURE — 88334 PATH CONSLTJ SURG CYTO XM EA: CPT | Performed by: INTERNAL MEDICINE

## 2023-04-19 PROCEDURE — 36415 COLL VENOUS BLD VENIPUNCTURE: CPT

## 2023-04-19 PROCEDURE — 88307 TISSUE EXAM BY PATHOLOGIST: CPT | Performed by: INTERNAL MEDICINE

## 2023-04-19 PROCEDURE — 88333 PATH CONSLTJ SURG CYTO XM 1: CPT | Performed by: INTERNAL MEDICINE

## 2023-04-19 PROCEDURE — 76942 ECHO GUIDE FOR BIOPSY: CPT | Performed by: RADIOLOGY

## 2023-04-19 RX ORDER — SODIUM CHLORIDE 9 MG/ML
INJECTION, SOLUTION INTRAVENOUS CONTINUOUS
Status: DISCONTINUED | OUTPATIENT
Start: 2023-04-19 | End: 2023-04-19

## 2023-04-19 RX ORDER — HYDROCODONE BITARTRATE AND ACETAMINOPHEN 5; 325 MG/1; MG/1
TABLET ORAL
Status: COMPLETED
Start: 2023-04-19 | End: 2023-04-19

## 2023-04-19 RX ORDER — MIDAZOLAM HYDROCHLORIDE 1 MG/ML
INJECTION INTRAMUSCULAR; INTRAVENOUS
Status: COMPLETED
Start: 2023-04-19 | End: 2023-04-19

## 2023-04-19 RX ORDER — MIDAZOLAM HYDROCHLORIDE 1 MG/ML
INJECTION INTRAMUSCULAR; INTRAVENOUS
Status: DISCONTINUED
Start: 2023-04-19 | End: 2023-04-19

## 2023-04-19 RX ORDER — LIDOCAINE HYDROCHLORIDE 20 MG/ML
INJECTION, SOLUTION EPIDURAL; INFILTRATION; INTRACAUDAL; PERINEURAL
Status: COMPLETED
Start: 2023-04-19 | End: 2023-04-19

## 2023-04-19 RX ORDER — HYDROCODONE BITARTRATE AND ACETAMINOPHEN 5; 325 MG/1; MG/1
1 TABLET ORAL ONCE
Status: COMPLETED | OUTPATIENT
Start: 2023-04-19 | End: 2023-04-19

## 2023-04-19 RX ORDER — ACETAMINOPHEN 500 MG
1000 TABLET ORAL ONCE
Status: DISCONTINUED | OUTPATIENT
Start: 2023-04-19 | End: 2023-04-19

## 2023-04-19 RX ADMIN — HYDROCODONE BITARTRATE AND ACETAMINOPHEN 1 TABLET: 5; 325 TABLET ORAL at 12:23:00

## 2023-04-19 RX ADMIN — SODIUM CHLORIDE: 9 INJECTION, SOLUTION INTRAVENOUS at 09:47:00

## 2023-04-19 NOTE — INTERVAL H&P NOTE
The above referenced H&P was reviewed by Janeth Trejo. Yanira Cotton MD on 4/19/2023, the patient was examined and no significant changes have occurred in the patient's condition since the H&P was performed. Risks, benefits, alternative treatments and consequences of no treatment were discussed. We will proceed with repeat liver lesion biopsy as planned. Jose Cotton MD  4/19/2023  9:48 AM

## 2023-04-19 NOTE — IVS NOTE
Patient tolerated liver biopsy without incident. Site prepped, lidocaine given subcutaneous by Dr. Maria Elena Spencer. Four passes made, specimen to lab. No bleeding noted. Band aid placed at site. Report to [employer].

## 2023-04-19 NOTE — DISCHARGE INSTRUCTIONS
Pr-14  4.2  [phone]     Patient Name:  Delmis Wolfe    Procedure:  Liver Biopsy by Dr. Deirdre Roca    Site Care: Remove your band-aid or dressing in 24 hours. Gently wash area with soap and water. Activity/Diet  No heavy lifting or strenuous activity for 48 hours. Do not shower for 24 hours  Drink plenty of fluids, unless you have otherwise been told to restrict your fluid intake. Do not drink alcohol for 24 hours. Do not drive,  operate heavy machinery, make important decisions or sign legal documents today. Medications: Take acetaminophen if needed for pain. Do not exceed 4000mg of acetaminophen in a 24-hour period. , Do not take blood thinners, aspirin, or Plavix for 24 hours. , and Make no changes to your existing medications. Contact Interventional Radiology at [phone] if you have severe/unrelieved pain, fever, chills, dizziness/lightheadedness, or drainage/bleeding from your incision site.

## 2023-04-19 NOTE — IVS NOTE
DISCHARGE NOTE     Pt is able to sit up and ambulate without difficulty. Pt tolerated fluids and food. Procedural site remains dry and intact with good circulation, motion, and sensation. No signs and symptoms of bleeding/hematoma noted. Pain on left shoulder/abdomen stable unchanged, and tolerable per patient. IV access removed  Instruction provided, patient/family verbalizes understanding. Dr. José Sepulveda spoke with patient/family post procedure.      Pt discharge via wheelchair to [facility]

## 2023-04-24 ENCOUNTER — TELEPHONE (OUTPATIENT)
Dept: HEMATOLOGY/ONCOLOGY | Facility: HOSPITAL | Age: 63
End: 2023-04-24

## 2023-04-26 ENCOUNTER — OFFICE VISIT (OUTPATIENT)
Dept: HEMATOLOGY/ONCOLOGY | Facility: HOSPITAL | Age: 63
End: 2023-04-26
Attending: INTERNAL MEDICINE
Payer: MEDICARE

## 2023-04-26 ENCOUNTER — TELEPHONE (OUTPATIENT)
Dept: HEMATOLOGY/ONCOLOGY | Facility: HOSPITAL | Age: 63
End: 2023-04-26

## 2023-04-26 VITALS
TEMPERATURE: 98 F | OXYGEN SATURATION: 100 % | SYSTOLIC BLOOD PRESSURE: 127 MMHG | DIASTOLIC BLOOD PRESSURE: 62 MMHG | HEIGHT: 61 IN | RESPIRATION RATE: 16 BRPM | BODY MASS INDEX: 22.78 KG/M2 | WEIGHT: 120.63 LBS | HEART RATE: 84 BPM

## 2023-04-26 DIAGNOSIS — T45.1X5A ANEMIA DUE TO ANTINEOPLASTIC CHEMOTHERAPY: ICD-10-CM

## 2023-04-26 DIAGNOSIS — C50.411 MALIGNANT NEOPLASM OF UPPER-OUTER QUADRANT OF RIGHT BREAST IN FEMALE, ESTROGEN RECEPTOR POSITIVE (HCC): Primary | ICD-10-CM

## 2023-04-26 DIAGNOSIS — D64.9 SYMPTOMATIC ANEMIA: ICD-10-CM

## 2023-04-26 DIAGNOSIS — D64.81 ANEMIA DUE TO ANTINEOPLASTIC CHEMOTHERAPY: ICD-10-CM

## 2023-04-26 DIAGNOSIS — Z17.0 MALIGNANT NEOPLASM OF UPPER-OUTER QUADRANT OF RIGHT BREAST IN FEMALE, ESTROGEN RECEPTOR POSITIVE (HCC): Primary | ICD-10-CM

## 2023-04-26 LAB
ANTIBODY SCREEN: NEGATIVE
BASOPHILS # BLD: 0 X10(3) UL (ref 0–0.2)
BASOPHILS NFR BLD: 0 %
DEPRECATED RDW RBC AUTO: 63 FL (ref 35.1–46.3)
DOHLE BOD BLD QL SMEAR: PRESENT
EOSINOPHIL # BLD: 0 X10(3) UL (ref 0–0.7)
EOSINOPHIL NFR BLD: 0 %
ERYTHROCYTE [DISTWIDTH] IN BLOOD BY AUTOMATED COUNT: 17.3 % (ref 11–15)
HCT VFR BLD AUTO: 20.7 %
HGB BLD-MCNC: 6.7 G/DL
IRON SATN MFR SERPL: 29 %
IRON SERPL-MCNC: 88 UG/DL
LYMPHOCYTES NFR BLD: 0.53 X10(3) UL (ref 1–4)
LYMPHOCYTES NFR BLD: 5 %
MCH RBC QN AUTO: 32.7 PG (ref 26–34)
MCHC RBC AUTO-ENTMCNC: 32.4 G/DL (ref 31–37)
MCV RBC AUTO: 101 FL
METAMYELOCYTES # BLD: 0.11 X10(3) UL
METAMYELOCYTES NFR BLD: 1 %
MONOCYTES # BLD: 0.84 X10(3) UL (ref 0.1–1)
MONOCYTES NFR BLD: 8 %
MYELOCYTES # BLD: 0.11 X10(3) UL
MYELOCYTES NFR BLD: 1 %
NEUTROPHILS # BLD AUTO: 7.98 X10 (3) UL (ref 1.5–7.7)
NEUTROPHILS NFR BLD: 67 %
NEUTS BAND NFR BLD: 18 %
NEUTS HYPERSEG # BLD: 8.93 X10(3) UL (ref 1.5–7.7)
NEUTS VAC BLD QL SMEAR: PRESENT
PLATELET # BLD AUTO: 22 10(3)UL (ref 150–450)
RBC # BLD AUTO: 2.05 X10(6)UL
RH BLOOD TYPE: POSITIVE
RH BLOOD TYPE: POSITIVE
TIBC SERPL-MCNC: 308 UG/DL (ref 240–450)
TOTAL CELLS COUNTED BLD: 100
TOXIC GRANULES BLD QL SMEAR: PRESENT
TRANSFERRIN SERPL-MCNC: 207 MG/DL (ref 200–360)
WBC # BLD AUTO: 10.5 X10(3) UL (ref 4–11)

## 2023-04-26 PROCEDURE — 36415 COLL VENOUS BLD VENIPUNCTURE: CPT

## 2023-04-26 PROCEDURE — 99215 OFFICE O/P EST HI 40 MIN: CPT | Performed by: INTERNAL MEDICINE

## 2023-04-26 NOTE — TELEPHONE ENCOUNTER
Spoke with Christopher Nickerson. Hgb 6.7. Will get 1 unit of blood tomorrow at 0930. Pt repeated information and verbalized understanding.

## 2023-04-27 ENCOUNTER — OFFICE VISIT (OUTPATIENT)
Dept: HEMATOLOGY/ONCOLOGY | Facility: HOSPITAL | Age: 63
End: 2023-04-27
Attending: INTERNAL MEDICINE
Payer: MEDICARE

## 2023-04-27 ENCOUNTER — SOCIAL WORK SERVICES (OUTPATIENT)
Dept: HEMATOLOGY/ONCOLOGY | Facility: HOSPITAL | Age: 63
End: 2023-04-27

## 2023-04-27 VITALS
HEART RATE: 70 BPM | DIASTOLIC BLOOD PRESSURE: 72 MMHG | RESPIRATION RATE: 16 BRPM | OXYGEN SATURATION: 100 % | SYSTOLIC BLOOD PRESSURE: 117 MMHG | TEMPERATURE: 98 F

## 2023-04-27 DIAGNOSIS — Z45.2 ENCOUNTER FOR CENTRAL LINE CARE: ICD-10-CM

## 2023-04-27 DIAGNOSIS — E53.8 B12 DEFICIENCY: ICD-10-CM

## 2023-04-27 DIAGNOSIS — Z51.11 CHEMOTHERAPY MANAGEMENT, ENCOUNTER FOR: ICD-10-CM

## 2023-04-27 DIAGNOSIS — E61.1 IRON DEFICIENCY: ICD-10-CM

## 2023-04-27 DIAGNOSIS — T45.1X5A ANEMIA DUE TO ANTINEOPLASTIC CHEMOTHERAPY: Primary | ICD-10-CM

## 2023-04-27 DIAGNOSIS — D64.81 ANEMIA DUE TO ANTINEOPLASTIC CHEMOTHERAPY: Primary | ICD-10-CM

## 2023-04-27 DIAGNOSIS — K90.9 MALABSORPTION OF IRON: ICD-10-CM

## 2023-04-27 DIAGNOSIS — Z79.899 ENCOUNTER FOR MEDICATION MANAGEMENT: ICD-10-CM

## 2023-04-27 PROCEDURE — 36430 TRANSFUSION BLD/BLD COMPNT: CPT

## 2023-04-27 RX ORDER — HEPARIN SODIUM (PORCINE) LOCK FLUSH IV SOLN 100 UNIT/ML 100 UNIT/ML
SOLUTION INTRAVENOUS
Status: COMPLETED
Start: 2023-04-27 | End: 2023-04-27

## 2023-04-27 RX ORDER — CYANOCOBALAMIN 1000 UG/ML
1000 INJECTION, SOLUTION INTRAMUSCULAR; SUBCUTANEOUS AS NEEDED
Start: 2023-05-11

## 2023-04-27 RX ORDER — HEPARIN SODIUM (PORCINE) LOCK FLUSH IV SOLN 100 UNIT/ML 100 UNIT/ML
5 SOLUTION INTRAVENOUS ONCE
Status: COMPLETED | OUTPATIENT
Start: 2023-04-27 | End: 2023-04-27

## 2023-04-27 RX ORDER — SODIUM CHLORIDE 9 MG/ML
10 INJECTION INTRAVENOUS ONCE
OUTPATIENT
Start: 2023-05-11

## 2023-04-27 RX ORDER — HEPARIN SODIUM (PORCINE) LOCK FLUSH IV SOLN 100 UNIT/ML 100 UNIT/ML
5 SOLUTION INTRAVENOUS ONCE
OUTPATIENT
Start: 2023-05-11

## 2023-04-27 RX ADMIN — HEPARIN SODIUM (PORCINE) LOCK FLUSH IV SOLN 100 UNIT/ML 500 UNITS: 100 SOLUTION INTRAVENOUS at 11:35:00

## 2023-04-27 NOTE — PROGRESS NOTES
Nataliia Abrams is here for a transfusion of 1 unit of packed red blood cells. She complains of some fatigue and 'heart pounding' with activity. Consent obtained and I reviewed signs and symptoms of reaction to monitor for. Aleisha tolerated the transfusion well and discharged stable.

## 2023-04-27 NOTE — PROGRESS NOTES
Called patient to answer her questions regarding the financial assist program. Patient did not answer SW left a voicemail for patient requesting a call back for further assistance.

## 2023-05-01 RX ORDER — LAMOTRIGINE 25 MG/1
500 TABLET ORAL ONCE
OUTPATIENT
Start: 2023-05-11

## 2023-05-01 RX ORDER — LAMOTRIGINE 25 MG/1
500 TABLET ORAL ONCE
OUTPATIENT
Start: 2023-05-01

## 2023-05-03 NOTE — ED INITIAL ASSESSMENT (HPI)
Tim Esparza in shower approximately 8 hours ago, unknown LOC. Laceration to right forehead.
Detail Level: Detailed

## 2023-05-04 ENCOUNTER — NURSE ONLY (OUTPATIENT)
Dept: HEMATOLOGY/ONCOLOGY | Facility: HOSPITAL | Age: 63
End: 2023-05-04
Attending: INTERNAL MEDICINE
Payer: MEDICARE

## 2023-05-04 VITALS
HEART RATE: 75 BPM | WEIGHT: 117 LBS | HEIGHT: 61 IN | TEMPERATURE: 98 F | DIASTOLIC BLOOD PRESSURE: 76 MMHG | BODY MASS INDEX: 22.09 KG/M2 | RESPIRATION RATE: 16 BRPM | SYSTOLIC BLOOD PRESSURE: 134 MMHG | OXYGEN SATURATION: 98 %

## 2023-05-04 VITALS
SYSTOLIC BLOOD PRESSURE: 111 MMHG | HEART RATE: 70 BPM | RESPIRATION RATE: 16 BRPM | DIASTOLIC BLOOD PRESSURE: 67 MMHG | TEMPERATURE: 99 F

## 2023-05-04 DIAGNOSIS — Z51.11 CHEMOTHERAPY MANAGEMENT, ENCOUNTER FOR: ICD-10-CM

## 2023-05-04 DIAGNOSIS — Z45.2 ENCOUNTER FOR CENTRAL LINE CARE: ICD-10-CM

## 2023-05-04 DIAGNOSIS — K90.9 MALABSORPTION OF IRON: ICD-10-CM

## 2023-05-04 DIAGNOSIS — E53.8 B12 DEFICIENCY: ICD-10-CM

## 2023-05-04 DIAGNOSIS — E61.1 IRON DEFICIENCY: ICD-10-CM

## 2023-05-04 DIAGNOSIS — J91.0 MALIGNANT PLEURAL EFFUSION: Primary | ICD-10-CM

## 2023-05-04 DIAGNOSIS — Z79.899 ENCOUNTER FOR MEDICATION MANAGEMENT: ICD-10-CM

## 2023-05-04 DIAGNOSIS — C50.411 MALIGNANT NEOPLASM OF UPPER-OUTER QUADRANT OF RIGHT BREAST IN FEMALE, ESTROGEN RECEPTOR POSITIVE (HCC): Primary | ICD-10-CM

## 2023-05-04 DIAGNOSIS — Z17.0 MALIGNANT NEOPLASM OF UPPER-OUTER QUADRANT OF RIGHT BREAST IN FEMALE, ESTROGEN RECEPTOR POSITIVE (HCC): Primary | ICD-10-CM

## 2023-05-04 DIAGNOSIS — Z17.0 MALIGNANT NEOPLASM OF UPPER-OUTER QUADRANT OF RIGHT BREAST IN FEMALE, ESTROGEN RECEPTOR POSITIVE (HCC): ICD-10-CM

## 2023-05-04 DIAGNOSIS — C50.411 MALIGNANT NEOPLASM OF UPPER-OUTER QUADRANT OF RIGHT BREAST IN FEMALE, ESTROGEN RECEPTOR POSITIVE (HCC): ICD-10-CM

## 2023-05-04 DIAGNOSIS — D64.9 SYMPTOMATIC ANEMIA: ICD-10-CM

## 2023-05-04 LAB
ALBUMIN SERPL-MCNC: 3.3 G/DL (ref 3.4–5)
ALBUMIN/GLOB SERPL: 1 {RATIO} (ref 1–2)
ALP LIVER SERPL-CCNC: 122 U/L
ALT SERPL-CCNC: 51 U/L
ANION GAP SERPL CALC-SCNC: 8 MMOL/L (ref 0–18)
AST SERPL-CCNC: 37 U/L (ref 15–37)
BASOPHILS # BLD AUTO: 0.02 X10(3) UL (ref 0–0.2)
BASOPHILS NFR BLD AUTO: 0.4 %
BILIRUB SERPL-MCNC: 0.5 MG/DL (ref 0.1–2)
BUN BLD-MCNC: 27 MG/DL (ref 7–18)
BUN/CREAT SERPL: 27.3 (ref 10–20)
CALCIUM BLD-MCNC: 8.9 MG/DL (ref 8.5–10.1)
CHLORIDE SERPL-SCNC: 111 MMOL/L (ref 98–112)
CO2 SERPL-SCNC: 23 MMOL/L (ref 21–32)
CREAT BLD-MCNC: 0.99 MG/DL
DEPRECATED RDW RBC AUTO: 66.4 FL (ref 35.1–46.3)
EOSINOPHIL # BLD AUTO: 0.01 X10(3) UL (ref 0–0.7)
EOSINOPHIL NFR BLD AUTO: 0.2 %
ERYTHROCYTE [DISTWIDTH] IN BLOOD BY AUTOMATED COUNT: 17.9 % (ref 11–15)
GFR SERPLBLD BASED ON 1.73 SQ M-ARVRAT: 64 ML/MIN/1.73M2 (ref 60–?)
GLOBULIN PLAS-MCNC: 3.2 G/DL (ref 2.8–4.4)
GLUCOSE BLD-MCNC: 96 MG/DL (ref 70–99)
HCT VFR BLD AUTO: 28.3 %
HGB BLD-MCNC: 8.9 G/DL
IMM GRANULOCYTES # BLD AUTO: 0.08 X10(3) UL (ref 0–1)
IMM GRANULOCYTES NFR BLD: 1.6 %
LYMPHOCYTES # BLD AUTO: 0.39 X10(3) UL (ref 1–4)
LYMPHOCYTES NFR BLD AUTO: 7.6 %
MCH RBC QN AUTO: 31.8 PG (ref 26–34)
MCHC RBC AUTO-ENTMCNC: 31.4 G/DL (ref 31–37)
MCV RBC AUTO: 101.1 FL
MONOCYTES # BLD AUTO: 0.51 X10(3) UL (ref 0.1–1)
MONOCYTES NFR BLD AUTO: 9.9 %
NEUTROPHILS # BLD AUTO: 4.15 X10 (3) UL (ref 1.5–7.7)
NEUTROPHILS # BLD AUTO: 4.15 X10(3) UL (ref 1.5–7.7)
NEUTROPHILS NFR BLD AUTO: 80.3 %
NEUTS VAC BLD QL SMEAR: PRESENT
OSMOLALITY SERPL CALC.SUM OF ELEC: 299 MOSM/KG (ref 275–295)
PLATELET # BLD AUTO: 208 10(3)UL (ref 150–450)
POTASSIUM SERPL-SCNC: 4.5 MMOL/L (ref 3.5–5.1)
PROT SERPL-MCNC: 6.5 G/DL (ref 6.4–8.2)
RBC # BLD AUTO: 2.8 X10(6)UL
SODIUM SERPL-SCNC: 142 MMOL/L (ref 136–145)
TOXIC GRANULES BLD QL SMEAR: PRESENT
WBC # BLD AUTO: 5.2 X10(3) UL (ref 4–11)

## 2023-05-04 PROCEDURE — 96367 TX/PROPH/DG ADDL SEQ IV INF: CPT

## 2023-05-04 PROCEDURE — 99215 OFFICE O/P EST HI 40 MIN: CPT | Performed by: INTERNAL MEDICINE

## 2023-05-04 PROCEDURE — 96375 TX/PRO/DX INJ NEW DRUG ADDON: CPT

## 2023-05-04 PROCEDURE — 96413 CHEMO IV INFUSION 1 HR: CPT

## 2023-05-04 PROCEDURE — 96402 CHEMO HORMON ANTINEOPL SQ/IM: CPT

## 2023-05-04 PROCEDURE — 36593 DECLOT VASCULAR DEVICE: CPT

## 2023-05-04 PROCEDURE — 80053 COMPREHEN METABOLIC PANEL: CPT

## 2023-05-04 PROCEDURE — 96417 CHEMO IV INFUS EACH ADDL SEQ: CPT

## 2023-05-04 PROCEDURE — 85025 COMPLETE CBC W/AUTO DIFF WBC: CPT

## 2023-05-04 RX ORDER — CYANOCOBALAMIN 1000 UG/ML
1000 INJECTION, SOLUTION INTRAMUSCULAR; SUBCUTANEOUS AS NEEDED
Start: 2023-05-11

## 2023-05-04 RX ORDER — HEPARIN SODIUM (PORCINE) LOCK FLUSH IV SOLN 100 UNIT/ML 100 UNIT/ML
5 SOLUTION INTRAVENOUS ONCE
Status: CANCELLED | OUTPATIENT
Start: 2023-05-11

## 2023-05-04 RX ORDER — CYANOCOBALAMIN 1000 UG/ML
1000 INJECTION, SOLUTION INTRAMUSCULAR; SUBCUTANEOUS AS NEEDED
Start: 2023-05-18

## 2023-05-04 RX ORDER — LAMOTRIGINE 25 MG/1
500 TABLET ORAL ONCE
OUTPATIENT
Start: 2023-05-11

## 2023-05-04 RX ORDER — SODIUM CHLORIDE 9 MG/ML
10 INJECTION INTRAVENOUS ONCE
OUTPATIENT
Start: 2023-05-11

## 2023-05-04 RX ORDER — WATER 1000 ML/1000ML
INJECTION, SOLUTION INTRAVENOUS
Status: DISCONTINUED
Start: 2023-05-04 | End: 2023-05-04

## 2023-05-04 RX ORDER — LAMOTRIGINE 25 MG/1
500 TABLET ORAL ONCE
Status: COMPLETED | OUTPATIENT
Start: 2023-05-04 | End: 2023-05-04

## 2023-05-04 RX ORDER — HEPARIN SODIUM (PORCINE) LOCK FLUSH IV SOLN 100 UNIT/ML 100 UNIT/ML
5 SOLUTION INTRAVENOUS ONCE
Status: COMPLETED | OUTPATIENT
Start: 2023-05-04 | End: 2023-05-04

## 2023-05-04 RX ORDER — LAMOTRIGINE 25 MG/1
500 TABLET ORAL ONCE
Status: CANCELLED | OUTPATIENT
Start: 2023-05-11

## 2023-05-04 RX ORDER — CYANOCOBALAMIN 1000 UG/ML
1000 INJECTION, SOLUTION INTRAMUSCULAR; SUBCUTANEOUS AS NEEDED
Status: CANCELLED
Start: 2023-05-11

## 2023-05-04 RX ORDER — HEPARIN SODIUM (PORCINE) LOCK FLUSH IV SOLN 100 UNIT/ML 100 UNIT/ML
5 SOLUTION INTRAVENOUS ONCE
OUTPATIENT
Start: 2023-05-11

## 2023-05-04 RX ORDER — HEPARIN SODIUM (PORCINE) LOCK FLUSH IV SOLN 100 UNIT/ML 100 UNIT/ML
SOLUTION INTRAVENOUS
Status: COMPLETED
Start: 2023-05-04 | End: 2023-05-04

## 2023-05-04 RX ADMIN — HEPARIN SODIUM (PORCINE) LOCK FLUSH IV SOLN 100 UNIT/ML 500 UNITS: 100 SOLUTION INTRAVENOUS at 17:03:00

## 2023-05-04 RX ADMIN — LAMOTRIGINE 500 MG: 25 TABLET ORAL at 17:06:00

## 2023-05-04 NOTE — PROGRESS NOTES
Pt here for C5D1 Carbo/Gemzar + Faslodex IM + Venofer 2 of 5. Arrives Ambulating independently, accompanied by Self from MD visit. Oral medications included in this regimen:  no    Patient confirms comprehension of cancer treatment schedule:  yes    Pregnancy screening:  Not applicable    Modifications in dose or schedule:  No    Medications appearance and physical integrity checked by RN. Chemotherapy IV pump settings verified by 2 RNs:  yes     Port accessed previously in lab, arrives with TPA in. TPA removed, present blood return noted. Frequency of blood return and site check throughout administration: Prior to administration, Prior to each drug and At completion of therapy     Infusion/treatment outcome:  patient tolerated treatment without incident    Education Record    Learner:  Patient  Barriers / Limitations:  None  Method:  Reinforcement  Education / instructions given: Reinforced treatment schedule and potential side effects   Outcome:  Shows understanding    Faslodex given to BL upper outer gluteus. Site covered with gauze and tape.  Discharged Home, Ambulating independently, accompanied by:Self    Patient/family verbalized understanding of future appointments: by printed AVS

## 2023-05-05 ENCOUNTER — NURSE ONLY (OUTPATIENT)
Dept: HEMATOLOGY/ONCOLOGY | Facility: HOSPITAL | Age: 63
End: 2023-05-05
Attending: INTERNAL MEDICINE
Payer: MEDICARE

## 2023-05-05 DIAGNOSIS — C50.411 MALIGNANT NEOPLASM OF UPPER-OUTER QUADRANT OF RIGHT BREAST IN FEMALE, ESTROGEN RECEPTOR POSITIVE (HCC): ICD-10-CM

## 2023-05-05 DIAGNOSIS — J91.0 MALIGNANT PLEURAL EFFUSION: Primary | ICD-10-CM

## 2023-05-05 DIAGNOSIS — Z17.0 MALIGNANT NEOPLASM OF UPPER-OUTER QUADRANT OF RIGHT BREAST IN FEMALE, ESTROGEN RECEPTOR POSITIVE (HCC): ICD-10-CM

## 2023-05-05 PROCEDURE — 96372 THER/PROPH/DIAG INJ SC/IM: CPT

## 2023-05-05 NOTE — PROGRESS NOTES
Pt here for C5 D2 Fulfila injection  Took a claritin today to prevent bony aches, aware of symptom mgmt and purpose of med  Denies any complaints/ concerns    fulfila injection given subcutaneous left upper arm, tolerated well    Discharged stable to home with future appts  Gait steady , indep

## 2023-05-09 RX ORDER — MIRTAZAPINE 15 MG/1
TABLET, FILM COATED ORAL
Qty: 90 TABLET | Refills: 1 | Status: SHIPPED | OUTPATIENT
Start: 2023-05-09

## 2023-05-11 ENCOUNTER — APPOINTMENT (OUTPATIENT)
Dept: HEMATOLOGY/ONCOLOGY | Facility: HOSPITAL | Age: 63
End: 2023-05-11
Attending: INTERNAL MEDICINE
Payer: MEDICARE

## 2023-05-18 ENCOUNTER — NURSE ONLY (OUTPATIENT)
Dept: HEMATOLOGY/ONCOLOGY | Facility: HOSPITAL | Age: 63
End: 2023-05-18
Attending: INTERNAL MEDICINE
Payer: MEDICARE

## 2023-05-18 DIAGNOSIS — Z51.11 CHEMOTHERAPY MANAGEMENT, ENCOUNTER FOR: ICD-10-CM

## 2023-05-18 DIAGNOSIS — Z79.899 ENCOUNTER FOR MEDICATION MANAGEMENT: ICD-10-CM

## 2023-05-18 DIAGNOSIS — E61.1 IRON DEFICIENCY: ICD-10-CM

## 2023-05-18 DIAGNOSIS — D64.9 SYMPTOMATIC ANEMIA: ICD-10-CM

## 2023-05-18 DIAGNOSIS — K90.9 MALABSORPTION OF IRON: ICD-10-CM

## 2023-05-18 DIAGNOSIS — E53.8 B12 DEFICIENCY: Primary | ICD-10-CM

## 2023-05-18 DIAGNOSIS — Z45.2 ENCOUNTER FOR CENTRAL LINE CARE: ICD-10-CM

## 2023-05-18 LAB
ANTIBODY SCREEN: NEGATIVE
BASOPHILS # BLD: 0 X10(3) UL (ref 0–0.2)
BASOPHILS NFR BLD: 0 %
DEPRECATED RDW RBC AUTO: 59.7 FL (ref 35.1–46.3)
EOSINOPHIL # BLD: 0.35 X10(3) UL (ref 0–0.7)
EOSINOPHIL NFR BLD: 2 %
ERYTHROCYTE [DISTWIDTH] IN BLOOD BY AUTOMATED COUNT: 17 % (ref 11–15)
HCT VFR BLD AUTO: 25.4 %
HGB BLD-MCNC: 8 G/DL
LYMPHOCYTES NFR BLD: 1.05 X10(3) UL (ref 1–4)
LYMPHOCYTES NFR BLD: 6 %
MCH RBC QN AUTO: 32.4 PG (ref 26–34)
MCHC RBC AUTO-ENTMCNC: 31.5 G/DL (ref 31–37)
MCV RBC AUTO: 102.8 FL
METAMYELOCYTES # BLD: 0.35 X10(3) UL
METAMYELOCYTES NFR BLD: 2 %
MONOCYTES # BLD: 0.88 X10(3) UL (ref 0.1–1)
MONOCYTES NFR BLD: 5 %
NEUTROPHILS # BLD AUTO: 12.87 X10 (3) UL (ref 1.5–7.7)
NEUTROPHILS NFR BLD: 77 %
NEUTS BAND NFR BLD: 8 %
NEUTS HYPERSEG # BLD: 14.88 X10(3) UL (ref 1.5–7.7)
PLATELET # BLD AUTO: 49 10(3)UL (ref 150–450)
PLATELET MORPHOLOGY: NORMAL
RBC # BLD AUTO: 2.47 X10(6)UL
RH BLOOD TYPE: POSITIVE
TOTAL CELLS COUNTED BLD: 100
TOXIC GRANULES BLD QL SMEAR: PRESENT
WBC # BLD AUTO: 17.5 X10(3) UL (ref 4–11)

## 2023-05-18 PROCEDURE — 85025 COMPLETE CBC W/AUTO DIFF WBC: CPT

## 2023-05-18 PROCEDURE — 85027 COMPLETE CBC AUTOMATED: CPT

## 2023-05-18 PROCEDURE — 86901 BLOOD TYPING SEROLOGIC RH(D): CPT

## 2023-05-18 PROCEDURE — 85007 BL SMEAR W/DIFF WBC COUNT: CPT

## 2023-05-18 PROCEDURE — 86850 RBC ANTIBODY SCREEN: CPT

## 2023-05-18 PROCEDURE — 86900 BLOOD TYPING SEROLOGIC ABO: CPT

## 2023-05-18 PROCEDURE — 36415 COLL VENOUS BLD VENIPUNCTURE: CPT

## 2023-05-18 PROCEDURE — 96402 CHEMO HORMON ANTINEOPL SQ/IM: CPT

## 2023-05-18 RX ORDER — LAMOTRIGINE 25 MG/1
500 TABLET ORAL ONCE
OUTPATIENT
Start: 2023-05-24

## 2023-05-18 RX ORDER — LAMOTRIGINE 25 MG/1
500 TABLET ORAL ONCE
Status: COMPLETED | OUTPATIENT
Start: 2023-05-18 | End: 2023-05-18

## 2023-05-18 RX ORDER — SODIUM CHLORIDE 9 MG/ML
10 INJECTION INTRAVENOUS ONCE
OUTPATIENT
Start: 2023-05-24

## 2023-05-18 RX ORDER — CYANOCOBALAMIN 1000 UG/ML
1000 INJECTION, SOLUTION INTRAMUSCULAR; SUBCUTANEOUS AS NEEDED
Start: 2023-05-24

## 2023-05-18 RX ORDER — HEPARIN SODIUM (PORCINE) LOCK FLUSH IV SOLN 100 UNIT/ML 100 UNIT/ML
5 SOLUTION INTRAVENOUS ONCE
OUTPATIENT
Start: 2023-05-24

## 2023-05-18 RX ADMIN — LAMOTRIGINE 500 MG: 25 TABLET ORAL at 09:23:00

## 2023-05-19 ENCOUNTER — TELEPHONE (OUTPATIENT)
Dept: HEMATOLOGY/ONCOLOGY | Facility: HOSPITAL | Age: 63
End: 2023-05-19

## 2023-05-19 NOTE — TELEPHONE ENCOUNTER
Spoke with Licha Benitez. She feels about the same. Tachycardic with activities, resolves after she sits down. Minor swelling in the legs, but takes furosemide for that. No difficulties taking a deep breath or lying down. Pt stated that she would like to wait on any scans or intervention at this time because she \"feels pretty good. \" Will follow up with her next week.

## 2023-05-25 ENCOUNTER — OFFICE VISIT (OUTPATIENT)
Dept: HEMATOLOGY/ONCOLOGY | Facility: HOSPITAL | Age: 63
End: 2023-05-25
Attending: INTERNAL MEDICINE
Payer: MEDICARE

## 2023-05-25 ENCOUNTER — HOSPITAL ENCOUNTER (OUTPATIENT)
Dept: GENERAL RADIOLOGY | Facility: HOSPITAL | Age: 63
Discharge: HOME | End: 2023-05-25
Attending: INTERNAL MEDICINE
Payer: MEDICARE

## 2023-05-25 VITALS
WEIGHT: 116 LBS | RESPIRATION RATE: 16 BRPM | DIASTOLIC BLOOD PRESSURE: 81 MMHG | TEMPERATURE: 98 F | BODY MASS INDEX: 21.9 KG/M2 | SYSTOLIC BLOOD PRESSURE: 128 MMHG | HEART RATE: 88 BPM | HEIGHT: 61 IN | OXYGEN SATURATION: 99 %

## 2023-05-25 DIAGNOSIS — Z51.11 CHEMOTHERAPY MANAGEMENT, ENCOUNTER FOR: ICD-10-CM

## 2023-05-25 DIAGNOSIS — R06.00 DYSPNEA, UNSPECIFIED TYPE: ICD-10-CM

## 2023-05-25 DIAGNOSIS — J91.0 MALIGNANT PLEURAL EFFUSION: Primary | ICD-10-CM

## 2023-05-25 DIAGNOSIS — R06.00 DYSPNEA, UNSPECIFIED TYPE: Primary | ICD-10-CM

## 2023-05-25 DIAGNOSIS — Z17.0 MALIGNANT NEOPLASM OF UPPER-OUTER QUADRANT OF RIGHT BREAST IN FEMALE, ESTROGEN RECEPTOR POSITIVE (HCC): ICD-10-CM

## 2023-05-25 DIAGNOSIS — K90.9 MALABSORPTION OF IRON: ICD-10-CM

## 2023-05-25 DIAGNOSIS — E53.8 B12 DEFICIENCY: ICD-10-CM

## 2023-05-25 DIAGNOSIS — T45.1X5A ANEMIA DUE TO ANTINEOPLASTIC CHEMOTHERAPY: ICD-10-CM

## 2023-05-25 DIAGNOSIS — Z79.899 ENCOUNTER FOR MEDICATION MANAGEMENT: ICD-10-CM

## 2023-05-25 DIAGNOSIS — Z45.2 ENCOUNTER FOR CENTRAL LINE CARE: ICD-10-CM

## 2023-05-25 DIAGNOSIS — E61.1 IRON DEFICIENCY: ICD-10-CM

## 2023-05-25 DIAGNOSIS — C78.7 MALIGNANT NEOPLASM METASTATIC TO LIVER (HCC): ICD-10-CM

## 2023-05-25 DIAGNOSIS — C50.411 MALIGNANT NEOPLASM OF UPPER-OUTER QUADRANT OF RIGHT BREAST IN FEMALE, ESTROGEN RECEPTOR POSITIVE (HCC): ICD-10-CM

## 2023-05-25 DIAGNOSIS — D64.81 ANEMIA DUE TO ANTINEOPLASTIC CHEMOTHERAPY: ICD-10-CM

## 2023-05-25 DIAGNOSIS — J91.0 MALIGNANT PLEURAL EFFUSION: ICD-10-CM

## 2023-05-25 LAB
ALBUMIN SERPL-MCNC: 3.3 G/DL (ref 3.4–5)
ALBUMIN/GLOB SERPL: 1.2 {RATIO} (ref 1–2)
ALP LIVER SERPL-CCNC: 110 U/L
ALT SERPL-CCNC: 41 U/L
ANION GAP SERPL CALC-SCNC: 5 MMOL/L (ref 0–18)
AST SERPL-CCNC: 34 U/L (ref 15–37)
BASOPHILS # BLD AUTO: 0.02 X10(3) UL (ref 0–0.2)
BASOPHILS NFR BLD AUTO: 0.2 %
BILIRUB SERPL-MCNC: 0.3 MG/DL (ref 0.1–2)
BUN BLD-MCNC: 25 MG/DL (ref 7–18)
BUN/CREAT SERPL: 22.9 (ref 10–20)
CALCIUM BLD-MCNC: 8.7 MG/DL (ref 8.5–10.1)
CHLORIDE SERPL-SCNC: 109 MMOL/L (ref 98–112)
CO2 SERPL-SCNC: 25 MMOL/L (ref 21–32)
CREAT BLD-MCNC: 1.09 MG/DL
DEPRECATED RDW RBC AUTO: 69 FL (ref 35.1–46.3)
EOSINOPHIL # BLD AUTO: 0.02 X10(3) UL (ref 0–0.7)
EOSINOPHIL NFR BLD AUTO: 0.2 %
ERYTHROCYTE [DISTWIDTH] IN BLOOD BY AUTOMATED COUNT: 17.8 % (ref 11–15)
GFR SERPLBLD BASED ON 1.73 SQ M-ARVRAT: 57 ML/MIN/1.73M2 (ref 60–?)
GLOBULIN PLAS-MCNC: 2.8 G/DL (ref 2.8–4.4)
GLUCOSE BLD-MCNC: 142 MG/DL (ref 70–99)
HCT VFR BLD AUTO: 26 %
HGB BLD-MCNC: 8.1 G/DL
IMM GRANULOCYTES # BLD AUTO: 0.12 X10(3) UL (ref 0–1)
IMM GRANULOCYTES NFR BLD: 1.4 %
LYMPHOCYTES # BLD AUTO: 0.52 X10(3) UL (ref 1–4)
LYMPHOCYTES NFR BLD AUTO: 5.9 %
MCH RBC QN AUTO: 32.8 PG (ref 26–34)
MCHC RBC AUTO-ENTMCNC: 31.2 G/DL (ref 31–37)
MCV RBC AUTO: 105.3 FL
MONOCYTES # BLD AUTO: 0.88 X10(3) UL (ref 0.1–1)
MONOCYTES NFR BLD AUTO: 10 %
NEUTROPHILS # BLD AUTO: 7.25 X10 (3) UL (ref 1.5–7.7)
NEUTROPHILS # BLD AUTO: 7.25 X10(3) UL (ref 1.5–7.7)
NEUTROPHILS NFR BLD AUTO: 82.3 %
OSMOLALITY SERPL CALC.SUM OF ELEC: 295 MOSM/KG (ref 275–295)
PLATELET # BLD AUTO: 159 10(3)UL (ref 150–450)
PLATELET MORPHOLOGY: NORMAL
POTASSIUM SERPL-SCNC: 3.7 MMOL/L (ref 3.5–5.1)
PROT SERPL-MCNC: 6.1 G/DL (ref 6.4–8.2)
RBC # BLD AUTO: 2.47 X10(6)UL
SODIUM SERPL-SCNC: 139 MMOL/L (ref 136–145)
WBC # BLD AUTO: 8.8 X10(3) UL (ref 4–11)

## 2023-05-25 PROCEDURE — 71046 X-RAY EXAM CHEST 2 VIEWS: CPT | Performed by: INTERNAL MEDICINE

## 2023-05-25 PROCEDURE — 96417 CHEMO IV INFUS EACH ADDL SEQ: CPT

## 2023-05-25 PROCEDURE — 85025 COMPLETE CBC W/AUTO DIFF WBC: CPT

## 2023-05-25 PROCEDURE — 96372 THER/PROPH/DIAG INJ SC/IM: CPT

## 2023-05-25 PROCEDURE — 96367 TX/PROPH/DG ADDL SEQ IV INF: CPT

## 2023-05-25 PROCEDURE — 99215 OFFICE O/P EST HI 40 MIN: CPT | Performed by: INTERNAL MEDICINE

## 2023-05-25 PROCEDURE — 96375 TX/PRO/DX INJ NEW DRUG ADDON: CPT

## 2023-05-25 PROCEDURE — 96413 CHEMO IV INFUSION 1 HR: CPT

## 2023-05-25 PROCEDURE — 80053 COMPREHEN METABOLIC PANEL: CPT

## 2023-05-25 RX ORDER — HEPARIN SODIUM (PORCINE) LOCK FLUSH IV SOLN 100 UNIT/ML 100 UNIT/ML
SOLUTION INTRAVENOUS
Status: DISPENSED
Start: 2023-05-25 | End: 2023-05-25

## 2023-05-25 RX ORDER — HEPARIN SODIUM (PORCINE) LOCK FLUSH IV SOLN 100 UNIT/ML 100 UNIT/ML
5 SOLUTION INTRAVENOUS ONCE
Status: DISCONTINUED | OUTPATIENT
Start: 2023-05-25 | End: 2023-05-25

## 2023-05-25 RX ORDER — SODIUM CHLORIDE 9 MG/ML
10 INJECTION INTRAVENOUS ONCE
Status: DISCONTINUED | OUTPATIENT
Start: 2023-05-25 | End: 2023-05-25

## 2023-05-25 RX ORDER — SODIUM CHLORIDE 9 MG/ML
10 INJECTION INTRAVENOUS ONCE
OUTPATIENT
Start: 2023-06-01

## 2023-05-25 RX ORDER — CYANOCOBALAMIN 1000 UG/ML
1000 INJECTION, SOLUTION INTRAMUSCULAR; SUBCUTANEOUS AS NEEDED
Start: 2023-06-01

## 2023-05-25 RX ORDER — LAMOTRIGINE 25 MG/1
500 TABLET ORAL ONCE
OUTPATIENT
Start: 2023-06-01

## 2023-05-25 RX ORDER — HEPARIN SODIUM (PORCINE) LOCK FLUSH IV SOLN 100 UNIT/ML 100 UNIT/ML
5 SOLUTION INTRAVENOUS ONCE
OUTPATIENT
Start: 2023-06-01

## 2023-05-25 RX ORDER — CYANOCOBALAMIN 1000 UG/ML
INJECTION, SOLUTION INTRAMUSCULAR; SUBCUTANEOUS
Status: DISPENSED
Start: 2023-05-25 | End: 2023-05-25

## 2023-05-25 RX ORDER — CYANOCOBALAMIN 1000 UG/ML
1000 INJECTION, SOLUTION INTRAMUSCULAR; SUBCUTANEOUS AS NEEDED
Status: DISCONTINUED | OUTPATIENT
Start: 2023-05-25 | End: 2023-05-25

## 2023-05-25 RX ADMIN — CYANOCOBALAMIN 1000 MCG: 1000 INJECTION, SOLUTION INTRAMUSCULAR; SUBCUTANEOUS at 11:39:00

## 2023-05-25 NOTE — PROGRESS NOTES
Pt here for C  6    D 1   Gemzar Carbo B 12 and Venofer 200 mg     Arrives Ambulating independently, accompanied by Self       Oral medications included in this regimen:  no    Patient confirms comprehension of cancer treatment schedule:  yes    Pregnancy screening:  Not applicable    Modifications in dose or schedule:  No    Patient to follow up next week for albs and possible transfusion      Medications appearance and physical integrity checked by RN.  Chemotherapy IV pump settings verified by 2 RNs:  yes     Frequency of blood return and site check throughout administration: Prior to administration, Prior to each drug and At completion of therapy     Infusion/treatment outcome:  patient tolerated treatment without incident    Education Record    Learner:  Patient  Barriers / Limitations:  None  Method:  Discussion  Education / instructions given:    Outcome:  Shows understanding    Discharged Home, Ambulating independently, accompanied by:Self    Patient/family verbalized understanding of future appointments: by printed AVS     Patient declined 30 min observation period as she has tolerated previously with no adverse reaction

## 2023-05-26 ENCOUNTER — NURSE ONLY (OUTPATIENT)
Dept: HEMATOLOGY/ONCOLOGY | Facility: HOSPITAL | Age: 63
End: 2023-05-26
Attending: INTERNAL MEDICINE
Payer: MEDICARE

## 2023-05-26 DIAGNOSIS — C50.411 MALIGNANT NEOPLASM OF UPPER-OUTER QUADRANT OF RIGHT BREAST IN FEMALE, ESTROGEN RECEPTOR POSITIVE (HCC): ICD-10-CM

## 2023-05-26 DIAGNOSIS — J91.0 MALIGNANT PLEURAL EFFUSION: Primary | ICD-10-CM

## 2023-05-26 DIAGNOSIS — Z17.0 MALIGNANT NEOPLASM OF UPPER-OUTER QUADRANT OF RIGHT BREAST IN FEMALE, ESTROGEN RECEPTOR POSITIVE (HCC): ICD-10-CM

## 2023-05-26 PROCEDURE — 96372 THER/PROPH/DIAG INJ SC/IM: CPT

## 2023-05-26 NOTE — PROGRESS NOTES
fulphilia given subcutaneous today. States she had some nausea last night, relief after she took some Compazine. No nausea today. Otherwise no other complaints. Discharged ambulatory, aware of future appointments made.

## 2023-05-31 ENCOUNTER — NURSE ONLY (OUTPATIENT)
Dept: HEMATOLOGY/ONCOLOGY | Facility: HOSPITAL | Age: 63
End: 2023-05-31
Attending: INTERNAL MEDICINE
Payer: MEDICARE

## 2023-05-31 ENCOUNTER — OFFICE VISIT (OUTPATIENT)
Dept: HEMATOLOGY/ONCOLOGY | Facility: HOSPITAL | Age: 63
End: 2023-05-31
Attending: PHYSICIAN ASSISTANT
Payer: MEDICARE

## 2023-05-31 VITALS
TEMPERATURE: 98 F | HEIGHT: 61 IN | SYSTOLIC BLOOD PRESSURE: 124 MMHG | RESPIRATION RATE: 16 BRPM | HEART RATE: 83 BPM | DIASTOLIC BLOOD PRESSURE: 70 MMHG | BODY MASS INDEX: 22.09 KG/M2 | OXYGEN SATURATION: 100 % | WEIGHT: 117 LBS

## 2023-05-31 VITALS — DIASTOLIC BLOOD PRESSURE: 74 MMHG | HEART RATE: 69 BPM | TEMPERATURE: 99 F | SYSTOLIC BLOOD PRESSURE: 128 MMHG

## 2023-05-31 DIAGNOSIS — D64.81 ANEMIA DUE TO ANTINEOPLASTIC CHEMOTHERAPY: Primary | ICD-10-CM

## 2023-05-31 DIAGNOSIS — Z79.899 ENCOUNTER FOR MEDICATION MANAGEMENT: ICD-10-CM

## 2023-05-31 DIAGNOSIS — E53.8 B12 DEFICIENCY: Primary | ICD-10-CM

## 2023-05-31 DIAGNOSIS — T45.1X5A ANEMIA DUE TO ANTINEOPLASTIC CHEMOTHERAPY: ICD-10-CM

## 2023-05-31 DIAGNOSIS — Z51.11 CHEMOTHERAPY MANAGEMENT, ENCOUNTER FOR: ICD-10-CM

## 2023-05-31 DIAGNOSIS — K90.9 MALABSORPTION OF IRON: ICD-10-CM

## 2023-05-31 DIAGNOSIS — E61.1 IRON DEFICIENCY: ICD-10-CM

## 2023-05-31 DIAGNOSIS — E53.8 B12 DEFICIENCY: ICD-10-CM

## 2023-05-31 DIAGNOSIS — R06.00 DYSPNEA, UNSPECIFIED TYPE: ICD-10-CM

## 2023-05-31 DIAGNOSIS — D64.81 ANEMIA DUE TO ANTINEOPLASTIC CHEMOTHERAPY: ICD-10-CM

## 2023-05-31 DIAGNOSIS — C78.7 MALIGNANT NEOPLASM METASTATIC TO LIVER (HCC): ICD-10-CM

## 2023-05-31 DIAGNOSIS — Z45.2 ENCOUNTER FOR CENTRAL LINE CARE: ICD-10-CM

## 2023-05-31 DIAGNOSIS — K90.9 MALABSORPTION OF IRON: Primary | ICD-10-CM

## 2023-05-31 DIAGNOSIS — T45.1X5A ANEMIA DUE TO ANTINEOPLASTIC CHEMOTHERAPY: Primary | ICD-10-CM

## 2023-05-31 LAB
ANTIBODY SCREEN: NEGATIVE
BASOPHILS # BLD AUTO: 0.02 X10(3) UL (ref 0–0.2)
BASOPHILS NFR BLD AUTO: 0.2 %
DEPRECATED RDW RBC AUTO: 61.1 FL (ref 35.1–46.3)
EOSINOPHIL # BLD AUTO: 0.01 X10(3) UL (ref 0–0.7)
EOSINOPHIL NFR BLD AUTO: 0.1 %
ERYTHROCYTE [DISTWIDTH] IN BLOOD BY AUTOMATED COUNT: 16.2 % (ref 11–15)
HCT VFR BLD AUTO: 22.6 %
HGB BLD-MCNC: 7.4 G/DL
IMM GRANULOCYTES # BLD AUTO: 0.11 X10(3) UL (ref 0–1)
IMM GRANULOCYTES NFR BLD: 1.3 %
LYMPHOCYTES # BLD AUTO: 0.3 X10(3) UL (ref 1–4)
LYMPHOCYTES NFR BLD AUTO: 3.7 %
MCH RBC QN AUTO: 33.8 PG (ref 26–34)
MCHC RBC AUTO-ENTMCNC: 32.7 G/DL (ref 31–37)
MCV RBC AUTO: 103.2 FL
MONOCYTES # BLD AUTO: 0.13 X10(3) UL (ref 0.1–1)
MONOCYTES NFR BLD AUTO: 1.6 %
NEUTROPHILS # BLD AUTO: 7.63 X10 (3) UL (ref 1.5–7.7)
NEUTROPHILS # BLD AUTO: 7.63 X10(3) UL (ref 1.5–7.7)
NEUTROPHILS NFR BLD AUTO: 93.1 %
PLATELET # BLD AUTO: 36 10(3)UL (ref 150–450)
RBC # BLD AUTO: 2.19 X10(6)UL
RH BLOOD TYPE: POSITIVE
WBC # BLD AUTO: 8.2 X10(3) UL (ref 4–11)

## 2023-05-31 PROCEDURE — 85025 COMPLETE CBC W/AUTO DIFF WBC: CPT

## 2023-05-31 PROCEDURE — 85060 BLOOD SMEAR INTERPRETATION: CPT

## 2023-05-31 PROCEDURE — 36430 TRANSFUSION BLD/BLD COMPNT: CPT

## 2023-05-31 PROCEDURE — 86920 COMPATIBILITY TEST SPIN: CPT

## 2023-05-31 PROCEDURE — 86900 BLOOD TYPING SEROLOGIC ABO: CPT

## 2023-05-31 PROCEDURE — 96402 CHEMO HORMON ANTINEOPL SQ/IM: CPT

## 2023-05-31 PROCEDURE — 86850 RBC ANTIBODY SCREEN: CPT

## 2023-05-31 PROCEDURE — 99214 OFFICE O/P EST MOD 30 MIN: CPT | Performed by: PHYSICIAN ASSISTANT

## 2023-05-31 PROCEDURE — 86901 BLOOD TYPING SEROLOGIC RH(D): CPT

## 2023-05-31 RX ORDER — LAMOTRIGINE 25 MG/1
500 TABLET ORAL ONCE
OUTPATIENT
Start: 2023-06-22

## 2023-05-31 RX ORDER — CYANOCOBALAMIN 1000 UG/ML
1000 INJECTION, SOLUTION INTRAMUSCULAR; SUBCUTANEOUS AS NEEDED
Start: 2023-06-01

## 2023-05-31 RX ORDER — LAMOTRIGINE 25 MG/1
500 TABLET ORAL ONCE
Status: COMPLETED | OUTPATIENT
Start: 2023-05-31 | End: 2023-05-31

## 2023-05-31 RX ORDER — HEPARIN SODIUM (PORCINE) LOCK FLUSH IV SOLN 100 UNIT/ML 100 UNIT/ML
SOLUTION INTRAVENOUS
Status: COMPLETED
Start: 2023-05-31 | End: 2023-05-31

## 2023-05-31 RX ORDER — HEPARIN SODIUM (PORCINE) LOCK FLUSH IV SOLN 100 UNIT/ML 100 UNIT/ML
5 SOLUTION INTRAVENOUS ONCE
Status: COMPLETED | OUTPATIENT
Start: 2023-05-31 | End: 2023-05-31

## 2023-05-31 RX ORDER — LAMOTRIGINE 25 MG/1
500 TABLET ORAL ONCE
Status: CANCELLED | OUTPATIENT
Start: 2023-06-01

## 2023-05-31 RX ORDER — SODIUM CHLORIDE 9 MG/ML
10 INJECTION INTRAVENOUS ONCE
OUTPATIENT
Start: 2023-06-01

## 2023-05-31 RX ORDER — SODIUM CHLORIDE 9 MG/ML
10 INJECTION INTRAVENOUS ONCE
OUTPATIENT
Start: 2023-06-22

## 2023-05-31 RX ORDER — HEPARIN SODIUM (PORCINE) LOCK FLUSH IV SOLN 100 UNIT/ML 100 UNIT/ML
5 SOLUTION INTRAVENOUS ONCE
Status: CANCELLED | OUTPATIENT
Start: 2023-06-01

## 2023-05-31 RX ORDER — HEPARIN SODIUM (PORCINE) LOCK FLUSH IV SOLN 100 UNIT/ML 100 UNIT/ML
5 SOLUTION INTRAVENOUS ONCE
OUTPATIENT
Start: 2023-06-22

## 2023-05-31 RX ORDER — CYANOCOBALAMIN 1000 UG/ML
1000 INJECTION, SOLUTION INTRAMUSCULAR; SUBCUTANEOUS AS NEEDED
Start: 2023-06-22

## 2023-05-31 RX ORDER — LAMOTRIGINE 25 MG/1
500 TABLET ORAL ONCE
OUTPATIENT
Start: 2023-06-01

## 2023-05-31 RX ADMIN — LAMOTRIGINE 500 MG: 25 TABLET ORAL at 14:03:00

## 2023-05-31 RX ADMIN — HEPARIN SODIUM (PORCINE) LOCK FLUSH IV SOLN 100 UNIT/ML 500 UNITS: 100 SOLUTION INTRAVENOUS at 17:32:00

## 2023-05-31 NOTE — PROGRESS NOTES
Pt here for one unit PRBC for a current hgb of 7.4  Feeling short of breath, increased heart rate at times. Feels cold easily. Ordering Tyra PAC       Pt tolerated infusion without difficulty or complaint.        Education Record    Learner:  Patient    Disease / Diagnosis: Anemia    Barriers / Limitations:  None   Comments:    Method:  Discussion   Comments:    General Topics:  Plan of care reviewed   Comments:    Outcome:  Shows understanding   Comments:

## 2023-06-01 ENCOUNTER — APPOINTMENT (OUTPATIENT)
Dept: HEMATOLOGY/ONCOLOGY | Facility: HOSPITAL | Age: 63
End: 2023-06-01
Attending: INTERNAL MEDICINE
Payer: MEDICARE

## 2023-06-01 LAB
BLOOD TYPE BARCODE: 6200
UNIT VOLUME: 350 ML

## 2023-06-06 DIAGNOSIS — R06.09 DYSPNEA ON EXERTION: ICD-10-CM

## 2023-06-06 RX ORDER — FUROSEMIDE 20 MG/1
20 TABLET ORAL 2 TIMES DAILY
Qty: 180 TABLET | Refills: 0 | Status: SHIPPED | OUTPATIENT
Start: 2023-06-06

## 2023-06-09 ENCOUNTER — TELEPHONE (OUTPATIENT)
Dept: HEMATOLOGY/ONCOLOGY | Facility: HOSPITAL | Age: 63
End: 2023-06-09

## 2023-06-09 NOTE — TELEPHONE ENCOUNTER
Ric Beard from [facility] is calling to ask how much dose of Jennifer Ortega was administered to the pt on AFK:11-21-33, 10-20-22 and 11-3-22.  He can be reached at [phone]. -NL

## 2023-06-14 RX ORDER — SODIUM CHLORIDE 9 MG/ML
10 INJECTION INTRAVENOUS ONCE
OUTPATIENT
Start: 2023-06-21

## 2023-06-14 RX ORDER — HEPARIN SODIUM (PORCINE) LOCK FLUSH IV SOLN 100 UNIT/ML 100 UNIT/ML
5 SOLUTION INTRAVENOUS ONCE
Status: CANCELLED | OUTPATIENT
Start: 2023-06-21

## 2023-06-15 ENCOUNTER — NURSE ONLY (OUTPATIENT)
Dept: HEMATOLOGY/ONCOLOGY | Facility: HOSPITAL | Age: 63
End: 2023-06-15
Attending: INTERNAL MEDICINE
Payer: MEDICARE

## 2023-06-15 ENCOUNTER — OFFICE VISIT (OUTPATIENT)
Dept: HEMATOLOGY/ONCOLOGY | Facility: HOSPITAL | Age: 63
End: 2023-06-15
Attending: PHYSICIAN ASSISTANT
Payer: MEDICARE

## 2023-06-15 VITALS
DIASTOLIC BLOOD PRESSURE: 70 MMHG | SYSTOLIC BLOOD PRESSURE: 119 MMHG | BODY MASS INDEX: 22.09 KG/M2 | TEMPERATURE: 98 F | WEIGHT: 117 LBS | OXYGEN SATURATION: 97 % | HEIGHT: 61 IN | HEART RATE: 73 BPM | RESPIRATION RATE: 16 BRPM

## 2023-06-15 DIAGNOSIS — D64.81 ANTINEOPLASTIC CHEMOTHERAPY INDUCED ANEMIA: ICD-10-CM

## 2023-06-15 DIAGNOSIS — Z17.0 MALIGNANT NEOPLASM OF UPPER-OUTER QUADRANT OF RIGHT BREAST IN FEMALE, ESTROGEN RECEPTOR POSITIVE (HCC): ICD-10-CM

## 2023-06-15 DIAGNOSIS — C79.51 METASTASIS TO BONE (HCC): ICD-10-CM

## 2023-06-15 DIAGNOSIS — C78.7 MALIGNANT NEOPLASM METASTATIC TO LIVER (HCC): ICD-10-CM

## 2023-06-15 DIAGNOSIS — C50.919 TRIPLE NEGATIVE BREAST CANCER (HCC): ICD-10-CM

## 2023-06-15 DIAGNOSIS — C50.411 MALIGNANT NEOPLASM OF UPPER-OUTER QUADRANT OF RIGHT BREAST IN FEMALE, ESTROGEN RECEPTOR POSITIVE (HCC): Primary | ICD-10-CM

## 2023-06-15 DIAGNOSIS — C78.00 LYMPHANGITIC LUNG METASTASIS (HCC): ICD-10-CM

## 2023-06-15 DIAGNOSIS — T45.1X5A ANTINEOPLASTIC CHEMOTHERAPY INDUCED ANEMIA: ICD-10-CM

## 2023-06-15 DIAGNOSIS — C50.411 MALIGNANT NEOPLASM OF UPPER-OUTER QUADRANT OF RIGHT BREAST IN FEMALE, ESTROGEN RECEPTOR POSITIVE (HCC): ICD-10-CM

## 2023-06-15 DIAGNOSIS — J91.0 MALIGNANT PLEURAL EFFUSION: Primary | ICD-10-CM

## 2023-06-15 DIAGNOSIS — Z09 CHEMOTHERAPY FOLLOW-UP EXAMINATION: ICD-10-CM

## 2023-06-15 DIAGNOSIS — Z45.2 ENCOUNTER FOR CENTRAL LINE CARE: ICD-10-CM

## 2023-06-15 DIAGNOSIS — Z17.0 MALIGNANT NEOPLASM OF UPPER-OUTER QUADRANT OF RIGHT BREAST IN FEMALE, ESTROGEN RECEPTOR POSITIVE (HCC): Primary | ICD-10-CM

## 2023-06-15 LAB
ALBUMIN SERPL-MCNC: 3 G/DL (ref 3.4–5)
ALBUMIN/GLOB SERPL: 1 {RATIO} (ref 1–2)
ALP LIVER SERPL-CCNC: 111 U/L
ALT SERPL-CCNC: 41 U/L
ANION GAP SERPL CALC-SCNC: 6 MMOL/L (ref 0–18)
AST SERPL-CCNC: 34 U/L (ref 15–37)
BASOPHILS # BLD AUTO: 0.01 X10(3) UL (ref 0–0.2)
BASOPHILS NFR BLD AUTO: 0.2 %
BILIRUB SERPL-MCNC: 0.5 MG/DL (ref 0.1–2)
BUN BLD-MCNC: 33 MG/DL (ref 7–18)
BUN/CREAT SERPL: 31.1 (ref 10–20)
CALCIUM BLD-MCNC: 8.7 MG/DL (ref 8.5–10.1)
CHLORIDE SERPL-SCNC: 109 MMOL/L (ref 98–112)
CO2 SERPL-SCNC: 28 MMOL/L (ref 21–32)
CREAT BLD-MCNC: 1.06 MG/DL
DEPRECATED RDW RBC AUTO: 91.6 FL (ref 35.1–46.3)
EOSINOPHIL # BLD AUTO: 0.01 X10(3) UL (ref 0–0.7)
EOSINOPHIL NFR BLD AUTO: 0.2 %
ERYTHROCYTE [DISTWIDTH] IN BLOOD BY AUTOMATED COUNT: 27.9 % (ref 11–15)
GFR SERPLBLD BASED ON 1.73 SQ M-ARVRAT: 59 ML/MIN/1.73M2 (ref 60–?)
GLOBULIN PLAS-MCNC: 3 G/DL (ref 2.8–4.4)
GLUCOSE BLD-MCNC: 113 MG/DL (ref 70–99)
HCT VFR BLD AUTO: 27.5 %
HGB BLD-MCNC: 8.6 G/DL
IMM GRANULOCYTES # BLD AUTO: 0.05 X10(3) UL (ref 0–1)
IMM GRANULOCYTES NFR BLD: 0.9 %
LYMPHOCYTES # BLD AUTO: 0.39 X10(3) UL (ref 1–4)
LYMPHOCYTES NFR BLD AUTO: 7.3 %
MCH RBC QN AUTO: 29.2 PG (ref 26–34)
MCHC RBC AUTO-ENTMCNC: 31.3 G/DL (ref 31–37)
MCV RBC AUTO: 93.2 FL
MONOCYTES # BLD AUTO: 0.54 X10(3) UL (ref 0.1–1)
MONOCYTES NFR BLD AUTO: 10.1 %
NEUTROPHILS # BLD AUTO: 4.34 X10 (3) UL (ref 1.5–7.7)
NEUTROPHILS # BLD AUTO: 4.34 X10(3) UL (ref 1.5–7.7)
NEUTROPHILS NFR BLD AUTO: 81.3 %
OSMOLALITY SERPL CALC.SUM OF ELEC: 304 MOSM/KG (ref 275–295)
PLATELET # BLD AUTO: 181 10(3)UL (ref 150–450)
PLATELET MORPHOLOGY: NORMAL
POTASSIUM SERPL-SCNC: 3.8 MMOL/L (ref 3.5–5.1)
PROT SERPL-MCNC: 6 G/DL (ref 6.4–8.2)
RBC # BLD AUTO: 2.95 X10(6)UL
SODIUM SERPL-SCNC: 143 MMOL/L (ref 136–145)
WBC # BLD AUTO: 5.3 X10(3) UL (ref 4–11)

## 2023-06-15 PROCEDURE — 96417 CHEMO IV INFUS EACH ADDL SEQ: CPT

## 2023-06-15 PROCEDURE — 96372 THER/PROPH/DIAG INJ SC/IM: CPT

## 2023-06-15 PROCEDURE — 99215 OFFICE O/P EST HI 40 MIN: CPT | Performed by: INTERNAL MEDICINE

## 2023-06-15 PROCEDURE — 85025 COMPLETE CBC W/AUTO DIFF WBC: CPT

## 2023-06-15 PROCEDURE — 96413 CHEMO IV INFUSION 1 HR: CPT

## 2023-06-15 PROCEDURE — 80053 COMPREHEN METABOLIC PANEL: CPT

## 2023-06-15 PROCEDURE — 96367 TX/PROPH/DG ADDL SEQ IV INF: CPT

## 2023-06-15 RX ORDER — HEPARIN SODIUM (PORCINE) LOCK FLUSH IV SOLN 100 UNIT/ML 100 UNIT/ML
SOLUTION INTRAVENOUS
Status: DISPENSED
Start: 2023-06-15 | End: 2023-06-16

## 2023-06-15 RX ORDER — LAMOTRIGINE 25 MG/1
500 TABLET ORAL ONCE
OUTPATIENT
Start: 2023-06-22

## 2023-06-15 RX ORDER — FOLIC ACID 1 MG/1
1 TABLET ORAL DAILY
Qty: 90 TABLET | Refills: 3 | Status: SHIPPED | OUTPATIENT
Start: 2023-06-15

## 2023-06-15 RX ORDER — HEPARIN SODIUM (PORCINE) LOCK FLUSH IV SOLN 100 UNIT/ML 100 UNIT/ML
5 SOLUTION INTRAVENOUS ONCE
Status: COMPLETED | OUTPATIENT
Start: 2023-06-15 | End: 2023-06-15

## 2023-06-15 RX ORDER — SODIUM CHLORIDE 9 MG/ML
10 INJECTION INTRAVENOUS ONCE
OUTPATIENT
Start: 2023-06-22

## 2023-06-15 RX ORDER — HEPARIN SODIUM (PORCINE) LOCK FLUSH IV SOLN 100 UNIT/ML 100 UNIT/ML
5 SOLUTION INTRAVENOUS ONCE
OUTPATIENT
Start: 2023-06-22

## 2023-06-15 RX ADMIN — HEPARIN SODIUM (PORCINE) LOCK FLUSH IV SOLN 100 UNIT/ML 500 UNITS: 100 SOLUTION INTRAVENOUS at 17:08:00

## 2023-06-15 NOTE — PROGRESS NOTES
Pt here for C7D1 Carbo/Gemzar and Fulphila. Arrives Ambulating independently, accompanied by Self from MD visit. Oral medications included in this regimen:  no    Patient confirms comprehension of cancer treatment schedule:  yes    Pregnancy screening:  Not applicable    Modifications in dose or schedule:  No    Medications appearance and physical integrity checked by RN. Chemotherapy IV pump settings verified by 2 RNs:  yes     Port accessed previously in lab, present blood return noted.  Frequency of blood return and site check throughout administration: Prior to administration, Prior to each drug and At completion of therapy     Infusion/treatment outcome:  patient tolerated treatment without incident    Education Record    Learner:  Patient  Barriers / Limitations:  None  Method:  Reinforcement  Education / instructions given: Reinforced treatment schedule and potential side effects   Outcome:  Shows understanding   Discharged Home, Ambulating independently, accompanied by:Self    Patient/family verbalized understanding of future appointments: by printed AVS

## 2023-06-16 ENCOUNTER — TELEPHONE (OUTPATIENT)
Dept: HEMATOLOGY/ONCOLOGY | Facility: HOSPITAL | Age: 63
End: 2023-06-16

## 2023-06-16 ENCOUNTER — APPOINTMENT (OUTPATIENT)
Dept: HEMATOLOGY/ONCOLOGY | Facility: HOSPITAL | Age: 63
End: 2023-06-16
Attending: INTERNAL MEDICINE
Payer: MEDICARE

## 2023-06-16 NOTE — TELEPHONE ENCOUNTER
Adrienne Boy :[phone] Calling this is a secure line. To see if he can get the amount of  TRODELVY was given  to patient on the following dates 10/12/22, 10/13/22,10/22/22.  Thanks Sarsys

## 2023-06-28 ENCOUNTER — APPOINTMENT (OUTPATIENT)
Dept: HEMATOLOGY/ONCOLOGY | Facility: HOSPITAL | Age: 63
End: 2023-06-28
Attending: INTERNAL MEDICINE
Payer: MEDICARE

## 2023-06-28 DIAGNOSIS — K90.9 MALABSORPTION OF IRON: ICD-10-CM

## 2023-06-28 DIAGNOSIS — Z45.2 ENCOUNTER FOR CENTRAL LINE CARE: ICD-10-CM

## 2023-06-28 DIAGNOSIS — Z79.899 ENCOUNTER FOR MEDICATION MANAGEMENT: ICD-10-CM

## 2023-06-28 DIAGNOSIS — Z51.11 CHEMOTHERAPY MANAGEMENT, ENCOUNTER FOR: Primary | ICD-10-CM

## 2023-06-28 DIAGNOSIS — E61.1 IRON DEFICIENCY: ICD-10-CM

## 2023-06-28 DIAGNOSIS — E53.8 B12 DEFICIENCY: ICD-10-CM

## 2023-06-28 PROCEDURE — 96402 CHEMO HORMON ANTINEOPL SQ/IM: CPT

## 2023-06-28 RX ORDER — LAMOTRIGINE 25 MG/1
500 TABLET ORAL ONCE
OUTPATIENT
Start: 2023-07-19

## 2023-06-28 RX ORDER — HEPARIN SODIUM (PORCINE) LOCK FLUSH IV SOLN 100 UNIT/ML 100 UNIT/ML
5 SOLUTION INTRAVENOUS ONCE
OUTPATIENT
Start: 2023-07-19

## 2023-06-28 RX ORDER — SODIUM CHLORIDE 9 MG/ML
10 INJECTION INTRAVENOUS ONCE
OUTPATIENT
Start: 2023-07-19

## 2023-06-28 RX ORDER — LAMOTRIGINE 25 MG/1
500 TABLET ORAL ONCE
Status: COMPLETED | OUTPATIENT
Start: 2023-06-28 | End: 2023-06-28

## 2023-06-28 RX ADMIN — LAMOTRIGINE 500 MG: 25 TABLET ORAL at 13:56:00

## 2023-07-03 ENCOUNTER — HOSPITAL ENCOUNTER (OUTPATIENT)
Dept: CT IMAGING | Facility: HOSPITAL | Age: 63
Discharge: HOME | End: 2023-07-03
Attending: INTERNAL MEDICINE
Payer: MEDICARE

## 2023-07-03 DIAGNOSIS — C79.51 METASTASIS TO BONE (HCC): ICD-10-CM

## 2023-07-03 DIAGNOSIS — Z17.0 MALIGNANT NEOPLASM OF UPPER-OUTER QUADRANT OF RIGHT BREAST IN FEMALE, ESTROGEN RECEPTOR POSITIVE: ICD-10-CM

## 2023-07-03 DIAGNOSIS — C78.00 LYMPHANGITIC LUNG METASTASIS (HCC): ICD-10-CM

## 2023-07-03 DIAGNOSIS — C50.411 MALIGNANT NEOPLASM OF UPPER-OUTER QUADRANT OF RIGHT BREAST IN FEMALE, ESTROGEN RECEPTOR POSITIVE: ICD-10-CM

## 2023-07-03 DIAGNOSIS — C50.919 TRIPLE NEGATIVE BREAST CANCER (HCC): ICD-10-CM

## 2023-07-03 DIAGNOSIS — C78.7 MALIGNANT NEOPLASM METASTATIC TO LIVER (HCC): ICD-10-CM

## 2023-07-03 PROCEDURE — 74177 CT ABD & PELVIS W/CONTRAST: CPT | Performed by: INTERNAL MEDICINE

## 2023-07-03 PROCEDURE — 71260 CT THORAX DX C+: CPT | Performed by: INTERNAL MEDICINE

## 2023-07-06 ENCOUNTER — OFFICE VISIT (OUTPATIENT)
Dept: HEMATOLOGY/ONCOLOGY | Facility: HOSPITAL | Age: 63
End: 2023-07-06
Attending: NURSE PRACTITIONER
Payer: MEDICARE

## 2023-07-06 ENCOUNTER — APPOINTMENT (OUTPATIENT)
Dept: HEMATOLOGY/ONCOLOGY | Facility: HOSPITAL | Age: 63
End: 2023-07-06
Attending: INTERNAL MEDICINE
Payer: MEDICARE

## 2023-07-06 VITALS
BODY MASS INDEX: 22.43 KG/M2 | OXYGEN SATURATION: 97 % | SYSTOLIC BLOOD PRESSURE: 123 MMHG | HEIGHT: 61 IN | WEIGHT: 118.81 LBS | RESPIRATION RATE: 16 BRPM | DIASTOLIC BLOOD PRESSURE: 70 MMHG | HEART RATE: 71 BPM | TEMPERATURE: 99 F

## 2023-07-06 DIAGNOSIS — C78.7 MALIGNANT NEOPLASM METASTATIC TO LIVER (HCC): ICD-10-CM

## 2023-07-06 DIAGNOSIS — Z17.0 MALIGNANT NEOPLASM OF UPPER-OUTER QUADRANT OF RIGHT BREAST IN FEMALE, ESTROGEN RECEPTOR POSITIVE: Primary | ICD-10-CM

## 2023-07-06 DIAGNOSIS — Z51.81 MEDICATION MONITORING ENCOUNTER: ICD-10-CM

## 2023-07-06 DIAGNOSIS — C50.919 TRIPLE NEGATIVE BREAST CANCER (HCC): Primary | ICD-10-CM

## 2023-07-06 DIAGNOSIS — C50.411 MALIGNANT NEOPLASM OF UPPER-OUTER QUADRANT OF RIGHT BREAST IN FEMALE, ESTROGEN RECEPTOR POSITIVE: Primary | ICD-10-CM

## 2023-07-06 DIAGNOSIS — Z09 CHEMOTHERAPY FOLLOW-UP EXAMINATION: ICD-10-CM

## 2023-07-06 DIAGNOSIS — Z45.2 ENCOUNTER FOR CENTRAL LINE CARE: Primary | ICD-10-CM

## 2023-07-06 DIAGNOSIS — C50.919 METASTASIS FROM BREAST CANCER (HCC): ICD-10-CM

## 2023-07-06 DIAGNOSIS — C79.9 METASTASIS FROM BREAST CANCER (HCC): ICD-10-CM

## 2023-07-06 LAB
ALBUMIN SERPL-MCNC: 3 G/DL (ref 3.4–5)
ALBUMIN/GLOB SERPL: 0.9 {RATIO} (ref 1–2)
ALP LIVER SERPL-CCNC: 122 U/L
ALT SERPL-CCNC: 40 U/L
ANION GAP SERPL CALC-SCNC: 6 MMOL/L (ref 0–18)
AST SERPL-CCNC: 34 U/L (ref 15–37)
BASOPHILS # BLD AUTO: 0.02 X10(3) UL (ref 0–0.2)
BASOPHILS NFR BLD AUTO: 0.4 %
BILIRUB SERPL-MCNC: 0.5 MG/DL (ref 0.1–2)
BUN BLD-MCNC: 22 MG/DL (ref 7–18)
BUN/CREAT SERPL: 21.2 (ref 10–20)
CALCIUM BLD-MCNC: 9.1 MG/DL (ref 8.5–10.1)
CHLORIDE SERPL-SCNC: 110 MMOL/L (ref 98–112)
CO2 SERPL-SCNC: 28 MMOL/L (ref 21–32)
CREAT BLD-MCNC: 1.04 MG/DL
DEPRECATED RDW RBC AUTO: 95.5 FL (ref 35.1–46.3)
EOSINOPHIL # BLD AUTO: 0.02 X10(3) UL (ref 0–0.7)
EOSINOPHIL NFR BLD AUTO: 0.4 %
ERYTHROCYTE [DISTWIDTH] IN BLOOD BY AUTOMATED COUNT: 25.7 % (ref 11–15)
GFR SERPLBLD BASED ON 1.73 SQ M-ARVRAT: 61 ML/MIN/1.73M2 (ref 60–?)
GLOBULIN PLAS-MCNC: 3.2 G/DL (ref 2.8–4.4)
GLUCOSE BLD-MCNC: 102 MG/DL (ref 70–99)
HCT VFR BLD AUTO: 27 %
HGB BLD-MCNC: 8.6 G/DL
IMM GRANULOCYTES # BLD AUTO: 0.09 X10(3) UL (ref 0–1)
IMM GRANULOCYTES NFR BLD: 1.7 %
LYMPHOCYTES # BLD AUTO: 0.55 X10(3) UL (ref 1–4)
LYMPHOCYTES NFR BLD AUTO: 10.2 %
MCH RBC QN AUTO: 31.7 PG (ref 26–34)
MCHC RBC AUTO-ENTMCNC: 31.9 G/DL (ref 31–37)
MCV RBC AUTO: 99.6 FL
MONOCYTES # BLD AUTO: 0.66 X10(3) UL (ref 0.1–1)
MONOCYTES NFR BLD AUTO: 12.2 %
NEUTROPHILS # BLD AUTO: 4.06 X10 (3) UL (ref 1.5–7.7)
NEUTROPHILS # BLD AUTO: 4.06 X10(3) UL (ref 1.5–7.7)
NEUTROPHILS NFR BLD AUTO: 75.1 %
OSMOLALITY SERPL CALC.SUM OF ELEC: 302 MOSM/KG (ref 275–295)
PLATELET # BLD AUTO: 160 10(3)UL (ref 150–450)
PLATELET MORPHOLOGY: NORMAL
POTASSIUM SERPL-SCNC: 3.9 MMOL/L (ref 3.5–5.1)
PROT SERPL-MCNC: 6.2 G/DL (ref 6.4–8.2)
RBC # BLD AUTO: 2.71 X10(6)UL
SODIUM SERPL-SCNC: 144 MMOL/L (ref 136–145)
WBC # BLD AUTO: 5.4 X10(3) UL (ref 4–11)

## 2023-07-06 PROCEDURE — 80053 COMPREHEN METABOLIC PANEL: CPT

## 2023-07-06 PROCEDURE — 99215 OFFICE O/P EST HI 40 MIN: CPT | Performed by: INTERNAL MEDICINE

## 2023-07-06 PROCEDURE — 85025 COMPLETE CBC W/AUTO DIFF WBC: CPT

## 2023-07-06 PROCEDURE — 36591 DRAW BLOOD OFF VENOUS DEVICE: CPT

## 2023-07-06 RX ORDER — SODIUM CHLORIDE 9 MG/ML
10 INJECTION INTRAVENOUS ONCE
OUTPATIENT
Start: 2023-09-01

## 2023-07-06 RX ORDER — HEPARIN SODIUM (PORCINE) LOCK FLUSH IV SOLN 100 UNIT/ML 100 UNIT/ML
5 SOLUTION INTRAVENOUS ONCE
Status: COMPLETED | OUTPATIENT
Start: 2023-07-06 | End: 2023-07-06

## 2023-07-06 RX ORDER — HEPARIN SODIUM (PORCINE) LOCK FLUSH IV SOLN 100 UNIT/ML 100 UNIT/ML
5 SOLUTION INTRAVENOUS ONCE
OUTPATIENT
Start: 2023-09-01

## 2023-07-06 RX ORDER — HEPARIN SODIUM (PORCINE) LOCK FLUSH IV SOLN 100 UNIT/ML 100 UNIT/ML
SOLUTION INTRAVENOUS
Status: COMPLETED
Start: 2023-07-06 | End: 2023-07-06

## 2023-07-06 RX ADMIN — HEPARIN SODIUM (PORCINE) LOCK FLUSH IV SOLN 100 UNIT/ML 500 UNITS: 100 SOLUTION INTRAVENOUS at 13:02:00

## 2023-07-06 NOTE — PROGRESS NOTES
Patient being switched to new chemotherapy regimen - no treatment today. Right PAC deaccessed. Flushed, heparin locked, 2x2 gauze and paper tape applied. Discharged home, stable - patient aware of upcoming appointments.

## 2023-07-06 NOTE — PROGRESS NOTES
[place] lab drawn in [facility] today. [address] CDX ordered and sent via [address] [place] [address] Benjamin.

## 2023-07-07 DIAGNOSIS — Z51.11 CHEMOTHERAPY MANAGEMENT, ENCOUNTER FOR: ICD-10-CM

## 2023-07-07 DIAGNOSIS — C50.919 PRIMARY MALIGNANT NEOPLASM OF BREAST WITH METASTASIS (HCC): ICD-10-CM

## 2023-07-07 RX ORDER — LORAZEPAM 0.5 MG/1
0.5 TABLET ORAL EVERY 6 HOURS PRN
Qty: 30 TABLET | Refills: 0 | Status: SHIPPED | OUTPATIENT
Start: 2023-07-07

## 2023-07-17 ENCOUNTER — TELEPHONE (OUTPATIENT)
Dept: HEMATOLOGY/ONCOLOGY | Facility: HOSPITAL | Age: 63
End: 2023-07-17

## 2023-07-17 NOTE — TELEPHONE ENCOUNTER
Patient called per Dr. Hollis Yang request and notified that the [address] results came back and negative for ESR1 mutation. Per Dr. Nabil Metzger will proceed with Eribulin ([place]). Patient scheduled to start tx on 7/20/23. Patient verbalizes understanding.

## 2023-07-20 ENCOUNTER — NURSE ONLY (OUTPATIENT)
Dept: HEMATOLOGY/ONCOLOGY | Facility: HOSPITAL | Age: 63
End: 2023-07-20
Attending: INTERNAL MEDICINE
Payer: MEDICARE

## 2023-07-20 VITALS
OXYGEN SATURATION: 97 % | RESPIRATION RATE: 16 BRPM | TEMPERATURE: 99 F | SYSTOLIC BLOOD PRESSURE: 128 MMHG | HEART RATE: 67 BPM | DIASTOLIC BLOOD PRESSURE: 70 MMHG | BODY MASS INDEX: 22.84 KG/M2 | HEIGHT: 61 IN | WEIGHT: 121 LBS

## 2023-07-20 DIAGNOSIS — J91.0 MALIGNANT PLEURAL EFFUSION: ICD-10-CM

## 2023-07-20 DIAGNOSIS — Z51.11 CHEMOTHERAPY MANAGEMENT, ENCOUNTER FOR: ICD-10-CM

## 2023-07-20 DIAGNOSIS — Z17.0 MALIGNANT NEOPLASM OF UPPER-OUTER QUADRANT OF RIGHT BREAST IN FEMALE, ESTROGEN RECEPTOR POSITIVE: Primary | ICD-10-CM

## 2023-07-20 DIAGNOSIS — C50.411 MALIGNANT NEOPLASM OF UPPER-OUTER QUADRANT OF RIGHT BREAST IN FEMALE, ESTROGEN RECEPTOR POSITIVE: Primary | ICD-10-CM

## 2023-07-20 DIAGNOSIS — C78.7 MALIGNANT NEOPLASM METASTATIC TO LIVER (HCC): ICD-10-CM

## 2023-07-20 DIAGNOSIS — Z45.2 ENCOUNTER FOR CENTRAL LINE CARE: ICD-10-CM

## 2023-07-20 LAB
ALBUMIN SERPL-MCNC: 2.9 G/DL (ref 3.4–5)
ALBUMIN/GLOB SERPL: 1 {RATIO} (ref 1–2)
ALP LIVER SERPL-CCNC: 100 U/L
ALT SERPL-CCNC: 48 U/L
ANION GAP SERPL CALC-SCNC: 5 MMOL/L (ref 0–18)
AST SERPL-CCNC: 39 U/L (ref 15–37)
BASOPHILS # BLD AUTO: 0.02 X10(3) UL (ref 0–0.2)
BASOPHILS NFR BLD AUTO: 0.6 %
BILIRUB SERPL-MCNC: 0.6 MG/DL (ref 0.1–2)
BUN BLD-MCNC: 20 MG/DL (ref 7–18)
BUN/CREAT SERPL: 18.5 (ref 10–20)
CALCIUM BLD-MCNC: 8.4 MG/DL (ref 8.5–10.1)
CHLORIDE SERPL-SCNC: 111 MMOL/L (ref 98–112)
CO2 SERPL-SCNC: 28 MMOL/L (ref 21–32)
CREAT BLD-MCNC: 1.08 MG/DL
DEPRECATED RDW RBC AUTO: 62.2 FL (ref 35.1–46.3)
EOSINOPHIL # BLD AUTO: 0.04 X10(3) UL (ref 0–0.7)
EOSINOPHIL NFR BLD AUTO: 1.1 %
ERYTHROCYTE [DISTWIDTH] IN BLOOD BY AUTOMATED COUNT: 18.6 % (ref 11–15)
GFR SERPLBLD BASED ON 1.73 SQ M-ARVRAT: 58 ML/MIN/1.73M2 (ref 60–?)
GLOBULIN PLAS-MCNC: 3 G/DL (ref 2.8–4.4)
GLUCOSE BLD-MCNC: 109 MG/DL (ref 70–99)
HCT VFR BLD AUTO: 30.3 %
HGB BLD-MCNC: 9.5 G/DL
IMM GRANULOCYTES # BLD AUTO: 0.01 X10(3) UL (ref 0–1)
IMM GRANULOCYTES NFR BLD: 0.3 %
LYMPHOCYTES # BLD AUTO: 0.65 X10(3) UL (ref 1–4)
LYMPHOCYTES NFR BLD AUTO: 18.1 %
MCH RBC QN AUTO: 32.2 PG (ref 26–34)
MCHC RBC AUTO-ENTMCNC: 31.4 G/DL (ref 31–37)
MCV RBC AUTO: 102.7 FL
MONOCYTES # BLD AUTO: 0.45 X10(3) UL (ref 0.1–1)
MONOCYTES NFR BLD AUTO: 12.5 %
NEUTROPHILS # BLD AUTO: 2.42 X10 (3) UL (ref 1.5–7.7)
NEUTROPHILS # BLD AUTO: 2.42 X10(3) UL (ref 1.5–7.7)
NEUTROPHILS NFR BLD AUTO: 67.4 %
OSMOLALITY SERPL CALC.SUM OF ELEC: 301 MOSM/KG (ref 275–295)
PLATELET # BLD AUTO: 180 10(3)UL (ref 150–450)
POTASSIUM SERPL-SCNC: 3.6 MMOL/L (ref 3.5–5.1)
PROT SERPL-MCNC: 5.9 G/DL (ref 6.4–8.2)
RBC # BLD AUTO: 2.95 X10(6)UL
SODIUM SERPL-SCNC: 144 MMOL/L (ref 136–145)
WBC # BLD AUTO: 3.6 X10(3) UL (ref 4–11)

## 2023-07-20 PROCEDURE — 80053 COMPREHEN METABOLIC PANEL: CPT

## 2023-07-20 PROCEDURE — 99215 OFFICE O/P EST HI 40 MIN: CPT | Performed by: NURSE PRACTITIONER

## 2023-07-20 PROCEDURE — 96375 TX/PRO/DX INJ NEW DRUG ADDON: CPT

## 2023-07-20 PROCEDURE — 85025 COMPLETE CBC W/AUTO DIFF WBC: CPT

## 2023-07-20 PROCEDURE — 96409 CHEMO IV PUSH SNGL DRUG: CPT

## 2023-07-20 RX ORDER — HEPARIN SODIUM (PORCINE) LOCK FLUSH IV SOLN 100 UNIT/ML 100 UNIT/ML
5 SOLUTION INTRAVENOUS ONCE
OUTPATIENT
Start: 2023-09-01

## 2023-07-20 RX ORDER — SODIUM CHLORIDE 9 MG/ML
10 INJECTION INTRAVENOUS ONCE
OUTPATIENT
Start: 2023-09-01

## 2023-07-20 RX ORDER — HEPARIN SODIUM (PORCINE) LOCK FLUSH IV SOLN 100 UNIT/ML 100 UNIT/ML
SOLUTION INTRAVENOUS
Status: COMPLETED
Start: 2023-07-20 | End: 2023-07-20

## 2023-07-20 RX ORDER — HEPARIN SODIUM (PORCINE) LOCK FLUSH IV SOLN 100 UNIT/ML 100 UNIT/ML
5 SOLUTION INTRAVENOUS ONCE
Status: COMPLETED | OUTPATIENT
Start: 2023-07-20 | End: 2023-07-20

## 2023-07-20 RX ADMIN — HEPARIN SODIUM (PORCINE) LOCK FLUSH IV SOLN 100 UNIT/ML 500 UNITS: 100 SOLUTION INTRAVENOUS at 11:25:00

## 2023-07-20 NOTE — PROGRESS NOTES
Pt here for Earvin Ready. Arrives Ambulating independently, accompanied by Self     Consent confirmed. Scanned in d/t unable to sign consent using iPad. Oral medications included in this regimen:  no    Patient confirms comprehension of cancer treatment schedule:  yes    Pregnancy screening:  Denies possibility of pregnancy    Modifications in dose or schedule:  No    Medications appearance and physical integrity checked by 2 RN.   yes     Frequency of blood return and site check throughout administration: Prior to administration, Every 2-3 ml IVP, and At completion of therapy     Infusion/treatment outcome:  patient tolerated treatment without incident    Education Record    Learner:  Patient  Barriers / Limitations:  None  Method:  Discussion  Education / instructions given:  POC  Outcome:  Shows understanding    Discharged Home, Ambulating independently, accompanied by:Self    Patient/family verbalized understanding of future appointments: by printed AVS

## 2023-07-21 ENCOUNTER — TELEPHONE (OUTPATIENT)
Dept: HEMATOLOGY/ONCOLOGY | Facility: HOSPITAL | Age: 63
End: 2023-07-21

## 2023-07-21 NOTE — TELEPHONE ENCOUNTER
Toxicities: C1 D2 Eribulin on 7/20/2023    Bishnu Zaragoza reports she is feeling \"good. \" No side effects at this time. I encouraged her to please call the office if she is not feeling well or she has any questions or concerns. She agreed and thanked me for checking on her.

## 2023-07-26 ENCOUNTER — APPOINTMENT (OUTPATIENT)
Dept: HEMATOLOGY/ONCOLOGY | Facility: HOSPITAL | Age: 63
End: 2023-07-26
Attending: INTERNAL MEDICINE
Payer: MEDICARE

## 2023-07-27 ENCOUNTER — NURSE ONLY (OUTPATIENT)
Dept: HEMATOLOGY/ONCOLOGY | Facility: HOSPITAL | Age: 63
End: 2023-07-27
Attending: INTERNAL MEDICINE
Payer: MEDICARE

## 2023-07-27 VITALS
HEART RATE: 73 BPM | OXYGEN SATURATION: 99 % | RESPIRATION RATE: 16 BRPM | BODY MASS INDEX: 22 KG/M2 | DIASTOLIC BLOOD PRESSURE: 76 MMHG | TEMPERATURE: 98 F | WEIGHT: 118 LBS | SYSTOLIC BLOOD PRESSURE: 139 MMHG

## 2023-07-27 DIAGNOSIS — J91.0 MALIGNANT PLEURAL EFFUSION: ICD-10-CM

## 2023-07-27 DIAGNOSIS — C50.411 MALIGNANT NEOPLASM OF UPPER-OUTER QUADRANT OF RIGHT BREAST IN FEMALE, ESTROGEN RECEPTOR POSITIVE: Primary | ICD-10-CM

## 2023-07-27 DIAGNOSIS — Z17.0 MALIGNANT NEOPLASM OF UPPER-OUTER QUADRANT OF RIGHT BREAST IN FEMALE, ESTROGEN RECEPTOR POSITIVE: Primary | ICD-10-CM

## 2023-07-27 DIAGNOSIS — Z45.2 ENCOUNTER FOR CENTRAL LINE CARE: ICD-10-CM

## 2023-07-27 LAB
BASOPHILS # BLD: 0.02 X10(3) UL (ref 0–0.2)
BASOPHILS NFR BLD: 1 %
DEPRECATED RDW RBC AUTO: 51.6 FL (ref 35.1–46.3)
EOSINOPHIL # BLD: 0.02 X10(3) UL (ref 0–0.7)
EOSINOPHIL NFR BLD: 1 %
ERYTHROCYTE [DISTWIDTH] IN BLOOD BY AUTOMATED COUNT: 16.4 % (ref 11–15)
HCT VFR BLD AUTO: 29.9 %
HGB BLD-MCNC: 9.8 G/DL
LYMPHOCYTES NFR BLD: 0.48 X10(3) UL (ref 1–4)
LYMPHOCYTES NFR BLD: 22 %
MCH RBC QN AUTO: 31.5 PG (ref 26–34)
MCHC RBC AUTO-ENTMCNC: 32.8 G/DL (ref 31–37)
MCV RBC AUTO: 96.1 FL
MONOCYTES # BLD: 0.09 X10(3) UL (ref 0.1–1)
MONOCYTES NFR BLD: 4 %
NEUTROPHILS # BLD AUTO: 1.4 X10 (3) UL (ref 1.5–7.7)
NEUTROPHILS NFR BLD: 70 %
NEUTS BAND NFR BLD: 2 %
NEUTS HYPERSEG # BLD: 1.58 X10(3) UL (ref 1.5–7.7)
PLATELET # BLD AUTO: 163 10(3)UL (ref 150–450)
PLATELET MORPHOLOGY: NORMAL
RBC # BLD AUTO: 3.11 X10(6)UL
TOTAL CELLS COUNTED BLD: 100
WBC # BLD AUTO: 2.2 X10(3) UL (ref 4–11)

## 2023-07-27 PROCEDURE — 85025 COMPLETE CBC W/AUTO DIFF WBC: CPT

## 2023-07-27 PROCEDURE — 96375 TX/PRO/DX INJ NEW DRUG ADDON: CPT

## 2023-07-27 PROCEDURE — 85007 BL SMEAR W/DIFF WBC COUNT: CPT

## 2023-07-27 PROCEDURE — 96409 CHEMO IV PUSH SNGL DRUG: CPT

## 2023-07-27 PROCEDURE — 85060 BLOOD SMEAR INTERPRETATION: CPT

## 2023-07-27 PROCEDURE — 85027 COMPLETE CBC AUTOMATED: CPT

## 2023-07-27 RX ORDER — HEPARIN SODIUM (PORCINE) LOCK FLUSH IV SOLN 100 UNIT/ML 100 UNIT/ML
5 SOLUTION INTRAVENOUS ONCE
OUTPATIENT
Start: 2023-09-01

## 2023-07-27 RX ORDER — SODIUM CHLORIDE 9 MG/ML
10 INJECTION INTRAVENOUS ONCE
OUTPATIENT
Start: 2023-09-01

## 2023-07-27 RX ORDER — HEPARIN SODIUM (PORCINE) LOCK FLUSH IV SOLN 100 UNIT/ML 100 UNIT/ML
5 SOLUTION INTRAVENOUS ONCE
Status: COMPLETED | OUTPATIENT
Start: 2023-07-27 | End: 2023-07-27

## 2023-07-27 RX ADMIN — HEPARIN SODIUM (PORCINE) LOCK FLUSH IV SOLN 100 UNIT/ML 500 UNITS: 100 SOLUTION INTRAVENOUS at 13:35:00

## 2023-07-27 NOTE — PROGRESS NOTES
Pt here for C1D8 Halaven. Arrives Ambulating independently, accompanied by Self from lab visit. Oral medications included in this regimen:  no    Patient confirms comprehension of cancer treatment schedule:  yes    Pregnancy screening:  Not applicable    Modifications in dose or schedule:  Yes - OK to treat for ANC greater than 1. Dose at 1.1 mg/m2 verified with Dr. Aleida Olvera by pharmacy    Medications appearance and physical integrity checked by RN.  Chemotherapy IV pump settings verified by 2 RNs:  yes     Frequency of blood return and site check throughout administration: Prior to administration, Prior to each drug, and At completion of therapy     Infusion/treatment outcome:  patient tolerated treatment without incident    Education Record    Learner:  Patient  Barriers / Limitations:  None  Method:  Reinforcement  Education / instructions given:  Reinforced plan of care  Outcome:  Shows understanding    Discharged Home, Ambulating independently, accompanied by:Self    Patient/family verbalized understanding of future appointments: by printed AVS

## 2023-08-10 ENCOUNTER — OFFICE VISIT (OUTPATIENT)
Dept: HEMATOLOGY/ONCOLOGY | Facility: HOSPITAL | Age: 63
End: 2023-08-10
Attending: INTERNAL MEDICINE
Payer: MEDICARE

## 2023-08-10 VITALS
DIASTOLIC BLOOD PRESSURE: 73 MMHG | HEART RATE: 66 BPM | OXYGEN SATURATION: 99 % | WEIGHT: 120 LBS | TEMPERATURE: 98 F | RESPIRATION RATE: 16 BRPM | BODY MASS INDEX: 22.66 KG/M2 | HEIGHT: 61 IN | SYSTOLIC BLOOD PRESSURE: 113 MMHG

## 2023-08-10 DIAGNOSIS — C50.411 MALIGNANT NEOPLASM OF UPPER-OUTER QUADRANT OF RIGHT BREAST IN FEMALE, ESTROGEN RECEPTOR POSITIVE: Primary | ICD-10-CM

## 2023-08-10 DIAGNOSIS — J91.0 MALIGNANT PLEURAL EFFUSION: ICD-10-CM

## 2023-08-10 DIAGNOSIS — Z17.0 MALIGNANT NEOPLASM OF UPPER-OUTER QUADRANT OF RIGHT BREAST IN FEMALE, ESTROGEN RECEPTOR POSITIVE: Primary | ICD-10-CM

## 2023-08-10 DIAGNOSIS — Z51.11 CHEMOTHERAPY MANAGEMENT, ENCOUNTER FOR: ICD-10-CM

## 2023-08-10 DIAGNOSIS — C78.7 MALIGNANT NEOPLASM METASTATIC TO LIVER (HCC): ICD-10-CM

## 2023-08-10 LAB
ALBUMIN SERPL-MCNC: 3 G/DL (ref 3.4–5)
ALBUMIN/GLOB SERPL: 1 {RATIO} (ref 1–2)
ALP LIVER SERPL-CCNC: 81 U/L
ALT SERPL-CCNC: 29 U/L
ANION GAP SERPL CALC-SCNC: 5 MMOL/L (ref 0–18)
AST SERPL-CCNC: 26 U/L (ref 15–37)
BASOPHILS # BLD AUTO: 0.01 X10(3) UL (ref 0–0.2)
BASOPHILS NFR BLD AUTO: 0.4 %
BILIRUB SERPL-MCNC: 0.7 MG/DL (ref 0.1–2)
BUN BLD-MCNC: 22 MG/DL (ref 7–18)
BUN/CREAT SERPL: 17.7 (ref 10–20)
CALCIUM BLD-MCNC: 8.9 MG/DL (ref 8.5–10.1)
CHLORIDE SERPL-SCNC: 111 MMOL/L (ref 98–112)
CO2 SERPL-SCNC: 29 MMOL/L (ref 21–32)
CREAT BLD-MCNC: 1.24 MG/DL
DEPRECATED RDW RBC AUTO: 51 FL (ref 35.1–46.3)
EGFRCR SERPLBLD CKD-EPI 2021: 49 ML/MIN/1.73M2 (ref 60–?)
EOSINOPHIL # BLD AUTO: 0 X10(3) UL (ref 0–0.7)
EOSINOPHIL NFR BLD AUTO: 0 %
ERYTHROCYTE [DISTWIDTH] IN BLOOD BY AUTOMATED COUNT: 15.2 % (ref 11–15)
GLOBULIN PLAS-MCNC: 3.1 G/DL (ref 2.8–4.4)
GLUCOSE BLD-MCNC: 109 MG/DL (ref 70–99)
HCT VFR BLD AUTO: 31 %
HGB BLD-MCNC: 10 G/DL
IMM GRANULOCYTES # BLD AUTO: 0.04 X10(3) UL (ref 0–1)
IMM GRANULOCYTES NFR BLD: 1.5 %
LYMPHOCYTES # BLD AUTO: 0.44 X10(3) UL (ref 1–4)
LYMPHOCYTES NFR BLD AUTO: 17 %
MCH RBC QN AUTO: 31.3 PG (ref 26–34)
MCHC RBC AUTO-ENTMCNC: 32.3 G/DL (ref 31–37)
MCV RBC AUTO: 96.9 FL
MONOCYTES # BLD AUTO: 0.56 X10(3) UL (ref 0.1–1)
MONOCYTES NFR BLD AUTO: 21.6 %
NEUTROPHILS # BLD AUTO: 1.54 X10 (3) UL (ref 1.5–7.7)
NEUTROPHILS # BLD AUTO: 1.54 X10(3) UL (ref 1.5–7.7)
NEUTROPHILS NFR BLD AUTO: 59.5 %
OSMOLALITY SERPL CALC.SUM OF ELEC: 304 MOSM/KG (ref 275–295)
PLATELET # BLD AUTO: 170 10(3)UL (ref 150–450)
POTASSIUM SERPL-SCNC: 3.7 MMOL/L (ref 3.5–5.1)
PROT SERPL-MCNC: 6.1 G/DL (ref 6.4–8.2)
RBC # BLD AUTO: 3.2 X10(6)UL
SODIUM SERPL-SCNC: 145 MMOL/L (ref 136–145)
WBC # BLD AUTO: 2.6 X10(3) UL (ref 4–11)

## 2023-08-10 PROCEDURE — 96375 TX/PRO/DX INJ NEW DRUG ADDON: CPT

## 2023-08-10 PROCEDURE — 80053 COMPREHEN METABOLIC PANEL: CPT

## 2023-08-10 PROCEDURE — 85025 COMPLETE CBC W/AUTO DIFF WBC: CPT

## 2023-08-10 PROCEDURE — 99215 OFFICE O/P EST HI 40 MIN: CPT | Performed by: NURSE PRACTITIONER

## 2023-08-10 PROCEDURE — 96409 CHEMO IV PUSH SNGL DRUG: CPT

## 2023-08-10 NOTE — PROGRESS NOTES
Pt here for C2 D1 [place]      Arrives Ambulating independently, accompanied by Self       Oral medications included in this regimen:  no    Patient confirms comprehension of cancer treatment schedule:  yes    Pregnancy screening:  Denies possibility of pregnancy    Modifications in dose or schedule:  No    Medications appearance and physical integrity checked by RN.  Chemotherapy IV pump settings verified by 2 RNs:  yes     Frequency of blood return and site check throughout administration: Prior to administration, Every 2-3 ml IVP, and At completion of therapy     Infusion/treatment outcome:  patient tolerated treatment without incident    Education Record    Learner:  Patient  Barriers / Limitations:  None  Method:  Discussion  Education / instructions given:    Outcome:  Shows understanding    Discharged Home, Ambulating independently, accompanied by:Self    Patient/family verbalized understanding of future appointments: by printed AVS

## 2023-08-12 ENCOUNTER — APPOINTMENT (OUTPATIENT)
Dept: GENERAL RADIOLOGY | Facility: HOSPITAL | Age: 63
End: 2023-08-12
Attending: EMERGENCY MEDICINE
Payer: MEDICARE

## 2023-08-12 ENCOUNTER — HOSPITAL ENCOUNTER (EMERGENCY)
Facility: HOSPITAL | Age: 63
Discharge: HOME | End: 2023-08-12
Attending: EMERGENCY MEDICINE
Payer: MEDICARE

## 2023-08-12 VITALS
WEIGHT: 124 LBS | RESPIRATION RATE: 18 BRPM | SYSTOLIC BLOOD PRESSURE: 115 MMHG | HEIGHT: 61 IN | BODY MASS INDEX: 23.41 KG/M2 | DIASTOLIC BLOOD PRESSURE: 71 MMHG | OXYGEN SATURATION: 97 % | TEMPERATURE: 100 F | HEART RATE: 99 BPM

## 2023-08-12 DIAGNOSIS — J18.9 COMMUNITY ACQUIRED PNEUMONIA, UNSPECIFIED LATERALITY: Primary | ICD-10-CM

## 2023-08-12 LAB
ALBUMIN SERPL-MCNC: 3.2 G/DL (ref 3.4–5)
ALBUMIN/GLOB SERPL: 0.9 {RATIO} (ref 1–2)
ALP LIVER SERPL-CCNC: 91 U/L
ALT SERPL-CCNC: 31 U/L
ANION GAP SERPL CALC-SCNC: 9 MMOL/L (ref 0–18)
AST SERPL-CCNC: 36 U/L (ref 15–37)
BASOPHILS # BLD AUTO: 0.01 X10(3) UL (ref 0–0.2)
BASOPHILS NFR BLD AUTO: 0.3 %
BILIRUB SERPL-MCNC: 0.8 MG/DL (ref 0.1–2)
BILIRUB UR QL: NEGATIVE
BUN BLD-MCNC: 24 MG/DL (ref 7–18)
BUN/CREAT SERPL: 23.5 (ref 10–20)
CALCIUM BLD-MCNC: 8.5 MG/DL (ref 8.5–10.1)
CHLORIDE SERPL-SCNC: 103 MMOL/L (ref 98–112)
CLARITY UR: CLEAR
CO2 SERPL-SCNC: 25 MMOL/L (ref 21–32)
CREAT BLD-MCNC: 1.02 MG/DL
DEPRECATED RDW RBC AUTO: 49.2 FL (ref 35.1–46.3)
EGFRCR SERPLBLD CKD-EPI 2021: 62 ML/MIN/1.73M2 (ref 60–?)
EOSINOPHIL # BLD AUTO: 0 X10(3) UL (ref 0–0.7)
EOSINOPHIL NFR BLD AUTO: 0 %
ERYTHROCYTE [DISTWIDTH] IN BLOOD BY AUTOMATED COUNT: 14.9 % (ref 11–15)
GLOBULIN PLAS-MCNC: 3.4 G/DL (ref 2.8–4.4)
GLUCOSE BLD-MCNC: 90 MG/DL (ref 70–99)
GLUCOSE UR-MCNC: NORMAL MG/DL
HCT VFR BLD AUTO: 31.7 %
HGB BLD-MCNC: 10.4 G/DL
HGB UR QL STRIP.AUTO: NEGATIVE
IMM GRANULOCYTES # BLD AUTO: 0.02 X10(3) UL (ref 0–1)
IMM GRANULOCYTES NFR BLD: 0.6 %
KETONES UR-MCNC: 40 MG/DL
LEUKOCYTE ESTERASE UR QL STRIP.AUTO: NEGATIVE
LYMPHOCYTES # BLD AUTO: 0.09 X10(3) UL (ref 1–4)
LYMPHOCYTES NFR BLD AUTO: 2.5 %
MCH RBC QN AUTO: 30.8 PG (ref 26–34)
MCHC RBC AUTO-ENTMCNC: 32.8 G/DL (ref 31–37)
MCV RBC AUTO: 93.8 FL
MONOCYTES # BLD AUTO: 0.38 X10(3) UL (ref 0.1–1)
MONOCYTES NFR BLD AUTO: 10.6 %
NEUTROPHILS # BLD AUTO: 3.07 X10 (3) UL (ref 1.5–7.7)
NEUTROPHILS # BLD AUTO: 3.07 X10(3) UL (ref 1.5–7.7)
NEUTROPHILS NFR BLD AUTO: 86 %
NITRITE UR QL STRIP.AUTO: NEGATIVE
OSMOLALITY SERPL CALC.SUM OF ELEC: 288 MOSM/KG (ref 275–295)
PH UR: 5.5 [PH] (ref 5–8)
PLATELET # BLD AUTO: 114 10(3)UL (ref 150–450)
POTASSIUM SERPL-SCNC: 3.4 MMOL/L (ref 3.5–5.1)
PROT SERPL-MCNC: 6.6 G/DL (ref 6.4–8.2)
PROT UR-MCNC: NEGATIVE MG/DL
RBC # BLD AUTO: 3.38 X10(6)UL
SARS-COV-2 RNA RESP QL NAA+PROBE: NOT DETECTED
SODIUM SERPL-SCNC: 137 MMOL/L (ref 136–145)
SP GR UR STRIP: 1.02 (ref 1–1.03)
UROBILINOGEN UR STRIP-ACNC: NORMAL
WBC # BLD AUTO: 3.6 X10(3) UL (ref 4–11)

## 2023-08-12 PROCEDURE — 36415 COLL VENOUS BLD VENIPUNCTURE: CPT

## 2023-08-12 PROCEDURE — 87040 BLOOD CULTURE FOR BACTERIA: CPT | Performed by: EMERGENCY MEDICINE

## 2023-08-12 PROCEDURE — 99284 EMERGENCY DEPT VISIT MOD MDM: CPT

## 2023-08-12 PROCEDURE — 85025 COMPLETE CBC W/AUTO DIFF WBC: CPT | Performed by: EMERGENCY MEDICINE

## 2023-08-12 PROCEDURE — 96360 HYDRATION IV INFUSION INIT: CPT

## 2023-08-12 PROCEDURE — 71045 X-RAY EXAM CHEST 1 VIEW: CPT | Performed by: EMERGENCY MEDICINE

## 2023-08-12 PROCEDURE — 99285 EMERGENCY DEPT VISIT HI MDM: CPT

## 2023-08-12 PROCEDURE — 80053 COMPREHEN METABOLIC PANEL: CPT | Performed by: EMERGENCY MEDICINE

## 2023-08-12 RX ORDER — DOXYCYCLINE HYCLATE 100 MG/1
100 CAPSULE ORAL 2 TIMES DAILY
Qty: 10 CAPSULE | Refills: 0 | Status: SHIPPED | OUTPATIENT
Start: 2023-08-12 | End: 2023-08-17

## 2023-08-12 RX ORDER — DOXYCYCLINE HYCLATE 100 MG/1
100 CAPSULE ORAL ONCE
Status: COMPLETED | OUTPATIENT
Start: 2023-08-12 | End: 2023-08-12

## 2023-08-13 NOTE — ED INITIAL ASSESSMENT (HPI)
Chemo pt to ED with c/o fever noted this morning. Per pt, fever 103 at home. Pt states HA, left knee pain, and back pain.

## 2023-08-16 ENCOUNTER — NURSE ONLY (OUTPATIENT)
Dept: HEMATOLOGY/ONCOLOGY | Facility: HOSPITAL | Age: 63
End: 2023-08-16
Attending: NURSE PRACTITIONER
Payer: MEDICARE

## 2023-08-16 ENCOUNTER — TELEPHONE (OUTPATIENT)
Dept: HEMATOLOGY/ONCOLOGY | Facility: HOSPITAL | Age: 63
End: 2023-08-16

## 2023-08-16 VITALS
HEART RATE: 76 BPM | DIASTOLIC BLOOD PRESSURE: 86 MMHG | OXYGEN SATURATION: 98 % | BODY MASS INDEX: 21.75 KG/M2 | WEIGHT: 115.19 LBS | HEIGHT: 61 IN | RESPIRATION RATE: 18 BRPM | TEMPERATURE: 98 F | SYSTOLIC BLOOD PRESSURE: 119 MMHG

## 2023-08-16 DIAGNOSIS — Z51.81 MEDICATION MONITORING ENCOUNTER: ICD-10-CM

## 2023-08-16 DIAGNOSIS — C50.411 MALIGNANT NEOPLASM OF UPPER-OUTER QUADRANT OF RIGHT BREAST IN FEMALE, ESTROGEN RECEPTOR POSITIVE: Primary | ICD-10-CM

## 2023-08-16 DIAGNOSIS — Z51.11 CHEMOTHERAPY MANAGEMENT, ENCOUNTER FOR: ICD-10-CM

## 2023-08-16 DIAGNOSIS — C50.919 TRIPLE NEGATIVE BREAST CANCER (HCC): ICD-10-CM

## 2023-08-16 DIAGNOSIS — Z17.0 MALIGNANT NEOPLASM OF UPPER-OUTER QUADRANT OF RIGHT BREAST IN FEMALE, ESTROGEN RECEPTOR POSITIVE: Primary | ICD-10-CM

## 2023-08-16 DIAGNOSIS — R06.09 DYSPNEA ON EXERTION: ICD-10-CM

## 2023-08-16 DIAGNOSIS — C78.00 LYMPHANGITIC LUNG METASTASIS (HCC): Primary | ICD-10-CM

## 2023-08-16 DIAGNOSIS — C50.411 MALIGNANT NEOPLASM OF UPPER-OUTER QUADRANT OF RIGHT BREAST IN FEMALE, ESTROGEN RECEPTOR POSITIVE: ICD-10-CM

## 2023-08-16 DIAGNOSIS — C78.7 MALIGNANT NEOPLASM METASTATIC TO LIVER (HCC): ICD-10-CM

## 2023-08-16 DIAGNOSIS — C78.00 LYMPHANGITIC LUNG METASTASIS (HCC): ICD-10-CM

## 2023-08-16 DIAGNOSIS — Z17.0 MALIGNANT NEOPLASM OF UPPER-OUTER QUADRANT OF RIGHT BREAST IN FEMALE, ESTROGEN RECEPTOR POSITIVE: ICD-10-CM

## 2023-08-16 LAB
ALBUMIN SERPL-MCNC: 3.5 G/DL (ref 3.4–5)
ALBUMIN/GLOB SERPL: 1.2 {RATIO} (ref 1–2)
ALP LIVER SERPL-CCNC: 74 U/L
ALT SERPL-CCNC: 34 U/L
ANION GAP SERPL CALC-SCNC: 6 MMOL/L (ref 0–18)
ANTIBODY SCREEN: NEGATIVE
AST SERPL-CCNC: 32 U/L (ref 15–37)
BASOPHILS # BLD AUTO: 0.05 X10(3) UL (ref 0–0.2)
BASOPHILS NFR BLD AUTO: 1.9 %
BILIRUB SERPL-MCNC: 0.9 MG/DL (ref 0.1–2)
BUN BLD-MCNC: 22 MG/DL (ref 7–18)
BUN/CREAT SERPL: 21.6 (ref 10–20)
CALCIUM BLD-MCNC: 9.2 MG/DL (ref 8.5–10.1)
CHLORIDE SERPL-SCNC: 108 MMOL/L (ref 98–112)
CO2 SERPL-SCNC: 28 MMOL/L (ref 21–32)
CREAT BLD-MCNC: 1.02 MG/DL
DEPRECATED RDW RBC AUTO: 48.6 FL (ref 35.1–46.3)
EGFRCR SERPLBLD CKD-EPI 2021: 62 ML/MIN/1.73M2 (ref 60–?)
EOSINOPHIL # BLD AUTO: 0 X10(3) UL (ref 0–0.7)
EOSINOPHIL NFR BLD AUTO: 0 %
ERYTHROCYTE [DISTWIDTH] IN BLOOD BY AUTOMATED COUNT: 14.6 % (ref 11–15)
GLOBULIN PLAS-MCNC: 3 G/DL (ref 2.8–4.4)
GLUCOSE BLD-MCNC: 86 MG/DL (ref 70–99)
HCT VFR BLD AUTO: 31.4 %
HGB BLD-MCNC: 10.1 G/DL
IMM GRANULOCYTES # BLD AUTO: 0.05 X10(3) UL (ref 0–1)
IMM GRANULOCYTES NFR BLD: 1.9 %
LYMPHOCYTES # BLD AUTO: 0.49 X10(3) UL (ref 1–4)
LYMPHOCYTES NFR BLD AUTO: 18.4 %
MCH RBC QN AUTO: 30.5 PG (ref 26–34)
MCHC RBC AUTO-ENTMCNC: 32.2 G/DL (ref 31–37)
MCV RBC AUTO: 94.9 FL
MONOCYTES # BLD AUTO: 0.1 X10(3) UL (ref 0.1–1)
MONOCYTES NFR BLD AUTO: 3.8 %
NEUTROPHILS # BLD AUTO: 1.97 X10 (3) UL (ref 1.5–7.7)
NEUTROPHILS # BLD AUTO: 1.97 X10(3) UL (ref 1.5–7.7)
NEUTROPHILS NFR BLD AUTO: 74 %
OSMOLALITY SERPL CALC.SUM OF ELEC: 297 MOSM/KG (ref 275–295)
PLATELET # BLD AUTO: 156 10(3)UL (ref 150–450)
PLATELET MORPHOLOGY: NORMAL
POTASSIUM SERPL-SCNC: 3.4 MMOL/L (ref 3.5–5.1)
PROT SERPL-MCNC: 6.5 G/DL (ref 6.4–8.2)
RBC # BLD AUTO: 3.31 X10(6)UL
RH BLOOD TYPE: POSITIVE
SODIUM SERPL-SCNC: 142 MMOL/L (ref 136–145)
WBC # BLD AUTO: 2.7 X10(3) UL (ref 4–11)

## 2023-08-16 PROCEDURE — 85025 COMPLETE CBC W/AUTO DIFF WBC: CPT

## 2023-08-16 PROCEDURE — 36591 DRAW BLOOD OFF VENOUS DEVICE: CPT

## 2023-08-16 PROCEDURE — 86901 BLOOD TYPING SEROLOGIC RH(D): CPT

## 2023-08-16 PROCEDURE — 99214 OFFICE O/P EST MOD 30 MIN: CPT | Performed by: NURSE PRACTITIONER

## 2023-08-16 PROCEDURE — 80053 COMPREHEN METABOLIC PANEL: CPT

## 2023-08-16 PROCEDURE — 86900 BLOOD TYPING SEROLOGIC ABO: CPT

## 2023-08-16 PROCEDURE — 86850 RBC ANTIBODY SCREEN: CPT

## 2023-08-16 NOTE — TELEPHONE ENCOUNTER
Patient in ED 8/12/23 fevers. Patient called for condition update. Per patient went to grocery store yesterday and had to sit down multiple times during the day due to feeling  SOB and fatigue. Patient denies fevers. Patient stated \"I feel how I felt when my hemoglobin was 6. \" Currently taking Doxycycline and has 2.5 more days left to take. Patient stated eating take out food and maintaining weight at 112 lbs. Appetite \"not great. \" Labs and acute care visit scheduled today at 2:15 pm.

## 2023-08-17 ENCOUNTER — APPOINTMENT (OUTPATIENT)
Dept: HEMATOLOGY/ONCOLOGY | Facility: HOSPITAL | Age: 63
End: 2023-08-17
Attending: INTERNAL MEDICINE
Payer: MEDICARE

## 2023-08-23 ENCOUNTER — APPOINTMENT (OUTPATIENT)
Dept: HEMATOLOGY/ONCOLOGY | Facility: HOSPITAL | Age: 63
End: 2023-08-23
Attending: INTERNAL MEDICINE
Payer: MEDICARE

## 2023-08-31 ENCOUNTER — OFFICE VISIT (OUTPATIENT)
Dept: HEMATOLOGY/ONCOLOGY | Facility: HOSPITAL | Age: 63
End: 2023-08-31
Attending: INTERNAL MEDICINE
Payer: MEDICARE

## 2023-08-31 ENCOUNTER — OFFICE VISIT (OUTPATIENT)
Dept: HEMATOLOGY/ONCOLOGY | Facility: HOSPITAL | Age: 63
End: 2023-08-31
Attending: NURSE PRACTITIONER
Payer: MEDICARE

## 2023-08-31 VITALS
BODY MASS INDEX: 22.47 KG/M2 | TEMPERATURE: 98 F | HEART RATE: 76 BPM | WEIGHT: 119 LBS | RESPIRATION RATE: 18 BRPM | OXYGEN SATURATION: 98 % | SYSTOLIC BLOOD PRESSURE: 129 MMHG | DIASTOLIC BLOOD PRESSURE: 83 MMHG | HEIGHT: 61 IN

## 2023-08-31 DIAGNOSIS — Z45.2 ENCOUNTER FOR CENTRAL LINE CARE: ICD-10-CM

## 2023-08-31 DIAGNOSIS — C50.411 MALIGNANT NEOPLASM OF UPPER-OUTER QUADRANT OF RIGHT BREAST IN FEMALE, ESTROGEN RECEPTOR POSITIVE: Primary | ICD-10-CM

## 2023-08-31 DIAGNOSIS — J91.0 MALIGNANT PLEURAL EFFUSION: ICD-10-CM

## 2023-08-31 DIAGNOSIS — Z17.0 MALIGNANT NEOPLASM OF UPPER-OUTER QUADRANT OF RIGHT BREAST IN FEMALE, ESTROGEN RECEPTOR POSITIVE: Primary | ICD-10-CM

## 2023-08-31 DIAGNOSIS — Z51.11 CHEMOTHERAPY MANAGEMENT, ENCOUNTER FOR: ICD-10-CM

## 2023-08-31 DIAGNOSIS — C78.7 MALIGNANT NEOPLASM METASTATIC TO LIVER (HCC): ICD-10-CM

## 2023-08-31 LAB
ALBUMIN SERPL-MCNC: 2.9 G/DL (ref 3.4–5)
ALBUMIN/GLOB SERPL: 0.9 {RATIO} (ref 1–2)
ALP LIVER SERPL-CCNC: 77 U/L
ALT SERPL-CCNC: 35 U/L
ANION GAP SERPL CALC-SCNC: 4 MMOL/L (ref 0–18)
AST SERPL-CCNC: 34 U/L (ref 15–37)
BASOPHILS # BLD AUTO: 0.02 X10(3) UL (ref 0–0.2)
BASOPHILS NFR BLD AUTO: 0.4 %
BILIRUB SERPL-MCNC: 0.6 MG/DL (ref 0.1–2)
BUN BLD-MCNC: 18 MG/DL (ref 7–18)
BUN/CREAT SERPL: 18 (ref 10–20)
CALCIUM BLD-MCNC: 8.5 MG/DL (ref 8.5–10.1)
CHLORIDE SERPL-SCNC: 110 MMOL/L (ref 98–112)
CO2 SERPL-SCNC: 28 MMOL/L (ref 21–32)
CREAT BLD-MCNC: 1 MG/DL
DEPRECATED RDW RBC AUTO: 53.1 FL (ref 35.1–46.3)
EGFRCR SERPLBLD CKD-EPI 2021: 64 ML/MIN/1.73M2 (ref 60–?)
EOSINOPHIL # BLD AUTO: 0.02 X10(3) UL (ref 0–0.7)
EOSINOPHIL NFR BLD AUTO: 0.4 %
ERYTHROCYTE [DISTWIDTH] IN BLOOD BY AUTOMATED COUNT: 14.9 % (ref 11–15)
GLOBULIN PLAS-MCNC: 3.2 G/DL (ref 2.8–4.4)
GLUCOSE BLD-MCNC: 100 MG/DL (ref 70–99)
HCT VFR BLD AUTO: 34.3 %
HGB BLD-MCNC: 10.8 G/DL
IMM GRANULOCYTES # BLD AUTO: 0.03 X10(3) UL (ref 0–1)
IMM GRANULOCYTES NFR BLD: 0.7 %
LYMPHOCYTES # BLD AUTO: 0.45 X10(3) UL (ref 1–4)
LYMPHOCYTES NFR BLD AUTO: 9.9 %
MCH RBC QN AUTO: 30.9 PG (ref 26–34)
MCHC RBC AUTO-ENTMCNC: 31.5 G/DL (ref 31–37)
MCV RBC AUTO: 98 FL
MONOCYTES # BLD AUTO: 0.51 X10(3) UL (ref 0.1–1)
MONOCYTES NFR BLD AUTO: 11.3 %
NEUTROPHILS # BLD AUTO: 3.5 X10 (3) UL (ref 1.5–7.7)
NEUTROPHILS # BLD AUTO: 3.5 X10(3) UL (ref 1.5–7.7)
NEUTROPHILS NFR BLD AUTO: 77.3 %
OSMOLALITY SERPL CALC.SUM OF ELEC: 296 MOSM/KG (ref 275–295)
PLATELET # BLD AUTO: 148 10(3)UL (ref 150–450)
POTASSIUM SERPL-SCNC: 4 MMOL/L (ref 3.5–5.1)
PROT SERPL-MCNC: 6.1 G/DL (ref 6.4–8.2)
RBC # BLD AUTO: 3.5 X10(6)UL
SODIUM SERPL-SCNC: 142 MMOL/L (ref 136–145)
WBC # BLD AUTO: 4.5 X10(3) UL (ref 4–11)

## 2023-08-31 PROCEDURE — 80053 COMPREHEN METABOLIC PANEL: CPT

## 2023-08-31 PROCEDURE — 99215 OFFICE O/P EST HI 40 MIN: CPT | Performed by: NURSE PRACTITIONER

## 2023-08-31 PROCEDURE — 85025 COMPLETE CBC W/AUTO DIFF WBC: CPT

## 2023-08-31 PROCEDURE — 96409 CHEMO IV PUSH SNGL DRUG: CPT

## 2023-08-31 PROCEDURE — 96367 TX/PROPH/DG ADDL SEQ IV INF: CPT

## 2023-08-31 RX ORDER — HEPARIN SODIUM (PORCINE) LOCK FLUSH IV SOLN 100 UNIT/ML 100 UNIT/ML
5 SOLUTION INTRAVENOUS ONCE
Status: COMPLETED | OUTPATIENT
Start: 2023-08-31 | End: 2023-08-31

## 2023-08-31 RX ORDER — HEPARIN SODIUM (PORCINE) LOCK FLUSH IV SOLN 100 UNIT/ML 100 UNIT/ML
SOLUTION INTRAVENOUS
Status: COMPLETED
Start: 2023-08-31 | End: 2023-08-31

## 2023-08-31 RX ORDER — HEPARIN SODIUM (PORCINE) LOCK FLUSH IV SOLN 100 UNIT/ML 100 UNIT/ML
5 SOLUTION INTRAVENOUS ONCE
OUTPATIENT
Start: 2023-09-01

## 2023-08-31 RX ORDER — SODIUM CHLORIDE 9 MG/ML
10 INJECTION INTRAVENOUS ONCE
OUTPATIENT
Start: 2023-09-01

## 2023-08-31 RX ADMIN — HEPARIN SODIUM (PORCINE) LOCK FLUSH IV SOLN 100 UNIT/ML 500 UNITS: 100 SOLUTION INTRAVENOUS at 16:00:00

## 2023-09-07 ENCOUNTER — NURSE ONLY (OUTPATIENT)
Dept: HEMATOLOGY/ONCOLOGY | Facility: HOSPITAL | Age: 63
End: 2023-09-07
Attending: INTERNAL MEDICINE
Payer: MEDICARE

## 2023-09-07 VITALS
WEIGHT: 116 LBS | DIASTOLIC BLOOD PRESSURE: 78 MMHG | OXYGEN SATURATION: 98 % | HEART RATE: 78 BPM | BODY MASS INDEX: 22 KG/M2 | RESPIRATION RATE: 18 BRPM | TEMPERATURE: 99 F | SYSTOLIC BLOOD PRESSURE: 134 MMHG

## 2023-09-07 DIAGNOSIS — C50.411 MALIGNANT NEOPLASM OF UPPER-OUTER QUADRANT OF RIGHT BREAST IN FEMALE, ESTROGEN RECEPTOR POSITIVE: Primary | ICD-10-CM

## 2023-09-07 DIAGNOSIS — Z17.0 MALIGNANT NEOPLASM OF UPPER-OUTER QUADRANT OF RIGHT BREAST IN FEMALE, ESTROGEN RECEPTOR POSITIVE: Primary | ICD-10-CM

## 2023-09-07 DIAGNOSIS — J91.0 MALIGNANT PLEURAL EFFUSION: ICD-10-CM

## 2023-09-07 DIAGNOSIS — Z45.2 ENCOUNTER FOR CENTRAL LINE CARE: ICD-10-CM

## 2023-09-07 LAB
BASOPHILS # BLD: 0 X10(3) UL (ref 0–0.2)
BASOPHILS NFR BLD: 0 %
DEPRECATED RDW RBC AUTO: 49.3 FL (ref 35.1–46.3)
EOSINOPHIL # BLD: 0 X10(3) UL (ref 0–0.7)
EOSINOPHIL NFR BLD: 0 %
ERYTHROCYTE [DISTWIDTH] IN BLOOD BY AUTOMATED COUNT: 14.4 % (ref 11–15)
HCT VFR BLD AUTO: 32 %
HGB BLD-MCNC: 10.4 G/DL
LYMPHOCYTES NFR BLD: 0.58 X10(3) UL (ref 1–4)
LYMPHOCYTES NFR BLD: 23 %
MCH RBC QN AUTO: 30.3 PG (ref 26–34)
MCHC RBC AUTO-ENTMCNC: 32.5 G/DL (ref 31–37)
MCV RBC AUTO: 93.3 FL
MONOCYTES # BLD: 0.13 X10(3) UL (ref 0.1–1)
MONOCYTES NFR BLD: 5 %
NEUTROPHILS # BLD AUTO: 1.51 X10 (3) UL (ref 1.5–7.7)
NEUTROPHILS NFR BLD: 71 %
NEUTS BAND NFR BLD: 1 %
NEUTS HYPERSEG # BLD: 1.8 X10(3) UL (ref 1.5–7.7)
PLATELET # BLD AUTO: 161 10(3)UL (ref 150–450)
PLATELET MORPHOLOGY: NORMAL
RBC # BLD AUTO: 3.43 X10(6)UL
TOTAL CELLS COUNTED BLD: 100
WBC # BLD AUTO: 2.5 X10(3) UL (ref 4–11)

## 2023-09-07 PROCEDURE — 85007 BL SMEAR W/DIFF WBC COUNT: CPT

## 2023-09-07 PROCEDURE — 96409 CHEMO IV PUSH SNGL DRUG: CPT

## 2023-09-07 PROCEDURE — 85025 COMPLETE CBC W/AUTO DIFF WBC: CPT

## 2023-09-07 PROCEDURE — 85027 COMPLETE CBC AUTOMATED: CPT

## 2023-09-07 PROCEDURE — 96375 TX/PRO/DX INJ NEW DRUG ADDON: CPT

## 2023-09-07 RX ORDER — HEPARIN SODIUM (PORCINE) LOCK FLUSH IV SOLN 100 UNIT/ML 100 UNIT/ML
5 SOLUTION INTRAVENOUS ONCE
Status: COMPLETED | OUTPATIENT
Start: 2023-09-07 | End: 2023-09-07

## 2023-09-07 RX ORDER — HEPARIN SODIUM (PORCINE) LOCK FLUSH IV SOLN 100 UNIT/ML 100 UNIT/ML
5 SOLUTION INTRAVENOUS ONCE
OUTPATIENT
Start: 2023-09-07

## 2023-09-07 RX ORDER — SODIUM CHLORIDE 9 MG/ML
10 INJECTION INTRAVENOUS ONCE
OUTPATIENT
Start: 2023-09-07

## 2023-09-07 RX ADMIN — HEPARIN SODIUM (PORCINE) LOCK FLUSH IV SOLN 100 UNIT/ML 500 UNITS: 100 SOLUTION INTRAVENOUS at 16:18:00

## 2023-09-08 DIAGNOSIS — R06.09 DYSPNEA ON EXERTION: ICD-10-CM

## 2023-09-08 RX ORDER — FUROSEMIDE 20 MG/1
20 TABLET ORAL 2 TIMES DAILY
Qty: 180 TABLET | Refills: 3 | Status: SHIPPED | OUTPATIENT
Start: 2023-09-08

## 2023-09-21 ENCOUNTER — OFFICE VISIT (OUTPATIENT)
Dept: HEMATOLOGY/ONCOLOGY | Facility: HOSPITAL | Age: 63
End: 2023-09-21
Attending: INTERNAL MEDICINE
Payer: MEDICARE

## 2023-09-21 ENCOUNTER — OFFICE VISIT (OUTPATIENT)
Dept: HEMATOLOGY/ONCOLOGY | Facility: HOSPITAL | Age: 63
End: 2023-09-21
Attending: NURSE PRACTITIONER
Payer: MEDICARE

## 2023-09-21 VITALS
BODY MASS INDEX: 22.47 KG/M2 | SYSTOLIC BLOOD PRESSURE: 125 MMHG | RESPIRATION RATE: 16 BRPM | DIASTOLIC BLOOD PRESSURE: 82 MMHG | TEMPERATURE: 98 F | HEIGHT: 61 IN | HEART RATE: 85 BPM | WEIGHT: 119 LBS

## 2023-09-21 DIAGNOSIS — Z17.0 MALIGNANT NEOPLASM OF UPPER-OUTER QUADRANT OF RIGHT BREAST IN FEMALE, ESTROGEN RECEPTOR POSITIVE: Primary | ICD-10-CM

## 2023-09-21 DIAGNOSIS — Z51.11 CHEMOTHERAPY MANAGEMENT, ENCOUNTER FOR: ICD-10-CM

## 2023-09-21 DIAGNOSIS — J91.0 MALIGNANT PLEURAL EFFUSION: ICD-10-CM

## 2023-09-21 DIAGNOSIS — C50.411 MALIGNANT NEOPLASM OF UPPER-OUTER QUADRANT OF RIGHT BREAST IN FEMALE, ESTROGEN RECEPTOR POSITIVE: Primary | ICD-10-CM

## 2023-09-21 DIAGNOSIS — C78.7 MALIGNANT NEOPLASM METASTATIC TO LIVER (HCC): ICD-10-CM

## 2023-09-21 LAB
ALBUMIN SERPL-MCNC: 3.2 G/DL (ref 3.4–5)
ALBUMIN/GLOB SERPL: 1 {RATIO} (ref 1–2)
ALP LIVER SERPL-CCNC: 71 U/L
ALT SERPL-CCNC: 26 U/L
ANION GAP SERPL CALC-SCNC: 7 MMOL/L (ref 0–18)
AST SERPL-CCNC: 22 U/L (ref 15–37)
BASOPHILS # BLD AUTO: 0.03 X10(3) UL (ref 0–0.2)
BASOPHILS NFR BLD AUTO: 0.9 %
BILIRUB SERPL-MCNC: 0.5 MG/DL (ref 0.1–2)
BUN BLD-MCNC: 21 MG/DL (ref 7–18)
BUN/CREAT SERPL: 20.8 (ref 10–20)
CALCIUM BLD-MCNC: 8.9 MG/DL (ref 8.5–10.1)
CHLORIDE SERPL-SCNC: 111 MMOL/L (ref 98–112)
CO2 SERPL-SCNC: 26 MMOL/L (ref 21–32)
CREAT BLD-MCNC: 1.01 MG/DL
DEPRECATED RDW RBC AUTO: 52.9 FL (ref 35.1–46.3)
EGFRCR SERPLBLD CKD-EPI 2021: 63 ML/MIN/1.73M2 (ref 60–?)
EOSINOPHIL # BLD AUTO: 0.01 X10(3) UL (ref 0–0.7)
EOSINOPHIL NFR BLD AUTO: 0.3 %
ERYTHROCYTE [DISTWIDTH] IN BLOOD BY AUTOMATED COUNT: 15.6 % (ref 11–15)
GLOBULIN PLAS-MCNC: 3.1 G/DL (ref 2.8–4.4)
GLUCOSE BLD-MCNC: 84 MG/DL (ref 70–99)
HCT VFR BLD AUTO: 33.9 %
HGB BLD-MCNC: 10.9 G/DL
IMM GRANULOCYTES # BLD AUTO: 0.15 X10(3) UL (ref 0–1)
IMM GRANULOCYTES NFR BLD: 4.6 %
LYMPHOCYTES # BLD AUTO: 0.66 X10(3) UL (ref 1–4)
LYMPHOCYTES NFR BLD AUTO: 20.1 %
MCH RBC QN AUTO: 30.2 PG (ref 26–34)
MCHC RBC AUTO-ENTMCNC: 32.2 G/DL (ref 31–37)
MCV RBC AUTO: 93.9 FL
MONOCYTES # BLD AUTO: 0.67 X10(3) UL (ref 0.1–1)
MONOCYTES NFR BLD AUTO: 20.4 %
NEUTROPHILS # BLD AUTO: 1.76 X10 (3) UL (ref 1.5–7.7)
NEUTROPHILS # BLD AUTO: 1.76 X10(3) UL (ref 1.5–7.7)
NEUTROPHILS NFR BLD AUTO: 53.7 %
OSMOLALITY SERPL CALC.SUM OF ELEC: 300 MOSM/KG (ref 275–295)
PLATELET # BLD AUTO: 180 10(3)UL (ref 150–450)
POTASSIUM SERPL-SCNC: 4 MMOL/L (ref 3.5–5.1)
PROT SERPL-MCNC: 6.3 G/DL (ref 6.4–8.2)
RBC # BLD AUTO: 3.61 X10(6)UL
SODIUM SERPL-SCNC: 144 MMOL/L (ref 136–145)
WBC # BLD AUTO: 3.3 X10(3) UL (ref 4–11)

## 2023-09-21 PROCEDURE — 80053 COMPREHEN METABOLIC PANEL: CPT

## 2023-09-21 PROCEDURE — 99215 OFFICE O/P EST HI 40 MIN: CPT | Performed by: NURSE PRACTITIONER

## 2023-09-21 PROCEDURE — 96409 CHEMO IV PUSH SNGL DRUG: CPT

## 2023-09-21 PROCEDURE — 85025 COMPLETE CBC W/AUTO DIFF WBC: CPT

## 2023-09-21 PROCEDURE — 96375 TX/PRO/DX INJ NEW DRUG ADDON: CPT

## 2023-09-21 NOTE — PROGRESS NOTES
Pt here for [address]. Arrives Ambulating independently, accompanied by Self       Oral medications included in this regimen:  no    Patient confirms comprehension of cancer treatment schedule:  yes    Pregnancy screening:  Denies possibility of pregnancy    Modifications in dose or schedule:  No    Medications appearance and physical integrity checked by RN.  Chemotherapy IV pump settings verified by 2 RNs:  yes     Frequency of blood return and site check throughout administration: Prior to administration, Every 2-3 ml IVP, and At completion of therapy     Infusion/treatment outcome:  patient tolerated treatment without incident    Education Record    Learner:  Patient  Barriers / Limitations:  None  Method:  Discussion  Education / instructions given:  POC  Outcome:  Shows understanding    Discharged Home, Ambulating independently, accompanied by:Self    Patient/family verbalized understanding of future appointments: by [facility]

## 2023-09-25 RX ORDER — PROCHLORPERAZINE MALEATE 10 MG
10 TABLET ORAL EVERY 6 HOURS PRN
Qty: 90 TABLET | Refills: 2 | Status: ON HOLD | OUTPATIENT
Start: 2023-09-25

## 2023-09-28 ENCOUNTER — NURSE ONLY (OUTPATIENT)
Dept: HEMATOLOGY/ONCOLOGY | Facility: HOSPITAL | Age: 63
End: 2023-09-28
Attending: INTERNAL MEDICINE
Payer: MEDICARE

## 2023-09-28 ENCOUNTER — OFFICE VISIT (OUTPATIENT)
Dept: HEMATOLOGY/ONCOLOGY | Facility: HOSPITAL | Age: 63
End: 2023-09-28
Attending: NURSE PRACTITIONER
Payer: MEDICARE

## 2023-09-28 VITALS
TEMPERATURE: 98 F | OXYGEN SATURATION: 98 % | HEART RATE: 92 BPM | DIASTOLIC BLOOD PRESSURE: 85 MMHG | SYSTOLIC BLOOD PRESSURE: 131 MMHG | RESPIRATION RATE: 18 BRPM

## 2023-09-28 DIAGNOSIS — Z17.0 MALIGNANT NEOPLASM OF UPPER-OUTER QUADRANT OF RIGHT BREAST IN FEMALE, ESTROGEN RECEPTOR POSITIVE: Primary | ICD-10-CM

## 2023-09-28 DIAGNOSIS — C50.411 MALIGNANT NEOPLASM OF UPPER-OUTER QUADRANT OF RIGHT BREAST IN FEMALE, ESTROGEN RECEPTOR POSITIVE: Primary | ICD-10-CM

## 2023-09-28 DIAGNOSIS — J91.0 MALIGNANT PLEURAL EFFUSION: ICD-10-CM

## 2023-09-28 DIAGNOSIS — Z45.2 ENCOUNTER FOR CENTRAL LINE CARE: ICD-10-CM

## 2023-09-28 LAB
BASOPHILS # BLD: 0 X10(3) UL (ref 0–0.2)
BASOPHILS NFR BLD: 0 %
DEPRECATED RDW RBC AUTO: 50.4 FL (ref 35.1–46.3)
EOSINOPHIL # BLD: 0 X10(3) UL (ref 0–0.7)
EOSINOPHIL NFR BLD: 0 %
ERYTHROCYTE [DISTWIDTH] IN BLOOD BY AUTOMATED COUNT: 15 % (ref 11–15)
HCT VFR BLD AUTO: 32.4 %
HGB BLD-MCNC: 10.8 G/DL
LYMPHOCYTES NFR BLD: 0.65 X10(3) UL (ref 1–4)
LYMPHOCYTES NFR BLD: 25 %
MCH RBC QN AUTO: 30.5 PG (ref 26–34)
MCHC RBC AUTO-ENTMCNC: 33.3 G/DL (ref 31–37)
MCV RBC AUTO: 91.5 FL
MONOCYTES # BLD: 0.05 X10(3) UL (ref 0.1–1)
MONOCYTES NFR BLD: 2 %
NEUTROPHILS # BLD AUTO: 1.54 X10 (3) UL (ref 1.5–7.7)
NEUTROPHILS NFR BLD: 72 %
NEUTS HYPERSEG # BLD: 1.8 X10(3) UL (ref 1.5–7.7)
PLATELET # BLD AUTO: 179 10(3)UL (ref 150–450)
PLATELET MORPHOLOGY: NORMAL
RBC # BLD AUTO: 3.54 X10(6)UL
TOTAL CELLS COUNTED BLD: 100
VARIANT LYMPHS NFR BLD MANUAL: 1 %
WBC # BLD AUTO: 2.5 X10(3) UL (ref 4–11)

## 2023-09-28 PROCEDURE — 96409 CHEMO IV PUSH SNGL DRUG: CPT

## 2023-09-28 PROCEDURE — 85025 COMPLETE CBC W/AUTO DIFF WBC: CPT

## 2023-09-28 PROCEDURE — 85027 COMPLETE CBC AUTOMATED: CPT

## 2023-09-28 PROCEDURE — 96367 TX/PROPH/DG ADDL SEQ IV INF: CPT

## 2023-09-28 PROCEDURE — 85007 BL SMEAR W/DIFF WBC COUNT: CPT

## 2023-09-28 RX ORDER — SODIUM CHLORIDE 9 MG/ML
10 INJECTION INTRAVENOUS ONCE
OUTPATIENT
Start: 2023-09-28

## 2023-09-28 RX ORDER — HEPARIN SODIUM (PORCINE) LOCK FLUSH IV SOLN 100 UNIT/ML 100 UNIT/ML
SOLUTION INTRAVENOUS
Status: COMPLETED
Start: 2023-09-28 | End: 2023-09-28

## 2023-09-28 RX ORDER — HEPARIN SODIUM (PORCINE) LOCK FLUSH IV SOLN 100 UNIT/ML 100 UNIT/ML
5 SOLUTION INTRAVENOUS ONCE
OUTPATIENT
Start: 2023-09-28

## 2023-09-28 RX ORDER — HEPARIN SODIUM (PORCINE) LOCK FLUSH IV SOLN 100 UNIT/ML 100 UNIT/ML
5 SOLUTION INTRAVENOUS ONCE
Status: COMPLETED | OUTPATIENT
Start: 2023-09-28 | End: 2023-09-28

## 2023-09-28 RX ADMIN — HEPARIN SODIUM (PORCINE) LOCK FLUSH IV SOLN 100 UNIT/ML 500 UNITS: 100 SOLUTION INTRAVENOUS at 16:31:00

## 2023-09-28 NOTE — PROGRESS NOTES
Pt here for C4D8 [place]. Arrives Ambulating independently, accompanied by Self from Lab Appt. PORT previously accessed in the lab- good blood return noted. Oral medications included in this regimen:  no    Patient confirms comprehension of cancer treatment schedule:  yes    Pregnancy screening:  Denies possibility of pregnancy    Modifications in dose or schedule:  No    Medications appearance and physical integrity checked by RN. Chemotherapy IV pump settings verified by 2 RNs:  yes     Frequency of blood return and site check throughout administration: Prior to administration and At completion of therapy     Infusion/treatment outcome:  patient tolerated treatment without incident. PORT flushed and Heparin locked. Gauze/tape applied to site. Education Record    Learner:  Patient  Barriers / Limitations:  None  Method:  Discussion  Education / instructions given:  Plan of care and future appointment's.    Outcome:  Shows understanding    Discharged Home, Ambulating independently, accompanied by:Self    Patient/family verbalized understanding of future appointments: by [facility]

## 2023-10-04 DIAGNOSIS — C50.919 PRIMARY MALIGNANT NEOPLASM OF BREAST WITH METASTASIS (HCC): ICD-10-CM

## 2023-10-04 DIAGNOSIS — Z51.11 CHEMOTHERAPY MANAGEMENT, ENCOUNTER FOR: ICD-10-CM

## 2023-10-04 RX ORDER — LORAZEPAM 0.5 MG/1
0.5 TABLET ORAL EVERY 6 HOURS PRN
Qty: 30 TABLET | Refills: 0 | Status: SHIPPED | OUTPATIENT
Start: 2023-10-04

## 2023-10-07 ENCOUNTER — HOSPITAL ENCOUNTER (OUTPATIENT)
Dept: CT IMAGING | Facility: HOSPITAL | Age: 63
Discharge: HOME | End: 2023-10-07
Attending: NURSE PRACTITIONER
Payer: MEDICARE

## 2023-10-07 DIAGNOSIS — Z17.0 MALIGNANT NEOPLASM OF UPPER-OUTER QUADRANT OF RIGHT BREAST IN FEMALE, ESTROGEN RECEPTOR POSITIVE: ICD-10-CM

## 2023-10-07 DIAGNOSIS — C78.7 MALIGNANT NEOPLASM METASTATIC TO LIVER (HCC): ICD-10-CM

## 2023-10-07 DIAGNOSIS — C50.411 MALIGNANT NEOPLASM OF UPPER-OUTER QUADRANT OF RIGHT BREAST IN FEMALE, ESTROGEN RECEPTOR POSITIVE: ICD-10-CM

## 2023-10-07 PROCEDURE — 74177 CT ABD & PELVIS W/CONTRAST: CPT | Performed by: NURSE PRACTITIONER

## 2023-10-07 PROCEDURE — 71260 CT THORAX DX C+: CPT | Performed by: NURSE PRACTITIONER

## 2023-10-12 ENCOUNTER — OFFICE VISIT (OUTPATIENT)
Dept: HEMATOLOGY/ONCOLOGY | Facility: HOSPITAL | Age: 63
End: 2023-10-12
Attending: NURSE PRACTITIONER
Payer: MEDICARE

## 2023-10-12 VITALS
RESPIRATION RATE: 18 BRPM | TEMPERATURE: 99 F | OXYGEN SATURATION: 97 % | HEIGHT: 61 IN | BODY MASS INDEX: 23 KG/M2 | WEIGHT: 121.81 LBS | SYSTOLIC BLOOD PRESSURE: 126 MMHG | HEART RATE: 85 BPM | DIASTOLIC BLOOD PRESSURE: 77 MMHG

## 2023-10-12 DIAGNOSIS — J91.0 MALIGNANT PLEURAL EFFUSION: ICD-10-CM

## 2023-10-12 DIAGNOSIS — T45.1X5A ANEMIA DUE TO ANTINEOPLASTIC CHEMOTHERAPY: ICD-10-CM

## 2023-10-12 DIAGNOSIS — C50.919 PRIMARY MALIGNANT NEOPLASM OF BREAST WITH METASTASIS (HCC): Primary | ICD-10-CM

## 2023-10-12 DIAGNOSIS — D64.81 ANEMIA DUE TO ANTINEOPLASTIC CHEMOTHERAPY: ICD-10-CM

## 2023-10-12 DIAGNOSIS — C78.7 MALIGNANT NEOPLASM METASTATIC TO LIVER (HCC): ICD-10-CM

## 2023-10-12 DIAGNOSIS — Z09 CHEMOTHERAPY FOLLOW-UP EXAMINATION: ICD-10-CM

## 2023-10-12 DIAGNOSIS — C50.411 MALIGNANT NEOPLASM OF UPPER-OUTER QUADRANT OF RIGHT BREAST IN FEMALE, ESTROGEN RECEPTOR POSITIVE: Primary | ICD-10-CM

## 2023-10-12 DIAGNOSIS — Z17.0 MALIGNANT NEOPLASM OF UPPER-OUTER QUADRANT OF RIGHT BREAST IN FEMALE, ESTROGEN RECEPTOR POSITIVE: Primary | ICD-10-CM

## 2023-10-12 DIAGNOSIS — C79.51 METASTASIS TO BONE (HCC): ICD-10-CM

## 2023-10-12 DIAGNOSIS — Z45.2 ENCOUNTER FOR CENTRAL LINE CARE: ICD-10-CM

## 2023-10-12 LAB
ALBUMIN SERPL-MCNC: 3.1 G/DL (ref 3.4–5)
ALBUMIN/GLOB SERPL: 0.9 {RATIO} (ref 1–2)
ALP LIVER SERPL-CCNC: 83 U/L
ALT SERPL-CCNC: 23 U/L
ANION GAP SERPL CALC-SCNC: 6 MMOL/L (ref 0–18)
AST SERPL-CCNC: 20 U/L (ref 15–37)
BASOPHILS # BLD AUTO: 0.02 X10(3) UL (ref 0–0.2)
BASOPHILS NFR BLD AUTO: 0.6 %
BILIRUB SERPL-MCNC: 0.6 MG/DL (ref 0.1–2)
BUN BLD-MCNC: 16 MG/DL (ref 7–18)
BUN/CREAT SERPL: 13.6 (ref 10–20)
CALCIUM BLD-MCNC: 8.5 MG/DL (ref 8.5–10.1)
CHLORIDE SERPL-SCNC: 108 MMOL/L (ref 98–112)
CO2 SERPL-SCNC: 28 MMOL/L (ref 21–32)
CREAT BLD-MCNC: 1.18 MG/DL
DEPRECATED RDW RBC AUTO: 53.6 FL (ref 35.1–46.3)
EGFRCR SERPLBLD CKD-EPI 2021: 52 ML/MIN/1.73M2 (ref 60–?)
EOSINOPHIL # BLD AUTO: 0.02 X10(3) UL (ref 0–0.7)
EOSINOPHIL NFR BLD AUTO: 0.6 %
ERYTHROCYTE [DISTWIDTH] IN BLOOD BY AUTOMATED COUNT: 15.8 % (ref 11–15)
GLOBULIN PLAS-MCNC: 3.5 G/DL (ref 2.8–4.4)
GLUCOSE BLD-MCNC: 118 MG/DL (ref 70–99)
HCT VFR BLD AUTO: 34.2 %
HGB BLD-MCNC: 11.1 G/DL
IMM GRANULOCYTES # BLD AUTO: 0.14 X10(3) UL (ref 0–1)
IMM GRANULOCYTES NFR BLD: 4.4 %
LYMPHOCYTES # BLD AUTO: 0.51 X10(3) UL (ref 1–4)
LYMPHOCYTES NFR BLD AUTO: 16.1 %
MCH RBC QN AUTO: 30 PG (ref 26–34)
MCHC RBC AUTO-ENTMCNC: 32.5 G/DL (ref 31–37)
MCV RBC AUTO: 92.4 FL
MONOCYTES # BLD AUTO: 0.67 X10(3) UL (ref 0.1–1)
MONOCYTES NFR BLD AUTO: 21.2 %
NEUTROPHILS # BLD AUTO: 1.8 X10 (3) UL (ref 1.5–7.7)
NEUTROPHILS # BLD AUTO: 1.8 X10(3) UL (ref 1.5–7.7)
NEUTROPHILS NFR BLD AUTO: 57.1 %
OSMOLALITY SERPL CALC.SUM OF ELEC: 296 MOSM/KG (ref 275–295)
PLATELET # BLD AUTO: 176 10(3)UL (ref 150–450)
POTASSIUM SERPL-SCNC: 3.5 MMOL/L (ref 3.5–5.1)
PROT SERPL-MCNC: 6.6 G/DL (ref 6.4–8.2)
RBC # BLD AUTO: 3.7 X10(6)UL
SODIUM SERPL-SCNC: 142 MMOL/L (ref 136–145)
WBC # BLD AUTO: 3.2 X10(3) UL (ref 4–11)

## 2023-10-12 PROCEDURE — 80053 COMPREHEN METABOLIC PANEL: CPT

## 2023-10-12 PROCEDURE — 99215 OFFICE O/P EST HI 40 MIN: CPT | Performed by: INTERNAL MEDICINE

## 2023-10-12 PROCEDURE — 96375 TX/PRO/DX INJ NEW DRUG ADDON: CPT

## 2023-10-12 PROCEDURE — 96409 CHEMO IV PUSH SNGL DRUG: CPT

## 2023-10-12 PROCEDURE — 85025 COMPLETE CBC W/AUTO DIFF WBC: CPT

## 2023-10-12 RX ORDER — HEPARIN SODIUM (PORCINE) LOCK FLUSH IV SOLN 100 UNIT/ML 100 UNIT/ML
5 SOLUTION INTRAVENOUS ONCE
Status: COMPLETED | OUTPATIENT
Start: 2023-10-12 | End: 2023-10-12

## 2023-10-12 RX ORDER — SODIUM CHLORIDE 9 MG/ML
10 INJECTION INTRAVENOUS ONCE
OUTPATIENT
Start: 2023-10-12

## 2023-10-12 RX ORDER — HEPARIN SODIUM (PORCINE) LOCK FLUSH IV SOLN 100 UNIT/ML 100 UNIT/ML
5 SOLUTION INTRAVENOUS ONCE
OUTPATIENT
Start: 2023-10-12

## 2023-10-12 RX ORDER — HEPARIN SODIUM (PORCINE) LOCK FLUSH IV SOLN 100 UNIT/ML 100 UNIT/ML
SOLUTION INTRAVENOUS
Status: COMPLETED
Start: 2023-10-12 | End: 2023-10-12

## 2023-10-12 RX ADMIN — HEPARIN SODIUM (PORCINE) LOCK FLUSH IV SOLN 100 UNIT/ML 500 UNITS: 100 SOLUTION INTRAVENOUS at 14:35:00

## 2023-10-12 NOTE — PROGRESS NOTES
Pt here for [id number] [facility]. Arrives Ambulating independently, accompanied by Self       Oral medications included in this regimen:  no    Patient confirms comprehension of cancer treatment schedule:  yes    Pregnancy screening:  Denies possibility of pregnancy    Modifications in dose or schedule:  No    Medications appearance and physical integrity checked by RN.  Chemotherapy IV pump settings verified by 2 RNs:  yes     Frequency of blood return and site check throughout administration: Prior to administration, Every 2-3 ml IVP, and At completion of therapy     Infusion/treatment outcome:  patient tolerated treatment without incident    Education Record    Learner:  Patient  Barriers / Limitations:  None  Method:  Brief focused  Education / instructions given:  Plan of care for treatment today  Outcome:  Shows understanding    Discharged Home, Ambulating independently, accompanied by:Self    Patient/family verbalized understanding of future appointments: by [facility]

## 2023-10-19 ENCOUNTER — OFFICE VISIT (OUTPATIENT)
Dept: HEMATOLOGY/ONCOLOGY | Facility: HOSPITAL | Age: 63
End: 2023-10-19
Attending: INTERNAL MEDICINE
Payer: MEDICARE

## 2023-10-19 ENCOUNTER — NURSE ONLY (OUTPATIENT)
Dept: HEMATOLOGY/ONCOLOGY | Facility: HOSPITAL | Age: 63
End: 2023-10-19
Attending: NURSE PRACTITIONER
Payer: MEDICARE

## 2023-10-19 VITALS
BODY MASS INDEX: 22 KG/M2 | RESPIRATION RATE: 18 BRPM | TEMPERATURE: 98 F | SYSTOLIC BLOOD PRESSURE: 119 MMHG | DIASTOLIC BLOOD PRESSURE: 79 MMHG | HEART RATE: 105 BPM | OXYGEN SATURATION: 97 % | WEIGHT: 117.19 LBS

## 2023-10-19 DIAGNOSIS — Z17.0 MALIGNANT NEOPLASM OF UPPER-OUTER QUADRANT OF RIGHT BREAST IN FEMALE, ESTROGEN RECEPTOR POSITIVE: ICD-10-CM

## 2023-10-19 DIAGNOSIS — J91.0 MALIGNANT PLEURAL EFFUSION: ICD-10-CM

## 2023-10-19 DIAGNOSIS — Z45.2 ENCOUNTER FOR CENTRAL LINE CARE: Primary | ICD-10-CM

## 2023-10-19 DIAGNOSIS — C50.411 MALIGNANT NEOPLASM OF UPPER-OUTER QUADRANT OF RIGHT BREAST IN FEMALE, ESTROGEN RECEPTOR POSITIVE: Primary | ICD-10-CM

## 2023-10-19 DIAGNOSIS — Z17.0 MALIGNANT NEOPLASM OF UPPER-OUTER QUADRANT OF RIGHT BREAST IN FEMALE, ESTROGEN RECEPTOR POSITIVE: Primary | ICD-10-CM

## 2023-10-19 DIAGNOSIS — C50.411 MALIGNANT NEOPLASM OF UPPER-OUTER QUADRANT OF RIGHT BREAST IN FEMALE, ESTROGEN RECEPTOR POSITIVE: ICD-10-CM

## 2023-10-19 LAB
BASOPHILS # BLD: 0.03 X10(3) UL (ref 0–0.2)
BASOPHILS NFR BLD: 1 %
DEPRECATED RDW RBC AUTO: 48.7 FL (ref 35.1–46.3)
EOSINOPHIL # BLD: 0 X10(3) UL (ref 0–0.7)
EOSINOPHIL NFR BLD: 0 %
ERYTHROCYTE [DISTWIDTH] IN BLOOD BY AUTOMATED COUNT: 15 % (ref 11–15)
HCT VFR BLD AUTO: 33.8 %
HGB BLD-MCNC: 11.4 G/DL
LYMPHOCYTES NFR BLD: 0.6 X10(3) UL (ref 1–4)
LYMPHOCYTES NFR BLD: 18 %
MCH RBC QN AUTO: 30.2 PG (ref 26–34)
MCHC RBC AUTO-ENTMCNC: 33.7 G/DL (ref 31–37)
MCV RBC AUTO: 89.7 FL
MONOCYTES # BLD: 0.06 X10(3) UL (ref 0.1–1)
MONOCYTES NFR BLD: 2 %
NEUTROPHILS # BLD AUTO: 2.13 X10 (3) UL (ref 1.5–7.7)
NEUTROPHILS NFR BLD: 77 %
NEUTS HYPERSEG # BLD: 2.31 X10(3) UL (ref 1.5–7.7)
PLATELET # BLD AUTO: 187 10(3)UL (ref 150–450)
PLATELET MORPHOLOGY: NORMAL
RBC # BLD AUTO: 3.77 X10(6)UL
TOTAL CELLS COUNTED BLD: 100
VARIANT LYMPHS NFR BLD MANUAL: 2 %
WBC # BLD AUTO: 3 X10(3) UL (ref 4–11)

## 2023-10-19 PROCEDURE — 96409 CHEMO IV PUSH SNGL DRUG: CPT

## 2023-10-19 PROCEDURE — 96367 TX/PROPH/DG ADDL SEQ IV INF: CPT

## 2023-10-19 PROCEDURE — 85027 COMPLETE CBC AUTOMATED: CPT

## 2023-10-19 PROCEDURE — 85007 BL SMEAR W/DIFF WBC COUNT: CPT

## 2023-10-19 PROCEDURE — 85025 COMPLETE CBC W/AUTO DIFF WBC: CPT

## 2023-10-19 RX ORDER — SODIUM CHLORIDE 9 MG/ML
10 INJECTION INTRAVENOUS ONCE
OUTPATIENT
Start: 2023-10-19

## 2023-10-19 RX ORDER — HEPARIN SODIUM (PORCINE) LOCK FLUSH IV SOLN 100 UNIT/ML 100 UNIT/ML
5 SOLUTION INTRAVENOUS ONCE
OUTPATIENT
Start: 2023-10-19

## 2023-10-19 RX ORDER — HEPARIN SODIUM (PORCINE) LOCK FLUSH IV SOLN 100 UNIT/ML 100 UNIT/ML
SOLUTION INTRAVENOUS
Status: COMPLETED
Start: 2023-10-19 | End: 2023-10-19

## 2023-10-19 RX ORDER — HEPARIN SODIUM (PORCINE) LOCK FLUSH IV SOLN 100 UNIT/ML 100 UNIT/ML
5 SOLUTION INTRAVENOUS ONCE
Status: COMPLETED | OUTPATIENT
Start: 2023-10-19 | End: 2023-10-19

## 2023-10-19 RX ADMIN — HEPARIN SODIUM (PORCINE) LOCK FLUSH IV SOLN 100 UNIT/ML 500 UNITS: 100 SOLUTION INTRAVENOUS at 15:11:00

## 2023-10-19 NOTE — PROGRESS NOTES
Pt here for C5D8 [place]. Arrives Ambulating independently, accompanied by Self from Lab Appt. PORT previously accessed in the lab- good blood return noted. CBC appropriate for treatment today. Patient was evaluated today by Treatment Nurse. Oral medications included in this regimen:  no    Patient confirms comprehension of cancer treatment schedule:  yes    Pregnancy screening:  Denies possibility of pregnancy    Modifications in dose or schedule:  No    Medications appearance and physical integrity checked by RN: yes. Chemotherapy IV pump settings verified by 2 RNs:  Yes. Frequency of blood return and site check throughout administration: Prior to administration and At completion of therapy     Infusion/treatment outcome:  patient tolerated treatment without incident. PORT flushed and Heparin locked before de-accessing. Gauze/tape applied to site. Education Record    Learner:  Patient  Barriers / Limitations:  None  Method:  Discussion  Education / instructions given:  Plan of care and future appointment's.    Outcome:  Shows understanding    Discharged Home, Ambulating independently, accompanied by:Self    Patient/family verbalized understanding of future appointments: by [facility]

## 2023-11-01 ENCOUNTER — NURSE ONLY (OUTPATIENT)
Dept: HEMATOLOGY/ONCOLOGY | Facility: HOSPITAL | Age: 63
End: 2023-11-01
Attending: INTERNAL MEDICINE
Payer: MEDICARE

## 2023-11-01 DIAGNOSIS — Z45.2 ENCOUNTER FOR CENTRAL LINE CARE: ICD-10-CM

## 2023-11-01 DIAGNOSIS — Z17.0 MALIGNANT NEOPLASM OF UPPER-OUTER QUADRANT OF RIGHT BREAST IN FEMALE, ESTROGEN RECEPTOR POSITIVE: Primary | ICD-10-CM

## 2023-11-01 DIAGNOSIS — C50.411 MALIGNANT NEOPLASM OF UPPER-OUTER QUADRANT OF RIGHT BREAST IN FEMALE, ESTROGEN RECEPTOR POSITIVE: Primary | ICD-10-CM

## 2023-11-01 LAB
ALBUMIN SERPL-MCNC: 4.2 G/DL (ref 3.2–4.8)
ALBUMIN/GLOB SERPL: 2 {RATIO} (ref 1–2)
ALP LIVER SERPL-CCNC: 62 U/L
ALT SERPL-CCNC: 16 U/L
ANION GAP SERPL CALC-SCNC: 6 MMOL/L (ref 0–18)
AST SERPL-CCNC: 22 U/L (ref ?–34)
BASOPHILS # BLD AUTO: 0.04 X10(3) UL (ref 0–0.2)
BASOPHILS NFR BLD AUTO: 1.5 %
BILIRUB SERPL-MCNC: 0.7 MG/DL (ref 0.2–1.1)
BUN BLD-MCNC: 16 MG/DL (ref 9–23)
BUN/CREAT SERPL: 15.1 (ref 10–20)
CALCIUM BLD-MCNC: 9.7 MG/DL (ref 8.7–10.4)
CHLORIDE SERPL-SCNC: 103 MMOL/L (ref 98–112)
CO2 SERPL-SCNC: 30 MMOL/L (ref 21–32)
CREAT BLD-MCNC: 1.06 MG/DL
DEPRECATED RDW RBC AUTO: 54.5 FL (ref 35.1–46.3)
EGFRCR SERPLBLD CKD-EPI 2021: 59 ML/MIN/1.73M2 (ref 60–?)
EOSINOPHIL # BLD AUTO: 0 X10(3) UL (ref 0–0.7)
EOSINOPHIL NFR BLD AUTO: 0 %
ERYTHROCYTE [DISTWIDTH] IN BLOOD BY AUTOMATED COUNT: 16 % (ref 11–15)
FASTING STATUS PATIENT QL REPORTED: NO
GLOBULIN PLAS-MCNC: 2.1 G/DL (ref 2.8–4.4)
GLUCOSE BLD-MCNC: 90 MG/DL (ref 70–99)
HCT VFR BLD AUTO: 36.9 %
HGB BLD-MCNC: 11.8 G/DL
IMM GRANULOCYTES # BLD AUTO: 0.12 X10(3) UL (ref 0–1)
IMM GRANULOCYTES NFR BLD: 4.4 %
LYMPHOCYTES # BLD AUTO: 0.64 X10(3) UL (ref 1–4)
LYMPHOCYTES NFR BLD AUTO: 23.4 %
MCH RBC QN AUTO: 29.6 PG (ref 26–34)
MCHC RBC AUTO-ENTMCNC: 32 G/DL (ref 31–37)
MCV RBC AUTO: 92.5 FL
MONOCYTES # BLD AUTO: 0.67 X10(3) UL (ref 0.1–1)
MONOCYTES NFR BLD AUTO: 24.5 %
NEUTROPHILS # BLD AUTO: 1.26 X10 (3) UL (ref 1.5–7.7)
NEUTROPHILS # BLD AUTO: 1.26 X10(3) UL (ref 1.5–7.7)
NEUTROPHILS NFR BLD AUTO: 46.2 %
OSMOLALITY SERPL CALC.SUM OF ELEC: 289 MOSM/KG (ref 275–295)
PLATELET # BLD AUTO: 196 10(3)UL (ref 150–450)
POTASSIUM SERPL-SCNC: 3.6 MMOL/L (ref 3.5–5.1)
PROT SERPL-MCNC: 6.3 G/DL (ref 5.7–8.2)
RBC # BLD AUTO: 3.99 X10(6)UL
SODIUM SERPL-SCNC: 139 MMOL/L (ref 136–145)
WBC # BLD AUTO: 2.7 X10(3) UL (ref 4–11)

## 2023-11-01 PROCEDURE — 85025 COMPLETE CBC W/AUTO DIFF WBC: CPT

## 2023-11-01 PROCEDURE — 36415 COLL VENOUS BLD VENIPUNCTURE: CPT

## 2023-11-01 PROCEDURE — 80053 COMPREHEN METABOLIC PANEL: CPT

## 2023-11-01 RX ORDER — HEPARIN SODIUM (PORCINE) LOCK FLUSH IV SOLN 100 UNIT/ML 100 UNIT/ML
5 SOLUTION INTRAVENOUS ONCE
Status: DISCONTINUED | OUTPATIENT
Start: 2023-11-01 | End: 2023-11-01

## 2023-11-01 RX ORDER — SODIUM CHLORIDE 9 MG/ML
10 INJECTION INTRAVENOUS ONCE
OUTPATIENT
Start: 2023-11-01

## 2023-11-01 RX ORDER — HEPARIN SODIUM (PORCINE) LOCK FLUSH IV SOLN 100 UNIT/ML 100 UNIT/ML
5 SOLUTION INTRAVENOUS ONCE
Status: CANCELLED | OUTPATIENT
Start: 2023-11-01

## 2023-11-02 ENCOUNTER — OFFICE VISIT (OUTPATIENT)
Dept: HEMATOLOGY/ONCOLOGY | Facility: HOSPITAL | Age: 63
End: 2023-11-02
Attending: NURSE PRACTITIONER
Payer: MEDICARE

## 2023-11-02 ENCOUNTER — OFFICE VISIT (OUTPATIENT)
Dept: HEMATOLOGY/ONCOLOGY | Facility: HOSPITAL | Age: 63
End: 2023-11-02
Attending: INTERNAL MEDICINE
Payer: MEDICARE

## 2023-11-02 VITALS
SYSTOLIC BLOOD PRESSURE: 121 MMHG | WEIGHT: 118.5 LBS | HEART RATE: 92 BPM | RESPIRATION RATE: 16 BRPM | TEMPERATURE: 98 F | HEIGHT: 60.98 IN | DIASTOLIC BLOOD PRESSURE: 84 MMHG | OXYGEN SATURATION: 97 % | BODY MASS INDEX: 22.37 KG/M2

## 2023-11-02 DIAGNOSIS — C78.7 MALIGNANT NEOPLASM METASTATIC TO LIVER (HCC): ICD-10-CM

## 2023-11-02 DIAGNOSIS — C50.411 MALIGNANT NEOPLASM OF UPPER-OUTER QUADRANT OF RIGHT BREAST IN FEMALE, ESTROGEN RECEPTOR POSITIVE: Primary | ICD-10-CM

## 2023-11-02 DIAGNOSIS — Z17.0 MALIGNANT NEOPLASM OF UPPER-OUTER QUADRANT OF RIGHT BREAST IN FEMALE, ESTROGEN RECEPTOR POSITIVE: Primary | ICD-10-CM

## 2023-11-02 DIAGNOSIS — J91.0 MALIGNANT PLEURAL EFFUSION: ICD-10-CM

## 2023-11-02 DIAGNOSIS — Z51.11 CHEMOTHERAPY MANAGEMENT, ENCOUNTER FOR: ICD-10-CM

## 2023-11-02 DIAGNOSIS — Z45.2 ENCOUNTER FOR CENTRAL LINE CARE: ICD-10-CM

## 2023-11-02 PROCEDURE — 99215 OFFICE O/P EST HI 40 MIN: CPT | Performed by: NURSE PRACTITIONER

## 2023-11-02 PROCEDURE — 96409 CHEMO IV PUSH SNGL DRUG: CPT

## 2023-11-02 PROCEDURE — 96367 TX/PROPH/DG ADDL SEQ IV INF: CPT

## 2023-11-02 RX ORDER — HEPARIN SODIUM (PORCINE) LOCK FLUSH IV SOLN 100 UNIT/ML 100 UNIT/ML
SOLUTION INTRAVENOUS
Status: COMPLETED
Start: 2023-11-02 | End: 2023-11-02

## 2023-11-02 RX ORDER — HEPARIN SODIUM (PORCINE) LOCK FLUSH IV SOLN 100 UNIT/ML 100 UNIT/ML
5 SOLUTION INTRAVENOUS ONCE
OUTPATIENT
Start: 2023-11-02

## 2023-11-02 RX ORDER — HEPARIN SODIUM (PORCINE) LOCK FLUSH IV SOLN 100 UNIT/ML 100 UNIT/ML
5 SOLUTION INTRAVENOUS ONCE
Status: COMPLETED | OUTPATIENT
Start: 2023-11-02 | End: 2023-11-02

## 2023-11-02 RX ORDER — MIRTAZAPINE 15 MG/1
15 TABLET, FILM COATED ORAL NIGHTLY
Qty: 90 TABLET | Refills: 1 | Status: SHIPPED | OUTPATIENT
Start: 2023-11-02

## 2023-11-02 RX ORDER — SODIUM CHLORIDE 9 MG/ML
10 INJECTION INTRAVENOUS ONCE
OUTPATIENT
Start: 2023-11-02

## 2023-11-02 RX ADMIN — HEPARIN SODIUM (PORCINE) LOCK FLUSH IV SOLN 100 UNIT/ML 500 UNITS: 100 SOLUTION INTRAVENOUS at 14:38:00

## 2023-11-02 NOTE — PROGRESS NOTES
Pt here for C6D1 Drug(s)Halaven. Arrives Ambulating independently, accompanied by Self     Patient was evaluated today by MD.    Oral medications included in this regimen:  no    Patient confirms comprehension of cancer treatment schedule:  yes    Pregnancy screening:  Denies possibility of pregnancy    Modifications in dose or schedule:  No    Medications appearance and physical integrity checked by RN: yes. Chemotherapy IV pump settings verified by 2 RNs:  Yes. Frequency of blood return and site check throughout administration: Prior to administration, Prior to each drug, and At completion of therapy     Infusion/treatment outcome:  patient tolerated treatment without incident    Education Record    Learner:  Patient  Barriers / Limitations:  Fatigue  Method:  Brief focused  Education / instructions given:  Discussed plan of care with patient.    Outcome:  Needs reinforcement    Discharged Home, Ambulating independently, accompanied by:Self    Patient/family verbalized understanding of future appointments: by printed AVS

## 2023-11-09 ENCOUNTER — NURSE ONLY (OUTPATIENT)
Dept: HEMATOLOGY/ONCOLOGY | Facility: HOSPITAL | Age: 63
End: 2023-11-09
Attending: NURSE PRACTITIONER
Payer: MEDICARE

## 2023-11-09 ENCOUNTER — OFFICE VISIT (OUTPATIENT)
Dept: HEMATOLOGY/ONCOLOGY | Facility: HOSPITAL | Age: 63
End: 2023-11-09
Attending: INTERNAL MEDICINE
Payer: MEDICARE

## 2023-11-09 VITALS
WEIGHT: 115 LBS | TEMPERATURE: 98 F | DIASTOLIC BLOOD PRESSURE: 67 MMHG | OXYGEN SATURATION: 97 % | HEART RATE: 104 BPM | RESPIRATION RATE: 16 BRPM | SYSTOLIC BLOOD PRESSURE: 137 MMHG | BODY MASS INDEX: 22 KG/M2

## 2023-11-09 DIAGNOSIS — Z45.2 ENCOUNTER FOR CENTRAL LINE CARE: ICD-10-CM

## 2023-11-09 DIAGNOSIS — J91.0 MALIGNANT PLEURAL EFFUSION: ICD-10-CM

## 2023-11-09 DIAGNOSIS — Z17.0 MALIGNANT NEOPLASM OF UPPER-OUTER QUADRANT OF RIGHT BREAST IN FEMALE, ESTROGEN RECEPTOR POSITIVE: Primary | ICD-10-CM

## 2023-11-09 DIAGNOSIS — C50.411 MALIGNANT NEOPLASM OF UPPER-OUTER QUADRANT OF RIGHT BREAST IN FEMALE, ESTROGEN RECEPTOR POSITIVE: Primary | ICD-10-CM

## 2023-11-09 LAB
BASOPHILS # BLD: 0 X10(3) UL (ref 0–0.2)
BASOPHILS NFR BLD: 0 %
DEPRECATED RDW RBC AUTO: 50.4 FL (ref 35.1–46.3)
EOSINOPHIL # BLD: 0 X10(3) UL (ref 0–0.7)
EOSINOPHIL NFR BLD: 0 %
ERYTHROCYTE [DISTWIDTH] IN BLOOD BY AUTOMATED COUNT: 15.4 % (ref 11–15)
HCT VFR BLD AUTO: 33.9 %
HGB BLD-MCNC: 11.1 G/DL
LYMPHOCYTES NFR BLD: 0.47 X10(3) UL (ref 1–4)
LYMPHOCYTES NFR BLD: 15 %
MCH RBC QN AUTO: 29.7 PG (ref 26–34)
MCHC RBC AUTO-ENTMCNC: 32.7 G/DL (ref 31–37)
MCV RBC AUTO: 90.6 FL
MONOCYTES # BLD: 0 X10(3) UL (ref 0.1–1)
MONOCYTES NFR BLD: 0 %
NEUTROPHILS # BLD AUTO: 2.29 X10 (3) UL (ref 1.5–7.7)
NEUTROPHILS NFR BLD: 83 %
NEUTS BAND NFR BLD: 2 %
NEUTS HYPERSEG # BLD: 2.64 X10(3) UL (ref 1.5–7.7)
PLATELET # BLD AUTO: 195 10(3)UL (ref 150–450)
PLATELET MORPHOLOGY: NORMAL
RBC # BLD AUTO: 3.74 X10(6)UL
TOTAL CELLS COUNTED BLD: 100
WBC # BLD AUTO: 3.1 X10(3) UL (ref 4–11)

## 2023-11-09 PROCEDURE — 85007 BL SMEAR W/DIFF WBC COUNT: CPT

## 2023-11-09 PROCEDURE — 96375 TX/PRO/DX INJ NEW DRUG ADDON: CPT

## 2023-11-09 PROCEDURE — 85027 COMPLETE CBC AUTOMATED: CPT

## 2023-11-09 PROCEDURE — 96409 CHEMO IV PUSH SNGL DRUG: CPT

## 2023-11-09 PROCEDURE — 85025 COMPLETE CBC W/AUTO DIFF WBC: CPT

## 2023-11-09 RX ORDER — SODIUM CHLORIDE 9 MG/ML
10 INJECTION INTRAVENOUS ONCE
OUTPATIENT
Start: 2023-11-09

## 2023-11-09 RX ORDER — HEPARIN SODIUM (PORCINE) LOCK FLUSH IV SOLN 100 UNIT/ML 100 UNIT/ML
5 SOLUTION INTRAVENOUS ONCE
OUTPATIENT
Start: 2023-11-09

## 2023-11-09 RX ORDER — HEPARIN SODIUM (PORCINE) LOCK FLUSH IV SOLN 100 UNIT/ML 100 UNIT/ML
5 SOLUTION INTRAVENOUS ONCE
Status: COMPLETED | OUTPATIENT
Start: 2023-11-09 | End: 2023-11-09

## 2023-11-09 RX ADMIN — HEPARIN SODIUM (PORCINE) LOCK FLUSH IV SOLN 100 UNIT/ML 500 UNITS: 100 SOLUTION INTRAVENOUS at 16:19:00

## 2023-11-09 NOTE — PROGRESS NOTES
Pt here for C6D8 [place]. Arrives Ambulating independently, accompanied by Self     Patient was evaluated today by Treatment Nurse. Oral medications included in this regimen:  no    Patient confirms comprehension of cancer treatment schedule:  yes    Pregnancy screening:  Not applicable    Modifications in dose or schedule:  No    Medications appearance and physical integrity checked by RN: yes. Chemotherapy IV pump settings verified by 2 RNs:  Yes.   Frequency of blood return and site check throughout administration: Prior to administration, Prior to each drug, Every 2-3 ml IVP, and At completion of therapy     Infusion/treatment outcome:  patient tolerated treatment without incident    Education Record    Learner:  Patient  Barriers / Limitations:  None  Method:  Reinforcement  Education / instructions given:  Reinforced plan of care  Outcome:  Shows understanding    Discharged Home, Ambulating independently, accompanied by:Self    Patient/family verbalized understanding of future appointments: by printed AVS

## 2023-11-22 ENCOUNTER — NURSE ONLY (OUTPATIENT)
Dept: HEMATOLOGY/ONCOLOGY | Facility: HOSPITAL | Age: 63
End: 2023-11-22
Attending: INTERNAL MEDICINE
Payer: MEDICARE

## 2023-11-22 VITALS
OXYGEN SATURATION: 98 % | RESPIRATION RATE: 16 BRPM | DIASTOLIC BLOOD PRESSURE: 74 MMHG | SYSTOLIC BLOOD PRESSURE: 119 MMHG | HEIGHT: 60.98 IN | TEMPERATURE: 98 F | BODY MASS INDEX: 22.28 KG/M2 | WEIGHT: 118 LBS | HEART RATE: 84 BPM

## 2023-11-22 DIAGNOSIS — C50.411 MALIGNANT NEOPLASM OF UPPER-OUTER QUADRANT OF RIGHT BREAST IN FEMALE, ESTROGEN RECEPTOR POSITIVE: Primary | ICD-10-CM

## 2023-11-22 DIAGNOSIS — Z17.0 MALIGNANT NEOPLASM OF UPPER-OUTER QUADRANT OF RIGHT BREAST IN FEMALE, ESTROGEN RECEPTOR POSITIVE: Primary | ICD-10-CM

## 2023-11-22 DIAGNOSIS — Z45.2 ENCOUNTER FOR CENTRAL LINE CARE: ICD-10-CM

## 2023-11-22 DIAGNOSIS — J91.0 MALIGNANT PLEURAL EFFUSION: ICD-10-CM

## 2023-11-22 DIAGNOSIS — Z51.11 CHEMOTHERAPY MANAGEMENT, ENCOUNTER FOR: ICD-10-CM

## 2023-11-22 DIAGNOSIS — C78.7 MALIGNANT NEOPLASM METASTATIC TO LIVER (HCC): ICD-10-CM

## 2023-11-22 LAB
ALBUMIN SERPL-MCNC: 3.6 G/DL (ref 3.2–4.8)
ALBUMIN/GLOB SERPL: 1.6 {RATIO} (ref 1–2)
ALP LIVER SERPL-CCNC: 69 U/L
ALT SERPL-CCNC: 15 U/L
ANION GAP SERPL CALC-SCNC: 5 MMOL/L (ref 0–18)
AST SERPL-CCNC: 25 U/L (ref ?–34)
BASOPHILS # BLD AUTO: 0.03 X10(3) UL (ref 0–0.2)
BASOPHILS NFR BLD AUTO: 1 %
BILIRUB SERPL-MCNC: 0.8 MG/DL (ref 0.2–1.1)
BUN BLD-MCNC: 17 MG/DL (ref 9–23)
BUN/CREAT SERPL: 17 (ref 10–20)
CALCIUM BLD-MCNC: 8.7 MG/DL (ref 8.7–10.4)
CHLORIDE SERPL-SCNC: 108 MMOL/L (ref 98–112)
CO2 SERPL-SCNC: 27 MMOL/L (ref 21–32)
CREAT BLD-MCNC: 1 MG/DL
DEPRECATED RDW RBC AUTO: 53.2 FL (ref 35.1–46.3)
EGFRCR SERPLBLD CKD-EPI 2021: 64 ML/MIN/1.73M2 (ref 60–?)
EOSINOPHIL # BLD AUTO: 0 X10(3) UL (ref 0–0.7)
EOSINOPHIL NFR BLD AUTO: 0 %
ERYTHROCYTE [DISTWIDTH] IN BLOOD BY AUTOMATED COUNT: 15.9 % (ref 11–15)
GLOBULIN PLAS-MCNC: 2.2 G/DL (ref 2.8–4.4)
GLUCOSE BLD-MCNC: 83 MG/DL (ref 70–99)
HCT VFR BLD AUTO: 32.3 %
HGB BLD-MCNC: 10.2 G/DL
IMM GRANULOCYTES # BLD AUTO: 0.08 X10(3) UL (ref 0–1)
IMM GRANULOCYTES NFR BLD: 2.8 %
LYMPHOCYTES # BLD AUTO: 0.36 X10(3) UL (ref 1–4)
LYMPHOCYTES NFR BLD AUTO: 12.6 %
MCH RBC QN AUTO: 29 PG (ref 26–34)
MCHC RBC AUTO-ENTMCNC: 31.6 G/DL (ref 31–37)
MCV RBC AUTO: 91.8 FL
MONOCYTES # BLD AUTO: 0.69 X10(3) UL (ref 0.1–1)
MONOCYTES NFR BLD AUTO: 24.1 %
NEUTROPHILS # BLD AUTO: 1.7 X10 (3) UL (ref 1.5–7.7)
NEUTROPHILS # BLD AUTO: 1.7 X10(3) UL (ref 1.5–7.7)
NEUTROPHILS NFR BLD AUTO: 59.5 %
OSMOLALITY SERPL CALC.SUM OF ELEC: 291 MOSM/KG (ref 275–295)
PLATELET # BLD AUTO: 225 10(3)UL (ref 150–450)
POTASSIUM SERPL-SCNC: 4 MMOL/L (ref 3.5–5.1)
PROT SERPL-MCNC: 5.8 G/DL (ref 5.7–8.2)
RBC # BLD AUTO: 3.52 X10(6)UL
SODIUM SERPL-SCNC: 140 MMOL/L (ref 136–145)
WBC # BLD AUTO: 2.9 X10(3) UL (ref 4–11)

## 2023-11-22 PROCEDURE — 80053 COMPREHEN METABOLIC PANEL: CPT

## 2023-11-22 PROCEDURE — 96409 CHEMO IV PUSH SNGL DRUG: CPT

## 2023-11-22 PROCEDURE — 85025 COMPLETE CBC W/AUTO DIFF WBC: CPT

## 2023-11-22 PROCEDURE — 99215 OFFICE O/P EST HI 40 MIN: CPT | Performed by: PHYSICIAN ASSISTANT

## 2023-11-22 PROCEDURE — 96375 TX/PRO/DX INJ NEW DRUG ADDON: CPT

## 2023-11-22 RX ORDER — HYDROCODONE BITARTRATE AND ACETAMINOPHEN 10; 325 MG/1; MG/1
1 TABLET ORAL EVERY 6 HOURS PRN
Qty: 90 TABLET | Refills: 0 | Status: SHIPPED | OUTPATIENT
Start: 2023-11-22

## 2023-11-22 RX ORDER — SODIUM CHLORIDE 9 MG/ML
10 INJECTION INTRAVENOUS ONCE
OUTPATIENT
Start: 2023-11-22

## 2023-11-22 RX ORDER — HEPARIN SODIUM (PORCINE) LOCK FLUSH IV SOLN 100 UNIT/ML 100 UNIT/ML
5 SOLUTION INTRAVENOUS ONCE
Status: COMPLETED | OUTPATIENT
Start: 2023-11-22 | End: 2023-11-22

## 2023-11-22 RX ORDER — HEPARIN SODIUM (PORCINE) LOCK FLUSH IV SOLN 100 UNIT/ML 100 UNIT/ML
SOLUTION INTRAVENOUS
Status: COMPLETED
Start: 2023-11-22 | End: 2023-11-22

## 2023-11-22 RX ORDER — HEPARIN SODIUM (PORCINE) LOCK FLUSH IV SOLN 100 UNIT/ML 100 UNIT/ML
5 SOLUTION INTRAVENOUS ONCE
OUTPATIENT
Start: 2023-11-22

## 2023-11-22 RX ADMIN — HEPARIN SODIUM (PORCINE) LOCK FLUSH IV SOLN 100 UNIT/ML 500 UNITS: 100 SOLUTION INTRAVENOUS at 13:24:00

## 2023-11-22 NOTE — PROGRESS NOTES
Pt here for C7D1 Drug(s)halaven. Arrives Ambulating independently, accompanied by Self     Patient was evaluated today by MD.    Oral medications included in this regimen:  no    Patient confirms comprehension of cancer treatment schedule:  yes    Pregnancy screening:  Not applicable    Modifications in dose or schedule:  Yes    Medications appearance and physical integrity checked by RN: yes. Chemotherapy IV pump settings verified by 2 RNs:  Yes.   Frequency of blood return and site check throughout administration: Prior to administration, Prior to each drug, and At completion of therapy     Infusion/treatment outcome:  patient tolerated treatment without incident    Education Record    Learner:  Patient  Barriers / Limitations:  None  Method:  Discussion  Education / instructions given:  plan of care  Outcome:  Shows understanding    Discharged Home, Ambulating independently, accompanied by:Self    Patient/family verbalized understanding of future appointments: by [facility]

## 2023-11-30 ENCOUNTER — NURSE ONLY (OUTPATIENT)
Dept: HEMATOLOGY/ONCOLOGY | Facility: HOSPITAL | Age: 63
End: 2023-11-30
Attending: INTERNAL MEDICINE
Payer: MEDICARE

## 2023-11-30 VITALS
HEART RATE: 91 BPM | TEMPERATURE: 99 F | RESPIRATION RATE: 16 BRPM | SYSTOLIC BLOOD PRESSURE: 110 MMHG | OXYGEN SATURATION: 95 % | DIASTOLIC BLOOD PRESSURE: 73 MMHG

## 2023-11-30 DIAGNOSIS — Z17.0 MALIGNANT NEOPLASM OF UPPER-OUTER QUADRANT OF RIGHT BREAST IN FEMALE, ESTROGEN RECEPTOR POSITIVE: Primary | ICD-10-CM

## 2023-11-30 DIAGNOSIS — C50.411 MALIGNANT NEOPLASM OF UPPER-OUTER QUADRANT OF RIGHT BREAST IN FEMALE, ESTROGEN RECEPTOR POSITIVE: Primary | ICD-10-CM

## 2023-11-30 DIAGNOSIS — Z45.2 ENCOUNTER FOR CENTRAL LINE CARE: ICD-10-CM

## 2023-11-30 DIAGNOSIS — J91.0 MALIGNANT PLEURAL EFFUSION: ICD-10-CM

## 2023-11-30 LAB
BASOPHILS # BLD: 0.02 X10(3) UL (ref 0–0.2)
BASOPHILS NFR BLD: 1 %
DEPRECATED RDW RBC AUTO: 49.4 FL (ref 35.1–46.3)
EOSINOPHIL # BLD: 0 X10(3) UL (ref 0–0.7)
EOSINOPHIL NFR BLD: 0 %
ERYTHROCYTE [DISTWIDTH] IN BLOOD BY AUTOMATED COUNT: 15.1 % (ref 11–15)
HCT VFR BLD AUTO: 31.4 %
HGB BLD-MCNC: 9.9 G/DL
LYMPHOCYTES NFR BLD: 0.19 X10(3) UL (ref 1–4)
LYMPHOCYTES NFR BLD: 9 %
MCH RBC QN AUTO: 28.4 PG (ref 26–34)
MCHC RBC AUTO-ENTMCNC: 31.5 G/DL (ref 31–37)
MCV RBC AUTO: 90.2 FL
MONOCYTES # BLD: 0.19 X10(3) UL (ref 0.1–1)
MONOCYTES NFR BLD: 10 %
NEUTROPHILS # BLD AUTO: 1.18 X10 (3) UL (ref 1.5–7.7)
NEUTROPHILS NFR BLD: 79 %
NEUTS HYPERSEG # BLD: 1.5 X10(3) UL (ref 1.5–7.7)
PLATELET # BLD AUTO: 233 10(3)UL (ref 150–450)
PLATELET MORPHOLOGY: NORMAL
RBC # BLD AUTO: 3.48 X10(6)UL
TOTAL CELLS COUNTED BLD: 100
VARIANT LYMPHS NFR BLD MANUAL: 1 %
WBC # BLD AUTO: 1.9 X10(3) UL (ref 4–11)

## 2023-11-30 PROCEDURE — 85007 BL SMEAR W/DIFF WBC COUNT: CPT

## 2023-11-30 PROCEDURE — 85060 BLOOD SMEAR INTERPRETATION: CPT

## 2023-11-30 PROCEDURE — 96375 TX/PRO/DX INJ NEW DRUG ADDON: CPT

## 2023-11-30 PROCEDURE — 85027 COMPLETE CBC AUTOMATED: CPT

## 2023-11-30 PROCEDURE — 96409 CHEMO IV PUSH SNGL DRUG: CPT

## 2023-11-30 PROCEDURE — 85025 COMPLETE CBC W/AUTO DIFF WBC: CPT

## 2023-11-30 RX ORDER — SODIUM CHLORIDE 9 MG/ML
10 INJECTION INTRAVENOUS ONCE
OUTPATIENT
Start: 2023-11-30

## 2023-11-30 RX ORDER — HEPARIN SODIUM (PORCINE) LOCK FLUSH IV SOLN 100 UNIT/ML 100 UNIT/ML
5 SOLUTION INTRAVENOUS ONCE
OUTPATIENT
Start: 2023-11-30

## 2023-11-30 RX ORDER — HEPARIN SODIUM (PORCINE) LOCK FLUSH IV SOLN 100 UNIT/ML 100 UNIT/ML
5 SOLUTION INTRAVENOUS ONCE
Status: COMPLETED | OUTPATIENT
Start: 2023-11-30 | End: 2023-11-30

## 2023-11-30 RX ADMIN — HEPARIN SODIUM (PORCINE) LOCK FLUSH IV SOLN 100 UNIT/ML 500 UNITS: 100 SOLUTION INTRAVENOUS at 14:26:00

## 2023-11-30 NOTE — PROGRESS NOTES
Pt here for C7D8 [place]. Arrives Ambulating independently, accompanied by Self     Patient was evaluated today by Treatment Nurse. Oral medications included in this regimen:  no    Patient confirms comprehension of cancer treatment schedule:  yes    Pregnancy screening:  Not applicable    Modifications in dose or schedule:  No    Medications appearance and physical integrity checked by RN: yes. Chemotherapy IV pump settings verified by 2 RNs:  Yes.   Frequency of blood return and site check throughout administration: Prior to administration, Prior to each drug, Every 2-3 ml IVP, and At completion of therapy     Infusion/treatment outcome:  patient tolerated treatment without incident    Education Record    Learner:  Patient  Barriers / Limitations:  None  Method:  Reinforcement  Education / instructions given:  Reinforced plan of care  Outcome:  Shows understanding    Discharged Home, Ambulating independently, accompanied by:Self    Patient/family verbalized understanding of future appointments: by printed AVS

## 2023-12-14 ENCOUNTER — OFFICE VISIT (OUTPATIENT)
Dept: HEMATOLOGY/ONCOLOGY | Facility: HOSPITAL | Age: 63
End: 2023-12-14
Attending: INTERNAL MEDICINE
Payer: MEDICARE

## 2023-12-14 VITALS
HEIGHT: 60.98 IN | TEMPERATURE: 98 F | WEIGHT: 116.5 LBS | DIASTOLIC BLOOD PRESSURE: 73 MMHG | BODY MASS INDEX: 21.99 KG/M2 | OXYGEN SATURATION: 100 % | RESPIRATION RATE: 16 BRPM | HEART RATE: 76 BPM | SYSTOLIC BLOOD PRESSURE: 107 MMHG

## 2023-12-14 DIAGNOSIS — C50.411 MALIGNANT NEOPLASM OF UPPER-OUTER QUADRANT OF RIGHT BREAST IN FEMALE, ESTROGEN RECEPTOR POSITIVE  (HCC): Primary | ICD-10-CM

## 2023-12-14 DIAGNOSIS — J91.0 MALIGNANT PLEURAL EFFUSION: ICD-10-CM

## 2023-12-14 DIAGNOSIS — Z51.11 CHEMOTHERAPY MANAGEMENT, ENCOUNTER FOR: ICD-10-CM

## 2023-12-14 DIAGNOSIS — Z17.0 MALIGNANT NEOPLASM OF UPPER-OUTER QUADRANT OF RIGHT BREAST IN FEMALE, ESTROGEN RECEPTOR POSITIVE  (HCC): Primary | ICD-10-CM

## 2023-12-14 DIAGNOSIS — Z45.2 ENCOUNTER FOR CENTRAL LINE CARE: ICD-10-CM

## 2023-12-14 DIAGNOSIS — C78.7 MALIGNANT NEOPLASM METASTATIC TO LIVER (HCC): ICD-10-CM

## 2023-12-14 LAB
ALBUMIN SERPL-MCNC: 3.7 G/DL (ref 3.2–4.8)
ALBUMIN/GLOB SERPL: 1.6 {RATIO} (ref 1–2)
ALP LIVER SERPL-CCNC: 60 U/L
ALT SERPL-CCNC: 23 U/L
ANION GAP SERPL CALC-SCNC: 3 MMOL/L (ref 0–18)
AST SERPL-CCNC: 29 U/L (ref ?–34)
BASOPHILS # BLD AUTO: 0.03 X10(3) UL (ref 0–0.2)
BASOPHILS NFR BLD AUTO: 0.7 %
BILIRUB SERPL-MCNC: 0.4 MG/DL (ref 0.2–1.1)
BUN BLD-MCNC: 16 MG/DL (ref 9–23)
BUN/CREAT SERPL: 14.4 (ref 10–20)
CALCIUM BLD-MCNC: 9.5 MG/DL (ref 8.7–10.4)
CHLORIDE SERPL-SCNC: 107 MMOL/L (ref 98–112)
CO2 SERPL-SCNC: 29 MMOL/L (ref 21–32)
CREAT BLD-MCNC: 1.11 MG/DL
DEPRECATED RDW RBC AUTO: 53.2 FL (ref 35.1–46.3)
EGFRCR SERPLBLD CKD-EPI 2021: 56 ML/MIN/1.73M2 (ref 60–?)
EOSINOPHIL # BLD AUTO: 0 X10(3) UL (ref 0–0.7)
EOSINOPHIL NFR BLD AUTO: 0 %
ERYTHROCYTE [DISTWIDTH] IN BLOOD BY AUTOMATED COUNT: 16.2 % (ref 11–15)
FASTING STATUS PATIENT QL REPORTED: NO
GLOBULIN PLAS-MCNC: 2.3 G/DL (ref 2.8–4.4)
GLUCOSE BLD-MCNC: 85 MG/DL (ref 70–99)
HCT VFR BLD AUTO: 35 %
HGB BLD-MCNC: 11.1 G/DL
IMM GRANULOCYTES # BLD AUTO: 0.15 X10(3) UL (ref 0–1)
IMM GRANULOCYTES NFR BLD: 3.5 %
LYMPHOCYTES # BLD AUTO: 0.61 X10(3) UL (ref 1–4)
LYMPHOCYTES NFR BLD AUTO: 14.3 %
MCH RBC QN AUTO: 28.8 PG (ref 26–34)
MCHC RBC AUTO-ENTMCNC: 31.7 G/DL (ref 31–37)
MCV RBC AUTO: 90.7 FL
MONOCYTES # BLD AUTO: 0.67 X10(3) UL (ref 0.1–1)
MONOCYTES NFR BLD AUTO: 15.7 %
NEUTROPHILS # BLD AUTO: 2.82 X10 (3) UL (ref 1.5–7.7)
NEUTROPHILS # BLD AUTO: 2.82 X10(3) UL (ref 1.5–7.7)
NEUTROPHILS NFR BLD AUTO: 65.8 %
OSMOLALITY SERPL CALC.SUM OF ELEC: 288 MOSM/KG (ref 275–295)
PLATELET # BLD AUTO: 221 10(3)UL (ref 150–450)
POTASSIUM SERPL-SCNC: 4.3 MMOL/L (ref 3.5–5.1)
PROT SERPL-MCNC: 6 G/DL (ref 5.7–8.2)
RBC # BLD AUTO: 3.86 X10(6)UL
SODIUM SERPL-SCNC: 139 MMOL/L (ref 136–145)
WBC # BLD AUTO: 4.3 X10(3) UL (ref 4–11)

## 2023-12-14 PROCEDURE — 85025 COMPLETE CBC W/AUTO DIFF WBC: CPT

## 2023-12-14 PROCEDURE — 36593 DECLOT VASCULAR DEVICE: CPT

## 2023-12-14 PROCEDURE — 99215 OFFICE O/P EST HI 40 MIN: CPT | Performed by: NURSE PRACTITIONER

## 2023-12-14 PROCEDURE — 80053 COMPREHEN METABOLIC PANEL: CPT

## 2023-12-14 PROCEDURE — 96367 TX/PROPH/DG ADDL SEQ IV INF: CPT

## 2023-12-14 PROCEDURE — 96409 CHEMO IV PUSH SNGL DRUG: CPT

## 2023-12-14 RX ORDER — HEPARIN SODIUM (PORCINE) LOCK FLUSH IV SOLN 100 UNIT/ML 100 UNIT/ML
5 SOLUTION INTRAVENOUS ONCE
Status: COMPLETED | OUTPATIENT
Start: 2023-12-14 | End: 2023-12-14

## 2023-12-14 RX ORDER — HEPARIN SODIUM (PORCINE) LOCK FLUSH IV SOLN 100 UNIT/ML 100 UNIT/ML
SOLUTION INTRAVENOUS
Status: COMPLETED
Start: 2023-12-14 | End: 2023-12-14

## 2023-12-14 RX ORDER — WATER 10 ML/10ML
INJECTION INTRAMUSCULAR; INTRAVENOUS; SUBCUTANEOUS
Status: DISCONTINUED
Start: 2023-12-14 | End: 2023-12-14

## 2023-12-14 RX ORDER — HYDROCODONE BITARTRATE AND ACETAMINOPHEN 10; 325 MG/1; MG/1
1 TABLET ORAL EVERY 6 HOURS PRN
Qty: 90 TABLET | Refills: 0 | Status: SHIPPED | OUTPATIENT
Start: 2023-12-14

## 2023-12-14 RX ORDER — SODIUM CHLORIDE 9 MG/ML
10 INJECTION INTRAVENOUS ONCE
OUTPATIENT
Start: 2023-12-14

## 2023-12-14 RX ORDER — HEPARIN SODIUM (PORCINE) LOCK FLUSH IV SOLN 100 UNIT/ML 100 UNIT/ML
5 SOLUTION INTRAVENOUS ONCE
Status: CANCELLED | OUTPATIENT
Start: 2023-12-14

## 2023-12-14 RX ORDER — HEPARIN SODIUM (PORCINE) LOCK FLUSH IV SOLN 100 UNIT/ML 100 UNIT/ML
5 SOLUTION INTRAVENOUS ONCE
OUTPATIENT
Start: 2023-12-14

## 2023-12-14 RX ORDER — SODIUM CHLORIDE 9 MG/ML
10 INJECTION INTRAVENOUS ONCE
Status: CANCELLED | OUTPATIENT
Start: 2023-12-14

## 2023-12-14 RX ADMIN — HEPARIN SODIUM (PORCINE) LOCK FLUSH IV SOLN 100 UNIT/ML 500 UNITS: 100 SOLUTION INTRAVENOUS at 15:20:00

## 2023-12-14 NOTE — PROGRESS NOTES
Pt here for C8D1 Drug(s)Halaven. Arrives Ambulating independently, accompanied by Self from APRN visit. PORT previously accessed in the lab, TPA administered. TPA removed upon arrival to infusion center with good blood return established. Patient was evaluated today by DOMINICK. Oral medications included in this regimen:  no    Patient confirms comprehension of cancer treatment schedule:  yes    Pregnancy screening:  Denies possibility of pregnancy    Modifications in dose or schedule:  No    Medications appearance and physical integrity checked by RN: yes. Chemotherapy IV pump settings verified by 2 RNs:  Yes. Frequency of blood return and site check throughout administration: Prior to administration, Prior to each drug, Every 2-3 ml IVP, and At completion of therapy     Infusion/treatment outcome:  patient tolerated treatment without incident  PORT flushed and Heparin locked before de-accessing. Gauze/tape applied to site.      Education Record    Learner:  Patient  Barriers / Limitations:  None  Method:  Discussion  Education / instructions given:  Plan of care and future appointment's,  Outcome:  Shows understanding    Discharged Home, Ambulating independently, accompanied by:Self    Patient/family verbalized understanding of future appointments: by [facility]

## 2023-12-21 ENCOUNTER — NURSE ONLY (OUTPATIENT)
Dept: HEMATOLOGY/ONCOLOGY | Facility: HOSPITAL | Age: 63
End: 2023-12-21
Attending: INTERNAL MEDICINE
Payer: MEDICARE

## 2023-12-21 ENCOUNTER — OFFICE VISIT (OUTPATIENT)
Dept: HEMATOLOGY/ONCOLOGY | Facility: HOSPITAL | Age: 63
End: 2023-12-21
Attending: NURSE PRACTITIONER
Payer: MEDICARE

## 2023-12-21 VITALS
SYSTOLIC BLOOD PRESSURE: 127 MMHG | WEIGHT: 111.5 LBS | OXYGEN SATURATION: 98 % | DIASTOLIC BLOOD PRESSURE: 76 MMHG | HEART RATE: 96 BPM | BODY MASS INDEX: 21 KG/M2 | RESPIRATION RATE: 16 BRPM | TEMPERATURE: 98 F

## 2023-12-21 DIAGNOSIS — Z45.2 ENCOUNTER FOR CENTRAL LINE CARE: ICD-10-CM

## 2023-12-21 DIAGNOSIS — Z17.0 MALIGNANT NEOPLASM OF UPPER-OUTER QUADRANT OF RIGHT BREAST IN FEMALE, ESTROGEN RECEPTOR POSITIVE  (HCC): Primary | ICD-10-CM

## 2023-12-21 DIAGNOSIS — C50.411 MALIGNANT NEOPLASM OF UPPER-OUTER QUADRANT OF RIGHT BREAST IN FEMALE, ESTROGEN RECEPTOR POSITIVE  (HCC): Primary | ICD-10-CM

## 2023-12-21 DIAGNOSIS — J91.0 MALIGNANT PLEURAL EFFUSION: ICD-10-CM

## 2023-12-21 LAB
BASOPHILS # BLD: 0 X10(3) UL (ref 0–0.2)
BASOPHILS NFR BLD: 0 %
DEPRECATED RDW RBC AUTO: 51.6 FL (ref 35.1–46.3)
EOSINOPHIL # BLD: 0 X10(3) UL (ref 0–0.7)
EOSINOPHIL NFR BLD: 0 %
ERYTHROCYTE [DISTWIDTH] IN BLOOD BY AUTOMATED COUNT: 16 % (ref 11–15)
HCT VFR BLD AUTO: 33.3 %
HGB BLD-MCNC: 10.7 G/DL
LYMPHOCYTES NFR BLD: 0.54 X10(3) UL (ref 1–4)
LYMPHOCYTES NFR BLD: 15 %
MCH RBC QN AUTO: 28.4 PG (ref 26–34)
MCHC RBC AUTO-ENTMCNC: 32.1 G/DL (ref 31–37)
MCV RBC AUTO: 88.3 FL
MONOCYTES # BLD: 0.11 X10(3) UL (ref 0.1–1)
MONOCYTES NFR BLD: 3 %
NEUTROPHILS # BLD AUTO: 2.61 X10 (3) UL (ref 1.5–7.7)
NEUTROPHILS NFR BLD: 81 %
NEUTS BAND NFR BLD: 1 %
NEUTS HYPERSEG # BLD: 2.95 X10(3) UL (ref 1.5–7.7)
PLATELET # BLD AUTO: 188 10(3)UL (ref 150–450)
PLATELET MORPHOLOGY: NORMAL
RBC # BLD AUTO: 3.77 X10(6)UL
TOTAL CELLS COUNTED BLD: 100
WBC # BLD AUTO: 3.6 X10(3) UL (ref 4–11)

## 2023-12-21 PROCEDURE — 85007 BL SMEAR W/DIFF WBC COUNT: CPT

## 2023-12-21 PROCEDURE — 96409 CHEMO IV PUSH SNGL DRUG: CPT

## 2023-12-21 PROCEDURE — 85025 COMPLETE CBC W/AUTO DIFF WBC: CPT

## 2023-12-21 PROCEDURE — 96375 TX/PRO/DX INJ NEW DRUG ADDON: CPT

## 2023-12-21 PROCEDURE — 85027 COMPLETE CBC AUTOMATED: CPT

## 2023-12-21 RX ORDER — HEPARIN SODIUM (PORCINE) LOCK FLUSH IV SOLN 100 UNIT/ML 100 UNIT/ML
5 SOLUTION INTRAVENOUS ONCE
Status: COMPLETED | OUTPATIENT
Start: 2023-12-21 | End: 2023-12-21

## 2023-12-21 RX ORDER — SODIUM CHLORIDE 9 MG/ML
10 INJECTION INTRAVENOUS ONCE
OUTPATIENT
Start: 2023-12-21

## 2023-12-21 RX ORDER — HEPARIN SODIUM (PORCINE) LOCK FLUSH IV SOLN 100 UNIT/ML 100 UNIT/ML
5 SOLUTION INTRAVENOUS ONCE
OUTPATIENT
Start: 2023-12-21

## 2023-12-21 RX ORDER — ZOLPIDEM TARTRATE 10 MG/1
10 TABLET ORAL NIGHTLY PRN
Qty: 60 TABLET | Refills: 0 | Status: SHIPPED | OUTPATIENT
Start: 2023-12-21

## 2023-12-21 RX ADMIN — HEPARIN SODIUM (PORCINE) LOCK FLUSH IV SOLN 100 UNIT/ML 500 UNITS: 100 SOLUTION INTRAVENOUS at 15:17:00

## 2023-12-21 NOTE — PROGRESS NOTES
Pt here for C8D8 Drug(s)[place]. Arrives Ambulating independently, accompanied by Self     Patient was evaluated today by Treatment Nurse. Oral medications included in this regimen:  no  Patient confirms comprehension of cancer treatment schedule:  yes  Pregnancy screening:  Denies possibility of pregnancy  Modifications in dose or schedule:  No  Medications appearance and physical integrity checked by RN: yes. Chemotherapy IV pump settings verified by 2 RNs:  Yes. Frequency of blood return and site check throughout administration: Prior to administration, Prior to each drug, Every 2-3 ml IVP, and At completion of therapy   Infusion/treatment outcome:  patient tolerated treatment without incident    Education Record    Learner:  Patient  Barriers / Limitations:  None  Method:  Reinforcement  Education / instructions given:  Plan of care.   Outcome:  Shows understanding    Discharged Home, Ambulating independently, accompanied by:Self    Patient/family verbalized understanding of future appointments: by [facility]
[FreeTextEntry1] : Sigrid was hospitalized at [facility] in January 2020, found to have proximal small bowel obstruction, attributed to adhesions (radical prostatectomy and umbilical herniorrhaphy).  With conservative management, including nasogastric tube decompression, obstruction resolved.  He attributed this episode to eating a lot of grapefruit just prior.  He had a lesser bout not requiring hospitalization several months later.  Incidental finding of cholelithiasis, hepatic steatosis on imaging studies.  Two small tubular adenomas and a hyperplastic polyp were removed at colonoscopy June 2019.

## 2023-12-31 ENCOUNTER — APPOINTMENT (OUTPATIENT)
Dept: MRI IMAGING | Facility: HOSPITAL | Age: 63
End: 2023-12-31
Attending: EMERGENCY MEDICINE
Payer: MEDICARE

## 2023-12-31 ENCOUNTER — HOSPITAL ENCOUNTER (INPATIENT)
Facility: HOSPITAL | Age: 63
LOS: 7 days | Discharge: HOME | End: 2024-01-07
Attending: EMERGENCY MEDICINE | Admitting: HOSPITALIST
Payer: MEDICARE

## 2023-12-31 ENCOUNTER — APPOINTMENT (OUTPATIENT)
Dept: CT IMAGING | Facility: HOSPITAL | Age: 63
End: 2023-12-31
Attending: EMERGENCY MEDICINE
Payer: MEDICARE

## 2023-12-31 DIAGNOSIS — R29.6 MULTIPLE FALLS: ICD-10-CM

## 2023-12-31 DIAGNOSIS — R06.09 DYSPNEA ON EXERTION: ICD-10-CM

## 2023-12-31 DIAGNOSIS — R53.1 WEAKNESS: Primary | ICD-10-CM

## 2023-12-31 LAB
ALBUMIN SERPL-MCNC: 4.1 G/DL (ref 3.2–4.8)
ALP LIVER SERPL-CCNC: 72 U/L
ALT SERPL-CCNC: 23 U/L
ANION GAP SERPL CALC-SCNC: 2 MMOL/L (ref 0–18)
AST SERPL-CCNC: 33 U/L (ref ?–34)
BILIRUB DIRECT SERPL-MCNC: 0.2 MG/DL (ref ?–0.3)
BILIRUB SERPL-MCNC: 0.8 MG/DL (ref 0.2–1.1)
BILIRUB UR QL: NEGATIVE
BNP SERPL-MCNC: 55 PG/ML
BUN BLD-MCNC: 26 MG/DL (ref 9–23)
BUN/CREAT SERPL: 23.2 (ref 10–20)
CALCIUM BLD-MCNC: 9.4 MG/DL (ref 8.7–10.4)
CHLORIDE SERPL-SCNC: 104 MMOL/L (ref 98–112)
CLARITY UR: CLEAR
CO2 SERPL-SCNC: 31 MMOL/L (ref 21–32)
COLOR UR: YELLOW
CREAT BLD-MCNC: 1.12 MG/DL
EGFRCR SERPLBLD CKD-EPI 2021: 56 ML/MIN/1.73M2 (ref 60–?)
FLUAV + FLUBV RNA SPEC NAA+PROBE: NEGATIVE
FLUAV + FLUBV RNA SPEC NAA+PROBE: NEGATIVE
GLUCOSE BLD-MCNC: 118 MG/DL (ref 70–99)
GLUCOSE UR-MCNC: NORMAL MG/DL
HGB UR QL STRIP.AUTO: NEGATIVE
LACTATE SERPL-SCNC: 1.7 MMOL/L (ref 0.5–2)
LEUKOCYTE ESTERASE UR QL STRIP.AUTO: NEGATIVE
NITRITE UR QL STRIP.AUTO: NEGATIVE
OSMOLALITY SERPL CALC.SUM OF ELEC: 290 MOSM/KG (ref 275–295)
PH UR: 5.5 [PH] (ref 5–8)
POTASSIUM SERPL-SCNC: 4 MMOL/L (ref 3.5–5.1)
PROT SERPL-MCNC: 6.7 G/DL (ref 5.7–8.2)
PROT UR-MCNC: 20 MG/DL
RSV RNA SPEC NAA+PROBE: NEGATIVE
SARS-COV-2 RNA RESP QL NAA+PROBE: NOT DETECTED
SODIUM SERPL-SCNC: 137 MMOL/L (ref 136–145)
SP GR UR STRIP: 1.02 (ref 1–1.03)
TROPONIN I SERPL HS-MCNC: 6 NG/L
UROBILINOGEN UR STRIP-ACNC: NORMAL

## 2023-12-31 PROCEDURE — 72157 MRI CHEST SPINE W/O & W/DYE: CPT | Performed by: EMERGENCY MEDICINE

## 2023-12-31 PROCEDURE — 70551 MRI BRAIN STEM W/O DYE: CPT | Performed by: EMERGENCY MEDICINE

## 2023-12-31 PROCEDURE — 70450 CT HEAD/BRAIN W/O DYE: CPT | Performed by: EMERGENCY MEDICINE

## 2023-12-31 PROCEDURE — 72158 MRI LUMBAR SPINE W/O & W/DYE: CPT | Performed by: EMERGENCY MEDICINE

## 2023-12-31 RX ORDER — MAGNESIUM HYDROXIDE/ALUMINUM HYDROXICE/SIMETHICONE 120; 1200; 1200 MG/30ML; MG/30ML; MG/30ML
30 SUSPENSION ORAL 4 TIMES DAILY PRN
Status: DISCONTINUED | OUTPATIENT
Start: 2023-12-31 | End: 2024-01-07

## 2023-12-31 RX ORDER — ACETAMINOPHEN 325 MG/1
650 TABLET ORAL EVERY 6 HOURS PRN
Status: DISCONTINUED | OUTPATIENT
Start: 2023-12-31 | End: 2024-01-07

## 2023-12-31 RX ORDER — MORPHINE SULFATE 2 MG/ML
INJECTION, SOLUTION INTRAMUSCULAR; INTRAVENOUS
Status: COMPLETED
Start: 2023-12-31 | End: 2023-12-31

## 2023-12-31 RX ORDER — ONDANSETRON 2 MG/ML
4 INJECTION INTRAMUSCULAR; INTRAVENOUS EVERY 6 HOURS PRN
Status: DISCONTINUED | OUTPATIENT
Start: 2023-12-31 | End: 2024-01-07

## 2023-12-31 RX ORDER — HEPARIN SODIUM 5000 [USP'U]/ML
5000 INJECTION, SOLUTION INTRAVENOUS; SUBCUTANEOUS EVERY 12 HOURS SCHEDULED
Status: DISCONTINUED | OUTPATIENT
Start: 2023-12-31 | End: 2024-01-07

## 2023-12-31 RX ORDER — CALCIUM CARBONATE-CHOLECALCIFEROL TAB 250 MG-125 UNIT 250-125 MG-UNIT
2 TAB ORAL DAILY
Status: DISCONTINUED | OUTPATIENT
Start: 2024-01-01 | End: 2024-01-07

## 2023-12-31 RX ORDER — HYDRALAZINE HYDROCHLORIDE 20 MG/ML
10 INJECTION INTRAMUSCULAR; INTRAVENOUS EVERY 4 HOURS PRN
Status: DISCONTINUED | OUTPATIENT
Start: 2023-12-31 | End: 2024-01-07

## 2023-12-31 RX ORDER — DIPHENHYDRAMINE HYDROCHLORIDE 50 MG/ML
10 INJECTION, SOLUTION INTRAMUSCULAR; INTRAVENOUS
Status: COMPLETED | OUTPATIENT
Start: 2023-12-31 | End: 2023-12-31

## 2023-12-31 RX ORDER — FOLIC ACID 1 MG/1
1 TABLET ORAL DAILY
Status: DISCONTINUED | OUTPATIENT
Start: 2023-12-31 | End: 2024-01-07

## 2023-12-31 RX ORDER — MIRTAZAPINE 15 MG/1
15 TABLET, ORALLY DISINTEGRATING ORAL NIGHTLY
Status: DISCONTINUED | OUTPATIENT
Start: 2023-12-31 | End: 2024-01-07

## 2023-12-31 RX ORDER — HYDROCODONE BITARTRATE AND ACETAMINOPHEN 10; 325 MG/1; MG/1
1-2 TABLET ORAL EVERY 4 HOURS PRN
Status: DISCONTINUED | OUTPATIENT
Start: 2023-12-31 | End: 2024-01-07

## 2023-12-31 RX ORDER — PANTOPRAZOLE SODIUM 40 MG/1
40 TABLET, DELAYED RELEASE ORAL
Status: DISCONTINUED | OUTPATIENT
Start: 2024-01-01 | End: 2024-01-07

## 2023-12-31 RX ORDER — ZOLPIDEM TARTRATE 5 MG/1
10 TABLET ORAL NIGHTLY PRN
Status: DISCONTINUED | OUTPATIENT
Start: 2023-12-31 | End: 2024-01-07

## 2023-12-31 RX ORDER — MORPHINE SULFATE 2 MG/ML
2 INJECTION, SOLUTION INTRAMUSCULAR; INTRAVENOUS ONCE
Status: COMPLETED | OUTPATIENT
Start: 2023-12-31 | End: 2023-12-31

## 2023-12-31 RX ORDER — ALBUTEROL SULFATE 90 UG/1
1 AEROSOL, METERED RESPIRATORY (INHALATION) EVERY 6 HOURS PRN
Status: DISCONTINUED | OUTPATIENT
Start: 2023-12-31 | End: 2024-01-07

## 2023-12-31 RX ORDER — FUROSEMIDE 20 MG/1
20 TABLET ORAL 2 TIMES DAILY
Status: DISCONTINUED | OUTPATIENT
Start: 2023-12-31 | End: 2024-01-02

## 2023-12-31 NOTE — ED QUICK NOTES
Patient states she does not feel better, states she still feels weak, states she does not feel safe going home as she is afraid she will fall, dr duff

## 2023-12-31 NOTE — ED PROVIDER NOTES
Patient Seen in: [facility] Emergency Department      History   No chief complaint on file.    Stated Complaint: Weakness    Subjective:   HPI    63-year-old female with history of metastatic breast cancer currently on chemotherapy presents for evaluation of weakness.  Patient reports generalized weakness resulting in multiple falls this morning.  Denies focal weakness or numbness, is noted to have multiple bruises to legs.  Had difficulty with ADLs due to weakness this morning.  Denies chest pain or pressure, shortness of breath, lightheadedness or syncope.  Has had episodes of vomiting shortly after chemo last week, nothing recently, denies diarrhea  or urinary complaints.  Denies new pain.        Objective:   Past Medical History:   Diagnosis Date    Breast cancer (HCC) 03/02/2012    MASTECTOMY / RECONSTRUCTION    Chemotherapy-induced neutropenia  (HCC) 12/25/2020    Encounter for insertion of venous access port 2007    portacath insertion / venous access    Glaucoma     History of breast cancer in female     Hyperlipidemia     Malignant neoplasm of overlapping sites of breast in female, estrogen receptor positive  (HCC) 1/16/2015    Malignant neoplasm of upper-outer quadrant of right breast in female, estrogen receptor positive  (HCC) 1/16/2015    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    Menorrhagia 2007    HYSTEROSCOPY / D&C / endomentrial biopsy (08/28/2007)    Metastatic breast cancer 10/6/2020    Neurofibromatosis (HCC)     Osteopenia of multiple sites 12/25/2020    Osteopenia of multiple sites 2/2/2021    Osteoporosis screening 8/2/13    Osteoporosis screening 08/02/2013    Per [employer]    Other osteoporosis without current pathological fracture 2/2/2021    Postmenopausal bleeding 7/7/2014    Psychophysiological insomnia 8/5/2021    Tumor, foot     tumor right foot / excision/excision of bone spur/arthrodesis w/screw fixation              Past Surgical History:   Procedure Laterality Date    BREAST  RECONSTRUCTION Right 2008    right breast reconstructed - ductal, BRCA neg    BREAST SURGERY      CHEMOTHERAPY  2007    FOOT SURGERY Right     tumor rt foot / excision/excision of bone spur/arthrodesis w/screw fixation    IR PORT A CATH PROCEDURE  2007    Right subclavian port of cath.    MASTECTOMY RIGHT      2012    MASTECTOMY,SIMPLE  2007                Social History     Socioeconomic History    Marital status: Single   Tobacco Use    Smoking status: Never    Smokeless tobacco: Never   Substance and Sexual Activity    Alcohol use: Yes     Alcohol/week: 5.0 standard drinks of alcohol     Types: 5 Standard drinks or equivalent per week     Comment: Socially.  (Per [employer]:  5 days weekly.)    Drug use: No   Other Topics Concern    Caffeine Concern Yes     Comment: tea              Review of Systems    Positive for stated complaint: Weakness  Other systems are as noted in HPI.  Constitutional and vital signs reviewed.      All other systems reviewed and negative except as noted above.    Physical Exam     ED Triage Vitals   BP 12/31/23 0828 107/73   Pulse 12/31/23 0828 117   Resp 12/31/23 0828 18   Temp 12/31/23 0828 98 °F (36.7 °C)   Temp src 12/31/23 0828 Temporal   SpO2 12/31/23 0828 97 %   O2 Device 12/31/23 1000 None (Room air)       Current:/82   Pulse 87   Temp 98 °F (36.7 °C) (Temporal)   Resp 17   Ht 154.9 cm (5' 1\")   Wt 49.4 kg   SpO2 94%   BMI 20.60 kg/m²         Physical Exam  Vitals and nursing note reviewed.   Constitutional:       General: She is not in acute distress.     Appearance: She is well-developed.   HENT:      Head: Normocephalic and atraumatic.   Eyes:      Conjunctiva/sclera: Conjunctivae normal.   Cardiovascular:      Rate and Rhythm: Normal rate and regular rhythm.      Heart sounds: Normal heart sounds.   Pulmonary:      Effort: Pulmonary effort is normal. No respiratory distress.      Breath sounds: Normal breath sounds.   Abdominal:      General: Bowel sounds are  normal. There is no distension.      Palpations: Abdomen is soft.      Tenderness: There is no abdominal tenderness. There is no guarding or rebound.   Musculoskeletal:         General: Normal range of motion.      Cervical back: Normal range of motion and neck supple.   Skin:     General: Skin is warm and dry.      Findings: No rash.      Comments: Bruising in various stages to bilateral lower extremities   Neurological:      General: No focal deficit present.      Mental Status: She is alert and oriented to person, place, and time.               ED Course     Labs Reviewed   BASIC METABOLIC PANEL (8) - Abnormal; Notable for the following components:       Result Value    Glucose 118 (*)     BUN 26 (*)     Creatinine 1.12 (*)     BUN/CREA Ratio 23.2 (*)     eGFR-Cr 56 (*)     All other components within normal limits   URINALYSIS WITH CULTURE REFLEX - Abnormal; Notable for the following components:    Ketones Urine Trace (*)     Protein Urine 20 (*)     All other components within normal limits   CBC W/ DIFFERENTIAL - Abnormal; Notable for the following components:    WBC 1.7 (*)     HGB 11.4 (*)     HCT 34.5 (*)     RDW-SD 58.5 (*)     RDW 18.0 (*)     Neutrophil Absolute Prelim 0.59 (*)     All other components within normal limits   HEPATIC FUNCTION PANEL (7) - Normal   TROPONIN I HIGH SENSITIVITY - Normal   BNP (B TYPE NATRIURETIC PEPTIDE) - Normal   LACTIC ACID, PLASMA - Normal   SARS-COV-2/FLU A AND B/RSV BY PCR ([employer]) - Normal    Narrative:     This test is intended for the qualitative detection and differentiation of SARS-CoV-2, influenza A, influenza B, and respiratory syncytial virus (RSV) viral RNA in nasopharyngeal or nares swabs from individuals suspected of respiratory viral infection consistent with COVID-19 by their healthcare provider. Signs and symptoms of respiratory viral infection due to SARS-CoV-2, influenza, and RSV can be similar.    Test performed using the Xpert Xpress  SARS-CoV-2/FLU/RSV (real time RT-PCR)  assay on the GeneXpert instrument, [place], [place] [address].   This test is being used under the Food and Drug Administration's Emergency Use Authorization.    The authorized Fact Sheet for Healthcare Providers for this assay is available upon request from the laboratory.   CBC WITH DIFFERENTIAL WITH PLATELET    Narrative:     The following orders were created for panel order CBC With Differential With Platelet.  Procedure                               Abnormality         Status                     ---------                               -----------         ------                     CBC W/ DIFFERENTIAL[[id number]]          Abnormal            Preliminary result           Please view results for these tests on the individual orders.   MANUAL DIFFERENTIAL   MD BLOOD SMEAR CONSULT   RAINBOW DRAW BLUE   RAINBOW DRAW GOLD   BLOOD CULTURE   BLOOD CULTURE             Imaging Results Available and Reviewed while in ED:   MRI SPINE LUMBAR (W+WO) (CPT=72158)   Final Result   PROCEDURE: MRI SPINE LUMBAR (W+WO) (CPT=72158)       COMPARISON: None.       INDICATIONS: Weakness       TECHNIQUE: A comprehensive examination was performed utilizing a variety    of imaging planes and imaging parameters to optimize visualization of    suspected pathology.  Images were performed before and after the    administration of intravenous gadolinium    contrast.         FINDINGS:    PARASPINAL AREA: Normal with no visible mass.     BONES: No fracture, pars defect, or osseous lesion.     CORD/CAUDA EQUINA: Normal caliber, contour, and signal intensity.     OTHER: There is a 1.3 x 0.7 centimeter endometrial mass present.  There is    a 1.7 centimeter intramural uterine mass present, most compatible with a    fibroid (series 2, image 7).       There is an enhancing 1.4 centimeters subcutaneous nodule of back at the    midline.  This may represent a sebaceous cyst.  There are numerous other    cutaneous  and subcutaneous nodules that are partially imaged.       LUMBAR DISC LEVELS:   L1-L2: No significant disc/facet abnormality, spinal stenosis, or    foraminal stenosis.     L2-L3: No significant disc/facet abnormality, spinal stenosis, or    foraminal stenosis.     L3-L4: No significant disc/facet abnormality, spinal stenosis, or    foraminal stenosis.     L4-L5: No significant disc/facet abnormality, spinal stenosis, or    foraminal stenosis.     L5-S1: No significant disc/facet abnormality, spinal stenosis, or    foraminal stenosis.                     =====   CONCLUSION:    No focal suspicious osseous lesions.       No high-grade spinal canal or neural foraminal narrowing.       1.3 centimeter endometrial mass.  Differential considerations include    endometrial polyp or other etiologies.  Recommend follow-up pelvic    ultrasound on a nonemergent basis for further assessment.           1.7 centimeter intramural uterine fibroid.             Dictated by (CST): Maura Wilson MD on 12/31/2023 at 5:53 PM        Finalized by (CST): Maura Wilson MD on 12/31/2023 at 5:58 PM               MRI BRAIN WO ACUTE (3) SEQUENCE (CPT=70551)   Final Result   PROCEDURE: MRI BRAIN WO ACUTE (3) SEQUENCE(CPT=70551)       COMPARISON: [facility], CT BRAIN OR HEAD (CPT=70450),    12/31/2023, 1:02 PM.       INDICATIONS: Weakness       TECHNIQUE: A variety of imaging planes and parameters were utilized for    visualization of suspected pathology.  Images were performed without    contrast.       Limited 3-sequence stroke protocol study was performed. Within these    parameters:       FINDINGS:        CEREBRUM: No edema, hemorrhage, mass, acute infarction, or inappropriate    atrophy.         Scattered foci of FLAIR signal hyperintensity in the periventricular white    matter, which are nonspecific but may represent a mild degree of chronic    small vessel ischemic disease.   CEREBELLUM: No edema, hemorrhage, mass,  acute infarction, or inappropriate    atrophy.     BRAINSTEM: No edema, hemorrhage, mass, acute infarction, or inappropriate    atrophy.     CSF SPACES: Ventricles, cisterns, and sulci are appropriate for age.  No    hydrocephalus, subarachnoid hemorrhage, or mass.     SKULL: No mass or other significant visible lesion.     SINUSES: Limited views demonstrate no significant mucosal thickening or    fluid.     ORBITS: Limited views are unremarkable.     OTHER: Negative.                     =====   CONCLUSION:        No restricted diffusion to suggest acute ischemic stroke.       Background of mild chronic small vessel ischemic disease.         No focal suspicious lesion on noncontrast MRI.  If there is concern for    intracranial metastatic disease, complete MRI with contrast is    recommended.             Dictated by (CST): Maura Wilson MD on 12/31/2023 at 4:04 PM        Finalized by (CST): Maura Wilson MD on 12/31/2023 at 4:09 PM               CT BRAIN OR HEAD (28461)   Final Result   PROCEDURE: CT BRAIN OR HEAD (CPT=70450)       COMPARISON: [facility], CT BRAIN OR HEAD    (CPT=70450), 10/12/2018, 11:11 AM.  [facility], CT BRAIN    OR HEAD (CPT=70450), 9/28/2018, 6:26 AM.  [facility], CT    BRAIN OR HEAD (CPT=70450), 9/27/2018, 6:12    AM.       INDICATIONS: Weakness.       TECHNIQUE: CT images were obtained without contrast material.  Automated    exposure control for dose reduction was used.  Dose information is    transmitted to the ACR (American College of Radiology) NRDR (National    Radiology Data Registry) which includes the    Dose Index Registry.        FINDINGS:    CSF SPACES: The ventricles, cisterns, and sulci are commensurate in    caliber and appropriate for age. No hydrocephalus, subarachnoid    hemorrhage, or effacement of the basal cisterns is appreciated. There is    no extra-axial fluid collection   CEREBRUM: No acute  intraparenchymal hemorrhage, edema, or cortical sulcal    effacement is apparent. There is no space-occupying lesion, mass effect,    or shift of midline structures. The gray-white matter junction is    preserved and bilaterally symmetric in    appearance.   CEREBELLUM: No edema, hemorrhage, mass, acute infarction, or significant    atrophy.     BRAINSTEM: No edema, hemorrhage, mass, acute infarction, or significant    atrophy.     CALVARIUM: There is no apparent depressed fracture, mass, or other    significant visible lesion.. Incomplete fusion of the posterior C1 arch,    normal variant.  Redemonstration of multiple cutaneous nodules , some of    which may represent sebaceous cysts.   SINUSES: Limited views demonstrate no significant mucosal thickening or    fluid.     ORBITS: Limited views are grossly unremarkable.         OTHER: Atherosclerotic vascular calcifications are perceived in the    carotid siphons. Soft tissue density in both external auditory canals    presumably relates to impacted cerumen.                    =====   CONCLUSION:        No acute intracranial hemorrhage, hydrocephalus, or mass effect.  If there    is clinical concern for acute ischemic stroke, MRI of the brain is    recommended.               Dictated by (CST): Maura Wilson MD on 12/31/2023 at 1:20 PM        Finalized by (CST): Maura Wilson MD on 12/31/2023 at 1:27 PM               MRI SPINE THORACIC (W+WO) (CPT=72157)    (Results Pending)       ED Medications Administered:   Medications   sodium chloride 0.9 % IV bolus 1,000 mL (0 mL Intravenous Stopped 12/31/23 1156)   morphINE PF 2 MG/ML injection 2 mg (2 mg Intravenous Given 12/31/23 1312)   gadoterate meglumine (Dotarem) 5 MMOL/10ML injection 10 mL (10 mL Intravenous Given 12/31/23 1623)   morphINE PF 2 MG/ML injection 2 mg (2 mg Intravenous Given 12/31/23 1656)       Vitals:    12/31/23 1145 12/31/23 1200 12/31/23 1300 12/31/23 1645   BP:  101/69 113/82    Pulse: 96 96  101 87   Resp: 12 13 21 17   Temp:       TempSrc:       SpO2: 96% 97% 96% 94%   Weight:       Height:         *I personally reviewed and interpreted all ED vitals.           MDM      Medical Decision Making  Differential diagnosis includes but is not limited to electrolyte imbalance, cystitis, TIA, stroke, metastatic disease, dehydration, anemia    Patient with unrevealing emergency department evaluation, remains generally weak, discussed with and admitted to hospitalist who advises MRI, order placed.  Patient and family updated at the bedside.  Discussed with oncology, no further recommendations for the emergency department.    Problems Addressed:  Multiple falls: acute illness or injury  Weakness: acute illness or injury    Amount and/or Complexity of Data Reviewed  External Data Reviewed: labs.     Details: Neutropenia today, otherwise CBC and CMP stable compared to labs from 12/14/2023  Labs: ordered.  Radiology: ordered and independent interpretation performed.     Details: CT brain images reviewed, no obvious intracranial hemorrhage, other and incidental findings deferred to radiology  ECG/medicine tests: ordered and independent interpretation performed. Decision-making details documented in ED Course.  Discussion of management or test interpretation with external provider(s): Discussed with hospitalist    Risk  Parenteral controlled substances.        Disposition and Plan     Clinical Impression:  1. Weakness    2. Multiple falls         Disposition:  There is no disposition on file for this visit.  There is no disposition time on file for this visit.    Follow-up:  No follow-up provider specified.  We recommend that you schedule follow up care with a primary care provider within the next three months to obtain basic health screening including reassessment of your blood pressure.      Medications Prescribed:  Current Discharge Medication List

## 2023-12-31 NOTE — ED QUICK NOTES
Orders for admission, patient is aware of plan and ready to go upstairs. Any questions, please call ED RN tru at extension [phone].     Patient Covid vaccination status: Fully vaccinated     COVID Test Ordered in ED: SARS-CoV-2/Flu A and B/RSV by PCR (GeneXpert)    COVID Suspicion at Admission: N/A    Running Infusions:  None    Mental Status/LOC at time of transport: x4    Other pertinent information:   CIWA score: N/A   NIH score:  N/A

## 2023-12-31 NOTE — ED INITIAL ASSESSMENT (HPI)
Pt to the ed for complaints of ble weakness  States that she fell down to her knees while walking when her knees gave out on her   Denies significant injury

## 2023-12-31 NOTE — H&P
[facility]    History & Physical    Aleisha Carlin Patient Status:  Emergency    3/1/1960 MRN [id number]   Location [facility] EMERGENCY DEPARTMENT Attending Sana Hernandez MD   Hosp Day # 0 PCP AMAURI ERNANDEZ     Date:  2023  Date of Admission:  2023    Chief Complaint:  No chief complaint on file.      History of Present Illness:  Aleisha Carlin is a(n) 63 year old female, with a past medical history significant for metastatic breast cancer, neurofibromatosis and osteoporosis presents with complaint of lower extremity weakness that started rather abruptly earlier today.  Patient has been receiving chemotherapy, does admit to feeling somewhat weak after chemo but never to this degree in her lower extremities.  Claims she was up going to the bathroom got there however as she was about to sit on the toilet seat her legs gave out on her and she collapsed to the ground, denies hitting her head however tried for multiple hours thereafter to get up and could not pull herself up.  She ended up with multiple bruises on her lower extremities in the process of doing so.  Complains of occasional back pain, denies any difficulty with urination however the same time claims she has not been able to urinate since when she first attempted to before following.  Denies any fever or chills    History:  Past Medical History:   Diagnosis Date    Breast cancer (HCC) 2012    MASTECTOMY / RECONSTRUCTION    Chemotherapy-induced neutropenia  (HCC) 2020    Encounter for insertion of venous access port 2007    portacath insertion / venous access    Glaucoma     History of breast cancer in female     Hyperlipidemia     Malignant neoplasm of overlapping sites of breast in female, estrogen receptor positive  (HCC) 2015    Malignant neoplasm of upper-outer quadrant of right breast in female, estrogen receptor positive  (HCC) 2015    Malignant pleural effusion 2020     Menorrhagia 2007    HYSTEROSCOPY / D&C / endomentrial biopsy (2007)    Metastatic breast cancer 10/6/2020    Neurofibromatosis (HCC)     Osteopenia of multiple sites 2020    Osteopenia of multiple sites 2021    Osteoporosis screening 14    Osteoporosis screening 2013    Per [employer]    Other osteoporosis without current pathological fracture 2021    Postmenopausal bleeding 2014    Psychophysiological insomnia 2021    Tumor, foot     tumor right foot / excision/excision of bone spur/arthrodesis w/screw fixation     Past Surgical History:   Procedure Laterality Date    BREAST RECONSTRUCTION Right 2008    right breast reconstructed - ductal, BRCA neg    BREAST SURGERY      CHEMOTHERAPY  2007    FOOT SURGERY Right     tumor rt foot / excision/excision of bone spur/arthrodesis w/screw fixation    IR PORT A CATH PROCEDURE      Right subclavian port of cath.    MASTECTOMY RIGHT      2012    MASTECTOMY,SIMPLE  2007     Family History   Problem Relation Age of Onset    Breast Cancer Mother 49        bilateral mastectomy    Genetic Disease Mother         NF-1    Breast Cancer Maternal Aunt 75    Breast Cancer Paternal Grandmother     Breast Cancer Self 51    Genetic Disease Self         NF-1    Cancer Paternal Aunt         throat ca    Cancer Maternal Uncle         prostate ca    Genetic Disease Brother         NF-1    Genetic Disease Brother         NF-1    Genetic Disease Brother         NF-1    Cancer Maternal Uncle         bladder ca    Breast Cancer Maternal Cousin Female     Cancer Maternal Cousin Female         leukemia;  childhood    Cancer Paternal Cousin Male         throat ca      reports that she has never smoked. She has never used smokeless tobacco. She reports current alcohol use of about 5.0 standard drinks of alcohol per week. She reports that she does not use drugs.    Allergies:  No Known Allergies    Home Medications:  Prior to Admission Medications    Prescriptions Last Dose Informant Patient Reported? Taking?   Calcium Carb-Cholecalciferol (CALCIUM 500 + D) 500-200 MG-UNIT Oral Tab   Yes No   Sig: Take by mouth daily.   Cholecalciferol (VITAMIN D3) 250 MCG (46232 UT) Oral Cap   Yes No   Sig: Take 1 capsule by mouth daily.   HYDROcodone-acetaminophen  MG Oral Tab   No No   Sig: Take 1-2 tablets by mouth every 4 (four) hours as needed for Pain.   HYDROcodone-acetaminophen  MG Oral Tab   No No   Sig: Take 1 tablet by mouth every 6 (six) hours as needed for Pain.   LORazepam 0.5 MG Oral Tab   No No   Sig: Take 1 tablet (0.5 mg total) by mouth every 6 (six) hours as needed for Anxiety.   Zoledronic Acid (ZOMETA IV)   Yes No   Sig: Inject 4 mg into the vein every 6 (six) months.   albuterol 108 (90 Base) MCG/ACT Inhalation Aero Soln   No No   Sig: Inhale 1 puff into the lungs every 6 (six) hours as needed for Wheezing.   atorvastatin 20 MG Oral Tab   Yes No   Sig: Take 1 tablet (20 mg total) by mouth daily.   Patient not taking: Reported on 9/21/2023   folic acid 1 MG Oral Tab   No No   Sig: Take 1 tablet (1 mg total) by mouth daily.   furosemide 20 MG Oral Tab   No No   Sig: Take 1 tablet (20 mg total) by mouth 2 (two) times daily.   guaiFENesin-codeine 100-10 MG/5ML Oral Solution   No No   Sig: Take 5 mL by mouth every 6 (six) hours as needed for cough.   losartan 100 MG Oral Tab   Yes No   Sig: Take by mouth daily.   Patient not taking: Reported on 10/12/2023   mirtazapine 15 MG Oral Tab   No No   Sig: Take 1 tablet (15 mg total) by mouth nightly.   prochlorperazine (COMPAZINE) 10 mg tablet   No No   Sig: Take 1 tablet (10 mg total) by mouth every 6 (six) hours as needed for Nausea.   zolpidem 10 MG Oral Tab   No No   Sig: Take 1 tablet (10 mg total) by mouth nightly as needed.      Facility-Administered Medications: None       Review of Systems:  Constitutional:  Weakness, Fatigue.  Eye:  Negative.  Ear/Nose/Mouth/Throat:  Negative.  Respiratory:   Negative  Cardiovascular: Negative  Gastrointestinal:  Negative.  Genitourinary:  Negative  Endocrine:  Negative.  Immunologic:  Negative.  Musculoskeletal: Bilateral lower extremity weakness  Integumentary: Lower extremity bruising  Neurologic:  Negative.  Psychiatric:  Negative.  ROS reviewed as documented in chart    Physical Exam:  Temp:  [98 °F (36.7 °C)] 98 °F (36.7 °C)  Pulse:  [] 96  Resp:  [12-18] 13  BP: (101-114)/(69-75) 101/69  SpO2:  [96 %-99 %] 97 %    General:  Alert and oriented.  Diffuse skin problem: Generalized neurofibromatosis, lower extremity bruising  Eye:  Pupils are equal, round and reactive to light, extraocular movements are intact, Normal conjunctiva.  HENT:  Normocephalic, oral mucosa is moist.  Head:  Normocephalic, atraumatic.  Neck:  Supple, non-tender, no carotid bruit, no jugular venous distention, no lymphadenopathy, no thyromegaly.  Respiratory:  Lungs are clear to auscultation, respirations are non-labored, breath sounds are equal, symmetrical chest wall expansion.  Cardiovascular:  Normal rate, regular rhythm, no murmur, no edema.  Gastrointestinal:  Soft, non-tender, non-distended, normal bowel sounds, no organomegaly.  Lymphatics:  No lymphadenopathy neck, axilla, groin.  Musculoskeletal: Decreased quad strength bilaterally, right ankle strength less in right compared to left, patient however claims that is her baseline.  Feet:  Normal pulses.  Neurologic:  Alert, oriented, no focal deficits, cranial nerves II-XII are grossly intact.  Cognition and Speech:  Oriented, speech clear and coherent.  Psychiatric:  Cooperative, appropriate mood & affect.      Laboratory Data:   Lab Results   Component Value Date    WBC 1.7 12/31/2023    HGB 11.4 12/31/2023    HCT 34.5 12/31/2023    .0 12/31/2023    CREATSERUM 1.12 12/31/2023    BUN 26 12/31/2023     12/31/2023    K 4.0 12/31/2023     12/31/2023    CO2 31.0 12/31/2023     12/31/2023    CA 9.4 12/31/2023     ALB 4.1 12/31/2023    ALKPHO 72 12/31/2023    BILT 0.8 12/31/2023    TP 6.7 12/31/2023    AST 33 12/31/2023    ALT 23 12/31/2023       Imaging:  No results found.     Assessment and Plan:    Lower extremity weakness  Likely multifactorial, with a definite element of deconditioning, however will need to rule out stenosis/lesion as a possible cause.  MRI L-spine pending at this time.  PT/OT to evaluate and treat.  Head CT pending as well    Metastatic breast CA  Follows oncology, continue supportive care    Neutropenia  Likely chemo related, will continue to monitor repeat CBC later.  If turns febrile will initiate antibiotics cultures etc.    Essential hypertension  Blood pressure well-controlled resume home meds.    Osteoporosis  Continue home meds    Prophylaxis  Subcutaneous heparin    CODE STATUS  Full    Primary care physician  AMAURI ERNANDEZ    60 minutes spent on this admission - examining patient, obtaining history, reviewing previous medical records, going over test results/imaging and discussing plan of care. All questions answered.     Disposition  Clinical course will dictate outcome      GLORIA CONWAY MD  12/31/2023  1:05 PM

## 2024-01-01 ENCOUNTER — APPOINTMENT (OUTPATIENT)
Dept: ULTRASOUND IMAGING | Facility: HOSPITAL | Age: 64
End: 2024-01-01
Attending: SPECIALIST
Payer: MEDICARE

## 2024-01-01 PROBLEM — R29.898 WEAKNESS OF BOTH LOWER EXTREMITIES: Status: ACTIVE | Noted: 2024-01-01

## 2024-01-01 PROBLEM — R60.0 BILATERAL LEG EDEMA: Status: ACTIVE | Noted: 2024-01-01

## 2024-01-01 PROBLEM — D70.1 CHEMOTHERAPY INDUCED NEUTROPENIA: Status: ACTIVE | Noted: 2024-01-01

## 2024-01-01 PROBLEM — C50.919 STAGE IV BREAST CANCER IN FEMALE (HCC): Status: ACTIVE | Noted: 2024-01-01

## 2024-01-01 PROBLEM — T45.1X5A CHEMOTHERAPY INDUCED NEUTROPENIA (HCC): Status: ACTIVE | Noted: 2024-01-01

## 2024-01-01 PROBLEM — R29.898 BILATERAL LEG WEAKNESS: Status: ACTIVE | Noted: 2024-01-01

## 2024-01-01 PROBLEM — G62.9 NEUROPATHY: Status: ACTIVE | Noted: 2024-01-01

## 2024-01-01 PROBLEM — D70.1 CHEMOTHERAPY INDUCED NEUTROPENIA (HCC): Status: ACTIVE | Noted: 2024-01-01

## 2024-01-01 PROBLEM — T45.1X5A CHEMOTHERAPY INDUCED NEUTROPENIA: Status: ACTIVE | Noted: 2024-01-01

## 2024-01-01 PROBLEM — D70.1 CHEMOTHERAPY INDUCED NEUTROPENIA  (HCC): Status: ACTIVE | Noted: 2024-01-01

## 2024-01-01 PROBLEM — R13.10 DYSPHAGIA: Status: ACTIVE | Noted: 2024-01-01

## 2024-01-01 PROBLEM — T45.1X5A CHEMOTHERAPY INDUCED NEUTROPENIA  (HCC): Status: ACTIVE | Noted: 2024-01-01

## 2024-01-01 LAB
ANION GAP SERPL CALC-SCNC: 0 MMOL/L (ref 0–18)
BASOPHILS # BLD: 0 X10(3) UL (ref 0–0.2)
BASOPHILS # BLD: 0.03 X10(3) UL (ref 0–0.2)
BASOPHILS NFR BLD: 0 %
BASOPHILS NFR BLD: 2 %
BUN BLD-MCNC: 16 MG/DL (ref 9–23)
BUN/CREAT SERPL: 15.8 (ref 10–20)
CALCIUM BLD-MCNC: 8.5 MG/DL (ref 8.7–10.4)
CHLORIDE SERPL-SCNC: 110 MMOL/L (ref 98–112)
CK SERPL-CCNC: 190 U/L
CO2 SERPL-SCNC: 28 MMOL/L (ref 21–32)
CREAT BLD-MCNC: 1.01 MG/DL
DEPRECATED RDW RBC AUTO: 58.5 FL (ref 35.1–46.3)
DEPRECATED RDW RBC AUTO: 59.9 FL (ref 35.1–46.3)
EGFRCR SERPLBLD CKD-EPI 2021: 63 ML/MIN/1.73M2 (ref 60–?)
EOSINOPHIL # BLD: 0 X10(3) UL (ref 0–0.7)
EOSINOPHIL # BLD: 0 X10(3) UL (ref 0–0.7)
EOSINOPHIL NFR BLD: 0 %
EOSINOPHIL NFR BLD: 0 %
ERYTHROCYTE [DISTWIDTH] IN BLOOD BY AUTOMATED COUNT: 18 % (ref 11–15)
ERYTHROCYTE [DISTWIDTH] IN BLOOD BY AUTOMATED COUNT: 18 % (ref 11–15)
GLUCOSE BLD-MCNC: 85 MG/DL (ref 70–99)
HCT VFR BLD AUTO: 28.1 %
HCT VFR BLD AUTO: 34.5 %
HGB BLD-MCNC: 11.4 G/DL
HGB BLD-MCNC: 9.3 G/DL
LYMPHOCYTES NFR BLD: 0.44 X10(3) UL (ref 1–4)
LYMPHOCYTES NFR BLD: 0.45 X10(3) UL (ref 1–4)
LYMPHOCYTES NFR BLD: 25 %
LYMPHOCYTES NFR BLD: 30 %
MCH RBC QN AUTO: 29.7 PG (ref 26–34)
MCH RBC QN AUTO: 30.3 PG (ref 26–34)
MCHC RBC AUTO-ENTMCNC: 33 G/DL (ref 31–37)
MCHC RBC AUTO-ENTMCNC: 33.1 G/DL (ref 31–37)
MCV RBC AUTO: 89.8 FL
MCV RBC AUTO: 91.5 FL
METAMYELOCYTES # BLD: 0.02 X10(3) UL
METAMYELOCYTES # BLD: 0.02 X10(3) UL
METAMYELOCYTES NFR BLD: 1 %
METAMYELOCYTES NFR BLD: 1 %
MONOCYTES # BLD: 0.45 X10(3) UL (ref 0.1–1)
MONOCYTES # BLD: 0.58 X10(3) UL (ref 0.1–1)
MONOCYTES NFR BLD: 30 %
MONOCYTES NFR BLD: 34 %
MYELOCYTES # BLD: 0.02 X10(3) UL
MYELOCYTES # BLD: 0.03 X10(3) UL
MYELOCYTES NFR BLD: 1 %
MYELOCYTES NFR BLD: 2 %
NEUTROPHILS # BLD AUTO: 0.35 X10 (3) UL (ref 1.5–7.7)
NEUTROPHILS # BLD AUTO: 0.59 X10 (3) UL (ref 1.5–7.7)
NEUTROPHILS NFR BLD: 33 %
NEUTROPHILS NFR BLD: 37 %
NEUTS BAND NFR BLD: 3 %
NEUTS HYPERSEG # BLD: 0.56 X10(3) UL (ref 1.5–7.7)
NEUTS HYPERSEG # BLD: 0.61 X10(3) UL (ref 1.5–7.7)
OSMOLALITY SERPL CALC.SUM OF ELEC: 286 MOSM/KG (ref 275–295)
PLATELET # BLD AUTO: 147 10(3)UL (ref 150–450)
PLATELET # BLD AUTO: 204 10(3)UL (ref 150–450)
PLATELET MORPHOLOGY: NORMAL
PLATELET MORPHOLOGY: NORMAL
POTASSIUM SERPL-SCNC: 3.6 MMOL/L (ref 3.5–5.1)
RBC # BLD AUTO: 3.07 X10(6)UL
RBC # BLD AUTO: 3.84 X10(6)UL
SODIUM SERPL-SCNC: 138 MMOL/L (ref 136–145)
TOTAL CELLS COUNTED BLD: 100
TOTAL CELLS COUNTED BLD: 100
VARIANT LYMPHS NFR BLD MANUAL: 1 %
VIT B12 SERPL-MCNC: 612 PG/ML (ref 211–911)
WBC # BLD AUTO: 1.5 X10(3) UL (ref 4–11)
WBC # BLD AUTO: 1.7 X10(3) UL (ref 4–11)

## 2024-01-01 PROCEDURE — 93970 EXTREMITY STUDY: CPT | Performed by: SPECIALIST

## 2024-01-01 PROCEDURE — 99223 1ST HOSP IP/OBS HIGH 75: CPT | Performed by: OTHER

## 2024-01-01 PROCEDURE — 99223 1ST HOSP IP/OBS HIGH 75: CPT | Performed by: SPECIALIST

## 2024-01-01 RX ORDER — MINOCYCLINE HYDROCHLORIDE 50 MG/1
50 CAPSULE ORAL DAILY
COMMUNITY
Start: 2023-12-20

## 2024-01-01 RX ORDER — MINOCYCLINE HYDROCHLORIDE 50 MG/1
50 CAPSULE ORAL DAILY
Status: DISCONTINUED | OUTPATIENT
Start: 2024-01-01 | End: 2024-01-07

## 2024-01-01 NOTE — CONSULTS
[facility] Hematology Oncology Report of Consultation      Patient Name: Aleisha Carlin   YOB: 1960  Medical Record Number: [id number]  Consulting Physician: Korey Jha M.D.   Referring Physician: Sana Hernandez MD    The 21st Century Cures Act makes medical notes like these available to patients in the interest of transparency. Please be advised this is a medical document. Medical documents are intended to carry relevant information, facts as evident, and the clinical opinion of the practitioner. The medical note is intended as peer to peer communication and may appear blunt or direct. It is written in medical language and may contain abbreviations or verbiage that are unfamiliar.      Date of Consultation: 12/31/2023      Reason for Consultation (Chief Complaint)  Metastatic breast cancer.     History of Present Illness  Aleisha Carlin is a 63 year old female with metastatic breast cancer to liver and pleura currently on anticancer therapy with eribulin under the care of Dr. Farias. She received C8D8 on 12/21/2023. Last CT imaging was in 10/2023 showed marked improvement of disease.     Patient presented to ED on day of admission with complaints of \"weak legs\" and falls. She states that at 2am on the day of admission, she woke to go to the bathroom and her legs started shaking and \"gave out\". She fell to the floor. This happened again when she tried to walk.     MRI of T/L spine in ED showed no evidence of cord compression. MRI brain showed no metastatic disease.     Patient has not attempted to walk since admission to the hospital. She is afraid.    No focal neurological complaints. No sensory changes. She does report increased lower extremity swelling.     Past Medical History   Past Medical History:   Diagnosis Date    Breast cancer (HCC) 03/02/2012    MASTECTOMY / RECONSTRUCTION    Chemotherapy-induced neutropenia  (HCC) 12/25/2020    Encounter for insertion of venous access port  2007    portacath insertion / venous access    Glaucoma     History of breast cancer in female     Hyperlipidemia     Malignant neoplasm of overlapping sites of breast in female, estrogen receptor positive  (HCC) 1/16/2015    Malignant neoplasm of upper-outer quadrant of right breast in female, estrogen receptor positive  (HCC) 1/16/2015    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    Menorrhagia 2007    HYSTEROSCOPY / D&C / endomentrial biopsy (08/28/2007)    Metastatic breast cancer 10/6/2020    Neurofibromatosis (HCC)     Osteopenia of multiple sites 12/25/2020    Osteopenia of multiple sites 2/2/2021    Osteoporosis screening 8/2/13    Osteoporosis screening 08/02/2013    Per [employer]    Other osteoporosis without current pathological fracture 2/2/2021    Postmenopausal bleeding 7/7/2014    Psychophysiological insomnia 8/5/2021    Tumor, foot     tumor right foot / excision/excision of bone spur/arthrodesis w/screw fixation     Past Surgical History   Past Surgical History:   Procedure Laterality Date    BREAST RECONSTRUCTION Right 2008    right breast reconstructed - ductal, BRCA neg    BREAST SURGERY      CHEMOTHERAPY  2007    FOOT SURGERY Right     tumor rt foot / excision/excision of bone spur/arthrodesis w/screw fixation    IR PORT A CATH PROCEDURE  2007    Right subclavian port of cath.    MASTECTOMY RIGHT      2012    MASTECTOMY,SIMPLE  2007     Family History   Family History   Problem Relation Age of Onset    Breast Cancer Mother 49        bilateral mastectomy    Genetic Disease Mother         NF-1    Breast Cancer Maternal Aunt 75    Breast Cancer Paternal Grandmother     Breast Cancer Self 51    Genetic Disease Self         NF-1    Cancer Paternal Aunt         throat ca    Cancer Maternal Uncle         prostate ca    Genetic Disease Brother         NF-1    Genetic Disease Brother         NF-1    Genetic Disease Brother         NF-1    Cancer Maternal Uncle         bladder ca    Breast Cancer Maternal Cousin  Female     Cancer Maternal Cousin Female         leukemia;  childhood    Cancer Paternal Cousin Male         throat ca     Social History   Social History     Socioeconomic History    Marital status: Single   Tobacco Use    Smoking status: Never    Smokeless tobacco: Never   Substance and Sexual Activity    Alcohol use: Yes     Alcohol/week: 5.0 standard drinks of alcohol     Types: 5 Standard drinks or equivalent per week     Comment: Socially.  (Per [employer]:  5 days weekly.)    Drug use: No   Other Topics Concern    Caffeine Concern Yes     Comment: tea     Current Medications    [COMPLETED] sodium chloride 0.9 % IV bolus 1,000 mL  1,000 mL Intravenous Once    [COMPLETED] morphINE PF 2 MG/ML injection 2 mg  2 mg Intravenous Once    [COMPLETED] gadoterate meglumine (Dotarem) 5 MMOL/10ML injection 10 mL  10 mL Intravenous ONCE PRN    [COMPLETED] morphINE PF 2 MG/ML injection 2 mg  2 mg Intravenous Once       Allergies   Ms. Carlin has No Known Allergies.       Review of Systems   Constitutional      No fevers, chills.  Allergic/Immunologic No reactions.  Eyes                   No significant visual changes.  ENMT                  No oral pain, sore throat, epistaxis, hearing changes.  Endocrine            No hot flashes; no diabetes, thyroid disease.  Hematologic/Lymphatic No tender or enlarged lymph nodes; no easy bruising or bleeding.  Breasts No self palpated masses; no nipple discharge.   Respiratory          No dyspnea, cough, hemoptysis, pleuritic chest pain.  Cardiovascular     No anginal chest pain, palpitations, edema, orthopnea.  Gastrointestinal   No nausea, vomiting, diarrhea, constipation, melena, hematochezia, heartburn, early satiety, change in stool caliber.  Genitourinary      No hematuria, dysuria, increased frequency, urgency, hesitancy, incontinence, vaginal bleeding.  Musculoskeletal   No edema; no joint pain, swelling, redness, change in range of motion.  Integumentary      No acute or  chronic rashes, inflammation, ulcerations; no new or changing skin lesions; no nail changes.  Neurologic           Chronic right leg mild weakness.  Psychiatric          No new or worsening depression, reji, mood swings, insomnia.      Vital Signs   /82   Pulse 87   Temp 98 °F (36.7 °C) (Temporal)   Resp 17   Ht 1.549 m (5' 1\")   Wt 49.4 kg (109 lb)   SpO2 94%   BMI 20.60 kg/m²     Physical Examination   Constitutional      Well developed, well nourished. Appears close to chronological age. No apparent distress.   Head   Normocephalic and atraumatic.  Eyes   Conjunctiva clear; sclera anicteric.  ENMT                 External nose normal; external ears normal.  Neck                   Supple, without masses.  Hematologic/Lymphatic No cervical, supraclavicular lymphadenopathy.  Respiratory          Normal effort; no respiratory distress.  Cardiovascular     Regular rate and rhythm.  Abdomen            Not distended.  Musculoskeletal   Chronic right leg changes from surgery.   Extremities          Bilateral LE edema, R>L.  Integumentary      No obvious rashes.   Neurologic           Mild motor weakness in the right leg and none in the left. No gross sensory changes.   Psychiatric          Mood and affect appropriate.    Laboratory   Recent Results (from the past 24 hour(s))   Basic Metabolic Panel (8)    Collection Time: 12/31/23  9:32 AM   Result Value Ref Range    Glucose 118 (H) 70 - 99 mg/dL    Sodium 137 136 - 145 mmol/L    Potassium 4.0 3.5 - 5.1 mmol/L    Chloride 104 98 - 112 mmol/L    CO2 31.0 21.0 - 32.0 mmol/L    Anion Gap 2 0 - 18 mmol/L    BUN 26 (H) 9 - 23 mg/dL    Creatinine 1.12 (H) 0.55 - 1.02 mg/dL    BUN/CREA Ratio 23.2 (H) 10.0 - 20.0    Calcium, Total 9.4 8.7 - 10.4 mg/dL    Calculated Osmolality 290 275 - 295 mOsm/kg    eGFR-Cr 56 (L) >=60 mL/min/1.73m2   Hepatic Function Panel (7)    Collection Time: 12/31/23  9:32 AM   Result Value Ref Range    AST 33 <=34 U/L    ALT 23 10 - 49 U/L     Alkaline Phosphatase 72 50 - 130 U/L    Bilirubin, Total 0.8 0.2 - 1.1 mg/dL    Bilirubin, Direct 0.2 <=0.3 mg/dL    Total Protein 6.7 5.7 - 8.2 g/dL    Albumin 4.1 3.2 - 4.8 g/dL   Troponin I (High Sensitivity)    Collection Time: 12/31/23  9:32 AM   Result Value Ref Range    Troponin I (High Sensitivity) 6 <=34 ng/L   BNP (Brain Natriuretic Peptide)    Collection Time: 12/31/23  9:32 AM   Result Value Ref Range    Beta Natriuretic Peptide 55 <100 pg/mL   SARS-CoV-2/Flu A and B/RSV by PCR (GeneXpert)    Collection Time: 12/31/23  9:32 AM    Specimen: Nares; Other   Result Value Ref Range    SARS-CoV-2 (COVID-19) - (GeneXpert) Not Detected Not Detected    Influenza A by PCR Negative Negative    Influenza B by PCR Negative Negative    RSV by PCR Negative Negative   CBC W/ DIFFERENTIAL    Collection Time: 12/31/23  9:32 AM   Result Value Ref Range    WBC 1.7 (L) 4.0 - 11.0 x10(3) uL    RBC 3.84 3.80 - 5.30 x10(6)uL    HGB 11.4 (L) 12.0 - 16.0 g/dL    HCT 34.5 (L) 35.0 - 48.0 %    MCV 89.8 80.0 - 100.0 fL    MCH 29.7 26.0 - 34.0 pg    MCHC 33.0 31.0 - 37.0 g/dL    RDW-SD 58.5 (H) 35.1 - 46.3 fL    RDW 18.0 (H) 11.0 - 15.0 %    .0 150.0 - 450.0 10(3)uL    Neutrophil Absolute Prelim 0.59 (L) 1.50 - 7.70 x10 (3) uL   Lactic Acid, Plasma    Collection Time: 12/31/23  9:33 AM   Result Value Ref Range    Lactic Acid 1.7 0.5 - 2.0 mmol/L   RAINBOW DRAW BLUE    Collection Time: 12/31/23  9:40 AM   Result Value Ref Range    Hold Blue Auto Resulted    RAINBOW DRAW GOLD    Collection Time: 12/31/23  9:40 AM   Result Value Ref Range    Hold Gold Auto Resulted    Urinalysis with Culture Reflex    Collection Time: 12/31/23  4:55 PM    Specimen: Urine, clean catch   Result Value Ref Range    Urine Color Yellow Yellow    Clarity Urine Clear Clear    Spec Gravity 1.024 1.005 - 1.030    Glucose Urine Normal Normal mg/dL    Bilirubin Urine Negative Negative    Ketones Urine Trace (A) Negative mg/dL    Blood Urine Negative  Negative    pH Urine 5.5 5.0 - 8.0    Protein Urine 20 (A) Negative mg/dL    Urobilinogen Urine Normal Normal    Nitrite Urine Negative Negative    Leukocyte Esterase Urine Negative Negative       Radiology   [facility]  Department of Radiology  [address] [place] [address] [phone]      Name: Aleisha Carlin  Attila Dr: Sana Hernandez MD  : 3/1/1960    Age/Sex: 63 year old Female  Pt. Phone: [phone]  Exam Date: 2023  Procedure: MRI SPINE THORACIC (W+WO) (CPT=72157)   -----------------------------------------------------------------------------------------------------------------------------------------------                  Sana Hernandez MD  [address] [place] [id number]      Interpretation   PROCEDURE:  MRI SPINE THORACIC (W+WO) (CPT=72157)     COMPARISON: None.     INDICATIONS:  weakness, Hx metastatic breast cancer     TECHNIQUE:    A variety of imaging planes and parameters were utilized for visualization of suspected pathology.  Images were performed without and with gadolinium contrast.       FINDINGS:          Examination is limited due to lack of complete fat suppression, as well as motion artifact.        PARASPINAL AREA:     Normal with no visible mass.    BONES:             No fracture, pars defect, or osseous lesion.    Post-contrast imaging is limited by lack of complete fat suppression the periphery of the field of view.  There is apparent diffuse high signal within a lower cervical vertebral body on postcontrast imaging, but without abnormal signal on precontrast   imaging.  This is favored to be artifactual in nature.     Assessment of lower thoracic vertebral bodies is limited on postcontrast imaging with due to lack of fat suppression.     CORD:  Normal caliber, contour, and signal intensity.  No abnormal contrast enhancement.  DISCS: No significant disc/facet abnormality, spinal stenosis, or foraminal stenosis.       Partially imaged  degenerative disease of the cervical spine.     OTHER:             There are multiple cutaneous and subcutaneous nodules, similar to prior imaging, which may relate to sebaceous cyst or other dermatologic etiologies.     There is a 1.1 centimeter nodule of the thyroid isthmus.     There is susceptibility artifact projecting over the left parietal region on large field-of-view  imaging.  Finding may be artifactual in nature.     There are bilateral breast prostheses noted.              =====  CONCLUSION:   No high-grade spinal canal or neural foraminal narrowing within the thoracic spine.     No focal suspicious osseous lesion.     1.1 centimeter isthmus thyroid nodule.  Follow-up thyroid ultrasound on a nonemergent basis can be performed for further assessment.     Partially imaged degenerative disease of the cervical spine.              Dictated by (CST): Maura Wilson MD on 2023 at 5:58 PM       Finalized by (CST): Maura Wilson MD on 2023 at 6:04 PM        [facility]  Department of Radiology  [address] [place], [place] [id number] [phone]      Name: Aleisha Carlin Dr: Sana Hernandez MD  : 3/1/1960    Age/Sex: 63 year old Female  Pt. Phone: [phone]  Exam Date: 2023  Procedure: MRI SPINE LUMBAR (W+WO) (CPT=72158)   -----------------------------------------------------------------------------------------------------------------------------------------------                  Sana Hernandez MD  [address] [place] [id number]      Interpretation   PROCEDURE:  MRI SPINE LUMBAR (W+WO) (CPT=72158)     COMPARISON: None.     INDICATIONS:  Weakness     TECHNIQUE:    A comprehensive examination was performed utilizing a variety of imaging planes and imaging parameters to optimize visualization of suspected pathology.  Images were performed before and after the administration of intravenous gadolinium   contrast.       FINDINGS:           PARASPINAL AREA:     Normal with no visible mass.    BONES:             No fracture, pars defect, or osseous lesion.    CORD/CAUDA EQUINA:            Normal caliber, contour, and signal intensity.    OTHER:             There is a 1.3 x 0.7 centimeter endometrial mass present.  There is a 1.7 centimeter intramural uterine mass present, most compatible with a fibroid (series 2, image 7).     There is an enhancing 1.4 centimeters subcutaneous nodule of back at the midline.  This may represent a sebaceous cyst.  There are numerous other cutaneous and subcutaneous nodules that are partially imaged.     LUMBAR DISC LEVELS:  L1-L2:   No significant disc/facet abnormality, spinal stenosis, or foraminal stenosis.    L2-L3:   No significant disc/facet abnormality, spinal stenosis, or foraminal stenosis.    L3-L4:   No significant disc/facet abnormality, spinal stenosis, or foraminal stenosis.    L4-L5:   No significant disc/facet abnormality, spinal stenosis, or foraminal stenosis.    L5-S1:   No significant disc/facet abnormality, spinal stenosis, or foraminal stenosis.                =====  CONCLUSION:   No focal suspicious osseous lesions.     No high-grade spinal canal or neural foraminal narrowing.     1.3 centimeter endometrial mass.  Differential considerations include endometrial polyp or other etiologies.  Recommend follow-up pelvic ultrasound on a nonemergent basis for further assessment.        1.7 centimeter intramural uterine fibroid.          Dictated by (CST): Maura Wilson MD on 2023 at 5:53 PM       Finalized by (CST): Maura Wilson MD on 2023 at 5:58 PM             [facility]  Department of Radiology  [address], [place] [id number] [phone]      Name: Aleisha Carlin Dr: Sana Hernandez MD  : 3/1/1960    Age/Sex: 63 year old Female  Pt. Phone: [phone]  Exam Date: 2023  Procedure: MRI BRAIN WO ACUTE (3) SEQUENCE (CPT=70551)    -----------------------------------------------------------------------------------------------------------------------------------------------                  MD Luis Beckett E [facility] [id number]      Interpretation   PROCEDURE:  MRI BRAIN WO ACUTE (3) SEQUENCE(CPT=70551)     COMPARISON: [facility], CT BRAIN OR HEAD (CPT=70450), 12/31/2023, 1:02 PM.     INDICATIONS:  Weakness     TECHNIQUE:    A variety of imaging planes and parameters were utilized for visualization of suspected pathology.  Images were performed without contrast.     Limited 3-sequence stroke protocol study was performed. Within these parameters:     FINDINGS:             CEREBRUM:     No edema, hemorrhage, mass, acute infarction, or inappropriate atrophy.       Scattered foci of FLAIR signal hyperintensity in the periventricular white matter, which are nonspecific but may represent a mild degree of chronic small vessel ischemic disease.  CEREBELLUM: No edema, hemorrhage, mass, acute infarction, or inappropriate atrophy.    BRAINSTEM:    No edema, hemorrhage, mass, acute infarction, or inappropriate atrophy.    CSF SPACES:   Ventricles, cisterns, and sulci are appropriate for age.  No hydrocephalus, subarachnoid hemorrhage, or mass.    SKULL: No mass or other significant visible lesion.    SINUSES:          Limited views demonstrate no significant mucosal thickening or fluid.    ORBITS:            Limited views are unremarkable.    OTHER:             Negative.                =====  CONCLUSION:      No restricted diffusion to suggest acute ischemic stroke.     Background of mild chronic small vessel ischemic disease.       No focal suspicious lesion on noncontrast MRI.  If there is concern for intracranial metastatic disease, complete MRI with contrast is recommended.          Dictated by (CST): Maura Wilson MD on 12/31/2023 at 4:04 PM       Finalized by (CST): Maura Wilson MD on 12/31/2023 at  4:09 PM        [facility]  Department of Radiology  [address], [place] [id number] [phone]      Name: Aleisha Carlin Dr: Sana Hernandez MD  : 3/1/1960    Age/Sex: 63 year old Female  Pt. Phone: [phone]  Exam Date: 2023  Procedure: CT BRAIN OR HEAD (43315)   -----------------------------------------------------------------------------------------------------------------------------------------------                  Sana Hernandez MD  [address] [place] [id number]      Interpretation   PROCEDURE:  CT BRAIN OR HEAD (CPT=70450)     COMPARISON: [facility], CT BRAIN OR HEAD (CPT=70450), 10/12/2018, 11:11 AM.  [facility], CT BRAIN OR HEAD (CPT=70450), 2018, 6:26 AM.  [facility], CT BRAIN OR HEAD (CPT=70450), 2018, 6:12   AM.     INDICATIONS:  Weakness.     TECHNIQUE:    CT images were obtained without contrast material.  Automated exposure control for dose reduction was used.  Dose information is transmitted to the ACR (American College of Radiology) NRDR (National Radiology Data Registry) which includes the   Dose Index Registry.      FINDINGS:          CSF SPACES:   The ventricles, cisterns, and sulci are commensurate in caliber and appropriate for age. No hydrocephalus, subarachnoid hemorrhage, or effacement of the basal cisterns is appreciated. There is no extra-axial fluid collection  CEREBRUM:     No acute intraparenchymal hemorrhage, edema, or cortical sulcal effacement is apparent. There is no space-occupying lesion, mass effect, or shift of midline structures. The gray-white matter junction is preserved and bilaterally symmetric in   appearance.  CEREBELLUM: No edema, hemorrhage, mass, acute infarction, or significant atrophy.    BRAINSTEM:    No edema, hemorrhage, mass, acute infarction, or significant atrophy.    CALVARIUM:    There is no apparent depressed fracture, mass, or  other significant visible lesion.. Incomplete fusion of the posterior C1 arch, normal variant.  Redemonstration of multiple cutaneous nodules , some of which may represent sebaceous cysts.  SINUSES:          Limited views demonstrate no significant mucosal thickening or fluid.    ORBITS:            Limited views are grossly unremarkable.       OTHER:             Atherosclerotic vascular calcifications are perceived in the carotid siphons. Soft tissue density in both external auditory canals presumably relates to impacted cerumen.               =====  CONCLUSION:      No acute intracranial hemorrhage, hydrocephalus, or mass effect.  If there is clinical concern for acute ischemic stroke, MRI of the brain is recommended.           Dictated by (CST): Maura Wilson MD on 12/31/2023 at 1:20 PM       Finalized by (CST): Maura Wilson MD on 12/31/2023 at 1:27 PM        Impression and Plan   1.   Leg weakness: Patient's imaging is unremarkable as is her neurological exam. Her issues may simply be deconditioning in the setting of chronic anticancer therapy for which PT/OT evaluation has been ordered. She does report increased lower extremity edema in the recent past and is on chronic furosemide. A worsening of her edema could also be the issue. Given the underlying malignancy, will check Dopplers to rule out new DVT.     2.   Metastatic breast cancer: No acute therapy. Patient is scheduled as outpatient later this week (Thursday) for follow up CT imaging to gauge status of disease. If patient remains in house, will consider ordering that imaging.     3.   Pancytopenia: Due to chemotherapy. Not febrile here and does not report fever at home. No respiratory or urinary complaints. Urinalysis and blood cultures x 2 sent in the ED. Tested negative for SARS-CoV-2/influenza A/RSV. Differential today shows 30% monocytes consistent with marrow recovery. Follow. If patient spikes temperature, panculture, check CXR, and start  broad spectrum antibiotics.     Electronically signed by:    Korey Jha M.D.  Systemic Medical Director of Oncology Services  [facility]

## 2024-01-01 NOTE — PLAN OF CARE
Problem: Patient Centered Care  Goal: Patient preferences are identified and integrated in the patient's plan of care  Description: Interventions:  - What would you like us to know as we care for you? I wanna be updated with plan of care  - Provide timely, complete, and accurate information to patient/family  - Incorporate patient and family knowledge, values, beliefs, and cultural backgrounds into the planning and delivery of care  - Encourage patient/family to participate in care and decision-making at the level they choose  - Honor patient and family perspectives and choices  Outcome: Progressing     Problem: Patient/Family Goals  Goal: Patient/Family Long Term Goal  Description: Patient's Long Term Goal: return home    Interventions:  - follow plan of care  - See additional Care Plan goals for specific interventions  Outcome: Progressing  Goal: Patient/Family Short Term Goal  Description: Patient's Short Term Goal: to get stronger    Interventions:   - PT/ OT on consult  - See additional Care Plan goals for specific interventions  Outcome: Progressing     Problem: PAIN - ADULT  Goal: Verbalizes/displays adequate comfort level or patient's stated pain goal  Description: INTERVENTIONS:  - Encourage pt to monitor pain and request assistance  - Assess pain using appropriate pain scale  - Administer analgesics based on type and severity of pain and evaluate response  - Implement non-pharmacological measures as appropriate and evaluate response  - Consider cultural and social influences on pain and pain management  - Manage/alleviate anxiety  - Utilize distraction and/or relaxation techniques  - Monitor for opioid side effects  - Notify MD/LIP if interventions unsuccessful or patient reports new pain  - Anticipate increased pain with activity and pre-medicate as appropriate  Outcome: Progressing     Problem: SAFETY ADULT - FALL  Goal: Free from fall injury  Description: INTERVENTIONS:  - Assess pt frequently for  physical needs  - Identify cognitive and physical deficits and behaviors that affect risk of falls.  - [place] fall precautions as indicated by assessment.  - Educate pt/family on patient safety including physical limitations  - Instruct pt to call for assistance with activity based on assessment  - Modify environment to reduce risk of injury  - Provide assistive devices as appropriate  - Consider OT/PT consult to assist with strengthening/mobility  - Encourage toileting schedule  Outcome: Progressing   Patient arrived to the floor from ED. Alert and oriented X4. Vital signs stable. Complained back and coccyx pain improved with [place]. Tolerating diet. Right chest port accessed. Patient has generalized weakness. Patient has bruising on lower extremities and coccyx. Mepilex applied to sacrum for protection. Patient using bedpan to urinate. PT/OT on consult. Oriented to unit. Plan of care reviewed.

## 2024-01-01 NOTE — PLAN OF CARE
Worked with PT / OT today. Ambulating to bathroom/small distances with x1-2 RW with gait belt. Safety and fall precautions in place and maintained. Plan for US Doppler tomorrow. Pain managed with [place] PRN. Voiding freely. Family at bedside.     Problem: Patient Centered Care  Goal: Patient preferences are identified and integrated in the patient's plan of care  Description: Interventions:  - What would you like us to know as we care for you? I wanna be updated with plan of care  - Provide timely, complete, and accurate information to patient/family  - Incorporate patient and family knowledge, values, beliefs, and cultural backgrounds into the planning and delivery of care  - Encourage patient/family to participate in care and decision-making at the level they choose  - Honor patient and family perspectives and choices  Outcome: Progressing     Problem: Patient/Family Goals  Goal: Patient/Family Long Term Goal  Description: Patient's Long Term Goal: return home    Interventions:  - follow plan of care  - See additional Care Plan goals for specific interventions  Outcome: Progressing  Goal: Patient/Family Short Term Goal  Description: Patient's Short Term Goal: to get stronger    Interventions:   - PT/ OT on consult  - See additional Care Plan goals for specific interventions  Outcome: Progressing     Problem: PAIN - ADULT  Goal: Verbalizes/displays adequate comfort level or patient's stated pain goal  Description: INTERVENTIONS:  - Encourage pt to monitor pain and request assistance  - Assess pain using appropriate pain scale  - Administer analgesics based on type and severity of pain and evaluate response  - Implement non-pharmacological measures as appropriate and evaluate response  - Consider cultural and social influences on pain and pain management  - Manage/alleviate anxiety  - Utilize distraction and/or relaxation techniques  - Monitor for opioid side effects  - Notify MD/LIP if interventions unsuccessful or  patient reports new pain  - Anticipate increased pain with activity and pre-medicate as appropriate  Outcome: Progressing     Problem: SAFETY ADULT - FALL  Goal: Free from fall injury  Description: INTERVENTIONS:  - Assess pt frequently for physical needs  - Identify cognitive and physical deficits and behaviors that affect risk of falls.  - [place] fall precautions as indicated by assessment.  - Educate pt/family on patient safety including physical limitations  - Instruct pt to call for assistance with activity based on assessment  - Modify environment to reduce risk of injury  - Provide assistive devices as appropriate  - Consider OT/PT consult to assist with strengthening/mobility  - Encourage toileting schedule  Outcome: Progressing     Problem: MUSCULOSKELETAL - ADULT  Goal: Return mobility to safest level of function  Description: INTERVENTIONS:  - Assess patient stability and activity tolerance for standing, transferring and ambulating w/ or w/o assistive devices  - Assist with transfers and ambulation using safe patient handling equipment as needed  - Ensure adequate protection for wounds/incisions during mobilization  - Obtain PT/OT consults as needed  - Advance activity as appropriate  - Communicate ordered activity level and limitations with patient/family  Outcome: Progressing     Problem: Impaired Functional Mobility  Goal: Achieve highest/safest level of mobility/gait  Description: Interventions:  - Assess patient's functional ability and stability  - Promote increasing activity/tolerance for mobility and gait  - Educate and engage patient/family in tolerated activity level and precautions  - Recommend use of  RW for transfers and ambulation  Outcome: Progressing     Problem: Impaired Activities of Daily Living  Goal: Achieve highest/safest level of independence in self care  Description: Interventions:  - Assess ability and encourage patient to participate in ADLs to maximize function  - Promote  sitting position while performing ADLs such as feeding, grooming, and bathing  - Educate and encourage patient/family in tolerated functional activity level and precautions during self-care  - Encourage patient to incorporate impaired side during daily activities to promote function  Outcome: Progressing

## 2024-01-01 NOTE — CONSULTS
[facility]  [address], [address]  [place] [address]  [phone]          INPATIENT NEUROLOGY   INITIAL CONSULT NOTE       [facility]    Report of Consultation    Aleisha Carlin Patient Status:  Inpatient     3/1/1960 MRN [id number]    Location [facility] 4W/SW/SE Attending Rocio Campos,     Hosp Day # 1 PCP AMAURI ERNANDEZ      Date of Admission:  2023  Date of Consult:  2024    HPI:     Aleisha Carlin is a 63 year old woman with past medical history of breast cancer status post mastectomy on chemotherapy, neurofibromatosis, hyperlipidemia who presented to the ER with weakness resulting in multiple falls.    In the emergency department, blood pressure is 117/73 and she was tachycardic to 117.  Afebrile.  MRI of her lumbar spine showed no significant stenoses.  MRI of her brain mild chronic microvascular ischemic change but otherwise unremarkable.    Patient is also complaining of lower extremity edema.    Per 2023 oncology note patient did complain of neuropathic symptoms of her right digits 4, 5.    Patient says on Saturday she got up twice to use the restroom overnight and both times felt her knees giving out and she fell forward onto her knees where there is quite a bit of bruising.  She had to crawl and scoot which to get to the toilet and off of the toilet.  The second time this occurred, she called her father and brother and they helped bring her to the hospital.    She has had multiple right foot surgeries that of, needed and numbness on the plantar aspect and difficulty inverting and everting.    There are paresthesias.  She has had some difficulty with swallowing her roast beef sandwich today.  No other bulbar symptoms.    CURRENT MEDICATIONS  No current outpatient medications on file.       OUTPATIENT MEDICATIONS  No current facility-administered medications on file prior to encounter.     Current Outpatient Medications on  File Prior to Encounter   Medication Sig Dispense Refill    minocycline 50 MG Oral Cap Take 1 capsule (50 mg total) by mouth daily.      zolpidem 10 MG Oral Tab Take 1 tablet (10 mg total) by mouth nightly as needed. 60 tablet 0    mirtazapine 15 MG Oral Tab Take 1 tablet (15 mg total) by mouth nightly. 90 tablet 1    prochlorperazine (COMPAZINE) 10 mg tablet Take 1 tablet (10 mg total) by mouth every 6 (six) hours as needed for Nausea. 90 tablet 2    furosemide 20 MG Oral Tab Take 1 tablet (20 mg total) by mouth 2 (two) times daily. 180 tablet 3    folic acid 1 MG Oral Tab Take 1 tablet (1 mg total) by mouth daily. 90 tablet 3    HYDROcodone-acetaminophen  MG Oral Tab Take 1 tablet by mouth every 6 (six) hours as needed for Pain. 90 tablet 0    LORazepam 0.5 MG Oral Tab Take 1 tablet (0.5 mg total) by mouth every 6 (six) hours as needed for Anxiety. 30 tablet 0    guaiFENesin-codeine 100-10 MG/5ML Oral Solution Take 5 mL by mouth every 6 (six) hours as needed for cough. 473 mL 0    albuterol 108 (90 Base) MCG/ACT Inhalation Aero Soln Inhale 1 puff into the lungs every 6 (six) hours as needed for Wheezing. 8 g 3    HYDROcodone-acetaminophen  MG Oral Tab Take 1-2 tablets by mouth every 4 (four) hours as needed for Pain. 120 tablet 0    Zoledronic Acid (ZOMETA IV) Inject 4 mg into the vein every 6 (six) months.      Calcium Carb-Cholecalciferol (CALCIUM 500 + D) 500-200 MG-UNIT Oral Tab Take by mouth daily.      Cholecalciferol (VITAMIN D3) 250 MCG (16029 UT) Oral Cap Take 1 capsule by mouth daily.      atorvastatin 20 MG Oral Tab Take 1 tablet (20 mg total) by mouth daily. (Patient not taking: Reported on 9/21/2023)      losartan 100 MG Oral Tab Take by mouth daily. (Patient not taking: Reported on 10/12/2023)          MEDICAL HISTORY  Past Medical History:   Diagnosis Date    Breast cancer (HCC) 03/02/2012    MASTECTOMY / RECONSTRUCTION    Chemotherapy-induced neutropenia  (HCC) 12/25/2020    Encounter  for insertion of venous access port 2007    portacath insertion / venous access    Glaucoma     History of breast cancer in female     Hyperlipidemia     Malignant neoplasm of overlapping sites of breast in female, estrogen receptor positive  (HCC) 1/16/2015    Malignant neoplasm of upper-outer quadrant of right breast in female, estrogen receptor positive  (HCC) 1/16/2015    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    Menorrhagia 2007    HYSTEROSCOPY / D&C / endomentrial biopsy (08/28/2007)    Metastatic breast cancer 10/6/2020    Neurofibromatosis (HCC)     Osteopenia of multiple sites 12/25/2020    Osteopenia of multiple sites 2/2/2021    Osteoporosis screening 8/2/13    Osteoporosis screening 08/02/2013    Per [employer]    Other osteoporosis without current pathological fracture 2/2/2021    Postmenopausal bleeding 7/7/2014    Psychophysiological insomnia 8/5/2021    Tumor, foot     tumor right foot / excision/excision of bone spur/arthrodesis w/screw fixation       ?SURGICAL HISTORY  Past Surgical History:   Procedure Laterality Date    BREAST RECONSTRUCTION Right 2008    right breast reconstructed - ductal, BRCA neg    BREAST SURGERY      CHEMOTHERAPY  2007    FOOT SURGERY Right     tumor rt foot / excision/excision of bone spur/arthrodesis w/screw fixation    IR PORT A CATH PROCEDURE  2007    Right subclavian port of cath.    MASTECTOMY RIGHT      2012    MASTECTOMY,SIMPLE  2007       SOCIAL HISTORY  Social History     Socioeconomic History    Marital status: Single   Tobacco Use    Smoking status: Never    Smokeless tobacco: Never   Substance and Sexual Activity    Alcohol use: Yes     Alcohol/week: 5.0 standard drinks of alcohol     Types: 5 Standard drinks or equivalent per week     Comment: Socially.  (Per [employer]:  5 days weekly.)    Drug use: No   Other Topics Concern    Caffeine Concern Yes     Comment: tea       FAMILY HISTORY  Family History   Problem Relation Age of Onset    Breast Cancer Mother 49         bilateral mastectomy    Genetic Disease Mother         NF-1    Breast Cancer Maternal Aunt 75    Breast Cancer Paternal Grandmother     Breast Cancer Self 51    Genetic Disease Self         NF-1    Cancer Paternal Aunt         throat ca    Cancer Maternal Uncle         prostate ca    Genetic Disease Brother         NF-1    Genetic Disease Brother         NF-1    Genetic Disease Brother         NF-1    Cancer Maternal Uncle         bladder ca    Breast Cancer Maternal Cousin Female     Cancer Maternal Cousin Female         leukemia;  childhood    Cancer Paternal Cousin Male         throat ca       ALLERGIES  No Known Allergies    ?REVIEW OF SYSTEMS:   13-point review of systems was done and is negative unless otherwise stated in HPI.    ?PHYSICAL EXAM:     /76 (BP Location: Left arm)   Pulse 86   Temp 98.4 °F (36.9 °C) (Oral)   Resp 16   Ht 61\"   Wt 109 lb 4.8 oz (49.6 kg)   SpO2 97%   BMI 20.65 kg/m²   General appearance: Well appearing, and in no acute distress  Skin: skin color, texture normal.  No rashes or lesions.    Head: Normocephalic, atraumatic.    Neurological exam:  Mental Status: Alert, oriented to person, place and time, Follows commands, and Speech fluent and appropriate.  Cranial Nerves:  visual fields intact to confrontation, extraocular movements intact, facial sensation intact, face symmetric, no facial droop or ptosis, normal bedside auditory acuity bilaterally, no dysarthria  Motor:    Deltoid  C5 Biceps  C5-6  Triceps  C7   R 5 5 5   L 5 5 5        Hip flexion (Iliopsoas) L2-4  Knee flexion  (Hamstrings)  L5-S1   Knee Extension (Quadriceps)  L3, L4  Ankle plantarflexion  S1 Ankle Dorsiflexion  Tib Anterior   L5   R 4- 5 5 5 5   L 4- 5 5 5 5     She has right thigh muscle spasms when flexing her hip and right knee  Reflexes: UE and LE reflexes are equal and reactive  Sensation: intact to light touch but length-dependent loss of vibration in lower extremities  Coordination:  Finger-to- nose-finger intact bilaterally   Gait: not assessed       LABS/DATA:      Lab Results   Component Value Date    WBC 1.5 01/01/2024    HGB 9.3 01/01/2024    HCT 28.1 01/01/2024    .0 01/01/2024    CREATSERUM 1.01 01/01/2024    BUN 16 01/01/2024     01/01/2024    K 3.6 01/01/2024     01/01/2024    CO2 28.0 01/01/2024    GLU 85 01/01/2024    CA 8.5 01/01/2024       HGBA1C:  No results found for: \"A1C\", \"EAG\"             IMAGING:  MRI SPINE THORACIC (W+WO) (CPT=72157)    Result Date: 12/31/2023  CONCLUSION:  No high-grade spinal canal or neural foraminal narrowing within the thoracic spine.  No focal suspicious osseous lesion.  1.1 centimeter isthmus thyroid nodule.  Follow-up thyroid ultrasound on a nonemergent basis can be performed for further assessment.  Partially imaged degenerative disease of the cervical spine.     Dictated by (CST): Maura Wilson MD on 12/31/2023 at 5:58 PM     Finalized by (CST): Maura Wilson MD on 12/31/2023 at 6:04 PM          MRI SPINE LUMBAR (W+WO) (CPT=72158)    Result Date: 12/31/2023  CONCLUSION:  No focal suspicious osseous lesions.  No high-grade spinal canal or neural foraminal narrowing.  1.3 centimeter endometrial mass.  Differential considerations include endometrial polyp or other etiologies.  Recommend follow-up pelvic ultrasound on a nonemergent basis for further assessment.   1.7 centimeter intramural uterine fibroid.    Dictated by (CST): Maura Wilson MD on 12/31/2023 at 5:53 PM     Finalized by (CST): Maura Wilson MD on 12/31/2023 at 5:58 PM          MRI BRAIN WO ACUTE (3) SEQUENCE (CPT=70551)    Result Date: 12/31/2023  CONCLUSION:   No restricted diffusion to suggest acute ischemic stroke.  Background of mild chronic small vessel ischemic disease.   No focal suspicious lesion on noncontrast MRI.  If there is concern for intracranial metastatic disease, complete MRI with contrast is recommended.    Dictated by (CST): Katie  MD Maura on 12/31/2023 at 4:04 PM     Finalized by (CST): Maura Wilson MD on 12/31/2023 at 4:09 PM          CT BRAIN OR HEAD (74567)    Result Date: 12/31/2023  CONCLUSION:   No acute intracranial hemorrhage, hydrocephalus, or mass effect.  If there is clinical concern for acute ischemic stroke, MRI of the brain is recommended.    Dictated by (CST): Maura Wilson MD on 12/31/2023 at 1:20 PM     Finalized by (CST): Maura Wilson MD on 12/31/2023 at 1:27 PM          I PERSONALLY REVIEWED THESE IMAGES.     ASSESSMENT:  The patient is a 63 year old woman with past medical history of breast cancer status post mastectomy on chemotherapy, neurofibromatosis, hyperlipidemia who presented to the ER with weakness resulting in multiple falls.    In the emergency department, blood pressure is 117/73 and she was tachycardic to 117.  Afebrile.    MRI of her lumbar spine showed no significant stenoses.  MRI of her brain mild chronic microvascular ischemic change but otherwise unremarkable.  MRI thoracic spine no significant abnormalities, though partially images cervical spine showed degenerative disc disease       Her neurological examination is significant for proximal lower extremity weakness and a length-dependent peripheral neuropathy.      Eribulin can lead to neuropathy frequently in patients.    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 When identified, these errors have been corrected. While every attempt is made to correct errors during  dictation, discrepancies may still exist    JANNETTE Roberts DO   Staff Neurologist   1/1/2024  2:41 PM

## 2024-01-01 NOTE — PROGRESS NOTES
[facility]    Progress Note    Aleisha Carlin Patient Status:  Inpatient    3/1/1960 MRN [id number]   Location [facility] 4W/SW/SE Attending Rocio Campos,    Hosp Day # 1 PCP AMAURI ERNANDEZ     Chief complaint LE weakness     Subjective:   Aleisha Carlin is a(n) 63 year old female Pt reports weakness in her legs. Also pain in coccyx. No cp or sob. No n/v. No c/d.     ROS:   No cp, sob   No c/d   No n/v     Objective:   Blood pressure 113/76, pulse 86, temperature 98.4 °F (36.9 °C), temperature source Oral, resp. rate 16, height 5' 1\" (1.549 m), weight 109 lb 4.8 oz (49.6 kg), SpO2 97%.      Intake/Output Summary (Last 24 hours) at 2024 1227  Last data filed at 2024 0840  Gross per 24 hour   Intake 360 ml   Output --   Net 360 ml       Patient Weight(s) for the past 336 hrs:   Weight   23 1929 109 lb 4.8 oz (49.6 kg)   23 0828 109 lb (49.4 kg)           General appearance: alert, appears stated age and cooperative  Pulmonary:  clear to auscultation bilaterally  Cardiovascular: S1, S2 normal, no murmur, click, rub or gallop, regular rate and rhythm  Abdominal: soft, non-tender; bowel sounds normal; no masses,  no organomegaly  Extremities: extremities normal, atraumatic, no cyanosis or edema  Neuro: CN intact, strength 5/5 ue's, 4/5 on right leg due to injury         Medicines:     Current Facility-Administered Medications   Medication Dose Route Frequency    albuterol (Ventolin HFA) 108 (90 Base) MCG/ACT inhaler 1 puff  1 puff Inhalation Q6H PRN    calcium carbonate-vitamin D (Oyster Shell-D) 250-3.125 MG-MCG per tab 2 tablet  2 tablet Oral Daily    cholecalciferol (Vitamin D3) tab 250 mcg  250 mcg Oral Daily    folic acid (Folvite) tab 1 mg  1 mg Oral Daily    furosemide (Lasix) tab 20 mg  20 mg Oral BID    HYDROcodone-acetaminophen (Norco)  MG per tab 1-2 tablet  1-2 tablet Oral Q4H PRN    mirtazapine (REMERON SOL-TAB) disintegrating tab 15 mg  15 mg Oral  Nightly    zolpidem (Ambien) tab 10 mg  10 mg Oral Nightly PRN    ondansetron (Zofran) 4 MG/2ML injection 4 mg  4 mg Intravenous Q6H PRN    acetaminophen (Tylenol) tab 650 mg  650 mg Oral Q6H PRN    heparin (Porcine) 5000 UNIT/ML injection 5,000 Units  5,000 Units Subcutaneous 2 times per day    hydrALAzine (Apresoline) 20 mg/mL injection 10 mg  10 mg Intravenous Q4H PRN    alum-mag hydroxide-simethicone (Maalox) 200-200-20 MG/5ML oral suspension 30 mL  30 mL Oral QID PRN    pantoprazole (Protonix) DR tab 40 mg  40 mg Oral QAM AC       Lab Results   Component Value Date    WBC 1.5 (L) 01/01/2024    HGB 9.3 (L) 01/01/2024    HCT 28.1 (L) 01/01/2024    .0 (L) 01/01/2024    CREATSERUM 1.01 01/01/2024    BUN 16 01/01/2024     01/01/2024    K 3.6 01/01/2024     01/01/2024    CO2 28.0 01/01/2024    GLU 85 01/01/2024    CA 8.5 (L) 01/01/2024    ALB 4.1 12/31/2023    ALKPHO 72 12/31/2023    BILT 0.8 12/31/2023    TP 6.7 12/31/2023    AST 33 12/31/2023    ALT 23 12/31/2023    PTT 29.1 09/25/2020    INR 1.01 04/19/2023    TSH 1.200 08/05/2021    LIP 45 (L) 04/15/2021    DDIMER 0.94 (H) 10/26/2020    MG 2.4 04/17/2021    B12 >2,000 (H) 04/13/2023       MRI SPINE THORACIC (W+WO) (CPT=72157)    Result Date: 12/31/2023  CONCLUSION:  No high-grade spinal canal or neural foraminal narrowing within the thoracic spine.  No focal suspicious osseous lesion.  1.1 centimeter isthmus thyroid nodule.  Follow-up thyroid ultrasound on a nonemergent basis can be performed for further assessment.  Partially imaged degenerative disease of the cervical spine.     Dictated by (CST): Maura Wilson MD on 12/31/2023 at 5:58 PM     Finalized by (CST): Maura Wilson MD on 12/31/2023 at 6:04 PM          MRI SPINE LUMBAR (W+WO) (CPT=72158)    Result Date: 12/31/2023  CONCLUSION:  No focal suspicious osseous lesions.  No high-grade spinal canal or neural foraminal narrowing.  1.3 centimeter endometrial mass.  Differential  considerations include endometrial polyp or other etiologies.  Recommend follow-up pelvic ultrasound on a nonemergent basis for further assessment.   1.7 centimeter intramural uterine fibroid.    Dictated by (CST): Maura Wilson MD on 12/31/2023 at 5:53 PM     Finalized by (CST): Maura Wilson MD on 12/31/2023 at 5:58 PM          MRI BRAIN WO ACUTE (3) SEQUENCE (CPT=70551)    Result Date: 12/31/2023  CONCLUSION:   No restricted diffusion to suggest acute ischemic stroke.  Background of mild chronic small vessel ischemic disease.   No focal suspicious lesion on noncontrast MRI.  If there is concern for intracranial metastatic disease, complete MRI with contrast is recommended.    Dictated by (CST): Maura Wilson MD on 12/31/2023 at 4:04 PM     Finalized by (CST): Maura Wilson MD on 12/31/2023 at 4:09 PM          CT BRAIN OR HEAD (13057)    Result Date: 12/31/2023  CONCLUSION:   No acute intracranial hemorrhage, hydrocephalus, or mass effect.  If there is clinical concern for acute ischemic stroke, MRI of the brain is recommended.    Dictated by (CST): Maura Wilson MD on 12/31/2023 at 1:20 PM     Finalized by (CST): Maura Wilson MD on 12/31/2023 at 1:27 PM               Results:     CBC:    Lab Results   Component Value Date    WBC 1.5 (L) 01/01/2024    WBC 1.7 (L) 12/31/2023    WBC 3.6 (L) 12/21/2023     Lab Results   Component Value Date    HGB 9.3 (L) 01/01/2024    HGB 11.4 (L) 12/31/2023    HGB 10.7 (L) 12/21/2023      Lab Results   Component Value Date    .0 (L) 01/01/2024    .0 12/31/2023    .0 12/21/2023       Recent Labs   Lab 12/31/23  0932 01/01/24  0516   * 85   BUN 26* 16   CREATSERUM 1.12* 1.01   CA 9.4 8.5*    138   K 4.0 3.6    110   CO2 31.0 28.0             Assessment and Plan:           Lower extremity weakness  Likely multifactorial, with a definite element of deconditioning  Mri brain, c spine and l spine ok  Consult neuro for further  inpu   LE dopp pending     Metastatic breast CA  Follows oncology, continue supportive care     Neutropenia  Likely chemo related, will continue to monitor repeat CBC later.  If turns febrile will initiate antibiotics cultures etc.     Essential hypertension  Blood pressure well-controlled resume home meds.     Osteoporosis  Continue home meds     Prophylaxis  Subcutaneous heparin     CODE STATUS  Full       PT don Campos,          Chart reviewed, including current vitals, notes, labs and imaging  Labs ordered and medications adjusted as outlined above  Coordinate care with care team/consultants  Discussed with patient results of tests, management plan as outlined above, and the need for ongoing hospitalization  D/w RN     [facility]        1/1/2024     **Certification      PHYSICIAN Certification of Need for Inpatient Hospitalization - Initial Certification    Patient will require inpatient services that will reasonably be expected to span two midnight's based on the clinical documentation in H+P.   Based on patients current state of illness, I anticipate that, after discharge, patient will require TBD.

## 2024-01-01 NOTE — OCCUPATIONAL THERAPY NOTE
OCCUPATIONAL THERAPY EVALUATION - INPATIENT     Room Number: 452/452-A  Evaluation Date: 1/1/2024  Type of Evaluation: Initial  Presenting Problem: fall, weakness    Physician Order: IP Consult to Occupational Therapy  Reason for Therapy: ADL/IADL Dysfunction and Discharge Planning    OCCUPATIONAL THERAPY ASSESSMENT   Patient is a 63 year old female admitted 12/31/2023 for weakness, fall in setting of metastatic CA.    Pt seen in cooperation with PT after nurse approves pt's participation in OOB activity.   Pt received in bed no family present (father arrived at end of session).   Pt is very motivated.   Pt reports pain in coccyx, as well as c/o mm spasms RT thigh and painful and swollen LE joints with bruising DEANDRE LEs. Pt notes she has chronic ankle swelling.     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   Pt performs all requested in room activity without SOB, no LOB or LE buckling however pt indicating a fear of falling/LEs giving out as her chief c/o is LE weakness.     Pt requests assistance to walk to bathroom. Nurse approves activity.     ADLs  Grooming: CGA standing to wash hands  Toileting: CGA for standing portions  UE self care: set up to don robe at EOB  LE self care: sons underwear and socks with set up, CGA for standing portions    Functional Mobility  Supine to sit: supervision  Functional Xfers: bed, toilet, and chair with CGA  BR Mobility: CGA using R/W with pt expressing fear of falling d//t LE weakness      In this OT evaluation patient presents with the following impairments: weakness, limited activity tolerance.  These deficits manifest functionally while performing I/ADLs and functional mobility.   The patient is below baseline and would benefit from skilled inpatient OT to address the above deficits, maximizing patient's ability to return to prior level of function.    The patient's Approx Degree of  Impairment: 42.8% has been calculated based on documentation in the [facility] '6 clicks' Inpatient Daily Activity Short Form.  Research supports that patients with this level of impairment may benefit from HHPT. Additionally, initial increased support at discharge is recommended with pt in agreement- plans to stay with her father short term at discharge.      DISCHARGE RECOMMENDATIONS  OT Discharge Recommendations: Home with home health PT (initial support from family is recommended)    PLAN  OT Treatment Plan: Energy conservation/work simplification techniques;UE strengthening/ROM;Functional transfer training;Endurance training;Patient/Family education;Patient/Family training;Equipment eval/education;Compensatory technique education       OCCUPATIONAL THERAPY MEDICAL/SOCIAL HISTORY   Problem List  Principal Problem:    Weakness  Active Problems:    Multiple falls    Bilateral leg weakness    Bilateral leg edema    Stage IV breast cancer in female (HCC)    Chemotherapy induced neutropenia  (HCC)    HOME SITUATION  Type of Home: House  Home Layout: Two level; Bed/bath upstairs  Lives With: Alone  Other Equipment: -- (no AD)  Occupation/Status: nurse    Stairs in Home: 1 CAREY 2 story house with flight to 2nd floor BR  Use of Assistive Device(s): none    Prior Level of [place]: pt lives alone and is typically I without AD for ADLs and functional mobiltiy. Pt has supportive family in the area involved in her care.     SUBJECTIVE  \"Hold on tight!\"- as pt requesting therapist  gait belt during mobility tasks    OCCUPATIONAL THERAPY EXAMINATION    OBJECTIVE  Precautions: Bed/chair alarm; Limb alert - right  Fall Risk: -- (Moderate fall risk)    PAIN ASSESSMENT  Rating: Unable to rate  Location: buttocks, RT thigh mm spasm  Management Techniques: Repositioning; Nurse notified    ACTIVITY TOLERANCE  Good for limited in room activity- no SOB    COGNITION  Intact    SENSATION  Pt reports a h/o numbness/tingling RT middle,  ring, and little finger citing\" because of chemo\"    Communication: able to communicate her needs    Behavioral/Emotional/Social: pleasant and appropriate with indication of a fear of falling      ACTIVITIES OF DAILY LIVING ASSESSMENT  AM-PAC ‘6-Clicks’ Inpatient Daily Activity Short Form  How much help from another person does the patient currently need…  -   Putting on and taking off regular lower body clothing?: A Little  -   Bathing (including washing, rinsing, drying)?: A Little  -   Toileting, which includes using toilet, bedpan or urinal? : A Little  -   Putting on and taking off regular upper body clothing?: A Little  -   Taking care of personal grooming such as brushing teeth?: A Little  -   Eating meals?: None    AM-PAC Score:  Score: 19  Approx Degree of Impairment: 42.8%  Standardized Score (AM-PAC Scale): 40.22  CMS Modifier (G-Code): CK    FUNCTIONAL TRANSFER ASSESSMENT  Sit to Stand: Edge of Bed; Chair  Edge of Bed: Contact Guard Assist  Chair: Contact Guard Assist  Toilet Transfer: Contact Guard Assist    BED MOBILITY  Supine to Sit : Supervision  Sit to Supine (OT): Supervision    EDUCATION PROVIDED  Patient : Role of Occupational Therapy; Plan of Care; Functional Transfer Techniques; Fall Prevention (activity promotion with energy conservation considerations)  Patient's Response to Education: Verbalized Understanding; Returned Demonstration    Patient End of Session: Up in chair;Needs met;Call light within reach;All patient questions and concerns addressed;RN aware of session/findings;Family present    OT Goals  Patients self stated goal is: regain her LE strength     Patient will complete functional transfer with Mod I  Comment:      Comment:     Patient will tolerate standing for 5 minutes in prep for adls with supervision   Comment:    Patient will complete item retrieval with Mod I  Comment:          Goals  on: 24  Frequency: 3-5x/week    Patient Evaluation Complexity Level:    Occupational Profile/Medical History MODERATE - Expanded review of history including review of medical or therapy record   Specific performance deficits impacting engagement in ADL/IADL MODERATE  3 - 5 performance deficits   Client Assessment/Performance Deficits MODERATE - Comorbidities and min to mod modifications of tasks    Clinical Decision Making MODERATE - Analysis of occupational profile, detailed assessments, several treatment options    Overall Complexity MODERATE     Self-Care Home Management: 15 minutes

## 2024-01-02 LAB
BASOPHILS # BLD: 0 X10(3) UL (ref 0–0.2)
BASOPHILS NFR BLD: 0 %
DEPRECATED RDW RBC AUTO: 59.7 FL (ref 35.1–46.3)
EOSINOPHIL # BLD: 0 X10(3) UL (ref 0–0.7)
EOSINOPHIL NFR BLD: 0 %
ERYTHROCYTE [DISTWIDTH] IN BLOOD BY AUTOMATED COUNT: 17.8 % (ref 11–15)
HCT VFR BLD AUTO: 28.4 %
HGB BLD-MCNC: 9 G/DL
LYMPHOCYTES NFR BLD: 0.68 X10(3) UL (ref 1–4)
LYMPHOCYTES NFR BLD: 35 %
MCH RBC QN AUTO: 28.9 PG (ref 26–34)
MCHC RBC AUTO-ENTMCNC: 31.7 G/DL (ref 31–37)
MCV RBC AUTO: 91.3 FL
MONOCYTES # BLD: 0.41 X10(3) UL (ref 0.1–1)
MONOCYTES NFR BLD: 23 %
MORPHOLOGY: NORMAL
NEUTROPHILS # BLD AUTO: 0.44 X10 (3) UL (ref 1.5–7.7)
NEUTROPHILS NFR BLD: 39 %
NEUTS HYPERSEG # BLD: 0.7 X10(3) UL (ref 1.5–7.7)
PLATELET # BLD AUTO: 144 10(3)UL (ref 150–450)
PLATELET MORPHOLOGY: NORMAL
RBC # BLD AUTO: 3.11 X10(6)UL
TOTAL CELLS COUNTED BLD: 100
VARIANT LYMPHS NFR BLD MANUAL: 3 %
WBC # BLD AUTO: 1.8 X10(3) UL (ref 4–11)

## 2024-01-02 PROCEDURE — 99232 SBSQ HOSP IP/OBS MODERATE 35: CPT | Performed by: OTHER

## 2024-01-02 PROCEDURE — 30243S1 TRANSFUSION OF NONAUTOLOGOUS GLOBULIN INTO CENTRAL VEIN, PERCUTANEOUS APPROACH: ICD-10-PCS | Performed by: HOSPITALIST

## 2024-01-02 RX ORDER — DOCUSATE SODIUM 100 MG/1
100 CAPSULE, LIQUID FILLED ORAL 2 TIMES DAILY
Status: DISCONTINUED | OUTPATIENT
Start: 2024-01-02 | End: 2024-01-07

## 2024-01-02 RX ORDER — FUROSEMIDE 20 MG/1
20 TABLET ORAL DAILY
Status: DISCONTINUED | OUTPATIENT
Start: 2024-01-03 | End: 2024-01-07

## 2024-01-02 RX ORDER — POLYETHYLENE GLYCOL 3350 17 G/17G
17 POWDER, FOR SOLUTION ORAL DAILY PRN
Status: DISCONTINUED | OUTPATIENT
Start: 2024-01-02 | End: 2024-01-04

## 2024-01-02 RX ORDER — BISACODYL 10 MG
10 SUPPOSITORY, RECTAL RECTAL
Status: DISCONTINUED | OUTPATIENT
Start: 2024-01-02 | End: 2024-01-07

## 2024-01-02 RX ORDER — ENEMA 19; 7 G/133ML; G/133ML
1 ENEMA RECTAL ONCE AS NEEDED
Status: DISCONTINUED | OUTPATIENT
Start: 2024-01-02 | End: 2024-01-07

## 2024-01-02 NOTE — PLAN OF CARE
Problem: ALTERED NUTRIENT INTAKE - ADULT  Goal: Nutrient intake appropriate for improving, restoring or maintaining nutritional needs  Description: INTERVENTIONS:  - Assess nutritional status and recommend course of action  - Monitor oral intake, labs, and treatment plans  - Recommend appropriate diets, oral nutritional supplements, and vitamin/mineral supplements  - Provide specific nutrition education as appropriate  Outcome: Progressing

## 2024-01-02 NOTE — CDS QUERY
Malnutrition  CLINICAL DOCUMENTATION CLARIFICATION FORM    Dear Dr. Campos,   Clinical information (provided below) includes documentation of Moderate Malnutrition by the Clinical Dietician.  PLEASE INDICATE IF YOU ARE IN AGREEMENT WITH THE FOLLOWING   ASSESSMENT BY THE CONSULTANT:  Moderate Malnutrition as documented by the Clinical Dietician on : 1/2/23  [x  ] Yes, I agree with this assessment      [  ] No, I do not agree with this assessment  If not in agreement with this assessment, please provide your independent assessment of the nutritional status:  ____________________________        Documentation from the Medical Record:    Dietician Nutritional Assessment: 1/2/2023: Pt meets Modeerate malnutrition criteria.  CRITERIA FOR MALNUTRITION DIAGNOSIS: Criteria for non-severe malnutrition diagnosis: chronic illness related to wt loss 7.5% in 3 months, energy intake less than75% for greater than 1 month, body fat mild depletion, and muscle mass mild depletion.     Nutrition Diagnosis/Problem: Moderate Malnutrition related to Chronic - Physiological causes increasing nutrient needs due to illness or condition cancer with tx as evidenced by wt less, less than 75% usual intake and mild/moderate muscle and fat deficits.       Nutrition Focused Physical Exam (NFPE): mild depletion body fat orbital region and fat overlying rib cage region, moderate depletion body fat triceps region, mild depletion muscle mass scapular region, shoulder region, dorsal gonzalez region, thigh region, and calf region, and moderate depletion muscle mass temple region and clavicle region   - Fluid Accumulation:  right LE--improving on lasix per pt.  see RN documentation for details  - Skin Integrity: at risk see RN documentation for details    For questions regarding this query, please contact Clinical Documentation :   [email or web address] [phone]  Thank You  THIS FORM IS A PERMANENT PART OF  THE MEDICAL RECORD

## 2024-01-02 NOTE — HOME CARE LIAISON
[facility] Spoke w/ patient who is agreeable with [facility]. Contact information placed on AVS.

## 2024-01-02 NOTE — PROGRESS NOTES
[facility] Oncology Report of Consultation      Patient Name: Aleisha Carlin   YOB: 1960  Medical Record Number: [id number]  Consulting Physician: Korey Jha M.D.   Referring Physician: Sana Hernandez MD    The 21st Century Cures Act makes medical notes like these available to patients in the interest of transparency. Please be advised this is a medical document. Medical documents are intended to carry relevant information, facts as evident, and the clinical opinion of the practitioner. The medical note is intended as peer to peer communication and may appear blunt or direct. It is written in medical language and may contain abbreviations or verbiage that are unfamiliar.      Neurology has recommended trial of IVIG for patient's lower extremity complaints. Dopplers are negative for DVT. Patient remain neutropenic but ANC is rising. No intervention needed. Expect ANC to be >500 by tomorrow.     Electronically signed by:    Korey Jha M.D.  Systemic Medical Director of Oncology Services  [facility]

## 2024-01-02 NOTE — PROGRESS NOTES
[facility]    Progress Note    Aleisha Carlin Patient Status:  Inpatient    3/1/1960 MRN [id number]   Location [facility] 4W/SW/SE Attending Rocio Campos, DO   Hosp Day # 2 PCP AMAURI ERNANDEZ     Chief complaint LE weakness     Subjective:   Aleisha Carlin is a(n) 63 year old female Pt doing better but describes spasm in right thigh. Also dysphagia for some foods last night     ROS:   No cp, sob   No c/d   No n/v     Objective:   Blood pressure 94/66, pulse 76, temperature 98.4 °F (36.9 °C), temperature source Oral, resp. rate 18, height 5' 1\" (1.549 m), weight 109 lb 4.8 oz (49.6 kg), SpO2 100%.      Intake/Output Summary (Last 24 hours) at 2024 1441  Last data filed at 2024 1742  Gross per 24 hour   Intake 240 ml   Output --   Net 240 ml       Patient Weight(s) for the past 336 hrs:   Weight   23 1929 109 lb 4.8 oz (49.6 kg)   23 0828 109 lb (49.4 kg)           General appearance: alert, appears stated age and cooperative  Pulmonary:  clear to auscultation bilaterally  Cardiovascular: S1, S2 normal, no murmur, click, rub or gallop, regular rate and rhythm  Abdominal: soft, non-tender; bowel sounds normal; no masses,  no organomegaly  Extremities: extremities normal, atraumatic, no cyanosis or edema  Neuro: CN intact, strength 5/5 ue's, 4/5 on right leg due to injury         Medicines:           Lab Results   Component Value Date    WBC 1.8 (L) 2024    HGB 9.0 (L) 2024    HCT 28.4 (L) 2024    .0 (L) 2024    CREATSERUM 1.01 2024    BUN 16 2024     2024    K 3.6 2024     2024    CO2 28.0 2024    GLU 85 2024    CA 8.5 (L) 2024    ALB 4.1 2023    ALKPHO 72 2023    BILT 0.8 2023    TP 6.7 2023    AST 33 2023    ALT 23 2023    PTT 29.1 2020    INR 1.01 2023    TSH 1.200 2021    LIP 45 (L) 04/15/2021    DDIMER 0.94 (H)  10/26/2020    MG 2.4 04/17/2021     (H) 01/01/2024    B12 612 01/01/2024       US VENOUS DOPPLER LEG BILAT - DIAG IMG (CPT=93970)    Result Date: 1/1/2024  CONCLUSION: No evidence of acute deep venous thrombosis in the imaged veins of the bilateral lower extremities.    Dictated by (CST): Rasta Bass MD on 1/01/2024 at 9:27 PM     Finalized by (CST): Rasta Bass MD on 1/01/2024 at 9:28 PM          MRI SPINE THORACIC (W+WO) (CPT=72157)    Result Date: 12/31/2023  CONCLUSION:  No high-grade spinal canal or neural foraminal narrowing within the thoracic spine.  No focal suspicious osseous lesion.  1.1 centimeter isthmus thyroid nodule.  Follow-up thyroid ultrasound on a nonemergent basis can be performed for further assessment.  Partially imaged degenerative disease of the cervical spine.     Dictated by (CST): Maura Wilson MD on 12/31/2023 at 5:58 PM     Finalized by (CST): Maura Wilson MD on 12/31/2023 at 6:04 PM          MRI SPINE LUMBAR (W+WO) (CPT=72158)    Result Date: 12/31/2023  CONCLUSION:  No focal suspicious osseous lesions.  No high-grade spinal canal or neural foraminal narrowing.  1.3 centimeter endometrial mass.  Differential considerations include endometrial polyp or other etiologies.  Recommend follow-up pelvic ultrasound on a nonemergent basis for further assessment.   1.7 centimeter intramural uterine fibroid.    Dictated by (CST): Maura Wilson MD on 12/31/2023 at 5:53 PM     Finalized by (CST): Maura Wilson MD on 12/31/2023 at 5:58 PM          MRI BRAIN WO ACUTE (3) SEQUENCE (CPT=70551)    Result Date: 12/31/2023  CONCLUSION:   No restricted diffusion to suggest acute ischemic stroke.  Background of mild chronic small vessel ischemic disease.   No focal suspicious lesion on noncontrast MRI.  If there is concern for intracranial metastatic disease, complete MRI with contrast is recommended.    Dictated by (CST): Maura Wilson MD on 12/31/2023 at 4:04 PM     Finalized by  (CST): Maura Wilson MD on 12/31/2023 at 4:09 PM               Results:     CBC:    Lab Results   Component Value Date    WBC 1.8 (L) 01/02/2024    WBC 1.5 (L) 01/01/2024    WBC 1.7 (L) 12/31/2023     Lab Results   Component Value Date    HGB 9.0 (L) 01/02/2024    HGB 9.3 (L) 01/01/2024    HGB 11.4 (L) 12/31/2023      Lab Results   Component Value Date    .0 (L) 01/02/2024    .0 (L) 01/01/2024    .0 12/31/2023       Recent Labs   Lab 12/31/23  0932 01/01/24  0516   * 85   BUN 26* 16   CREATSERUM 1.12* 1.01   CA 9.4 8.5*    138   K 4.0 3.6    110   CO2 31.0 28.0             Assessment and Plan:           Lower extremity weakness  Likely multifactorial, with a definite element of deconditioning  Mri brain, c spine and l spine ok  Consult neuro for further input - discussed with Dr Roberts - myesthenia work up in process   - pt is agreeable today to start IV IG. Did discuss with Dr Farias and no contraindications from her end. Will start today and await lab work up   LE dopp neg for dvt   - pt/ot and speech following     Metastatic breast CA  Follows oncology, continue supportive care     Neutropenia  Likely chemo related, will continue to monitor repeat CBC .  If turns febrile will initiate antibiotics cultures etc.     Essential hypertension  Blood pressure well-controlled resume home meds.     Osteoporosis  Continue home meds     Prophylaxis  Subcutaneous heparin     CODE STATUS  Full             Rocio Campos DO         Chart reviewed, including current vitals, notes, labs and imaging  Labs ordered and medications adjusted as outlined above  Coordinate care with care team/consultants  Discussed with patient results of tests, management plan as outlined above, and the need for ongoing hospitalization  D/w RN     MDM high        PHYSICIAN Certification of Need for Inpatient Hospitalization - Initial Certification    Patient will require inpatient services that will reasonably  be expected to span two midnight's based on the clinical documentation in H+P.   Based on patients current state of illness, I anticipate that, after discharge, patient will require TBD.

## 2024-01-02 NOTE — PLAN OF CARE
Problem: Patient Centered Care  Goal: Patient preferences are identified and integrated in the patient's plan of care  Description: Interventions:  - What would you like us to know as we care for you? I wanna be updated with plan of care  - Provide timely, complete, and accurate information to patient/family  - Incorporate patient and family knowledge, values, beliefs, and cultural backgrounds into the planning and delivery of care  - Encourage patient/family to participate in care and decision-making at the level they choose  - Honor patient and family perspectives and choices  Outcome: Progressing     Problem: Patient/Family Goals  Goal: Patient/Family Long Term Goal  Description: Patient's Long Term Goal: return home    Interventions:  - follow plan of care  - See additional Care Plan goals for specific interventions  Outcome: Progressing  Goal: Patient/Family Short Term Goal  Description: Patient's Short Term Goal: to get stronger    Interventions:   - PT/ OT on consult  - See additional Care Plan goals for specific interventions  Outcome: Progressing     Problem: PAIN - ADULT  Goal: Verbalizes/displays adequate comfort level or patient's stated pain goal  Description: INTERVENTIONS:  - Encourage pt to monitor pain and request assistance  - Assess pain using appropriate pain scale  - Administer analgesics based on type and severity of pain and evaluate response  - Implement non-pharmacological measures as appropriate and evaluate response  - Consider cultural and social influences on pain and pain management  - Manage/alleviate anxiety  - Utilize distraction and/or relaxation techniques  - Monitor for opioid side effects  - Notify MD/LIP if interventions unsuccessful or patient reports new pain  - Anticipate increased pain with activity and pre-medicate as appropriate  Outcome: Progressing     Problem: SAFETY ADULT - FALL  Goal: Free from fall injury  Description: INTERVENTIONS:  - Assess pt frequently for  physical needs  - Identify cognitive and physical deficits and behaviors that affect risk of falls.  - [place] fall precautions as indicated by assessment.  - Educate pt/family on patient safety including physical limitations  - Instruct pt to call for assistance with activity based on assessment  - Modify environment to reduce risk of injury  - Provide assistive devices as appropriate  - Consider OT/PT consult to assist with strengthening/mobility  - Encourage toileting schedule  Outcome: Progressing     Problem: MUSCULOSKELETAL - ADULT  Goal: Return mobility to safest level of function  Description: INTERVENTIONS:  - Assess patient stability and activity tolerance for standing, transferring and ambulating w/ or w/o assistive devices  - Assist with transfers and ambulation using safe patient handling equipment as needed  - Ensure adequate protection for wounds/incisions during mobilization  - Obtain PT/OT consults as needed  - Advance activity as appropriate  - Communicate ordered activity level and limitations with patient/family  Outcome: Progressing     Problem: Impaired Functional Mobility  Goal: Achieve highest/safest level of mobility/gait  Description: Interventions:  - Assess patient's functional ability and stability  - Promote increasing activity/tolerance for mobility and gait  - Educate and engage patient/family in tolerated activity level and precautions    Outcome: Progressing     Problem: Impaired Activities of Daily Living  Goal: Achieve highest/safest level of independence in self care  Description: Interventions:  - Assess ability and encourage patient to participate in ADLs to maximize function  - Promote sitting position while performing ADLs such as feeding, grooming, and bathing  - Educate and encourage patient/family in tolerated functional activity level and precautions during self-care    Outcome: Progressing   A/Ox4. VSS on room air. Heparin for DVT prophylaxis. Tolerating general diet.  Voiding freely. PRN Norco for pain management. US doppler completed. Fall and safety precautions maintained. Bed locked in the lowest position, call light within reach, frequent rounding done.

## 2024-01-02 NOTE — DIETARY NOTE
ADULT NUTRITION INITIAL ASSESSMENT    Pt is at moderate nutrition risk.  Pt meets moderate malnutrition criteria.      CRITERIA FOR MALNUTRITION DIAGNOSIS:  Criteria for non-severe malnutrition diagnosis: chronic illness related to wt loss 7.5% in 3 months, energy intake less than75% for greater than 1 month, body fat mild depletion, and muscle mass mild depletion.    RECOMMENDATIONS TO MD: See Nutrition Intervention    ADMITTING DIAGNOSIS:  Weakness [R53.1]  Multiple falls [R29.6]    PERTINENT PAST MEDICAL HISTORY:  has a past medical history of Breast cancer (HCC) (03/02/2012), Chemotherapy-induced neutropenia  (HCC) (12/25/2020), Encounter for insertion of venous access port (2007), Glaucoma, History of breast cancer in female, Hyperlipidemia, Malignant neoplasm of overlapping sites of breast in female, estrogen receptor positive  (HCC) (1/16/2015), Malignant neoplasm of upper-outer quadrant of right breast in female, estrogen receptor positive  (HCC) (1/16/2015), Malignant pleural effusion (9/25/2020), Menorrhagia (2007), Metastatic breast cancer (10/6/2020), Neurofibromatosis (HCC), Osteopenia of multiple sites (12/25/2020), Osteopenia of multiple sites (2/2/2021), Osteoporosis screening (8/2/13), Osteoporosis screening (08/02/2013), Other osteoporosis without current pathological fracture (2/2/2021), Postmenopausal bleeding (7/7/2014), Psychophysiological insomnia (8/5/2021), and Tumor, foot.     PATIENT STATUS: Initial 01/02/24: Seeing pt due to screened at risk by RN at admission for unintentional wt loss and eating poorly due to decreased appetite. Pt with breast cancer s/p mastectomy on chemo. Neurological work up in progress. Decreased appetite for some time now. Wt loss--9# in past 2 months and hx of 30# loss from a couple yrs ago.  No use of ONS--urged trial to minimize further wt loss, agreed. S/p SLP eval and restrictions noted.        FOOD/NUTRITION RELATED HISTORY:  Appetite: Fair  Intake:  pt ate  side of mac and cheese, tomato soup and 1/2 of mashed potatoes for lunch--improved over prior meals.    Intake Meeting Needs: No, but oral nutrition supplements (ONS) to maximize  Percent Meals Eaten (last 3 days)       Date/Time Percent Meals Eaten (%)    01/01/24 0840 100 %    01/01/24 1255 30 %     Percent Meals Eaten (%): pt reported trouble swallowing at 01/01/24 1255    01/01/24 1742 30 %     Percent Meals Eaten (%): only ate pudding at 01/01/24 1742           Food Allergies: No Known Food Allergies (NKFA)  Cultural/Ethnic/Methodist Preferences: Not Obtained    GASTROINTESTINAL: constipation and dysphagia    MEDICATIONS: reviewed   [START ON 1/3/2024] furosemide  20 mg Oral Daily    minocycline  50 mg Oral Daily    calcium carbonate-vitamin D  2 tablet Oral Daily    cholecalciferol  250 mcg Oral Daily    folic acid  1 mg Oral Daily    mirtazapine  15 mg Oral Nightly    heparin  5,000 Units Subcutaneous 2 times per day    pantoprazole  40 mg Oral QAM AC       LABS: reviewed  Recent Labs     12/31/23  0932 01/01/24  0516   * 85   BUN 26* 16   CREATSERUM 1.12* 1.01   CA 9.4 8.5*    138   K 4.0 3.6    110   CO2 31.0 28.0   OSMOCALC 290 286       NUTRITION RELATED PHYSICAL FINDINGS:  - Nutrition Focused Physical Exam (NFPE): mild depletion body fat orbital region and fat overlying rib cage region, moderate depletion body fat triceps region, mild depletion muscle mass scapular region, shoulder region, dorsal gonzalez region, thigh region, and calf region, and moderate depletion muscle mass temple region and clavicle region   - Fluid Accumulation:  right LE--improving on lasix per pt.  see RN documentation for details  - Skin Integrity: at risk see RN documentation for details    ANTHROPOMETRICS:  HT: 154.9 cm (5' 1\")  WT: 49.6 kg (109 lb 4.8 oz)   BMI: Body mass index is 20.65 kg/m².  BMI CLASSIFICATION: 19-24.9 kg/m2 - WNL  IBW: 105 lbs        104% IBW  Usual Body Wt: 140lbs 2/2022, 118# 11/22/23       78% UBW from a couple yrs ago and 93% recent UBW    WEIGHT HISTORY:  Patient Weight(s) for the past 336 hrs:   Weight   12/31/23 1929 49.6 kg (109 lb 4.8 oz)   12/31/23 0828 49.4 kg (109 lb)     Wt Readings from Last 10 Encounters:   12/31/23 49.6 kg (109 lb 4.8 oz)   12/21/23 50.6 kg (111 lb 8 oz)   12/14/23 52.8 kg (116 lb 8 oz)   11/22/23 53.5 kg (118 lb)   11/09/23 52.2 kg (115 lb)   11/02/23 53.8 kg (118 lb 8 oz)   10/19/23 53.2 kg (117 lb 3.2 oz)   10/12/23 55.2 kg (121 lb 12.8 oz)   09/21/23 54 kg (119 lb)   09/07/23 52.6 kg (116 lb)       NUTRITION DIAGNOSIS/PROBLEM:    Moderate Malnutrition related to Chronic - Physiological causes increasing nutrient needs due to illness or condition cancer with tx as evidenced by wt less, less than 75% usual intake and mild/moderate muscle and fat deficits.      NUTRITION INTERVENTION:     NUTRITION PRESCRIPTION:   Estimated Nutrition needs: --dosing wt of 49.6 kg - wt taken on 12/31  Calories: 2814-2401 calories/day (30-35 calories per kg Current wt)  Protein: 65-75 g protein/day (1.3-1.5 g protein/kg Dosing wt)      - Diet:       Procedures    Regular/General diet Is Patient on Accuchecks? No      - RD Malnutrition Care Plan: Encouraged increased PO intake and Initiated ONS (oral nutritional supplements)    - Meals and snacks: Encouraged increased PO intake  - Medical Food Supplements-RD added Ensure Enlive (350 calories/ 20 g protein each) Chocolate Daily and Ensure Compact (220 calories/ 9 g protein each) Chocolate Daily Rational/use of oral supplements discussed.  - Vitamin and mineral supplements: folic acid and vitamin D  - Feeding assistance: meal set up  - Nutrition education: Discussed importance of adequate energy and protein intake   - Coordination of nutrition care: collaboration with other providers  - Discharge and transfer of nutrition care to new setting or provider: to be determined    MONITOR AND EVALUATE/NUTRITION GOALS:  - Food and Nutrient Intake:       Monitor: adequacy of PO intake, tolerance of PO intake, adequacy of supplement intake, and tolerance of supplement intake  - Food and Nutrient Administration:      Monitor: N/A  - Anthropometric Measurement:    Monitor weight  - Nutrition Goals:      halt wt loss, regain wt as able, PO and supplement greater than 75% of needs, promote healing, and improved GI status    DIETITIAN FOLLOW UP: RD to follow and monitor nutrition status      Chetna Linton RD, LDN   Clinical Dietitian [phone]

## 2024-01-02 NOTE — DISCHARGE INSTRUCTIONS
Sometimes managing your health at home requires assistance.  The [facility] team has recognized your preference to use [facility].  They can be reached by phone at [phone].  The fax number for your reference is [phone].  A representative from the home health agency will contact you or your family to schedule your first visit.

## 2024-01-02 NOTE — CM/SW NOTE
PT/OT recs home w/ HHC    Per chart review pt lives at home alone w/ family support as needed. Pt is A&O, up SBA w/ RW    Home Situation  Type of Home: House  Home Layout: Two level;Bed/bath upstairs  Stairs to Enter : 1  Railing: No  Stairs to Bedroom:  (did not quantify)  Railing: Yes  Lives With: Alone  Patient Owned Equipment: None      CM req DSC send tent ref, f2f done for RN/PT/OT    / to f/u for choice when list is available.    SW/CM to remain available for support and/or discharge planning.     1140  CM met with pt at bedside to discuss dc recs for HHC.    Pt is agreeable and choice is [facility].    Pt sts she will dc to her fathers apt in [facility]. His unit is a 3rd flr w/ elev and all one level living.    Cm confirmed w/ [facility] liaison that they can cover [facility].    Pt anticipates need for walker. CM notified RN of need. PT to dispense.    Plan  Home with Dad and [facility]    / to remain available for support and/or discharge planning.     Jennifer Remy, FLORINA    Ext [phone]

## 2024-01-02 NOTE — PROGRESS NOTES
[facility]  [address] [place] [address], [address]  [facility] [address]  [phone]          INPATIENT NEUROLOGY   FOLLOW UP CONSULT NOTE       [facility]    Report of Consultation    Aleisha Carlin Patient Status:  Inpatient     3/1/1960 MRN [id number]    Location [facility] 4W/SW/SE Attending Rocio Campos, DO    Hosp Day # 2 PCP AMAURI ERNANDEZ      Date of Admission:  2023  Date of Consult Follow Up:  2024        INTERVAL HPI:   -Had another episode of dysphagia to lettuce, had to make herself vomit to stop from choking.  Still with leg spasms.          ?PHYSICAL EXAM:   /67 (BP Location: Left arm)   Pulse 72   Temp 98.2 °F (36.8 °C) (Oral)   Resp 18   Ht 61\"   Wt 109 lb 4.8 oz (49.6 kg)   SpO2 100%   BMI 20.65 kg/m²   General appearance: Well appearing, and in no acute distress  Skin: skin color, texture normal.  No rashes or lesions.    Head: Normocephalic, atraumatic.    Neurological exam:  Awake, alert, no aphasia, following commands, oriented  No facial droop, EOMI, no dysarthria  5/5 neck flexion/extension  Deltoids 4+/5, otherwise 5/5 in UE  Hip flexors 3+ to 4-/5, KE 5/5, PF/DF 5/5       LABS/DATA:    Lab Results   Component Value Date    WBC 1.8 2024    HGB 9.0 2024    HCT 28.4 2024    .0 2024    B12 612 2024       HGBA1C:  No results found for: \"A1C\", \"EAG\"           IMAGING:  US VENOUS DOPPLER LEG BILAT - DIAG IMG (CPT=93970)    Result Date: 2024  CONCLUSION: No evidence of acute deep venous thrombosis in the imaged veins of the bilateral lower extremities.    Dictated by (CST): Rasta Bass MD on 2024 at 9:27 PM     Finalized by (CST): Rasta Bass MD on 2024 at 9:28 PM          MRI SPINE THORACIC (W+WO) (CPT=72157)    Result Date: 2023  CONCLUSION:  No high-grade spinal canal or neural foraminal narrowing within the thoracic spine.  No focal suspicious osseous lesion.  1.1  centimeter isthmus thyroid nodule.  Follow-up thyroid ultrasound on a nonemergent basis can be performed for further assessment.  Partially imaged degenerative disease of the cervical spine.     Dictated by (CST): Maura Wilson MD on 12/31/2023 at 5:58 PM     Finalized by (CST): Maura Wilson MD on 12/31/2023 at 6:04 PM          MRI SPINE LUMBAR (W+WO) (CPT=72158)    Result Date: 12/31/2023  CONCLUSION:  No focal suspicious osseous lesions.  No high-grade spinal canal or neural foraminal narrowing.  1.3 centimeter endometrial mass.  Differential considerations include endometrial polyp or other etiologies.  Recommend follow-up pelvic ultrasound on a nonemergent basis for further assessment.   1.7 centimeter intramural uterine fibroid.    Dictated by (CST): Maura Wilson MD on 12/31/2023 at 5:53 PM     Finalized by (CST): Maura Wilson MD on 12/31/2023 at 5:58 PM          MRI BRAIN WO ACUTE (3) SEQUENCE (CPT=70551)    Result Date: 12/31/2023  CONCLUSION:   No restricted diffusion to suggest acute ischemic stroke.  Background of mild chronic small vessel ischemic disease.   No focal suspicious lesion on noncontrast MRI.  If there is concern for intracranial metastatic disease, complete MRI with contrast is recommended.    Dictated by (CST): Maura Wilson MD on 12/31/2023 at 4:04 PM     Finalized by (CST): Maura Wilson MD on 12/31/2023 at 4:09 PM          CT BRAIN OR HEAD (62524)    Result Date: 12/31/2023  CONCLUSION:   No acute intracranial hemorrhage, hydrocephalus, or mass effect.  If there is clinical concern for acute ischemic stroke, MRI of the brain is recommended.    Dictated by (CST): Maura Wilson MD on 12/31/2023 at 1:20 PM     Finalized by (CST): Maura Wilson MD on 12/31/2023 at 1:27 PM              ASSESSMENT:  The patient is a 63 year old woman with past medical history of breast cancer status post mastectomy on chemotherapy, neurofibromatosis, hyperlipidemia who presented to  the ER with weakness resulting in multiple falls.  MRI of her lumbar spine showed no significant stenoses.  MRI of her brain mild chronic microvascular ischemic change but otherwise unremarkable.  MRI thoracic spine no significant abnormalities, though partially images cervical spine showed degenerative disc disease       Her neurological examination is significant for proximal upper and lower weakness and a length-dependent peripheral neuropathy.       Eribulin can lead to neuropathy frequently in patients.    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  Arthritis in neck can cause dysphagia as well       This note was prepared using Dragon Medical voice recognition dictation software and as a result, errors may occur. When identified, these errors have been corrected. While every attempt is made to correct errors during dictation, discrepancies may still exist    JANNETTE Roberts DO   Staff Neurologist   1/2/2024  11:16 AM

## 2024-01-02 NOTE — SLP NOTE
ADULT SWALLOWING EVALUATION    ASSESSMENT    ASSESSMENT/OVERALL IMPRESSION:    Proper PPE worn. Hands sanitized upon entrance/exit Pt room.       BSE ordered 2/2 Pt report of episode of \"dry chicken piece got stuck.\" Pt admitted 12/31/23 with weakness. Pt on solid/thin liquids at home. PMH includes Metastatic Breast Ca. No PMH of dysphagia at [facility]. Current diet solid/thin liquids.     PMH of dysphagia [facility]:  BSE    /  /20 VFSS   /  /20    MRI Brain 12/31/23:  CONCLUSION:    No restricted diffusion to suggest acute ischemic stroke.   Background of mild chronic small vessel ischemic disease.    No focal suspicious lesion on noncontrast MRI.  If there is concern for intracranial metastatic disease, complete MRI with contrast is recommended.        No CXR completed.     Pt alert, on room air, afebrile and assessed sitting upright in chair (after consulting with RN). Pt agreed to participate. Pt's learning preference verbal. Vocal quality/intensity adequate. Volitional swallow and cough present; considered functional. Oral motor exam unremarkable. Functional dentition. Pt self-fed solid and thin liquid trials. Bilabial seal adequate; no anterior loss. Lingual skills adequate for bolus formation, preparation and control of all consistencies. Bite reflex of solid functional in strength. Rotary, coordinated mastication skills. Oral cavity clear post swallows.     Pharyngeal response appeared slightly delayed per hyolaryngeal elevation to completion (considered weak in strength/rise to palpation). No overt CSA on solid nor thin liquids via open cup (no throat clearing, no coughing, no SOB and clear vocal quality). Throat clear and delayed cough x1 on chain swallow thin liquids via straw. Overall, swallow function appeared grossly coordinated. Sp02 ~ 99% during this assessment.        IMPRESSIONS:    Pt presents with functional oral swallow and possible pharyngeal dysfunction. Collaborated with RN regarding Pt's swallowing  plan of care. BSE results/recommendations discussed with Pt. Pt v/u; would benefit from additional reinforcement. Swallowing precautions written on white board in room for reinforcement/carry-over of skills for Pt, family and staff. Call light within Pt's reach upon SLP discharge from room.          PLAN:    To f/u x2 sessions to ensure safe intake of diet and reinforce swallowing/aspiration precautions. To monitor for any CXR results. VFSS IF appropriate. Family education as available.      RECOMMENDATIONS   Diet Recommendations - Solids: Regular  Diet Recommendations - Liquids: Thin Liquids    Compensatory Strategies Recommended: Alternate consistencies;Multiple swallows  Aspiration Precautions: Upright position;Slow rate;Small bites and sips;No straw  Medication Administration Recommendations: One pill at a time  Treatment Plan/Recommendations: Aspiration precautions  Discharge Recommendations/Plan: Undetermined    HISTORY   MEDICAL HISTORY  Reason for Referral: R/O aspiration    Problem List  Principal Problem:    Weakness  Active Problems:    Multiple falls    Bilateral leg weakness    Bilateral leg edema    Stage IV breast cancer in female (HCC)    Chemotherapy induced neutropenia  (HCC)    Dysphagia    Neuropathy    Weakness of both lower extremities    Past Medical History  Past Medical History:   Diagnosis Date    Breast cancer (HCC) 03/02/2012    MASTECTOMY / RECONSTRUCTION    Chemotherapy-induced neutropenia  (HCC) 12/25/2020    Encounter for insertion of venous access port 2007    portacath insertion / venous access    Glaucoma     History of breast cancer in female     Hyperlipidemia     Malignant neoplasm of overlapping sites of breast in female, estrogen receptor positive  (HCC) 1/16/2015    Malignant neoplasm of upper-outer quadrant of right breast in female, estrogen receptor positive  (HCC) 1/16/2015    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    Menorrhagia 2007    HYSTEROSCOPY / D&C / endomentrial  biopsy (08/28/2007)    Metastatic breast cancer 10/6/2020    Neurofibromatosis (HCC)     Osteopenia of multiple sites 12/25/2020    Osteopenia of multiple sites 2/2/2021    Osteoporosis screening 8/2/13    Osteoporosis screening 08/02/2013    Per [employer]    Other osteoporosis without current pathological fracture 2/2/2021    Postmenopausal bleeding 7/7/2014    Psychophysiological insomnia 8/5/2021    Tumor, foot     tumor right foot / excision/excision of bone spur/arthrodesis w/screw fixation       Prior Living Situation:  (admitted from home)  Diet Prior to Admission: Regular;Thin liquids  Precautions: Aspiration    Patient/Family Goals: to eat    SWALLOWING HISTORY  Current Diet Consistency: Regular;Thin liquids  OBJECTIVE   ORAL MOTOR EXAMINATION  Dentition: Natural;Functional  Symmetry: Within Functional Limits  Strength: Within Functional Limits  Range of Motion: Within Functional Limits  Rate of Motion: Within Functional Limits    Voice Quality: Clear  Respiratory Status: Unlabored  Consistencies Trialed: Thin liquids;Hard solid  Method of Presentation: Self presentation;Cup;Straw  Patient Positioning: Upright;Midline;Standard chair    Oral Phase of Swallow: Within Functional Limits  Pharyngeal Phase of Swallow: Impaired  Laryngeal Elevation Timing: Appears impaired  Laryngeal Elevation Strength: Appears impaired     (Please note: Silent aspiration cannot be evaluated clinically. Videofluoroscopic Swallow Study is required to rule-out silent aspiration.)       GOALS  Goal #1 The patient will tolerate solid consistency and thin liquids without overt signs or symptoms of aspiration with 100 % accuracy over 2 session(s).  In Progress   Goal #2 The patient/family/caregiver will demonstrate understanding and implementation of aspiration precautions and swallow strategies independently over 2 session(s).    In Progress   Goal #3 The patient will utilize compensatory strategies as outlined by  BSSE (clinical  evaluation) including Slow rate, Small bites, Small sips, Multiple swallows, Alternate liquids/solids, No straws, Upright 90 degrees with no feeding assistance 90 % of the time across 2 sessions.  In Progress   FOLLOW UP  Treatment Plan/Recommendations: Aspiration precautions  Number of Visits to Meet Established Goals: 2  Follow Up Needed (Documentation Required): Yes  SLP Follow-up Date: 01/04/24    Thank you for your referral.   If you have any questions, please contact   Sari Vazquez M.S. [facility]/SLP  Speech-Language Pathologist  [facility]  #[phone]

## 2024-01-03 ENCOUNTER — APPOINTMENT (OUTPATIENT)
Dept: MRI IMAGING | Facility: HOSPITAL | Age: 64
End: 2024-01-03
Attending: Other
Payer: MEDICARE

## 2024-01-03 PROBLEM — G95.0 SYRINX (HCC): Status: ACTIVE | Noted: 2024-01-03

## 2024-01-03 LAB
ALBUMIN SERPL ELPH-MCNC: 4.1 G/DL (ref 3.75–5.21)
ALBUMIN/GLOB SERPL: 1.86 {RATIO} (ref 1–2)
ALDOLASE: 4.9 U/L
ALPHA1 GLOB SERPL ELPH-MCNC: 0.36 G/DL (ref 0.19–0.46)
ALPHA2 GLOB SERPL ELPH-MCNC: 0.69 G/DL (ref 0.48–1.05)
ANION GAP SERPL CALC-SCNC: 4 MMOL/L (ref 0–18)
B-GLOBULIN SERPL ELPH-MCNC: 0.62 G/DL (ref 0.68–1.23)
BASOPHILS # BLD: 0 X10(3) UL (ref 0–0.2)
BASOPHILS NFR BLD: 0 %
BUN BLD-MCNC: 12 MG/DL (ref 9–23)
BUN/CREAT SERPL: 12 (ref 10–20)
CALCIUM BLD-MCNC: 8.2 MG/DL (ref 8.7–10.4)
CHLORIDE SERPL-SCNC: 105 MMOL/L (ref 98–112)
CO2 SERPL-SCNC: 30 MMOL/L (ref 21–32)
CREAT BLD-MCNC: 1 MG/DL
DEPRECATED RDW RBC AUTO: 59.5 FL (ref 35.1–46.3)
EGFRCR SERPLBLD CKD-EPI 2021: 64 ML/MIN/1.73M2 (ref 60–?)
EOSINOPHIL # BLD: 0 X10(3) UL (ref 0–0.7)
EOSINOPHIL NFR BLD: 0 %
ERYTHROCYTE [DISTWIDTH] IN BLOOD BY AUTOMATED COUNT: 18 % (ref 11–15)
GAMMA GLOB SERPL ELPH-MCNC: 0.54 G/DL (ref 0.62–1.7)
GLUCOSE BLD-MCNC: 80 MG/DL (ref 70–99)
HCT VFR BLD AUTO: 27 %
HGB BLD-MCNC: 8.7 G/DL
KAPPA LC FREE SER-MCNC: 1.17 MG/DL (ref 0.33–1.94)
KAPPA LC FREE/LAMBDA FREE SER NEPH: 1.33 {RATIO} (ref 0.26–1.65)
LAMBDA LC FREE SERPL-MCNC: 0.88 MG/DL (ref 0.57–2.63)
LYMPHOCYTES NFR BLD: 0.37 X10(3) UL (ref 1–4)
LYMPHOCYTES NFR BLD: 21 %
MCH RBC QN AUTO: 28.8 PG (ref 26–34)
MCHC RBC AUTO-ENTMCNC: 32.2 G/DL (ref 31–37)
MCV RBC AUTO: 89.4 FL
MONOCYTES # BLD: 0.29 X10(3) UL (ref 0.1–1)
MONOCYTES NFR BLD: 18 %
MORPHOLOGY: NORMAL
MYELOCYTES # BLD: 0.03 X10(3) UL
MYELOCYTES NFR BLD: 2 %
NEUTROPHILS # BLD AUTO: 0.76 X10 (3) UL (ref 1.5–7.7)
NEUTROPHILS NFR BLD: 57 %
NEUTS HYPERSEG # BLD: 0.91 X10(3) UL (ref 1.5–7.7)
OSMOLALITY SERPL CALC.SUM OF ELEC: 287 MOSM/KG (ref 275–295)
PLATELET # BLD AUTO: 129 10(3)UL (ref 150–450)
PLATELET MORPHOLOGY: NORMAL
POTASSIUM SERPL-SCNC: 3.5 MMOL/L (ref 3.5–5.1)
PROT SERPL-MCNC: 6.3 G/DL (ref 5.7–8.2)
RBC # BLD AUTO: 3.02 X10(6)UL
SODIUM SERPL-SCNC: 139 MMOL/L (ref 136–145)
TOTAL CELLS COUNTED BLD: 100
VARIANT LYMPHS NFR BLD MANUAL: 2 %
WBC # BLD AUTO: 1.6 X10(3) UL (ref 4–11)

## 2024-01-03 PROCEDURE — 99232 SBSQ HOSP IP/OBS MODERATE 35: CPT | Performed by: OTHER

## 2024-01-03 PROCEDURE — 72156 MRI NECK SPINE W/O & W/DYE: CPT | Performed by: OTHER

## 2024-01-03 PROCEDURE — 99232 SBSQ HOSP IP/OBS MODERATE 35: CPT | Performed by: SPECIALIST

## 2024-01-03 RX ORDER — DIPHENHYDRAMINE HYDROCHLORIDE 50 MG/ML
10 INJECTION, SOLUTION INTRAMUSCULAR; INTRAVENOUS
Status: COMPLETED | OUTPATIENT
Start: 2024-01-03 | End: 2024-01-03

## 2024-01-03 NOTE — PLAN OF CARE
Patient A/Ox 4. Room air. 1 stand by assistance- fall and safety precautions maintained.Tolerating general diet well. Call light with a reach.  Problem: Patient Centered Care  Goal: Patient preferences are identified and integrated in the patient's plan of care  Description: Interventions:  - What would you like us to know as we care for you? I wanna be updated with plan of care  - Provide timely, complete, and accurate information to patient/family  - Incorporate patient and family knowledge, values, beliefs, and cultural backgrounds into the planning and delivery of care  - Encourage patient/family to participate in care and decision-making at the level they choose  - Honor patient and family perspectives and choices  Outcome: Progressing

## 2024-01-03 NOTE — PROGRESS NOTES
[facility]  [address] [place] [address], [address]  [facility] [address]  [phone]          INPATIENT NEUROLOGY   FOLLOW UP CONSULT NOTE       [facility]    Report of Consultation    Aleisha Carlin Patient Status:  Inpatient     3/1/1960 MRN [id number]    Location [facility] 4W/SW/SE Attending Brad Pham MD    Hosp Day # 3 PCP AMAURI ERNANDEZ      Date of Admission:  2023  Date of Consult Follow Up:  1/3/2024        INTERVAL HPI:   -First dose of IVIG given yesterday.  Speech noted pharyngeal dysfunction.  Patient says her dysphagia is mainly gone.  She has a better appetite.  Her muscle spasms are better in her legs.  She feels stronger.          ?PHYSICAL EXAM:   /64 (BP Location: Left arm)   Pulse 87   Temp 98.4 °F (36.9 °C) (Oral)   Resp 22   Ht 61\"   Wt 109 lb 4.8 oz (49.6 kg)   SpO2 99%   BMI 20.65 kg/m²   General appearance: Well appearing, and in no acute distress  Skin: skin color, texture normal.  No rashes or lesions.    Head: Normocephalic, atraumatic.    Neurological exam:  Mental Status: Alert, oriented to situation/normal fund of knowledge, Follows commands, and Speech fluent and appropriate.  Cranial Nerves: extraocular movements intact, facial sensation intact, face symmetric, no facial droop or ptosis, normal bedside auditory acuity, no dysarthria  Motor: muscle strength 5/5 both upper and lower extremities except right hip flexor 4+/5, neck flexor/extensor 5/5   Sensation: intact to light touch, pinprick/temperature       LABS/DATA:    Lab Results   Component Value Date    WBC 1.6 2024    HGB 8.7 2024    HCT 27.0 2024    .0 2024    CREATSERUM 1.00 2024    BUN 12 2024     2024    K 3.5 2024     2024    CO2 30.0 2024    GLU 80 2024    CA 8.2 2024       HGBA1C:  No results found for: \"A1C\", \"EAG\"           IMAGING:  MRI SPINE CERVICAL (W+WO)  (CPT=72156)    Result Date: 1/3/2024  CONCLUSION:  1. No suspicious marrow replacing or enhancing lesions throughout the cervical spine to suggest osseous metastatic disease. 2. Probable small 2.2 cm maximal diameter syrinx in the upper cervical cord at C1-C2.  No other signal abnormality or enhancement throughout the cervical cord. 3. Scattered multilevel cervical spine degenerative changes as detailed.  Findings result in mild-to-moderate right neural foraminal stenosis at C5-C6.  No other significant neural compromise throughout the cervical spine. 4. Mild levoscoliosis of the cervical spine. 5. Heterogeneous signal 1.2 cm thyroid nodule. 6. Numerous small cutaneous and subcutaneous nodules throughout the posterior neck.  These are grossly unchanged since prior PET scan from February, 2023 (not metabolic) and likely represent sebaceous cysts or other dermatologic abnormalities. 7. Lesser incidental findings as above.   elm-remote  Dictated by (CST): Desmond Barragan MD on 1/03/2024 at 7:28 AM     Finalized by (CST): Desmond Barragan MD on 1/03/2024 at 7:40 AM                 I PERSONALLY REVIEWED THESE IMAGES.     ASSESSMENT:  The patient is a 63 year old woman with past medical history of breast cancer status post mastectomy on chemotherapy, neurofibromatosis, hyperlipidemia who presented to the ER with weakness resulting in multiple falls.  -MRI of her brain mild chronic microvascular ischemic change but otherwise unremarkable.  -MRI cervical spine with and without no signs of metastatic disease, small 2.2 mm syrinx at upper cervical cord at C1-2, multilevel degenerative disc disease significant neural compromise than at right C5-6  -MRI thoracic spine no significant abnormalities, though partially images cervical spine showed degenerative disc disease   -MRI of her lumbar spine showed no significant stenoses.   -, vitamin B12 612      Her neurological examination is significant for proximal upper and lower  weakness and a length-dependent peripheral neuropathy.       Eribulin can lead to neuropathy in patients.     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 When identified, these errors have been corrected. While every attempt is made to correct errors during dictation, discrepancies may still exist    JANNETTE Roberts DO   Staff Neurologist   1/3/2024  11:58 AM

## 2024-01-03 NOTE — PLAN OF CARE
Patient is alert and oriented. Room air. Vital signs stable. IVIG infusion completed, tolerated well. Norco given prn for pain. Zofran given prn for nausea. Voiding/ Up with SB and a walker. Call light and personal belongings within reach. Safety precautions in place. Patient went down for MRI this AM.     Problem: Patient Centered Care  Goal: Patient preferences are identified and integrated in the patient's plan of care  Description: Interventions:  - What would you like us to know as we care for you? I wanna be updated with plan of care  - Provide timely, complete, and accurate information to patient/family  - Incorporate patient and family knowledge, values, beliefs, and cultural backgrounds into the planning and delivery of care  - Encourage patient/family to participate in care and decision-making at the level they choose  - Honor patient and family perspectives and choices  Outcome: Progressing     Problem: Patient/Family Goals  Goal: Patient/Family Long Term Goal  Description: Patient's Long Term Goal: return home    Interventions:  - follow plan of care  - See additional Care Plan goals for specific interventions  Outcome: Progressing  Goal: Patient/Family Short Term Goal  Description: Patient's Short Term Goal: to get stronger    Interventions:   - PT/ OT on consult  - See additional Care Plan goals for specific interventions  Outcome: Progressing     Problem: PAIN - ADULT  Goal: Verbalizes/displays adequate comfort level or patient's stated pain goal  Description: INTERVENTIONS:  - Encourage pt to monitor pain and request assistance  - Assess pain using appropriate pain scale  - Administer analgesics based on type and severity of pain and evaluate response  - Implement non-pharmacological measures as appropriate and evaluate response  - Consider cultural and social influences on pain and pain management  - Manage/alleviate anxiety  - Utilize distraction and/or relaxation techniques  - Monitor for opioid  side effects  - Notify MD/LIP if interventions unsuccessful or patient reports new pain  - Anticipate increased pain with activity and pre-medicate as appropriate  Outcome: Progressing     Problem: SAFETY ADULT - FALL  Goal: Free from fall injury  Description: INTERVENTIONS:  - Assess pt frequently for physical needs  - Identify cognitive and physical deficits and behaviors that affect risk of falls.  - [place] fall precautions as indicated by assessment.  - Educate pt/family on patient safety including physical limitations  - Instruct pt to call for assistance with activity based on assessment  - Modify environment to reduce risk of injury  - Provide assistive devices as appropriate  - Consider OT/PT consult to assist with strengthening/mobility  - Encourage toileting schedule  Outcome: Progressing     Problem: MUSCULOSKELETAL - ADULT  Goal: Return mobility to safest level of function  Description: INTERVENTIONS:  - Assess patient stability and activity tolerance for standing, transferring and ambulating w/ or w/o assistive devices  - Assist with transfers and ambulation using safe patient handling equipment as needed  - Ensure adequate protection for wounds/incisions during mobilization  - Obtain PT/OT consults as needed  - Advance activity as appropriate  - Communicate ordered activity level and limitations with patient/family  Outcome: Progressing     Problem: Impaired Functional Mobility  Goal: Achieve highest/safest level of mobility/gait  Description: Interventions:  - Assess patient's functional ability and stability  - Promote increasing activity/tolerance for mobility and gait  - Educate and engage patient/family in tolerated activity level and precautions  Outcome: Progressing     Problem: Impaired Activities of Daily Living  Goal: Achieve highest/safest level of independence in self care  Description: Interventions:  - Assess ability and encourage patient to participate in ADLs to maximize function  -  Promote sitting position while performing ADLs such as feeding, grooming, and bathing  - Educate and encourage patient/family in tolerated functional activity level and precautions during self-care  Outcome: Progressing

## 2024-01-03 NOTE — PROGRESS NOTES
[facility]    Progress Note    Aleisha Carlin Patient Status:  Inpatient    3/1/1960 MRN [id number]   Location [facility] 4W/SW/SE Attending Brad Pham MD   Hosp Day # 3 PCP AMAURI ERNANDEZ       Subjective:     Feeling much better with much better energy.  Improved leg strength and pt has been walking with walker.    Objective:   Blood pressure 106/64, pulse 87, temperature 98.4 °F (36.9 °C), temperature source Oral, resp. rate 22, height 5' 1\" (1.549 m), weight 109 lb 4.8 oz (49.6 kg), SpO2 99%.    Gen:   NAD.  A and O x 3  CV:   RRR, no m/g/r  Pulm:   CTA bilat  Abd:   +bs, soft, NT, ND  LE:   No c/c/e  Neuro:   Right leg slightly weaker than left but otherwise nonfocal    Results:     Lab Results   Component Value Date    WBC 1.6 (L) 2024    HGB 8.7 (L) 2024    HCT 27.0 (L) 2024    .0 (L) 2024    CREATSERUM 1.00 2024    BUN 12 2024     2024    K 3.5 2024     2024    CO2 30.0 2024    GLU 80 2024    CA 8.2 (L) 2024    ALB 4.10 2023    ALKPHO 72 2023    BILT 0.8 2023    TP 6.3 2023    AST 33 2023    ALT 23 2023    PTT 29.1 2020    INR 1.01 2023    TSH 1.200 2021    LIP 45 (L) 04/15/2021    DDIMER 0.94 (H) 10/26/2020    MG 2.4 2021     (H) 2024    B12 612 2024       MRI SPINE CERVICAL (W+WO) (CPT=72156)    Result Date: 1/3/2024  CONCLUSION:  1. No suspicious marrow replacing or enhancing lesions throughout the cervical spine to suggest osseous metastatic disease. 2. Probable small 2.2 cm maximal diameter syrinx in the upper cervical cord at C1-C2.  No other signal abnormality or enhancement throughout the cervical cord. 3. Scattered multilevel cervical spine degenerative changes as detailed.  Findings result in mild-to-moderate right neural foraminal stenosis at C5-C6.  No other significant neural compromise throughout  the cervical spine. 4. Mild levoscoliosis of the cervical spine. 5. Heterogeneous signal 1.2 cm thyroid nodule. 6. Numerous small cutaneous and subcutaneous nodules throughout the posterior neck.  These are grossly unchanged since prior PET scan from February, 2023 (not metabolic) and likely represent sebaceous cysts or other dermatologic abnormalities. 7. Lesser incidental findings as above.   elm-remote  Dictated by (CST): Desmond Barragan MD on 1/03/2024 at 7:28 AM     Finalized by (CST): Desmond Barragan MD on 1/03/2024 at 7:40 AM          US VENOUS DOPPLER LEG BILAT - DIAG IMG (CPT=93970)    Result Date: 1/1/2024  CONCLUSION: No evidence of acute deep venous thrombosis in the imaged veins of the bilateral lower extremities.    Dictated by (CST): Rasta Bass MD on 1/01/2024 at 9:27 PM     Finalized by (CST): Rasta Bass MD on 1/01/2024 at 9:28 PM               Assessment and Plan:     Lower extremity weakness - improved  Likely multifactorial, with a definite element of deconditioning  Mri brain, c spine and L spine ok.  Small syrinx on MRI C-spine is not likely clinically significant.     - d/w neuro on consult  - cont IVIG for possible myesthenia  - LE dopp negative for dvt   - PT/OT and speech following   - cont ambuulation     Hx of metastatic breast CA  - oncology on consult.   Follows with Dr. Farias.     Pancytopenia.  Chemo related.      Not neutropenic anymore.  - follow CBC     Essential hypertension  Blood pressure well-controlled.  - cont lasix     Hx of osteoporosis  - continue home meds    dvt proph:    heparin      Code status:    Full       MDM:    High Brad Stewart MD  1/3/2024

## 2024-01-03 NOTE — PROGRESS NOTES
[facility] Hematology Oncology Progress Note      Patient Name: Aleisha Carlin   YOB: 1960  Medical Record Number: [id number]  Consulting Physician: Korey Jha M.D.   Referring Physician: Sana Hernandez MD    The 21st Century Cures Act makes medical notes like these available to patients in the interest of transparency. Please be advised this is a medical document. Medical documents are intended to carry relevant information, facts as evident, and the clinical opinion of the practitioner. The medical note is intended as peer to peer communication and may appear blunt or direct. It is written in medical language and may contain abbreviations or verbiage that are unfamiliar.      Subjective  No new complaints. Patient states that she was able to walk to the bathroom before IVIG was given. However, she has otherwise remained in bed due to fear that she will fall.     Allergies   Ms. Carlin has No Known Allergies.    Vital Signs   BP 93/67 (BP Location: Left arm)   Pulse 80   Temp 97.9 °F (36.6 °C) (Oral)   Resp 18   Ht 1.549 m (5' 1\")   Wt 49.6 kg (109 lb 4.8 oz)   SpO2 98%   BMI 20.65 kg/m²     Physical Examination   Constitutional      NAD.  Head   Normocephalic and atraumatic.  Eyes   Conjunctiva clear; sclera anicteric.  ENMT                 External nose normal; external ears normal.  Respiratory          Normal effort; no respiratory distress.  Cardiovascular     Regular rate and rhythm.  Abdomen            Not distended.  Psychiatric          Mood and affect appropriate.    Laboratory   Recent Results (from the past 24 hour(s))   Basic Metabolic Panel (8)    Collection Time: 01/03/24  5:00 AM   Result Value Ref Range    Glucose 80 70 - 99 mg/dL    Sodium 139 136 - 145 mmol/L    Potassium 3.5 3.5 - 5.1 mmol/L    Chloride 105 98 - 112 mmol/L    CO2 30.0 21.0 - 32.0 mmol/L    Anion Gap 4 0 - 18 mmol/L    BUN 12 9 - 23 mg/dL    Creatinine 1.00 0.55 - 1.02 mg/dL    BUN/CREA Ratio  12.0 10.0 - 20.0    Calcium, Total 8.2 (L) 8.7 - 10.4 mg/dL    Calculated Osmolality 287 275 - 295 mOsm/kg    eGFR-Cr 64 >=60 mL/min/1.73m2   CBC W/ DIFFERENTIAL    Collection Time: 24  5:00 AM   Result Value Ref Range    WBC 1.6 (L) 4.0 - 11.0 x10(3) uL    RBC 3.02 (L) 3.80 - 5.30 x10(6)uL    HGB 8.7 (L) 12.0 - 16.0 g/dL    HCT 27.0 (L) 35.0 - 48.0 %    MCV 89.4 80.0 - 100.0 fL    MCH 28.8 26.0 - 34.0 pg    MCHC 32.2 31.0 - 37.0 g/dL    RDW-SD 59.5 (H) 35.1 - 46.3 fL    RDW 18.0 (H) 11.0 - 15.0 %    .0 (L) 150.0 - 450.0 10(3)uL    Neutrophil Absolute Prelim 0.76 (L) 1.50 - 7.70 x10 (3) uL   Manual differential    Collection Time: 24  5:00 AM   Result Value Ref Range    Neutrophil Absolute Manual 0.91 (L) 1.50 - 7.70 x10(3) uL    Lymphocyte Absolute Manual 0.37 (L) 1.00 - 4.00 x10(3) uL    Monocyte Absolute Manual 0.29 0.10 - 1.00 x10(3) uL    Eosinophil Absolute Manual 0.00 0.00 - 0.70 x10(3) uL    Basophil Absolute Manual 0.00 0.00 - 0.20 x10(3) uL    Myelocyte Absolute Manual 0.03 (H) 0 x10(3) uL    Neutrophils % Manual 57 %    Lymphocyte % Manual 21 %    Monocyte % Manual 18 %    Eosinophil % Manual 0 %    Basophil % Manual 0 %    Myelocyte % 2 %    Atypical Lymphocyte % 2 0-4 % %    Total Cells Counted 100     RBC Morphology Normal Normal, Slide reviewed, see previous RBC morphology.    Platelet Morphology Normal Normal       Radiology  [facility]  Department of Radiology  [address], [place] [id number] [phone]      Name: Aleisha Carlin Dr: Korey Jha MD  : 3/1/1960    Age/Sex: 63 year old Female  Pt. Phone: [phone]  Exam Date: 2024  Procedure: US VENOUS DOPPLER LEG BILAT - DIAG IMG (CPT=93970)   -----------------------------------------------------------------------------------------------------------------------------------------------                  Korey Jha MD  [facility] [address] [place] [address]  [facility] [address]      Interpretation   PROCEDURE:  US VENOUS DOPPLER LEG BILAT-DIAG IMG (CPT=93970)     COMPARISON: None.     INDICATIONS:  Evaluate for DVT in patient with metastatic cancer. Bilat calf swelling and bruising.     TECHNIQUE:    Color duplex Doppler venous ultrasound of both lower extremities was performed in the               usual manner.     FINDINGS:        The femoral and popliteal veins appear normal.  Normal flow was demonstrated with color and pulsed Doppler.  Visualized portions of the great and small saphenous, posterior tibial and peroneal veins appear normal.                               THROMBI:         None visible.   COMPRESSIBILITY:      Normal.   OTHER:             Negative.               =====  CONCLUSION: No evidence of acute deep venous thrombosis in the imaged veins of the bilateral lower extremities.           Dictated by (CST): Rasta Bass MD on 2024 at 9:27 PM       Finalized by (CST): Rasta Bass MD on 2024 at 9:28 PM        [facility]  Department of Radiology  [address] [place] [address] [phone]      Name: Aleisha Cralin Dr: Caroline Roberts DO  : 3/1/1960    Age/Sex: 63 year old Female  Pt. Phone: [phone]  Exam Date: 2024  Procedure: MRI SPINE CERVICAL (W+WO) (CPT=72156)   -----------------------------------------------------------------------------------------------------------------------------------------------                  Caroline Roberts DO  [address] [place] [address] [address] [place] [address]      Interpretation   PROCEDURE:  MRI SPINE CERVICAL (W+WO) (CPT=72156)     COMPARISON: [facility], CT BRAIN OR HEAD (CPT=70450), 2023, 1:02 PM.  [facility], PET STANDARD BODY SCAN (CPT=78815), 2023, 11:59 AM.  [facility], MRI BRAIN WO ACUTE (3)   SEQUENCE(CPT=70551), 2023, 2:50 PM.  [facility], CT  CHEST+ABDOMEN+PELVIS(ALL CNTRST ONLY)(CPT=71260/75436), 10/07/2023, 9:21 AM.  [facility], MRI SPINE LUMBAR (W+WO) (CPT=72158), 12/31/2023, 2:50 PM.    [facility], MRI SPINE THORACIC (W+WO) (CPT=72157), 12/31/2023, 2:50 PM.     INDICATIONS:  Breast cancer history, dysphagia, deltoid weakness     TECHNIQUE:    A variety of imaging planes and parameters were utilized for visualization of suspected pathology prior to and after intravenous gadolinium injection.          FINDINGS:          ALIGNMENT:    The anatomic cervical lordosis is preserved.  Mild levoscoliosis.  BONES:             No fractures or osseous lesions are apparent.  CORD:  Small nonenhancing 2.2 mm maximal diameter syrinx in the upper cervical cord at C1-C2.  Cervical cord otherwise demonstrates preserved caliber and signal with no suspicious postcontrast enhancement.  CRANIOCERVICAL AREA:         Normal foramen magnum without Chiari malformation.    PARASPINAL AREA:     Heterogeneous signal 1.2 cm thyroid nodules  OTHER:             There is no visible swelling of the prevertebral soft tissues.  Multiple small cutaneous and subcutaneous nodules in the posterior neck, which are grossly unchanged since prior PET scan.     CERVICAL DISC LEVELS:  C2-C3:  Small central disc protrusion.  No significant resultant spinal canal or neural foraminal stenosis.  C3-C4:  Posterior disc-osteophyte complex with small central disc protrusion.  No substantial resultant neural compromise.  C4-C5:  Small diffuse disc bulge.  No significant resultant spinal canal or neural foraminal stenosis.  C5-C6:  Posterior disc-osteophyte complex with mild right uncovertebral joint hypertrophy/facet arthropathy.  Mild right neural foraminal stenosis with no other significant neural compromise.  C6-C7:  Small posterior disc-osteophyte complex with minor bilateral uncovertebral joint hypertrophy/facet arthropathy.  No significant resultant neural  compromise.  C7-T1:  Minor disc and mild uncovertebral/facet joint related degeneration, which does not result in substantial neural compromise.              =====  CONCLUSION:   1. No suspicious marrow replacing or enhancing lesions throughout the cervical spine to suggest osseous metastatic disease.  2. Probable small 2.2 cm maximal diameter syrinx in the upper cervical cord at C1-C2.  No other signal abnormality or enhancement throughout the cervical cord.  3. Scattered multilevel cervical spine degenerative changes as detailed.  Findings result in mild-to-moderate right neural foraminal stenosis at C5-C6.  No other significant neural compromise throughout the cervical spine.  4. Mild levoscoliosis of the cervical spine.  5. Heterogeneous signal 1.2 cm thyroid nodule.  6. Numerous small cutaneous and subcutaneous nodules throughout the posterior neck.  These are grossly unchanged since prior PET scan from February, 2023 (not metabolic) and likely represent sebaceous cysts or other dermatologic abnormalities.  7. Lesser incidental findings as above.        [facility]     Dictated by (CST): Desmond Barragan MD on 1/03/2024 at 7:28 AM       Finalized by (CST): Desmond Barragan MD on 1/03/2024 at 7:40 AM        Impression and Plan   1.   Leg weakness: Neurology treating with trial of IVIG.     2.   Metastatic breast cancer: No acute therapy.     3.   Pancytopenia: Due to chemotherapy. ANC rising and patient is no longer neutropenic (ANC>500 and rising).     Electronically signed by:    Korey Jha M.D.  Systemic Medical Director of Oncology Services  [facility]

## 2024-01-04 PROBLEM — G70.9 MYASTHENIC SYNDROME (HCC): Status: ACTIVE | Noted: 2024-01-04

## 2024-01-04 LAB
ANION GAP SERPL CALC-SCNC: 5 MMOL/L (ref 0–18)
BASOPHILS # BLD: 0.03 X10(3) UL (ref 0–0.2)
BASOPHILS NFR BLD: 2 %
BUN BLD-MCNC: 16 MG/DL (ref 9–23)
BUN/CREAT SERPL: 15 (ref 10–20)
CALCIUM BLD-MCNC: 8.6 MG/DL (ref 8.7–10.4)
CHLORIDE SERPL-SCNC: 103 MMOL/L (ref 98–112)
CO2 SERPL-SCNC: 30 MMOL/L (ref 21–32)
CREAT BLD-MCNC: 1.07 MG/DL
DEPRECATED RDW RBC AUTO: 60.9 FL (ref 35.1–46.3)
EGFRCR SERPLBLD CKD-EPI 2021: 59 ML/MIN/1.73M2 (ref 60–?)
EOSINOPHIL # BLD: 0.02 X10(3) UL (ref 0–0.7)
EOSINOPHIL NFR BLD: 1 %
ERYTHROCYTE [DISTWIDTH] IN BLOOD BY AUTOMATED COUNT: 18.2 % (ref 11–15)
GLUCOSE BLD-MCNC: 84 MG/DL (ref 70–99)
HCT VFR BLD AUTO: 27.1 %
HGB BLD-MCNC: 8.6 G/DL
LYMPHOCYTES NFR BLD: 0.45 X10(3) UL (ref 1–4)
LYMPHOCYTES NFR BLD: 30 %
MCH RBC QN AUTO: 29 PG (ref 26–34)
MCHC RBC AUTO-ENTMCNC: 31.7 G/DL (ref 31–37)
MCV RBC AUTO: 91.2 FL
METAMYELOCYTES # BLD: 0.03 X10(3) UL
METAMYELOCYTES NFR BLD: 2 %
MONOCYTES # BLD: 0.42 X10(3) UL (ref 0.1–1)
MONOCYTES NFR BLD: 28 %
NEUTROPHILS # BLD AUTO: 0.52 X10 (3) UL (ref 1.5–7.7)
NEUTROPHILS NFR BLD: 37 %
NEUTS HYPERSEG # BLD: 0.56 X10(3) UL (ref 1.5–7.7)
OSMOLALITY SERPL CALC.SUM OF ELEC: 286 MOSM/KG (ref 275–295)
PLATELET # BLD AUTO: 128 10(3)UL (ref 150–450)
PLATELET MORPHOLOGY: NORMAL
POTASSIUM SERPL-SCNC: 3.3 MMOL/L (ref 3.5–5.1)
RBC # BLD AUTO: 2.97 X10(6)UL
SODIUM SERPL-SCNC: 138 MMOL/L (ref 136–145)
TOTAL CELLS COUNTED BLD: 100
WBC # BLD AUTO: 1.5 X10(3) UL (ref 4–11)

## 2024-01-04 PROCEDURE — 99232 SBSQ HOSP IP/OBS MODERATE 35: CPT | Performed by: INTERNAL MEDICINE

## 2024-01-04 PROCEDURE — 99232 SBSQ HOSP IP/OBS MODERATE 35: CPT | Performed by: OTHER

## 2024-01-04 RX ORDER — POLYETHYLENE GLYCOL 3350 17 G/17G
17 POWDER, FOR SOLUTION ORAL 2 TIMES DAILY PRN
Status: DISCONTINUED | OUTPATIENT
Start: 2024-01-04 | End: 2024-01-07

## 2024-01-04 RX ORDER — POTASSIUM CHLORIDE 20 MEQ/1
40 TABLET, EXTENDED RELEASE ORAL ONCE
Status: COMPLETED | OUTPATIENT
Start: 2024-01-04 | End: 2024-01-04

## 2024-01-04 NOTE — PLAN OF CARE
Patient A/Ox 4. Room air. Walked on halls.[place] for pain PRN. Fall and safety precautions maintained.Tolerating general diet well. Call light with a reach.     Problem: Patient Centered Care  Goal: Patient preferences are identified and integrated in the patient's plan of care  Description: Interventions:  - What would you like us to know as we care for you? I wanna be updated with plan of care  - Provide timely, complete, and accurate information to patient/family  - Incorporate patient and family knowledge, values, beliefs, and cultural backgrounds into the planning and delivery of care  - Encourage patient/family to participate in care and decision-making at the level they choose  - Honor patient and family perspectives and choices  Outcome: Progressing

## 2024-01-04 NOTE — PLAN OF CARE
Patient is alert and oriented. Room air. Vital signs stable. IVIG infusion completed, tolerated well. Voiding. Ambulates with walker. General diet. Denies pain/nausea. Call light and personal belongings within reach. Safety precautions in place.    Problem: Patient Centered Care  Goal: Patient preferences are identified and integrated in the patient's plan of care  Description: Interventions:  - What would you like us to know as we care for you? I wanna be updated with plan of care  - Provide timely, complete, and accurate information to patient/family  - Incorporate patient and family knowledge, values, beliefs, and cultural backgrounds into the planning and delivery of care  - Encourage patient/family to participate in care and decision-making at the level they choose  - Honor patient and family perspectives and choices  Outcome: Progressing     Problem: Patient/Family Goals  Goal: Patient/Family Long Term Goal  Description: Patient's Long Term Goal: return home    Interventions:  - follow plan of care  - See additional Care Plan goals for specific interventions  Outcome: Progressing  Goal: Patient/Family Short Term Goal  Description: Patient's Short Term Goal: to get stronger    Interventions:   - PT/ OT on consult  - See additional Care Plan goals for specific interventions  Outcome: Progressing     Problem: PAIN - ADULT  Goal: Verbalizes/displays adequate comfort level or patient's stated pain goal  Description: INTERVENTIONS:  - Encourage pt to monitor pain and request assistance  - Assess pain using appropriate pain scale  - Administer analgesics based on type and severity of pain and evaluate response  - Implement non-pharmacological measures as appropriate and evaluate response  - Consider cultural and social influences on pain and pain management  - Manage/alleviate anxiety  - Utilize distraction and/or relaxation techniques  - Monitor for opioid side effects  - Notify MD/LIP if interventions unsuccessful  or patient reports new pain  - Anticipate increased pain with activity and pre-medicate as appropriate  Outcome: Progressing     Problem: SAFETY ADULT - FALL  Goal: Free from fall injury  Description: INTERVENTIONS:  - Assess pt frequently for physical needs  - Identify cognitive and physical deficits and behaviors that affect risk of falls.  - [place] fall precautions as indicated by assessment.  - Educate pt/family on patient safety including physical limitations  - Instruct pt to call for assistance with activity based on assessment  - Modify environment to reduce risk of injury  - Provide assistive devices as appropriate  - Consider OT/PT consult to assist with strengthening/mobility  - Encourage toileting schedule  Outcome: Progressing     Problem: MUSCULOSKELETAL - ADULT  Goal: Return mobility to safest level of function  Description: INTERVENTIONS:  - Assess patient stability and activity tolerance for standing, transferring and ambulating w/ or w/o assistive devices  - Assist with transfers and ambulation using safe patient handling equipment as needed  - Ensure adequate protection for wounds/incisions during mobilization  - Obtain PT/OT consults as needed  - Advance activity as appropriate  - Communicate ordered activity level and limitations with patient/family  Outcome: Progressing     Problem: Impaired Functional Mobility  Goal: Achieve highest/safest level of mobility/gait  Description: Interventions:  - Assess patient's functional ability and stability  - Promote increasing activity/tolerance for mobility and gait  - Educate and engage patient/family in tolerated activity level and precautions  Outcome: Progressing     Problem: Impaired Activities of Daily Living  Goal: Achieve highest/safest level of independence in self care  Description: Interventions:  - Assess ability and encourage patient to participate in ADLs to maximize function  - Promote sitting position while performing ADLs such as  feeding, grooming, and bathing  - Educate and encourage patient/family in tolerated functional activity level and precautions during self-care  Outcome: Progressing

## 2024-01-04 NOTE — SLP NOTE
SPEECH DAILY NOTE - INPATIENT    ASSESSMENT & PLAN   ASSESSMENT  PPE REQUIRED. THIS THERAPIST WORE GLOVES, DROPLET MASK, AND GOGGLES FOR DURATION OF EVALUATION. HANDS WASHED UPON ENTRANCE/EXIT.    SLP f/u for ongoing dysphagia tx/meal assessment per recommendations of regular/thin per BSE. RN reports pt tolerates diet and medication well with no overt clinical s/s aspiration. Pt denies any swallowing challenges.     Pt positioned upright in upright in bed with father at bedside. Pt afebrile, tolerating room air with oxygen status 97 prior to the start of oral trials. SLP reviewed aspiration precautions and safe swallowing compensatory strategies with the patient. Safe swallow guidelines remain written on the white board in purple. Diet recommendations remain on the whiteboard in the room. Patient and family v/u. Provided no assistance, pt tolerates regular and thin liquids via open cup with no overt clinical signs/symptoms of aspiration. Oxygen status remained >97 t/o the entire session.     PLAN: SLP to sign off at this time secondary to pt tolerating least restrictive diet with no CSA.     Diet Recommendations - Solids: Regular  Diet Recommendations - Liquids: Thin Liquids    Compensatory Strategies Recommended: Alternate consistencies;Multiple swallows  Aspiration Precautions: Upright position;Slow rate;Small bites and sips;No straw  Medication Administration Recommendations: One pill at a time    Patient Experiencing Pain: No      Discharge Recommendations  Discharge Recommendations/Plan: Undetermined    Treatment Plan  Treatment Plan/Recommendations: No further inpatient SLP service warranted    Interdisciplinary Communication: Discussed with RN  Recommendations posted at bedside            GOALS  Goal #1 The patient will tolerate solid consistency and thin liquids without overt signs or symptoms of aspiration with 100 % accuracy over 2 session(s).  Goal met   Goal #2 The patient/family/caregiver will  demonstrate understanding and implementation of aspiration precautions and swallow strategies independently over 2 session(s).    Goal met   Goal #3 The patient will utilize compensatory strategies as outlined by  BSSE (clinical evaluation) including Slow rate, Small bites, Small sips, Multiple swallows, Alternate liquids/solids, No straws, Upright 90 degrees with no feeding assistance 90 % of the time across 2 sessions.  Goal met       FOLLOW UP  Follow Up Needed (Documentation Required): No  SLP Follow-up Date: 01/04/24  Number of Visits to Meet Established Goals: 0    Session: 1 following BSSE    If you have any questions, please contact CARLI Haines M.S. CCC-SLP  Speech Language Pathologist  Phone Number [phone]

## 2024-01-04 NOTE — PLAN OF CARE
A/O x 4. Tolerating diet. Pain managed with norco. Miralax for constipation. Up ad giovanna, walked in hallways many times. Plan for 3rd dose of IVIG this evening. Bed in lowest position, call light in reach, frequent rounding, nonskid footwear, fall precautions in place.

## 2024-01-04 NOTE — PROGRESS NOTES
[facility]  [address] [place] [address], [address]  [facility] [address]  [phone]          INPATIENT NEUROLOGY   FOLLOW UP CONSULT NOTE       [facility]    Report of Consultation    Aleisha Carlin Patient Status:  Inpatient     3/1/1960 MRN [id number]    Location [facility] 4W/SW/SE Attending Brad Pham MD    Hosp Day # 4 PCP AMAURI ERNANDEZ      Date of Admission:  2023  Date of Consult Follow Up:  2024        INTERVAL HPI:   -No acute overnight events.  Patient is feeling very good.  Mirtazapine is helping her sleep well.        ?PHYSICAL EXAM:     /73 (BP Location: Left arm)   Pulse 72   Temp 98.4 °F (36.9 °C) (Oral)   Resp 20   Ht 61\"   Wt 109 lb 4.8 oz (49.6 kg)   SpO2 100%   BMI 20.65 kg/m²   General appearance: Well appearing, and in no acute distress  Skin: skin color, texture normal.  No rashes or lesions.    Head: Normocephalic, atraumatic.  Multiple neurofibromas    Neurological exam:  She is awake and alert, no aphasia  Neck flexors and extensors are full strength  Lower extremities are full strength      LABS/DATA:    Lab Results   Component Value Date    WBC 1.5 2024    HGB 8.6 2024    HCT 27.1 2024    .0 2024    CREATSERUM 1.07 2024    BUN 16 2024     2024    K 3.3 2024     2024    CO2 30.0 2024    GLU 84 2024    CA 8.6 2024       HGBA1C:  No results found for: \"A1C\", \"EAG\"           IMAGING:  MRI SPINE CERVICAL (W+WO) (CPT=72156)    Result Date: 1/3/2024  CONCLUSION:  1. No suspicious marrow replacing or enhancing lesions throughout the cervical spine to suggest osseous metastatic disease. 2. Probable small 2.2 cm maximal diameter syrinx in the upper cervical cord at C1-C2.  No other signal abnormality or enhancement throughout the cervical cord. 3. Scattered multilevel cervical spine degenerative changes as detailed.  Findings result in  mild-to-moderate right neural foraminal stenosis at C5-C6.  No other significant neural compromise throughout the cervical spine. 4. Mild levoscoliosis of the cervical spine. 5. Heterogeneous signal 1.2 cm thyroid nodule. 6. Numerous small cutaneous and subcutaneous nodules throughout the posterior neck.  These are grossly unchanged since prior PET scan from February, 2023 (not metabolic) and likely represent sebaceous cysts or other dermatologic abnormalities. 7. Lesser incidental findings as above.   elm-remote  Dictated by (CST): Desmond Barragan MD on 1/03/2024 at 7:28 AM     Finalized by (CST): Desmond Barragan MD on 1/03/2024 at 7:40 AM          US VENOUS DOPPLER LEG BILAT - DIAG IMG (CPT=93970)    Result Date: 1/1/2024  CONCLUSION: No evidence of acute deep venous thrombosis in the imaged veins of the bilateral lower extremities.    Dictated by (CST): Rasta Bass MD on 1/01/2024 at 9:27 PM     Finalized by (CST): Rasta Bass MD on 1/01/2024 at 9:28 PM          MRI SPINE THORACIC (W+WO) (CPT=72157)    Result Date: 12/31/2023  CONCLUSION:  No high-grade spinal canal or neural foraminal narrowing within the thoracic spine.  No focal suspicious osseous lesion.  1.1 centimeter isthmus thyroid nodule.  Follow-up thyroid ultrasound on a nonemergent basis can be performed for further assessment.  Partially imaged degenerative disease of the cervical spine.     Dictated by (CST): Maura Wilson MD on 12/31/2023 at 5:58 PM     Finalized by (CST): Maura Wilson MD on 12/31/2023 at 6:04 PM          MRI SPINE LUMBAR (W+WO) (CPT=72158)    Result Date: 12/31/2023  CONCLUSION:  No focal suspicious osseous lesions.  No high-grade spinal canal or neural foraminal narrowing.  1.3 centimeter endometrial mass.  Differential considerations include endometrial polyp or other etiologies.  Recommend follow-up pelvic ultrasound on a nonemergent basis for further assessment.   1.7 centimeter intramural uterine fibroid.     Dictated by (CST): Maura Wilson MD on 12/31/2023 at 5:53 PM     Finalized by (CST): Maura Wilson MD on 12/31/2023 at 5:58 PM          MRI BRAIN WO ACUTE (3) SEQUENCE (CPT=70551)    Result Date: 12/31/2023  CONCLUSION:   No restricted diffusion to suggest acute ischemic stroke.  Background of mild chronic small vessel ischemic disease.   No focal suspicious lesion on noncontrast MRI.  If there is concern for intracranial metastatic disease, complete MRI with contrast is recommended.    Dictated by (CST): Maura Wilson MD on 12/31/2023 at 4:04 PM     Finalized by (CST): Maura Wilson MD on 12/31/2023 at 4:09 PM          CT BRAIN OR HEAD (35230)    Result Date: 12/31/2023  CONCLUSION:   No acute intracranial hemorrhage, hydrocephalus, or mass effect.  If there is clinical concern for acute ischemic stroke, MRI of the brain is recommended.    Dictated by (CST): Maura Wilson MD on 12/31/2023 at 1:20 PM     Finalized by (CST): Maura Wilson MD on 12/31/2023 at 1:27 PM              ASSESSMENT:  The patient is a 63 year old woman with past medical history of breast cancer status post mastectomy on chemotherapy, neurofibromatosis, hyperlipidemia who presented to the ER with weakness resulting in multiple falls.  -MRI of her brain mild chronic microvascular ischemic change but otherwise unremarkable.  -MRI cervical spine with and without no signs of metastatic disease, small 2.2 mm syrinx at upper cervical cord at C1-2, multilevel degenerative disc disease significant neural compromise than at right C5-6  -MRI thoracic spine no significant abnormalities, though partially images cervical spine showed degenerative disc disease   -MRI of her lumbar spine showed no significant stenoses.   -, vitamin B12 612, aldolase 4.9       Her neurological examination is significant for proximal upper and lower weakness and a length-dependent peripheral neuropathy.       Eribulin can lead to neuropathy and  myasthenia in patients.     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 When identified, these errors have been corrected. While every attempt is made to correct errors during dictation, discrepancies may still exist    JANNETTE Roberts DO   Staff Neurologist   1/4/2024  10:24 AM

## 2024-01-04 NOTE — OCCUPATIONAL THERAPY NOTE
RN approving of pt participation in therapy. Contact coordinated w/ PT. Pt received in bed alert and oriented x 4;Dad at bedside. Pt reported that she has been ambulating in the [place] independently and w/o ad. Pt reporting taking 6 walks so far today. Pt reported significant improvement in strength and mobility since starting IVIG. Energy conservation and pacing strategies reinforced w/ emphasis on adls. Use of rw encouraged if needed to minimize risk for falls. Pt currently able to manage le dressing tasks from long sit w/ supervision. Pt tending to personal care tasks w/ mod I . Pt currently unable to identify potential areas of concern in anticipation of discharge and denied need for continued OT services. Pt plans to stay w/ her Dad upon discharge. No further OT contact palnned

## 2024-01-04 NOTE — PHYSICAL THERAPY NOTE
Attempted f/u treatment, chart reviewed, RN approved participation. Pt in bed resting, father at bedside. Pt states she has been up independently ambulating in hallways throughout the day and has no further IP PT needs at this time. Pt ed provided on use of RW at home as needed for safety, RN notified of need. Please reach out with new orders if need for IP PT arises for remainder of admission.

## 2024-01-04 NOTE — PROGRESS NOTES
[facility]    Progress Note    Aleisha Carlin Patient Status:  Inpatient    3/1/1960 MRN [id number]   Location [facility] 4W/SW/SE Attending Brad Pham MD   Hosp Day # 4 PCP AMAURI ERNANDEZ       Subjective:     Pt says he feels much better with much improved leg strength.    Objective:   Blood pressure 107/80, pulse 80, temperature 98.3 °F (36.8 °C), temperature source Oral, resp. rate 14, height 5' 1\" (1.549 m), weight 109 lb 4.8 oz (49.6 kg), SpO2 97%.    Gen:   NAD.  A and O x 3  CV:   RRR, no m/g/r  Pulm:   CTA bilat  Abd:   +bs, soft, NT, ND  LE:   No c/c/e  Neuro:   nonfocal    Results:     Lab Results   Component Value Date    WBC 1.5 (L) 2024    HGB 8.6 (L) 2024    HCT 27.1 (L) 2024    .0 (L) 2024    CREATSERUM 1.07 (H) 2024    BUN 16 2024     2024    K 3.3 (L) 2024     2024    CO2 30.0 2024    GLU 84 2024    CA 8.6 (L) 2024    ALB 4.10 2023    ALKPHO 72 2023    BILT 0.8 2023    TP 6.3 2023    AST 33 2023    ALT 23 2023    PTT 29.1 2020    INR 1.01 2023    TSH 1.200 2021    LIP 45 (L) 04/15/2021    DDIMER 0.94 (H) 10/26/2020    MG 2.4 2021     (H) 2024    B12 612 2024       MRI SPINE CERVICAL (W+WO) (CPT=72156)    Result Date: 1/3/2024  CONCLUSION:  1. No suspicious marrow replacing or enhancing lesions throughout the cervical spine to suggest osseous metastatic disease. 2. Probable small 2.2 cm maximal diameter syrinx in the upper cervical cord at C1-C2.  No other signal abnormality or enhancement throughout the cervical cord. 3. Scattered multilevel cervical spine degenerative changes as detailed.  Findings result in mild-to-moderate right neural foraminal stenosis at C5-C6.  No other significant neural compromise throughout the cervical spine. 4. Mild levoscoliosis of the cervical spine. 5. Heterogeneous  signal 1.2 cm thyroid nodule. 6. Numerous small cutaneous and subcutaneous nodules throughout the posterior neck.  These are grossly unchanged since prior PET scan from February, 2023 (not metabolic) and likely represent sebaceous cysts or other dermatologic abnormalities. 7. Lesser incidental findings as above.   elm-remote  Dictated by (CST): Desmond Barragan MD on 1/03/2024 at 7:28 AM     Finalized by (CST): Desmond Barragan MD on 1/03/2024 at 7:40 AM               Assessment and Plan:     Lower extremity weakness - improved  Likely multifactorial, with a definite element of deconditioning  Mri brain, c spine and L spine ok.  Small syrinx on MRI C-spine is not likely clinically significant.     - d/w neuro on consult  - cont IVIG for possible myesthenia, today is day 3/5  - LE dopp negative for dvt   - PT/OT and speech following   - cont ambuulation     Hx of metastatic breast CA  - oncology on consult.   Follows with Dr. Farias.     Pancytopenia.  Chemo related.      Not neutropenic anymore.  - follow CBC     Essential hypertension  Blood pressure well-controlled.  - cont lasix     Hx of osteoporosis  - continue home meds     dvt proph:    heparin       Code status:    Full       MDM:    High Brad Stewart MD  1/4/2024

## 2024-01-05 ENCOUNTER — APPOINTMENT (OUTPATIENT)
Dept: CT IMAGING | Facility: HOSPITAL | Age: 64
End: 2024-01-05
Attending: HOSPITALIST
Payer: MEDICARE

## 2024-01-05 PROBLEM — D61.818 PANCYTOPENIA (HCC): Status: ACTIVE | Noted: 2024-01-05

## 2024-01-05 LAB
ANION GAP SERPL CALC-SCNC: 5 MMOL/L (ref 0–18)
BASOPHILS # BLD AUTO: 0.01 X10(3) UL (ref 0–0.2)
BASOPHILS NFR BLD AUTO: 0.4 %
BUN BLD-MCNC: 16 MG/DL (ref 9–23)
BUN/CREAT SERPL: 16 (ref 10–20)
CALCIUM BLD-MCNC: 8.9 MG/DL (ref 8.7–10.4)
CHLORIDE SERPL-SCNC: 103 MMOL/L (ref 98–112)
CO2 SERPL-SCNC: 30 MMOL/L (ref 21–32)
CREAT BLD-MCNC: 1 MG/DL
DEPRECATED RDW RBC AUTO: 61.5 FL (ref 35.1–46.3)
EGFRCR SERPLBLD CKD-EPI 2021: 64 ML/MIN/1.73M2 (ref 60–?)
EOSINOPHIL # BLD AUTO: 0.01 X10(3) UL (ref 0–0.7)
EOSINOPHIL NFR BLD AUTO: 0.4 %
ERYTHROCYTE [DISTWIDTH] IN BLOOD BY AUTOMATED COUNT: 18.6 % (ref 11–15)
GLUCOSE BLD-MCNC: 80 MG/DL (ref 70–99)
HCT VFR BLD AUTO: 26.5 %
HGB BLD-MCNC: 8.4 G/DL
IMM GRANULOCYTES # BLD AUTO: 0.06 X10(3) UL (ref 0–1)
IMM GRANULOCYTES NFR BLD: 2.5 %
LYMPHOCYTES # BLD AUTO: 0.47 X10(3) UL (ref 1–4)
LYMPHOCYTES NFR BLD AUTO: 19.7 %
MCH RBC QN AUTO: 28.8 PG (ref 26–34)
MCHC RBC AUTO-ENTMCNC: 31.7 G/DL (ref 31–37)
MCV RBC AUTO: 90.8 FL
METHYLMALONIC ACID: 199 NMOL/L
MONOCYTES # BLD AUTO: 0.6 X10(3) UL (ref 0.1–1)
MONOCYTES NFR BLD AUTO: 25.1 %
NEUTROPHILS # BLD AUTO: 1.24 X10 (3) UL (ref 1.5–7.7)
NEUTROPHILS # BLD AUTO: 1.24 X10(3) UL (ref 1.5–7.7)
NEUTROPHILS NFR BLD AUTO: 51.9 %
OSMOLALITY SERPL CALC.SUM OF ELEC: 286 MOSM/KG (ref 275–295)
PLATELET # BLD AUTO: 119 10(3)UL (ref 150–450)
POTASSIUM SERPL-SCNC: 3.5 MMOL/L (ref 3.5–5.1)
POTASSIUM SERPL-SCNC: 3.5 MMOL/L (ref 3.5–5.1)
RBC # BLD AUTO: 2.92 X10(6)UL
SODIUM SERPL-SCNC: 138 MMOL/L (ref 136–145)
VIT E ALPHA TOCO: 13.6 MG/L
VIT E GAMMA TOCO: 3.1 MG/L
VITAMIN B6: 4.2 UG/L
WBC # BLD AUTO: 2.4 X10(3) UL (ref 4–11)

## 2024-01-05 PROCEDURE — 74177 CT ABD & PELVIS W/CONTRAST: CPT | Performed by: HOSPITALIST

## 2024-01-05 PROCEDURE — 99232 SBSQ HOSP IP/OBS MODERATE 35: CPT | Performed by: INTERNAL MEDICINE

## 2024-01-05 PROCEDURE — 71260 CT THORAX DX C+: CPT | Performed by: HOSPITALIST

## 2024-01-05 PROCEDURE — 99232 SBSQ HOSP IP/OBS MODERATE 35: CPT | Performed by: OTHER

## 2024-01-05 NOTE — PROGRESS NOTES
[facility]    Progress Note    Aleisha Carlin Patient Status:  Inpatient    3/1/1960 MRN [id number]   Location [facility] 4W/SW/SE Attending Brad Pham MD   Hosp Day # 5 PCP AMAURI ERNANDEZ       Subjective:     No new complaints.  Leg strength improved.    Objective:   Blood pressure (P) 94/63, pulse (P) 92, temperature (P) 98.1 °F (36.7 °C), temperature source (P) Oral, resp. rate (P) 18, height 5' 1\" (1.549 m), weight 109 lb 4.8 oz (49.6 kg), SpO2 (P) 100%.    Gen:   NAD.  A and O x 3  CV:   RRR, no m/g/r  Pulm:   CTA bilat  Abd:   +bs, soft, NT, ND  LE:   No c/c/e  Neuro:   nonfocal    Results:     Lab Results   Component Value Date    WBC 2.4 (L) 2024    HGB 8.4 (L) 2024    HCT 26.5 (L) 2024    .0 (L) 2024    CREATSERUM 1.00 2024    BUN 16 2024     2024    K 3.5 2024    K 3.5 2024     2024    CO2 30.0 2024    GLU 80 2024    CA 8.9 2024    ALB 4.10 2023    ALKPHO 72 2023    BILT 0.8 2023    TP 6.3 2023    AST 33 2023    ALT 23 2023    PTT 29.1 2020    INR 1.01 2023    TSH 1.200 2021    LIP 45 (L) 04/15/2021    DDIMER 0.94 (H) 10/26/2020    MG 2.4 2021     (H) 2024    B12 612 2024       CT CHEST+ABDOMEN+PELVIS(ALL CNTRST ONLY)(CPT=71260/50256)    Result Date: 2024  CONCLUSION:   New or enlarging right hepatic lobe mass suspicious for metastatic disease progression/recurrence.  No other sites of suspected recurrence or metastatic disease.  Additional chronic or incidental findings are described in the body of this report.    Dictated by (CST): Rasta Bass MD on 2024 at 9:48 AM     Finalized by (CST): Rasta Bass MD on 2024 at 10:27 AM               Assessment and Plan:     Lower extremity weakness - improved  Likely multifactorial, with a definite element of deconditioning  Mri brain, c spine  and L spine ok.  Small syrinx on MRI C-spine is not likely clinically significant.     - d/w neuro on consult  - cont IVIG for possible myesthenia, today is day 4/5  - LE dopp negative for dvt   - PT/OT and speech following   - cont ambuulation     Hx of metastatic breast CA  - oncology on consult.   Follows with Dr. Farias.     Pancytopenia.  Chemo related.      Not neutropenic anymore.  - follow CBC     Essential hypertension  Blood pressure well-controlled.  - cont lasix     Hx of osteoporosis  - continue home meds     dvt proph:    heparin       Code status:    Full       MDM:    High Brad Stewart MD  1/5/2024

## 2024-01-05 NOTE — PLAN OF CARE
Problem: Patient Centered Care  Goal: Patient preferences are identified and integrated in the patient's plan of care  Description: Interventions:  - What would you like us to know as we care for you? I wanna be updated with plan of care  - Provide timely, complete, and accurate information to patient/family  - Incorporate patient and family knowledge, values, beliefs, and cultural backgrounds into the planning and delivery of care  - Encourage patient/family to participate in care and decision-making at the level they choose  - Honor patient and family perspectives and choices  Outcome: Progressing

## 2024-01-05 NOTE — PROGRESS NOTES
[facility]    Progress Note    Aleisha Carlin Patient Status:  Inpatient    3/1/1960 MRN [id number]   Location [facility] 4W/SW/SE Attending Brad Pham MD   Hosp Day # 4 PCP AMAURI ERNANDEZ     Subjective:   Subjective:  Patient states that she feels better.  She has been able to ambulate in the hallway by herself.  She states that she did 6 laps.  She states that physical therapy came to see her and discharged her as she was able to walk independently.  She states that she has bruising in her legs from when she had to move herself in the floor at her home and when she had landed on her knees when she went down.  She states that her appetite has improved.  She is eating better than she had been previously.  She has been tolerating the IVIG well.  Other than the known numbness of her right hand, she denies any other symptoms of numbness or tingling of her left hand or feet.    Objective:   Blood pressure 106/73, pulse 90, temperature 98.4 °F (36.9 °C), temperature source Oral, resp. rate 16, height 1.549 m (5' 1\"), weight 49.6 kg (109 lb 4.8 oz), SpO2 95%.  Physical Exam  General: Patient is alert, not in acute distress.  HEENT: EOMs intact. PERRL.   Neck: No JVD. No palpable lymphadenopathy. Neck is supple.  Chest: Clear to auscultation.  Heart: Regular rate and rhythm.   Abdomen: Soft, non tender with good bowel sounds.  Extremities: No edema.  Bilateral lower extremities with ecchymotic lesions that are large and with purpleish hue that is slight.      Results:   Lab Results   Component Value Date    WBC 1.5 (L) 2024    HGB 8.6 (L) 2024    HCT 27.1 (L) 2024    .0 (L) 2024    CREATSERUM 1.07 (H) 2024    BUN 16 2024     2024    K 3.3 (L) 2024     2024    CO2 30.0 2024    GLU 84 2024    CA 8.6 (L) 2024    ALB 4.10 2023    ALKPHO 72 2023    BILT 0.8 2023    TP 6.3 2023     AST 33 12/31/2023    ALT 23 12/31/2023    PTT 29.1 09/25/2020    INR 1.01 04/19/2023    TSH 1.200 08/05/2021    LIP 45 (L) 04/15/2021    DDIMER 0.94 (H) 10/26/2020    MG 2.4 04/17/2021    TROPHS 6 12/31/2023     (H) 01/01/2024    B12 612 01/01/2024       MRI SPINE CERVICAL (W+WO) (CPT=72156)    Result Date: 1/3/2024  CONCLUSION:  1. No suspicious marrow replacing or enhancing lesions throughout the cervical spine to suggest osseous metastatic disease. 2. Probable small 2.2 cm maximal diameter syrinx in the upper cervical cord at C1-C2.  No other signal abnormality or enhancement throughout the cervical cord. 3. Scattered multilevel cervical spine degenerative changes as detailed.  Findings result in mild-to-moderate right neural foraminal stenosis at C5-C6.  No other significant neural compromise throughout the cervical spine. 4. Mild levoscoliosis of the cervical spine. 5. Heterogeneous signal 1.2 cm thyroid nodule. 6. Numerous small cutaneous and subcutaneous nodules throughout the posterior neck.  These are grossly unchanged since prior PET scan from February, 2023 (not metabolic) and likely represent sebaceous cysts or other dermatologic abnormalities. 7. Lesser incidental findings as above.   elm-remote  Dictated by (CST): Desmond Barragan MD on 1/03/2024 at 7:28 AM     Finalized by (CST): Desmond Barragan MD on 1/03/2024 at 7:40 AM               Assessment & Plan:     Aleisha Carlin is a 63 year old female with diagnosis of metastatic breast cancer, who is currently on 6th line palliative systemic therapy with eribulin.  She is status post cycle 8 of treatment.  Admitted with sudden onset of weakness of the bilateral lower extremities.    Patient has been evaluated by neurology who suspect that the patient has myasthenia gravis, which is speculated due to the eribulin.  To my knowledge and from extensive review of the literature, there is not a single case report I could find that would attribute to  related to myasthenia gravis as a side effect.  This may be a more common side effect of immunotherapy, which the patient is not currently receiving and has never received.  Patient can have peripheral neuropathy attributed to the eribulin, however, she does not have any complaints of peripheral neuropathy, and this would not develop acutely.  Nonetheless, she seems to be responding to the IVIG.  This is to be managed per neurology.  Blood work was sent for evaluation of myasthenia gravis and is still pending.    Patient is now ambulatory again.  She was discharged from physical therapy as it was deemed not necessary.    Patient will have tumor assessment imaging, and discussed with the patient that once she recovers from this admission, if evidence of continued treatment response, will continue with treatment with dose reduction due to fatigue.    She will continue to follow as outpatient with neurology.    Pancytopenia from recent chemotherapy administration should be recovering soon.  Will not likely need supportive transfusions of PRBCs or platelets this admission.  Her ANC will continue to improve.  She is currently not febrile.    CHE Farias M.D.  [facility]  Associate Medical Director  [address] [place]  [address]  [phone]      01/04/24    This note was created using a voice-recognition transcribing system. Incorrect words or phrases may have been missed during proofreading. Please interpret accordingly.

## 2024-01-05 NOTE — CM/SW NOTE
Care Progression Note:  Length of stay: 5  GMLOS: 7  Avoidable Delays:   Code Status: FULL    Acute Medical Issue/Factors:   BLE weakness-Improving, now ambulatory. Pt will receive dose 4 of 5 of IVIG today.    Metastatic breast CA-Dr Farias is following. Pt is status post cycle 8 of tx per Dr Farias.    Pancytopenia 2/2 chemo-resolving.    PT recommending HH w/PT at [place], pt agreeable to recommendation. [facility] will provide services at [place].    Discharge Barriers: Physical/emotional and/or cognitive functioning   Expected discharge date: 1/7/2023   Expected next site of care: Home with Home Health Care.     Plan: Home w/father with [facility] pending medical clearance.    / available for discharge planning.     DELMY NaqviN    [phone]

## 2024-01-06 LAB
BASOPHILS # BLD AUTO: 0.01 X10(3) UL (ref 0–0.2)
BASOPHILS NFR BLD AUTO: 0.5 %
DEPRECATED RDW RBC AUTO: 63.5 FL (ref 35.1–46.3)
EOSINOPHIL # BLD AUTO: 0.01 X10(3) UL (ref 0–0.7)
EOSINOPHIL NFR BLD AUTO: 0.5 %
ERYTHROCYTE [DISTWIDTH] IN BLOOD BY AUTOMATED COUNT: 18.7 % (ref 11–15)
HCT VFR BLD AUTO: 19.9 %
HCT VFR BLD AUTO: 27.2 %
HGB BLD-MCNC: 6.3 G/DL
HGB BLD-MCNC: 8.6 G/DL
IMM GRANULOCYTES # BLD AUTO: 0.07 X10(3) UL (ref 0–1)
IMM GRANULOCYTES NFR BLD: 3.7 %
LYMPHOCYTES # BLD AUTO: 0.37 X10(3) UL (ref 1–4)
LYMPHOCYTES NFR BLD AUTO: 19.5 %
MCH RBC QN AUTO: 29 PG (ref 26–34)
MCHC RBC AUTO-ENTMCNC: 31.7 G/DL (ref 31–37)
MCV RBC AUTO: 91.7 FL
MONOCYTES # BLD AUTO: 0.5 X10(3) UL (ref 0.1–1)
MONOCYTES NFR BLD AUTO: 26.3 %
NEUTROPHILS # BLD AUTO: 0.94 X10 (3) UL (ref 1.5–7.7)
NEUTROPHILS # BLD AUTO: 0.94 X10(3) UL (ref 1.5–7.7)
NEUTROPHILS NFR BLD AUTO: 49.5 %
PLATELET # BLD AUTO: 107 10(3)UL (ref 150–450)
RBC # BLD AUTO: 2.17 X10(6)UL
VITAMIN B1 WHOLE BLD: 72 NMOL/L
WBC # BLD AUTO: 1.9 X10(3) UL (ref 4–11)

## 2024-01-06 PROCEDURE — 99232 SBSQ HOSP IP/OBS MODERATE 35: CPT | Performed by: INTERNAL MEDICINE

## 2024-01-06 PROCEDURE — 99232 SBSQ HOSP IP/OBS MODERATE 35: CPT | Performed by: OTHER

## 2024-01-06 NOTE — PROGRESS NOTES
Oncology note    No events.       Vitals:    01/06/24 1248   BP: 103/70   Pulse: 75   Resp: 18   Temp: 98.1 °F (36.7 °C)     Nad, newman, nd, no edema,  nl skin, nl mood    A/P:  Aleisha Carlin is a 63 year old female with diagnosis of metastatic breast cancer, who is currently on 6th line palliative systemic therapy with eribulin.  She is status post cycle 8 of treatment.  Admitted with sudden onset of weakness of the bilateral lower extremities.   --trial of ivig per neuro.   --pancytopenia from chemo.  Transfuse as need.   --ok for discharge    Data: moderate, neuro notes, Im notes, labs

## 2024-01-06 NOTE — PLAN OF CARE
Aleisha reports feeling better today. Able to ambulate multiple laps in hallways independently. Voiding freely. Norco prn for pain. Tolerating diet. Plan for IVIG dose tonight. No acute complaints.     Problem: Patient Centered Care  Goal: Patient preferences are identified and integrated in the patient's plan of care  Description: Interventions:  - What would you like us to know as we care for you? I wanna be updated with plan of care  - Provide timely, complete, and accurate information to patient/family  - Incorporate patient and family knowledge, values, beliefs, and cultural backgrounds into the planning and delivery of care  - Encourage patient/family to participate in care and decision-making at the level they choose  - Honor patient and family perspectives and choices  Outcome: Progressing     Problem: Patient/Family Goals  Goal: Patient/Family Long Term Goal  Description: Patient's Long Term Goal: return home    Interventions:  - follow plan of care  - See additional Care Plan goals for specific interventions  Outcome: Progressing  Goal: Patient/Family Short Term Goal  Description: Patient's Short Term Goal: to get stronger    Interventions:   - PT/ OT on consult  - See additional Care Plan goals for specific interventions  Outcome: Progressing     Problem: PAIN - ADULT  Goal: Verbalizes/displays adequate comfort level or patient's stated pain goal  Description: INTERVENTIONS:  - Encourage pt to monitor pain and request assistance  - Assess pain using appropriate pain scale  - Administer analgesics based on type and severity of pain and evaluate response  - Implement non-pharmacological measures as appropriate and evaluate response  - Consider cultural and social influences on pain and pain management  - Manage/alleviate anxiety  - Utilize distraction and/or relaxation techniques  - Monitor for opioid side effects  - Notify MD/LIP if interventions unsuccessful or patient reports new pain  - Anticipate  increased pain with activity and pre-medicate as appropriate  Outcome: Progressing     Problem: SAFETY ADULT - FALL  Goal: Free from fall injury  Description: INTERVENTIONS:  - Assess pt frequently for physical needs  - Identify cognitive and physical deficits and behaviors that affect risk of falls.  - [place] fall precautions as indicated by assessment.  - Educate pt/family on patient safety including physical limitations  - Instruct pt to call for assistance with activity based on assessment  - Modify environment to reduce risk of injury  - Provide assistive devices as appropriate  - Consider OT/PT consult to assist with strengthening/mobility  - Encourage toileting schedule  Outcome: Progressing     Problem: MUSCULOSKELETAL - ADULT  Goal: Return mobility to safest level of function  Description: INTERVENTIONS:  - Assess patient stability and activity tolerance for standing, transferring and ambulating w/ or w/o assistive devices  - Assist with transfers and ambulation using safe patient handling equipment as needed  - Ensure adequate protection for wounds/incisions during mobilization  - Obtain PT/OT consults as needed  - Advance activity as appropriate  - Communicate ordered activity level and limitations with patient/family  Outcome: Progressing     Problem: Impaired Functional Mobility  Goal: Achieve highest/safest level of mobility/gait  Description: Interventions:  - Assess patient's functional ability and stability  - Promote increasing activity/tolerance for mobility and gait  - Educate and engage patient/family in tolerated activity level and precautions  Outcome: Progressing     Problem: Impaired Activities of Daily Living  Goal: Achieve highest/safest level of independence in self care  Description: Interventions:  - Assess ability and encourage patient to participate in ADLs to maximize function  - Promote sitting position while performing ADLs such as feeding, grooming, and bathing  - Educate and  encourage patient/family in tolerated functional activity level and precautions during self-care  Outcome: Progressing

## 2024-01-06 NOTE — PROGRESS NOTES
Oncology note    Legs feel stronger.       Vitals:    01/05/24 1304   BP: (P) 94/63   Pulse: (P) 92   Resp: (P) 18   Temp: (P) 98.1 °F (36.7 °C)     Nad, newman, nd, no edema,  nl skin, nl mood    A/P:  Aleisha Carlin is a 63 year old female with diagnosis of metastatic breast cancer, who is currently on 6th line palliative systemic therapy with eribulin.  She is status post cycle 8 of treatment.  Admitted with sudden onset of weakness of the bilateral lower extremities.   --trial of ivig per neuro.   --pancytopenia from chemo.  Transfuse as need.   --ok for discharge    Data: moderate, neuro notes, Im notes, labs

## 2024-01-06 NOTE — PROGRESS NOTES
[facility]    Progress Note    Aleisha Carlin Patient Status:  Inpatient    3/1/1960 MRN [id number]   Location [facility] 4W/SW/SE Attending Brad Pham MD   Hosp Day # 6 PCP AMAURI ERNANDEZ       Subjective:     No complaints.  Leg strength improved.    Objective:   Blood pressure 103/70, pulse 75, temperature 98.1 °F (36.7 °C), temperature source Oral, resp. rate 18, height 5' 1\" (1.549 m), weight 109 lb 4.8 oz (49.6 kg), SpO2 99%.    Gen:   NAD.  A and O x 3  CV:   RRR, no m/g/r  Pulm:   CTA bilat  Abd:   +bs, soft, NT, ND  LE:   No c/c/e  Neuro:   nonfocal    Results:     Lab Results   Component Value Date    WBC 1.9 (L) 2024    HGB 8.6 (L) 2024    HCT 27.2 (L) 2024    .0 (L) 2024    CREATSERUM 1.00 2024    BUN 16 2024     2024    K 3.5 2024    K 3.5 2024     2024    CO2 30.0 2024    GLU 80 2024    CA 8.9 2024    ALB 4.10 2023    ALKPHO 72 2023    BILT 0.8 2023    TP 6.3 2023    AST 33 2023    ALT 23 2023    PTT 29.1 2020    INR 1.01 2023    TSH 1.200 2021    LIP 45 (L) 04/15/2021    DDIMER 0.94 (H) 10/26/2020    MG 2.4 2021     (H) 2024    B12 612 2024       CT CHEST+ABDOMEN+PELVIS(ALL CNTRST ONLY)(CPT=71260/48175)    Result Date: 2024  CONCLUSION:   New or enlarging right hepatic lobe mass suspicious for metastatic disease progression/recurrence.  No other sites of suspected recurrence or metastatic disease.  Additional chronic or incidental findings are described in the body of this report.    Dictated by (CST): Rasta Bass MD on 2024 at 9:48 AM     Finalized by (CST): Rasta Bass MD on 2024 at 10:27 AM               Assessment and Plan:     Lower extremity weakness - improved  Likely multifactorial, with a definite element of deconditioning  Mri brain, c spine and L spine ok.  Small syrinx  on MRI C-spine is not likely clinically significant.     - d/w neuro on consult  - cont IVIG for possible myesthenia, today is day 5/5  - LE dopp negative for dvt   - PT/OT and speech following   - cont ambuulation     Hx of metastatic breast CA  - oncology on consult.   Follows with Dr. Farias.     Pancytopenia.  Chemo related.      Not neutropenic anymore.  - follow CBC     Essential hypertension  Blood pressure well-controlled.  - cont lasix     Hx of osteoporosis  - continue home meds     dvt proph:    heparin       Code status:    Full       MDM:    High Brad Stewart MD  1/6/2024

## 2024-01-06 NOTE — PLAN OF CARE
A/O X4. Room air. Ambulates independently on hallways. Norco PRN for pain. Miralax for constipation. Tolerating general diet well. IVIG infusion Day 4, on p.m shift. Call light with a reach  Problem: Patient Centered Care  Goal: Patient preferences are identified and integrated in the patient's plan of care  Description: Interventions:  - What would you like us to know as we care for you? I wanna be updated with plan of care  - Provide timely, complete, and accurate information to patient/family  - Incorporate patient and family knowledge, values, beliefs, and cultural backgrounds into the planning and delivery of care  - Encourage patient/family to participate in care and decision-making at the level they choose  - Honor patient and family perspectives and choices  Outcome: Progressing

## 2024-01-06 NOTE — PROGRESS NOTES
[facility]  [address], [address]  [place] [address]  [phone]          INPATIENT NEUROLOGY   FOLLOW UP CONSULT NOTE       [facility]    Report of Consultation    Aleisha Carlin Patient Status:  Inpatient     3/1/1960 MRN [id number]    Location [facility] 4W/SW/SE Attending Brad Pham MD    Hosp Day # 5 PCP AMAURI ERNANDEZ      Date of Admission:  2023  Date of Consult Follow Up:  24          INTERVAL HPI:   -No acute overnight events.  Patient is feeling okay. More tired compared to 24; she notes she is anemic.     Dysphagia - denies   Dysarthria - denies   Exertional fatigue -  from anemia  Orthopnea -  denie  Head drop - denies        ?PHYSICAL EXAM:     /75 (BP Location: Left arm)   Pulse 113   Temp 99 °F (37.2 °C) (Oral)   Resp 18   Ht 61\"   Wt 109 lb 4.8 oz (49.6 kg)   SpO2 95%   BMI 20.65 kg/m²   General appearance: Well appearing, and in no acute distress  Skin: skin color.  Head: Normocephalic, atraumatic.  Multiple neurofibromas    Neurological exam:  She is awake and alert, no aphasia  Neck flexors and extensors are full strength  Lower extremities are full strength  No fatigability  No ptosis  Diplopia - non             LABS/DATA:    Lab Results   Component Value Date    WBC 2.4 2024    HGB 8.4 2024    HCT 26.5 2024    .0 2024    CREATSERUM 1.00 2024    BUN 16 2024     2024    K 3.5 2024    K 3.5 2024     2024    CO2 30.0 2024    GLU 80 2024    CA 8.9 2024       HGBA1C:  No results found for: \"A1C\", \"EAG\"           IMAGING:  CT CHEST+ABDOMEN+PELVIS(ALL CNTRST ONLY)(CPT=71260/52976)    Result Date: 2024  CONCLUSION:   New or enlarging right hepatic lobe mass suspicious for metastatic disease progression/recurrence.  No other sites of suspected recurrence or metastatic disease.  Additional chronic or  incidental findings are described in the body of this report.    Dictated by (CST): Rasta Bass MD on 1/05/2024 at 9:48 AM     Finalized by (CST): Rasta Bass MD on 1/05/2024 at 10:27 AM          MRI SPINE CERVICAL (W+WO) (CPT=72156)    Result Date: 1/3/2024  CONCLUSION:  1. No suspicious marrow replacing or enhancing lesions throughout the cervical spine to suggest osseous metastatic disease. 2. Probable small 2.2 cm maximal diameter syrinx in the upper cervical cord at C1-C2.  No other signal abnormality or enhancement throughout the cervical cord. 3. Scattered multilevel cervical spine degenerative changes as detailed.  Findings result in mild-to-moderate right neural foraminal stenosis at C5-C6.  No other significant neural compromise throughout the cervical spine. 4. Mild levoscoliosis of the cervical spine. 5. Heterogeneous signal 1.2 cm thyroid nodule. 6. Numerous small cutaneous and subcutaneous nodules throughout the posterior neck.  These are grossly unchanged since prior PET scan from February, 2023 (not metabolic) and likely represent sebaceous cysts or other dermatologic abnormalities. 7. Lesser incidental findings as above.   elm-remote  Dictated by (CST): Desmond Barragan MD on 1/03/2024 at 7:28 AM     Finalized by (CST): Desmond Barragan MD on 1/03/2024 at 7:40 AM          US VENOUS DOPPLER LEG BILAT - DIAG IMG (CPT=93970)    Result Date: 1/1/2024  CONCLUSION: No evidence of acute deep venous thrombosis in the imaged veins of the bilateral lower extremities.    Dictated by (CST): Rasta Bass MD on 1/01/2024 at 9:27 PM     Finalized by (CST): Rasta Bass MD on 1/01/2024 at 9:28 PM          MRI SPINE THORACIC (W+WO) (CPT=72157)    Result Date: 12/31/2023  CONCLUSION:  No high-grade spinal canal or neural foraminal narrowing within the thoracic spine.  No focal suspicious osseous lesion.  1.1 centimeter isthmus thyroid nodule.  Follow-up thyroid ultrasound on a nonemergent basis can be performed  for further assessment.  Partially imaged degenerative disease of the cervical spine.     Dictated by (CST): Maura Wilson MD on 12/31/2023 at 5:58 PM     Finalized by (CST): Maura Wilson MD on 12/31/2023 at 6:04 PM          MRI SPINE LUMBAR (W+WO) (CPT=72158)    Result Date: 12/31/2023  CONCLUSION:  No focal suspicious osseous lesions.  No high-grade spinal canal or neural foraminal narrowing.  1.3 centimeter endometrial mass.  Differential considerations include endometrial polyp or other etiologies.  Recommend follow-up pelvic ultrasound on a nonemergent basis for further assessment.   1.7 centimeter intramural uterine fibroid.    Dictated by (CST): Maura Wilson MD on 12/31/2023 at 5:53 PM     Finalized by (CST): Maura Wilson MD on 12/31/2023 at 5:58 PM          MRI BRAIN WO ACUTE (3) SEQUENCE (CPT=70551)    Result Date: 12/31/2023  CONCLUSION:   No restricted diffusion to suggest acute ischemic stroke.  Background of mild chronic small vessel ischemic disease.   No focal suspicious lesion on noncontrast MRI.  If there is concern for intracranial metastatic disease, complete MRI with contrast is recommended.    Dictated by (CST): Maura Wilson MD on 12/31/2023 at 4:04 PM     Finalized by (CST): Maura Wilson MD on 12/31/2023 at 4:09 PM          CT BRAIN OR HEAD (34792)    Result Date: 12/31/2023  CONCLUSION:   No acute intracranial hemorrhage, hydrocephalus, or mass effect.  If there is clinical concern for acute ischemic stroke, MRI of the brain is recommended.    Dictated by (CST): Maura Wilson MD on 12/31/2023 at 1:20 PM     Finalized by (CST): Maura Wilson MD on 12/31/2023 at 1:27 PM              ASSESSMENT:  The patient is a 63 year old woman with past medical history of breast cancer status post mastectomy on chemotherapy, neurofibromatosis, hyperlipidemia who presented to the ER with weakness resulting in multiple falls.  -MRI of her brain mild chronic microvascular ischemic  change but otherwise unremarkable.  -MRI cervical spine with and without no signs of metastatic disease, small 2.2 mm syrinx at upper cervical cord at C1-2, multilevel degenerative disc disease significant neural compromise than at right C5-6  -MRI thoracic spine no significant abnormalities, though partially images cervical spine showed degenerative disc disease   -MRI of her lumbar spine showed no significant stenoses.   -, vitamin B12 612, aldolase 4.9       Her neurological examination is significant for improved proximal upper and lower weakness and a length-dependent peripheral neuropathy.       Eribulin can lead to neuropathy and myasthenia in patients.     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   - Follow up lab work       Total time involved int he care of the pt including  face to face time was 35 minutes, more than 50% of the time was spent in counseling and/or coordination of care related to  MG.      This note was prepared using Dragon Medical voice recognition dictation software and as a result, errors may occur. When identified, these errors have been corrected. While every attempt is made to correct errors during dictation, discrepancies may still exist    Festus Juarez DO  [facility] V[facility] Neurology     9:26 PM    01/05/24

## 2024-01-06 NOTE — PLAN OF CARE
Patient alert and orientated. Patient came in for weakness, swelling and lower leg weakness.  Patient can ambulate to restroom with standby.  Patient receiving IVIG treatment this evening.  Patient has Norco for pain if needed. Patient has personal belongings and call light within reach.  Safety measures in place.  Problem: Patient Centered Care  Goal: Patient preferences are identified and integrated in the patient's plan of care  Description: Interventions:  - What would you like us to know as we care for you? I wanna be updated with plan of care  - Provide timely, complete, and accurate information to patient/family  - Incorporate patient and family knowledge, values, beliefs, and cultural backgrounds into the planning and delivery of care  - Encourage patient/family to participate in care and decision-making at the level they choose  - Honor patient and family perspectives and choices  Outcome: Progressing     Problem: Patient/Family Goals  Goal: Patient/Family Long Term Goal  Description: Patient's Long Term Goal: return home    Interventions:  - follow plan of care  - See additional Care Plan goals for specific interventions  Outcome: Progressing  Goal: Patient/Family Short Term Goal  Description: Patient's Short Term Goal: to get stronger    Interventions:   - PT/ OT on consult  - See additional Care Plan goals for specific interventions  Outcome: Progressing     Problem: PAIN - ADULT  Goal: Verbalizes/displays adequate comfort level or patient's stated pain goal  Description: INTERVENTIONS:  - Encourage pt to monitor pain and request assistance  - Assess pain using appropriate pain scale  - Administer analgesics based on type and severity of pain and evaluate response  - Implement non-pharmacological measures as appropriate and evaluate response  - Consider cultural and social influences on pain and pain management  - Manage/alleviate anxiety  - Utilize distraction and/or relaxation techniques  - Monitor for  opioid side effects  - Notify MD/LIP if interventions unsuccessful or patient reports new pain  - Anticipate increased pain with activity and pre-medicate as appropriate  Outcome: Progressing     Problem: SAFETY ADULT - FALL  Goal: Free from fall injury  Description: INTERVENTIONS:  - Assess pt frequently for physical needs  - Identify cognitive and physical deficits and behaviors that affect risk of falls.  - [place] fall precautions as indicated by assessment.  - Educate pt/family on patient safety including physical limitations  - Instruct pt to call for assistance with activity based on assessment  - Modify environment to reduce risk of injury  - Provide assistive devices as appropriate  - Consider OT/PT consult to assist with strengthening/mobility  - Encourage toileting schedule  Outcome: Progressing     Problem: MUSCULOSKELETAL - ADULT  Goal: Return mobility to safest level of function  Description: INTERVENTIONS:  - Assess patient stability and activity tolerance for standing, transferring and ambulating w/ or w/o assistive devices  - Assist with transfers and ambulation using safe patient handling equipment as needed  - Ensure adequate protection for wounds/incisions during mobilization  - Obtain PT/OT consults as needed  - Advance activity as appropriate  - Communicate ordered activity level and limitations with patient/family  Outcome: Progressing     Problem: Impaired Functional Mobility  Goal: Achieve highest/safest level of mobility/gait  Description: Interventions:  - Assess patient's functional ability and stability  - Promote increasing activity/tolerance for mobility and gait  - Educate and engage patient/family in tolerated activity level and precautions    Outcome: Progressin     Problem: Impaired Activities of Daily Living  Goal: Achieve highest/safest level of independence in self care  Description: Interventions:  - Assess ability and encourage patient to participate in ADLs to maximize  function  - Promote sitting position while performing ADLs such as feeding, grooming, and bathing  - Educate and encourage patient/family in tolerated functional activity level and precautions during self-care    Outcome: Progressing     Problem: Patient Centered Care  Goal: Patient preferences are identified and integrated in the patient's plan of care  Description: Interventions:  - What would you like us to know as we care for you? I wanna be updated with plan of care  - Provide timely, complete, and accurate information to patient/family  - Incorporate patient and family knowledge, values, beliefs, and cultural backgrounds into the planning and delivery of care  - Encourage patient/family to participate in care and decision-making at the level they choose  - Honor patient and family perspectives and choices  Outcome: Progressing     Problem: Patient/Family Goals  Goal: Patient/Family Long Term Goal  Description: Patient's Long Term Goal: return home    Interventions:  - follow plan of care  - See additional Care Plan goals for specific interventions  Outcome: Progressing     Problem: Patient/Family Goals  Goal: Patient/Family Short Term Goal  Description: Patient's Short Term Goal: to get stronger    Interventions:   - PT/ OT on consult  - See additional Care Plan goals for specific interventions  Outcome: Progressing     Problem: PAIN - ADULT  Goal: Verbalizes/displays adequate comfort level or patient's stated pain goal  Description: INTERVENTIONS:  - Encourage pt to monitor pain and request assistance  - Assess pain using appropriate pain scale  - Administer analgesics based on type and severity of pain and evaluate response  - Implement non-pharmacological measures as appropriate and evaluate response  - Consider cultural and social influences on pain and pain management  - Manage/alleviate anxiety  - Utilize distraction and/or relaxation techniques  - Monitor for opioid side effects  - Notify MD/LIP if  interventions unsuccessful or patient reports new pain  - Anticipate increased pain with activity and pre-medicate as appropriate  Outcome: Progressing     Problem: SAFETY ADULT - FALL  Goal: Free from fall injury  Description: INTERVENTIONS:  - Assess pt frequently for physical needs  - Identify cognitive and physical deficits and behaviors that affect risk of falls.  - [place] fall precautions as indicated by assessment.  - Educate pt/family on patient safety including physical limitations  - Instruct pt to call for assistance with activity based on assessment  - Modify environment to reduce risk of injury  - Provide assistive devices as appropriate  - Consider OT/PT consult to assist with strengthening/mobility  - Encourage toileting schedule  Outcome: Progressing     Problem: MUSCULOSKELETAL - ADULT  Goal: Return mobility to safest level of function  Description: INTERVENTIONS:  - Assess patient stability and activity tolerance for standing, transferring and ambulating w/ or w/o assistive devices  - Assist with transfers and ambulation using safe patient handling equipment as needed  - Ensure adequate protection for wounds/incisions during mobilization  - Obtain PT/OT consults as needed  - Advance activity as appropriate  - Communicate ordered activity level and limitations with patient/family  Outcome: Progressing

## 2024-01-07 VITALS
DIASTOLIC BLOOD PRESSURE: 70 MMHG | OXYGEN SATURATION: 98 % | WEIGHT: 109.31 LBS | HEIGHT: 61 IN | HEART RATE: 75 BPM | SYSTOLIC BLOOD PRESSURE: 96 MMHG | BODY MASS INDEX: 20.64 KG/M2 | RESPIRATION RATE: 16 BRPM | TEMPERATURE: 98 F

## 2024-01-07 LAB
DEPRECATED RDW RBC AUTO: 61.6 FL (ref 35.1–46.3)
ERYTHROCYTE [DISTWIDTH] IN BLOOD BY AUTOMATED COUNT: 18.6 % (ref 11–15)
HCT VFR BLD AUTO: 23.6 %
HGB BLD-MCNC: 7.4 G/DL
MCH RBC QN AUTO: 29 PG (ref 26–34)
MCHC RBC AUTO-ENTMCNC: 31.4 G/DL (ref 31–37)
MCV RBC AUTO: 92.5 FL
MUSK ABS, SERUM: <1 U/ML
PLATELET # BLD AUTO: 127 10(3)UL (ref 150–450)
RBC # BLD AUTO: 2.55 X10(6)UL
WBC # BLD AUTO: 2.7 X10(3) UL (ref 4–11)

## 2024-01-07 PROCEDURE — 99232 SBSQ HOSP IP/OBS MODERATE 35: CPT | Performed by: OTHER

## 2024-01-07 RX ORDER — FUROSEMIDE 20 MG/1
20 TABLET ORAL DAILY
Status: SHIPPED | COMMUNITY
Start: 2024-01-07

## 2024-01-07 NOTE — DISCHARGE SUMMARY
[facility]    Discharge Summary    Aleisha Carlin Patient Status:  Inpatient    3/1/1960 MRN [id number]   Location [facility] 4W/SW/SE Attending Brad Pham MD   Hosp Day # 7 PCP AMAURI ERNANDEZ     Date of Admission: 2023 Disposition:   Home or Self Care     Date of Discharge:  2024     Admitting Diagnosis:   Weakness [R53.1]  Multiple falls [R29.6]    Hospital Discharge Diagnoses:    Lower extremity weakness    Deconditioning  Pancytopenia  Hx of neurofibromatosis  Hx of HTN  Hx of osteoporosis       Problem List:     Patient Active Problem List   Diagnosis    Malignant neoplasm of upper-outer quadrant of right breast in female, estrogen receptor positive  (HCC)    Acute subdural hematoma (HCC)    Malignant pleural effusion    Malignant neoplasm metastatic to liver (HCC)    Chemotherapy-induced neutropenia  (HCC)    Other osteoporosis without current pathological fracture    Neurofibromatosis (HCC)    Vitamin D deficiency    Chemotherapy management, encounter for    Encounter for central line care    Encounter for medication management    Triple negative breast cancer (HCC)    Iron deficiency    B12 deficiency    Malabsorption of iron    Anemia due to antineoplastic chemotherapy    Weakness    Multiple falls    Bilateral leg weakness    Bilateral leg edema    Stage IV breast cancer in female (HCC)    Chemotherapy induced neutropenia  (HCC)    Dysphagia    Neuropathy    Weakness of both lower extremities    Syrinx (HCC)    Myasthenic syndrome (HCC)    Pancytopenia (HCC)       Physical Exam:      BP 96/70 (BP Location: Left arm)   Pulse 75   Temp 97.9 °F (36.6 °C) (Oral)   Resp 16   Ht 5' 1\" (1.549 m)   Wt 109 lb 4.8 oz (49.6 kg)   SpO2 98%   BMI 20.65 kg/m²     Gen:  NAD.  A and O x  3  CV:   RRR.  No m/g/r  Pulm:   CTA bilat  Abd:   +bs, soft, NT, ND  LE:  No c/c/e  Neuro:  nonfocal      Reason for Admission:   LE weakness    History of Present Illness:   Aleisha Florentino  Tesfaye is a(n) 63 year old female, with a past medical history significant for metastatic breast cancer, neurofibromatosis and osteoporosis presents with complaint of lower extremity weakness that started rather abruptly earlier today.  Patient has been receiving chemotherapy, does admit to feeling somewhat weak after chemo but never to this degree in her lower extremities.  Claims she was up going to the bathroom got there however as she was about to sit on the toilet seat her legs gave out on her and she collapsed to the ground, denies hitting her head however tried for multiple hours thereafter to get up and could not pull herself up.  She ended up with multiple bruises on her lower extremities in the process of doing so.  Complains of occasional back pain, denies any difficulty with urination however the same time claims she has not been able to urinate since when she first attempted to before following.  Denies any fever or chills    Hospital Course:     Lower extremity weakness - improved  Likely multifactorial, with a definite element of deconditioning  Mri brain, c spine and L spine ok.  Small syrinx on MRI C-spine is not likely clinically significant.     - neuro was on consult  - completed IVIG for possible myesthenia  - LE dopp negative for dvt   - PT/OT and speech were following  - cont ambuulation  Ok for dc home with [facility].  Onc and neuro f/u.     Hx of metastatic breast CA  - oncology on consult.   Follows with Dr. Farias.     Pancytopenia.  Chemo related.      Not neutropenic anymore.  - follow CBC  - F/u with Dr. Farias     Essential hypertension  Blood pressure well-controlled.  - cont lasix  - stop home losartan     Hx of osteoporosis  - continue home meds     dvt proph:    heparin       Code status:    Full       Consultations:   Oncology, neurology     Discharge Condition:  Good    Discharge Medications:      Discharge Medications        CHANGE how you take these medications        Instructions  Prescription details   furosemide 20 MG Tabs  Commonly known as: Lasix  What changed: when to take this      Take 1 tablet (20 mg total) by mouth daily.   Refills: 0            CONTINUE taking these medications        Instructions Prescription details   albuterol 108 (90 Base) MCG/ACT Aers  Commonly known as: Ventolin HFA      Inhale 1 puff into the lungs every 6 (six) hours as needed for Wheezing.   Quantity: 8 g  Refills: 3     Calcium 500 + D 500-200 MG-UNIT Tabs  Generic drug: Calcium Carb-Cholecalciferol      Take by mouth daily.   Refills: 0     folic acid 1 MG Tabs  Commonly known as: Folvite      Take 1 tablet (1 mg total) by mouth daily.   Quantity: 90 tablet  Refills: 3     guaiFENesin-codeine 100-10 MG/5ML Soln  Commonly known as: Robitussin AC      Take 5 mL by mouth every 6 (six) hours as needed for cough.   Quantity: 473 mL  Refills: 0     HYDROcodone-acetaminophen  MG Tabs  Commonly known as: Norco      Take 1-2 tablets by mouth every 4 (four) hours as needed for Pain.   Quantity: 120 tablet  Refills: 0     HYDROcodone-acetaminophen  MG Tabs  Commonly known as: Norco      Take 1 tablet by mouth every 6 (six) hours as needed for Pain.   Quantity: 90 tablet  Refills: 0     LORazepam 0.5 MG Tabs  Commonly known as: Ativan      Take 1 tablet (0.5 mg total) by mouth every 6 (six) hours as needed for Anxiety.   Quantity: 30 tablet  Refills: 0     minocycline 50 MG Caps  Commonly known as: Minocin      Take 1 capsule (50 mg total) by mouth daily.   Refills: 0     mirtazapine 15 MG Tabs  Commonly known as: Remeron      Take 1 tablet (15 mg total) by mouth nightly.   Quantity: 90 tablet  Refills: 1     prochlorperazine 10 mg tablet  Commonly known as: Compazine      Take 1 tablet (10 mg total) by mouth every 6 (six) hours as needed for Nausea.   Quantity: 90 tablet  Refills: 2     Vitamin D3 250 MCG (16463 UT) Caps      Take 1 capsule by mouth daily.   Refills: 0     zolpidem 10 MG Tabs  Commonly  known as: Ambien      Take 1 tablet (10 mg total) by mouth nightly as needed.   Quantity: 60 tablet  Refills: 0     ZOMETA IV      Inject 4 mg into the vein every 6 (six) months.   Refills: 0            STOP taking these medications      losartan 100 MG Tabs  Commonly known as: Cozaar               ASK your doctor about these medications        Instructions Prescription details   atorvastatin 20 MG Tabs  Commonly known as: Lipitor      Take 1 tablet (20 mg total) by mouth daily.   Refills: 0              Follow up Visits:     Follow-up Information       Ami Farias MD. Schedule an appointment as soon as possible for a visit.    Specialties: Hematology and Oncology, ONCOLOGY, HEMATOLOGY  Contact information:  [address]  [facility] [address]  [phone]               Caroline Roberts DO. Schedule an appointment as soon as possible for a visit.    Specialty: NEUROLOGY  Contact information:  [address] [facility] [address]  [facility] [address]  [phone]                             Hospital Discharge Diagnoses:  Leg weakness    Lace+ Score: 67  59-90 High Risk  29-58 Medium Risk  0-28   Low Risk.    TCM Follow-Up Recommendation:  LACE > 58: High Risk of readmission after discharge from the hospital.    >35 minutes spent preparing this discharge.    Brad Stewart MD  1/7/2024  1:34 PM

## 2024-01-07 NOTE — CM/SW NOTE
01/07/24 1300   Discharge disposition   Expected discharge disposition Home or Self   Post Acute Care Provider Residential     MDO for [facility] liaison notified.    Jennifer Remy, RN    Ext [phone]

## 2024-01-07 NOTE — PROGRESS NOTES
[facility]  [address] [place] [address], [address]  [facility] [address]  [phone]          INPATIENT NEUROLOGY   FOLLOW UP CONSULT NOTE       [facility]    Report of Consultation    Aleisha Carlin Patient Status:  Inpatient     3/1/1960 MRN [id number]    Location [facility] 4W/SW/SE Attending Brad Pham MD    Hosp Day # 7 PCP AMAURI ERNANDEZ      Date of Admission:  2023  Date of Consult Follow Up: 24      INTERVAL HPI:    Rdy for discharge                Subjective:   Diplopia -  none  Ptosis - non  Dysphagia -  yes; she had a swallow study. Just solids    Dysarthria - none   Exertional fatigue - yes   Orthopnea - no  Head drop - no      ?PHYSICAL EXAM:     BP 96/70 (BP Location: Left arm)   Pulse 75   Temp 97.9 °F (36.6 °C) (Oral)   Resp 16   Ht 61\"   Wt 109 lb 4.8 oz (49.6 kg)   SpO2 98%   BMI 20.65 kg/m²   General appearance: Well appearing, and in no acute distress  Skin: skin color.  Head: Normocephalic, atraumatic.  Multiple neurofibromas    Neurological exam:   Exam:  - General: appears stated age and no distress  - CV: symmetric pulses   - Pulmonary: no sign of respiratory distress.    Neurologic Exam    - Mental Status: Alert and attentive. Oriented.  Speech is spontaneous, fluent, and prosodic. Comprehension intact. Repetition intact. Phrase length and rate are normal. No paraphasic errors, neologisms, anomia, neglect, apraxia, or R/L confusion.   - Cranial Nerves: No gaze preference. Visual fields:normal  Pupils are 4mm briskly constricting to 3mm and equally round and reactive to light  in a well lit room. EOMI. No nystagmus. No ptosis. V1-V3 intact B/L to light touch.No pathological facial asymmetry. No flattening of the nasolabial fold. .  Hearing grossly intact.  Tongue midline. No atrophy or fasiculations of the tongue noted. Palate and uvula elevate symmetrically.  Shoulder shrug symmetric.  Single breath counting -  20 (<25-30 is  concerning)   Ptosis with sustained upward gaze -  none   Dysarthria -  none    Neck flexors - 4+/5 to 5/5 Neck extensors - 4+/5 to 5/5 her hf are 4+/5 b/l.    - Motor:  normal tone,  nml bulk.     Pronator drift: No pronator drift   Index finger Rolling: No orbiting.   Finger Taps: Finger taps are symmetric in rate and amplitude. .     Leg Drift:  none     - Sensory:   Light touch: normal   - Cerebellum: No truncal ataxia. No titubations. No dysmetria.             LABS/DATA:    Lab Results   Component Value Date    WBC 2.7 01/07/2024    HGB 7.4 01/07/2024    HCT 23.6 01/07/2024    .0 01/07/2024       HGBA1C:  No results found for: \"A1C\", \"EAG\"           IMAGING:  CT CHEST+ABDOMEN+PELVIS(ALL CNTRST ONLY)(CPT=71260/03410)    Result Date: 1/5/2024  CONCLUSION:   New or enlarging right hepatic lobe mass suspicious for metastatic disease progression/recurrence.  No other sites of suspected recurrence or metastatic disease.  Additional chronic or incidental findings are described in the body of this report.    Dictated by (CST): Rasta Bass MD on 1/05/2024 at 9:48 AM     Finalized by (CST): Rasta Bass MD on 1/05/2024 at 10:27 AM          MRI SPINE CERVICAL (W+WO) (CPT=72156)    Result Date: 1/3/2024  CONCLUSION:  1. No suspicious marrow replacing or enhancing lesions throughout the cervical spine to suggest osseous metastatic disease. 2. Probable small 2.2 cm maximal diameter syrinx in the upper cervical cord at C1-C2.  No other signal abnormality or enhancement throughout the cervical cord. 3. Scattered multilevel cervical spine degenerative changes as detailed.  Findings result in mild-to-moderate right neural foraminal stenosis at C5-C6.  No other significant neural compromise throughout the cervical spine. 4. Mild levoscoliosis of the cervical spine. 5. Heterogeneous signal 1.2 cm thyroid nodule. 6. Numerous small cutaneous and subcutaneous nodules throughout the posterior neck.  These are grossly  unchanged since prior PET scan from February, 2023 (not metabolic) and likely represent sebaceous cysts or other dermatologic abnormalities. 7. Lesser incidental findings as above.   elm-remote  Dictated by (CST): Desmond Barragan MD on 1/03/2024 at 7:28 AM     Finalized by (CST): Desmond Barragan MD on 1/03/2024 at 7:40 AM          US VENOUS DOPPLER LEG BILAT - DIAG IMG (CPT=93970)    Result Date: 1/1/2024  CONCLUSION: No evidence of acute deep venous thrombosis in the imaged veins of the bilateral lower extremities.    Dictated by (CST): Rasta Bass MD on 1/01/2024 at 9:27 PM     Finalized by (CST): Rasta Bass MD on 1/01/2024 at 9:28 PM          MRI SPINE THORACIC (W+WO) (CPT=72157)    Result Date: 12/31/2023  CONCLUSION:  No high-grade spinal canal or neural foraminal narrowing within the thoracic spine.  No focal suspicious osseous lesion.  1.1 centimeter isthmus thyroid nodule.  Follow-up thyroid ultrasound on a nonemergent basis can be performed for further assessment.  Partially imaged degenerative disease of the cervical spine.     Dictated by (CST): Maura Wilson MD on 12/31/2023 at 5:58 PM     Finalized by (CST): Maura Wilson MD on 12/31/2023 at 6:04 PM          MRI SPINE LUMBAR (W+WO) (CPT=72158)    Result Date: 12/31/2023  CONCLUSION:  No focal suspicious osseous lesions.  No high-grade spinal canal or neural foraminal narrowing.  1.3 centimeter endometrial mass.  Differential considerations include endometrial polyp or other etiologies.  Recommend follow-up pelvic ultrasound on a nonemergent basis for further assessment.   1.7 centimeter intramural uterine fibroid.    Dictated by (CST): Maura Wilson MD on 12/31/2023 at 5:53 PM     Finalized by (CST): Maura Wilson MD on 12/31/2023 at 5:58 PM          MRI BRAIN WO ACUTE (3) SEQUENCE (CPT=70551)    Result Date: 12/31/2023  CONCLUSION:   No restricted diffusion to suggest acute ischemic stroke.  Background of mild chronic small vessel  ischemic disease.   No focal suspicious lesion on noncontrast MRI.  If there is concern for intracranial metastatic disease, complete MRI with contrast is recommended.    Dictated by (CST): Maura Wilson MD on 12/31/2023 at 4:04 PM     Finalized by (CST): Maura Wilson MD on 12/31/2023 at 4:09 PM          CT BRAIN OR HEAD (97126)    Result Date: 12/31/2023  CONCLUSION:   No acute intracranial hemorrhage, hydrocephalus, or mass effect.  If there is clinical concern for acute ischemic stroke, MRI of the brain is recommended.    Dictated by (CST): Maura Wilson MD on 12/31/2023 at 1:20 PM     Finalized by (CST): Maura Wilson MD on 12/31/2023 at 1:27 PM              ASSESSMENT:  The patient is a 63 year old woman with past medical history of breast cancer status post mastectomy on chemotherapy, neurofibromatosis, hyperlipidemia who presented to the ER with weakness resulting in multiple falls.  -MRI of her brain mild chronic microvascular ischemic change but otherwise unremarkable.  -MRI cervical spine with and without no signs of metastatic disease, small 2.2 mm syrinx at upper cervical cord at C1-2, multilevel degenerative disc disease significant neural compromise than at right C5-6  -MRI thoracic spine no significant abnormalities, though partially images cervical spine showed degenerative disc disease   -MRI of her lumbar spine showed no significant stenoses.   -, vitamin B12 612, aldolase 4.9             Eribulin can lead to neuropathy and myasthenia in patients.     MG composite scale  Ptosis, upward ease physical examination  >45 seconds = 0 11-45 seconds = 1 1-10 seconds = 2 Immediate = 3 0   Double vision on lateral gaze, left or right physical examination >45 seconds = 0 11-45 seconds = 1 1-10 seconds = 3 Immediate = 4 0   Eye closure physical examination Normal = 0 Mild weakness (can be forced open with effort) = 0 Moderate weakness (can be forced open easily) = 1 Severe weakness (unable  to keep eyes closed) = 2 0   Talking patient history Normal = 0 Intermittent slurring or nasal speech = 2 Constant slurring or nasal but can be understood = 4 Difficult to understand speech = 6 0   Chewing patient history Normal = 0 Fatigue with solid food = 2 Fatigue with soft food = 4 Gastric tube = 6 0   Swallowing patient history Normal = 0 Rare episode of choking or trouble swallowing = 2 Frequent trouble swallowing, for example necessitating change in diet = 5 Gastric tube = 6 0 in the past she had rare episodes of choking or trouble swallowing    Breathing (thought to be caused by MG) Normal = 0 Shortness of breath with exertion = 2 Shortness of breath at rest = 4 Ventilator dependence = 9 0 currently no sx; in the last month or 2 she has shortness of breath w/ exertion    Neck flexion or extension (weakness) physical examination Normal = 0 Mild weakness = 1 Moderate weakness (i.e., approximately 50% weak ±15%) = 3 Severe weakness = 4 1   Shoulder abduction  physical examination Normal = 0 Mild weakness = 2 Moderate weakness (i.e., approximately 50% weak ±15%) = 4 Severe weakness = 5 0   Hip flexion physical examination Normal = 0 Mild weakness = 2 Moderate weakness (i.e., approximately 50% weak ±15%) = 4 Severe weakness = 5 0   Total    Note: Please note that “moderate weakness” for neck and limb items should be construed as weakness that equals  roughly 50% ± 15% of expected normal strength. Any weakness milder than that would be “mild,” and any  weakness more severe than that would be classified as “severe.      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      This note was prepared using Dragon Medical voice recognition dictation software and as a result, errors may occur. When identified, these errors have been corrected. While every attempt is made to correct errors during dictation, discrepancies may still exist    Festus Juarez DO  [facility]        01/07/24

## 2024-01-07 NOTE — PLAN OF CARE
No acute events. Finished last IVIG infusion overnight. Denies need for prn pain medication. Ambulating independently in room. Voiding freely. Cleared for discharge home today. Reviewed discharge medication, follow up, and instruction. Verbalized understanding. Waiting for ride for .     Problem: Patient Centered Care  Goal: Patient preferences are identified and integrated in the patient's plan of care  Description: Interventions:  - What would you like us to know as we care for you? I wanna be updated with plan of care  - Provide timely, complete, and accurate information to patient/family  - Incorporate patient and family knowledge, values, beliefs, and cultural backgrounds into the planning and delivery of care  - Encourage patient/family to participate in care and decision-making at the level they choose  - Honor patient and family perspectives and choices  Outcome: Adequate for Discharge     Problem: Patient/Family Goals  Goal: Patient/Family Long Term Goal  Description: Patient's Long Term Goal: return home    Interventions:  - follow plan of care  - See additional Care Plan goals for specific interventions  Outcome: Adequate for Discharge  Goal: Patient/Family Short Term Goal  Description: Patient's Short Term Goal: to get stronger    Interventions:   - PT/ OT on consult  - See additional Care Plan goals for specific interventions  Outcome: Adequate for Discharge     Problem: PAIN - ADULT  Goal: Verbalizes/displays adequate comfort level or patient's stated pain goal  Description: INTERVENTIONS:  - Encourage pt to monitor pain and request assistance  - Assess pain using appropriate pain scale  - Administer analgesics based on type and severity of pain and evaluate response  - Implement non-pharmacological measures as appropriate and evaluate response  - Consider cultural and social influences on pain and pain management  - Manage/alleviate anxiety  - Utilize distraction and/or relaxation techniques  -  Monitor for opioid side effects  - Notify MD/LIP if interventions unsuccessful or patient reports new pain  - Anticipate increased pain with activity and pre-medicate as appropriate  Outcome: Adequate for Discharge     Problem: SAFETY ADULT - FALL  Goal: Free from fall injury  Description: INTERVENTIONS:  - Assess pt frequently for physical needs  - Identify cognitive and physical deficits and behaviors that affect risk of falls.  - [place] fall precautions as indicated by assessment.  - Educate pt/family on patient safety including physical limitations  - Instruct pt to call for assistance with activity based on assessment  - Modify environment to reduce risk of injury  - Provide assistive devices as appropriate  - Consider OT/PT consult to assist with strengthening/mobility  - Encourage toileting schedule  Outcome: Adequate for Discharge     Problem: MUSCULOSKELETAL - ADULT  Goal: Return mobility to safest level of function  Description: INTERVENTIONS:  - Assess patient stability and activity tolerance for standing, transferring and ambulating w/ or w/o assistive devices  - Assist with transfers and ambulation using safe patient handling equipment as needed  - Ensure adequate protection for wounds/incisions during mobilization  - Obtain PT/OT consults as needed  - Advance activity as appropriate  - Communicate ordered activity level and limitations with patient/family  Outcome: Adequate for Discharge     Problem: Impaired Functional Mobility  Goal: Achieve highest/safest level of mobility/gait  Description: Interventions:  - Assess patient's functional ability and stability  - Promote increasing activity/tolerance for mobility and gait  - Educate and engage patient/family in tolerated activity level and precautions  Outcome: Adequate for Discharge     Problem: Impaired Activities of Daily Living  Goal: Achieve highest/safest level of independence in self care  Description: Interventions:  - Assess ability and  encourage patient to participate in ADLs to maximize function  - Promote sitting position while performing ADLs such as feeding, grooming, and bathing  - Educate and encourage patient/family in tolerated functional activity level and precautions during self-care  Outcome: Adequate for Discharge

## 2024-01-07 NOTE — PLAN OF CARE
Patient is alert and orientated.  Patient received her last IVIG infusion this evening.  Patient is ambulating halls and restroom on her own. Patient looking forward to going home with her Father possibly tomorrow.  Patient has call light and personal belongings within reach.   Safety measures in place.  Problem: Patient Centered Care  Goal: Patient preferences are identified and integrated in the patient's plan of care  Description: Interventions:  - What would you like us to know as we care for you? I wanna be updated with plan of care  - Provide timely, complete, and accurate information to patient/family  - Incorporate patient and family knowledge, values, beliefs, and cultural backgrounds into the planning and delivery of care  - Encourage patient/family to participate in care and decision-making at the level they choose  - Honor patient and family perspectives and choices  Outcome: Progressing     Problem: Patient/Family Goals  Goal: Patient/Family Long Term Goal  Description: Patient's Long Term Goal: return home    Interventions:  - follow plan of care  - See additional Care Plan goals for specific interventions  Outcome: Progressing  Goal: Patient/Family Short Term Goal  Description: Patient's Short Term Goal: to get stronger    Interventions:   - PT/ OT on consult  - See additional Care Plan goals for specific interventions  Outcome: Progressing     Problem: PAIN - ADULT  Goal: Verbalizes/displays adequate comfort level or patient's stated pain goal  Description: INTERVENTIONS:  - Encourage pt to monitor pain and request assistance  - Assess pain using appropriate pain scale  - Administer analgesics based on type and severity of pain and evaluate response  - Implement non-pharmacological measures as appropriate and evaluate response  - Consider cultural and social influences on pain and pain management  - Manage/alleviate anxiety  - Utilize distraction and/or relaxation techniques  - Monitor for opioid  side effects  - Notify MD/LIP if interventions unsuccessful or patient reports new pain  - Anticipate increased pain with activity and pre-medicate as appropriate  Outcome: Progressing     Problem: SAFETY ADULT - FALL  Goal: Free from fall injury  Description: INTERVENTIONS:  - Assess pt frequently for physical needs  - Identify cognitive and physical deficits and behaviors that affect risk of falls.  - [place] fall precautions as indicated by assessment.  - Educate pt/family on patient safety including physical limitations  - Instruct pt to call for assistance with activity based on assessment  - Modify environment to reduce risk of injury  - Provide assistive devices as appropriate  - Consider OT/PT consult to assist with strengthening/mobility  - Encourage toileting schedule  Outcome: Progressing     Problem: MUSCULOSKELETAL - ADULT  Goal: Return mobility to safest level of function  Description: INTERVENTIONS:  - Assess patient stability and activity tolerance for standing, transferring and ambulating w/ or w/o assistive devices  - Assist with transfers and ambulation using safe patient handling equipment as needed  - Ensure adequate protection for wounds/incisions during mobilization  - Obtain PT/OT consults as needed  - Advance activity as appropriate  - Communicate ordered activity level and limitations with patient/family  Outcome: Progressing     Problem: Impaired Functional Mobility  Goal: Achieve highest/safest level of mobility/gait  Description: Interventions:  - Assess patient's functional ability and stability  - Promote increasing activity/tolerance for mobility and gait  - Educate and engage patient/family in tolerated activity level and precautions    Outcome: Progressing     Problem: Impaired Activities of Daily Living  Goal: Achieve highest/safest level of independence in self care  Description: Interventions:  - Assess ability and encourage patient to participate in ADLs to maximize  function  - Promote sitting position while performing ADLs such as feeding, grooming, and bathing  - Educate and encourage patient/family in tolerated functional activity level and precautions during self-care    Outcome: Progressing     Problem: Patient Centered Care  Goal: Patient preferences are identified and integrated in the patient's plan of care  Description: Interventions:  - What would you like us to know as we care for you? I wanna be updated with plan of care  - Provide timely, complete, and accurate information to patient/family  - Incorporate patient and family knowledge, values, beliefs, and cultural backgrounds into the planning and delivery of care  - Encourage patient/family to participate in care and decision-making at the level they choose  - Honor patient and family perspectives and choices  Outcome: Progressing     Problem: Patient/Family Goals  Goal: Patient/Family Long Term Goal  Description: Patient's Long Term Goal: return home    Interventions:  - follow plan of care  - See additional Care Plan goals for specific interventions  Outcome: Progressing     Problem: Patient/Family Goals  Goal: Patient/Family Short Term Goal  Description: Patient's Short Term Goal: to get stronger    Interventions:   - PT/ OT on consult  - See additional Care Plan goals for specific interventions  Outcome: Progressing     Problem: PAIN - ADULT  Goal: Verbalizes/displays adequate comfort level or patient's stated pain goal  Description: INTERVENTIONS:  - Encourage pt to monitor pain and request assistance  - Assess pain using appropriate pain scale  - Administer analgesics based on type and severity of pain and evaluate response  - Implement non-pharmacological measures as appropriate and evaluate response  - Consider cultural and social influences on pain and pain management  - Manage/alleviate anxiety  - Utilize distraction and/or relaxation techniques  - Monitor for opioid side effects  - Notify MD/LIP if  interventions unsuccessful or patient reports new pain  - Anticipate increased pain with activity and pre-medicate as appropriate  Outcome: Progressing     Problem: SAFETY ADULT - FALL  Goal: Free from fall injury  Description: INTERVENTIONS:  - Assess pt frequently for physical needs  - Identify cognitive and physical deficits and behaviors that affect risk of falls.  - [place] fall precautions as indicated by assessment.  - Educate pt/family on patient safety including physical limitations  - Instruct pt to call for assistance with activity based on assessment  - Modify environment to reduce risk of injury  - Provide assistive devices as appropriate  - Consider OT/PT consult to assist with strengthening/mobility  - Encourage toileting schedule  Outcome: Progressing     Problem: MUSCULOSKELETAL - ADULT  Goal: Return mobility to safest level of function  Description: INTERVENTIONS:  - Assess patient stability and activity tolerance for standing, transferring and ambulating w/ or w/o assistive devices  - Assist with transfers and ambulation using safe patient handling equipment as needed  - Ensure adequate protection for wounds/incisions during mobilization  - Obtain PT/OT consults as needed  - Advance activity as appropriate  - Communicate ordered activity level and limitations with patient/family  Outcome: Progressing     Problem: Impaired Functional Mobility  Goal: Achieve highest/safest level of mobility/gait  Description: Interventions:  - Assess patient's functional ability and stability  - Promote increasing activity/tolerance for mobility and gait  - Educate and engage patient/family in tolerated activity level and precautions    Outcome: Progressing     Problem: Impaired Activities of Daily Living  Goal: Achieve highest/safest level of independence in self care  Description: Interventions:  - Assess ability and encourage patient to participate in ADLs to maximize function  - Promote sitting position while  performing ADLs such as feeding, grooming, and bathing  - Educate and encourage patient/family in tolerated functional activity level and precautions during self-care    Outcome: Progressing

## 2024-01-08 ENCOUNTER — TELEPHONE (OUTPATIENT)
Dept: NEUROLOGY | Facility: CLINIC | Age: 64
End: 2024-01-08

## 2024-01-08 NOTE — TELEPHONE ENCOUNTER
Patient called to make a HFU appointment.  Dr. Roberts saw them 1/1-1/4, 2024.  She will not go to [place] and the first available for [facility] is April 10th which we booked.   I told her we would call her if Doctor needed to see her sooner.

## 2024-01-10 ENCOUNTER — OFFICE VISIT (OUTPATIENT)
Dept: HEMATOLOGY/ONCOLOGY | Facility: HOSPITAL | Age: 64
End: 2024-01-10
Attending: INTERNAL MEDICINE
Payer: MEDICARE

## 2024-01-10 VITALS
HEART RATE: 100 BPM | WEIGHT: 112 LBS | TEMPERATURE: 98 F | HEIGHT: 60.98 IN | DIASTOLIC BLOOD PRESSURE: 75 MMHG | OXYGEN SATURATION: 100 % | BODY MASS INDEX: 21.14 KG/M2 | RESPIRATION RATE: 16 BRPM | SYSTOLIC BLOOD PRESSURE: 126 MMHG

## 2024-01-10 DIAGNOSIS — C78.7 MALIGNANT NEOPLASM METASTATIC TO LIVER (HCC): ICD-10-CM

## 2024-01-10 DIAGNOSIS — Z17.0 MALIGNANT NEOPLASM OF UPPER-OUTER QUADRANT OF RIGHT BREAST IN FEMALE, ESTROGEN RECEPTOR POSITIVE  (HCC): Primary | ICD-10-CM

## 2024-01-10 DIAGNOSIS — C50.411 MALIGNANT NEOPLASM OF UPPER-OUTER QUADRANT OF RIGHT BREAST IN FEMALE, ESTROGEN RECEPTOR POSITIVE  (HCC): Primary | ICD-10-CM

## 2024-01-10 DIAGNOSIS — Z09 CHEMOTHERAPY FOLLOW-UP EXAMINATION: ICD-10-CM

## 2024-01-10 PROCEDURE — 99215 OFFICE O/P EST HI 40 MIN: CPT | Performed by: INTERNAL MEDICINE

## 2024-01-10 RX ORDER — CAPECITABINE 500 MG/1
1000 TABLET, FILM COATED ORAL 2 TIMES DAILY
Qty: 84 TABLET | Refills: 0 | Status: SHIPPED | OUTPATIENT
Start: 2024-01-10 | End: 2024-01-12

## 2024-01-10 RX ORDER — MIRTAZAPINE 15 MG/1
15 TABLET, ORALLY DISINTEGRATING ORAL NIGHTLY
Qty: 90 TABLET | Refills: 3 | Status: SHIPPED | OUTPATIENT
Start: 2024-01-10

## 2024-01-10 NOTE — PROGRESS NOTES
HPI   Aleisha Carlin is a 63 year old female here for follow up of   Encounter Diagnoses   Name Primary?    Malignant neoplasm of upper-outer quadrant of right breast in female, estrogen receptor positive  (HCC) Yes    Malignant neoplasm metastatic to liver (HCC)     Chemotherapy follow-up examination    .    Currently s/p cycle 8 Eribulin.  Patient was admitted on 12/31/2023 and discharged on 1/7/2024 due to bilateral lower extremity weakness with falls.  She was evaluated by neurology and was diagnosed with possible myasthenia gravis and was treated as such with IVIG infusions.  She did have improvement in her strength of the lower extremities, and is able not to ambulate independently.  The myasthenia gravis panel is still pending.  She is following with neurology.  While she was admitted, she had her staging imaging for which she was due.    States that able to ambulate well.  She states that did have some vertigo one of the days.  Increasing her activity.      Fatigue:  Yes, but states a little improved    Fevers:  No     Appetite/taste changes:  Yes, but improving.    Mucositis:  Yes, one but resolved.     Weight changes:   stable now    Nausea/vomiting:  No     Diarrhea:  No    Constipation:  Yes, after treatment but improves with stool softener.  States not as bad as with prior treatments.      Peripheral neuropathy:  Yes, Occasionally R last two fingers and does not last long .        RUQ discomfort intermittent. Requesting Norco refill - last month Rx was sent told not able to fill for insurance issue.          ECOG PS 1        Review of Systems:     Review of Systems   Respiratory:  Positive for shortness of breath (with activity). Negative for cough.    Cardiovascular:  Positive for leg swelling (BLE edema R>L, from surgeries on the R foot and leg.). Negative for chest pain and palpitations.   Gastrointestinal:  Positive for constipation (stool softener) and nausea (mild day 3,4). Negative for  abdominal pain (RUQ discomfort).   Genitourinary:  Negative for difficulty urinating, dysuria and frequency.    Musculoskeletal:  Positive for arthralgias (L knee pain). Negative for back pain.   Skin:         States has NF lesion in her back that hurts on her back on the L side.     Neurological:  Positive for numbness (to R hand). Negative for dizziness and headaches.   Hematological:  Negative for adenopathy. Bruises/bleeds easily.   Psychiatric/Behavioral:  Positive for sleep disturbance (improved with sl mirtazapine.).          Current Outpatient Medications   Medication Sig Dispense Refill    furosemide 20 MG Oral Tab Take 1 tablet (20 mg total) by mouth daily.      minocycline 50 MG Oral Cap Take 1 capsule (50 mg total) by mouth daily.      zolpidem 10 MG Oral Tab Take 1 tablet (10 mg total) by mouth nightly as needed. 60 tablet 0    HYDROcodone-acetaminophen  MG Oral Tab Take 1 tablet by mouth every 6 (six) hours as needed for Pain. 90 tablet 0    mirtazapine 15 MG Oral Tab Take 1 tablet (15 mg total) by mouth nightly. 90 tablet 1    LORazepam 0.5 MG Oral Tab Take 1 tablet (0.5 mg total) by mouth every 6 (six) hours as needed for Anxiety. 30 tablet 0    prochlorperazine (COMPAZINE) 10 mg tablet Take 1 tablet (10 mg total) by mouth every 6 (six) hours as needed for Nausea. 90 tablet 2    folic acid 1 MG Oral Tab Take 1 tablet (1 mg total) by mouth daily. 90 tablet 3    guaiFENesin-codeine 100-10 MG/5ML Oral Solution Take 5 mL by mouth every 6 (six) hours as needed for cough. 473 mL 0    albuterol 108 (90 Base) MCG/ACT Inhalation Aero Soln Inhale 1 puff into the lungs every 6 (six) hours as needed for Wheezing. 8 g 3    HYDROcodone-acetaminophen  MG Oral Tab Take 1-2 tablets by mouth every 4 (four) hours as needed for Pain. 120 tablet 0    Zoledronic Acid (ZOMETA IV) Inject 4 mg into the vein every 6 (six) months.      Calcium Carb-Cholecalciferol (CALCIUM 500 + D) 500-200 MG-UNIT Oral Tab Take by  mouth daily.      Cholecalciferol (VITAMIN D3) 250 MCG (49964 UT) Oral Cap Take 1 capsule by mouth daily.      atorvastatin 20 MG Oral Tab Take 1 tablet (20 mg total) by mouth daily. (Patient not taking: Reported on 9/21/2023)       Allergies:   No Known Allergies    Past Medical History:   Diagnosis Date    Breast cancer (HCC) 03/02/2012    MASTECTOMY / RECONSTRUCTION    Chemotherapy-induced neutropenia  (HCC) 12/25/2020    Encounter for insertion of venous access port 2007    portacath insertion / venous access    Glaucoma     History of breast cancer in female     Hyperlipidemia     Malignant neoplasm of overlapping sites of breast in female, estrogen receptor positive  (HCC) 1/16/2015    Malignant neoplasm of upper-outer quadrant of right breast in female, estrogen receptor positive  (HCC) 1/16/2015    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    Menorrhagia 2007    HYSTEROSCOPY / D&C / endomentrial biopsy (08/28/2007)    Metastatic breast cancer 10/6/2020    Neurofibromatosis (HCC)     Osteopenia of multiple sites 12/25/2020    Osteopenia of multiple sites 2/2/2021    Osteoporosis screening 8/2/13    Osteoporosis screening 08/02/2013    Per [employer]    Other osteoporosis without current pathological fracture 2/2/2021    Postmenopausal bleeding 7/7/2014    Psychophysiological insomnia 8/5/2021    Tumor, foot     tumor right foot / excision/excision of bone spur/arthrodesis w/screw fixation     Past Surgical History:   Procedure Laterality Date    BREAST RECONSTRUCTION Right 2008    right breast reconstructed - ductal, BRCA neg    BREAST SURGERY      CHEMOTHERAPY  2007    FOOT SURGERY Right     tumor rt foot / excision/excision of bone spur/arthrodesis w/screw fixation    IR PORT A CATH PROCEDURE  2007    Right subclavian port of cath.    MASTECTOMY RIGHT      2012    MASTECTOMY,SIMPLE  2007     Social History     Socioeconomic History    Marital status: Single   Tobacco Use    Smoking status: Never    Smokeless  tobacco: Never   Substance and Sexual Activity    Alcohol use: Yes     Alcohol/week: 5.0 standard drinks of alcohol     Types: 5 Standard drinks or equivalent per week     Comment: Socially.  (Per [employer]:  5 days weekly.)    Drug use: No   Other Topics Concern    Caffeine Concern Yes     Comment: tea     Social Determinants of Health     Food Insecurity: No Food Insecurity (2023)    Food Insecurity     Food Insecurity: Never true   Transportation Needs: No Transportation Needs (2023)    Transportation Needs     Lack of Transportation: No   Housing Stability: Low Risk  (2023)    Housing Stability     Housing Instability: No       Family History   Problem Relation Age of Onset    Breast Cancer Mother 49        bilateral mastectomy    Genetic Disease Mother         NF-1    Breast Cancer Maternal Aunt 75    Breast Cancer Paternal Grandmother     Breast Cancer Self 51    Genetic Disease Self         NF-1    Cancer Paternal Aunt         throat ca    Cancer Maternal Uncle         prostate ca    Genetic Disease Brother         NF-1    Genetic Disease Brother         NF-1    Genetic Disease Brother         NF-1    Cancer Maternal Uncle         bladder ca    Breast Cancer Maternal Cousin Female     Cancer Maternal Cousin Female         leukemia;  childhood    Cancer Paternal Cousin Male         throat ca         PHYSICAL EXAM:    /75 (BP Location: Left arm, Patient Position: Sitting, Cuff Size: adult)   Pulse 100   Temp 97.7 °F (36.5 °C) (Oral)   Resp 16   Ht 1.549 m (5' 0.98\")   Wt 50.8 kg (112 lb)   SpO2 100%   BMI 21.18 kg/m²   Wt Readings from Last 6 Encounters:   01/10/24 50.8 kg (112 lb)   23 49.6 kg (109 lb 4.8 oz)   24 49.6 kg (109 lb 5.6 oz)   23 50.6 kg (111 lb 8 oz)   23 52.8 kg (116 lb 8 oz)   23 53.5 kg (118 lb)     Physical Exam  General: Patient is alert, not in acute distress.  HEENT: EOMs intact. Oropharynx is clear.   Neck: No JVD. No palpable  lymphadenopathy. Neck is supple.  Chest: Clear to auscultation.  Heart: Regular rate and rhythm.   Abdomen: Soft, non tender with good bowel sounds.    Extremities: +1 edema R>L chronic,   Neurological: Grossly intact.   Psych/Depression: nl  Skin:  NF lesions on the back       ASSESSMENT/PLAN:     1. Malignant neoplasm of upper-outer quadrant of right breast in female, estrogen receptor positive  (HCC)    2. Malignant neoplasm metastatic to liver (HCC)    3. Chemotherapy follow-up examination        Breast cancer history of ER/FL positive metastatic breast cancer to pleura, liver, bones. Initially diagnosed 2007 with DCIS, treated with lumpectomy, however MRI showed progression in the breast and she had a complete right mastectomy with axillary dissection followed by chemo in 2008: .pT1,N1. ER 9%, PgR 8%, Her-2 0, Ki 39%. she then presented with recurrence in 2020 right neck/supraclav ( ER 98% FL 0  her2 1+)  Pleura (Er 20%, Her2 1+) and liver:  (ER 38%, her 0)     Treatment history  9/11/2007:  right breast lumpectomy: Extensive DCIS ER 90%. Mri with progression  11/16/2007:  right mastectomy with axillary node dissection: pT1 N1 M0  2/16/2008:  4 cycles of Taxotere and Cytoxan   9/18/2020: Right neck recurrence,  left pleural,  liver-   10/1/2020: My risk Myriad negative. Started Pablociclib 125mg d1-21 w/ faslodex   4/15/2021: Neutropenic fever. Pablociclib changed to q 21 d with 14 day off cycle   1/27/2022: Prince at 100mg D1-21 q 28 day schedule stared per Dr Soriano  7/15/22: changed to abemaciclib. Progress in the pleura bx: TNBC, Her2 0 CPS 0  9/22/22-11/2022: Sacituzumab w/o response   11/17/22-1/2023: Abraxane with initial response, liver bx nondx (too small)  2/9/23: started gem/carbo due to SOB with response  4/26/23: imaging with continues response in pleura, progression in liver Er 65%, FL 0, Her2 0, bx: NGS below     NGS:  9/2020: Foundation: NF1, [id number], FGFR1 and MYC amplification  9/2022: G360:  [id number] 0.5%. Tempus pleura: NF1, TP53, FGFR1 amp  2/2023: G360:  [id number] 0.9%, APC 0.2 with several new VUS  4/2023 G360: [id number] 0.4%. APC 0.3 with several new VUS. Tempus liver:           Patient  progressed fifth line palliative chemotherapy with gemcitabine and carboplatin, and Faslodex was added to treatment again in April after biopsy of the liver showed ER receptor positive.      ESR 1 mutation is negative.    S/p cycle 8 Eribulin, sixth line therapy, treatment complicated by bilateral lower extremity weakness.  This required admission, was managed by neurology as possible myasthenia.  She did recover her performance status back to 0-1.    Her CT scan of the chest, abdomen and pelvis to assess response to therapy showed a new hypodense lesion of the right hepatic lobe measuring 1.8 x 1.6 cm with some mild associated capsular retraction.  I reviewed with the patient options for treatment, given that this is a solitary site, the option of evaluation with IR for Y90 embolization was presented to the patient, this would treat that lesion by itself.  Given that the patient has had metastatic disease for several years, she will also need to have further systemic therapy after that treatment.  The patient has not been treated with capecitabine.  I discussed with the patient that if she is able to have a good breakfast and dinner, she would be a good candidate for capecitabine week on and week off.      Patient will proceed with 7th line therapy with capecitabine week on/week off and after 3 months will have imaging.  If still has liver lesion, will consider Y-90 embolization, vs continue with treatment w/o Y-90.      Cancer-related pain- RUQ discomfort Norco 10/325 prn    Zometa - last dose March 2023. Completed 2 years, no further needed       Anemia from chemo:  Monitor closely. Added folic acid 1mg daily and changed B12 SL 1000mcg daily.     Call prn.    MDM high risk    No orders of the defined types were  placed in this encounter.      Results From Past 48 Hours:  No results found for this or any previous visit (from the past 48 hour(s)).    Imaging & Referrals:  None   No orders of the defined types were placed in this encounter.    PROCEDURE: CT CHEST ABDOMEN PELVIS (ALL CONTRAST ONLY) (CPT=71260/53469)     COMPARISON: [facility], CT CHEST+ABDOMEN+PELVIS(ALL CNTRST ONLY)(CPT=71260/30571), 10/07/2023, 9:21 AM.  [facility], CT CHEST+ABDOMEN+PELVIS(ALL CNTRST ONLY)(CPT=71260/32890), 7/03/2023, 1:07 PM.     INDICATIONS: Breast cancer     TECHNIQUE:   CT images of the chest, abdomen and pelvis were obtained with intravenous contrast material.  Automated exposure control for dose reduction was used. Adjustment of the mA and/or kV was done based on the patient's size. Use of iterative  reconstruction technique for dose reduction was used.  Dose information is transmitted to the ACR (American College of Radiology) NRDR (National Radiology Data Registry) which includes the Dose Index Registry.     FINDINGS:  SUPPORT DEVICES: Right chest wall central venous port catheter terminates in the SVC.  CARDIAC:   No enlargement or pericardial thickening.  No significant calcification.    AORTA: No aneurysm or dissection.  Mild calcified atherosclerosis.  VASCULATURE:   No large central pulmonary embolus.  The main pulmonary artery is normal diameter.  MEDIASTINUM/BRANDY:   No mass or enlarged adenopathy.    ESOPHAGUS: Unremarkable.  NECK BASE: No enlarged lymph nodes.  Stable thyroid nodules.  CHEST WALL/AXILLAE: No enlarged lymph nodes.  Bilateral breast reconstruction with saline implants.  BONES: No aggressive appearing osseous lesions.  Mild degenerative changes of the thoracic spine.  DIAPHRAGM: Unremarkable.  PLEURA:   No mass or effusion.    LUNGS:   The trachea and central airways are clear.  There are no acute consolidations.  There is subsegmental atelectasis.  Stable mild fibrosis at  the periphery of the left upper lobe.        LIVER:   Newly apparent hypodense lesion in the right hepatic lobe measuring 1.8 x 1.6 cm series 2, image 80. There is mild associated capsular retraction.    GALLBLADDER: Normal size and appearance.  BILIARY:   No visible dilatation or calcification.    PANCREAS:   No lesion, fluid collection, ductal dilatation, or atrophy.    SPLEEN:   No enlargement or focal lesion.    ADRENALS:   Stable mild nodular thickening of the right adrenal gland is seen.  KIDNEYS:   No mass, obstruction, or calcification.    RETROPERITONEUM:   No mass or enlarged adenopathy.    AORTA/VASCULAR:   No aneurysm or dissection.  Mild to moderate calcified atherosclerosis.  PERITONEUM: No free fluid or air.  BOWEL/MESENTERY:   No visible mass, obstruction, or bowel wall thickening.  The appendix is unremarkable.  There is diverticulosis without evidence of acute diverticulitis.  Moderate rectal stool burden.  Mild colonic stool burden.  URINARY BLADDER: Unremarkable.  REPRODUCTIVE ORGANS: The uterus is present.  The ovaries are unremarkable.  ABDOMINAL WALL:   No acute abnormality.  Numerous cutaneous nodules are again seen.  BONES: No acute fracture.  No aggressive appearing osseous lesion.  Mild degenerative changes of the lumbar spine.                    Impression  CONCLUSION:     New or enlarging right hepatic lobe mass suspicious for metastatic disease progression/recurrence.     No other sites of suspected recurrence or metastatic disease.     Additional chronic or incidental findings are described in the body of this report.           Dictated by (CST): Rasta Bass MD on 1/05/2024 at 9:48 AM      Finalized by (CST): Rasta Bass MD on 1/05/2024 at 10:27 AM

## 2024-01-11 ENCOUNTER — APPOINTMENT (OUTPATIENT)
Dept: HEMATOLOGY/ONCOLOGY | Facility: HOSPITAL | Age: 64
End: 2024-01-11
Attending: INTERNAL MEDICINE
Payer: MEDICARE

## 2024-01-11 ENCOUNTER — APPOINTMENT (OUTPATIENT)
Dept: HEMATOLOGY/ONCOLOGY | Facility: HOSPITAL | Age: 64
End: 2024-01-11
Attending: NURSE PRACTITIONER
Payer: MEDICARE

## 2024-01-12 DIAGNOSIS — Z17.0 MALIGNANT NEOPLASM OF UPPER-OUTER QUADRANT OF RIGHT BREAST IN FEMALE, ESTROGEN RECEPTOR POSITIVE  (HCC): ICD-10-CM

## 2024-01-12 DIAGNOSIS — C78.7 MALIGNANT NEOPLASM METASTATIC TO LIVER (HCC): ICD-10-CM

## 2024-01-12 DIAGNOSIS — C50.411 MALIGNANT NEOPLASM OF UPPER-OUTER QUADRANT OF RIGHT BREAST IN FEMALE, ESTROGEN RECEPTOR POSITIVE  (HCC): ICD-10-CM

## 2024-01-12 RX ORDER — CAPECITABINE 500 MG/1
1000 TABLET, FILM COATED ORAL 2 TIMES DAILY
Qty: 84 TABLET | Refills: 11 | Status: SHIPPED | OUTPATIENT
Start: 2024-01-12

## 2024-01-15 LAB
ACHR BINDING ABS: <0.03 NMOL/L
ACHR BLOCKING ABS: 14 %
ACHR-MODULATING AB: 1 %
MUSK ABS, SERUM: <1 U/ML
STRIATIONAL ABS, SERUM: NEGATIVE

## 2024-01-19 DIAGNOSIS — Z17.0 MALIGNANT NEOPLASM OF UPPER-OUTER QUADRANT OF RIGHT BREAST IN FEMALE, ESTROGEN RECEPTOR POSITIVE  (HCC): ICD-10-CM

## 2024-01-19 DIAGNOSIS — C78.7 MALIGNANT NEOPLASM METASTATIC TO LIVER (HCC): Primary | ICD-10-CM

## 2024-01-19 DIAGNOSIS — Z51.81 MEDICATION MONITORING ENCOUNTER: ICD-10-CM

## 2024-01-19 DIAGNOSIS — C50.411 MALIGNANT NEOPLASM OF UPPER-OUTER QUADRANT OF RIGHT BREAST IN FEMALE, ESTROGEN RECEPTOR POSITIVE  (HCC): ICD-10-CM

## 2024-01-19 NOTE — PATIENT INSTRUCTIONS
Medication Education Record: Oral Therapy    Learner:  Patient    Barriers / Limitations:  None    Psychosocial Assessment:  patient psychosocial response appropriate    Diagnosis:  Breast cancer     Readiness of the patient to learn and adhere to oral cancer treatment outside of the cancer center: Yes    Medication Name Dose/Strength Frequency   Capecitabine  1500 mg =  3  500mg tabs  Twice daily with food   Days 1-7, 7 days off  Repeat                Schedule of oral medication(s):  Twice daily with food Days 1-7, 7 days off repeat          Number of cycles planned:  ongoing until progression or unacceptable toxicity     Verified Consent to Chemotherapy/Biotherapy Cancer Treatment form signed by patient and provider:  Yes      RN/PA Verified prescription bottle against physician order: yes    Start date of treatment:  1/23/24     How to take your medication(s):  Swallow each tablet whole.  Do not break, crush, or chew and Take with food    Plan for appointments and lab testing: monthly     Missed Dose Management:   If you miss a dose, call your doctor for further instructions.  If you vomit or throw up your medication, call your doctor for further instructions.  Do not take 2 doses at the same time to make up for a missed dose.     Drug / Drug or Drug / Food Interactions:  none    Cancer Treatment Side Effects (refer to Chemo Care handout for further information):  Allergic reactions  Constipation  Diarrhea  Fatigue  Hair loss  Kidney / Bladder effects  Liver effects  Loss of appetite  Low red blood cell count / Anemia  Low White Blood Cell Count/Risk of infection  Low Platelet Count/Risk of Bleeding  Lung Effects  Mouth or Throat Sores  Muscle / Bone Effects  Nausea / Vomiting  Skin Effects  Taste Changes  Recommended Antinausea medications: (as directed by your Provider):   Prochloperazine (Compazine) 10mg every 8 hours  Take as needed and Ondansetron (Zofran) 8 mg every 8 hours  Take as needed  Helpful hints  during cancer treatment:    Diet:  Avoid greasy or spicy foods on days surrounding chemotherapy  Eat small frequent meals per day (6-7 meals) rather than 3 large meals  Choose high calorie/high protein foods (chicken, hard cooked eggs, peanut butter, cheese)  If nauseated, try dry foods, such as toast, crackers or pretzels; light or bland foods, such as applesauce or oatmeal.    Fluid intake:  Drink 8-10 cups of liquid a day and take a water bottle wherever you go.  Any fluid is acceptable, but caffeinated products do not count towards your intake and should be limited to 1-2 drinks/day.    Physical Activity  If your doctor approves, be as physically active as you can, but start out slowly, and increase your activity over time as you feel stronger.  Listen to your body and rest when you need to.  Do what you can when you feel up to it.      Oral Care  Keep your mouth clean.  Rinse your mouth before and after meals with plain water or with a mild mouth rinse (made with 1 quart water, 1 teaspoon salt, and 1 teaspoon baking soda, shake before using)   Avoid commercial mouthwashes that contain alcohol, alcoholic or acidic drinks or tobacco  An acceptable mouthwash is Biotene®   If soreness or sores develop, contact the office.    Diarrhea or Constipation  Imodium A-D use for diarrhea:  Take 2 tabs (4mg) at the first sign of diarrhea; then take 1 tab (2mg) every 2 hours until you have had no diarrhea for at least 12 hours; during the night take 2 tabs (4mg) every 4 hours as needed.  Maximum of 8 tablets in 24 hours.  Stool softener for constipation   Skin Care  Avoid direct sunlight.  Wear a broad-spectrum sunscreen with an SPF of 30 or higher on any skin exposed to the sun.  Re-apply every 2 hours if in the sun and after bathing or sweating.  For dry skin, use an alcohol-free lotion twice per day, especially after baths.  If scalp hair loss has occurred, protect the scalp from the sun by wearing a hat and use sunscreen.   Apply alcohol-free moisturizer as needed.  Lotion to hands and feet twice daily     When to call the doctor:  Fever of 100.5 or greater or shaking chills  Nausea/vomiting not controlled with anti-nausea medications: Unable to drink for 24 hours or have signs of dehydration: tiredness, thirst, dry mouth, dark and decreased amount of urine  Diarrhea - not controlled with Imodium AD or more than 6 episodes in 24 hours  Constipation -no bowel movement x 3 days, no response to stool softeners or laxatives  Mouth sores, sore throat or blisters on the lips affecting oral intake  Difficulty breathing, chest pain, or new cough  Excessive tiredness or weakness, confusion or loss of balance  New rash  Tingling or burning, redness, swelling of the palms of hands or soles of feet  Sudden new unexpected symptom -such as change in vision, swelling in arm or leg  Increase in numbness and tingling of hands or feet  Unusual bleeding (nose bleeds, blood in urine, stool or phlegm)  Pain with urination  Persistent mood changes, depression, nervousness, difficulty sleeping   Pain, redness, swelling or blistering at the IV site  If you go to Emergency Room for any reason or seek medical attention elsewhere  If you should need to cancel or reschedule any visit, it is important that you contact the office    What Phone Number to Call:   [facility] [place] [phone] / Triage Nurse    Teaching Materials Provided:   Chemo Care chemotherapy information sheets  and Safety and Handling Sheet     Please refer to the following link if you are interested in additional information about chemotherapy for yourself or family members: [email or web address]        Safe Handling of Chemotherapy  While you or your family member is receiving chemotherapy, whether in the clinic or at home, the following precautions must be taken to lessen any exposure to the medication.     Handling oral medication:    Confirm that the medication  is appropriately labeled.  Only patients who need chemotherapy should take or touch the medication.    If caregivers are involved, caregivers should wear gloves when administering the medication to protect against exposure.  Discard used gloves immediately - do not use for anything else.  Wash hands thoroughly before and after contact with this medication.  Women of child bearing age, pregnant women or women who are nursing should not handle this medication or any contaminated waste.     If the medication is provided in a blister pack, care should be taken when opening the packet to avoid breathing in any powder that may be aerosolized.   Do not crush, break or open any pills or capsules unless instructed by your doctor.  Do not chew tablets.    Storage:   Store medication in sealed containers, out of reach of children and pets.  Do not store medication in the bathroom, as high humidity may damage the medication.    Check the medication label to confirm if medication should be stored in the refrigerator or away from light.  Store the medicine container inside another container or in a sealed bag.  Make sure it does not touch any food.    The medication should remain in its original packaging until it is ready for ingestion.  Pill containers can be used but should not be used for other medications, and as few people as possible should come in contact with it.  Empty pill containers should be washed with soap and water or disposed within a plastic bag.      Disposal:  The unused medication should not be flushed down the toilet or placed in the trash (preventing contamination of landfills and water supply).  Please contact your local police department for collection of unused prescription medications.    Handling Body Waste:   Caregivers must wear gloves if exposed to the patient’s blood, urine, stool or vomit.  Dispose of the used gloves after each use and wash hands with soap and water.  Any sheets or clothes soiled  with the bodily fluids should be machine washed twice in hot water with regular laundry detergent.  Do not wash soiled garments with hands.  If the soiled garments cannot be washed right away, place them in a sealed plastic bag until they can be washed.   Absorbable undergarments, or any other items contaminated with chemotherapy, should be placed in a sealed plastic bag for disposal, separate from other trash.  Toilets should be flushed twice with the lid closed while taking this medication and for 48 hours after the last dose.  Wash your hands after using the toilet.  Wash any skin area that has come in contact with urine or stool.    Safety for my family and friends:  Hugging and kissing is safe for you and your partner or family members.  You can visit, sit with, hug and kiss the children in your life.    You can be around pregnant women, though (if possible) they should not clean up any of your body fluids after you have treatment.   Sexual activity is safe while throughout treatment.  It is possible that traces of the oral chemotherapy may be present in vaginal fluid or semen for 48 hours after taking.  A condom should be used during this time.  Effective birth control should be used throughout treatment to prevent pregnancy while on these medications and for several months or years after therapy.  Chemotherapy can have harmful side effects to the fetus, especially in the first trimester.  In addition, menstrual cycles can become irregular during and after treatment, so you may not know if you are at a time in your cycle when you could become pregnant or if you are actually pregnant.

## 2024-01-22 ENCOUNTER — OFFICE VISIT (OUTPATIENT)
Dept: HEMATOLOGY/ONCOLOGY | Facility: HOSPITAL | Age: 64
End: 2024-01-22
Attending: INTERNAL MEDICINE
Payer: MEDICARE

## 2024-01-22 DIAGNOSIS — Z17.0 MALIGNANT NEOPLASM OF UPPER-OUTER QUADRANT OF RIGHT BREAST IN FEMALE, ESTROGEN RECEPTOR POSITIVE  (HCC): Primary | ICD-10-CM

## 2024-01-22 DIAGNOSIS — C50.411 MALIGNANT NEOPLASM OF UPPER-OUTER QUADRANT OF RIGHT BREAST IN FEMALE, ESTROGEN RECEPTOR POSITIVE  (HCC): ICD-10-CM

## 2024-01-22 DIAGNOSIS — C78.7 MALIGNANT NEOPLASM METASTATIC TO LIVER (HCC): ICD-10-CM

## 2024-01-22 DIAGNOSIS — Z45.2 ENCOUNTER FOR CENTRAL LINE CARE: Primary | ICD-10-CM

## 2024-01-22 DIAGNOSIS — C50.411 MALIGNANT NEOPLASM OF UPPER-OUTER QUADRANT OF RIGHT BREAST IN FEMALE, ESTROGEN RECEPTOR POSITIVE  (HCC): Primary | ICD-10-CM

## 2024-01-22 DIAGNOSIS — Z51.81 MEDICATION MONITORING ENCOUNTER: ICD-10-CM

## 2024-01-22 DIAGNOSIS — Z17.0 MALIGNANT NEOPLASM OF UPPER-OUTER QUADRANT OF RIGHT BREAST IN FEMALE, ESTROGEN RECEPTOR POSITIVE  (HCC): ICD-10-CM

## 2024-01-22 LAB
ALBUMIN SERPL-MCNC: 3.8 G/DL (ref 3.2–4.8)
ALBUMIN/GLOB SERPL: 1.3 {RATIO} (ref 1–2)
ALP LIVER SERPL-CCNC: 71 U/L
ALT SERPL-CCNC: 19 U/L
ANION GAP SERPL CALC-SCNC: 8 MMOL/L (ref 0–18)
AST SERPL-CCNC: 29 U/L (ref ?–34)
BASOPHILS # BLD AUTO: 0.02 X10(3) UL (ref 0–0.2)
BASOPHILS NFR BLD AUTO: 0.9 %
BILIRUB SERPL-MCNC: 0.8 MG/DL (ref 0.2–1.1)
BUN BLD-MCNC: 19 MG/DL (ref 9–23)
BUN/CREAT SERPL: 15.7 (ref 10–20)
CALCIUM BLD-MCNC: 9.6 MG/DL (ref 8.7–10.4)
CHLORIDE SERPL-SCNC: 104 MMOL/L (ref 98–112)
CO2 SERPL-SCNC: 28 MMOL/L (ref 21–32)
CREAT BLD-MCNC: 1.21 MG/DL
DEPRECATED RDW RBC AUTO: 70.7 FL (ref 35.1–46.3)
EGFRCR SERPLBLD CKD-EPI 2021: 51 ML/MIN/1.73M2 (ref 60–?)
EOSINOPHIL # BLD AUTO: 0.1 X10(3) UL (ref 0–0.7)
EOSINOPHIL NFR BLD AUTO: 4.4 %
ERYTHROCYTE [DISTWIDTH] IN BLOOD BY AUTOMATED COUNT: 19.6 % (ref 11–15)
GLOBULIN PLAS-MCNC: 3 G/DL (ref 2.8–4.4)
GLUCOSE BLD-MCNC: 123 MG/DL (ref 70–99)
HCT VFR BLD AUTO: 33.1 %
HGB BLD-MCNC: 10.3 G/DL
IMM GRANULOCYTES # BLD AUTO: 0.01 X10(3) UL (ref 0–1)
IMM GRANULOCYTES NFR BLD: 0.4 %
LYMPHOCYTES # BLD AUTO: 0.52 X10(3) UL (ref 1–4)
LYMPHOCYTES NFR BLD AUTO: 23 %
MCH RBC QN AUTO: 30.4 PG (ref 26–34)
MCHC RBC AUTO-ENTMCNC: 31.1 G/DL (ref 31–37)
MCV RBC AUTO: 97.6 FL
MONOCYTES # BLD AUTO: 0.25 X10(3) UL (ref 0.1–1)
MONOCYTES NFR BLD AUTO: 11.1 %
NEUTROPHILS # BLD AUTO: 1.36 X10 (3) UL (ref 1.5–7.7)
NEUTROPHILS # BLD AUTO: 1.36 X10(3) UL (ref 1.5–7.7)
NEUTROPHILS NFR BLD AUTO: 60.2 %
OSMOLALITY SERPL CALC.SUM OF ELEC: 294 MOSM/KG (ref 275–295)
PLATELET # BLD AUTO: 165 10(3)UL (ref 150–450)
PLATELET MORPHOLOGY: NORMAL
POTASSIUM SERPL-SCNC: 3.8 MMOL/L (ref 3.5–5.1)
PROT SERPL-MCNC: 6.8 G/DL (ref 5.7–8.2)
RBC # BLD AUTO: 3.39 X10(6)UL
SODIUM SERPL-SCNC: 140 MMOL/L (ref 136–145)
WBC # BLD AUTO: 2.3 X10(3) UL (ref 4–11)

## 2024-01-22 PROCEDURE — 85025 COMPLETE CBC W/AUTO DIFF WBC: CPT

## 2024-01-22 PROCEDURE — 36415 COLL VENOUS BLD VENIPUNCTURE: CPT

## 2024-01-22 PROCEDURE — 99214 OFFICE O/P EST MOD 30 MIN: CPT | Performed by: NURSE PRACTITIONER

## 2024-01-22 PROCEDURE — 80053 COMPREHEN METABOLIC PANEL: CPT

## 2024-01-22 RX ORDER — HEPARIN SODIUM (PORCINE) LOCK FLUSH IV SOLN 100 UNIT/ML 100 UNIT/ML
5 SOLUTION INTRAVENOUS ONCE
Status: DISCONTINUED | OUTPATIENT
Start: 2024-01-22 | End: 2024-01-22

## 2024-01-22 RX ORDER — HEPARIN SODIUM (PORCINE) LOCK FLUSH IV SOLN 100 UNIT/ML 100 UNIT/ML
5 SOLUTION INTRAVENOUS ONCE
OUTPATIENT
Start: 2024-01-22

## 2024-01-22 RX ORDER — SODIUM CHLORIDE 9 MG/ML
10 INJECTION INTRAVENOUS ONCE
OUTPATIENT
Start: 2024-01-22

## 2024-01-22 NOTE — PROGRESS NOTES
Medication Education Record: Oral Therapy    Learner:  Patient    Barriers / Limitations:  None    Psychosocial Assessment:  patient psychosocial response appropriate    Diagnosis:  Breast cancer     Readiness of the patient to learn and adhere to oral cancer treatment outside of the cancer center: Yes    Medication Name Dose/Strength Frequency   Capecitabine  1500 mg =  3  500mg tabs  Twice daily with food   Days 1-7, 7 days off  Repeat                Schedule of oral medication(s):  Twice daily with food Days 1-7, 7 days off repeat          Number of cycles planned:  ongoing until progression or unacceptable toxicity     Verified Consent to Chemotherapy/Biotherapy Cancer Treatment form signed by patient and provider:  Yes      RN/PA Verified prescription bottle against physician order: yes    Start date of treatment:  1/22/24     How to take your medication(s):  Swallow each tablet whole.  Do not break, crush, or chew and Take with food    Plan for appointments and lab testing: monthly     Missed Dose Management:   If you miss a dose, call your doctor for further instructions.  If you vomit or throw up your medication, call your doctor for further instructions.  Do not take 2 doses at the same time to make up for a missed dose.     Drug / Drug or Drug / Food Interactions:  none    Cancer Treatment Side Effects (refer to Chemo Care handout for further information):  Allergic reactions  Constipation  Diarrhea  Fatigue  Hair loss  Kidney / Bladder effects  Liver effects  Loss of appetite  Low red blood cell count / Anemia  Low White Blood Cell Count/Risk of infection  Low Platelet Count/Risk of Bleeding  Lung Effects  Mouth or Throat Sores  Muscle / Bone Effects  Nausea / Vomiting  Skin Effects  Taste Changes  Recommended Antinausea medications: (as directed by your Provider):   Prochloperazine (Compazine) 10mg every 8 hours  Take as needed and Ondansetron (Zofran) 8 mg every 8 hours  Take as needed  Helpful hints  during cancer treatment:    Diet:  Avoid greasy or spicy foods on days surrounding chemotherapy  Eat small frequent meals per day (6-7 meals) rather than 3 large meals  Choose high calorie/high protein foods (chicken, hard cooked eggs, peanut butter, cheese)  If nauseated, try dry foods, such as toast, crackers or pretzels; light or bland foods, such as applesauce or oatmeal.    Fluid intake:  Drink 8-10 cups of liquid a day and take a water bottle wherever you go.  Any fluid is acceptable, but caffeinated products do not count towards your intake and should be limited to 1-2 drinks/day.    Physical Activity  If your doctor approves, be as physically active as you can, but start out slowly, and increase your activity over time as you feel stronger.  Listen to your body and rest when you need to.  Do what you can when you feel up to it.      Oral Care  Keep your mouth clean.  Rinse your mouth before and after meals with plain water or with a mild mouth rinse (made with 1 quart water, 1 teaspoon salt, and 1 teaspoon baking soda, shake before using)   Avoid commercial mouthwashes that contain alcohol, alcoholic or acidic drinks or tobacco  An acceptable mouthwash is Biotene®   If soreness or sores develop, contact the office.    Diarrhea or Constipation  Imodium A-D use for diarrhea:  Take 2 tabs (4mg) at the first sign of diarrhea; then take 1 tab (2mg) every 2 hours until you have had no diarrhea for at least 12 hours; during the night take 2 tabs (4mg) every 4 hours as needed.  Maximum of 8 tablets in 24 hours.  Stool softener for constipation   Skin Care  Avoid direct sunlight.  Wear a broad-spectrum sunscreen with an SPF of 30 or higher on any skin exposed to the sun.  Re-apply every 2 hours if in the sun and after bathing or sweating.  For dry skin, use an alcohol-free lotion twice per day, especially after baths.  If scalp hair loss has occurred, protect the scalp from the sun by wearing a hat and use sunscreen.   Apply alcohol-free moisturizer as needed.  Lotion to hands and feet twice daily     When to call the doctor:  Fever of 100.5 or greater or shaking chills  Nausea/vomiting not controlled with anti-nausea medications: Unable to drink for 24 hours or have signs of dehydration: tiredness, thirst, dry mouth, dark and decreased amount of urine  Diarrhea - not controlled with Imodium AD or more than 6 episodes in 24 hours  Constipation -no bowel movement x 3 days, no response to stool softeners or laxatives  Mouth sores, sore throat or blisters on the lips affecting oral intake  Difficulty breathing, chest pain, or new cough  Excessive tiredness or weakness, confusion or loss of balance  New rash  Tingling or burning, redness, swelling of the palms of hands or soles of feet  Sudden new unexpected symptom -such as change in vision, swelling in arm or leg  Increase in numbness and tingling of hands or feet  Unusual bleeding (nose bleeds, blood in urine, stool or phlegm)  Pain with urination  Persistent mood changes, depression, nervousness, difficulty sleeping   Pain, redness, swelling or blistering at the IV site  If you go to Emergency Room for any reason or seek medical attention elsewhere  If you should need to cancel or reschedule any visit, it is important that you contact the office    What Phone Number to Call:   [facility] [place] [phone] / Triage Nurse    Teaching Materials Provided:   Chemo Care chemotherapy information sheets  and Safety and Handling Sheet     Please refer to the following link if you are interested in additional information about chemotherapy for yourself or family members: [email or web address]        Safe Handling of Chemotherapy  While you or your family member is receiving chemotherapy, whether in the clinic or at home, the following precautions must be taken to lessen any exposure to the medication.     Handling oral medication:    Confirm that the medication  is appropriately labeled.  Only patients who need chemotherapy should take or touch the medication.    If caregivers are involved, caregivers should wear gloves when administering the medication to protect against exposure.  Discard used gloves immediately - do not use for anything else.  Wash hands thoroughly before and after contact with this medication.  Women of child bearing age, pregnant women or women who are nursing should not handle this medication or any contaminated waste.     If the medication is provided in a blister pack, care should be taken when opening the packet to avoid breathing in any powder that may be aerosolized.   Do not crush, break or open any pills or capsules unless instructed by your doctor.  Do not chew tablets.    Storage:   Store medication in sealed containers, out of reach of children and pets.  Do not store medication in the bathroom, as high humidity may damage the medication.    Check the medication label to confirm if medication should be stored in the refrigerator or away from light.  Store the medicine container inside another container or in a sealed bag.  Make sure it does not touch any food.    The medication should remain in its original packaging until it is ready for ingestion.  Pill containers can be used but should not be used for other medications, and as few people as possible should come in contact with it.  Empty pill containers should be washed with soap and water or disposed within a plastic bag.      Disposal:  The unused medication should not be flushed down the toilet or placed in the trash (preventing contamination of landfills and water supply).  Please contact your local police department for collection of unused prescription medications.    Handling Body Waste:   Caregivers must wear gloves if exposed to the patient’s blood, urine, stool or vomit.  Dispose of the used gloves after each use and wash hands with soap and water.  Any sheets or clothes soiled  with the bodily fluids should be machine washed twice in hot water with regular laundry detergent.  Do not wash soiled garments with hands.  If the soiled garments cannot be washed right away, place them in a sealed plastic bag until they can be washed.   Absorbable undergarments, or any other items contaminated with chemotherapy, should be placed in a sealed plastic bag for disposal, separate from other trash.  Toilets should be flushed twice with the lid closed while taking this medication and for 48 hours after the last dose.  Wash your hands after using the toilet.  Wash any skin area that has come in contact with urine or stool.    Safety for my family and friends:  Hugging and kissing is safe for you and your partner or family members.  You can visit, sit with, hug and kiss the children in your life.    You can be around pregnant women, though (if possible) they should not clean up any of your body fluids after you have treatment.   Sexual activity is safe while throughout treatment.  It is possible that traces of the oral chemotherapy may be present in vaginal fluid or semen for 48 hours after taking.  A condom should be used during this time.  Effective birth control should be used throughout treatment to prevent pregnancy while on these medications and for several months or years after therapy.  Chemotherapy can have harmful side effects to the fetus, especially in the first trimester.  In addition, menstrual cycles can become irregular during and after treatment, so you may not know if you are at a time in your cycle when you could become pregnant or if you are actually pregnant.    Patient here for chemotherapy education. Recent hospitalization for lower leg weakness. Feeling stronger, walking without assistance. Walking for exercise down the [address] at her fathers condo.   Reinforced need to eat before taking capecitabine. Reinforced small freq meals if appetite is decreased. Reviewed todays labs and  stressed importance of staying well hydrated.   Cancer treatment education, including treatment plan, supportive medications, and post-treatment care was provided to the patient.  The patient/support person  was attentive during education, verbalized understanding, all questions were answered and patient was instructed to call with further questions.     Reinforcement of education is needed at next visit? Yes  This visit lasted 30 minutes with greater than 50% of the visit for discussion of symptom management and treatment.

## 2024-01-25 ENCOUNTER — APPOINTMENT (OUTPATIENT)
Dept: HEMATOLOGY/ONCOLOGY | Facility: HOSPITAL | Age: 64
End: 2024-01-25
Attending: NURSE PRACTITIONER
Payer: MEDICARE

## 2024-01-25 ENCOUNTER — APPOINTMENT (OUTPATIENT)
Dept: HEMATOLOGY/ONCOLOGY | Facility: HOSPITAL | Age: 64
End: 2024-01-25
Attending: INTERNAL MEDICINE
Payer: MEDICARE

## 2024-01-29 ENCOUNTER — NURSE ONLY (OUTPATIENT)
Dept: HEMATOLOGY/ONCOLOGY | Facility: HOSPITAL | Age: 64
End: 2024-01-29
Attending: INTERNAL MEDICINE
Payer: MEDICARE

## 2024-01-29 DIAGNOSIS — Z51.81 MEDICATION MONITORING ENCOUNTER: ICD-10-CM

## 2024-01-29 DIAGNOSIS — C50.411 MALIGNANT NEOPLASM OF UPPER-OUTER QUADRANT OF RIGHT BREAST IN FEMALE, ESTROGEN RECEPTOR POSITIVE  (HCC): ICD-10-CM

## 2024-01-29 DIAGNOSIS — Z17.0 MALIGNANT NEOPLASM OF UPPER-OUTER QUADRANT OF RIGHT BREAST IN FEMALE, ESTROGEN RECEPTOR POSITIVE  (HCC): ICD-10-CM

## 2024-01-29 DIAGNOSIS — C78.7 MALIGNANT NEOPLASM METASTATIC TO LIVER (HCC): ICD-10-CM

## 2024-01-29 LAB
ALBUMIN SERPL-MCNC: 4.1 G/DL (ref 3.2–4.8)
ALBUMIN/GLOB SERPL: 1.2 {RATIO} (ref 1–2)
ALP LIVER SERPL-CCNC: 67 U/L
ALT SERPL-CCNC: 18 U/L
ANION GAP SERPL CALC-SCNC: 8 MMOL/L (ref 0–18)
AST SERPL-CCNC: 30 U/L (ref ?–34)
BASOPHILS # BLD AUTO: 0.01 X10(3) UL (ref 0–0.2)
BASOPHILS NFR BLD AUTO: 0.4 %
BILIRUB SERPL-MCNC: 0.9 MG/DL (ref 0.2–1.1)
BUN BLD-MCNC: 20 MG/DL (ref 9–23)
BUN/CREAT SERPL: 17.9 (ref 10–20)
CALCIUM BLD-MCNC: 9.9 MG/DL (ref 8.7–10.4)
CHLORIDE SERPL-SCNC: 104 MMOL/L (ref 98–112)
CO2 SERPL-SCNC: 28 MMOL/L (ref 21–32)
CREAT BLD-MCNC: 1.12 MG/DL
DEPRECATED RDW RBC AUTO: 61.9 FL (ref 35.1–46.3)
EGFRCR SERPLBLD CKD-EPI 2021: 56 ML/MIN/1.73M2 (ref 60–?)
EOSINOPHIL # BLD AUTO: 0.04 X10(3) UL (ref 0–0.7)
EOSINOPHIL NFR BLD AUTO: 1.6 %
ERYTHROCYTE [DISTWIDTH] IN BLOOD BY AUTOMATED COUNT: 17.6 % (ref 11–15)
GLOBULIN PLAS-MCNC: 3.3 G/DL (ref 2.8–4.4)
GLUCOSE BLD-MCNC: 84 MG/DL (ref 70–99)
HCT VFR BLD AUTO: 34.6 %
HGB BLD-MCNC: 11.3 G/DL
IMM GRANULOCYTES # BLD AUTO: 0 X10(3) UL (ref 0–1)
IMM GRANULOCYTES NFR BLD: 0 %
LYMPHOCYTES # BLD AUTO: 0.58 X10(3) UL (ref 1–4)
LYMPHOCYTES NFR BLD AUTO: 23.3 %
MCH RBC QN AUTO: 31.4 PG (ref 26–34)
MCHC RBC AUTO-ENTMCNC: 32.7 G/DL (ref 31–37)
MCV RBC AUTO: 96.1 FL
MONOCYTES # BLD AUTO: 0.22 X10(3) UL (ref 0.1–1)
MONOCYTES NFR BLD AUTO: 8.8 %
NEUTROPHILS # BLD AUTO: 1.64 X10 (3) UL (ref 1.5–7.7)
NEUTROPHILS # BLD AUTO: 1.64 X10(3) UL (ref 1.5–7.7)
NEUTROPHILS NFR BLD AUTO: 65.9 %
OSMOLALITY SERPL CALC.SUM OF ELEC: 292 MOSM/KG (ref 275–295)
PLATELET # BLD AUTO: 137 10(3)UL (ref 150–450)
POTASSIUM SERPL-SCNC: 4.2 MMOL/L (ref 3.5–5.1)
PROT SERPL-MCNC: 7.4 G/DL (ref 5.7–8.2)
RBC # BLD AUTO: 3.6 X10(6)UL
SODIUM SERPL-SCNC: 140 MMOL/L (ref 136–145)
WBC # BLD AUTO: 2.5 X10(3) UL (ref 4–11)

## 2024-01-29 PROCEDURE — 85025 COMPLETE CBC W/AUTO DIFF WBC: CPT

## 2024-01-29 PROCEDURE — 80053 COMPREHEN METABOLIC PANEL: CPT

## 2024-01-29 PROCEDURE — 36415 COLL VENOUS BLD VENIPUNCTURE: CPT

## 2024-01-31 ENCOUNTER — TELEPHONE (OUTPATIENT)
Dept: HEMATOLOGY/ONCOLOGY | Facility: HOSPITAL | Age: 64
End: 2024-01-31

## 2024-01-31 DIAGNOSIS — C79.9 METASTASIS FROM BREAST CANCER (HCC): Primary | ICD-10-CM

## 2024-01-31 DIAGNOSIS — C50.919 METASTASIS FROM BREAST CANCER (HCC): Primary | ICD-10-CM

## 2024-01-31 RX ORDER — HYDROCODONE BITARTRATE AND ACETAMINOPHEN 10; 325 MG/1; MG/1
1 TABLET ORAL EVERY 6 HOURS PRN
Qty: 90 TABLET | Refills: 0 | Status: SHIPPED | OUTPATIENT
Start: 2024-01-31

## 2024-01-31 NOTE — TELEPHONE ENCOUNTER
Oral chemo tracking- Capecitabine C1 D10 started  1/22/24. Patient denies side effects of Capecitabine. Reinforced teaching to call if having side effects or questions or concerns.    Patient states for 1 week has had pain in her right shoulder \"in the joint\". Patient when raises arm or moves arm having spasm and rates pain at level 10/10. Pain will last a few minutes. Patient has increase her Norco to 1-2 a day. Per patient there is no swelling or redness to arm/shoulder.  Per patient has not injured or strained shoulder.    Discussed with Aiyana LAGUNA. Instructed to rest right arm /shoulder the next few days. Increase the Norco to 3 times a day the next few days. Patient will call if symptoms worsen or do not improved in the next few days. Aiyana to send refill of [place]. Patient verbalizes understanding.

## 2024-02-01 ENCOUNTER — APPOINTMENT (OUTPATIENT)
Dept: HEMATOLOGY/ONCOLOGY | Facility: HOSPITAL | Age: 64
End: 2024-02-01
Attending: NURSE PRACTITIONER
Payer: MEDICARE

## 2024-02-01 DIAGNOSIS — Z51.11 CHEMOTHERAPY MANAGEMENT, ENCOUNTER FOR: ICD-10-CM

## 2024-02-01 DIAGNOSIS — C50.919 PRIMARY MALIGNANT NEOPLASM OF BREAST WITH METASTASIS (HCC): ICD-10-CM

## 2024-02-02 RX ORDER — LORAZEPAM 0.5 MG/1
0.5 TABLET ORAL EVERY 6 HOURS PRN
Qty: 30 TABLET | Refills: 0 | Status: SHIPPED | OUTPATIENT
Start: 2024-02-02 | End: 2024-03-29

## 2024-02-05 NOTE — PROGRESS NOTES
Pt here for q4 week faslodex injections  VSS, reports feeling well  Labs due 6/30 with Axel WALTER visit. faslodex administered bilat upper outer glut - tolerated well, 2x2 gauze/ paper tape to sites    Discharged stable to home, gait steady / indep  Has future appts, provided and reviewed AVS.  She also uses [employer]. Wheelchair

## 2024-02-12 ENCOUNTER — OFFICE VISIT (OUTPATIENT)
Dept: HEMATOLOGY/ONCOLOGY | Facility: HOSPITAL | Age: 64
End: 2024-02-12
Attending: NURSE PRACTITIONER
Payer: MEDICARE

## 2024-02-12 ENCOUNTER — NURSE ONLY (OUTPATIENT)
Dept: HEMATOLOGY/ONCOLOGY | Facility: HOSPITAL | Age: 64
End: 2024-02-12
Attending: INTERNAL MEDICINE
Payer: MEDICARE

## 2024-02-12 VITALS
RESPIRATION RATE: 16 BRPM | HEIGHT: 60.98 IN | BODY MASS INDEX: 21.36 KG/M2 | HEART RATE: 68 BPM | WEIGHT: 113.13 LBS | TEMPERATURE: 98 F | SYSTOLIC BLOOD PRESSURE: 121 MMHG | OXYGEN SATURATION: 100 % | DIASTOLIC BLOOD PRESSURE: 76 MMHG

## 2024-02-12 DIAGNOSIS — C78.7 MALIGNANT NEOPLASM METASTATIC TO LIVER (HCC): ICD-10-CM

## 2024-02-12 DIAGNOSIS — Z51.81 MEDICATION MONITORING ENCOUNTER: ICD-10-CM

## 2024-02-12 DIAGNOSIS — Z51.11 CHEMOTHERAPY MANAGEMENT, ENCOUNTER FOR: ICD-10-CM

## 2024-02-12 DIAGNOSIS — Z17.0 MALIGNANT NEOPLASM OF UPPER-OUTER QUADRANT OF RIGHT BREAST IN FEMALE, ESTROGEN RECEPTOR POSITIVE  (HCC): ICD-10-CM

## 2024-02-12 DIAGNOSIS — C50.411 MALIGNANT NEOPLASM OF UPPER-OUTER QUADRANT OF RIGHT BREAST IN FEMALE, ESTROGEN RECEPTOR POSITIVE  (HCC): Primary | ICD-10-CM

## 2024-02-12 DIAGNOSIS — Z17.0 MALIGNANT NEOPLASM OF UPPER-OUTER QUADRANT OF RIGHT BREAST IN FEMALE, ESTROGEN RECEPTOR POSITIVE  (HCC): Primary | ICD-10-CM

## 2024-02-12 DIAGNOSIS — C50.411 MALIGNANT NEOPLASM OF UPPER-OUTER QUADRANT OF RIGHT BREAST IN FEMALE, ESTROGEN RECEPTOR POSITIVE  (HCC): ICD-10-CM

## 2024-02-12 DIAGNOSIS — Z45.2 ENCOUNTER FOR CENTRAL LINE CARE: Primary | ICD-10-CM

## 2024-02-12 LAB
ALBUMIN SERPL-MCNC: 3.9 G/DL (ref 3.2–4.8)
ALBUMIN/GLOB SERPL: 1.3 {RATIO} (ref 1–2)
ALP LIVER SERPL-CCNC: 74 U/L
ALT SERPL-CCNC: 36 U/L
ANION GAP SERPL CALC-SCNC: 8 MMOL/L (ref 0–18)
AST SERPL-CCNC: 39 U/L (ref ?–34)
BASOPHILS # BLD AUTO: 0.02 X10(3) UL (ref 0–0.2)
BASOPHILS NFR BLD AUTO: 0.7 %
BILIRUB SERPL-MCNC: 0.9 MG/DL (ref 0.2–1.1)
BUN BLD-MCNC: 24 MG/DL (ref 9–23)
BUN/CREAT SERPL: 19.2 (ref 10–20)
CALCIUM BLD-MCNC: 9.3 MG/DL (ref 8.7–10.4)
CHLORIDE SERPL-SCNC: 104 MMOL/L (ref 98–112)
CO2 SERPL-SCNC: 29 MMOL/L (ref 21–32)
CREAT BLD-MCNC: 1.25 MG/DL
DEPRECATED RDW RBC AUTO: 58.3 FL (ref 35.1–46.3)
EGFRCR SERPLBLD CKD-EPI 2021: 49 ML/MIN/1.73M2 (ref 60–?)
EOSINOPHIL # BLD AUTO: 0.08 X10(3) UL (ref 0–0.7)
EOSINOPHIL NFR BLD AUTO: 2.7 %
ERYTHROCYTE [DISTWIDTH] IN BLOOD BY AUTOMATED COUNT: 16.8 % (ref 11–15)
GLOBULIN PLAS-MCNC: 2.9 G/DL (ref 2.8–4.4)
GLUCOSE BLD-MCNC: 114 MG/DL (ref 70–99)
HCT VFR BLD AUTO: 33.7 %
HGB BLD-MCNC: 11.2 G/DL
IMM GRANULOCYTES # BLD AUTO: 0 X10(3) UL (ref 0–1)
IMM GRANULOCYTES NFR BLD: 0 %
LYMPHOCYTES # BLD AUTO: 0.58 X10(3) UL (ref 1–4)
LYMPHOCYTES NFR BLD AUTO: 19.4 %
MCH RBC QN AUTO: 32.2 PG (ref 26–34)
MCHC RBC AUTO-ENTMCNC: 33.2 G/DL (ref 31–37)
MCV RBC AUTO: 96.8 FL
MONOCYTES # BLD AUTO: 0.35 X10(3) UL (ref 0.1–1)
MONOCYTES NFR BLD AUTO: 11.7 %
NEUTROPHILS # BLD AUTO: 1.96 X10 (3) UL (ref 1.5–7.7)
NEUTROPHILS # BLD AUTO: 1.96 X10(3) UL (ref 1.5–7.7)
NEUTROPHILS NFR BLD AUTO: 65.5 %
OSMOLALITY SERPL CALC.SUM OF ELEC: 297 MOSM/KG (ref 275–295)
PLATELET # BLD AUTO: 139 10(3)UL (ref 150–450)
POTASSIUM SERPL-SCNC: 3.5 MMOL/L (ref 3.5–5.1)
PROT SERPL-MCNC: 6.8 G/DL (ref 5.7–8.2)
RBC # BLD AUTO: 3.48 X10(6)UL
SODIUM SERPL-SCNC: 141 MMOL/L (ref 136–145)
WBC # BLD AUTO: 3 X10(3) UL (ref 4–11)

## 2024-02-12 PROCEDURE — 36591 DRAW BLOOD OFF VENOUS DEVICE: CPT

## 2024-02-12 PROCEDURE — 80053 COMPREHEN METABOLIC PANEL: CPT

## 2024-02-12 PROCEDURE — 85025 COMPLETE CBC W/AUTO DIFF WBC: CPT

## 2024-02-12 RX ORDER — SODIUM CHLORIDE 9 MG/ML
10 INJECTION, SOLUTION INTRAMUSCULAR; INTRAVENOUS; SUBCUTANEOUS ONCE
OUTPATIENT
Start: 2024-02-12

## 2024-02-12 RX ORDER — HEPARIN SODIUM (PORCINE) LOCK FLUSH IV SOLN 100 UNIT/ML 100 UNIT/ML
5 SOLUTION INTRAVENOUS ONCE
OUTPATIENT
Start: 2024-02-12

## 2024-02-12 RX ORDER — HEPARIN SODIUM (PORCINE) LOCK FLUSH IV SOLN 100 UNIT/ML 100 UNIT/ML
5 SOLUTION INTRAVENOUS ONCE
Status: COMPLETED | OUTPATIENT
Start: 2024-02-12 | End: 2024-02-12

## 2024-02-12 RX ADMIN — HEPARIN SODIUM (PORCINE) LOCK FLUSH IV SOLN 100 UNIT/ML 500 UNITS: 100 SOLUTION INTRAVENOUS at 09:57:00

## 2024-02-12 NOTE — PROGRESS NOTES
HPI   Aleisha Carlin is a 63 year old female here for follow up of   Encounter Diagnoses   Name Primary?    Malignant neoplasm of upper-outer quadrant of right breast in female, estrogen receptor positive  (HCC) Yes    Malignant neoplasm metastatic to liver (HCC)     Chemotherapy management, encounter for    .    Currently s/p cycle 8 Eribulin.  Patient was admitted on 12/31/2023 and discharged on 1/7/2024 due to bilateral lower extremity weakness with falls.  She was evaluated by neurology and was diagnosed with possible myasthenia gravis and was treated as such with IVIG infusions.  She did have improvement in her strength of the lower extremities, and is able not to ambulate independently.  The myasthenia gravis panel is still pending.  She is following with neurology.  While she was admitted, she had her staging imaging for which she was due.    States that able to ambulate well.  She states that did have some vertigo one of the days.  Increasing her activity.      Currently s/p cycle 1 capecitabine tolerating well. Mild nausea x 1   C/o shoulder pain R>L, jero when raising arms. Taking norco 2-3 times per day.    Fatigue:  Yes, but states a little improved    Fevers:  No     Appetite/taste changes:  Yes, but improving.    Mucositis:  Yes, one but resolved.     Weight changes:   stable now    Nausea/vomiting:  No     Diarrhea:  No    Constipation:  Yes, after treatment but improves with stool softener.  States not as bad as with prior treatments.      Peripheral neuropathy:  Yes, Occasionally R last two fingers and does not last long .  Improved      RUQ discomfort intermittent. Improved. Norco prn.          ECOG PS 1        Review of Systems:     Review of Systems   HENT:   Negative for mouth sores.    Respiratory:  Positive for shortness of breath (with activity). Negative for cough.    Cardiovascular:  Positive for leg swelling (BLE edema R>L, from surgeries on the R foot and leg.). Negative for chest pain  and palpitations.   Gastrointestinal:  Positive for constipation (stool softener) and nausea (mild day 3,4). Negative for abdominal pain (RUQ discomfort).   Genitourinary:  Negative for difficulty urinating, dysuria and frequency.    Musculoskeletal:  Positive for arthralgias (L knee pain, shoulder pain). Negative for back pain.   Skin:         States has NF lesion in her back that hurts on her back on the L side.     Neurological:  Positive for numbness (to R hand- sl better). Negative for dizziness and headaches.   Hematological:  Negative for adenopathy. Bruises/bleeds easily.   Psychiatric/Behavioral:  Positive for sleep disturbance (improved with sl mirtazapine.).          Current Outpatient Medications   Medication Sig Dispense Refill    capecitabine 500 MG Oral tab Take 3 tablets (1,500 mg total) by mouth 2 (two) times daily Take daily for 7 days, then stop for 7 days.  Continue this pattern until instructed otherwise. 4 weeks is one cycle 84 tablet 11    LORazepam 0.5 MG Oral Tab Take 1 tablet (0.5 mg total) by mouth every 6 (six) hours as needed for Anxiety. 30 tablet 0    HYDROcodone-acetaminophen  MG Oral Tab Take 1 tablet by mouth every 6 (six) hours as needed for Pain. 90 tablet 0    mirtazapine 15 MG Oral Tablet Dispersible Take 1 tablet (15 mg total) by mouth nightly. 90 tablet 3    furosemide 20 MG Oral Tab Take 1 tablet (20 mg total) by mouth daily.      minocycline 50 MG Oral Cap Take 1 capsule (50 mg total) by mouth daily.      zolpidem 10 MG Oral Tab Take 1 tablet (10 mg total) by mouth nightly as needed. 60 tablet 0    prochlorperazine (COMPAZINE) 10 mg tablet Take 1 tablet (10 mg total) by mouth every 6 (six) hours as needed for Nausea. 90 tablet 2    folic acid 1 MG Oral Tab Take 1 tablet (1 mg total) by mouth daily. 90 tablet 3    guaiFENesin-codeine 100-10 MG/5ML Oral Solution Take 5 mL by mouth every 6 (six) hours as needed for cough. 473 mL 0    albuterol 108 (90 Base) MCG/ACT  Inhalation Aero Soln Inhale 1 puff into the lungs every 6 (six) hours as needed for Wheezing. 8 g 3    HYDROcodone-acetaminophen  MG Oral Tab Take 1-2 tablets by mouth every 4 (four) hours as needed for Pain. 120 tablet 0    Zoledronic Acid (ZOMETA IV) Inject 4 mg into the vein every 6 (six) months.      Calcium Carb-Cholecalciferol (CALCIUM 500 + D) 500-200 MG-UNIT Oral Tab Take by mouth daily.      Cholecalciferol (VITAMIN D3) 250 MCG (61218 UT) Oral Cap Take 1 capsule by mouth daily.      atorvastatin 20 MG Oral Tab Take 1 tablet (20 mg total) by mouth daily. (Patient not taking: Reported on 9/21/2023)       Allergies:   No Known Allergies    Past Medical History:   Diagnosis Date    Breast cancer (HCC) 03/02/2012    MASTECTOMY / RECONSTRUCTION    Chemotherapy-induced neutropenia  (HCC) 12/25/2020    Encounter for insertion of venous access port 2007    portacath insertion / venous access    Glaucoma     History of breast cancer in female     Hyperlipidemia     Malignant neoplasm of overlapping sites of breast in female, estrogen receptor positive  (HCC) 1/16/2015    Malignant neoplasm of upper-outer quadrant of right breast in female, estrogen receptor positive  (HCC) 1/16/2015    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    Menorrhagia 2007    HYSTEROSCOPY / D&C / endomentrial biopsy (08/28/2007)    Metastatic breast cancer 10/6/2020    Neurofibromatosis (HCC)     Osteopenia of multiple sites 12/25/2020    Osteopenia of multiple sites 2/2/2021    Osteoporosis screening 8/2/13    Osteoporosis screening 08/02/2013    Per [facility]    Other osteoporosis without current pathological fracture 2/2/2021    Postmenopausal bleeding 7/7/2014    Psychophysiological insomnia 8/5/2021    Tumor, foot     tumor right foot / excision/excision of bone spur/arthrodesis w/screw fixation     Past Surgical History:   Procedure Laterality Date    BREAST RECONSTRUCTION Right 2008    right breast reconstructed - ductal, BRCA neg     BREAST SURGERY      CHEMOTHERAPY  2007    FOOT SURGERY Right     tumor rt foot / excision/excision of bone spur/arthrodesis w/screw fixation    IR PORT A CATH PROCEDURE  2007    Right subclavian port of cath.    MASTECTOMY RIGHT      2012    MASTECTOMY,SIMPLE  2007     Social History     Socioeconomic History    Marital status: Single   Tobacco Use    Smoking status: Never    Smokeless tobacco: Never   Substance and Sexual Activity    Alcohol use: Yes     Alcohol/week: 5.0 standard drinks of alcohol     Types: 5 Standard drinks or equivalent per week     Comment: Socially.  (Per [facility]:  5 days weekly.)    Drug use: No   Other Topics Concern    Caffeine Concern Yes     Comment: tea     Social Determinants of Health     Food Insecurity: No Food Insecurity (2023)    Food Insecurity     Food Insecurity: Never true   Transportation Needs: No Transportation Needs (2023)    Transportation Needs     Lack of Transportation: No   Housing Stability: Low Risk  (2023)    Housing Stability     Housing Instability: No       Family History   Problem Relation Age of Onset    Breast Cancer Mother 49        bilateral mastectomy    Genetic Disease Mother         NF-1    Breast Cancer Maternal Aunt 75    Breast Cancer Paternal Grandmother     Breast Cancer Self 51    Genetic Disease Self         NF-1    Cancer Paternal Aunt         throat ca    Cancer Maternal Uncle         prostate ca    Genetic Disease Brother         NF-1    Genetic Disease Brother         NF-1    Genetic Disease Brother         NF-1    Cancer Maternal Uncle         bladder ca    Breast Cancer Maternal Cousin Female     Cancer Maternal Cousin Female         leukemia;  childhood    Cancer Paternal Cousin Male         throat ca         PHYSICAL EXAM:    /76 (BP Location: Left arm, Patient Position: Sitting, Cuff Size: adult)   Pulse 68   Temp 98.1 °F (36.7 °C) (Oral)   Resp 16   Ht 1.549 m (5' 0.98\")   Wt 51.3 kg (113 lb 1.6 oz)    SpO2 100%   BMI 21.38 kg/m²   Wt Readings from Last 6 Encounters:   01/10/24 50.8 kg (112 lb)   12/31/23 49.6 kg (109 lb 4.8 oz)   01/03/24 49.6 kg (109 lb 5.6 oz)   12/21/23 50.6 kg (111 lb 8 oz)   12/14/23 52.8 kg (116 lb 8 oz)   11/22/23 53.5 kg (118 lb)     Physical Exam  General: Patient is alert, not in acute distress.  HEENT: EOMs intact. Oropharynx is clear.   Neck: No JVD. No palpable lymphadenopathy. Neck is supple.  Chest: Clear to auscultation.  Heart: Regular rate and rhythm.   Abdomen: Soft, non tender with good bowel sounds.    Extremities: +1 edema R>L chronic,   Neurological: Grossly intact.   Psych/Depression: nl  Skin:  NF lesions on the back       ASSESSMENT/PLAN:     1. Malignant neoplasm of upper-outer quadrant of right breast in female, estrogen receptor positive  (HCC)    2. Malignant neoplasm metastatic to liver (HCC)    3. Chemotherapy management, encounter for        Breast cancer history of ER/MD positive metastatic breast cancer to pleura, liver, bones. Initially diagnosed 2007 with DCIS, treated with lumpectomy, however MRI showed progression in the breast and she had a complete right mastectomy with axillary dissection followed by chemo in 2008: .pT1,N1. ER 9%, PgR 8%, Her-2 0, Ki 39%. she then presented with recurrence in 2020 right neck/supraclav ( ER 98% MD 0  her2 1+)  Pleura (Er 20%, Her2 1+) and liver:  (ER 38%, her 0)     Treatment history  9/11/2007:  right breast lumpectomy: Extensive DCIS ER 90%. Mri with progression  11/16/2007:  right mastectomy with axillary node dissection: pT1 N1 M0  2/16/2008:  4 cycles of Taxotere and Cytoxan   9/18/2020: Right neck recurrence,  left pleural,  liver-   10/1/2020: My risk Myriad negative. Started Pablociclib 125mg d1-21 w/ faslodex   4/15/2021: Neutropenic fever. Pablociclib changed to q 21 d with 14 day off cycle   1/27/2022: Prince at 100mg D1-21 q 28 day schedule stared per Dr Soriano  7/15/22: changed to abemaciclib. Progress in the  pleura bx: TNBC, Her2 0 CPS 0  9/22/22-11/2022: Sacituzumab w/o response   11/17/22-1/2023: Abraxane with initial response, liver bx nondx (too small)  2/9/23: started gem/carbo due to SOB with response  4/26/23: imaging with continues response in pleura, progression in liver Er 65%, LA 0, Her2 0, bx: NGS below     NGS:  9/2020: Foundation: NF1, [id number], FGFR1 and MYC amplification  9/2022: G360: [id number] 0.5%. Tempus pleura: NF1, TP53, FGFR1 amp  2/2023: G360:  [id number] 0.9%, APC 0.2 with several new VUS  4/2023 G360: [id number] 0.4%. APC 0.3 with several new VUS. Tempus liver:           Patient  progressed fifth line palliative chemotherapy with gemcitabine and carboplatin, and Faslodex was added to treatment again in April after biopsy of the liver showed ER receptor positive.      ESR 1 mutation is negative.    S/p cycle 8 Eribulin, sixth line therapy, treatment complicated by bilateral lower extremity weakness.  This required admission, was managed by neurology as possible myasthenia.  She did recover her performance status back to 0-1.    Her CT scan of the chest, abdomen and pelvis to assess response to therapy showed a new hypodense lesion of the right hepatic lobe measuring 1.8 x 1.6 cm with some mild associated capsular retraction.  I reviewed with the patient options for treatment, given that this is a solitary site, the option of evaluation with IR for Y90 embolization was presented to the patient, this would treat that lesion by itself.  Given that the patient has had metastatic disease for several years, she will also need to have further systemic therapy after that treatment.  The patient has not been treated with capecitabine.  I discussed with the patient that if she is able to have a good breakfast and dinner, she would be a good candidate for capecitabine week on and week off.      Patient will proceed with 7th line therapy with capecitabine week on/week off and after 3 months will have imaging.   If still has liver lesion, will consider Y-90 embolization, vs continue with treatment w/o Y-90.      S/p cycle 1 capecitabine started 1/22    Proceed with cycle 2 2/19  Pt to call for refills      Cancer-related pain- RUQ discomfort Norco 10/325 prn    Zometa - last dose March 2023. Completed 2 years, no further needed       Anemia from chemo:  Monitor closely. Added folic acid 1mg daily and changed B12 SL 1000mcg daily.     Call prn.    MDM high risk    No orders of the defined types were placed in this encounter.      Results From Past 48 Hours:  Recent Results (from the past 48 hour(s))   COMP METABOLIC PANEL [E]    Collection Time: 02/12/24  9:51 AM   Result Value Ref Range    Glucose 114 (H) 70 - 99 mg/dL    Sodium 141 136 - 145 mmol/L    Potassium 3.5 3.5 - 5.1 mmol/L    Chloride 104 98 - 112 mmol/L    CO2 29.0 21.0 - 32.0 mmol/L    Anion Gap 8 0 - 18 mmol/L    BUN 24 (H) 9 - 23 mg/dL    Creatinine 1.25 (H) 0.55 - 1.02 mg/dL    BUN/CREA Ratio 19.2 10.0 - 20.0    Calcium, Total 9.3 8.7 - 10.4 mg/dL    Calculated Osmolality 297 (H) 275 - 295 mOsm/kg    eGFR-Cr 49 (L) >=60 mL/min/1.73m2    ALT 36 10 - 49 U/L    AST 39 (H) <=34 U/L    Alkaline Phosphatase 74 50 - 130 U/L    Bilirubin, Total 0.9 0.2 - 1.1 mg/dL    Total Protein 6.8 5.7 - 8.2 g/dL    Albumin 3.9 3.2 - 4.8 g/dL    Globulin  2.9 2.8 - 4.4 g/dL    A/G Ratio 1.3 1.0 - 2.0    Patient Fasting for CMP? Patient not present    CBC W/ DIFFERENTIAL    Collection Time: 02/12/24  9:51 AM   Result Value Ref Range    WBC 3.0 (L) 4.0 - 11.0 x10(3) uL    RBC 3.48 (L) 3.80 - 5.30 x10(6)uL    HGB 11.2 (L) 12.0 - 16.0 g/dL    HCT 33.7 (L) 35.0 - 48.0 %    MCV 96.8 80.0 - 100.0 fL    MCH 32.2 26.0 - 34.0 pg    MCHC 33.2 31.0 - 37.0 g/dL    RDW-SD 58.3 (H) 35.1 - 46.3 fL    RDW 16.8 (H) 11.0 - 15.0 %    .0 (L) 150.0 - 450.0 10(3)uL    Neutrophil Absolute Prelim 1.96 1.50 - 7.70 x10 (3) uL    Neutrophil Absolute 1.96 1.50 - 7.70 x10(3) uL    Lymphocyte Absolute  0.58 (L) 1.00 - 4.00 x10(3) uL    Monocyte Absolute 0.35 0.10 - 1.00 x10(3) uL    Eosinophil Absolute 0.08 0.00 - 0.70 x10(3) uL    Basophil Absolute 0.02 0.00 - 0.20 x10(3) uL    Immature Granulocyte Absolute 0.00 0.00 - 1.00 x10(3) uL    Neutrophil % 65.5 %    Lymphocyte % 19.4 %    Monocyte % 11.7 %    Eosinophil % 2.7 %    Basophil % 0.7 %    Immature Granulocyte % 0.0 %       Imaging & Referrals:  None   No orders of the defined types were placed in this encounter.

## 2024-02-15 ENCOUNTER — APPOINTMENT (OUTPATIENT)
Dept: HEMATOLOGY/ONCOLOGY | Facility: HOSPITAL | Age: 64
End: 2024-02-15
Attending: INTERNAL MEDICINE
Payer: MEDICARE

## 2024-02-15 ENCOUNTER — APPOINTMENT (OUTPATIENT)
Dept: HEMATOLOGY/ONCOLOGY | Facility: HOSPITAL | Age: 64
End: 2024-02-15
Attending: NURSE PRACTITIONER
Payer: MEDICARE

## 2024-02-22 ENCOUNTER — APPOINTMENT (OUTPATIENT)
Dept: HEMATOLOGY/ONCOLOGY | Facility: HOSPITAL | Age: 64
End: 2024-02-22
Attending: INTERNAL MEDICINE
Payer: MEDICARE

## 2024-02-22 ENCOUNTER — APPOINTMENT (OUTPATIENT)
Dept: HEMATOLOGY/ONCOLOGY | Facility: HOSPITAL | Age: 64
End: 2024-02-22
Attending: NURSE PRACTITIONER
Payer: MEDICARE

## 2024-02-25 DIAGNOSIS — G47.00 INSOMNIA, UNSPECIFIED TYPE: Primary | ICD-10-CM

## 2024-02-26 RX ORDER — ZOLPIDEM TARTRATE 10 MG/1
10 TABLET ORAL NIGHTLY PRN
Qty: 60 TABLET | Refills: 0 | Status: SHIPPED | OUTPATIENT
Start: 2024-02-26 | End: 2024-04-24

## 2024-02-27 ENCOUNTER — TELEPHONE (OUTPATIENT)
Dept: HEMATOLOGY/ONCOLOGY | Facility: HOSPITAL | Age: 64
End: 2024-02-27

## 2024-02-27 NOTE — TELEPHONE ENCOUNTER
Aleisha [phone] I had the Myasthenia Gravis lab done and no one has call with the results this was 1/1/24. I would like to know the results .  Thanks Maureen

## 2024-02-27 NOTE — TELEPHONE ENCOUNTER
Patient called and notified Dr. Roberts commented on labs in mychart. Patient had not read yet. Patient will review [facility] message and will defer any questions to Dr. Roberts.Patient verbalizes understanding

## 2024-03-11 ENCOUNTER — NURSE ONLY (OUTPATIENT)
Dept: HEMATOLOGY/ONCOLOGY | Facility: HOSPITAL | Age: 64
End: 2024-03-11
Attending: PHYSICIAN ASSISTANT
Payer: MEDICARE

## 2024-03-11 VITALS
HEIGHT: 60.98 IN | WEIGHT: 112 LBS | DIASTOLIC BLOOD PRESSURE: 78 MMHG | BODY MASS INDEX: 21.14 KG/M2 | SYSTOLIC BLOOD PRESSURE: 129 MMHG | OXYGEN SATURATION: 100 % | RESPIRATION RATE: 16 BRPM | TEMPERATURE: 98 F | HEART RATE: 69 BPM

## 2024-03-11 DIAGNOSIS — M25.511 ACUTE PAIN OF BOTH SHOULDERS: ICD-10-CM

## 2024-03-11 DIAGNOSIS — Z51.11 CHEMOTHERAPY MANAGEMENT, ENCOUNTER FOR: ICD-10-CM

## 2024-03-11 DIAGNOSIS — C50.919 TRIPLE NEGATIVE BREAST CANCER (HCC): ICD-10-CM

## 2024-03-11 DIAGNOSIS — Z17.0 MALIGNANT NEOPLASM OF UPPER-OUTER QUADRANT OF RIGHT BREAST IN FEMALE, ESTROGEN RECEPTOR POSITIVE (HCC): ICD-10-CM

## 2024-03-11 DIAGNOSIS — Z51.81 MEDICATION MONITORING ENCOUNTER: ICD-10-CM

## 2024-03-11 DIAGNOSIS — C50.411 MALIGNANT NEOPLASM OF UPPER-OUTER QUADRANT OF RIGHT BREAST IN FEMALE, ESTROGEN RECEPTOR POSITIVE (HCC): ICD-10-CM

## 2024-03-11 DIAGNOSIS — C78.7 MALIGNANT NEOPLASM METASTATIC TO LIVER (HCC): ICD-10-CM

## 2024-03-11 DIAGNOSIS — M25.512 ACUTE PAIN OF BOTH SHOULDERS: ICD-10-CM

## 2024-03-11 DIAGNOSIS — C78.7 MALIGNANT NEOPLASM METASTATIC TO LIVER (HCC): Primary | ICD-10-CM

## 2024-03-11 LAB
ALBUMIN SERPL-MCNC: 4.5 G/DL (ref 3.2–4.8)
ALBUMIN/GLOB SERPL: 1.7 {RATIO} (ref 1–2)
ALP LIVER SERPL-CCNC: 66 U/L
ALT SERPL-CCNC: 33 U/L
ANION GAP SERPL CALC-SCNC: 8 MMOL/L (ref 0–18)
AST SERPL-CCNC: 37 U/L (ref ?–34)
BASOPHILS # BLD AUTO: 0.01 X10(3) UL (ref 0–0.2)
BASOPHILS NFR BLD AUTO: 0.3 %
BILIRUB SERPL-MCNC: 1.4 MG/DL (ref 0.2–1.1)
BUN BLD-MCNC: 22 MG/DL (ref 9–23)
BUN/CREAT SERPL: 16.9 (ref 10–20)
CALCIUM BLD-MCNC: 10.1 MG/DL (ref 8.7–10.4)
CHLORIDE SERPL-SCNC: 105 MMOL/L (ref 98–112)
CO2 SERPL-SCNC: 30 MMOL/L (ref 21–32)
CREAT BLD-MCNC: 1.3 MG/DL
DEPRECATED RDW RBC AUTO: 60.2 FL (ref 35.1–46.3)
EGFRCR SERPLBLD CKD-EPI 2021: 46 ML/MIN/1.73M2 (ref 60–?)
EOSINOPHIL # BLD AUTO: 0.09 X10(3) UL (ref 0–0.7)
EOSINOPHIL NFR BLD AUTO: 2.7 %
ERYTHROCYTE [DISTWIDTH] IN BLOOD BY AUTOMATED COUNT: 16.7 % (ref 11–15)
GLOBULIN PLAS-MCNC: 2.6 G/DL (ref 2.8–4.4)
GLUCOSE BLD-MCNC: 108 MG/DL (ref 70–99)
HCT VFR BLD AUTO: 38.8 %
HGB BLD-MCNC: 12.4 G/DL
IMM GRANULOCYTES # BLD AUTO: 0.01 X10(3) UL (ref 0–1)
IMM GRANULOCYTES NFR BLD: 0.3 %
LYMPHOCYTES # BLD AUTO: 0.48 X10(3) UL (ref 1–4)
LYMPHOCYTES NFR BLD AUTO: 14.2 %
MCH RBC QN AUTO: 31.7 PG (ref 26–34)
MCHC RBC AUTO-ENTMCNC: 32 G/DL (ref 31–37)
MCV RBC AUTO: 99.2 FL
MONOCYTES # BLD AUTO: 0.3 X10(3) UL (ref 0.1–1)
MONOCYTES NFR BLD AUTO: 8.8 %
NEUTROPHILS # BLD AUTO: 2.5 X10 (3) UL (ref 1.5–7.7)
NEUTROPHILS # BLD AUTO: 2.5 X10(3) UL (ref 1.5–7.7)
NEUTROPHILS NFR BLD AUTO: 73.7 %
OSMOLALITY SERPL CALC.SUM OF ELEC: 300 MOSM/KG (ref 275–295)
PLATELET # BLD AUTO: 134 10(3)UL (ref 150–450)
POTASSIUM SERPL-SCNC: 3.6 MMOL/L (ref 3.5–5.1)
PROT SERPL-MCNC: 7.1 G/DL (ref 5.7–8.2)
RBC # BLD AUTO: 3.91 X10(6)UL
SODIUM SERPL-SCNC: 143 MMOL/L (ref 136–145)
WBC # BLD AUTO: 3.4 X10(3) UL (ref 4–11)

## 2024-03-11 PROCEDURE — 36415 COLL VENOUS BLD VENIPUNCTURE: CPT

## 2024-03-11 PROCEDURE — 85025 COMPLETE CBC W/AUTO DIFF WBC: CPT

## 2024-03-11 PROCEDURE — 99215 OFFICE O/P EST HI 40 MIN: CPT | Performed by: PHYSICIAN ASSISTANT

## 2024-03-11 PROCEDURE — 80053 COMPREHEN METABOLIC PANEL: CPT

## 2024-03-11 NOTE — PATIENT INSTRUCTIONS
Continue Capecitabine as directed  Will monitor T. Bilirubin trend  CT due before next follow-up appointment in 4 weeks  Call as needed  Follow-up in 4 week with labs

## 2024-03-11 NOTE — PROGRESS NOTES
HPI   Aleisha Carlin is a 64 year old female here for follow up of   Encounter Diagnoses   Name Primary?    Malignant neoplasm metastatic to liver (HCC) Yes    Triple negative breast cancer (HCC)    .  Currently, s/p cycle 8 Eribulin.  Patient was admitted on 12/31/2023 and discharged on 1/7/2024 due to bilateral lower extremity weakness with falls.  She was evaluated by neurology and was diagnosed with possible myasthenia gravis and was treated as such with IVIG infusions.  She did have improvement in her strength of the lower extremities, and is able not to ambulate independently.  The myasthenia gravis panel is still pending.  She is following with neurology.  While she was admitted, she had her staging imaging for which she was due.    Seventh line capecitabine initiated on 1/22/24  Currently s/p cycle 2 capecitabine tolerating well.     C/o shoulder pain R>L, jero when raising arms since starting capecitabine.  Denies injury/activity changes. Advil or Norco alleviates pain.  Right foot has been bothersome more recently.  H/o surgeries dating back to childhood.      Fatigue:  Yes, but states a little improved    Fevers:  No     Appetite/taste changes:  No    Mucositis:  No    Weight changes:   stable now    Nausea/vomiting:  Yes, controlled with prochlorperazine      Diarrhea:  Yes, occasionally    Constipation:  No    Peripheral neuropathy:  Yes, Per history, occasionally R last two fingers and does not last long. No complaint today .      H/o RUQ discomfort intermittent, improved. Norco prn.  No complaint today    ECOG PS 1    Review of Systems:     Review of Systems   Constitutional:  Negative for appetite change, fatigue, fever and unexpected weight change.   HENT:   Negative for mouth sores.    Respiratory:  Positive for shortness of breath (with activity). Negative for cough.    Cardiovascular:  Positive for leg swelling (BLE edema R>L, from surgeries on the R foot and leg.). Negative for chest pain and  palpitations.   Gastrointestinal:  Positive for abdominal pain (RUQ discomfort history), diarrhea and nausea (mild day 3,4). Negative for blood in stool, constipation and vomiting.   Genitourinary:  Negative for difficulty urinating, dysuria and frequency.    Musculoskeletal:  Positive for arthralgias (L knee pain, shoulder pain bilaterally, right foot). Negative for back pain.   Skin:         NF   Neurological:  Positive for numbness (to R hand- sl better). Negative for dizziness and headaches.   Hematological:  Negative for adenopathy. Bruises/bleeds easily.   Psychiatric/Behavioral:  Positive for sleep disturbance (improved with sl mirtazapine.).          Current Outpatient Medications   Medication Sig Dispense Refill    zolpidem 10 MG Oral Tab Take 1 tablet (10 mg total) by mouth nightly as needed for Sleep. 60 tablet 0    LORazepam 0.5 MG Oral Tab Take 1 tablet (0.5 mg total) by mouth every 6 (six) hours as needed for Anxiety. 30 tablet 0    HYDROcodone-acetaminophen  MG Oral Tab Take 1 tablet by mouth every 6 (six) hours as needed for Pain. 90 tablet 0    capecitabine 500 MG Oral tab Take 3 tablets (1,500 mg total) by mouth 2 (two) times daily Take daily for 7 days, then stop for 7 days.  Continue this pattern until instructed otherwise. 4 weeks is one cycle 84 tablet 11    mirtazapine 15 MG Oral Tablet Dispersible Take 1 tablet (15 mg total) by mouth nightly. 90 tablet 3    furosemide 20 MG Oral Tab Take 1 tablet (20 mg total) by mouth daily.      minocycline 50 MG Oral Cap Take 1 capsule (50 mg total) by mouth daily.      prochlorperazine (COMPAZINE) 10 mg tablet Take 1 tablet (10 mg total) by mouth every 6 (six) hours as needed for Nausea. 90 tablet 2    albuterol 108 (90 Base) MCG/ACT Inhalation Aero Soln Inhale 1 puff into the lungs every 6 (six) hours as needed for Wheezing. 8 g 3    HYDROcodone-acetaminophen  MG Oral Tab Take 1-2 tablets by mouth every 4 (four) hours as needed for Pain. 120  tablet 0    Zoledronic Acid (ZOMETA IV) Inject 4 mg into the vein every 6 (six) months.      folic acid 1 MG Oral Tab Take 1 tablet (1 mg total) by mouth daily. (Patient not taking: Reported on 3/11/2024) 90 tablet 3    guaiFENesin-codeine 100-10 MG/5ML Oral Solution Take 5 mL by mouth every 6 (six) hours as needed for cough. (Patient not taking: Reported on 2/12/2024) 473 mL 0    Calcium Carb-Cholecalciferol (CALCIUM 500 + D) 500-200 MG-UNIT Oral Tab Take by mouth daily. (Patient not taking: Reported on 2/12/2024)      Cholecalciferol (VITAMIN D3) 250 MCG (55471 UT) Oral Cap Take 1 capsule by mouth daily. (Patient not taking: Reported on 2/12/2024)      atorvastatin 20 MG Oral Tab Take 1 tablet (20 mg total) by mouth daily. (Patient not taking: Reported on 9/21/2023)       Allergies:   No Known Allergies    Past Medical History:   Diagnosis Date    Breast cancer (HCC) 03/02/2012    MASTECTOMY / RECONSTRUCTION    Chemotherapy-induced neutropenia (HCC) 12/25/2020    Encounter for insertion of venous access port 2007    portacath insertion / venous access    Glaucoma     History of breast cancer in female     Hyperlipidemia     Malignant neoplasm of overlapping sites of breast in female, estrogen receptor positive (HCC) 1/16/2015    Malignant neoplasm of upper-outer quadrant of right breast in female, estrogen receptor positive (HCC) 1/16/2015    Malignant pleural effusion (HCC) 9/25/2020    Menorrhagia 2007    HYSTEROSCOPY / D&C / endomentrial biopsy (08/28/2007)    Metastatic breast cancer 10/6/2020    Neurofibromatosis (HCC)     Osteopenia of multiple sites 12/25/2020    Osteopenia of multiple sites 2/2/2021    Osteoporosis screening 8/2/13    Osteoporosis screening 08/02/2013    Per [facility]    Other osteoporosis without current pathological fracture 2/2/2021    Postmenopausal bleeding 7/7/2014    Psychophysiological insomnia 8/5/2021    Tumor, foot     tumor right foot / excision/excision of bone spur/arthrodesis  w/screw fixation     Past Surgical History:   Procedure Laterality Date    BREAST RECONSTRUCTION Right 2008    right breast reconstructed - ductal, BRCA neg    BREAST SURGERY      CHEMOTHERAPY  2007    FOOT SURGERY Right     tumor rt foot / excision/excision of bone spur/arthrodesis w/screw fixation    IR PORT A CATH PROCEDURE  2007    Right subclavian port of cath.    MASTECTOMY RIGHT      2012    MASTECTOMY,SIMPLE  2007     Social History     Socioeconomic History    Marital status: Single   Tobacco Use    Smoking status: Never    Smokeless tobacco: Never   Substance and Sexual Activity    Alcohol use: Yes     Alcohol/week: 5.0 standard drinks of alcohol     Types: 5 Standard drinks or equivalent per week     Comment: Socially.  (Per [facility]:  5 days weekly.)    Drug use: No   Other Topics Concern    Caffeine Concern Yes     Comment: tea     Social Determinants of Health     Food Insecurity: No Food Insecurity (2023)    Food Insecurity     Food Insecurity: Never true   Transportation Needs: No Transportation Needs (2023)    Transportation Needs     Lack of Transportation: No   Housing Stability: Low Risk  (2023)    Housing Stability     Housing Instability: No       Family History   Problem Relation Age of Onset    Breast Cancer Mother 49        bilateral mastectomy    Genetic Disease Mother         NF-1    Breast Cancer Maternal Aunt 75    Breast Cancer Paternal Grandmother     Breast Cancer Self 51    Genetic Disease Self         NF-1    Cancer Paternal Aunt         throat ca    Cancer Maternal Uncle         prostate ca    Genetic Disease Brother         NF-1    Genetic Disease Brother         NF-1    Genetic Disease Brother         NF-1    Cancer Maternal Uncle         bladder ca    Breast Cancer Maternal Cousin Female     Cancer Maternal Cousin Female         leukemia;  childhood    Cancer Paternal Cousin Male         throat ca         PHYSICAL EXAM:    /78 (BP Location: Left arm,  Patient Position: Sitting, Cuff Size: adult)   Pulse 69   Temp 98.1 °F (36.7 °C) (Oral)   Resp 16   Ht 1.549 m (5' 0.98\")   Wt 50.8 kg (112 lb)   SpO2 100%   BMI 21.18 kg/m²   Wt Readings from Last 6 Encounters:   03/11/24 50.8 kg (112 lb)   02/12/24 51.3 kg (113 lb 1.6 oz)   01/10/24 50.8 kg (112 lb)   12/31/23 49.6 kg (109 lb 4.8 oz)   01/03/24 49.6 kg (109 lb 5.6 oz)   12/21/23 50.6 kg (111 lb 8 oz)     Physical Exam  General: Patient is alert, not in acute distress.  HEENT: EOMs intact. Anicteric.  Oropharynx is clear.   Neck: No JVD. No palpable lymphadenopathy. Neck is supple.  Chest: Clear to auscultation.  Heart: Regular rate and rhythm.   Abdomen: Soft, non tender with good bowel sounds.    Extremities: +1 edema R>L chronic, attn shoulders - no edema, erythema or ecchymosis.  Nontender.   Neurological: Grossly intact. Normal AROM shoulders.    Psych/Depression: nl  Skin:  NF lesions on the back       ASSESSMENT/PLAN:     1. Malignant neoplasm metastatic to liver (HCC)    2. Triple negative breast cancer (HCC)        Breast cancer history of ER/CT positive metastatic breast cancer to pleura, liver, bones. Initially diagnosed 2007 with DCIS, treated with lumpectomy, however MRI showed progression in the breast and she had a complete right mastectomy with axillary dissection followed by chemo in 2008: .pT1,N1. ER 9%, PgR 8%, Her-2 0, Ki 39%. she then presented with recurrence in 2020 right neck/supraclav ( ER 98% CT 0  her2 1+)  Pleura (Er 20%, Her2 1+) and liver:  (ER 38%, her 0)     Treatment history  9/11/2007:  right breast lumpectomy: Extensive DCIS ER 90%. Mri with progression  11/16/2007:  right mastectomy with axillary node dissection: pT1 N1 M0  2/16/2008:  4 cycles of Taxotere and Cytoxan   9/18/2020: Right neck recurrence,  left pleural,  liver-   10/1/2020: My risk Myriad negative. Started Pablociclib 125mg d1-21 w/ faslodex   4/15/2021: Neutropenic fever. Pablociclib changed to q 21 d with  14 day off cycle   1/27/2022: Prince at 100mg D1-21 q 28 day schedule stared per Dr Soriano  7/15/22: changed to abemaciclib. Progress in the pleura bx: TNBC, Her2 0 CPS 0  9/22/22-11/2022: Sacituzumab w/o response   11/17/22-1/2023: Abraxane with initial response, liver bx nondx (too small)  2/9/23: started gem/carbo due to SOB with response  4/26/23: imaging with continues response in pleura, progression in liver Er 65%, TN 0, Her2 0, bx: NGS below     NGS:  9/2020: Foundation: NF1, [id number], FGFR1 and MYC amplification  9/2022: G360: [id number] 0.5%. Tempus pleura: NF1, TP53, FGFR1 amp  2/2023: G360:  [id number] 0.9%, APC 0.2 with several new VUS  4/2023 G360: [id number] 0.4%. APC 0.3 with several new VUS. Tempus liver:           Patient  progressed fifth line palliative chemotherapy with gemcitabine and carboplatin, and Faslodex was added to treatment again in April after biopsy of the liver showed ER receptor positive.    ESR 1 mutation is negative.    S/p cycle 8 Eribulin, sixth line therapy, treatment complicated by bilateral lower extremity weakness.  This required admission, was managed by neurology as possible myasthenia.  She did recover her performance status back to 0-1.    Her CT scan of the chest, abdomen and pelvis to assess response to therapy showed a new hypodense lesion of the right hepatic lobe measuring 1.8 x 1.6 cm with some mild associated capsular retraction.  I reviewed with the patient options for treatment, given that this is a solitary site, the option of evaluation with IR for Y90 embolization was presented to the patient, this would treat that lesion by itself.  Given that the patient has had metastatic disease for several years, she will also need to have further systemic therapy after that treatment.  The patient has not been treated with capecitabine.  I discussed with the patient that if she is able to have a good breakfast and dinner, she would be a good candidate for capecitabine week on  and week off.      Patient will proceed with 7th line therapy with capecitabine week on/week off and after 3 months (before next appt) will have imaging.  If still has liver lesion, will consider Y-90 embolization, vs continue with treatment w/o Y-90.      S/p cycle 2 capecitabine. started 1/22    Proceed with cycle 3 on 3/18    Cancer-related pain- RUQ discomfort Norco 10/325 prn    Zometa - last dose March 2023. Completed 2 years, no further needed     Anemia from chemo:  Monitor closely. Added folic acid 1mg daily and changed B12 SL 1000mcg daily.     Bilateral shoulder pain, likely DJD per C-spine MRI.  Not related to capecitabine.    Hyperbilirubinemia:  Monitor trend (change in lab machines/value reference)    Call prn.  Follow-up in 4 weeks      MDM high risk    No orders of the defined types were placed in this encounter.      Results From Past 48 Hours:  Recent Results (from the past 48 hour(s))   COMP METABOLIC PANEL [E]    Collection Time: 03/11/24 10:06 AM   Result Value Ref Range    Glucose 108 (H) 70 - 99 mg/dL    Sodium 143 136 - 145 mmol/L    Potassium 3.6 3.5 - 5.1 mmol/L    Chloride 105 98 - 112 mmol/L    CO2 30.0 21.0 - 32.0 mmol/L    Anion Gap 8 0 - 18 mmol/L    BUN 22 9 - 23 mg/dL    Creatinine 1.30 (H) 0.55 - 1.02 mg/dL    BUN/CREA Ratio 16.9 10.0 - 20.0    Calcium, Total 10.1 8.7 - 10.4 mg/dL    Calculated Osmolality 300 (H) 275 - 295 mOsm/kg    eGFR-Cr 46 (L) >=60 mL/min/1.73m2    ALT 33 10 - 49 U/L    AST 37 (H) <=34 U/L    Alkaline Phosphatase 66 50 - 130 U/L    Bilirubin, Total 1.4 (H) 0.2 - 1.1 mg/dL    Total Protein 7.1 5.7 - 8.2 g/dL    Albumin 4.5 3.2 - 4.8 g/dL    Globulin  2.6 (L) 2.8 - 4.4 g/dL    A/G Ratio 1.7 1.0 - 2.0    Patient Fasting for CMP? Patient not present    CBC W/ DIFFERENTIAL    Collection Time: 03/11/24 10:06 AM   Result Value Ref Range    WBC 3.4 (L) 4.0 - 11.0 x10(3) uL    RBC 3.91 3.80 - 5.30 x10(6)uL    HGB 12.4 12.0 - 16.0 g/dL    HCT 38.8 35.0 - 48.0 %    MCV  99.2 80.0 - 100.0 fL    MCH 31.7 26.0 - 34.0 pg    MCHC 32.0 31.0 - 37.0 g/dL    RDW-SD 60.2 (H) 35.1 - 46.3 fL    RDW 16.7 (H) 11.0 - 15.0 %    .0 (L) 150.0 - 450.0 10(3)uL    Neutrophil Absolute Prelim 2.50 1.50 - 7.70 x10 (3) uL    Neutrophil Absolute 2.50 1.50 - 7.70 x10(3) uL    Lymphocyte Absolute 0.48 (L) 1.00 - 4.00 x10(3) uL    Monocyte Absolute 0.30 0.10 - 1.00 x10(3) uL    Eosinophil Absolute 0.09 0.00 - 0.70 x10(3) uL    Basophil Absolute 0.01 0.00 - 0.20 x10(3) uL    Immature Granulocyte Absolute 0.01 0.00 - 1.00 x10(3) uL    Neutrophil % 73.7 %    Lymphocyte % 14.2 %    Monocyte % 8.8 %    Eosinophil % 2.7 %    Basophil % 0.3 %    Immature Granulocyte % 0.3 %       Imaging & Referrals:  CT CHEST+ABDOMEN+PELVIS(ALL [facility] ONLY)(CPT=71260/22721)   No orders of the defined types were placed in this encounter.

## 2024-03-22 ENCOUNTER — APPOINTMENT (OUTPATIENT)
Dept: HEMATOLOGY/ONCOLOGY | Facility: HOSPITAL | Age: 64
End: 2024-03-22
Attending: NURSE PRACTITIONER
Payer: MEDICARE

## 2024-03-29 DIAGNOSIS — C50.919 PRIMARY MALIGNANT NEOPLASM OF BREAST WITH METASTASIS (HCC): ICD-10-CM

## 2024-03-29 DIAGNOSIS — Z51.11 CHEMOTHERAPY MANAGEMENT, ENCOUNTER FOR: ICD-10-CM

## 2024-03-29 RX ORDER — LORAZEPAM 0.5 MG/1
0.5 TABLET ORAL EVERY 6 HOURS PRN
Qty: 30 TABLET | Refills: 1 | Status: SHIPPED | OUTPATIENT
Start: 2024-03-29

## 2024-04-15 ENCOUNTER — NURSE ONLY (OUTPATIENT)
Dept: HEMATOLOGY/ONCOLOGY | Facility: HOSPITAL | Age: 64
End: 2024-04-15
Attending: NURSE PRACTITIONER
Payer: MEDICARE

## 2024-04-15 VITALS
TEMPERATURE: 98 F | DIASTOLIC BLOOD PRESSURE: 80 MMHG | BODY MASS INDEX: 21.31 KG/M2 | HEART RATE: 70 BPM | RESPIRATION RATE: 16 BRPM | HEIGHT: 60.98 IN | WEIGHT: 112.88 LBS | SYSTOLIC BLOOD PRESSURE: 123 MMHG | OXYGEN SATURATION: 99 %

## 2024-04-15 DIAGNOSIS — Z51.11 CHEMOTHERAPY MANAGEMENT, ENCOUNTER FOR: ICD-10-CM

## 2024-04-15 DIAGNOSIS — Z45.2 ENCOUNTER FOR CENTRAL LINE CARE: Primary | ICD-10-CM

## 2024-04-15 DIAGNOSIS — C50.411 MALIGNANT NEOPLASM OF UPPER-OUTER QUADRANT OF RIGHT BREAST IN FEMALE, ESTROGEN RECEPTOR POSITIVE (HCC): ICD-10-CM

## 2024-04-15 DIAGNOSIS — Z51.81 MEDICATION MONITORING ENCOUNTER: ICD-10-CM

## 2024-04-15 DIAGNOSIS — C50.411 MALIGNANT NEOPLASM OF UPPER-OUTER QUADRANT OF RIGHT BREAST IN FEMALE, ESTROGEN RECEPTOR POSITIVE (HCC): Primary | ICD-10-CM

## 2024-04-15 DIAGNOSIS — C78.7 MALIGNANT NEOPLASM METASTATIC TO LIVER (HCC): ICD-10-CM

## 2024-04-15 DIAGNOSIS — Z17.0 MALIGNANT NEOPLASM OF UPPER-OUTER QUADRANT OF RIGHT BREAST IN FEMALE, ESTROGEN RECEPTOR POSITIVE (HCC): Primary | ICD-10-CM

## 2024-04-15 DIAGNOSIS — Z17.0 MALIGNANT NEOPLASM OF UPPER-OUTER QUADRANT OF RIGHT BREAST IN FEMALE, ESTROGEN RECEPTOR POSITIVE (HCC): ICD-10-CM

## 2024-04-15 LAB
ALBUMIN SERPL-MCNC: 4.2 G/DL (ref 3.2–4.8)
ALBUMIN/GLOB SERPL: 1.8 {RATIO} (ref 1–2)
ALP LIVER SERPL-CCNC: 60 U/L
ALT SERPL-CCNC: 8 U/L
ANION GAP SERPL CALC-SCNC: 2 MMOL/L (ref 0–18)
AST SERPL-CCNC: 21 U/L (ref ?–34)
BASOPHILS # BLD AUTO: 0.02 X10(3) UL (ref 0–0.2)
BASOPHILS NFR BLD AUTO: 0.6 %
BILIRUB SERPL-MCNC: 2.2 MG/DL (ref 0.2–1.1)
BUN BLD-MCNC: 28 MG/DL (ref 9–23)
BUN/CREAT SERPL: 22.6 (ref 10–20)
CALCIUM BLD-MCNC: 9.6 MG/DL (ref 8.7–10.4)
CHLORIDE SERPL-SCNC: 107 MMOL/L (ref 98–112)
CO2 SERPL-SCNC: 29 MMOL/L (ref 21–32)
CREAT BLD-MCNC: 1.24 MG/DL
DEPRECATED RDW RBC AUTO: 64.4 FL (ref 35.1–46.3)
EGFRCR SERPLBLD CKD-EPI 2021: 49 ML/MIN/1.73M2 (ref 60–?)
EOSINOPHIL # BLD AUTO: 0.07 X10(3) UL (ref 0–0.7)
EOSINOPHIL NFR BLD AUTO: 2 %
ERYTHROCYTE [DISTWIDTH] IN BLOOD BY AUTOMATED COUNT: 18 % (ref 11–15)
GLOBULIN PLAS-MCNC: 2.3 G/DL (ref 2.8–4.4)
GLUCOSE BLD-MCNC: 91 MG/DL (ref 70–99)
HCT VFR BLD AUTO: 34.8 %
HGB BLD-MCNC: 12 G/DL
IMM GRANULOCYTES # BLD AUTO: 0.03 X10(3) UL (ref 0–1)
IMM GRANULOCYTES NFR BLD: 0.9 %
LYMPHOCYTES # BLD AUTO: 0.51 X10(3) UL (ref 1–4)
LYMPHOCYTES NFR BLD AUTO: 14.9 %
MCH RBC QN AUTO: 33.7 PG (ref 26–34)
MCHC RBC AUTO-ENTMCNC: 34.5 G/DL (ref 31–37)
MCV RBC AUTO: 97.8 FL
MONOCYTES # BLD AUTO: 0.52 X10(3) UL (ref 0.1–1)
MONOCYTES NFR BLD AUTO: 15.2 %
NEUTROPHILS # BLD AUTO: 2.28 X10 (3) UL (ref 1.5–7.7)
NEUTROPHILS # BLD AUTO: 2.28 X10(3) UL (ref 1.5–7.7)
NEUTROPHILS NFR BLD AUTO: 66.4 %
OSMOLALITY SERPL CALC.SUM OF ELEC: 291 MOSM/KG (ref 275–295)
PLATELET # BLD AUTO: 126 10(3)UL (ref 150–450)
POTASSIUM SERPL-SCNC: 3.8 MMOL/L (ref 3.5–5.1)
PROT SERPL-MCNC: 6.5 G/DL (ref 5.7–8.2)
RBC # BLD AUTO: 3.56 X10(6)UL
SODIUM SERPL-SCNC: 138 MMOL/L (ref 136–145)
WBC # BLD AUTO: 3.4 X10(3) UL (ref 4–11)

## 2024-04-15 PROCEDURE — 85025 COMPLETE CBC W/AUTO DIFF WBC: CPT

## 2024-04-15 PROCEDURE — 80053 COMPREHEN METABOLIC PANEL: CPT

## 2024-04-15 PROCEDURE — 36591 DRAW BLOOD OFF VENOUS DEVICE: CPT

## 2024-04-15 PROCEDURE — 99215 OFFICE O/P EST HI 40 MIN: CPT | Performed by: NURSE PRACTITIONER

## 2024-04-15 RX ORDER — HEPARIN SODIUM (PORCINE) LOCK FLUSH IV SOLN 100 UNIT/ML 100 UNIT/ML
5 SOLUTION INTRAVENOUS ONCE
Status: COMPLETED | OUTPATIENT
Start: 2024-04-15 | End: 2024-04-15

## 2024-04-15 RX ORDER — SODIUM CHLORIDE 9 MG/ML
10 INJECTION, SOLUTION INTRAMUSCULAR; INTRAVENOUS; SUBCUTANEOUS ONCE
OUTPATIENT
Start: 2024-04-15

## 2024-04-15 RX ORDER — HEPARIN SODIUM (PORCINE) LOCK FLUSH IV SOLN 100 UNIT/ML 100 UNIT/ML
5 SOLUTION INTRAVENOUS ONCE
OUTPATIENT
Start: 2024-04-15

## 2024-04-15 RX ADMIN — HEPARIN SODIUM (PORCINE) LOCK FLUSH IV SOLN 100 UNIT/ML 500 UNITS: 100 SOLUTION INTRAVENOUS at 10:39:00

## 2024-04-15 NOTE — PROGRESS NOTES
HPI   Aleisha Carlin is a 64 year old female here for follow up of   Encounter Diagnoses   Name Primary?    Malignant neoplasm of upper-outer quadrant of right breast in female, estrogen receptor positive (HCC) Yes    Malignant neoplasm metastatic to liver (HCC)     Chemotherapy management, encounter for    .  Currently, s/p cycle 8 Eribulin.  Patient was admitted on 12/31/2023 and discharged on 1/7/2024 due to bilateral lower extremity weakness with falls.  She was evaluated by neurology and was diagnosed with possible myasthenia gravis and was treated as such with IVIG infusions.  She did have improvement in her strength of the lower extremities, and is able not to ambulate independently.  The myasthenia gravis panel is still pending.  She is following with neurology.  While she was admitted, she had her staging imaging for which she was due.    Seventh line capecitabine initiated on 1/22/24  Currently s/p cycle 3 capecitabine tolerating well.   Started cycle 4 today. Scheduled CT scan for later this week.     C/o shoulder pain R>L, jero when raising arms since starting capecitabine. Today more L>R.  Denies injury/activity changes. Advil or Norco alleviates pain.  Right foot has been bothersome more recently.  H/o surgeries dating back to childhood.      Fatigue:  Yes, but states a little improved    Fevers:  No     Appetite/taste changes:  No    Mucositis:  No    Weight changes:   stable now    Nausea/vomiting:  Yes, controlled with prochlorperazine      Diarrhea:  Yes, occasionally    Constipation:  No    Peripheral neuropathy:  Yes, Per history, occasionally R last two fingers and does not last long. No complaint today .      H/o RUQ discomfort intermittent, improved. Norco prn.  No complaint today    ECOG PS 1    Review of Systems:     Review of Systems   Constitutional:  Negative for appetite change, fatigue, fever and unexpected weight change.   HENT:   Negative for mouth sores.    Respiratory:  Negative  for cough and shortness of breath.    Cardiovascular:  Positive for leg swelling (BLE edema R>L, from surgeries on the R foot and leg.). Negative for chest pain and palpitations.   Gastrointestinal:  Positive for abdominal pain (RUQ discomfort history), diarrhea and nausea (mild day 3,4). Negative for blood in stool, constipation and vomiting.   Genitourinary:  Negative for difficulty urinating, dysuria and frequency.    Musculoskeletal:  Positive for arthralgias (L knee pain, shoulder pain bilaterally, right foot.). Negative for back pain.   Skin:         NF, hyperpigmented hands and feet    Neurological:  Positive for numbness (to R hand- sl better). Negative for dizziness and headaches.   Hematological:  Negative for adenopathy. Bruises/bleeds easily.   Psychiatric/Behavioral:  Positive for sleep disturbance (improved with sl mirtazapine.).          Current Outpatient Medications   Medication Sig Dispense Refill    LORazepam 0.5 MG Oral Tab Take 1 tablet (0.5 mg total) by mouth every 6 (six) hours as needed for Anxiety. 30 tablet 1    zolpidem 10 MG Oral Tab Take 1 tablet (10 mg total) by mouth nightly as needed for Sleep. 60 tablet 0    HYDROcodone-acetaminophen  MG Oral Tab Take 1 tablet by mouth every 6 (six) hours as needed for Pain. 90 tablet 0    capecitabine 500 MG Oral tab Take 3 tablets (1,500 mg total) by mouth 2 (two) times daily Take daily for 7 days, then stop for 7 days.  Continue this pattern until instructed otherwise. 4 weeks is one cycle 84 tablet 11    mirtazapine 15 MG Oral Tablet Dispersible Take 1 tablet (15 mg total) by mouth nightly. 90 tablet 3    furosemide 20 MG Oral Tab Take 1 tablet (20 mg total) by mouth daily.      minocycline 50 MG Oral Cap Take 1 capsule (50 mg total) by mouth daily.      prochlorperazine (COMPAZINE) 10 mg tablet Take 1 tablet (10 mg total) by mouth every 6 (six) hours as needed for Nausea. 90 tablet 2    folic acid 1 MG Oral Tab Take 1 tablet (1 mg total)  by mouth daily. (Patient not taking: Reported on 3/11/2024) 90 tablet 3    guaiFENesin-codeine 100-10 MG/5ML Oral Solution Take 5 mL by mouth every 6 (six) hours as needed for cough. (Patient not taking: Reported on 2/12/2024) 473 mL 0    albuterol 108 (90 Base) MCG/ACT Inhalation Aero Soln Inhale 1 puff into the lungs every 6 (six) hours as needed for Wheezing. 8 g 3    HYDROcodone-acetaminophen  MG Oral Tab Take 1-2 tablets by mouth every 4 (four) hours as needed for Pain. 120 tablet 0    Zoledronic Acid (ZOMETA IV) Inject 4 mg into the vein every 6 (six) months.      Calcium Carb-Cholecalciferol (CALCIUM 500 + D) 500-200 MG-UNIT Oral Tab Take by mouth daily. (Patient not taking: Reported on 2/12/2024)      Cholecalciferol (VITAMIN D3) 250 MCG (05327 UT) Oral Cap Take 1 capsule by mouth daily. (Patient not taking: Reported on 2/12/2024)      atorvastatin 20 MG Oral Tab Take 1 tablet (20 mg total) by mouth daily. (Patient not taking: Reported on 9/21/2023)       Allergies:   No Known Allergies    Past Medical History:    Breast cancer (HCC)    MASTECTOMY / RECONSTRUCTION    Chemotherapy-induced neutropenia (HCC)    Encounter for insertion of venous access port    portacath insertion / venous access    Glaucoma    History of breast cancer in female    Hyperlipidemia    Malignant neoplasm of overlapping sites of breast in female, estrogen receptor positive (HCC)    Malignant neoplasm of upper-outer quadrant of right breast in female, estrogen receptor positive (HCC)    Malignant pleural effusion (HCC)    Menorrhagia    HYSTEROSCOPY / D&C / endomentrial biopsy (08/28/2007)    Metastatic breast cancer    Neurofibromatosis (HCC)    Osteopenia of multiple sites    Osteopenia of multiple sites    Osteoporosis screening    Osteoporosis screening    Per [employer]    Other osteoporosis without current pathological fracture    Postmenopausal bleeding    Psychophysiological insomnia    Tumor, foot    tumor right foot /  excision/excision of bone spur/arthrodesis w/screw fixation     Past Surgical History:   Procedure Laterality Date    Breast reconstruction Right 2008    right breast reconstructed - ductal, BRCA neg    Breast surgery      Chemotherapy  2007    Foot surgery Right     tumor rt foot / excision/excision of bone spur/arthrodesis w/screw fixation    Ir port a cath procedure  2007    Right subclavian port of cath.    Mastectomy right      2012    Mastectomy,simple  2007     Social History     Socioeconomic History    Marital status: Single   Tobacco Use    Smoking status: Never    Smokeless tobacco: Never   Substance and Sexual Activity    Alcohol use: Yes     Alcohol/week: 5.0 standard drinks of alcohol     Types: 5 Standard drinks or equivalent per week     Comment: Socially.  (Per [employer]:  5 days weekly.)    Drug use: No   Other Topics Concern    Caffeine Concern Yes     Comment: tea     Social Determinants of Health     Food Insecurity: No Food Insecurity (2023)    Food Insecurity     Food Insecurity: Never true   Transportation Needs: No Transportation Needs (2023)    Transportation Needs     Lack of Transportation: No   Housing Stability: Low Risk  (2023)    Housing Stability     Housing Instability: No       Family History   Problem Relation Age of Onset    Breast Cancer Mother 49        bilateral mastectomy    Genetic Disease Mother         NF-1    Breast Cancer Maternal Aunt 75    Breast Cancer Paternal Grandmother     Breast Cancer Self 51    Genetic Disease Self         NF-1    Cancer Paternal Aunt         throat ca    Cancer Maternal Uncle         prostate ca    Genetic Disease Brother         NF-1    Genetic Disease Brother         NF-1    Genetic Disease Brother         NF-1    Cancer Maternal Uncle         bladder ca    Breast Cancer Maternal Cousin Female     Cancer Maternal Cousin Female         leukemia;  childhood    Cancer Paternal Cousin Male         throat ca         PHYSICAL  EXAM:    /80 (BP Location: Left arm, Patient Position: Sitting, Cuff Size: adult)   Pulse 70   Temp 98.2 °F (36.8 °C) (Oral)   Resp 16   Ht 1.549 m (5' 0.98\")   Wt 51.2 kg (112 lb 14.4 oz)   SpO2 99%   BMI 21.34 kg/m²   Wt Readings from Last 6 Encounters:   03/11/24 50.8 kg (112 lb)   02/12/24 51.3 kg (113 lb 1.6 oz)   01/10/24 50.8 kg (112 lb)   12/31/23 49.6 kg (109 lb 4.8 oz)   01/03/24 49.6 kg (109 lb 5.6 oz)   12/21/23 50.6 kg (111 lb 8 oz)     Physical Exam  General: Patient is alert, not in acute distress.  HEENT: EOMs intact. Anicteric.  Oropharynx is clear.   Neck: No JVD. No palpable lymphadenopathy. Neck is supple.  Chest: Clear to auscultation.  Heart: Regular rate and rhythm.   Abdomen: Soft, non tender with good bowel sounds.    Extremities: +1 edema R>L chronic, attn shoulders - no edema, erythema or ecchymosis.  Nontender.   Neurological: Grossly intact. Normal AROM shoulders.    Psych/Depression: nl  Skin:  NF lesions on the back       ASSESSMENT/PLAN:     1. Malignant neoplasm of upper-outer quadrant of right breast in female, estrogen receptor positive (HCC)    2. Malignant neoplasm metastatic to liver (HCC)    3. Chemotherapy management, encounter for        Breast cancer history of ER/PA positive metastatic breast cancer to pleura, liver, bones. Initially diagnosed 2007 with DCIS, treated with lumpectomy, however MRI showed progression in the breast and she had a complete right mastectomy with axillary dissection followed by chemo in 2008: .pT1,N1. ER 9%, PgR 8%, Her-2 0, Ki 39%. she then presented with recurrence in 2020 right neck/supraclav ( ER 98% PA 0  her2 1+)  Pleura (Er 20%, Her2 1+) and liver:  (ER 38%, her 0)     Treatment history  9/11/2007:  right breast lumpectomy: Extensive DCIS ER 90%. Mri with progression  11/16/2007:  right mastectomy with axillary node dissection: pT1 N1 M0  2/16/2008:  4 cycles of Taxotere and Cytoxan   9/18/2020: Right neck recurrence,  left  pleural,  liver-   10/1/2020: My risk Myriad negative. Started Pablociclib 125mg d1-21 w/ faslodex   4/15/2021: Neutropenic fever. Pablociclib changed to q 21 d with 14 day off cycle   1/27/2022: Prince at 100mg D1-21 q 28 day schedule stared per Dr Soriano  7/15/22: changed to abemaciclib. Progress in the pleura bx: TNBC, Her2 0 CPS 0  9/22/22-11/2022: Sacituzumab w/o response   11/17/22-1/2023: Abraxane with initial response, liver bx nondx (too small)  2/9/23: started gem/carbo due to SOB with response  4/26/23: imaging with continues response in pleura, progression in liver Er 65%, CO 0, Her2 0, bx: NGS below     NGS:  9/2020: Foundation: NF1, [id number], FGFR1 and MYC amplification  9/2022: G360: [id number] 0.5%. Tempus pleura: NF1, TP53, FGFR1 amp  2/2023: G360:  [id number] 0.9%, APC 0.2 with several new VUS  4/2023 G360: [id number] 0.4%. APC 0.3 with several new VUS. Tempus liver:           Patient  progressed fifth line palliative chemotherapy with gemcitabine and carboplatin, and Faslodex was added to treatment again in April after biopsy of the liver showed ER receptor positive.    ESR 1 mutation is negative.    S/p cycle 8 Eribulin, sixth line therapy, treatment complicated by bilateral lower extremity weakness.  This required admission, was managed by neurology as possible myasthenia.  She did recover her performance status back to 0-1.    Her CT scan of the chest, abdomen and pelvis to assess response to therapy showed a new hypodense lesion of the right hepatic lobe measuring 1.8 x 1.6 cm with some mild associated capsular retraction.  I reviewed with the patient options for treatment, given that this is a solitary site, the option of evaluation with IR for Y90 embolization was presented to the patient, this would treat that lesion by itself.  Given that the patient has had metastatic disease for several years, she will also need to have further systemic therapy after that treatment.  The patient has not been  treated with capecitabine.  I discussed with the patient that if she is able to have a good breakfast and dinner, she would be a good candidate for capecitabine week on and week off.      Patient will proceed with 7th line therapy with capecitabine week on/week off and after 3 months (before next appt) will have imaging.  If still has liver lesion, will consider Y-90 embolization, vs continue with treatment w/o Y-90.      S/p cycle 3 capecitabine. started 1/22    Proceed with cycle 4 on 4/15 - lotion to hands and feet     Cancer-related pain- RUQ discomfort Norco 10/325 prn    Zometa - last dose March 2023. Completed 2 years, no further needed     Anemia from chemo:  Monitor closely. Added folic acid 1mg daily and changed B12 SL 1000mcg daily.     Bilateral shoulder pain, likely DJD per C-spine MRI.  Not related to capecitabine.    Hyperbilirubinemia:  Monitor trend (change in lab machines/value reference)    Call prn.  Follow-up in 4 weeks      MDM high risk    No orders of the defined types were placed in this encounter.      Results From Past 48 Hours:  Recent Results (from the past 48 hour(s))   COMP METABOLIC PANEL [E]    Collection Time: 04/15/24 10:27 AM   Result Value Ref Range    Glucose 91 70 - 99 mg/dL    Sodium 138 136 - 145 mmol/L    Potassium 3.8 3.5 - 5.1 mmol/L    Chloride 107 98 - 112 mmol/L    CO2 29.0 21.0 - 32.0 mmol/L    Anion Gap 2 0 - 18 mmol/L    BUN 28 (H) 9 - 23 mg/dL    Creatinine 1.24 (H) 0.55 - 1.02 mg/dL    BUN/CREA Ratio 22.6 (H) 10.0 - 20.0    Calcium, Total 9.6 8.7 - 10.4 mg/dL    Calculated Osmolality 291 275 - 295 mOsm/kg    eGFR-Cr 49 (L) >=60 mL/min/1.73m2    ALT 8 (L) 10 - 49 U/L    AST 21 <=34 U/L    Alkaline Phosphatase 60 50 - 130 U/L    Bilirubin, Total 2.2 (H) 0.2 - 1.1 mg/dL    Total Protein 6.5 5.7 - 8.2 g/dL    Albumin 4.2 3.2 - 4.8 g/dL    Globulin  2.3 (L) 2.8 - 4.4 g/dL    A/G Ratio 1.8 1.0 - 2.0    Patient Fasting for CMP? Patient not present    CBC W/ DIFFERENTIAL     Collection Time: 04/15/24 10:27 AM   Result Value Ref Range    WBC 3.4 (L) 4.0 - 11.0 x10(3) uL    RBC 3.56 (L) 3.80 - 5.30 x10(6)uL    HGB 12.0 12.0 - 16.0 g/dL    HCT 34.8 (L) 35.0 - 48.0 %    MCV 97.8 80.0 - 100.0 fL    MCH 33.7 26.0 - 34.0 pg    MCHC 34.5 31.0 - 37.0 g/dL    RDW-SD 64.4 (H) 35.1 - 46.3 fL    RDW 18.0 (H) 11.0 - 15.0 %    .0 (L) 150.0 - 450.0 10(3)uL    Neutrophil Absolute Prelim 2.28 1.50 - 7.70 x10 (3) uL    Neutrophil Absolute 2.28 1.50 - 7.70 x10(3) uL    Lymphocyte Absolute 0.51 (L) 1.00 - 4.00 x10(3) uL    Monocyte Absolute 0.52 0.10 - 1.00 x10(3) uL    Eosinophil Absolute 0.07 0.00 - 0.70 x10(3) uL    Basophil Absolute 0.02 0.00 - 0.20 x10(3) uL    Immature Granulocyte Absolute 0.03 0.00 - 1.00 x10(3) uL    Neutrophil % 66.4 %    Lymphocyte % 14.9 %    Monocyte % 15.2 %    Eosinophil % 2.0 %    Basophil % 0.6 %    Immature Granulocyte % 0.9 %         Imaging & Referrals:  None   No orders of the defined types were placed in this encounter.

## 2024-04-16 ENCOUNTER — HOSPITAL ENCOUNTER (OUTPATIENT)
Dept: CT IMAGING | Facility: HOSPITAL | Age: 64
Discharge: HOME | End: 2024-04-16
Attending: PHYSICIAN ASSISTANT
Payer: MEDICARE

## 2024-04-16 DIAGNOSIS — C78.7 MALIGNANT NEOPLASM METASTATIC TO LIVER (HCC): ICD-10-CM

## 2024-04-16 DIAGNOSIS — C50.919 TRIPLE NEGATIVE BREAST CANCER (HCC): ICD-10-CM

## 2024-04-16 PROCEDURE — 74177 CT ABD & PELVIS W/CONTRAST: CPT | Performed by: PHYSICIAN ASSISTANT

## 2024-04-16 PROCEDURE — 71260 CT THORAX DX C+: CPT | Performed by: PHYSICIAN ASSISTANT

## 2024-04-23 DIAGNOSIS — G47.00 INSOMNIA, UNSPECIFIED TYPE: ICD-10-CM

## 2024-04-24 RX ORDER — ZOLPIDEM TARTRATE 10 MG/1
10 TABLET ORAL NIGHTLY PRN
Qty: 60 TABLET | Refills: 0 | Status: SHIPPED | OUTPATIENT
Start: 2024-04-24 | End: 2024-06-24

## 2024-05-08 RX ORDER — MINOCYCLINE HYDROCHLORIDE 50 MG/1
50 CAPSULE ORAL DAILY
Qty: 90 CAPSULE | Refills: 3 | Status: SHIPPED | OUTPATIENT
Start: 2024-05-08

## 2024-05-10 RX ORDER — SODIUM CHLORIDE 9 MG/ML
10 INJECTION, SOLUTION INTRAMUSCULAR; INTRAVENOUS; SUBCUTANEOUS ONCE
OUTPATIENT
Start: 2024-05-10

## 2024-05-10 RX ORDER — HEPARIN SODIUM (PORCINE) LOCK FLUSH IV SOLN 100 UNIT/ML 100 UNIT/ML
5 SOLUTION INTRAVENOUS ONCE
Status: CANCELLED | OUTPATIENT
Start: 2024-05-10

## 2024-05-13 ENCOUNTER — NURSE ONLY (OUTPATIENT)
Dept: HEMATOLOGY/ONCOLOGY | Facility: HOSPITAL | Age: 64
End: 2024-05-13
Attending: INTERNAL MEDICINE
Payer: MEDICARE

## 2024-05-13 VITALS
RESPIRATION RATE: 16 BRPM | HEART RATE: 65 BPM | HEIGHT: 60.98 IN | BODY MASS INDEX: 22.13 KG/M2 | DIASTOLIC BLOOD PRESSURE: 72 MMHG | WEIGHT: 117.19 LBS | OXYGEN SATURATION: 100 % | TEMPERATURE: 98 F | SYSTOLIC BLOOD PRESSURE: 124 MMHG

## 2024-05-13 DIAGNOSIS — C50.411 MALIGNANT NEOPLASM OF UPPER-OUTER QUADRANT OF RIGHT BREAST IN FEMALE, ESTROGEN RECEPTOR POSITIVE (HCC): Primary | ICD-10-CM

## 2024-05-13 DIAGNOSIS — C78.7 MALIGNANT NEOPLASM METASTATIC TO LIVER (HCC): ICD-10-CM

## 2024-05-13 DIAGNOSIS — Z09 CHEMOTHERAPY FOLLOW-UP EXAMINATION: ICD-10-CM

## 2024-05-13 DIAGNOSIS — C50.919 METASTASIS FROM BREAST CANCER (HCC): ICD-10-CM

## 2024-05-13 DIAGNOSIS — Z17.0 MALIGNANT NEOPLASM OF UPPER-OUTER QUADRANT OF RIGHT BREAST IN FEMALE, ESTROGEN RECEPTOR POSITIVE (HCC): Primary | ICD-10-CM

## 2024-05-13 DIAGNOSIS — C79.9 METASTASIS FROM BREAST CANCER (HCC): ICD-10-CM

## 2024-05-13 DIAGNOSIS — G89.3 CANCER RELATED PAIN: ICD-10-CM

## 2024-05-13 DIAGNOSIS — Z17.0 MALIGNANT NEOPLASM OF UPPER-OUTER QUADRANT OF RIGHT BREAST IN FEMALE, ESTROGEN RECEPTOR POSITIVE (HCC): ICD-10-CM

## 2024-05-13 DIAGNOSIS — C79.51 METASTASIS TO BONE (HCC): ICD-10-CM

## 2024-05-13 DIAGNOSIS — C50.411 MALIGNANT NEOPLASM OF UPPER-OUTER QUADRANT OF RIGHT BREAST IN FEMALE, ESTROGEN RECEPTOR POSITIVE (HCC): ICD-10-CM

## 2024-05-13 DIAGNOSIS — R42 VERTIGO: ICD-10-CM

## 2024-05-13 DIAGNOSIS — Z51.81 MEDICATION MONITORING ENCOUNTER: ICD-10-CM

## 2024-05-13 DIAGNOSIS — Z45.2 ENCOUNTER FOR CENTRAL LINE CARE: Primary | ICD-10-CM

## 2024-05-13 LAB
ALBUMIN SERPL-MCNC: 4 G/DL (ref 3.2–4.8)
ALBUMIN/GLOB SERPL: 1.9 {RATIO} (ref 1–2)
ALP LIVER SERPL-CCNC: 57 U/L
ALT SERPL-CCNC: 8 U/L
ANION GAP SERPL CALC-SCNC: 6 MMOL/L (ref 0–18)
AST SERPL-CCNC: 19 U/L (ref ?–34)
BASOPHILS # BLD AUTO: 0.02 X10(3) UL (ref 0–0.2)
BASOPHILS NFR BLD AUTO: 0.5 %
BILIRUB SERPL-MCNC: 1.5 MG/DL (ref 0.2–1.1)
BUN BLD-MCNC: 19 MG/DL (ref 9–23)
BUN/CREAT SERPL: 16.2 (ref 10–20)
CALCIUM BLD-MCNC: 9.5 MG/DL (ref 8.7–10.4)
CHLORIDE SERPL-SCNC: 110 MMOL/L (ref 98–112)
CO2 SERPL-SCNC: 27 MMOL/L (ref 21–32)
CREAT BLD-MCNC: 1.17 MG/DL
DEPRECATED RDW RBC AUTO: 62.6 FL (ref 35.1–46.3)
EGFRCR SERPLBLD CKD-EPI 2021: 52 ML/MIN/1.73M2 (ref 60–?)
EOSINOPHIL # BLD AUTO: 0.1 X10(3) UL (ref 0–0.7)
EOSINOPHIL NFR BLD AUTO: 2.3 %
ERYTHROCYTE [DISTWIDTH] IN BLOOD BY AUTOMATED COUNT: 16.2 % (ref 11–15)
GLOBULIN PLAS-MCNC: 2.1 G/DL (ref 2–3.5)
GLUCOSE BLD-MCNC: 104 MG/DL (ref 70–99)
HCT VFR BLD AUTO: 33.8 %
HGB BLD-MCNC: 11.2 G/DL
IMM GRANULOCYTES # BLD AUTO: 0.03 X10(3) UL (ref 0–1)
IMM GRANULOCYTES NFR BLD: 0.7 %
LYMPHOCYTES # BLD AUTO: 0.6 X10(3) UL (ref 1–4)
LYMPHOCYTES NFR BLD AUTO: 13.9 %
MCH RBC QN AUTO: 34.9 PG (ref 26–34)
MCHC RBC AUTO-ENTMCNC: 33.1 G/DL (ref 31–37)
MCV RBC AUTO: 105.3 FL
MONOCYTES # BLD AUTO: 0.47 X10(3) UL (ref 0.1–1)
MONOCYTES NFR BLD AUTO: 10.9 %
NEUTROPHILS # BLD AUTO: 3.1 X10 (3) UL (ref 1.5–7.7)
NEUTROPHILS # BLD AUTO: 3.1 X10(3) UL (ref 1.5–7.7)
NEUTROPHILS NFR BLD AUTO: 71.7 %
OSMOLALITY SERPL CALC.SUM OF ELEC: 299 MOSM/KG (ref 275–295)
PLATELET # BLD AUTO: 151 10(3)UL (ref 150–450)
POTASSIUM SERPL-SCNC: 3.8 MMOL/L (ref 3.5–5.1)
PROT SERPL-MCNC: 6.1 G/DL (ref 5.7–8.2)
RBC # BLD AUTO: 3.21 X10(6)UL
SODIUM SERPL-SCNC: 143 MMOL/L (ref 136–145)
WBC # BLD AUTO: 4.3 X10(3) UL (ref 4–11)

## 2024-05-13 PROCEDURE — G2211 COMPLEX E/M VISIT ADD ON: HCPCS | Performed by: INTERNAL MEDICINE

## 2024-05-13 PROCEDURE — 99215 OFFICE O/P EST HI 40 MIN: CPT | Performed by: INTERNAL MEDICINE

## 2024-05-13 PROCEDURE — 85025 COMPLETE CBC W/AUTO DIFF WBC: CPT

## 2024-05-13 PROCEDURE — 36591 DRAW BLOOD OFF VENOUS DEVICE: CPT

## 2024-05-13 PROCEDURE — 80053 COMPREHEN METABOLIC PANEL: CPT

## 2024-05-13 RX ORDER — SODIUM CHLORIDE 9 MG/ML
10 INJECTION, SOLUTION INTRAMUSCULAR; INTRAVENOUS; SUBCUTANEOUS ONCE
OUTPATIENT
Start: 2024-05-13

## 2024-05-13 RX ORDER — HEPARIN SODIUM (PORCINE) LOCK FLUSH IV SOLN 100 UNIT/ML 100 UNIT/ML
5 SOLUTION INTRAVENOUS ONCE
OUTPATIENT
Start: 2024-05-13

## 2024-05-13 RX ORDER — HEPARIN SODIUM (PORCINE) LOCK FLUSH IV SOLN 100 UNIT/ML 100 UNIT/ML
5 SOLUTION INTRAVENOUS ONCE
Status: COMPLETED | OUTPATIENT
Start: 2024-05-13 | End: 2024-05-13

## 2024-05-13 RX ORDER — HYDROCODONE BITARTRATE AND ACETAMINOPHEN 10; 325 MG/1; MG/1
1 TABLET ORAL EVERY 6 HOURS PRN
Qty: 90 TABLET | Refills: 0 | Status: SHIPPED | OUTPATIENT
Start: 2024-05-13

## 2024-05-13 RX ADMIN — HEPARIN SODIUM (PORCINE) LOCK FLUSH IV SOLN 100 UNIT/ML 500 UNITS: 100 SOLUTION INTRAVENOUS at 12:02:00

## 2024-05-13 NOTE — PROGRESS NOTES
HPI   Aleisha Carlin is a 64 year old female here for follow up of   Encounter Diagnoses   Name Primary?    Malignant neoplasm of upper-outer quadrant of right breast in female, estrogen receptor positive (HCC) Yes    Malignant neoplasm metastatic to liver (HCC)     Chemotherapy follow-up examination      Seventh line capecitabine initiated on 1/22/24  Currently s/p cycle 4 capecitabine tolerating well.     States feeling dizzy.  States with position changes it is worse.  States not vertigo.  States that does drink enough fluids.  Lots of stress, taking care of her father.     Was due for cycle 5 today and drug will be shipped today.  She will start tomorrow or day after.     C/o shoulder pain R>L, jero when raising arms or other movement.  Advil or Norco alleviates pain.  Right foot has been bothersome more recently.  H/o surgeries dating back to childhood.      Fatigue:  Yes, but states a little improved    Fevers:  No     Appetite/taste changes:  No    Mucositis:  No    Weight changes:  improved    Nausea/vomiting:  Yes, controlled with prochlorperazine   Sometimes on the off week.     Diarrhea:  Yes, occasionally    Constipation:  Yes, states had to take a suppository.    Peripheral neuropathy:  Yes, Per history, occasionally R last two fingers and does not last long. No complaint today .      PPE:  States using lotion in the hands all the time, no PPE.      H/o RUQ discomfort, resolved.     ECOG PS 1    Review of Systems:     Review of Systems   Respiratory:  Negative for cough and shortness of breath.    Cardiovascular:  Positive for leg swelling (BLE edema R>L, from surgeries on the R foot and leg.  Taking lasix prn). Negative for chest pain and palpitations.   Gastrointestinal:  Positive for abdominal pain (RUQ discomfort history), diarrhea and nausea (mild day 3,4). Negative for blood in stool, constipation and vomiting.   Genitourinary:  Negative for difficulty urinating, dysuria and frequency.     Musculoskeletal:  Positive for arthralgias (L knee pain, shoulder pain bilaterally, right foot.). Negative for back pain and neck pain.   Skin:         NF, hyperpigmented hands and feet    Neurological:  Positive for numbness (to L hand- sl betterd). Negative for dizziness and headaches.   Hematological:  Negative for adenopathy. Bruises/bleeds easily.   Psychiatric/Behavioral:  Positive for sleep disturbance (improved with sl mirtazapine.).          Current Outpatient Medications   Medication Sig Dispense Refill    minocycline 50 MG Oral Cap Take 1 capsule (50 mg total) by mouth daily. 90 capsule 3    zolpidem 10 MG Oral Tab Take 1 tablet (10 mg total) by mouth nightly as needed. 60 tablet 0    LORazepam 0.5 MG Oral Tab Take 1 tablet (0.5 mg total) by mouth every 6 (six) hours as needed for Anxiety. 30 tablet 1    HYDROcodone-acetaminophen  MG Oral Tab Take 1 tablet by mouth every 6 (six) hours as needed for Pain. 90 tablet 0    capecitabine 500 MG Oral tab Take 3 tablets (1,500 mg total) by mouth 2 (two) times daily Take daily for 7 days, then stop for 7 days.  Continue this pattern until instructed otherwise. 4 weeks is one cycle 84 tablet 11    mirtazapine 15 MG Oral Tablet Dispersible Take 1 tablet (15 mg total) by mouth nightly. 90 tablet 3    furosemide 20 MG Oral Tab Take 1 tablet (20 mg total) by mouth daily.      prochlorperazine (COMPAZINE) 10 mg tablet Take 1 tablet (10 mg total) by mouth every 6 (six) hours as needed for Nausea. 90 tablet 2    folic acid 1 MG Oral Tab Take 1 tablet (1 mg total) by mouth daily. 90 tablet 3    guaiFENesin-codeine 100-10 MG/5ML Oral Solution Take 5 mL by mouth every 6 (six) hours as needed for cough. 473 mL 0    albuterol 108 (90 Base) MCG/ACT Inhalation Aero Soln Inhale 1 puff into the lungs every 6 (six) hours as needed for Wheezing. 8 g 3    HYDROcodone-acetaminophen  MG Oral Tab Take 1-2 tablets by mouth every 4 (four) hours as needed for Pain. 120 tablet  0    Zoledronic Acid (ZOMETA IV) Inject 4 mg into the vein every 6 (six) months.      Calcium Carb-Cholecalciferol (CALCIUM 500 + D) 500-200 MG-UNIT Oral Tab Take by mouth daily. (Patient not taking: Reported on 5/13/2024)      Cholecalciferol (VITAMIN D3) 250 MCG (11835 UT) Oral Cap Take 1 capsule by mouth daily. (Patient not taking: Reported on 5/13/2024)      atorvastatin 20 MG Oral Tab Take 1 tablet (20 mg total) by mouth daily. (Patient not taking: Reported on 5/13/2024)       Allergies:   No Known Allergies    Past Medical History:    Breast cancer (HCC)    MASTECTOMY / RECONSTRUCTION    Chemotherapy-induced neutropenia (HCC)    Encounter for insertion of venous access port    portacath insertion / venous access    Glaucoma    History of breast cancer in female    Hyperlipidemia    Malignant neoplasm of overlapping sites of breast in female, estrogen receptor positive (HCC)    Malignant neoplasm of upper-outer quadrant of right breast in female, estrogen receptor positive (HCC)    Malignant pleural effusion (HCC)    Menorrhagia    HYSTEROSCOPY / D&C / endomentrial biopsy (08/28/2007)    Metastatic breast cancer    Neurofibromatosis (HCC)    Osteopenia of multiple sites    Osteopenia of multiple sites    Osteoporosis screening    Osteoporosis screening    Per [employer]    Other osteoporosis without current pathological fracture    Postmenopausal bleeding    Psychophysiological insomnia    Tumor, foot    tumor right foot / excision/excision of bone spur/arthrodesis w/screw fixation     Past Surgical History:   Procedure Laterality Date    Breast reconstruction Right 2008    right breast reconstructed - ductal, BRCA neg    Breast surgery      Chemotherapy  2007    Foot surgery Right     tumor rt foot / excision/excision of bone spur/arthrodesis w/screw fixation    Ir port a cath procedure  2007    Right subclavian port of cath.    Mastectomy right      2012    Mastectomy,simple  2007     Social History      Socioeconomic History    Marital status: Single   Tobacco Use    Smoking status: Never    Smokeless tobacco: Never   Substance and Sexual Activity    Alcohol use: Yes     Alcohol/week: 5.0 standard drinks of alcohol     Types: 5 Standard drinks or equivalent per week     Comment: Socially.  (Per [employer]:  5 days weekly.)    Drug use: No   Other Topics Concern    Caffeine Concern Yes     Comment: tea     Social Determinants of Health     Food Insecurity: No Food Insecurity (2023)    Food Insecurity     Food Insecurity: Never true   Transportation Needs: No Transportation Needs (2023)    Transportation Needs     Lack of Transportation: No   Housing Stability: Low Risk  (2023)    Housing Stability     Housing Instability: No       Family History   Problem Relation Age of Onset    Breast Cancer Mother 49        bilateral mastectomy    Genetic Disease Mother         NF-1    Breast Cancer Maternal Aunt 75    Breast Cancer Paternal Grandmother     Breast Cancer Self 51    Genetic Disease Self         NF-1    Cancer Paternal Aunt         throat ca    Cancer Maternal Uncle         prostate ca    Genetic Disease Brother         NF-1    Genetic Disease Brother         NF-1    Genetic Disease Brother         NF-1    Cancer Maternal Uncle         bladder ca    Breast Cancer Maternal Cousin Female     Cancer Maternal Cousin Female         leukemia;  childhood    Cancer Paternal Cousin Male         throat ca         PHYSICAL EXAM:    /72 (BP Location: Left arm, Patient Position: Sitting, Cuff Size: adult)   Pulse 65   Temp 98 °F (36.7 °C) (Oral)   Resp 16   Ht 1.549 m (5' 0.98\")   Wt 53.2 kg (117 lb 3.2 oz)   SpO2 100%   BMI 22.16 kg/m²   Wt Readings from Last 6 Encounters:   24 53.2 kg (117 lb 3.2 oz)   04/15/24 51.2 kg (112 lb 14.4 oz)   24 50.8 kg (112 lb)   24 51.3 kg (113 lb 1.6 oz)   01/10/24 50.8 kg (112 lb)   23 49.6 kg (109 lb 4.8 oz)     Physical  Exam  General: Patient is alert, not in acute distress.  HEENT: EOMs intact. Anicteric.  Nystagmus with gaze to the right.  Oropharynx is clear.   Neck: No JVD. No palpable lymphadenopathy. Neck is supple.  Chest: Clear to auscultation.  Heart: Regular rate and rhythm.   Abdomen: Soft, non tender with good bowel sounds.    Extremities: +1 edema R>L chronic, attn shoulders - no edema, erythema or ecchymosis.  Nontender.   Neurological: Grossly intact. Normal AROM shoulders.    Psych/Depression: nl  Skin:  NF lesions on the back       ASSESSMENT/PLAN:     1. Malignant neoplasm of upper-outer quadrant of right breast in female, estrogen receptor positive (HCC)    2. Malignant neoplasm metastatic to liver (HCC)    3. Chemotherapy follow-up examination        Breast cancer history of ER/NY positive metastatic breast cancer to pleura, liver, bones. Initially diagnosed 2007 with DCIS, treated with lumpectomy, however MRI showed progression in the breast and she had a complete right mastectomy with axillary dissection followed by chemo in 2008: .pT1,N1. ER 9%, PgR 8%, Her-2 0, Ki 39%. she then presented with recurrence in 2020 right neck/supraclav ( ER 98% [place] 0  her2 1+)  Pleura (Er 20%, Her2 1+) and liver:  (ER 38%, her 0)     Treatment history  9/11/2007:  right breast lumpectomy: Extensive DCIS ER 90%. Mri with progression  11/16/2007:  right mastectomy with axillary node dissection: pT1 N1 M0  2/16/2008:  4 cycles of Taxotere and Cytoxan   9/18/2020: Right neck recurrence,  left pleural,  liver-   10/1/2020: My risk Myriad negative. Started Pablociclib 125mg d1-21 w/ faslodex   4/15/2021: Neutropenic fever. Pablociclib changed to q 21 d with 14 day off cycle   1/27/2022: Prince at 100mg D1-21 q 28 day schedule stared per Dr Soriano  7/15/22: changed to abemaciclib. Progress in the pleura bx: TNBC, Her2 0 CPS 0  9/22/22-11/2022: Sacituzumab w/o response   11/17/22-1/2023: Abraxane with initial response, liver bx nondx (too  small)  2/9/23: started gem/carbo due to SOB with response  4/26/23: imaging with continues response in pleura, progression in liver Er 65%, IL 0, Her2 0, bx: NGS below     NGS:  9/2020: Foundation: NF1, [id number], FGFR1 and MYC amplification  9/2022: G360: [id number] 0.5%. Tempus pleura: NF1, TP53, FGFR1 amp  2/2023: G360:  [id number] 0.9%, APC 0.2 with several new VUS  4/2023 G360: [id number] 0.4%. APC 0.3 with several new VUS. Tempus liver:           Patient  progressed fifth line palliative chemotherapy with gemcitabine and carboplatin, and Faslodex was added to treatment again in April after biopsy of the liver showed ER receptor positive.    ESR 1 mutation is negative.    S/p cycle 8 Eribulin, sixth line therapy, treatment complicated by bilateral lower extremity weakness.  This required admission, was managed by neurology as possible myasthenia.  She did recover her performance status back to 0-1.    Her CT scan of the chest, abdomen and pelvis to assess response to therapy showed a new hypodense lesion of the right hepatic lobe measuring 1.8 x 1.6 cm with some mild associated capsular retraction.  I reviewed with the patient options for treatment, given that this is a solitary site, the option of evaluation with IR for Y90 embolization was presented to the patient, this would treat that lesion by itself.  Given that the patient has had metastatic disease for several years, she will also need to have further systemic therapy after that treatment.  The patient has not been treated with capecitabine.  I discussed with the patient that if she is able to have a good breakfast and dinner, she would be a good candidate for capecitabine week on and week off.      Patient will proceed with 7th line therapy with capecitabine week on/week off and after 3 months (before next appt) will have imaging.  If still has liver lesion, will consider Y-90 embolization, vs continue with treatment w/o Y-90.      S/p cycle 4 capecitabine.  started capecitabine single agent on 1/22/24.    Patient had CT of the chest, abdomen and pelvis on 4/16/2024 for assessment of disease response.  She has been showing evidence of response, with dominant liver mass still decreasing in size, and rest of disease being stable.  Next imaging due in July of 2024    Proceed with cycle 5 once she receives the capecitabine.     PPE- lotion to hands and feet     Cancer-related pain- RUQ discomfort Norco 10/325 prn, Rx sent    Zometa - last dose March 2023. Completed 2 years, no further needed     Anemia from chemo:  Monitor closely. Added folic acid 1mg daily and changed B12 SL 1000mcg daily.     Bilateral shoulder pain, likely DJD per C-spine MRI.  Not related to capecitabine.    Hyperbilirubinemia:  Monitor trend (change in lab machines/value reference)    Vertigo:  vestibular PT.    Call prn.  Follow-up in 4 weeks      MDM high risk  Continuity of complex medical care.  No orders of the defined types were placed in this encounter.      Results From Past 48 Hours:  Recent Results (from the past 48 hour(s))   COMP METABOLIC PANEL [E]    Collection Time: 05/13/24 11:51 AM   Result Value Ref Range    Glucose 104 (H) 70 - 99 mg/dL    Sodium 143 136 - 145 mmol/L    Potassium 3.8 3.5 - 5.1 mmol/L    Chloride 110 98 - 112 mmol/L    CO2 27.0 21.0 - 32.0 mmol/L    Anion Gap 6 0 - 18 mmol/L    BUN 19 9 - 23 mg/dL    Creatinine 1.17 (H) 0.55 - 1.02 mg/dL    BUN/CREA Ratio 16.2 10.0 - 20.0    Calcium, Total 9.5 8.7 - 10.4 mg/dL    Calculated Osmolality 299 (H) 275 - 295 mOsm/kg    eGFR-Cr 52 (L) >=60 mL/min/1.73m2    ALT 8 (L) 10 - 49 U/L    AST 19 <=34 U/L    Alkaline Phosphatase 57 50 - 130 U/L    Bilirubin, Total 1.5 (H) 0.2 - 1.1 mg/dL    Total Protein 6.1 5.7 - 8.2 g/dL    Albumin 4.0 3.2 - 4.8 g/dL    Globulin  2.1 2.0 - 3.5 g/dL    A/G Ratio 1.9 1.0 - 2.0    Patient Fasting for CMP? Patient not present    CBC W/ DIFFERENTIAL    Collection Time: 05/13/24 11:51 AM   Result Value  Ref Range    WBC 4.3 4.0 - 11.0 x10(3) uL    RBC 3.21 (L) 3.80 - 5.30 x10(6)uL    HGB 11.2 (L) 12.0 - 16.0 g/dL    HCT 33.8 (L) 35.0 - 48.0 %    .3 (H) 80.0 - 100.0 fL    MCH 34.9 (H) 26.0 - 34.0 pg    MCHC 33.1 31.0 - 37.0 g/dL    RDW-SD 62.6 (H) 35.1 - 46.3 fL    RDW 16.2 (H) 11.0 - 15.0 %    .0 150.0 - 450.0 10(3)uL    Neutrophil Absolute Prelim 3.10 1.50 - 7.70 x10 (3) uL    Neutrophil Absolute 3.10 1.50 - 7.70 x10(3) uL    Lymphocyte Absolute 0.60 (L) 1.00 - 4.00 x10(3) uL    Monocyte Absolute 0.47 0.10 - 1.00 x10(3) uL    Eosinophil Absolute 0.10 0.00 - 0.70 x10(3) uL    Basophil Absolute 0.02 0.00 - 0.20 x10(3) uL    Immature Granulocyte Absolute 0.03 0.00 - 1.00 x10(3) uL    Neutrophil % 71.7 %    Lymphocyte % 13.9 %    Monocyte % 10.9 %    Eosinophil % 2.3 %    Basophil % 0.5 %    Immature Granulocyte % 0.7 %         Imaging & Referrals:  None   No orders of the defined types were placed in this encounter.    PROCEDURE: CT CHEST ABDOMEN PELVIS (ALL CONTRAST ONLY) (CPT=71260/53942)      COMPARISON: MRI LIVER (W+WO) (CPT=74183), 4/14/2023, 6:26 PM.  [facility], MRI SPINE CERVICAL (W+WO) (CPT=72156), 1/03/2024, 6:23 AM.  [facility], MRI SPINE LUMBAR (W+WO) (CPT=72158), 12/31/2023, 2:50 PM.  [facility], MRI SPINE THORACIC (W+WO) (CPT=72157), 12/31/2023, 2:50 PM.  NM BONE SCAN WB (MVL=17899), 2/22/2022, 11:44 AM.  NM BONE SCAN WB (WGG=40134), 10/22/2021, 12:25 PM.  NM PF BONE SCAN PLANAR, 10/03/2007, 10:10 AM.  PET STANDARD BODY SCAN   (CPT=78815), 2/09/2023, 11:59 AM.  CT CHEST (CPT=71250), 8/03/2022, 8:14 AM.  CT CHEST PE AORTA (IV ONLY) (CPT=71260), 1/26/2022, 2:46 PM.  CT CHEST PE AORTA (IV ONLY) (CPT=71260), 4/15/2021, 10:09 PM.  CT CHEST PE AORTA (IV ONLY) (CPT=71260),   10/27/2020, 7:12 PM.  CT CHEST PE AORTA (IV ONLY) (CPT=71260), 9/18/2020, 12:55 PM.  CT CHEST+ABDOMEN+PELVIS(ALL CNTRST ONLY)(CPT=71260/00117), 4/11/2023, 6:05 PM.  CT  CHEST+ABDOMEN+PELVIS(ALL CNTRST ONLY)(CPT=71260/40386), 1/25/2023, 8:40 AM.  CT   CHEST+ABDOMEN+PELVIS(ALL CNTRST ONLY)(CPT=71260/75015), 11/11/2022, 2:29 PM.  CT CHEST+ABDOMEN+PELVIS(ALL CNTRST ONLY)(CPT=71260/29549), 8/30/2022, 2:46 PM.  CT CHEST+ABDOMEN+PELVIS(ALL CNTRST ONLY)(CPT=71260/82792), 6/24/2022, 10:09 AM.  CT   CHEST+ABDOMEN+PELVIS(ALL CNTRST ONLY)(CPT=71260/26815), 1/25/2022, 10:29 AM.  CT CHEST+ABDOMEN+PELVIS(ALL CNTRST ONLY)(CPT=71260/39134), 9/16/2021, 10:46 AM.  CT CHEST+ABDOMEN+PELVIS(ALL CNTRST ONLY)(CPT=71260/10725), 7/21/2021, 12:21 PM.  CT ABDOMEN   PELVIS IV CONTRAST NO ORAL (ER), 4/16/2021, 1:07 AM.  CT ABDOMEN + PELVIS (CONTRAST ONLY) (CPT=74177), 10/06/2020, 1:11 PM.  [facility], CT CHEST+ABDOMEN+PELVIS(ALL CNTRST ONLY)(CPT=71260/85258), 1/05/2024, 8:43 AM.  [facility], CT CHEST+ABDOMEN+PELVIS(ALL CNTRST ONLY)(CPT=71260/06542), 10/07/2023, 9:21 AM.  [facility], CT CHEST+ABDOMEN+PELVIS(ALL CNTRST ONLY)(CPT=71260/87019), 7/03/2023, 1:07 PM.  [facility], CT PF   CHEST ABD W CONTRAST, 10/02/2007, 2:34 PM.      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      TECHNIQUE: Multidetector CT images of the chest, abdomen and pelvis were obtained with intravenous contrast material. Automated exposure control for dose reduction was used. Adjustment of the mA and/or kV was done based on the patient's size. Iterative   reconstruction technique for dose reduction was employed. Dose information was transmitted to the ACR (American College of Radiology) NRDR (National Radiology Data  Registry), which includes the Dose Index Registry.       FINDINGS:   DEVICES: There is a right-sided Port-A-Cath with tip terminating near the cavoatrial junction.   CARDIAC: The heart is borderline enlarged. Trace pericardial effusion extends into the superior pericardial recess.   VASCULATURE: The thoracic aorta has unremarkable configuration without aneurysm or dissection. Mild atherosclerosis is noted.   LUNGS/PLEURA: Trace nodular biapical pleuroparenchymal scarring is suggested. Small left pleural effusion is present with extension into the left major fissure. Interlobular septal thickening is seen with a basilar predominance, more conspicuous in the   left lower lobe than the right.   A few punctate micronodules are redemonstrated, not significantly changed from prior exams.    No pneumothorax is detected.    AIRWAYS: The tracheobronchial tree is without central mass or obstructing lesion. Mild bronchial wall thickening is present.   MEDIASTINUM/BRANDY: No mass or lymphadenopathy.    CHEST WALL: There is minimal residual infiltration of the right supraclavicular fat abutting the right sternocleidomastoid muscle. No axillary mass or lymphadenopathy. There are bilateral retropectoral breast implants with intracapsular rupture of the   right. Innumerable cutaneous and subcutaneous nodular foci are apparent.       LIVER: Multiple hypoenhancing hepatic lesions are seen, slightly decreased in conspicuity from previous. For example, there is a 1.8 x 1.2 cm lesion (series 2, image 87) with associated capsular retraction which is slightly decreased. An anterior   subcapsular 0.6 x 0.9 cm lesion (series 2, image 86) is similar to previous.   BILIARY: The gallbladder is present.   PANCREAS: There is mild diffuse fatty replacement without discernible lesion, fluid collection, or ductal dilatation.   SPLEEN: No enlargement.    ADRENALS:   Multifocal nodularity and heterogeneous hypoattenuation of the right adrenal gland is  grossly unchanged in morphology. No defined mass or abnormal enlargement.    KIDNEYS:   Symmetric enhancement is seen without evidence of hydronephrosis or underlying solid masses.   GI/MESENTERY: A small hiatal hernia is evident. Retained or refluxed enteric contrast is seen throughout the esophageal lumen. Apparent gastric wall thickening is likely due to underdistention. There is no evidence of bowel obstruction. A normal caliber    appendix is seen without inflammatory manifestations. Scattered colonic diverticula are present in the sigmoid colon. There is no colonic wall thickening or pericolonic fat stranding.   URINARY BLADDER: Incompletely distended without visible calculus. Mild circumferential bladder wall thickening may relate to underdistention.   PELVIC NODES: No lymphadenopathy.     PELVIC ORGANS: The uterus is present. There is mild contour deformation of the uterine fundus, suggesting an underlying intramural fibroid. Deep pelvic calcifications likely represent phleboliths.    VASCULATURE:   Scattered atherosclerotic vascular calcifications of the abdominal aorta are observed. No aneurysm is detected.   RETROPERITONEUM: No mass or lymphadenopathy is apparent.    BONES:   There is redemonstration of sclerotic thoracolumbar lesions. A 0.9 cm lesion the T11 vertebral segment remains stable. A subtle sclerotic lesion involving the superior endplate of L3 with slight associated deformity also appears stable. Tiny   sclerotic lesions of L3, L4, and L5 remain grossly unchanged. Mild multilevel anterior osteophyte formation is seen throughout the spine.   A few scattered sclerotic foci in the proximal femora likely represent bone islands. Nonspecific irregular deformities of the right iliac wing are re-identified. Degenerative changes of the hips and pubic symphysis are present.   ABDOMINAL WALL: Extensive nodular cutaneous and subcutaneous nodules are demonstrated throughout the abdominal wall. Mild  infiltration of the gluteal subcutaneous fat is noted.   OTHER: A 1.4 x 1.3 cm heterogeneously enhancing paraisthmic nodule is seen with peripheral calcification. Additional smaller nodules and coarse calcifications are partially visualized.   No free air or fluid is seen in the abdomen or pelvis.                   Impression  CONCLUSION:   1. A dominant hepatic metastasis is slightly intervally decreased in conspicuity. Continued surveillance is recommended.      2. Stable appearance of sclerotic osseous metastatic lesions.      3. Distal colonic diverticulosis without CT evidence of acute complication.      4. Postoperative changes of mastectomy and retropectoral breast implantation; the right-sided implant demonstrates intracapsular rupture.      5. Imaging findings of neurofibromatosis are redemonstrated.       6. Thyroid nodules appear grossly stable.      7. Lesser incidental findings as above.            elm-remote.         Dictated by (CST): Alfredo Madden MD on 4/17/2024 at 10:50 AM       Finalized by (CST): Alfredo Madden MD on 4/17/2024 at 11:12 AM

## 2024-05-14 ENCOUNTER — TELEPHONE (OUTPATIENT)
Dept: HEMATOLOGY/ONCOLOGY | Facility: HOSPITAL | Age: 64
End: 2024-05-14

## 2024-05-14 NOTE — TELEPHONE ENCOUNTER
Aleisha [phone]  I lost the  paper with the name of therapist to see for my dizziness.Would like a call back with that information. Thanks Maureen

## 2024-05-15 ENCOUNTER — OFFICE VISIT (OUTPATIENT)
Dept: PHYSICAL THERAPY | Facility: HOSPITAL | Age: 64
End: 2024-05-15
Attending: INTERNAL MEDICINE

## 2024-05-15 DIAGNOSIS — R42 VERTIGO: Primary | ICD-10-CM

## 2024-05-15 PROCEDURE — 97161 PT EVAL LOW COMPLEX 20 MIN: CPT

## 2024-05-15 PROCEDURE — 95992 CANALITH REPOSITIONING PROC: CPT

## 2024-05-15 NOTE — PROGRESS NOTES
VESTIBULAR EVALUATION:   Referring Physician: Jarrett  Diagnosis:   BPPV     Date of Service: 5/15/2024   Insurance:  Medicare      PATIENT SUMMARY   Aleisha Carlin is a 64 year old female who presents to therapy today with reports of positional dizziness.  History/onset of current condition:  Pt. Reports that 2 weeks ago she noticed that she had spinning dizziness with position changes.  Pt. Notes that when she lies on right side her symptoms are worse.  She also noted dizziness and imbalance when out shopping.      Symptoms with cough/sneeze or loud noise: No  Falls: No  Hx of migraines: No  Hx of vision issue: No  Hx of hearing issues: none    Dizziness: Current 0/10, Best 0/10, Worst 4/10  Quality: spinning, intermittent imbalance  Frequency/Duration:  Brief in duration, nearly daily if exacerbated  Aggravates: Supine to/from sit, Sit to stand, Rolling, Bending over, Quick head movements    Dizziness Handicap Inventory (DHI): 32/100 = mild impairment    Current functional limitations include difficulty picking up objects from the floor, difficulty and symptomatic with lying flat or rolling in bed, limited community activity due to dizziness.   Social history: Lives alone, currently living with father to assist him with a temporary medical issue.     Prior level of function: no limitations.  Pt goals include Decrease dizziness  Past medical history was reviewed. Significant findings include Breast cancer (HCC) (03/02/2012), Chemotherapy-induced neutropenia (HCC) (12/25/2020), Glaucoma, Hyperlipidemia, Malignant pleural effusion (HCC) (9/25/2020), Menorrhagia (2007), Neurofibromatosis (HCC), Osteoporosis screening (08/02/2013), osteoporosis without current pathological fracture (2/2/2021).      ASSESSMENT  Aleisha Carlin presents to physical therapy [facility] with primary c/o positional dizziness. The results of the objective tests and measures show positive for right posterior canal BPPV.  Pt. Was treated  today with canalith repositioning for right posterior canal, twice with good tolerance.   Functional deficits include but are not limited to difficulty with lying flat or rolling over in bed, difficulty picking up objects from floor, reaching up onto high shelves.  PT discussed evaluation findings, pathology and management of BPPV, plan of care with pt.   Skilled Physical Therapy is medically necessary to address the above impairments and reach functional goals.    Precautions:  None  OBJECTIVE:   Physical Exam:  Posture/Observation: good posture, no assistive device   Neuro Screen: Sensation: WNL for light touch screen, no reports of paresthesias.    Coordination Testing:   Finger to Nose: WNL   Pronation/supination: WNL   Toe tapping: WNL     Cervical spine ROM: WNL  Adverse neuro signs with ROM: no     Oculomotor & Vestibular Exam:  Spontaneous Nystagmus: room light: none ;  fixation blocked: none  Smooth Pursuit: Negative  Saccades: Negative  Gaze Evoked Nystagmus:  room light: Negative; fixation blocked: Negative  Head Thrust: Negative  VOR screen:  WNL no dizziness    VOR Cancellation: Negative   Convergence: Negative  Cover/Uncover:  Negative  Cross Cover:  Negative  Head Shaking Nystagmus: Negative  Dynamic Visual Acuity:  Not Tested    Positional Testing:   Gunnison-Hallpike: Right: positive right torsional upbeating nystagmus, 3 sec latency, 20 sec duration, + symptoms, Left: negative, no symptoms   Roll Test (HC): NT  Canalith repositioning maneuver for right posterior canal BPPV x  2, first time mild nausea but tolerated ok.  Second repositioning pt. Had still demonstrated nystagmus and symptoms.      Postural Control:   TBA as appropriate    Functional Mobility:   Gait: pt ambulates on level ground with normal mechanics.   Functional Gait Assessment (FGA): TBA as appropriate   Five Times Sit to Stand Test: TBA as appropriate     Today's Treatment and Response:   Pt education was provided on exam findings,  treatment diagnosis, treatment plan, expectations, and prognosis. Pt was also provided recommendations for detrimental fear avoidance behaviors, importance of remaining active and possible dizziness after evaluation. Patient was issued written information regarding the pathology and management of BPPV.  No other home exercise program is appropriate at this time.    Charges: PT Eval Low Complexity, CRM      Total Timed Treatment: 50 min     Total Treatment Time: 50 min     PLAN OF CARE:    Goals: (to be met in 8 visits)  1.  Negative Osvaldo-Hallpike and roll tests.  2.  Able to perform position changes such as supine to/from sit and bending over to the floor without dizziness to improve safety in functional tasks.  3.  Pt. Able to ambulate with horizontal head turns for visual scanning without path deviation or dizziness to improve safety during gait.    Frequency / Duration: Patient will be seen for 1-2 x/week or a total of 8 visits over a 90 day period. Treatment will include: home exercise program development and instruction, patient/family education, balance training, canalith repositioning maneuver and eye/head coordination exercises.     Education or treatment limitation: None   Rehab Potential: good     Patient/Family/Caregiver was advised of these findings, precautions, and treatment options and has agreed to actively participate in planning and for this course of care.     Thank you for your referral. Please co-sign or sign and return this letter via fax as soon as possible to [phone]. If you have any questions, please contact me at Dept: [phone].    Sincerely,    Marjan Villalobos PT, NCS    Electronically signed by therapist: Marjan Villalobos PT, NCS  Physician's certification required: Yes  I certify the need for these services furnished under this plan of treatment and while under my care.    X___________________________________________________ Date____________________    Certification From: 5/15/2024   To:8/13/2024

## 2024-05-22 ENCOUNTER — OFFICE VISIT (OUTPATIENT)
Dept: PHYSICAL THERAPY | Facility: HOSPITAL | Age: 64
End: 2024-05-22
Attending: INTERNAL MEDICINE

## 2024-05-22 PROCEDURE — 95992 CANALITH REPOSITIONING PROC: CPT

## 2024-05-22 NOTE — PROGRESS NOTES
Date  5/22/24           Visit Number  2/8           NMR            Ther EX            Ther Act            Gait Training            CRM  x           Manual              Dx: right posterior canal BPPV         Insurance:  Medicare    Visit # authorized:  8 per POC  Referring MD: Ami Farias   Precautions: fall risk  Medication Changes since last visit?: No  Subjective: Pt. Reports that she has had only brief dizziness since last session- much better than previously.  Pt. Reports that she has been very careful with her movements, notes that she is still dizzy at times with position changes and rapid head movement, up to 3/10- resolves quickly.  Pt. Currently asymptomatic.  Objective:   Fixation blocked: no spontaneous or gaze-evoked nystagmus  Osvaldo-Hallpike  Right: + symptoms, no clear nystagmus pattern.  Left:  Negative, no symptoms  Roll test:  + geotropic nystagmus, worse on left.    Underlying downbeating nystagmus was present throughout testing and intervention  CRM for left horizontal canal BPPV, twice via Lempert maneuver, good tolerance    Patient Education: Reviewed pathology and management of BPPV.      Assessment: Pt. Tolerated session well, no nausea.  Pt. Negative for right posterior canal BPPV, but positive for left horizontal canal BPPV, canalithiasis.  Pt. Reports that dizziness improved compared to last session.  Multiple canal involvement is more complex and may require more visits to resolve.        Plan for next few sessions: recheck all canals prior to treating.    Charges: CRM       Total Timed Treatment: 40 min  Total Treatment Time: 40 min

## 2024-05-30 ENCOUNTER — OFFICE VISIT (OUTPATIENT)
Dept: PHYSICAL THERAPY | Facility: HOSPITAL | Age: 64
End: 2024-05-30
Attending: INTERNAL MEDICINE

## 2024-05-30 PROCEDURE — 95992 CANALITH REPOSITIONING PROC: CPT

## 2024-05-30 NOTE — PROGRESS NOTES
Date  5/22/24 5/30/24          Visit Number  2/8 3/8          NMR            Ther EX            Ther Act            Gait Training            CRM  x x          Manual              Dx: right posterior canal, left horizontal canal  BPPV         Insurance:  Medicare    Visit # authorized:  8 per POC  Referring MD: Ami Farias   Precautions: fall risk  Medication Changes since last visit?: No  Subjective: Pt. Reports that she has had only brief dizziness since last session- much better than previously.  Pt. Reports that she has been very careful with her movements, notes that she is still dizzy at times with position changes and rapid head movement, up to 3/10- resolves quickly.  Pt. Currently asymptomatic.  Objective:   Fixation blocked: no spontaneous or gaze-evoked nystagmus  Osvaldo-Hallpike  Right: + symptoms, no clear nystagmus pattern.  Left:  Negative, no symptoms  Roll test:  + geotropic nystagmus, worse on left.    Underlying downbeating nystagmus was present throughout testing and intervention  CRM for right posterior canal BPPV, sitting rest x 10 min  CRM for left horizontal canal BPPV, twice via Lempert maneuver, good tolerance  Roll test negative at end of 2nd CRM    Patient Education: Reviewed pathology and management of BPPV.      Assessment: Pt. Tolerated session well, no nausea.  Pt. Negative for right posterior canal BPPV, but positive for left horizontal canal BPPV, canalithiasis.  Pt. Reports that dizziness improved compared to last session.  Multiple canal involvement is more complex and may require more visits to resolve.        Plan for next few sessions: recheck all canals prior to treating.    Charges: CRM       Total Timed Treatment: 40 min  Total Treatment Time: 40 min

## 2024-06-04 ENCOUNTER — OFFICE VISIT (OUTPATIENT)
Dept: PHYSICAL THERAPY | Facility: HOSPITAL | Age: 64
End: 2024-06-04
Attending: INTERNAL MEDICINE
Payer: MEDICARE

## 2024-06-04 PROCEDURE — 95992 CANALITH REPOSITIONING PROC: CPT

## 2024-06-04 NOTE — PROGRESS NOTES
Date  5/22/24 5/30/24 6/4/24         Visit Number  2/8 3/8 4/8         NMR            Ther EX            Ther Act            Gait Training            CRM  x x x         Manual              Dx: right posterior canal, left horizontal canal  BPPV         Insurance:  Medicare    Visit # authorized:  8 per POC  Referring MD: Ami Farias   Precautions: fall risk  Medication Changes since last visit?: No  Subjective: Pt. Reports that she has had only brief dizziness since last session- much better than previously.  Pt. Reports that she has been very careful with her movements, notes that she is still dizzy at times with position changes and rapid head movement, up to 3/10- resolves quickly.  Pt. Currently asymptomatic.  Objective:   Fixation blocked: very minimal downbeating spontaneous nystagmus, very mild direction changing gaze-evoked nystagmus  Roll test:  + geotropic nystagmus, worse on left, absent on right, much improved  CRM for left horizontal canal BPPV x 3 via Lempert maneuver, good tolerance  Underlying downbeating nystagmus was present throughout testing and intervention  Roll test negative at end of 3rd CRM    Patient Education: Reviewed pathology and management of BPPV.      Assessment: Pt. Tolerated session well, no nausea.  Pt. Negative for right posterior canal BPPV, but positive for left horizontal canal BPPV, canalithiasis.  Pt. Reports that dizziness improved compared to last session.  Multiple canal involvement is more complex and may require more visits to resolve.        Plan for next few sessions: recheck all canals prior to treating.    Charges: CRM       Total Timed Treatment: 40 min  Total Treatment Time: 40 min     negative Affect and characteristics of appearance, verbalizations, behaviors are appropriate

## 2024-06-11 ENCOUNTER — APPOINTMENT (OUTPATIENT)
Dept: PHYSICAL THERAPY | Facility: HOSPITAL | Age: 64
End: 2024-06-11
Attending: INTERNAL MEDICINE
Payer: MEDICARE

## 2024-06-12 ENCOUNTER — OFFICE VISIT (OUTPATIENT)
Dept: HEMATOLOGY/ONCOLOGY | Facility: HOSPITAL | Age: 64
End: 2024-06-12
Attending: NURSE PRACTITIONER
Payer: MEDICARE

## 2024-06-12 ENCOUNTER — NURSE ONLY (OUTPATIENT)
Dept: HEMATOLOGY/ONCOLOGY | Facility: HOSPITAL | Age: 64
End: 2024-06-12
Attending: INTERNAL MEDICINE
Payer: MEDICARE

## 2024-06-12 VITALS
RESPIRATION RATE: 16 BRPM | OXYGEN SATURATION: 98 % | BODY MASS INDEX: 22.38 KG/M2 | TEMPERATURE: 98 F | HEIGHT: 60 IN | HEART RATE: 63 BPM | SYSTOLIC BLOOD PRESSURE: 136 MMHG | DIASTOLIC BLOOD PRESSURE: 77 MMHG | WEIGHT: 114 LBS

## 2024-06-12 DIAGNOSIS — Z17.0 MALIGNANT NEOPLASM OF UPPER-OUTER QUADRANT OF RIGHT BREAST IN FEMALE, ESTROGEN RECEPTOR POSITIVE (HCC): Primary | ICD-10-CM

## 2024-06-12 DIAGNOSIS — C50.411 MALIGNANT NEOPLASM OF UPPER-OUTER QUADRANT OF RIGHT BREAST IN FEMALE, ESTROGEN RECEPTOR POSITIVE (HCC): ICD-10-CM

## 2024-06-12 DIAGNOSIS — Z51.11 CHEMOTHERAPY MANAGEMENT, ENCOUNTER FOR: ICD-10-CM

## 2024-06-12 DIAGNOSIS — Z51.81 MEDICATION MONITORING ENCOUNTER: ICD-10-CM

## 2024-06-12 DIAGNOSIS — C78.7 MALIGNANT NEOPLASM METASTATIC TO LIVER (HCC): ICD-10-CM

## 2024-06-12 DIAGNOSIS — Z17.0 MALIGNANT NEOPLASM OF UPPER-OUTER QUADRANT OF RIGHT BREAST IN FEMALE, ESTROGEN RECEPTOR POSITIVE (HCC): ICD-10-CM

## 2024-06-12 DIAGNOSIS — C50.411 MALIGNANT NEOPLASM OF UPPER-OUTER QUADRANT OF RIGHT BREAST IN FEMALE, ESTROGEN RECEPTOR POSITIVE (HCC): Primary | ICD-10-CM

## 2024-06-12 LAB
ALBUMIN SERPL-MCNC: 4.3 G/DL (ref 3.2–4.8)
ALBUMIN/GLOB SERPL: 1.8 {RATIO} (ref 1–2)
ALP LIVER SERPL-CCNC: 71 U/L
ALT SERPL-CCNC: 12 U/L
ANION GAP SERPL CALC-SCNC: 6 MMOL/L (ref 0–18)
AST SERPL-CCNC: 21 U/L (ref ?–34)
BASOPHILS # BLD AUTO: 0.02 X10(3) UL (ref 0–0.2)
BASOPHILS NFR BLD AUTO: 0.5 %
BILIRUB SERPL-MCNC: 1.1 MG/DL (ref 0.2–1.1)
BUN BLD-MCNC: 25 MG/DL (ref 9–23)
BUN/CREAT SERPL: 22.1 (ref 10–20)
CALCIUM BLD-MCNC: 9.4 MG/DL (ref 8.7–10.4)
CHLORIDE SERPL-SCNC: 109 MMOL/L (ref 98–112)
CO2 SERPL-SCNC: 29 MMOL/L (ref 21–32)
CREAT BLD-MCNC: 1.13 MG/DL
DEPRECATED RDW RBC AUTO: 57.2 FL (ref 35.1–46.3)
EGFRCR SERPLBLD CKD-EPI 2021: 55 ML/MIN/1.73M2 (ref 60–?)
EOSINOPHIL # BLD AUTO: 0.04 X10(3) UL (ref 0–0.7)
EOSINOPHIL NFR BLD AUTO: 0.9 %
ERYTHROCYTE [DISTWIDTH] IN BLOOD BY AUTOMATED COUNT: 15.1 % (ref 11–15)
GLOBULIN PLAS-MCNC: 2.4 G/DL (ref 2–3.5)
GLUCOSE BLD-MCNC: 93 MG/DL (ref 70–99)
HCT VFR BLD AUTO: 34.1 %
HGB BLD-MCNC: 12.1 G/DL
IMM GRANULOCYTES # BLD AUTO: 0.04 X10(3) UL (ref 0–1)
IMM GRANULOCYTES NFR BLD: 0.9 %
LYMPHOCYTES # BLD AUTO: 0.56 X10(3) UL (ref 1–4)
LYMPHOCYTES NFR BLD AUTO: 13.1 %
MCH RBC QN AUTO: 37.1 PG (ref 26–34)
MCHC RBC AUTO-ENTMCNC: 35.5 G/DL (ref 31–37)
MCV RBC AUTO: 104.6 FL
MONOCYTES # BLD AUTO: 0.51 X10(3) UL (ref 0.1–1)
MONOCYTES NFR BLD AUTO: 11.9 %
NEUTROPHILS # BLD AUTO: 3.1 X10 (3) UL (ref 1.5–7.7)
NEUTROPHILS # BLD AUTO: 3.1 X10(3) UL (ref 1.5–7.7)
NEUTROPHILS NFR BLD AUTO: 72.7 %
OSMOLALITY SERPL CALC.SUM OF ELEC: 302 MOSM/KG (ref 275–295)
PLATELET # BLD AUTO: 141 10(3)UL (ref 150–450)
POTASSIUM SERPL-SCNC: 4.2 MMOL/L (ref 3.5–5.1)
PROT SERPL-MCNC: 6.7 G/DL (ref 5.7–8.2)
RBC # BLD AUTO: 3.26 X10(6)UL
SODIUM SERPL-SCNC: 144 MMOL/L (ref 136–145)
WBC # BLD AUTO: 4.3 X10(3) UL (ref 4–11)

## 2024-06-12 PROCEDURE — 36415 COLL VENOUS BLD VENIPUNCTURE: CPT

## 2024-06-12 PROCEDURE — 80053 COMPREHEN METABOLIC PANEL: CPT

## 2024-06-12 PROCEDURE — G2211 COMPLEX E/M VISIT ADD ON: HCPCS | Performed by: NURSE PRACTITIONER

## 2024-06-12 PROCEDURE — 85025 COMPLETE CBC W/AUTO DIFF WBC: CPT

## 2024-06-12 NOTE — PROGRESS NOTES
HPI   Aleisha Carlin is a 64 year old female here for follow up of   Encounter Diagnoses   Name Primary?    Malignant neoplasm of upper-outer quadrant of right breast in female, estrogen receptor positive (HCC) Yes    Malignant neoplasm metastatic to liver (HCC)     Chemotherapy management, encounter for        Seventh line capecitabine initiated on 1/22/24  Currently s/p cycle 4 capecitabine tolerating well.     States feeling dizzy.  States with position changes it is worse.  States not vertigo.  States that does drink enough fluids.  Lots of stress, taking care of her father.     Due for cycle 6 tomorrow.      Recent treatment for vertigo - improving.     C/o shoulder pain R>L, jero when raising arms or other movement.  Advil or Norco alleviates pain. Aggravated by movement. Right foot has been bothersome more recently.  H/o surgeries dating back to childhood.      Fatigue:  Yes, but states a little improved    Fevers:  No     Appetite/taste changes:  No    Mucositis:  No    Weight changes:  improved    Nausea/vomiting:  Yes, controlled with prochlorperazine   Sometimes on the off week.     Diarrhea:  Yes, occasionally    Constipation:  Yes, states had to take a stool softener.    Peripheral neuropathy:  Yes, Per history, occasionally R last two fingers and does not last long. No complaint today .      PPE:  States using lotion in the hands all the time, no PPE.      H/o RUQ discomfort, resolved.     ECOG PS 1    Review of Systems:     Review of Systems   Respiratory:  Negative for cough and shortness of breath.    Cardiovascular:  Positive for leg swelling (BLE edema R>L, from surgeries on the R foot and leg.  Taking lasix prn). Negative for chest pain and palpitations.   Gastrointestinal:  Positive for abdominal pain (RUQ discomfort history), diarrhea and nausea (mild day 3,4). Negative for blood in stool, constipation and vomiting.   Genitourinary:  Negative for difficulty urinating, dysuria and frequency.     Musculoskeletal:  Positive for arthralgias (L knee pain, shoulder pain bilaterally, right foot.). Negative for back pain and neck pain.   Skin:         NF, hyperpigmented hands and feet    Neurological:  Positive for numbness (to L hand- sl betterd). Negative for dizziness and headaches.   Hematological:  Negative for adenopathy. Bruises/bleeds easily.   Psychiatric/Behavioral:  Positive for sleep disturbance (improved with sl mirtazapine.).          Current Outpatient Medications   Medication Sig Dispense Refill    HYDROcodone-acetaminophen  MG Oral Tab Take 1 tablet by mouth every 6 (six) hours as needed for Pain. 90 tablet 0    minocycline 50 MG Oral Cap Take 1 capsule (50 mg total) by mouth daily. 90 capsule 3    zolpidem 10 MG Oral Tab Take 1 tablet (10 mg total) by mouth nightly as needed. 60 tablet 0    LORazepam 0.5 MG Oral Tab Take 1 tablet (0.5 mg total) by mouth every 6 (six) hours as needed for Anxiety. 30 tablet 1    capecitabine 500 MG Oral tab Take 3 tablets (1,500 mg total) by mouth 2 (two) times daily Take daily for 7 days, then stop for 7 days.  Continue this pattern until instructed otherwise. 4 weeks is one cycle 84 tablet 11    mirtazapine 15 MG Oral Tablet Dispersible Take 1 tablet (15 mg total) by mouth nightly. 90 tablet 3    furosemide 20 MG Oral Tab Take 1 tablet (20 mg total) by mouth daily.      prochlorperazine (COMPAZINE) 10 mg tablet Take 1 tablet (10 mg total) by mouth every 6 (six) hours as needed for Nausea. 90 tablet 2    folic acid 1 MG Oral Tab Take 1 tablet (1 mg total) by mouth daily. 90 tablet 3    guaiFENesin-codeine 100-10 MG/5ML Oral Solution Take 5 mL by mouth every 6 (six) hours as needed for cough. 473 mL 0    albuterol 108 (90 Base) MCG/ACT Inhalation Aero Soln Inhale 1 puff into the lungs every 6 (six) hours as needed for Wheezing. 8 g 3    Zoledronic Acid (ZOMETA IV) Inject 4 mg into the vein every 6 (six) months.      Calcium Carb-Cholecalciferol (CALCIUM 500  + D) 500-200 MG-UNIT Oral Tab Take by mouth daily. (Patient not taking: Reported on 5/13/2024)      Cholecalciferol (VITAMIN D3) 250 MCG (46291 UT) Oral Cap Take 1 capsule by mouth daily. (Patient not taking: Reported on 5/13/2024)      atorvastatin 20 MG Oral Tab Take 1 tablet (20 mg total) by mouth daily. (Patient not taking: Reported on 5/13/2024)       Allergies:   No Known Allergies    Past Medical History:    Breast cancer (HCC)    MASTECTOMY / RECONSTRUCTION    Chemotherapy-induced neutropenia (HCC)    Encounter for insertion of venous access port    portacath insertion / venous access    Glaucoma    History of breast cancer in female    Hyperlipidemia    Malignant neoplasm of overlapping sites of breast in female, estrogen receptor positive (HCC)    Malignant neoplasm of upper-outer quadrant of right breast in female, estrogen receptor positive (HCC)    Malignant pleural effusion (HCC)    Menorrhagia    HYSTEROSCOPY / D&C / endomentrial biopsy (08/28/2007)    Metastatic breast cancer    Neurofibromatosis (HCC)    Osteopenia of multiple sites    Osteopenia of multiple sites    Osteoporosis screening    Osteoporosis screening    Per [employer]    Other osteoporosis without current pathological fracture    Postmenopausal bleeding    Psychophysiological insomnia    Tumor, foot    tumor right foot / excision/excision of bone spur/arthrodesis w/screw fixation     Past Surgical History:   Procedure Laterality Date    Breast reconstruction Right 2008    right breast reconstructed - ductal, BRCA neg    Breast surgery      Chemotherapy  2007    Foot surgery Right     tumor rt foot / excision/excision of bone spur/arthrodesis w/screw fixation    Ir port a cath procedure  2007    Right subclavian port of cath.    Mastectomy right      2012    Mastectomy,simple  2007     Social History     Socioeconomic History    Marital status: Single   Tobacco Use    Smoking status: Never    Smokeless tobacco: Never   Substance and  Sexual Activity    Alcohol use: Yes     Alcohol/week: 5.0 standard drinks of alcohol     Types: 5 Standard drinks or equivalent per week     Comment: Socially.  (Per [employer]:  5 days weekly.)    Drug use: No   Other Topics Concern    Caffeine Concern Yes     Comment: tea     Social Determinants of Health     Food Insecurity: No Food Insecurity (2023)    Food Insecurity     Food Insecurity: Never true   Transportation Needs: No Transportation Needs (2023)    Transportation Needs     Lack of Transportation: No   Housing Stability: Low Risk  (2023)    Housing Stability     Housing Instability: No       Family History   Problem Relation Age of Onset    Breast Cancer Mother 49        bilateral mastectomy    Genetic Disease Mother         NF-1    Breast Cancer Maternal Aunt 75    Breast Cancer Paternal Grandmother     Breast Cancer Self 51    Genetic Disease Self         NF-1    Cancer Paternal Aunt         throat ca    Cancer Maternal Uncle         prostate ca    Genetic Disease Brother         NF-1    Genetic Disease Brother         NF-1    Genetic Disease Brother         NF-1    Cancer Maternal Uncle         bladder ca    Breast Cancer Maternal Cousin Female     Cancer Maternal Cousin Female         leukemia;  childhood    Cancer Paternal Cousin Male         throat ca         PHYSICAL EXAM:    /77 (BP Location: Left arm, Patient Position: Sitting, Cuff Size: adult)   Pulse 63   Temp 98.2 °F (36.8 °C) (Oral)   Resp 16   Ht 1.524 m (5')   Wt 51.7 kg (114 lb)   SpO2 98%   BMI 22.26 kg/m²   Wt Readings from Last 6 Encounters:   24 53.2 kg (117 lb 3.2 oz)   04/15/24 51.2 kg (112 lb 14.4 oz)   24 50.8 kg (112 lb)   24 51.3 kg (113 lb 1.6 oz)   01/10/24 50.8 kg (112 lb)   23 49.6 kg (109 lb 4.8 oz)     Physical Exam  General: Patient is alert, not in acute distress.  HEENT: EOMs intact. Anicteric.  Nystagmus with gaze to the right.  Oropharynx is clear.   Neck: No  JVD. No palpable lymphadenopathy. Neck is supple.  Chest: Clear to auscultation.  Heart: Regular rate and rhythm.   Abdomen: Soft, non tender with good bowel sounds.    Extremities: +1 edema R>L chronic, attn shoulders - no edema, erythema or ecchymosis.  Nontender.   Neurological: Grossly intact. Normal AROM shoulders.    Psych/Depression: nl  Skin:  NF lesions on the back       ASSESSMENT/PLAN:     1. Malignant neoplasm of upper-outer quadrant of right breast in female, estrogen receptor positive (HCC)    2. Malignant neoplasm metastatic to liver (HCC)    3. Chemotherapy management, encounter for          Breast cancer history of ER/AL positive metastatic breast cancer to pleura, liver, bones. Initially diagnosed 2007 with DCIS, treated with lumpectomy, however MRI showed progression in the breast and she had a complete right mastectomy with axillary dissection followed by chemo in 2008: .pT1,N1. ER 9%, PgR 8%, Her-2 0, Ki 39%. she then presented with recurrence in 2020 right neck/supraclav ( ER 98% AL 0  her2 1+)  Pleura (Er 20%, Her2 1+) and liver:  (ER 38%, her 0)     Treatment history  9/11/2007:  right breast lumpectomy: Extensive DCIS ER 90%. Mri with progression  11/16/2007:  right mastectomy with axillary node dissection: pT1 N1 M0  2/16/2008:  4 cycles of Taxotere and Cytoxan   9/18/2020: Right neck recurrence,  left pleural,  liver-   10/1/2020: My risk Myriad negative. Started Pablociclib 125mg d1-21 w/ faslodex   4/15/2021: Neutropenic fever. Pablociclib changed to q 21 d with 14 day off cycle   1/27/2022: Prince at 100mg D1-21 q 28 day schedule stared per Dr Soriano  7/15/22: changed to abemaciclib. Progress in the pleura bx: TNBC, Her2 0 CPS 0  9/22/22-11/2022: Sacituzumab w/o response   11/17/22-1/2023: Abraxane with initial response, liver bx nondx (too small)  2/9/23: started gem/carbo due to SOB with response  4/26/23: imaging with continues response in pleura, progression in liver Er 65%, AL 0, Her2  0, bx: NGS below     NGS:  9/2020: Foundation: NF1, [id number], FGFR1 and MYC amplification  9/2022: G360: [id number] 0.5%. Tempus pleura: NF1, TP53, FGFR1 amp  2/2023: G360:  [id number] 0.9%, APC 0.2 with several new VUS  4/2023 G360: [id number] 0.4%. APC 0.3 with several new VUS. Tempus liver:           Patient  progressed fifth line palliative chemotherapy with gemcitabine and carboplatin, and Faslodex was added to treatment again in April after biopsy of the liver showed ER receptor positive.    ESR 1 mutation is negative.    S/p cycle 8 Eribulin, sixth line therapy, treatment complicated by bilateral lower extremity weakness.  This required admission, was managed by neurology as possible myasthenia.  She did recover her performance status back to 0-1.    Her CT scan of the chest, abdomen and pelvis to assess response to therapy showed a new hypodense lesion of the right hepatic lobe measuring 1.8 x 1.6 cm with some mild associated capsular retraction.  I reviewed with the patient options for treatment, given that this is a solitary site, the option of evaluation with IR for Y90 embolization was presented to the patient, this would treat that lesion by itself.  Given that the patient has had metastatic disease for several years, she will also need to have further systemic therapy after that treatment.  The patient has not been treated with capecitabine.  I discussed with the patient that if she is able to have a good breakfast and dinner, she would be a good candidate for capecitabine week on and week off.      Patient will proceed with 7th line therapy with capecitabine week on/week off and after 3 months (before next appt) will have imaging.  If still has liver lesion, will consider Y-90 embolization, vs continue with treatment w/o Y-90.      S/p cycle 5 capecitabine. started capecitabine single agent on 1/22/24.    Patient had CT of the chest, abdomen and pelvis on 4/16/2024 for assessment of disease response.  She  has been showing evidence of response, with dominant liver mass still decreasing in size, and rest of disease being stable.  Next imaging due in July of 2024    Proceed with cycle 6.     PPE- lotion to hands and feet     Cancer-related pain- RUQ discomfort Norco 10/325 prn,     Zometa - last dose March 2023. Completed 2 years, no further needed     Anemia from chemo:  Monitor closely. Added folic acid 1mg daily and changed B12 SL 1000mcg daily.     Bilateral shoulder pain, likely DJD per C-spine MRI.  Not related to capecitabine.    Hyperbilirubinemia:  Monitor trend (change in lab machines/value reference)    Vertigo:  vestibular PT.    Call prn.  Follow-up in 4 weeks      MDM high risk  Continuity of complex medical care.  No orders of the defined types were placed in this encounter.      Results From Past 48 Hours:  Recent Results (from the past 48 hour(s))   COMP METABOLIC PANEL [E]    Collection Time: 06/12/24 10:00 AM   Result Value Ref Range    Glucose 93 70 - 99 mg/dL    Sodium 144 136 - 145 mmol/L    Potassium 4.2 3.5 - 5.1 mmol/L    Chloride 109 98 - 112 mmol/L    CO2 29.0 21.0 - 32.0 mmol/L    Anion Gap 6 0 - 18 mmol/L    BUN 25 (H) 9 - 23 mg/dL    Creatinine 1.13 (H) 0.55 - 1.02 mg/dL    BUN/CREA Ratio 22.1 (H) 10.0 - 20.0    Calcium, Total 9.4 8.7 - 10.4 mg/dL    Calculated Osmolality 302 (H) 275 - 295 mOsm/kg    eGFR-Cr 55 (L) >=60 mL/min/1.73m2    ALT 12 10 - 49 U/L    AST 21 <=34 U/L    Alkaline Phosphatase 71 50 - 130 U/L    Bilirubin, Total 1.1 0.2 - 1.1 mg/dL    Total Protein 6.7 5.7 - 8.2 g/dL    Albumin 4.3 3.2 - 4.8 g/dL    Globulin  2.4 2.0 - 3.5 g/dL    A/G Ratio 1.8 1.0 - 2.0    Patient Fasting for CMP? Patient not present    CBC W/ DIFFERENTIAL    Collection Time: 06/12/24 10:00 AM   Result Value Ref Range    WBC 4.3 4.0 - 11.0 x10(3) uL    RBC 3.26 (L) 3.80 - 5.30 x10(6)uL    HGB 12.1 12.0 - 16.0 g/dL    HCT 34.1 (L) 35.0 - 48.0 %    .6 (H) 80.0 - 100.0 fL    MCH 37.1 (H) 26.0 -  34.0 pg    MCHC 35.5 31.0 - 37.0 g/dL    RDW-SD 57.2 (H) 35.1 - 46.3 fL    RDW 15.1 (H) 11.0 - 15.0 %    .0 (L) 150.0 - 450.0 10(3)uL    Neutrophil Absolute Prelim 3.10 1.50 - 7.70 x10 (3) uL    Neutrophil Absolute 3.10 1.50 - 7.70 x10(3) uL    Lymphocyte Absolute 0.56 (L) 1.00 - 4.00 x10(3) uL    Monocyte Absolute 0.51 0.10 - 1.00 x10(3) uL    Eosinophil Absolute 0.04 0.00 - 0.70 x10(3) uL    Basophil Absolute 0.02 0.00 - 0.20 x10(3) uL    Immature Granulocyte Absolute 0.04 0.00 - 1.00 x10(3) uL    Neutrophil % 72.7 %    Lymphocyte % 13.1 %    Monocyte % 11.9 %    Eosinophil % 0.9 %    Basophil % 0.5 %    Immature Granulocyte % 0.9 %           Imaging & Referrals:  None   No orders of the defined types were placed in this encounter.    PROCEDURE: CT CHEST ABDOMEN PELVIS (ALL CONTRAST ONLY) (CPT=71260/61893)      COMPARISON: MRI LIVER (W+WO) (CPT=74183), 4/14/2023, 6:26 PM.  [facility], MRI SPINE CERVICAL (W+WO) (CPT=72156), 1/03/2024, 6:23 AM.  [facility], MRI SPINE LUMBAR (W+WO) (CPT=72158), 12/31/2023, 2:50 PM.  [facility], MRI SPINE THORACIC (W+WO) (CPT=72157), 12/31/2023, 2:50 PM.  NM BONE SCAN WB (RUR=[id number]), 2/22/2022, 11:44 AM.  NM BONE SCAN WB (OOZ=[id number]), 10/22/2021, 12:25 PM.  NM PF BONE SCAN PLANAR, 10/03/2007, 10:10 AM.  PET STANDARD BODY SCAN   (CPT=78815), 2/09/2023, 11:59 AM.  CT CHEST (CPT=71250), 8/03/2022, 8:14 AM.  CT CHEST PE AORTA (IV ONLY) (CPT=71260), 1/26/2022, 2:46 PM.  CT CHEST PE AORTA (IV ONLY) (CPT=71260), 4/15/2021, 10:09 PM.  CT CHEST PE AORTA (IV ONLY) (CPT=71260),   10/27/2020, 7:12 PM.  CT CHEST PE AORTA (IV ONLY) (CPT=71260), 9/18/2020, 12:55 PM.  CT CHEST+ABDOMEN+PELVIS(ALL CNTRST ONLY)(CPT=71260/46349), 4/11/2023, 6:05 PM.  CT CHEST+ABDOMEN+PELVIS(ALL CNTRST ONLY)(CPT=71260/33385), 1/25/2023, 8:40 AM.  CT   CHEST+ABDOMEN+PELVIS(ALL CNTRST ONLY)(CPT=71260/31512), 11/11/2022, 2:29 PM.  CT CHEST+ABDOMEN+PELVIS(ALL CNTRST  ONLY)(TOH=98147/10999), 8/30/2022, 2:46 PM.  CT CHEST+ABDOMEN+PELVIS(ALL CNTRST ONLY)(CPT=71260/85907), 6/24/2022, 10:09 AM.  CT   CHEST+ABDOMEN+PELVIS(ALL CNTRST ONLY)(CPT=71260/25104), 1/25/2022, 10:29 AM.  CT CHEST+ABDOMEN+PELVIS(ALL CNTRST ONLY)(CPT=71260/49413), 9/16/2021, 10:46 AM.  CT CHEST+ABDOMEN+PELVIS(ALL CNTRST ONLY)(CPT=71260/25293), 7/21/2021, 12:21 PM.  CT ABDOMEN   PELVIS IV CONTRAST NO ORAL (ER), 4/16/2021, 1:07 AM.  CT ABDOMEN + PELVIS (CONTRAST ONLY) (CPT=74177), 10/06/2020, 1:11 PM.  [facility], CT CHEST+ABDOMEN+PELVIS(ALL CNTRST ONLY)(CPT=71260/25189), 1/05/2024, 8:43 AM.  [facility], CT CHEST+ABDOMEN+PELVIS(ALL CNTRST ONLY)(CPT=71260/61690), 10/07/2023, 9:21 AM.  [facility], CT CHEST+ABDOMEN+PELVIS(ALL CNTRST ONLY)(CPT=71260/66204), 7/03/2023, 1:07 PM.  [facility], CT PF   CHEST ABD W CONTRAST, 10/02/2007, 2:34 PM.      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      TECHNIQUE: Multidetector CT images of the chest, abdomen and pelvis were obtained with intravenous contrast material. Automated exposure control for dose reduction was used. Adjustment of the mA and/or kV was done based on the patient's size. Iterative   reconstruction technique for dose reduction was employed. Dose information was transmitted to the ACR (American College of Radiology) NRDR (National Radiology Data Registry), which includes the Dose Index Registry.       FINDINGS:   DEVICES: There is a right-sided Port-A-Cath with tip terminating near the cavoatrial junction.   CARDIAC: The heart is  borderline enlarged. Trace pericardial effusion extends into the superior pericardial recess.   VASCULATURE: The thoracic aorta has unremarkable configuration without aneurysm or dissection. Mild atherosclerosis is noted.   LUNGS/PLEURA: Trace nodular biapical pleuroparenchymal scarring is suggested. Small left pleural effusion is present with extension into the left major fissure. Interlobular septal thickening is seen with a basilar predominance, more conspicuous in the   left lower lobe than the right.   A few punctate micronodules are redemonstrated, not significantly changed from prior exams.    No pneumothorax is detected.    AIRWAYS: The tracheobronchial tree is without central mass or obstructing lesion. Mild bronchial wall thickening is present.   MEDIASTINUM/BRANDY: No mass or lymphadenopathy.    CHEST WALL: There is minimal residual infiltration of the right supraclavicular fat abutting the right sternocleidomastoid muscle. No axillary mass or lymphadenopathy. There are bilateral retropectoral breast implants with intracapsular rupture of the   right. Innumerable cutaneous and subcutaneous nodular foci are apparent.       LIVER: Multiple hypoenhancing hepatic lesions are seen, slightly decreased in conspicuity from previous. For example, there is a 1.8 x 1.2 cm lesion (series 2, image 87) with associated capsular retraction which is slightly decreased. An anterior   subcapsular 0.6 x 0.9 cm lesion (series 2, image 86) is similar to previous.   BILIARY: The gallbladder is present.   PANCREAS: There is mild diffuse fatty replacement without discernible lesion, fluid collection, or ductal dilatation.   SPLEEN: No enlargement.    ADRENALS:   Multifocal nodularity and heterogeneous hypoattenuation of the right adrenal gland is grossly unchanged in morphology. No defined mass or abnormal enlargement.    KIDNEYS:   Symmetric enhancement is seen without evidence of hydronephrosis or underlying solid masses.    GI/MESENTERY: A small hiatal hernia is evident. Retained or refluxed enteric contrast is seen throughout the esophageal lumen. Apparent gastric wall thickening is likely due to underdistention. There is no evidence of bowel obstruction. A normal caliber    appendix is seen without inflammatory manifestations. Scattered colonic diverticula are present in the sigmoid colon. There is no colonic wall thickening or pericolonic fat stranding.   URINARY BLADDER: Incompletely distended without visible calculus. Mild circumferential bladder wall thickening may relate to underdistention.   PELVIC NODES: No lymphadenopathy.     PELVIC ORGANS: The uterus is present. There is mild contour deformation of the uterine fundus, suggesting an underlying intramural fibroid. Deep pelvic calcifications likely represent phleboliths.    VASCULATURE:   Scattered atherosclerotic vascular calcifications of the abdominal aorta are observed. No aneurysm is detected.   RETROPERITONEUM: No mass or lymphadenopathy is apparent.    BONES:   There is redemonstration of sclerotic thoracolumbar lesions. A 0.9 cm lesion the T11 vertebral segment remains stable. A subtle sclerotic lesion involving the superior endplate of L3 with slight associated deformity also appears stable. Tiny   sclerotic lesions of L3, L4, and L5 remain grossly unchanged. Mild multilevel anterior osteophyte formation is seen throughout the spine.   A few scattered sclerotic foci in the proximal femora likely represent bone islands. Nonspecific irregular deformities of the right iliac wing are re-identified. Degenerative changes of the hips and pubic symphysis are present.   ABDOMINAL WALL: Extensive nodular cutaneous and subcutaneous nodules are demonstrated throughout the abdominal wall. Mild infiltration of the gluteal subcutaneous fat is noted.   OTHER: A 1.4 x 1.3 cm heterogeneously enhancing paraisthmic nodule is seen with peripheral calcification. Additional smaller  nodules and coarse calcifications are partially visualized.   No free air or fluid is seen in the abdomen or pelvis.                   Impression  CONCLUSION:   1. A dominant hepatic metastasis is slightly intervally decreased in conspicuity. Continued surveillance is recommended.      2. Stable appearance of sclerotic osseous metastatic lesions.      3. Distal colonic diverticulosis without CT evidence of acute complication.      4. Postoperative changes of mastectomy and retropectoral breast implantation; the right-sided implant demonstrates intracapsular rupture.      5. Imaging findings of neurofibromatosis are redemonstrated.       6. Thyroid nodules appear grossly stable.      7. Lesser incidental findings as above.            elm-remote.         Dictated by (CST): Alfredo Madden MD on 4/17/2024 at 10:50 AM       Finalized by (CST): Alfredo Madden MD on 4/17/2024 at 11:12 AM

## 2024-06-18 ENCOUNTER — OFFICE VISIT (OUTPATIENT)
Dept: PHYSICAL THERAPY | Facility: HOSPITAL | Age: 64
End: 2024-06-18
Attending: INTERNAL MEDICINE
Payer: MEDICARE

## 2024-06-18 PROCEDURE — 95992 CANALITH REPOSITIONING PROC: CPT

## 2024-06-18 NOTE — PROGRESS NOTES
Date  5/22/24 5/30/24 6/4/24 6/18/24        Visit Number  2/8 3/8 4/8 5/8        NMR            Ther EX            Ther Act            Gait Training            CRM  x x x x        Manual              Dx: right posterior canal, left horizontal canal  BPPV         Insurance:  Medicare    Visit # authorized:  8 per POC  Referring MD: Ami Farias   Precautions: fall risk  Medication Changes since last visit?: No  Subjective: Pt. Reports that she has had only brief dizziness since last session- much better than previously.  Pt. Reports that she has been very careful with her movements, notes that she is still  occasionally dizzy with position changes and rapid head movement, up to 3/10- resolves quickly.  Pt. Currently asymptomatic.  Pt. Has [place] the zoo, was fatigued but no dizziness.   Objective:   Fixation blocked: very minimal downbeating spontaneous nystagmus, very mild direction changing gaze-evoked nystagmus  Roll test:  + geotropic nystagmus, worse on left,less on right, much improved  CRM for left horizontal canal BPPV x 1 via Lempert maneuver, good tolerance  Appiani maneuver for left horizontal canal x 1, good tolerance  Underlying downbeating nystagmus was present throughout testing and intervention  Roll test negative at end of 2nd CRM    Patient Education: Reviewed pathology and management of BPPV.      Assessment: Pt. Tolerated session well, no nausea.  Pt. Negative for right posterior canal BPPV, but still positive for left horizontal canal BPPV, canalithiasis, but continues to improve in symptoms and nystagmus intensity.  Multiple canal involvement is more complex and may require more visits to resolve.        Plan for next few sessions: recheck all canals prior to treating.    Charges: CRM       Total Timed Treatment: 35 min  Total Treatment Time: 35 min

## 2024-06-23 DIAGNOSIS — G47.00 INSOMNIA, UNSPECIFIED TYPE: ICD-10-CM

## 2024-06-24 RX ORDER — ZOLPIDEM TARTRATE 10 MG/1
10 TABLET ORAL NIGHTLY PRN
Qty: 60 TABLET | Refills: 0 | Status: SHIPPED | OUTPATIENT
Start: 2024-06-24 | End: 2024-08-19

## 2024-06-25 ENCOUNTER — APPOINTMENT (OUTPATIENT)
Dept: PHYSICAL THERAPY | Facility: HOSPITAL | Age: 64
End: 2024-06-25
Attending: INTERNAL MEDICINE
Payer: MEDICARE

## 2024-06-25 ENCOUNTER — TELEPHONE (OUTPATIENT)
Dept: PHYSICAL THERAPY | Facility: HOSPITAL | Age: 64
End: 2024-06-25

## 2024-06-28 ENCOUNTER — OFFICE VISIT (OUTPATIENT)
Dept: PHYSICAL THERAPY | Facility: HOSPITAL | Age: 64
End: 2024-06-28
Attending: INTERNAL MEDICINE
Payer: MEDICARE

## 2024-06-28 PROCEDURE — 97112 NEUROMUSCULAR REEDUCATION: CPT

## 2024-06-28 NOTE — PROGRESS NOTES
Patient Name: Aleisha Carlin  :  3/1/1960    MRN:  [id number]  Date: 2024  Referring Physician:  Ami Farias     Diagnosis: right posterior canal, left horizontal canal  BPPV       Discharge Summary  Pt has attended 6 visits in physical therapy.     Progress Note Start Date: 5/15/24   End Date: 24     Subjective: Pt. Reports that she has not had any positional dizziness since last session.  Pt. Reports that she is still apprehensive about picking up objects from the floor, has been careful with her movements, but has not had dizziness except occ brief sensation with turning.  She has been able to return to full functional activities without symptoms.  Balance feels back to normal.       Assessment: Pt. Tested positive for right posterior and left horizontal canal BPPV, underwent intervention and has had resolution of her BPPV.  She required more sessions than typical due to complex presentation of multiple canal involvement.  Postural control and gait are also WNL.  Pt. Has not had any positional symptoms.  All goals are met.  No indication for further PT at this time, but pt. encouraged to contact MD and return for PT if indicated due to recurrence of BPPV or decline in function.     Objective:   Neuromuscular Re-education: 30 min  Huffman-Hallpike:  Negative, no symptoms bilaterally  Roll test: Negative, no symptoms  Romberg EO and EC:  30 sec, no excessive sway  Gait with horizontal and vertical head turns for visually scanning his environment- no path deviation or imbalance observed.  Picking up objects from the floor- x 5 without symptoms, indep.  Standing head turns looking over shoulder x 5 ea way- no symptoms.        Goals    1.  Negative Huffman-Hallpike and roll tests.  (GOAL MET)  2.  Able to perform position changes such as supine to/from sit and bending over to the floor without dizziness to improve safety in functional tasks.  (GOAL MET)  3.  Pt. Able to ambulate with horizontal head turns  for visual scanning without path deviation or dizziness to improve safety during gait.  (GOAL MET)            Charges: NMR x 2         Total Timed Treatment: 30 min              Total Treatment Time: 30 min     Plan: Discharge from PT with pt. To continue with normal functional activities and exercise as tolerance.     Thank you for your referral. If you have any questions, please contact me at [phone].     Sincerely,  Marjan Villalobos PT, NCS     Electronically signed by therapist:  Marjan Villalobos PT, NCS

## 2024-07-02 ENCOUNTER — APPOINTMENT (OUTPATIENT)
Dept: PHYSICAL THERAPY | Facility: HOSPITAL | Age: 64
End: 2024-07-02
Attending: INTERNAL MEDICINE
Payer: MEDICARE

## 2024-07-09 ENCOUNTER — APPOINTMENT (OUTPATIENT)
Dept: PHYSICAL THERAPY | Facility: HOSPITAL | Age: 64
End: 2024-07-09
Attending: INTERNAL MEDICINE
Payer: MEDICARE

## 2024-07-10 ENCOUNTER — NURSE ONLY (OUTPATIENT)
Dept: HEMATOLOGY/ONCOLOGY | Facility: HOSPITAL | Age: 64
End: 2024-07-10
Attending: INTERNAL MEDICINE
Payer: MEDICARE

## 2024-07-10 ENCOUNTER — OFFICE VISIT (OUTPATIENT)
Dept: HEMATOLOGY/ONCOLOGY | Facility: HOSPITAL | Age: 64
End: 2024-07-10
Attending: NURSE PRACTITIONER
Payer: MEDICARE

## 2024-07-10 VITALS
SYSTOLIC BLOOD PRESSURE: 146 MMHG | OXYGEN SATURATION: 98 % | HEART RATE: 80 BPM | WEIGHT: 110.38 LBS | RESPIRATION RATE: 16 BRPM | BODY MASS INDEX: 21.67 KG/M2 | DIASTOLIC BLOOD PRESSURE: 89 MMHG | TEMPERATURE: 98 F | HEIGHT: 60 IN

## 2024-07-10 DIAGNOSIS — Z17.0 MALIGNANT NEOPLASM OF UPPER-OUTER QUADRANT OF RIGHT BREAST IN FEMALE, ESTROGEN RECEPTOR POSITIVE (HCC): ICD-10-CM

## 2024-07-10 DIAGNOSIS — C50.411 MALIGNANT NEOPLASM OF UPPER-OUTER QUADRANT OF RIGHT BREAST IN FEMALE, ESTROGEN RECEPTOR POSITIVE (HCC): Primary | ICD-10-CM

## 2024-07-10 DIAGNOSIS — C50.411 MALIGNANT NEOPLASM OF UPPER-OUTER QUADRANT OF RIGHT BREAST IN FEMALE, ESTROGEN RECEPTOR POSITIVE (HCC): ICD-10-CM

## 2024-07-10 DIAGNOSIS — C78.7 MALIGNANT NEOPLASM METASTATIC TO LIVER (HCC): ICD-10-CM

## 2024-07-10 DIAGNOSIS — Z51.11 CHEMOTHERAPY MANAGEMENT, ENCOUNTER FOR: ICD-10-CM

## 2024-07-10 DIAGNOSIS — Z51.81 MEDICATION MONITORING ENCOUNTER: ICD-10-CM

## 2024-07-10 DIAGNOSIS — Z17.0 MALIGNANT NEOPLASM OF UPPER-OUTER QUADRANT OF RIGHT BREAST IN FEMALE, ESTROGEN RECEPTOR POSITIVE (HCC): Primary | ICD-10-CM

## 2024-07-10 LAB
ALBUMIN SERPL-MCNC: 4.7 G/DL (ref 3.2–4.8)
ALBUMIN/GLOB SERPL: 2 {RATIO} (ref 1–2)
ALP LIVER SERPL-CCNC: 67 U/L
ALT SERPL-CCNC: 8 U/L
ANION GAP SERPL CALC-SCNC: 10 MMOL/L (ref 0–18)
AST SERPL-CCNC: 19 U/L (ref ?–34)
BASOPHILS # BLD AUTO: 0.03 X10(3) UL (ref 0–0.2)
BASOPHILS NFR BLD AUTO: 0.5 %
BILIRUB SERPL-MCNC: 2.3 MG/DL (ref 0.2–1.1)
BUN BLD-MCNC: 26 MG/DL (ref 9–23)
BUN/CREAT SERPL: 22.4 (ref 10–20)
CALCIUM BLD-MCNC: 9.5 MG/DL (ref 8.7–10.4)
CHLORIDE SERPL-SCNC: 107 MMOL/L (ref 98–112)
CO2 SERPL-SCNC: 24 MMOL/L (ref 21–32)
CREAT BLD-MCNC: 1.16 MG/DL
DEPRECATED RDW RBC AUTO: 56.7 FL (ref 35.1–46.3)
EGFRCR SERPLBLD CKD-EPI 2021: 53 ML/MIN/1.73M2 (ref 60–?)
EOSINOPHIL # BLD AUTO: 0.02 X10(3) UL (ref 0–0.7)
EOSINOPHIL NFR BLD AUTO: 0.4 %
ERYTHROCYTE [DISTWIDTH] IN BLOOD BY AUTOMATED COUNT: 14.9 % (ref 11–15)
GLOBULIN PLAS-MCNC: 2.3 G/DL (ref 2–3.5)
GLUCOSE BLD-MCNC: 76 MG/DL (ref 70–99)
HCT VFR BLD AUTO: 39.3 %
HGB BLD-MCNC: 13.3 G/DL
IMM GRANULOCYTES # BLD AUTO: 0.06 X10(3) UL (ref 0–1)
IMM GRANULOCYTES NFR BLD: 1.1 %
LYMPHOCYTES # BLD AUTO: 0.51 X10(3) UL (ref 1–4)
LYMPHOCYTES NFR BLD AUTO: 9 %
MCH RBC QN AUTO: 35.3 PG (ref 26–34)
MCHC RBC AUTO-ENTMCNC: 33.8 G/DL (ref 31–37)
MCV RBC AUTO: 104.2 FL
MONOCYTES # BLD AUTO: 0.49 X10(3) UL (ref 0.1–1)
MONOCYTES NFR BLD AUTO: 8.6 %
NEUTROPHILS # BLD AUTO: 4.58 X10 (3) UL (ref 1.5–7.7)
NEUTROPHILS # BLD AUTO: 4.58 X10(3) UL (ref 1.5–7.7)
NEUTROPHILS NFR BLD AUTO: 80.4 %
OSMOLALITY SERPL CALC.SUM OF ELEC: 296 MOSM/KG (ref 275–295)
PLATELET # BLD AUTO: 156 10(3)UL (ref 150–450)
POTASSIUM SERPL-SCNC: 4.3 MMOL/L (ref 3.5–5.1)
PROT SERPL-MCNC: 7 G/DL (ref 5.7–8.2)
RBC # BLD AUTO: 3.77 X10(6)UL
SODIUM SERPL-SCNC: 141 MMOL/L (ref 136–145)
WBC # BLD AUTO: 5.7 X10(3) UL (ref 4–11)

## 2024-07-10 PROCEDURE — 99215 OFFICE O/P EST HI 40 MIN: CPT | Performed by: NURSE PRACTITIONER

## 2024-07-10 PROCEDURE — 36591 DRAW BLOOD OFF VENOUS DEVICE: CPT

## 2024-07-10 PROCEDURE — 80053 COMPREHEN METABOLIC PANEL: CPT

## 2024-07-10 PROCEDURE — 85025 COMPLETE CBC W/AUTO DIFF WBC: CPT

## 2024-07-10 NOTE — PROGRESS NOTES
HPI   Aleisha Carlin is a 64 year old female here for follow up of   Encounter Diagnoses   Name Primary?    Malignant neoplasm of upper-outer quadrant of right breast in female, estrogen receptor positive (HCC) Yes    Malignant neoplasm metastatic to liver (HCC)     Chemotherapy management, encounter for        Seventh line capecitabine initiated on 1/22/24  Currently s/p cycle 6 capecitabine tolerating well.     States feeling dizzy.  States with position changes it is worse.  States not vertigo.  States that does drink enough fluids.  Lots of stress, taking care of her father.     Due for cycle 7 Friday.      Recent treatment for vertigo - improving.     Low back pain today after showering took norco.     On week off , due to start again 7/12/24.     C/o shoulder pain R>L, jero when raising arms or other movement.  Advil or Norco alleviates pain. Aggravated by movement. Right foot has been bothersome more recently.  H/o surgeries dating back to childhood.      Fatigue:  Yes, but states a little improved    Fevers:  No     Appetite/taste changes:  No, decreased appetite     Mucositis:  No    Weight changes:  improved    Nausea/vomiting:  Yes, controlled with prochlorperazine   Sometimes on the off week.     Diarrhea:  Yes, occasionally    Constipation:  Yes, states had to take a stool softener.    Peripheral neuropathy:  Yes, Per history, occasionally R last two fingers and does not last long. No complaint today .      PPE:  States using lotion in the hands all the time, no PPE.      H/o RUQ discomfort, resolved.     ECOG PS 1    Review of Systems:     Review of Systems   Constitutional:  Positive for fatigue.   HENT:   Negative for mouth sores.    Respiratory:  Negative for cough and shortness of breath.    Cardiovascular:  Positive for leg swelling (BLE edema R>L, from surgeries on the R foot and leg.  Taking lasix prn). Negative for chest pain and palpitations.   Gastrointestinal:  Positive for abdominal pain  (RUQ discomfort history), diarrhea and nausea (mild day 3,4). Negative for blood in stool, constipation and vomiting.   Genitourinary:  Negative for difficulty urinating, dysuria and frequency.    Musculoskeletal:  Positive for arthralgias (L knee pain, shoulder pain bilaterally, right foot.). Negative for back pain (low back today) and neck pain.   Skin:         NF, hyperpigmented hands and feet    Neurological:  Positive for numbness (to L hand- sl betterd). Negative for dizziness and headaches.   Hematological:  Negative for adenopathy. Bruises/bleeds easily.   Psychiatric/Behavioral:  Positive for sleep disturbance (improved with sl mirtazapine.).          Current Outpatient Medications   Medication Sig Dispense Refill    zolpidem 10 MG Oral Tab Take 1 tablet (10 mg total) by mouth nightly as needed. 60 tablet 0    HYDROcodone-acetaminophen  MG Oral Tab Take 1 tablet by mouth every 6 (six) hours as needed for Pain. 90 tablet 0    minocycline 50 MG Oral Cap Take 1 capsule (50 mg total) by mouth daily. 90 capsule 3    LORazepam 0.5 MG Oral Tab Take 1 tablet (0.5 mg total) by mouth every 6 (six) hours as needed for Anxiety. 30 tablet 1    capecitabine 500 MG Oral tab Take 3 tablets (1,500 mg total) by mouth 2 (two) times daily Take daily for 7 days, then stop for 7 days.  Continue this pattern until instructed otherwise. 4 weeks is one cycle 84 tablet 11    mirtazapine 15 MG Oral Tablet Dispersible Take 1 tablet (15 mg total) by mouth nightly. 90 tablet 3    furosemide 20 MG Oral Tab Take 1 tablet (20 mg total) by mouth daily.      prochlorperazine (COMPAZINE) 10 mg tablet Take 1 tablet (10 mg total) by mouth every 6 (six) hours as needed for Nausea. 90 tablet 2    folic acid 1 MG Oral Tab Take 1 tablet (1 mg total) by mouth daily. 90 tablet 3    guaiFENesin-codeine 100-10 MG/5ML Oral Solution Take 5 mL by mouth every 6 (six) hours as needed for cough. 473 mL 0    albuterol 108 (90 Base) MCG/ACT Inhalation  Aero Soln Inhale 1 puff into the lungs every 6 (six) hours as needed for Wheezing. 8 g 3    Zoledronic Acid (ZOMETA IV) Inject 4 mg into the vein every 6 (six) months.      Calcium Carb-Cholecalciferol (CALCIUM 500 + D) 500-200 MG-UNIT Oral Tab Take by mouth daily. (Patient not taking: Reported on 5/13/2024)      Cholecalciferol (VITAMIN D3) 250 MCG (77957 UT) Oral Cap Take 1 capsule by mouth daily.      atorvastatin 20 MG Oral Tab Take 1 tablet (20 mg total) by mouth daily. (Patient not taking: Reported on 5/13/2024)       Allergies:   No Known Allergies    Past Medical History:    Breast cancer (HCC)    MASTECTOMY / RECONSTRUCTION    Chemotherapy-induced neutropenia (HCC)    Encounter for insertion of venous access port    portacath insertion / venous access    Glaucoma    History of breast cancer in female    Hyperlipidemia    Malignant neoplasm of overlapping sites of breast in female, estrogen receptor positive (HCC)    Malignant neoplasm of upper-outer quadrant of right breast in female, estrogen receptor positive (HCC)    Malignant pleural effusion (HCC)    Menorrhagia    HYSTEROSCOPY / D&C / endomentrial biopsy (08/28/2007)    Metastatic breast cancer    Neurofibromatosis (HCC)    Osteopenia of multiple sites    Osteopenia of multiple sites    Osteoporosis screening    Osteoporosis screening    Per [employer]    Other osteoporosis without current pathological fracture    Postmenopausal bleeding    Psychophysiological insomnia    Tumor, foot    tumor right foot / excision/excision of bone spur/arthrodesis w/screw fixation     Past Surgical History:   Procedure Laterality Date    Breast reconstruction Right 2008    right breast reconstructed - ductal, BRCA neg    Breast surgery      Chemotherapy  2007    Foot surgery Right     tumor rt foot / excision/excision of bone spur/arthrodesis w/screw fixation    Ir port a cath procedure  2007    Right subclavian port of cath.    Mastectomy right      2012     Mastectomy,simple       Social History     Socioeconomic History    Marital status: Single   Tobacco Use    Smoking status: Never    Smokeless tobacco: Never   Substance and Sexual Activity    Alcohol use: Yes     Alcohol/week: 5.0 standard drinks of alcohol     Types: 5 Standard drinks or equivalent per week     Comment: Socially.  (Per [employer]:  5 days weekly.)    Drug use: No   Other Topics Concern    Caffeine Concern Yes     Comment: tea     Social Determinants of Health     Food Insecurity: No Food Insecurity (2023)    Food Insecurity     Food Insecurity: Never true   Transportation Needs: No Transportation Needs (2023)    Transportation Needs     Lack of Transportation: No   Housing Stability: Low Risk  (2023)    Housing Stability     Housing Instability: No       Family History   Problem Relation Age of Onset    Breast Cancer Mother 49        bilateral mastectomy    Genetic Disease Mother         NF-1    Breast Cancer Maternal Aunt 75    Breast Cancer Paternal Grandmother     Breast Cancer Self 51    Genetic Disease Self         NF-1    Cancer Paternal Aunt         throat ca    Cancer Maternal Uncle         prostate ca    Genetic Disease Brother         NF-1    Genetic Disease Brother         NF-1    Genetic Disease Brother         NF-1    Cancer Maternal Uncle         bladder ca    Breast Cancer Maternal Cousin Female     Cancer Maternal Cousin Female         leukemia;  childhood    Cancer Paternal Cousin Male         throat ca         PHYSICAL EXAM:    /89 (BP Location: Left arm, Patient Position: Sitting, Cuff Size: adult)   Pulse 80   Temp 98 °F (36.7 °C) (Oral)   Resp 16   Ht 1.524 m (5')   Wt 50.1 kg (110 lb 6.4 oz)   SpO2 98%   BMI 21.56 kg/m²   Wt Readings from Last 6 Encounters:   24 51.7 kg (114 lb)   24 53.2 kg (117 lb 3.2 oz)   04/15/24 51.2 kg (112 lb 14.4 oz)   24 50.8 kg (112 lb)   24 51.3 kg (113 lb 1.6 oz)   01/10/24 50.8 kg (112  lb)     Physical Exam  General: Patient is alert, not in acute distress.  HEENT: EOMs intact. Anicteric.  Nystagmus with gaze to the right.  Oropharynx is clear.   Neck: No JVD. No palpable lymphadenopathy. Neck is supple.  Chest: Clear to auscultation.  Heart: Regular rate and rhythm.   Abdomen: Soft, non tender with good bowel sounds.    Extremities: +1 edema R>L chronic, attn shoulders - no edema, erythema or ecchymosis.  Nontender.   Neurological: Grossly intact. Normal AROM shoulders.    Psych/Depression: nl  Skin:  NF lesions on the back       ASSESSMENT/PLAN:     1. Malignant neoplasm of upper-outer quadrant of right breast in female, estrogen receptor positive (HCC)    2. Malignant neoplasm metastatic to liver (HCC)    3. Chemotherapy management, encounter for          Breast cancer history of ER/NC positive metastatic breast cancer to pleura, liver, bones. Initially diagnosed 2007 with DCIS, treated with lumpectomy, however MRI showed progression in the breast and she had a complete right mastectomy with axillary dissection followed by chemo in 2008: .pT1,N1. ER 9%, PgR 8%, Her-2 0, Ki 39%. she then presented with recurrence in 2020 right neck/supraclav ( ER 98% NC 0  her2 1+)  Pleura (Er 20%, Her2 1+) and liver:  (ER 38%, her 0)     Treatment history  9/11/2007:  right breast lumpectomy: Extensive DCIS ER 90%. Mri with progression  11/16/2007:  right mastectomy with axillary node dissection: pT1 N1 M0  2/16/2008:  4 cycles of Taxotere and Cytoxan   9/18/2020: Right neck recurrence,  left pleural,  liver-   10/1/2020: My risk Myriad negative. Started Pablociclib 125mg d1-21 w/ faslodex   4/15/2021: Neutropenic fever. Pablociclib changed to q 21 d with 14 day off cycle   1/27/2022: Prince at 100mg D1-21 q 28 day schedule stared per Dr Soriano  7/15/22: changed to abemaciclib. Progress in the pleura bx: TNBC, Her2 0 CPS 0  9/22/22-11/2022: Sacituzumab w/o response   11/17/22-1/2023: Abraxane with initial  response, liver bx nondx (too small)  2/9/23: started gem/carbo due to SOB with response  4/26/23: imaging with continues response in pleura, progression in liver Er 65%, UT 0, Her2 0, bx: NGS below     NGS:  9/2020: Foundation: NF1, [id number], FGFR1 and MYC amplification  9/2022: G360: [id number] 0.5%. Tempus pleura: NF1, TP53, FGFR1 amp  2/2023: G360:  [id number] 0.9%, APC 0.2 with several new VUS  4/2023 G360: [id number] 0.4%. APC 0.3 with several new VUS. Tempus liver:           Patient  progressed fifth line palliative chemotherapy with gemcitabine and carboplatin, and Faslodex was added to treatment again in April after biopsy of the liver showed ER receptor positive.    ESR 1 mutation is negative.    S/p cycle 8 Eribulin, sixth line therapy, treatment complicated by bilateral lower extremity weakness.  This required admission, was managed by neurology as possible myasthenia.  She did recover her performance status back to 0-1.    Her CT scan of the chest, abdomen and pelvis to assess response to therapy showed a new hypodense lesion of the right hepatic lobe measuring 1.8 x 1.6 cm with some mild associated capsular retraction.  I reviewed with the patient options for treatment, given that this is a solitary site, the option of evaluation with IR for Y90 embolization was presented to the patient, this would treat that lesion by itself.  Given that the patient has had metastatic disease for several years, she will also need to have further systemic therapy after that treatment.  The patient has not been treated with capecitabine.  I discussed with the patient that if she is able to have a good breakfast and dinner, she would be a good candidate for capecitabine week on and week off.      Patient will proceed with 7th line therapy with capecitabine week on/week off and after 3 months (before next appt) will have imaging.  If still has liver lesion, will consider Y-90 embolization, vs continue with treatment w/o Y-90.       S/p cycle 6 capecitabine. started capecitabine single agent on 1/22/24.    Patient had CT of the chest, abdomen and pelvis on 4/16/2024 for assessment of disease response.  She has been showing evidence of response, with dominant liver mass still decreasing in size, and rest of disease being stable.  Next imaging due in July of 2024- scheduled     Proceed with cycle 7.     PPE- lotion to hands and feet     Cancer-related pain- RUQ discomfort Norco 10/325 prn,     Zometa - last dose March 2023. Completed 2 years, no further needed     Anemia from chemo:  Monitor closely. Added folic acid 1mg daily and changed B12 SL 1000mcg daily.     Bilateral shoulder pain, likely DJD per C-spine MRI.  Not related to capecitabine.    Hyperbilirubinemia:  Monitor trend (change in lab machines/value reference)    Vertigo:  vestibular PT.    Call prn.  Follow-up in 4 weeks      MDM high risk  Continuity of complex medical care.  No orders of the defined types were placed in this encounter.      Results From Past 48 Hours:  Recent Results (from the past 48 hour(s))   COMP METABOLIC PANEL [E]    Collection Time: 07/10/24  1:10 PM   Result Value Ref Range    Glucose 76 70 - 99 mg/dL    Sodium 141 136 - 145 mmol/L    Potassium 4.3 3.5 - 5.1 mmol/L    Chloride 107 98 - 112 mmol/L    CO2 24.0 21.0 - 32.0 mmol/L    Anion Gap 10 0 - 18 mmol/L    BUN 26 (H) 9 - 23 mg/dL    Creatinine 1.16 (H) 0.55 - 1.02 mg/dL    BUN/CREA Ratio 22.4 (H) 10.0 - 20.0    Calcium, Total 9.5 8.7 - 10.4 mg/dL    Calculated Osmolality 296 (H) 275 - 295 mOsm/kg    eGFR-Cr 53 (L) >=60 mL/min/1.73m2    ALT 8 (L) 10 - 49 U/L    AST 19 <=34 U/L    Alkaline Phosphatase 67 50 - 130 U/L    Bilirubin, Total 2.3 (H) 0.2 - 1.1 mg/dL    Total Protein 7.0 5.7 - 8.2 g/dL    Albumin 4.7 3.2 - 4.8 g/dL    Globulin  2.3 2.0 - 3.5 g/dL    A/G Ratio 2.0 1.0 - 2.0    Patient Fasting for CMP? Patient not present    CBC W/ DIFFERENTIAL    Collection Time: 07/10/24  1:10 PM   Result  Value Ref Range    WBC 5.7 4.0 - 11.0 x10(3) uL    RBC 3.77 (L) 3.80 - 5.30 x10(6)uL    HGB 13.3 12.0 - 16.0 g/dL    HCT 39.3 35.0 - 48.0 %    .2 (H) 80.0 - 100.0 fL    MCH 35.3 (H) 26.0 - 34.0 pg    MCHC 33.8 31.0 - 37.0 g/dL    RDW-SD 56.7 (H) 35.1 - 46.3 fL    RDW 14.9 11.0 - 15.0 %    .0 150.0 - 450.0 10(3)uL    Neutrophil Absolute Prelim 4.58 1.50 - 7.70 x10 (3) uL    Neutrophil Absolute 4.58 1.50 - 7.70 x10(3) uL    Lymphocyte Absolute 0.51 (L) 1.00 - 4.00 x10(3) uL    Monocyte Absolute 0.49 0.10 - 1.00 x10(3) uL    Eosinophil Absolute 0.02 0.00 - 0.70 x10(3) uL    Basophil Absolute 0.03 0.00 - 0.20 x10(3) uL    Immature Granulocyte Absolute 0.06 0.00 - 1.00 x10(3) uL    Neutrophil % 80.4 %    Lymphocyte % 9.0 %    Monocyte % 8.6 %    Eosinophil % 0.4 %    Basophil % 0.5 %    Immature Granulocyte % 1.1 %             Imaging & Referrals:  None   No orders of the defined types were placed in this encounter.    PROCEDURE: CT CHEST ABDOMEN PELVIS (ALL CONTRAST ONLY) (CPT=71260/75313)      COMPARISON: MRI LIVER (W+WO) (CPT=74183), 4/14/2023, 6:26 PM.  [facility], MRI SPINE CERVICAL (W+WO) (CPT=72156), 1/03/2024, 6:23 AM.  [facility], MRI SPINE LUMBAR (W+WO) (CPT=72158), 12/31/2023, 2:50 PM.  [facility], MRI SPINE THORACIC (W+WO) (CPT=72157), 12/31/2023, 2:50 PM.  NM BONE SCAN WB (ZUA=[id number]), 2/22/2022, 11:44 AM.  NM BONE SCAN WB (PVJ=[id number]), 10/22/2021, 12:25 PM.  NM PF BONE SCAN PLANAR, 10/03/2007, 10:10 AM.  PET STANDARD BODY SCAN   (CPT=78815), 2/09/2023, 11:59 AM.  CT CHEST (CPT=71250), 8/03/2022, 8:14 AM.  CT CHEST PE AORTA (IV ONLY) (CPT=71260), 1/26/2022, 2:46 PM.  CT CHEST PE AORTA (IV ONLY) (CPT=71260), 4/15/2021, 10:09 PM.  CT CHEST PE AORTA (IV ONLY) (CPT=71260),   10/27/2020, 7:12 PM.  CT CHEST PE AORTA (IV ONLY) (CPT=71260), 9/18/2020, 12:55 PM.  CT CHEST+ABDOMEN+PELVIS(ALL CNTRST ONLY)(CPT=71260/51303), 4/11/2023, 6:05 PM.  CT  CHEST+ABDOMEN+PELVIS(ALL CNTRST ONLY)(CPT=71260/15215), 1/25/2023, 8:40 AM.  CT   CHEST+ABDOMEN+PELVIS(ALL CNTRST ONLY)(CPT=71260/46375), 11/11/2022, 2:29 PM.  CT CHEST+ABDOMEN+PELVIS(ALL CNTRST ONLY)(CPT=71260/44300), 8/30/2022, 2:46 PM.  CT CHEST+ABDOMEN+PELVIS(ALL CNTRST ONLY)(CPT=71260/16412), 6/24/2022, 10:09 AM.  CT   CHEST+ABDOMEN+PELVIS(ALL CNTRST ONLY)(CPT=71260/10165), 1/25/2022, 10:29 AM.  CT CHEST+ABDOMEN+PELVIS(ALL CNTRST ONLY)(CPT=71260/71016), 9/16/2021, 10:46 AM.  CT CHEST+ABDOMEN+PELVIS(ALL CNTRST ONLY)(CPT=71260/64329), 7/21/2021, 12:21 PM.  CT ABDOMEN   PELVIS IV CONTRAST NO ORAL (ER), 4/16/2021, 1:07 AM.  CT ABDOMEN + PELVIS (CONTRAST ONLY) (CPT=74177), 10/06/2020, 1:11 PM.  [facility], CT CHEST+ABDOMEN+PELVIS(ALL CNTRST ONLY)(CPT=71260/43718), 1/05/2024, 8:43 AM.  [facility], CT CHEST+ABDOMEN+PELVIS(ALL CNTRST ONLY)(CPT=71260/09080), 10/07/2023, 9:21 AM.  [facility], CT CHEST+ABDOMEN+PELVIS(ALL CNTRST ONLY)(CPT=71260/08859), 7/03/2023, 1:07 PM.  [facility], CT PF   CHEST ABD W CONTRAST, 10/02/2007, 2:34 PM.      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      TECHNIQUE: Multidetector CT images of the chest, abdomen and pelvis were obtained with intravenous contrast material. Automated exposure control for dose reduction was used. Adjustment of the mA and/or kV was done based on the patient's size. Iterative   reconstruction technique for dose reduction was employed. Dose information was transmitted to the ACR (American College of Radiology) NRDR (National Radiology Data  Registry), which includes the Dose Index Registry.       FINDINGS:   DEVICES: There is a right-sided Port-A-Cath with tip terminating near the cavoatrial junction.   CARDIAC: The heart is borderline enlarged. Trace pericardial effusion extends into the superior pericardial recess.   VASCULATURE: The thoracic aorta has unremarkable configuration without aneurysm or dissection. Mild atherosclerosis is noted.   LUNGS/PLEURA: Trace nodular biapical pleuroparenchymal scarring is suggested. Small left pleural effusion is present with extension into the left major fissure. Interlobular septal thickening is seen with a basilar predominance, more conspicuous in the   left lower lobe than the right.   A few punctate micronodules are redemonstrated, not significantly changed from prior exams.    No pneumothorax is detected.    AIRWAYS: The tracheobronchial tree is without central mass or obstructing lesion. Mild bronchial wall thickening is present.   MEDIASTINUM/BRANDY: No mass or lymphadenopathy.    CHEST WALL: There is minimal residual infiltration of the right supraclavicular fat abutting the right sternocleidomastoid muscle. No axillary mass or lymphadenopathy. There are bilateral retropectoral breast implants with intracapsular rupture of the   right. Innumerable cutaneous and subcutaneous nodular foci are apparent.       LIVER: Multiple hypoenhancing hepatic lesions are seen, slightly decreased in conspicuity from previous. For example, there is a 1.8 x 1.2 cm lesion (series 2, image 87) with associated capsular retraction which is slightly decreased. An anterior   subcapsular 0.6 x 0.9 cm lesion (series 2, image 86) is similar to previous.   BILIARY: The gallbladder is present.   PANCREAS: There is mild diffuse fatty replacement without discernible lesion, fluid collection, or ductal dilatation.   SPLEEN: No enlargement.    ADRENALS:   Multifocal nodularity and heterogeneous hypoattenuation of the right adrenal gland is  grossly unchanged in morphology. No defined mass or abnormal enlargement.    KIDNEYS:   Symmetric enhancement is seen without evidence of hydronephrosis or underlying solid masses.   GI/MESENTERY: A small hiatal hernia is evident. Retained or refluxed enteric contrast is seen throughout the esophageal lumen. Apparent gastric wall thickening is likely due to underdistention. There is no evidence of bowel obstruction. A normal caliber    appendix is seen without inflammatory manifestations. Scattered colonic diverticula are present in the sigmoid colon. There is no colonic wall thickening or pericolonic fat stranding.   URINARY BLADDER: Incompletely distended without visible calculus. Mild circumferential bladder wall thickening may relate to underdistention.   PELVIC NODES: No lymphadenopathy.     PELVIC ORGANS: The uterus is present. There is mild contour deformation of the uterine fundus, suggesting an underlying intramural fibroid. Deep pelvic calcifications likely represent phleboliths.    VASCULATURE:   Scattered atherosclerotic vascular calcifications of the abdominal aorta are observed. No aneurysm is detected.   RETROPERITONEUM: No mass or lymphadenopathy is apparent.    BONES:   There is redemonstration of sclerotic thoracolumbar lesions. A 0.9 cm lesion the T11 vertebral segment remains stable. A subtle sclerotic lesion involving the superior endplate of L3 with slight associated deformity also appears stable. Tiny   sclerotic lesions of L3, L4, and L5 remain grossly unchanged. Mild multilevel anterior osteophyte formation is seen throughout the spine.   A few scattered sclerotic foci in the proximal femora likely represent bone islands. Nonspecific irregular deformities of the right iliac wing are re-identified. Degenerative changes of the hips and pubic symphysis are present.   ABDOMINAL WALL: Extensive nodular cutaneous and subcutaneous nodules are demonstrated throughout the abdominal wall. Mild  infiltration of the gluteal subcutaneous fat is noted.   OTHER: A 1.4 x 1.3 cm heterogeneously enhancing paraisthmic nodule is seen with peripheral calcification. Additional smaller nodules and coarse calcifications are partially visualized.   No free air or fluid is seen in the abdomen or pelvis.                   Impression  CONCLUSION:   1. A dominant hepatic metastasis is slightly intervally decreased in conspicuity. Continued surveillance is recommended.      2. Stable appearance of sclerotic osseous metastatic lesions.      3. Distal colonic diverticulosis without CT evidence of acute complication.      4. Postoperative changes of mastectomy and retropectoral breast implantation; the right-sided implant demonstrates intracapsular rupture.      5. Imaging findings of neurofibromatosis are redemonstrated.       6. Thyroid nodules appear grossly stable.      7. Lesser incidental findings as above.            elm-remote.         Dictated by (CST): Alfredo Madden MD on 4/17/2024 at 10:50 AM       Finalized by (CST): Alfredo Madden MD on 4/17/2024 at 11:12 AM

## 2024-07-16 ENCOUNTER — APPOINTMENT (OUTPATIENT)
Dept: PHYSICAL THERAPY | Facility: HOSPITAL | Age: 64
End: 2024-07-16
Attending: INTERNAL MEDICINE
Payer: MEDICARE

## 2024-07-16 ENCOUNTER — HOSPITAL ENCOUNTER (OUTPATIENT)
Dept: CT IMAGING | Facility: HOSPITAL | Age: 64
Discharge: HOME | End: 2024-07-16
Attending: INTERNAL MEDICINE
Payer: MEDICARE

## 2024-07-16 DIAGNOSIS — C79.9 METASTASIS FROM BREAST CANCER (HCC): ICD-10-CM

## 2024-07-16 DIAGNOSIS — C50.919 METASTASIS FROM BREAST CANCER (HCC): ICD-10-CM

## 2024-07-16 DIAGNOSIS — C79.51 METASTASIS TO BONE (HCC): ICD-10-CM

## 2024-07-16 DIAGNOSIS — C50.411 MALIGNANT NEOPLASM OF UPPER-OUTER QUADRANT OF RIGHT BREAST IN FEMALE, ESTROGEN RECEPTOR POSITIVE (HCC): ICD-10-CM

## 2024-07-16 DIAGNOSIS — C78.7 MALIGNANT NEOPLASM METASTATIC TO LIVER (HCC): ICD-10-CM

## 2024-07-16 DIAGNOSIS — Z17.0 MALIGNANT NEOPLASM OF UPPER-OUTER QUADRANT OF RIGHT BREAST IN FEMALE, ESTROGEN RECEPTOR POSITIVE (HCC): ICD-10-CM

## 2024-07-16 PROCEDURE — 71260 CT THORAX DX C+: CPT | Performed by: INTERNAL MEDICINE

## 2024-07-16 PROCEDURE — 74177 CT ABD & PELVIS W/CONTRAST: CPT | Performed by: INTERNAL MEDICINE

## 2024-07-23 ENCOUNTER — APPOINTMENT (OUTPATIENT)
Dept: PHYSICAL THERAPY | Facility: HOSPITAL | Age: 64
End: 2024-07-23
Attending: INTERNAL MEDICINE
Payer: MEDICARE

## 2024-07-30 ENCOUNTER — APPOINTMENT (OUTPATIENT)
Dept: PHYSICAL THERAPY | Facility: HOSPITAL | Age: 64
End: 2024-07-30
Attending: INTERNAL MEDICINE
Payer: MEDICARE

## 2024-08-04 DIAGNOSIS — C50.919 PRIMARY MALIGNANT NEOPLASM OF BREAST WITH METASTASIS (HCC): ICD-10-CM

## 2024-08-04 DIAGNOSIS — Z51.11 CHEMOTHERAPY MANAGEMENT, ENCOUNTER FOR: ICD-10-CM

## 2024-08-05 RX ORDER — LORAZEPAM 0.5 MG/1
0.5 TABLET ORAL EVERY 6 HOURS PRN
Qty: 30 TABLET | Refills: 0 | Status: SHIPPED | OUTPATIENT
Start: 2024-08-05 | End: 2024-09-04

## 2024-08-06 ENCOUNTER — APPOINTMENT (OUTPATIENT)
Dept: PHYSICAL THERAPY | Facility: HOSPITAL | Age: 64
End: 2024-08-06
Attending: INTERNAL MEDICINE
Payer: MEDICARE

## 2024-08-07 ENCOUNTER — OFFICE VISIT (OUTPATIENT)
Dept: HEMATOLOGY/ONCOLOGY | Facility: HOSPITAL | Age: 64
End: 2024-08-07
Attending: INTERNAL MEDICINE
Payer: MEDICARE

## 2024-08-07 ENCOUNTER — HOSPITAL ENCOUNTER (OUTPATIENT)
Dept: GENERAL RADIOLOGY | Facility: HOSPITAL | Age: 64
Discharge: HOME | End: 2024-08-07
Attending: INTERNAL MEDICINE
Payer: MEDICARE

## 2024-08-07 VITALS
HEIGHT: 60 IN | TEMPERATURE: 98 F | SYSTOLIC BLOOD PRESSURE: 122 MMHG | HEART RATE: 68 BPM | BODY MASS INDEX: 21.97 KG/M2 | WEIGHT: 111.88 LBS | DIASTOLIC BLOOD PRESSURE: 74 MMHG | OXYGEN SATURATION: 97 % | RESPIRATION RATE: 16 BRPM

## 2024-08-07 DIAGNOSIS — Z09 CHEMOTHERAPY FOLLOW-UP EXAMINATION: ICD-10-CM

## 2024-08-07 DIAGNOSIS — M26.609 TMJ (TEMPOROMANDIBULAR JOINT SYNDROME): ICD-10-CM

## 2024-08-07 DIAGNOSIS — C78.7 MALIGNANT NEOPLASM METASTATIC TO LIVER (HCC): ICD-10-CM

## 2024-08-07 DIAGNOSIS — C50.411 MALIGNANT NEOPLASM OF UPPER-OUTER QUADRANT OF RIGHT BREAST IN FEMALE, ESTROGEN RECEPTOR POSITIVE (HCC): Primary | ICD-10-CM

## 2024-08-07 DIAGNOSIS — Z17.0 MALIGNANT NEOPLASM OF UPPER-OUTER QUADRANT OF RIGHT BREAST IN FEMALE, ESTROGEN RECEPTOR POSITIVE (HCC): ICD-10-CM

## 2024-08-07 DIAGNOSIS — C79.51 METASTASIS TO BONE (HCC): ICD-10-CM

## 2024-08-07 DIAGNOSIS — C50.411 MALIGNANT NEOPLASM OF UPPER-OUTER QUADRANT OF RIGHT BREAST IN FEMALE, ESTROGEN RECEPTOR POSITIVE (HCC): ICD-10-CM

## 2024-08-07 DIAGNOSIS — Z45.2 ENCOUNTER FOR CENTRAL LINE CARE: Primary | ICD-10-CM

## 2024-08-07 DIAGNOSIS — Z17.0 MALIGNANT NEOPLASM OF UPPER-OUTER QUADRANT OF RIGHT BREAST IN FEMALE, ESTROGEN RECEPTOR POSITIVE (HCC): Primary | ICD-10-CM

## 2024-08-07 DIAGNOSIS — Z51.81 MEDICATION MONITORING ENCOUNTER: ICD-10-CM

## 2024-08-07 LAB
ALBUMIN SERPL-MCNC: 4.3 G/DL (ref 3.2–4.8)
ALBUMIN/GLOB SERPL: 2 {RATIO} (ref 1–2)
ALP LIVER SERPL-CCNC: 58 U/L
ALT SERPL-CCNC: 9 U/L
ANION GAP SERPL CALC-SCNC: 8 MMOL/L (ref 0–18)
AST SERPL-CCNC: 19 U/L (ref ?–34)
BASOPHILS # BLD AUTO: 0.02 X10(3) UL (ref 0–0.2)
BASOPHILS NFR BLD AUTO: 0.6 %
BILIRUB SERPL-MCNC: 0.9 MG/DL (ref 0.2–1.1)
BUN BLD-MCNC: 21 MG/DL (ref 9–23)
BUN/CREAT SERPL: 18.4 (ref 10–20)
CALCIUM BLD-MCNC: 9.7 MG/DL (ref 8.7–10.4)
CHLORIDE SERPL-SCNC: 107 MMOL/L (ref 98–112)
CO2 SERPL-SCNC: 29 MMOL/L (ref 21–32)
CREAT BLD-MCNC: 1.14 MG/DL
DEPRECATED RDW RBC AUTO: 54.4 FL (ref 35.1–46.3)
EGFRCR SERPLBLD CKD-EPI 2021: 54 ML/MIN/1.73M2 (ref 60–?)
EOSINOPHIL # BLD AUTO: 0.08 X10(3) UL (ref 0–0.7)
EOSINOPHIL NFR BLD AUTO: 2.5 %
ERYTHROCYTE [DISTWIDTH] IN BLOOD BY AUTOMATED COUNT: 14.8 % (ref 11–15)
FASTING STATUS PATIENT QL REPORTED: NO
GLOBULIN PLAS-MCNC: 2.2 G/DL (ref 2–3.5)
GLUCOSE BLD-MCNC: 103 MG/DL (ref 70–99)
HCT VFR BLD AUTO: 34 %
HGB BLD-MCNC: 12.1 G/DL
IMM GRANULOCYTES # BLD AUTO: 0.07 X10(3) UL (ref 0–1)
IMM GRANULOCYTES NFR BLD: 2.2 %
LYMPHOCYTES # BLD AUTO: 0.49 X10(3) UL (ref 1–4)
LYMPHOCYTES NFR BLD AUTO: 15.5 %
MCH RBC QN AUTO: 36.7 PG (ref 26–34)
MCHC RBC AUTO-ENTMCNC: 35.6 G/DL (ref 31–37)
MCV RBC AUTO: 103 FL
MONOCYTES # BLD AUTO: 0.31 X10(3) UL (ref 0.1–1)
MONOCYTES NFR BLD AUTO: 9.8 %
NEUTROPHILS # BLD AUTO: 2.2 X10 (3) UL (ref 1.5–7.7)
NEUTROPHILS # BLD AUTO: 2.2 X10(3) UL (ref 1.5–7.7)
NEUTROPHILS NFR BLD AUTO: 69.4 %
OSMOLALITY SERPL CALC.SUM OF ELEC: 301 MOSM/KG (ref 275–295)
PLATELET # BLD AUTO: 140 10(3)UL (ref 150–450)
POTASSIUM SERPL-SCNC: 3.9 MMOL/L (ref 3.5–5.1)
PROT SERPL-MCNC: 6.5 G/DL (ref 5.7–8.2)
RBC # BLD AUTO: 3.3 X10(6)UL
SODIUM SERPL-SCNC: 144 MMOL/L (ref 136–145)
WBC # BLD AUTO: 3.2 X10(3) UL (ref 4–11)

## 2024-08-07 PROCEDURE — 70330 X-RAY EXAM OF JAW JOINTS: CPT | Performed by: INTERNAL MEDICINE

## 2024-08-07 PROCEDURE — 99215 OFFICE O/P EST HI 40 MIN: CPT | Performed by: INTERNAL MEDICINE

## 2024-08-07 PROCEDURE — 85025 COMPLETE CBC W/AUTO DIFF WBC: CPT

## 2024-08-07 PROCEDURE — 36591 DRAW BLOOD OFF VENOUS DEVICE: CPT

## 2024-08-07 PROCEDURE — 80053 COMPREHEN METABOLIC PANEL: CPT

## 2024-08-07 PROCEDURE — G2211 COMPLEX E/M VISIT ADD ON: HCPCS | Performed by: INTERNAL MEDICINE

## 2024-08-07 RX ORDER — HEPARIN SODIUM (PORCINE) LOCK FLUSH IV SOLN 100 UNIT/ML 100 UNIT/ML
5 SOLUTION INTRAVENOUS ONCE
OUTPATIENT
Start: 2024-08-07

## 2024-08-07 RX ORDER — HEPARIN SODIUM (PORCINE) LOCK FLUSH IV SOLN 100 UNIT/ML 100 UNIT/ML
5 SOLUTION INTRAVENOUS ONCE
Status: COMPLETED | OUTPATIENT
Start: 2024-08-07 | End: 2024-08-07

## 2024-08-07 RX ORDER — SODIUM CHLORIDE 9 MG/ML
10 INJECTION, SOLUTION INTRAMUSCULAR; INTRAVENOUS; SUBCUTANEOUS ONCE
OUTPATIENT
Start: 2024-08-07

## 2024-08-07 RX ADMIN — HEPARIN SODIUM (PORCINE) LOCK FLUSH IV SOLN 100 UNIT/ML 500 UNITS: 100 SOLUTION INTRAVENOUS at 09:01:00

## 2024-08-07 NOTE — PROGRESS NOTES
HPI   Aleisha Carlin is a 64 year old female here for follow up of   Encounter Diagnoses   Name Primary?    Malignant neoplasm of upper-outer quadrant of right breast in female, estrogen receptor positive (HCC) Yes    Chemotherapy follow-up examination     Metastasis to bone (HCC)     Malignant neoplasm metastatic to liver (HCC)        Seventh line capecitabine initiated on 1/22/24    Currently s/p cycle 7 capecitabine tolerating well.       Due for cycle 8 Friday.      Fatigue:  Yes, states about the same.    Fevers:  No     Appetite/taste changes:  No.  However, hard to eat with TMJ flare up on the Left.    Mucositis:  Yes, very mild and improves with orajel.    Weight changes:  stable.    Nausea/vomiting:  Yes, controlled with prochlorperazine   Sometimes on the off week. Recently had vomiting after eating pizza.      Diarrhea:  Yes, occasionally with some meals.    Constipation:  Yes, states had to take a stool softener.  Not too frequently.    Peripheral neuropathy:  Yes, Per history, occasionally R last two fingers and does not last long. No complaint today .      PPE:  States using lotion in the hands all the time, no PPE.        ECOG PS 1    Review of Systems:     Review of Systems   Respiratory:  Negative for cough and shortness of breath.    Cardiovascular:  Positive for leg swelling (BLE edema R>L, from surgeries on the R foot and leg.  Taking lasix prn.). Negative for chest pain and palpitations.   Gastrointestinal:  Negative for abdominal pain.   Genitourinary:  Negative for dysuria and frequency.    Musculoskeletal:  Positive for arthralgias (L knee pain, shoulder pain bilaterally improved, right foot.  Takes norco about 2 a day and a times ibuprofen.). Negative for back pain (a little achy) and neck pain.   Skin:         NF, hyperpigmented hands and feet    Neurological:  Positive for numbness (to L hand- sl betterd). Negative for dizziness and headaches.        States bent this am to look under  her car and had start of vertigo but stopped and it resolved.    Hematological:  Negative for adenopathy. Bruises/bleeds easily.   Psychiatric/Behavioral:  Positive for sleep disturbance (improved with sl mirtazapine.).          Current Outpatient Medications   Medication Sig Dispense Refill    LORAZEPAM 0.5 MG Oral Tab TAKE 1 TABLET(0.5 MG) BY MOUTH EVERY 6 HOURS AS NEEDED FOR ANXIETY 30 tablet 0    zolpidem 10 MG Oral Tab Take 1 tablet (10 mg total) by mouth nightly as needed. 60 tablet 0    HYDROcodone-acetaminophen  MG Oral Tab Take 1 tablet by mouth every 6 (six) hours as needed for Pain. 90 tablet 0    minocycline 50 MG Oral Cap Take 1 capsule (50 mg total) by mouth daily. 90 capsule 3    capecitabine 500 MG Oral tab Take 3 tablets (1,500 mg total) by mouth 2 (two) times daily Take daily for 7 days, then stop for 7 days.  Continue this pattern until instructed otherwise. 4 weeks is one cycle 84 tablet 11    mirtazapine 15 MG Oral Tablet Dispersible Take 1 tablet (15 mg total) by mouth nightly. 90 tablet 3    furosemide 20 MG Oral Tab Take 1 tablet (20 mg total) by mouth daily.      prochlorperazine (COMPAZINE) 10 mg tablet Take 1 tablet (10 mg total) by mouth every 6 (six) hours as needed for Nausea. 90 tablet 2    folic acid 1 MG Oral Tab Take 1 tablet (1 mg total) by mouth daily. 90 tablet 3    guaiFENesin-codeine 100-10 MG/5ML Oral Solution Take 5 mL by mouth every 6 (six) hours as needed for cough. 473 mL 0    albuterol 108 (90 Base) MCG/ACT Inhalation Aero Soln Inhale 1 puff into the lungs every 6 (six) hours as needed for Wheezing. 8 g 3    Zoledronic Acid (ZOMETA IV) Inject 4 mg into the vein every 6 (six) months.      Cholecalciferol (VITAMIN D3) 250 MCG (62103 UT) Oral Cap Take 1 capsule by mouth daily.      atorvastatin 20 MG Oral Tab Take 1 tablet (20 mg total) by mouth daily.       Allergies:   No Known Allergies    Past Medical History:    Breast cancer (HCC)    MASTECTOMY / RECONSTRUCTION     Chemotherapy-induced neutropenia (HCC)    Encounter for insertion of venous access port    portacath insertion / venous access    Glaucoma    History of breast cancer in female    Hyperlipidemia    Malignant neoplasm of overlapping sites of breast in female, estrogen receptor positive (HCC)    Malignant neoplasm of upper-outer quadrant of right breast in female, estrogen receptor positive (HCC)    Malignant pleural effusion (HCC)    Menorrhagia    HYSTEROSCOPY / D&C / endomentrial biopsy (08/28/2007)    Metastatic breast cancer    Neurofibromatosis (HCC)    Osteopenia of multiple sites    Osteopenia of multiple sites    Osteoporosis screening    Osteoporosis screening    Per [employer]    Other osteoporosis without current pathological fracture    Postmenopausal bleeding    Psychophysiological insomnia    Tumor, foot    tumor right foot / excision/excision of bone spur/arthrodesis w/screw fixation     Past Surgical History:   Procedure Laterality Date    Breast reconstruction Right 2008    right breast reconstructed - ductal, BRCA neg    Breast surgery      Chemotherapy  2007    Foot surgery Right     tumor rt foot / excision/excision of bone spur/arthrodesis w/screw fixation    Ir port a cath procedure  2007    Right subclavian port of cath.    Mastectomy right      2012    Mastectomy,simple  2007     Social History     Socioeconomic History    Marital status: Single   Tobacco Use    Smoking status: Never    Smokeless tobacco: Never   Substance and Sexual Activity    Alcohol use: Yes     Alcohol/week: 5.0 standard drinks of alcohol     Types: 5 Standard drinks or equivalent per week     Comment: Socially.  (Per [employer]:  5 days weekly.)    Drug use: No   Other Topics Concern    Caffeine Concern Yes     Comment: tea     Social Determinants of Health     Food Insecurity: No Food Insecurity (12/31/2023)    Food Insecurity     Food Insecurity: Never true   Transportation Needs: No Transportation Needs (12/31/2023)     Transportation Needs     Lack of Transportation: No   Housing Stability: Low Risk  (2023)    Housing Stability     Housing Instability: No       Family History   Problem Relation Age of Onset    Breast Cancer Mother 49        bilateral mastectomy    Genetic Disease Mother         NF-1    Breast Cancer Maternal Aunt 75    Breast Cancer Paternal Grandmother     Breast Cancer Self 51    Genetic Disease Self         NF-1    Cancer Paternal Aunt         throat ca    Cancer Maternal Uncle         prostate ca    Genetic Disease Brother         NF-1    Genetic Disease Brother         NF-1    Genetic Disease Brother         NF-1    Cancer Maternal Uncle         bladder ca    Breast Cancer Maternal Cousin Female     Cancer Maternal Cousin Female         leukemia;  childhood    Cancer Paternal Cousin Male         throat ca         PHYSICAL EXAM:    /74 (BP Location: Left arm, Patient Position: Sitting, Cuff Size: adult)   Pulse 68   Temp 97.8 °F (36.6 °C) (Oral)   Resp 16   Ht 1.524 m (5')   Wt 50.8 kg (111 lb 14.4 oz)   SpO2 97%   BMI 21.85 kg/m²   Wt Readings from Last 6 Encounters:   24 50.8 kg (111 lb 14.4 oz)   07/10/24 50.1 kg (110 lb 6.4 oz)   24 51.7 kg (114 lb)   24 53.2 kg (117 lb 3.2 oz)   04/15/24 51.2 kg (112 lb 14.4 oz)   24 50.8 kg (112 lb)     Physical Exam  General: Patient is alert, not in acute distress.  HEENT: EOMs intact. Anicteric.  Nystagmus with gaze to the right.  Oropharynx is clear.   Neck: No JVD. No palpable lymphadenopathy. Neck is supple.  Chest: Clear to auscultation.  Heart: Regular rate and rhythm.   Abdomen: Soft, non tender with good bowel sounds.    Extremities: +1 edema R>L chronic, attn shoulders - no edema, erythema or ecchymosis.  Nontender.   Neurological: Grossly intact. Normal AROM shoulders.    Psych/Depression: nl  Skin:  NF lesions on the back       ASSESSMENT/PLAN:     1. Malignant neoplasm of upper-outer quadrant of right breast in  female, estrogen receptor positive (HCC)    2. Chemotherapy follow-up examination    3. Metastasis to bone (HCC)    4. Malignant neoplasm metastatic to liver (HCC)      Breast cancer history of ER/WV positive metastatic breast cancer to pleura, liver, bones. Initially diagnosed 2007 with DCIS, treated with lumpectomy, however MRI showed progression in the breast and she had a complete right mastectomy with axillary dissection followed by chemo in 2008: .pT1,N1. ER 9%, PgR 8%, Her-2 0, Ki 39%. she then presented with recurrence in 2020 right neck/supraclav ( ER 98% WV 0  her2 1+)  Pleura (Er 20%, Her2 1+) and liver:  (ER 38%, her 0)     Treatment history  9/11/2007:  right breast lumpectomy: Extensive DCIS ER 90%. Mri with progression  11/16/2007:  right mastectomy with axillary node dissection: pT1 N1 M0  2/16/2008:  4 cycles of Taxotere and Cytoxan   9/18/2020: Right neck recurrence,  left pleural,  liver-   10/1/2020: My risk Myriad negative. Started Pablociclib 125mg d1-21 w/ faslodex   4/15/2021: Neutropenic fever. Pablociclib changed to q 21 d with 14 day off cycle   1/27/2022: Prince at 100mg D1-21 q 28 day schedule stared per Dr Soriano  7/15/22: changed to abemaciclib. Progress in the pleura bx: TNBC, Her2 0 CPS 0  9/22/22-11/2022: Sacituzumab w/o response   11/17/22-1/2023: Abraxane with initial response, liver bx nondx (too small)  2/9/23: started gem/carbo due to SOB with response  4/26/23: imaging with continues response in pleura, progression in liver Er 65%, WV 0, Her2 0, bx: NGS below     NGS:  9/2020: Foundation: NF1, [id number], FGFR1 and MYC amplification  9/2022: G360: [id number] 0.5%. Tempus pleura: NF1, TP53, FGFR1 amp  2/2023: G360:  [id number] 0.9%, APC 0.2 with several new VUS  4/2023 G360: [id number] 0.4%. APC 0.3 with several new VUS. Tempus liver:           Patient  progressed fifth line palliative chemotherapy with gemcitabine and carboplatin, and Faslodex was added to treatment again in April after  biopsy of the liver showed ER receptor positive.    ESR 1 mutation is negative.    S/p cycle 8 Eribulin, sixth line therapy, treatment complicated by bilateral lower extremity weakness.  This required admission, was managed by neurology as possible myasthenia.  She did recover her performance status back to 0-1.    Her CT scan of the chest, abdomen and pelvis to assess response to therapy showed a new hypodense lesion of the right hepatic lobe measuring 1.8 x 1.6 cm with some mild associated capsular retraction.  I reviewed with the patient options for treatment, given that this is a solitary site, the option of evaluation with IR for Y90 embolization was presented to the patient, this would treat that lesion by itself.  Given that the patient has had metastatic disease for several years, she will also need to have further systemic therapy after that treatment.  The patient has not been treated with capecitabine.  I discussed with the patient that if she is able to have a good breakfast and dinner, she would be a good candidate for capecitabine week on and week off.      Patient will proceed with 7th line therapy with capecitabine week on/week off and after 3 months (before next appt) will have imaging.  If still has liver lesion, will consider Y-90 embolization, vs continue with treatment w/o Y-90.      S/p cycle 7 capecitabine. started capecitabine single agent on 1/22/24.    Most recent tumor assessment CT on July 2024 with stable disease.  She will be due for tumor assessment in October 2024.    Proceed with cycle 8.     Patient has flareup of TMJ: X-ray of TMJ ordered, as well as a physical therapy order placed.    PPE- lotion to hands and feet     Cancer-related pain- RUQ discomfort Norco 10/325 prn,     Zometa - last dose March 2023. Completed 2 years, no further needed     Added folic acid 1mg daily and changed B12 SL 1000mcg daily.     Bilateral shoulder pain, likely DJD per C-spine MRI.  Not related to  capecitabine.    Vertigo:  vestibular PT, HEP.    Call prn.  Follow-up in 4 weeks      MDM high risk  Continuity of complex medical care.  No orders of the defined types were placed in this encounter.      Results From Past 48 Hours:  Recent Results (from the past 48 hour(s))   CBC W/DIFF [E]    Collection Time: 08/07/24  8:52 AM   Result Value Ref Range    WBC 3.2 (L) 4.0 - 11.0 x10(3) uL    RBC 3.30 (L) 3.80 - 5.30 x10(6)uL    HGB 12.1 12.0 - 16.0 g/dL    HCT 34.0 (L) 35.0 - 48.0 %    .0 (H) 80.0 - 100.0 fL    MCH 36.7 (H) 26.0 - 34.0 pg    MCHC 35.6 31.0 - 37.0 g/dL    RDW-SD 54.4 (H) 35.1 - 46.3 fL    RDW 14.8 11.0 - 15.0 %    .0 (L) 150.0 - 450.0 10(3)uL    Neutrophil Absolute Prelim 2.20 1.50 - 7.70 x10 (3) uL    Neutrophil Absolute 2.20 1.50 - 7.70 x10(3) uL    Lymphocyte Absolute 0.49 (L) 1.00 - 4.00 x10(3) uL    Monocyte Absolute 0.31 0.10 - 1.00 x10(3) uL    Eosinophil Absolute 0.08 0.00 - 0.70 x10(3) uL    Basophil Absolute 0.02 0.00 - 0.20 x10(3) uL    Immature Granulocyte Absolute 0.07 0.00 - 1.00 x10(3) uL    Neutrophil % 69.4 %    Lymphocyte % 15.5 %    Monocyte % 9.8 %    Eosinophil % 2.5 %    Basophil % 0.6 %    Immature Granulocyte % 2.2 %   COMP METABOLIC PANEL [E]    Collection Time: 08/07/24  8:52 AM   Result Value Ref Range    Glucose 103 (H) 70 - 99 mg/dL    Sodium 144 136 - 145 mmol/L    Potassium 3.9 3.5 - 5.1 mmol/L    Chloride 107 98 - 112 mmol/L    CO2 29.0 21.0 - 32.0 mmol/L    Anion Gap 8 0 - 18 mmol/L    BUN 21 9 - 23 mg/dL    Creatinine 1.14 (H) 0.55 - 1.02 mg/dL    BUN/CREA Ratio 18.4 10.0 - 20.0    Calcium, Total 9.7 8.7 - 10.4 mg/dL    Calculated Osmolality 301 (H) 275 - 295 mOsm/kg    eGFR-Cr 54 (L) >=60 mL/min/1.73m2    ALT 9 (L) 10 - 49 U/L    AST 19 <34 U/L    Alkaline Phosphatase 58 50 - 130 U/L    Bilirubin, Total 0.9 0.2 - 1.1 mg/dL    Total Protein 6.5 5.7 - 8.2 g/dL    Albumin 4.3 3.2 - 4.8 g/dL    Globulin  2.2 2.0 - 3.5 g/dL    A/G Ratio 2.0 1.0 - 2.0     Patient Fasting for CMP? No              Imaging & Referrals:  None   No orders of the defined types were placed in this encounter.    PROCEDURE: CT CHEST ABDOMEN PELVIS (ALL CONTRAST ONLY) (CPT=71260/91926)      COMPARISON: NM BONE SCAN WB (VLL=77822), 2/22/2022, 11:44 AM.  NM BONE SCAN WB (QEJ=08540), 10/22/2021, 12:25 PM.  NM PF BONE SCAN PLANAR, 10/03/2007, 10:10 AM.  PET STANDARD BODY SCAN (CPT=78815), 2/09/2023, 11:59 AM.  [facility], MRI   SPINE THORACIC (W+WO) (CPT=72157), 12/31/2023, 2:50 PM.  [facility], MRI SPINE LUMBAR (W+WO) (CPT=72158), 12/31/2023, 2:50 PM.  MRI LIVER (W+WO) (CPT=74183), 4/14/2023, 6:26 PM.  CT STN/CHST/ABD/PEL W CONTRAST (CPT=70491/19619/18500),   2/01/2021, 2:30 PM.  CT CHEST (CPT=71250), 8/03/2022, 8:14 AM.  CT CHEST PE AORTA (IV ONLY) (CPT=71260), 1/26/2022, 2:46 PM.  CT CHEST PE AORTA (IV ONLY) (CPT=71260), 4/15/2021, 10:09 PM.  CT CHEST PE AORTA (IV ONLY) (CPT=71260), 10/27/2020, 7:12 PM.  CT    CHEST PE AORTA (IV ONLY) (CPT=71260), 9/18/2020, 12:55 PM.  CT CHEST+ABDOMEN+PELVIS(ALL CNTRST ONLY)(CPT=71260/77939), 7/03/2023, 1:07 PM.  CT CHEST+ABDOMEN+PELVIS(ALL CNTRST ONLY)(CPT=71260/42080), 4/11/2023, 6:05 PM.  CT CHEST+ABDOMEN+PELVIS(ALL   CNTRST ONLY)(XSB=22250/09756), 1/25/2023, 8:40 AM.  CT CHEST+ABDOMEN+PELVIS(ALL CNTRST ONLY)(CPT=71260/38990), 11/11/2022, 2:29 PM.  CT CHEST+ABDOMEN+PELVIS(ALL CNTRST ONLY)(CPT=71260/46586), 8/30/2022, 2:46 PM.  CT CHEST+ABDOMEN+PELVIS(ALL CNTRST   ONLY)(CPT=71260/01417), 6/24/2022, 10:09 AM.  CT CHEST+ABDOMEN+PELVIS(ALL CNTRST ONLY)(CPT=71260/50049), 1/25/2022, 10:29 AM.  CT CHEST+ABDOMEN+PELVIS(ALL CNTRST ONLY)(CPT=71260/33805), 9/16/2021, 10:46 AM.  CT CHEST+ABDOMEN+PELVIS(ALL CNTRST   ONLY)(CPT=71260/58837), 7/21/2021, 12:21 PM.  CT ABD PELV W CONTRAST, 8/15/2009, 10:37 AM.  CT ABDOMEN + PELVIS (CONTRAST ONLY) (CPT=74177), 10/06/2020, 1:11 PM.  [facility], CT  CHEST+ABDOMEN+PELVIS(ALL CNTRST   ONLY)(CPT=71260/89300), 4/16/2024, 1:08 PM.  [facility], CT CHEST+ABDOMEN+PELVIS(ALL CNTRST ONLY)(CPT=71260/82002), 1/05/2024, 8:43 AM.  [facility], CT CHEST+ABDOMEN+PELVIS(ALL CNTRST ONLY)(CPT=71260/45133),   10/07/2023, 9:21 AM.  [facility], CT PF CHEST ABD W CONTRAST, 10/02/2007, 2:34 PM.      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      TECHNIQUE: Multidetector CT images of the chest, abdomen and pelvis were obtained with intravenous contrast material. Automated exposure control for dose reduction was used. Adjustment of the mA and/or kV was done based on the patient's size. Iterative   reconstruction technique for dose reduction was employed. Dose information was transmitted to the ACR (American College of Radiology) NRDR (National Radiology Data Registry), which includes the Dose Index Registry.       FINDINGS:   DEVICES: There is a right-sided Port-A-Cath with tip terminating near the cavoatrial junction.   CARDIAC: The heart is borderline enlarged. Atherosclerotic vascular calcifications are present in the coronary vessels. Trace pericardial effusion extends into the superior pericardial recess.   VASCULATURE: The thoracic aorta has unremarkable configuration without aneurysm or dissection. Mild atherosclerosis is noted.   LUNGS/PLEURA: Trace nodular biapical pleuroparenchymal scarring is suggested. Small left pleural effusion is present with extension into the left major fissure. Interlobular septal thickening is seen with a basilar  predominance, more conspicuous in the   left lower lobe than the right.   A few punctate micronodules are redemonstrated, not significantly changed from prior exams.    No pneumothorax is detected.    AIRWAYS: The tracheobronchial tree is without central mass or obstructing lesion. Mild bronchial wall thickening is present.   MEDIASTINUM/BRANDY: No mass or lymphadenopathy.    CHEST WALL: There is minimal residual infiltration of the right supraclavicular fat abutting the right sternocleidomastoid muscle. No axillary mass or lymphadenopathy. There are bilateral retropectoral breast implants with intracapsular rupture of the   right. Innumerable cutaneous and subcutaneous nodular foci are apparent.       LIVER: In segment VIII, there is a 1.8 x 2.2 cm lesion (series 2, image 81) with associated capsular retraction. A previously seen anterior subcapsular lesion is less conspicuous on the current examination.   BILIARY: The gallbladder is present.   PANCREAS: There is mild diffuse fatty replacement without discernible lesion, fluid collection, or ductal dilatation.   SPLEEN: No enlargement.    ADRENALS:   Multifocal nodularity and heterogeneous hypoattenuation of the right adrenal gland is grossly unchanged in morphology. No defined mass or abnormal enlargement.    KIDNEYS:   Symmetric enhancement is seen without evidence of hydronephrosis or underlying solid masses.   GI/MESENTERY: A small hiatal hernia is evident. Minimal retained or refluxed enteric contrast is suggested in the distal esophageal lumen. Apparent gastric wall thickening is likely due to underdistention. There is no evidence of bowel obstruction. A   normal caliber appendix is seen without inflammatory manifestations. Scattered colonic diverticula are present in the sigmoid colon. There is no colonic wall thickening or pericolonic fat stranding.   URINARY BLADDER: Incompletely distended without visible calculus. Mild circumferential bladder wall  thickening may relate to underdistention.   PELVIC NODES: No lymphadenopathy.     PELVIC ORGANS: The uterus is present. There is mild contour deformation of the uterine fundus, suggesting an underlying intramural fibroid. Deep pelvic calcifications likely represent phleboliths.    VASCULATURE:   Scattered atherosclerotic vascular calcifications of the abdominal aorta are observed. No aneurysm is detected.   RETROPERITONEUM: No mass or lymphadenopathy is apparent.    BONES:   There is redemonstration of sclerotic thoracolumbar lesions. A 0.9 cm lesion the T11 vertebral segment remains stable. A subtle sclerotic lesion involving the superior endplate of L3 with slight associated deformity also appears stable. Tiny   sclerotic lesions of L3, L4, and L5 remain grossly unchanged. Mild multilevel anterior osteophyte formation is seen throughout the spine.   A few scattered sclerotic foci in the proximal femora likely represent bone islands. Nonspecific irregular deformities of the right iliac wing are re-identified. Degenerative changes of the hips and pubic symphysis are present.   ABDOMINAL WALL: Extensive nodular cutaneous and subcutaneous nodules are demonstrated throughout the abdominal wall. Mild infiltration of the gluteal subcutaneous fat is noted.   OTHER: A 1.4 x 1.2 cm heterogeneously enhancing paraisthmic nodule is seen with peripheral calcification. Additional smaller nodules and coarse calcifications are partially visualized.   No free air or fluid is seen in the abdomen or pelvis.                   Impression  CONCLUSION:   1. A hepatic lesion in segment VIII is redemonstrated, likely not significantly changed allowing for differences in technical factors. An additional hepatic lesion is not as well delineated, which may be due to technical factors.      2. Stable sclerotic osseous metastases.      3. Uncomplicated distal colonic diverticulosis.       4. Prior mastectomy changes are demonstrated with  retropectoral breast implants; the right-sided implant appears to have intracapsular rupture.      5. Imaging findings of neurofibromatosis.      6. Stable thyroid nodules.      7. Lesser incidental findings as above.            Dictated by (CST): Alfredo Madden MD on 7/17/2024 at 11:28 AM       Finalized by (CST): Alfredo Madden MD on 7/17/2024 at 11:42 AM

## 2024-08-19 DIAGNOSIS — C50.919 METASTASIS FROM BREAST CANCER (HCC): ICD-10-CM

## 2024-08-19 DIAGNOSIS — C79.9 METASTASIS FROM BREAST CANCER (HCC): ICD-10-CM

## 2024-08-19 DIAGNOSIS — G89.3 CANCER RELATED PAIN: ICD-10-CM

## 2024-08-19 DIAGNOSIS — G47.00 INSOMNIA, UNSPECIFIED TYPE: ICD-10-CM

## 2024-08-19 RX ORDER — ZOLPIDEM TARTRATE 10 MG/1
10 TABLET ORAL NIGHTLY PRN
Qty: 60 TABLET | Refills: 0 | Status: SHIPPED | OUTPATIENT
Start: 2024-08-19 | End: 2024-10-22

## 2024-08-19 RX ORDER — HYDROCODONE BITARTRATE AND ACETAMINOPHEN 10; 325 MG/1; MG/1
1 TABLET ORAL EVERY 6 HOURS PRN
Qty: 90 TABLET | Refills: 0 | Status: SHIPPED | OUTPATIENT
Start: 2024-08-19

## 2024-08-30 ENCOUNTER — OFFICE VISIT (OUTPATIENT)
Dept: PHYSICAL THERAPY | Facility: HOSPITAL | Age: 64
End: 2024-08-30
Attending: INTERNAL MEDICINE
Payer: MEDICARE

## 2024-08-30 DIAGNOSIS — M26.609 TMJ (TEMPOROMANDIBULAR JOINT SYNDROME): Primary | ICD-10-CM

## 2024-08-30 PROCEDURE — 97161 PT EVAL LOW COMPLEX 20 MIN: CPT

## 2024-08-30 PROCEDURE — 97140 MANUAL THERAPY 1/> REGIONS: CPT

## 2024-08-30 NOTE — PROGRESS NOTES
TEMPORAL MANDIBULAR JOINT EVALUATION:   Referring Physician: Dr. Farias  Diagnosis: L TMJ pain     Date of Service: 8/30/2024     PATIENT SUMMARY   Aleisha Carlin is a 64 year old y/o female who presents to therapy today with complaints of L jaw pain of ~1 week duration.  Patient reports no known KEVIN.  Has had TMJ pain in the past that resolved with therapy.    Pt describes pain level current 5/10, at best 2/10, at worst 10/10.   Current functional limitations include pain with eating, opening, biting.   Pertaining Imaging: none    Aleisha describes prior level of function independent with jaw movements. Pt goals include reduction of pain to be able to eat/chew pain-free.  Past medical history was reviewed with Aleisha. Significant findings include  has a past medical history of Breast cancer (HCC) (03/02/2012), Chemotherapy-induced neutropenia (HCC) (12/25/2020), Encounter for insertion of venous access port (2007), Glaucoma, History of breast cancer in female, Hyperlipidemia, Malignant neoplasm of overlapping sites of breast in female, estrogen receptor positive (HCC) (1/16/2015), Malignant neoplasm of upper-outer quadrant of right breast in female, estrogen receptor positive (HCC) (1/16/2015), Malignant pleural effusion (HCC) (9/25/2020), Menorrhagia (2007), Metastatic breast cancer (10/6/2020), Neurofibromatosis (HCC), Osteopenia of multiple sites (12/25/2020), Osteopenia of multiple sites (2/2/2021), Osteoporosis screening (8/2/13), Osteoporosis screening (08/02/2013), Other osteoporosis without current pathological fracture (2/2/2021), Postmenopausal bleeding (7/7/2014), Psychophysiological insomnia (8/5/2021), and Tumor, foot.      ASSESSMENT  Aleisha presents to physical therapy evaluation with primary c/o L jaw pain. The results of the objective tests and measures show limited opening AROM/PROM, left deviation at end-range, contralateral pain with cotton roll test, and mild crepitus of the L TMJ.  Functional  deficits include but are not limited to pain with eating/chewing, limiting opening.  Signs and symptoms are consistent with diagnosis of L TMJ arthralgia with mild crepitus. Pt and PT discussed evaluation findings, pathology, POC and HEP.  Pt voiced understanding and performs HEP correctly without reported pain. Skilled Physical Therapy is medically necessary to address the above impairments and reach functional goals.     Precautions:  Cancer, osteoporosis  OBJECTIVE:   Observation/Posture: WNL    AROM:  Cervical screen:  normal ROM without jaw pain    TMJ AROM Mobility Mobility (mm) End Range/Quality of mvmt   Depression 22/ 25 with OP Firm, painful   R lateral deviation 5  firm   L lateral deviation 11 capsular   Protrusion 2 firm     TMJ Accessory Joint  Mobility  Right   Left    Anterior  normal Hypomobile, pain   Inferior  normal Hypomobile, reduces pain   Posterior  normal hypomobile   Medial   normal Hypomobile, pain   Lateral  normal normal                Palpation: mild tenderness to L masseter, temporalis    General UE Screen: normal function    Special Tests:   Cotton Roll Test: pain with R bite    Today’s Treatment and Response:   Manual therapy: Grade II-III inferior L TMJ glide.  Grade II sustained anterior glide.  Manual stretch of opening, 3 x 1'  Pt education was provided on exam findings, treatment diagnosis, treatment plan, expectations, and prognosis. Pt was also provided recommendations for HEP   Patient was instructed in and issued a HEP for: jaw ROM exercises    Charges: PT Eval Low Complexity, 1 MT      Total Timed Treatment: 15 min     Total Treatment Time: 45 min       PLAN OF CARE:    Goals: (to be met in 6 visits)  Pt to have opening of 35 mm to chew food  Pt to report <3/10 pain when eating  Pt to report 0/10 pain at rest  Pt to have <3/10 pain with R side cotton roll test    Frequency / Duration: Patient will be seen for 2 x/week or a total of 6 visits over a 90 day period. Treatment  will include: Manual Therapy, Neuromuscular Re-education, Therapeutic Activities, Therapeutic Exercise, and Home Exercise Program instruction    Education or treatment limitation: None  Rehab Potential:good        Patient/Family/Caregiver was advised of these findings, precautions, and treatment options and has agreed to actively participate in planning and for this course of care.    Thank you for your referral. Please co-sign or sign and return this letter via fax as soon as possible to [phone]. If you have any questions, please contact me at Dept: [phone]    Sincerely,  Electronically signed by therapist: Tavon Gee PT  Physician's certification required: Yes  I certify the need for these services furnished under this plan of treatment and while under my care.    X___________________________________________________ Date____________________    Certification From: 8/30/2024  To:11/28/2024

## 2024-09-03 ENCOUNTER — OFFICE VISIT (OUTPATIENT)
Dept: PHYSICAL THERAPY | Facility: HOSPITAL | Age: 64
End: 2024-09-03
Attending: INTERNAL MEDICINE
Payer: MEDICARE

## 2024-09-03 DIAGNOSIS — C50.919 PRIMARY MALIGNANT NEOPLASM OF BREAST WITH METASTASIS (HCC): ICD-10-CM

## 2024-09-03 DIAGNOSIS — Z51.11 CHEMOTHERAPY MANAGEMENT, ENCOUNTER FOR: ICD-10-CM

## 2024-09-03 PROCEDURE — 97140 MANUAL THERAPY 1/> REGIONS: CPT

## 2024-09-03 PROCEDURE — 97110 THERAPEUTIC EXERCISES: CPT

## 2024-09-03 NOTE — PROGRESS NOTES
Diagnosis:   L side TMJ pain      Referring Provider: Ami Farias  Date of Evaluation:    8.30.24    Precautions:  Cancer Next MD visit:   none scheduled  Date of Surgery: n/a   Insurance Primary/Secondary: MEDICARE / COMMERCIAL     # Auth Visits: 10            Subjective: Pain was more/less the same after initial evaluation    Pain: 8/10 at worst      Objective:   opening 25 mm without pain, 28 mm with pain // pre-treatment.  35 mm without pain after.        Assessment: Continues to lack appropriate anterior roll of mandible on the left.  Increased pain-free ROM by 10 mm following massage and mobilization.      Goals:   Goals: (to be met in 10 visits)  Pt to have opening of 35 mm to chew food  Pt to report <3/10 pain when eating  Pt to report 0/10 pain at rest  Pt to have <3/10 pain with R side cotton roll test    Plan: continue with focus primarily on ROM, with long-duration, low-intensity stretch and joint mobilization to facilitate normal joint kinematics.  Date: 9/3/2024  TX#: 2/10 Date:                 TX#: 3/ Date:                 TX#: 4/ Date:                 TX#: 5/ Date:   Tx#: 6/   MT: 25'  STM to L lateral/medial pterygoids, intraoral masseter massage.  Grade III anterior and inferior glides L TMJ       TE: 15'  Jaw lateral deviation with tongue depressors, protrusion  Goldfish bite exercise, x 10 // worsened pain  Controlled bite, x 10  16 tongue depressor stretch, 3 x 90''                     HEP: Long-duration stretch    Charges: 2 MT, 1 TE       Total Timed Treatment: 40 min  Total Treatment Time: 45 min

## 2024-09-04 ENCOUNTER — NURSE ONLY (OUTPATIENT)
Dept: HEMATOLOGY/ONCOLOGY | Facility: HOSPITAL | Age: 64
End: 2024-09-04
Attending: INTERNAL MEDICINE
Payer: MEDICARE

## 2024-09-04 VITALS
BODY MASS INDEX: 22.78 KG/M2 | WEIGHT: 116 LBS | OXYGEN SATURATION: 98 % | RESPIRATION RATE: 16 BRPM | HEART RATE: 64 BPM | SYSTOLIC BLOOD PRESSURE: 131 MMHG | TEMPERATURE: 98 F | HEIGHT: 60 IN | DIASTOLIC BLOOD PRESSURE: 74 MMHG

## 2024-09-04 DIAGNOSIS — C78.7 MALIGNANT NEOPLASM METASTATIC TO LIVER (HCC): ICD-10-CM

## 2024-09-04 DIAGNOSIS — Z09 CHEMOTHERAPY FOLLOW-UP EXAMINATION: ICD-10-CM

## 2024-09-04 DIAGNOSIS — C78.7 MALIGNANT NEOPLASM METASTATIC TO LIVER (HCC): Primary | ICD-10-CM

## 2024-09-04 DIAGNOSIS — Z17.0 MALIGNANT NEOPLASM OF UPPER-OUTER QUADRANT OF RIGHT BREAST IN FEMALE, ESTROGEN RECEPTOR POSITIVE (HCC): ICD-10-CM

## 2024-09-04 DIAGNOSIS — C50.411 MALIGNANT NEOPLASM OF UPPER-OUTER QUADRANT OF RIGHT BREAST IN FEMALE, ESTROGEN RECEPTOR POSITIVE (HCC): ICD-10-CM

## 2024-09-04 DIAGNOSIS — C79.51 METASTASIS TO BONE (HCC): ICD-10-CM

## 2024-09-04 DIAGNOSIS — Z51.81 MEDICATION MONITORING ENCOUNTER: ICD-10-CM

## 2024-09-04 LAB
ALBUMIN SERPL-MCNC: 4.3 G/DL (ref 3.2–4.8)
ALBUMIN/GLOB SERPL: 1.8 {RATIO} (ref 1–2)
ALP LIVER SERPL-CCNC: 67 U/L
ALT SERPL-CCNC: 13 U/L
ANION GAP SERPL CALC-SCNC: 6 MMOL/L (ref 0–18)
AST SERPL-CCNC: 20 U/L (ref ?–34)
BASOPHILS # BLD AUTO: 0.03 X10(3) UL (ref 0–0.2)
BASOPHILS NFR BLD AUTO: 0.7 %
BILIRUB SERPL-MCNC: 1.8 MG/DL (ref 0.2–1.1)
BUN BLD-MCNC: 21 MG/DL (ref 9–23)
BUN/CREAT SERPL: 16.4 (ref 10–20)
CALCIUM BLD-MCNC: 9.8 MG/DL (ref 8.7–10.4)
CHLORIDE SERPL-SCNC: 106 MMOL/L (ref 98–112)
CO2 SERPL-SCNC: 29 MMOL/L (ref 21–32)
CREAT BLD-MCNC: 1.28 MG/DL
DEPRECATED RDW RBC AUTO: 61.1 FL (ref 35.1–46.3)
EGFRCR SERPLBLD CKD-EPI 2021: 47 ML/MIN/1.73M2 (ref 60–?)
EOSINOPHIL # BLD AUTO: 0.07 X10(3) UL (ref 0–0.7)
EOSINOPHIL NFR BLD AUTO: 1.6 %
ERYTHROCYTE [DISTWIDTH] IN BLOOD BY AUTOMATED COUNT: 15.4 % (ref 11–15)
GLOBULIN PLAS-MCNC: 2.4 G/DL (ref 2–3.5)
GLUCOSE BLD-MCNC: 88 MG/DL (ref 70–99)
HCT VFR BLD AUTO: 38.8 %
HGB BLD-MCNC: 13 G/DL
IMM GRANULOCYTES # BLD AUTO: 0.06 X10(3) UL (ref 0–1)
IMM GRANULOCYTES NFR BLD: 1.4 %
LYMPHOCYTES # BLD AUTO: 0.66 X10(3) UL (ref 1–4)
LYMPHOCYTES NFR BLD AUTO: 15.1 %
MCH RBC QN AUTO: 36.2 PG (ref 26–34)
MCHC RBC AUTO-ENTMCNC: 33.5 G/DL (ref 31–37)
MCV RBC AUTO: 108.1 FL
MONOCYTES # BLD AUTO: 0.42 X10(3) UL (ref 0.1–1)
MONOCYTES NFR BLD AUTO: 9.6 %
NEUTROPHILS # BLD AUTO: 3.12 X10 (3) UL (ref 1.5–7.7)
NEUTROPHILS # BLD AUTO: 3.12 X10(3) UL (ref 1.5–7.7)
NEUTROPHILS NFR BLD AUTO: 71.6 %
OSMOLALITY SERPL CALC.SUM OF ELEC: 294 MOSM/KG (ref 275–295)
PLATELET # BLD AUTO: 161 10(3)UL (ref 150–450)
POTASSIUM SERPL-SCNC: 4 MMOL/L (ref 3.5–5.1)
PROT SERPL-MCNC: 6.7 G/DL (ref 5.7–8.2)
RBC # BLD AUTO: 3.59 X10(6)UL
SODIUM SERPL-SCNC: 141 MMOL/L (ref 136–145)
WBC # BLD AUTO: 4.4 X10(3) UL (ref 4–11)

## 2024-09-04 PROCEDURE — 80053 COMPREHEN METABOLIC PANEL: CPT

## 2024-09-04 PROCEDURE — 85025 COMPLETE CBC W/AUTO DIFF WBC: CPT

## 2024-09-04 PROCEDURE — 36415 COLL VENOUS BLD VENIPUNCTURE: CPT

## 2024-09-04 PROCEDURE — G2211 COMPLEX E/M VISIT ADD ON: HCPCS | Performed by: INTERNAL MEDICINE

## 2024-09-04 PROCEDURE — 99215 OFFICE O/P EST HI 40 MIN: CPT | Performed by: INTERNAL MEDICINE

## 2024-09-04 RX ORDER — LORAZEPAM 0.5 MG/1
0.5 TABLET ORAL EVERY 6 HOURS PRN
Qty: 30 TABLET | Refills: 1 | Status: SHIPPED | OUTPATIENT
Start: 2024-09-04 | End: 2024-10-09

## 2024-09-04 NOTE — PROGRESS NOTES
HPI   Aleisha Carlin is a 64 year old female here for follow up of   Encounter Diagnoses   Name Primary?    Malignant neoplasm metastatic to liver (HCC) Yes    Malignant neoplasm of upper-outer quadrant of right breast in female, estrogen receptor positive (HCC)     Metastasis to bone (HCC)     Chemotherapy follow-up examination        Seventh line capecitabine initiated on 1/22/24    Currently s/p cycle 8 capecitabine tolerating well.       Due for cycle 9 Friday.      Fatigue:  Yes, states about the same.    Fevers:  No     Appetite/taste changes:  No.  However, hard to eat with TMJ flare up on the Left.  She is doing PT for TMJ.  States improving, but still on PT.      Mucositis:  No    Weight changes:  stable.    Nausea/vomiting:  No, controlled with prochlorperazine if needed       Diarrhea:  No    Constipation:  No    Peripheral neuropathy:  Yes, Per history, occasionally R last two fingers and does not last long. No complaint today .      PPE:  States using lotion in the hands all the time, no PPE.        ECOG PS 1    Review of Systems:     Review of Systems   HENT:           Runny nose   Eyes:         R watery eyes   Respiratory:  Negative for cough and shortness of breath.         Notices crackle on sternal border   Cardiovascular:  Positive for leg swelling (BLE edema R>L, from surgeries on the R foot and leg.  Taking lasix prn.). Negative for chest pain and palpitations.   Gastrointestinal:  Negative for abdominal pain.   Genitourinary:  Negative for dysuria and frequency.    Musculoskeletal:  Positive for arthralgias (L knee pain, shoulder pain bilaterally improved, right foot.  Takes norco about 2 a day and a times ibuprofen.). Negative for back pain and neck pain.   Skin:         NF, hyperpigmented hands and feet    Neurological:  Positive for numbness (to L hand- sl betterd). Negative for dizziness and headaches.        Vertigo resolved   Hematological:  Negative for adenopathy. Bruises/bleeds  easily.   Psychiatric/Behavioral:  Positive for sleep disturbance (improved with sl mirtazapine.  Still takes zolpidem and gummies).          Current Outpatient Medications   Medication Sig Dispense Refill    LORazepam 0.5 MG Oral Tab Take 1 tablet (0.5 mg total) by mouth every 6 (six) hours as needed for Anxiety. 30 tablet 1    zolpidem 10 MG Oral Tab Take 1 tablet (10 mg total) by mouth nightly as needed. 60 tablet 0    HYDROcodone-acetaminophen  MG Oral Tab Take 1 tablet by mouth every 6 (six) hours as needed for Pain. 90 tablet 0    minocycline 50 MG Oral Cap Take 1 capsule (50 mg total) by mouth daily. 90 capsule 3    capecitabine 500 MG Oral tab Take 3 tablets (1,500 mg total) by mouth 2 (two) times daily Take daily for 7 days, then stop for 7 days.  Continue this pattern until instructed otherwise. 4 weeks is one cycle 84 tablet 11    mirtazapine 15 MG Oral Tablet Dispersible Take 1 tablet (15 mg total) by mouth nightly. 90 tablet 3    furosemide 20 MG Oral Tab Take 1 tablet (20 mg total) by mouth daily.      prochlorperazine (COMPAZINE) 10 mg tablet Take 1 tablet (10 mg total) by mouth every 6 (six) hours as needed for Nausea. 90 tablet 2    folic acid 1 MG Oral Tab Take 1 tablet (1 mg total) by mouth daily. 90 tablet 3    guaiFENesin-codeine 100-10 MG/5ML Oral Solution Take 5 mL by mouth every 6 (six) hours as needed for cough. 473 mL 0    albuterol 108 (90 Base) MCG/ACT Inhalation Aero Soln Inhale 1 puff into the lungs every 6 (six) hours as needed for Wheezing. 8 g 3    Zoledronic Acid (ZOMETA IV) Inject 4 mg into the vein every 6 (six) months.      Cholecalciferol (VITAMIN D3) 250 MCG (45340 UT) Oral Cap Take 1 capsule by mouth daily.      atorvastatin 20 MG Oral Tab Take 1 tablet (20 mg total) by mouth daily. (Patient not taking: Reported on 9/4/2024)       Allergies:   No Known Allergies    Past Medical History:    Breast cancer (HCC)    MASTECTOMY / RECONSTRUCTION    Chemotherapy-induced  neutropenia (HCC)    Encounter for insertion of venous access port    portacath insertion / venous access    Glaucoma    History of breast cancer in female    Hyperlipidemia    Malignant neoplasm of overlapping sites of breast in female, estrogen receptor positive (HCC)    Malignant neoplasm of upper-outer quadrant of right breast in female, estrogen receptor positive (HCC)    Malignant pleural effusion (HCC)    Menorrhagia    HYSTEROSCOPY / D&C / endomentrial biopsy (08/28/2007)    Metastatic breast cancer    Neurofibromatosis (HCC)    Osteopenia of multiple sites    Osteopenia of multiple sites    Osteoporosis screening    Osteoporosis screening    Per [employer]    Other osteoporosis without current pathological fracture    Postmenopausal bleeding    Psychophysiological insomnia    Tumor, foot    tumor right foot / excision/excision of bone spur/arthrodesis w/screw fixation     Past Surgical History:   Procedure Laterality Date    Breast reconstruction Right 2008    right breast reconstructed - ductal, BRCA neg    Breast surgery      Chemotherapy  2007    Foot surgery Right     tumor rt foot / excision/excision of bone spur/arthrodesis w/screw fixation    Ir port a cath procedure  2007    Right subclavian port of cath.    Mastectomy right      2012    Mastectomy,simple  2007     Social History     Socioeconomic History    Marital status: Single   Tobacco Use    Smoking status: Never    Smokeless tobacco: Never   Substance and Sexual Activity    Alcohol use: Yes     Alcohol/week: 5.0 standard drinks of alcohol     Types: 5 Standard drinks or equivalent per week     Comment: Socially.  (Per [employer]:  5 days weekly.)    Drug use: No   Other Topics Concern    Caffeine Concern Yes     Comment: tea     Social Determinants of Health     Food Insecurity: No Food Insecurity (12/31/2023)    Food Insecurity     Food Insecurity: Never true   Transportation Needs: No Transportation Needs (12/31/2023)    Transportation Needs      Lack of Transportation: No   Housing Stability: Low Risk  (2023)    Housing Stability     Housing Instability: No       Family History   Problem Relation Age of Onset    Breast Cancer Mother 49        bilateral mastectomy    Genetic Disease Mother         NF-1    Breast Cancer Maternal Aunt 75    Breast Cancer Paternal Grandmother     Breast Cancer Self 51    Genetic Disease Self         NF-1    Cancer Paternal Aunt         throat ca    Cancer Maternal Uncle         prostate ca    Genetic Disease Brother         NF-1    Genetic Disease Brother         NF-1    Genetic Disease Brother         NF-1    Cancer Maternal Uncle         bladder ca    Breast Cancer Maternal Cousin Female     Cancer Maternal Cousin Female         leukemia;  childhood    Cancer Paternal Cousin Male         throat ca         PHYSICAL EXAM:    /74 (BP Location: Left arm, Patient Position: Sitting, Cuff Size: adult)   Pulse 64   Temp 97.6 °F (36.4 °C) (Oral)   Resp 16   Ht 1.524 m (5')   Wt 52.6 kg (116 lb)   SpO2 98%   BMI 22.65 kg/m²   Wt Readings from Last 6 Encounters:   24 52.6 kg (116 lb)   24 50.8 kg (111 lb 14.4 oz)   07/10/24 50.1 kg (110 lb 6.4 oz)   24 51.7 kg (114 lb)   24 53.2 kg (117 lb 3.2 oz)   04/15/24 51.2 kg (112 lb 14.4 oz)     Physical Exam  General: Patient is alert, not in acute distress.  HEENT: EOMs intact. Anicteric.  Nystagmus with gaze to the right.  Oropharynx is clear.   Neck: No JVD. No palpable lymphadenopathy. Neck is supple.  Chest: Clear to auscultation.  Heart: Regular rate and rhythm.   Abdomen: Soft, non tender with good bowel sounds.    Extremities: +1 edema R>L chronic, attn shoulders - no edema, erythema or ecchymosis.  Nontender.   Neurological: Grossly intact. Normal AROM shoulders.    Psych/Depression: nl  Skin:  NF lesions on the back       ASSESSMENT/PLAN:     1. Malignant neoplasm metastatic to liver (HCC)    2. Malignant neoplasm of upper-outer quadrant  of right breast in female, estrogen receptor positive (HCC)    3. Metastasis to bone (HCC)    4. Chemotherapy follow-up examination      Breast cancer history of ER/KS positive metastatic breast cancer to pleura, liver, bones. Initially diagnosed 2007 with DCIS, treated with lumpectomy, however MRI showed progression in the breast and she had a complete right mastectomy with axillary dissection followed by chemo in 2008: .pT1,N1. ER 9%, PgR 8%, Her-2 0, Ki 39%. she then presented with recurrence in 2020 right neck/supraclav ( ER 98% KS 0  her2 1+)  Pleura (Er 20%, Her2 1+) and liver:  (ER 38%, her 0)     Treatment history  9/11/2007:  right breast lumpectomy: Extensive DCIS ER 90%. Mri with progression  11/16/2007:  right mastectomy with axillary node dissection: pT1 N1 M0  2/16/2008:  4 cycles of Taxotere and Cytoxan   9/18/2020: Right neck recurrence,  left pleural,  liver-   10/1/2020: My risk Myriad negative. Started Pablociclib 125mg d1-21 w/ faslodex   4/15/2021: Neutropenic fever. Pablociclib changed to q 21 d with 14 day off cycle   1/27/2022: Palbo at 100mg D1-21 q 28 day schedule stared per Dr Soriano  7/15/22: changed to abemaciclib. Progress in the pleura bx: TNBC, Her2 0 CPS 0  9/22/22-11/2022: Sacituzumab w/o response   11/17/22-1/2023: Abraxane with initial response, liver bx nondx (too small)  2/9/23: started gem/carbo due to SOB with response  4/26/23: imaging with continues response in pleura, progression in liver Er 65%, KS 0, Her2 0, bx: NGS below  07/6/23-1/10/24 on eribulin s/p 8 cycles prior to progression.  01/22/24- present Capecitabine     NGS:  9/2020: Foundation: NF1, [id number], FGFR1 and MYC amplification  9/2022: G360: [id number] 0.5%. Tempus pleura: NF1, TP53, FGFR1 amp  2/2023: G360:  [id number] 0.9%, APC 0.2 with several new VUS  4/2023 G360: [id number] 0.4%. APC 0.3 with several new VUS. Tempus liver:           ESR 1 mutation is negative.    Patient will proceed with 7th line therapy with  capecitabine week on/week off and after 3 months (before next appt) will have imaging.  If still has liver lesion, will consider Y-90 embolization, vs continue with treatment w/o Y-90.      S/p cycle 8 capecitabine. started capecitabine single agent on 1/22/24.    Most recent tumor assessment CT on July 2024 with stable disease.  She will be due for tumor assessment in October 2024.    Proceed with cycle 9.     Patient has flareup of TMJ: X-ray of TMJ ordered, as well as a physical therapy order placed.    PPE- lotion to hands and feet     Cancer-related pain- RUQ discomfort Norco 10/325 prn,     Zometa - last dose March 2023. Completed 2 years, no further needed     Added folic acid 1mg daily and changed B12 SL 1000mcg daily.     Bilateral shoulder pain, likely DJD per C-spine MRI.  Not related to capecitabine.    Vertigo:  vestibular PT, HEP.    Call prn.  Follow-up in 4 weeks      MDM high risk  Continuity of complex medical care.  No orders of the defined types were placed in this encounter.      Results From Past 48 Hours:  Recent Results (from the past 48 hour(s))   CBC W/DIFF [E]    Collection Time: 09/04/24 11:24 AM   Result Value Ref Range    WBC 4.4 4.0 - 11.0 x10(3) uL    RBC 3.59 (L) 3.80 - 5.30 x10(6)uL    HGB 13.0 12.0 - 16.0 g/dL    HCT 38.8 35.0 - 48.0 %    .1 (H) 80.0 - 100.0 fL    MCH 36.2 (H) 26.0 - 34.0 pg    MCHC 33.5 31.0 - 37.0 g/dL    RDW-SD 61.1 (H) 35.1 - 46.3 fL    RDW 15.4 (H) 11.0 - 15.0 %    .0 150.0 - 450.0 10(3)uL    Neutrophil Absolute Prelim 3.12 1.50 - 7.70 x10 (3) uL    Neutrophil Absolute 3.12 1.50 - 7.70 x10(3) uL    Lymphocyte Absolute 0.66 (L) 1.00 - 4.00 x10(3) uL    Monocyte Absolute 0.42 0.10 - 1.00 x10(3) uL    Eosinophil Absolute 0.07 0.00 - 0.70 x10(3) uL    Basophil Absolute 0.03 0.00 - 0.20 x10(3) uL    Immature Granulocyte Absolute 0.06 0.00 - 1.00 x10(3) uL    Neutrophil % 71.6 %    Lymphocyte % 15.1 %    Monocyte % 9.6 %    Eosinophil % 1.6 %     Basophil % 0.7 %    Immature Granulocyte % 1.4 %   COMP METABOLIC PANEL [E]    Collection Time: 09/04/24 11:24 AM   Result Value Ref Range    Glucose 88 70 - 99 mg/dL    Sodium 141 136 - 145 mmol/L    Potassium 4.0 3.5 - 5.1 mmol/L    Chloride 106 98 - 112 mmol/L    CO2 29.0 21.0 - 32.0 mmol/L    Anion Gap 6 0 - 18 mmol/L    BUN 21 9 - 23 mg/dL    Creatinine 1.28 (H) 0.55 - 1.02 mg/dL    BUN/CREA Ratio 16.4 10.0 - 20.0    Calcium, Total 9.8 8.7 - 10.4 mg/dL    Calculated Osmolality 294 275 - 295 mOsm/kg    eGFR-Cr 47 (L) >=60 mL/min/1.73m2    ALT 13 10 - 49 U/L    AST 20 <34 U/L    Alkaline Phosphatase 67 50 - 130 U/L    Bilirubin, Total 1.8 (H) 0.2 - 1.1 mg/dL    Total Protein 6.7 5.7 - 8.2 g/dL    Albumin 4.3 3.2 - 4.8 g/dL    Globulin  2.4 2.0 - 3.5 g/dL    A/G Ratio 1.8 1.0 - 2.0    Patient Fasting for CMP? Patient not present              Imaging & Referrals:  None   No orders of the defined types were placed in this encounter.    PROCEDURE: CT CHEST ABDOMEN PELVIS (ALL CONTRAST ONLY) (CPT=71260/86586)      COMPARISON: NM BONE SCAN WB (JKD=[id number]), 2/22/2022, 11:44 AM.  NM BONE SCAN WB (RLF=02994), 10/22/2021, 12:25 PM.  NM PF BONE SCAN PLANAR, 10/03/2007, 10:10 AM.  PET STANDARD BODY SCAN (CPT=78815), 2/09/2023, 11:59 AM.  [facility], MRI   SPINE THORACIC (W+WO) (CPT=72157), 12/31/2023, 2:50 PM.  [facility], MRI SPINE LUMBAR (W+WO) (CPT=72158), 12/31/2023, 2:50 PM.  MRI LIVER (W+WO) (CPT=74183), 4/14/2023, 6:26 PM.  CT STN/CHST/ABD/PEL W CONTRAST (CPT=70491/23979/61157),   2/01/2021, 2:30 PM.  CT CHEST (CPT=71250), 8/03/2022, 8:14 AM.  CT CHEST PE AORTA (IV ONLY) (CPT=71260), 1/26/2022, 2:46 PM.  CT CHEST PE AORTA (IV ONLY) (CPT=71260), 4/15/2021, 10:09 PM.  CT CHEST PE AORTA (IV ONLY) (CPT=71260), 10/27/2020, 7:12 PM.  CT    CHEST PE AORTA (IV ONLY) (CPT=71260), 9/18/2020, 12:55 PM.  CT CHEST+ABDOMEN+PELVIS(ALL CNTRST ONLY)(CPT=71260/94288), 7/03/2023, 1:07 PM.  CT  CHEST+ABDOMEN+PELVIS(ALL CNTRST ONLY)(CPT=71260/54370), 4/11/2023, 6:05 PM.  CT CHEST+ABDOMEN+PELVIS(ALL   CNTRST ONLY)(TOK=40376/98702), 1/25/2023, 8:40 AM.  CT CHEST+ABDOMEN+PELVIS(ALL CNTRST ONLY)(CPT=71260/84188), 11/11/2022, 2:29 PM.  CT CHEST+ABDOMEN+PELVIS(ALL CNTRST ONLY)(CPT=71260/64364), 8/30/2022, 2:46 PM.  CT CHEST+ABDOMEN+PELVIS(ALL CNTRST   ONLY)(CPT=71260/48255), 6/24/2022, 10:09 AM.  CT CHEST+ABDOMEN+PELVIS(ALL CNTRST ONLY)(CPT=71260/48003), 1/25/2022, 10:29 AM.  CT CHEST+ABDOMEN+PELVIS(ALL CNTRST ONLY)(CPT=71260/86013), 9/16/2021, 10:46 AM.  CT CHEST+ABDOMEN+PELVIS(ALL CNTRST   ONLY)(CPT=71260/82922), 7/21/2021, 12:21 PM.  CT ABD PELV W CONTRAST, 8/15/2009, 10:37 AM.  CT ABDOMEN + PELVIS (CONTRAST ONLY) (CPT=74177), 10/06/2020, 1:11 PM.  [facility], CT CHEST+ABDOMEN+PELVIS(ALL CNTRST   ONLY)(CPT=71260/24526), 4/16/2024, 1:08 PM.  [facility], CT CHEST+ABDOMEN+PELVIS(ALL CNTRST ONLY)(CPT=71260/40736), 1/05/2024, 8:43 AM.  [facility], CT CHEST+ABDOMEN+PELVIS(ALL CNTRST ONLY)(CPT=71260/56004),   10/07/2023, 9:21 AM.  [facility], CT PF CHEST ABD W CONTRAST, 10/02/2007, 2:34 PM.      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      TECHNIQUE: Multidetector CT images of the chest, abdomen and pelvis were obtained with intravenous contrast material. Automated exposure control for dose reduction was used. Adjustment of the mA and/or kV was done based on the patient's size. Iterative   reconstruction technique for dose reduction was employed. Dose  information was transmitted to the ACR (American College of Radiology) NRDR (National Radiology Data Registry), which includes the Dose Index Registry.       FINDINGS:   DEVICES: There is a right-sided Port-A-Cath with tip terminating near the cavoatrial junction.   CARDIAC: The heart is borderline enlarged. Atherosclerotic vascular calcifications are present in the coronary vessels. Trace pericardial effusion extends into the superior pericardial recess.   VASCULATURE: The thoracic aorta has unremarkable configuration without aneurysm or dissection. Mild atherosclerosis is noted.   LUNGS/PLEURA: Trace nodular biapical pleuroparenchymal scarring is suggested. Small left pleural effusion is present with extension into the left major fissure. Interlobular septal thickening is seen with a basilar predominance, more conspicuous in the   left lower lobe than the right.   A few punctate micronodules are redemonstrated, not significantly changed from prior exams.    No pneumothorax is detected.    AIRWAYS: The tracheobronchial tree is without central mass or obstructing lesion. Mild bronchial wall thickening is present.   MEDIASTINUM/BRANDY: No mass or lymphadenopathy.    CHEST WALL: There is minimal residual infiltration of the right supraclavicular fat abutting the right sternocleidomastoid muscle. No axillary mass or lymphadenopathy. There are bilateral retropectoral breast implants with intracapsular rupture of the   right. Innumerable cutaneous and subcutaneous nodular foci are apparent.       LIVER: In segment VIII, there is a 1.8 x 2.2 cm lesion (series 2, image 81) with associated capsular retraction. A previously seen anterior subcapsular lesion is less conspicuous on the current examination.   BILIARY: The gallbladder is present.   PANCREAS: There is mild diffuse fatty replacement without discernible lesion, fluid collection, or ductal dilatation.   SPLEEN: No enlargement.    ADRENALS:   Multifocal nodularity and  heterogeneous hypoattenuation of the right adrenal gland is grossly unchanged in morphology. No defined mass or abnormal enlargement.    KIDNEYS:   Symmetric enhancement is seen without evidence of hydronephrosis or underlying solid masses.   GI/MESENTERY: A small hiatal hernia is evident. Minimal retained or refluxed enteric contrast is suggested in the distal esophageal lumen. Apparent gastric wall thickening is likely due to underdistention. There is no evidence of bowel obstruction. A   normal caliber appendix is seen without inflammatory manifestations. Scattered colonic diverticula are present in the sigmoid colon. There is no colonic wall thickening or pericolonic fat stranding.   URINARY BLADDER: Incompletely distended without visible calculus. Mild circumferential bladder wall thickening may relate to underdistention.   PELVIC NODES: No lymphadenopathy.     PELVIC ORGANS: The uterus is present. There is mild contour deformation of the uterine fundus, suggesting an underlying intramural fibroid. Deep pelvic calcifications likely represent phleboliths.    VASCULATURE:   Scattered atherosclerotic vascular calcifications of the abdominal aorta are observed. No aneurysm is detected.   RETROPERITONEUM: No mass or lymphadenopathy is apparent.    BONES:   There is redemonstration of sclerotic thoracolumbar lesions. A 0.9 cm lesion the T11 vertebral segment remains stable. A subtle sclerotic lesion involving the superior endplate of L3 with slight associated deformity also appears stable. Tiny   sclerotic lesions of L3, L4, and L5 remain grossly unchanged. Mild multilevel anterior osteophyte formation is seen throughout the spine.   A few scattered sclerotic foci in the proximal femora likely represent bone islands. Nonspecific irregular deformities of the right iliac wing are re-identified. Degenerative changes of the hips and pubic symphysis are present.   ABDOMINAL WALL: Extensive nodular cutaneous and  subcutaneous nodules are demonstrated throughout the abdominal wall. Mild infiltration of the gluteal subcutaneous fat is noted.   OTHER: A 1.4 x 1.2 cm heterogeneously enhancing paraisthmic nodule is seen with peripheral calcification. Additional smaller nodules and coarse calcifications are partially visualized.   No free air or fluid is seen in the abdomen or pelvis.                   Impression  CONCLUSION:   1. A hepatic lesion in segment VIII is redemonstrated, likely not significantly changed allowing for differences in technical factors. An additional hepatic lesion is not as well delineated, which may be due to technical factors.      2. Stable sclerotic osseous metastases.      3. Uncomplicated distal colonic diverticulosis.       4. Prior mastectomy changes are demonstrated with retropectoral breast implants; the right-sided implant appears to have intracapsular rupture.      5. Imaging findings of neurofibromatosis.      6. Stable thyroid nodules.      7. Lesser incidental findings as above.            Dictated by (CST): Alfredo Madden MD on 7/17/2024 at 11:28 AM       Finalized by (CST): Alfredo Madden MD on 7/17/2024 at 11:42 AM

## 2024-09-05 ENCOUNTER — OFFICE VISIT (OUTPATIENT)
Dept: PHYSICAL THERAPY | Facility: HOSPITAL | Age: 64
End: 2024-09-05
Attending: INTERNAL MEDICINE
Payer: MEDICARE

## 2024-09-05 PROCEDURE — 97140 MANUAL THERAPY 1/> REGIONS: CPT

## 2024-09-05 PROCEDURE — 97110 THERAPEUTIC EXERCISES: CPT

## 2024-09-05 NOTE — PROGRESS NOTES
Diagnosis:   L side TMJ pain      Referring Provider: Ami Farias  Date of Evaluation:    8.30.24    Precautions:  Cancer Next MD visit:   none scheduled  Date of Surgery: n/a   Insurance Primary/Secondary: MEDICARE / COMMERCIAL     # Auth Visits: 10            Subjective: Pain continues to be worst in the morning, with opening and closing, and when biting.  Was able to eat corn on the cob last night without issue.    Pain: 7/10 at worst, 5/10 current      Objective:   opening 25 mm without pain, 28 mm with pain // pre-treatment.  36 mm without pain after.        Assessment: Patient had considerable pain at start of session, but, again was able to increase her maximum opening to 36 mm after joint mobilization. She continues to have significant joint pain with opening and closing of the TMJ.        Goals:   Goals: (to be met in 10 visits)  Pt to have opening of 35 mm to chew food - In progress  Pt to report <3/10 pain when eating - In progress  Pt to report 0/10 pain at rest -   Pt to have <3/10 pain with R side cotton roll test    Plan: continue with focus primarily on ROM, with long-duration, low-intensity stretch and joint mobilization to facilitate normal joint kinematics.  Add 2 more depressors at next visit.  Date: 9/3/2024  TX#: 2/10 Date:                 TX#: 3/ Date:                 TX#: 4/ Date:                 TX#: 5/ Date:   Tx#: 6/   MT: 25'  STM to L lateral/medial pterygoids, intraoral masseter massage.  Grade III anterior and inferior glides L TMJ MT: 25'  STM to L lateral/medial pterygoids, intraoral masseter massage.  Grade III anterior and inferior glides L TMJ      TE: 15'  Jaw lateral deviation with tongue depressors, protrusion  Goldfish bite exercise, x 10 // worsened pain  Controlled bite, x 10  16 tongue depressor stretch, 3 x 90'' TE: 15'  16 tongue depressor stretch, 3 x 90''  Controlled bite, x 10  Isometric lateral deviation                    HEP: Long-duration stretch, deviation with  tongue depressors    Charges: 2 MT, 1 TE       Total Timed Treatment: 40 min  Total Treatment Time: 45 min

## 2024-09-18 ENCOUNTER — OFFICE VISIT (OUTPATIENT)
Dept: PHYSICAL THERAPY | Facility: HOSPITAL | Age: 64
End: 2024-09-18
Attending: INTERNAL MEDICINE
Payer: MEDICARE

## 2024-09-18 PROCEDURE — 97140 MANUAL THERAPY 1/> REGIONS: CPT

## 2024-09-18 PROCEDURE — 97110 THERAPEUTIC EXERCISES: CPT

## 2024-09-18 NOTE — PROGRESS NOTES
Diagnosis:   L side TMJ pain      Referring Provider: Ami Farias  Date of Evaluation:    8.30.24    Precautions:  Cancer Next MD visit:   none scheduled  Date of Surgery: n/a   Insurance Primary/Secondary: MEDICARE / COMMERCIAL     # Auth Visits: 10            Subjective: is still having pain with opening and leaving the mouth open.      Pain: 6/10 at worst, 2/10 current      Objective:   opening 25 mm without pain, 28 mm with pain // pre-treatment.  36 mm without pain after.      9/18/24: Opening of 33 mm at start, 36 mm after with minimal pain      Assessment: Pain is markedly improved with all activities, and ROM is better.  Is progressing as expected.      Goals:   Goals: (to be met in 10 visits)  Pt to have opening of 35 mm to chew food - In progress, 90% met  Pt to report <3/10 pain when eating - In progress  Pt to report 0/10 pain at rest - 80% MET  Pt to have <3/10 pain with R side cotton roll test    Plan: continue with focus primarily on ROM, with long-duration, low-intensity stretch and joint mobilization to facilitate normal joint kinematics.  Add 2 more depressors at next visit.  Date: 9/3/2024  TX#: 2/10 Date:                 TX#: 3/ Date:                 TX#: 4/10 Date:                 TX#: 5/ Date:   Tx#: 6/   MT: 25'  STM to L lateral/medial pterygoids, intraoral masseter massage.  Grade III anterior and inferior glides L TMJ MT: 25'  STM to L lateral/medial pterygoids, intraoral masseter massage.  Grade III anterior and inferior glides L TMJ MT: 25'  STM to R masseter, gentle with effleurage  Sustained pressure to R temporalis  Grade IV inferior glide L TMJ  Grade III anterior glide L TMJ     TE: 15'  Jaw lateral deviation with tongue depressors, protrusion  Goldfish bite exercise, x 10 // worsened pain  Controlled bite, x 10  16 tongue depressor stretch, 3 x 90'' TE: 15'  16 tongue depressor stretch, 3 x 90''  Controlled bite, x 10  Isometric lateral deviation TE: 15'  18 tongue depressor  stretch, 3 x 90''  Isometric lateral deviation                   HEP: Long-duration stretch, deviation with tongue depressors    Charges: 2 MT, 1 TE       Total Timed Treatment: 40 min  Total Treatment Time: 45 min

## 2024-09-24 ENCOUNTER — APPOINTMENT (OUTPATIENT)
Dept: PHYSICAL THERAPY | Facility: HOSPITAL | Age: 64
End: 2024-09-24
Attending: INTERNAL MEDICINE
Payer: MEDICARE

## 2024-09-27 ENCOUNTER — APPOINTMENT (OUTPATIENT)
Dept: PHYSICAL THERAPY | Facility: HOSPITAL | Age: 64
End: 2024-09-27
Attending: INTERNAL MEDICINE
Payer: MEDICARE

## 2024-09-27 ENCOUNTER — HOSPITAL ENCOUNTER (OUTPATIENT)
Dept: CT IMAGING | Facility: HOSPITAL | Age: 64
Discharge: HOME | End: 2024-09-27
Attending: INTERNAL MEDICINE
Payer: MEDICARE

## 2024-09-27 DIAGNOSIS — C79.51 METASTASIS TO BONE (HCC): ICD-10-CM

## 2024-09-27 DIAGNOSIS — Z17.0 MALIGNANT NEOPLASM OF UPPER-OUTER QUADRANT OF RIGHT BREAST IN FEMALE, ESTROGEN RECEPTOR POSITIVE (HCC): ICD-10-CM

## 2024-09-27 DIAGNOSIS — C78.7 MALIGNANT NEOPLASM METASTATIC TO LIVER (HCC): ICD-10-CM

## 2024-09-27 DIAGNOSIS — C50.411 MALIGNANT NEOPLASM OF UPPER-OUTER QUADRANT OF RIGHT BREAST IN FEMALE, ESTROGEN RECEPTOR POSITIVE (HCC): ICD-10-CM

## 2024-09-27 PROCEDURE — 74177 CT ABD & PELVIS W/CONTRAST: CPT | Performed by: INTERNAL MEDICINE

## 2024-09-27 PROCEDURE — 71260 CT THORAX DX C+: CPT | Performed by: INTERNAL MEDICINE

## 2024-10-01 ENCOUNTER — OFFICE VISIT (OUTPATIENT)
Dept: PHYSICAL THERAPY | Facility: HOSPITAL | Age: 64
End: 2024-10-01
Attending: INTERNAL MEDICINE
Payer: MEDICARE

## 2024-10-01 PROCEDURE — 97140 MANUAL THERAPY 1/> REGIONS: CPT

## 2024-10-01 PROCEDURE — 97110 THERAPEUTIC EXERCISES: CPT

## 2024-10-01 NOTE — PROGRESS NOTES
Diagnosis:   L side TMJ pain      Referring Provider: Ami Farias  Date of Evaluation:    8.30.24    Precautions:  Cancer Next MD visit:   none scheduled  Date of Surgery: n/a   Insurance Primary/Secondary: MEDICARE / COMMERCIAL     # Auth Visits: 10            Subjective: when opening mouth, feels like a rubber band is going to pull her jaw back.  Painful when biting into a frozen candy bar.      Pain: 6/10 at worst, 2/10 current      Objective:   opening 25 mm without pain, 28 mm with pain // pre-treatment.  36 mm without pain after.      9/18/24: Opening of 33 mm at start, 36 mm after with minimal pain      Assessment: is progressing as expected.  R jaw mobilization was added, which increased maximum opening to 38 mm with no pain.      Goals:   Goals: (to be met in 10 visits)  Pt to have opening of 35 mm to chew food - In progress, 90% met  Pt to report <3/10 pain when eating - In progress  Pt to report 0/10 pain at rest - 80% MET  Pt to have <3/10 pain with R side cotton roll test    Plan: continue with B TMJ mobilization  Date: 9/3/2024  TX#: 2/10 Date:                 TX#: 3/ Date:                 TX#: 4/10 Date:                 TX#: 5/ Date:   Tx#: 6/   MT: 25'  STM to L lateral/medial pterygoids, intraoral masseter massage.  Grade III anterior and inferior glides L TMJ MT: 25'  STM to L lateral/medial pterygoids, intraoral masseter massage.  Grade III anterior and inferior glides L TMJ MT: 25'  STM to R masseter, gentle with effleurage  Sustained pressure to R temporalis  Grade IV inferior glide L TMJ  Grade III anterior glide L TMJ MT: 25;  STM to L temporalis  Grade IV anterior, inferior glides to B TMJ    TE: 15'  Jaw lateral deviation with tongue depressors, protrusion  Goldfish bite exercise, x 10 // worsened pain  Controlled bite, x 10  16 tongue depressor stretch, 3 x 90'' TE: 15'  16 tongue depressor stretch, 3 x 90''  Controlled bite, x 10  Isometric lateral deviation TE: 15'  18 tongue depressor  stretch, 3 x 90''  Isometric lateral deviation TE:  20 tongue depressor stretch, 3 x 2'  Isometric lateral deviation                  HEP: Long-duration stretch, deviation with tongue depressors    Charges: 2 MT, 1 TE       Total Timed Treatment: 40 min  Total Treatment Time: 45 min

## 2024-10-02 ENCOUNTER — TELEPHONE (OUTPATIENT)
Dept: PHYSICAL THERAPY | Facility: HOSPITAL | Age: 64
End: 2024-10-02

## 2024-10-02 ENCOUNTER — OFFICE VISIT (OUTPATIENT)
Dept: HEMATOLOGY/ONCOLOGY | Facility: HOSPITAL | Age: 64
End: 2024-10-02
Attending: INTERNAL MEDICINE
Payer: MEDICARE

## 2024-10-02 ENCOUNTER — DOCUMENTATION ONLY (OUTPATIENT)
Dept: HEMATOLOGY/ONCOLOGY | Facility: HOSPITAL | Age: 64
End: 2024-10-02

## 2024-10-02 ENCOUNTER — TELEPHONE (OUTPATIENT)
Dept: HEMATOLOGY/ONCOLOGY | Facility: HOSPITAL | Age: 64
End: 2024-10-02

## 2024-10-02 VITALS
HEART RATE: 57 BPM | HEIGHT: 60 IN | SYSTOLIC BLOOD PRESSURE: 145 MMHG | WEIGHT: 113.81 LBS | RESPIRATION RATE: 16 BRPM | OXYGEN SATURATION: 94 % | TEMPERATURE: 98 F | BODY MASS INDEX: 22.34 KG/M2 | DIASTOLIC BLOOD PRESSURE: 96 MMHG

## 2024-10-02 DIAGNOSIS — Z09 CHEMOTHERAPY FOLLOW-UP EXAMINATION: ICD-10-CM

## 2024-10-02 DIAGNOSIS — Z51.81 ENCOUNTER FOR MONITORING CARDIOTOXIC DRUG THERAPY: ICD-10-CM

## 2024-10-02 DIAGNOSIS — Z79.899 ENCOUNTER FOR MONITORING CARDIOTOXIC DRUG THERAPY: ICD-10-CM

## 2024-10-02 DIAGNOSIS — Z17.0 MALIGNANT NEOPLASM OF UPPER-OUTER QUADRANT OF RIGHT BREAST IN FEMALE, ESTROGEN RECEPTOR POSITIVE (HCC): Primary | ICD-10-CM

## 2024-10-02 DIAGNOSIS — C79.51 METASTASIS TO BONE (HCC): ICD-10-CM

## 2024-10-02 DIAGNOSIS — C78.7 MALIGNANT NEOPLASM METASTATIC TO LIVER (HCC): ICD-10-CM

## 2024-10-02 DIAGNOSIS — C50.411 MALIGNANT NEOPLASM OF UPPER-OUTER QUADRANT OF RIGHT BREAST IN FEMALE, ESTROGEN RECEPTOR POSITIVE (HCC): Primary | ICD-10-CM

## 2024-10-02 LAB
ALBUMIN SERPL-MCNC: 4.2 G/DL (ref 3.2–4.8)
ALBUMIN/GLOB SERPL: 1.9 {RATIO} (ref 1–2)
ALP LIVER SERPL-CCNC: 62 U/L
ALT SERPL-CCNC: 10 U/L
ANION GAP SERPL CALC-SCNC: 7 MMOL/L (ref 0–18)
AST SERPL-CCNC: 21 U/L (ref ?–34)
BASOPHILS # BLD AUTO: 0.02 X10(3) UL (ref 0–0.2)
BASOPHILS NFR BLD AUTO: 0.6 %
BILIRUB SERPL-MCNC: 1.3 MG/DL (ref 0.2–1.1)
BUN BLD-MCNC: 27 MG/DL (ref 9–23)
BUN/CREAT SERPL: 21.6 (ref 10–20)
CALCIUM BLD-MCNC: 9.5 MG/DL (ref 8.7–10.4)
CHLORIDE SERPL-SCNC: 110 MMOL/L (ref 98–112)
CO2 SERPL-SCNC: 27 MMOL/L (ref 21–32)
CREAT BLD-MCNC: 1.25 MG/DL
DEPRECATED RDW RBC AUTO: 54.9 FL (ref 35.1–46.3)
EGFRCR SERPLBLD CKD-EPI 2021: 48 ML/MIN/1.73M2 (ref 60–?)
EOSINOPHIL # BLD AUTO: 0.06 X10(3) UL (ref 0–0.7)
EOSINOPHIL NFR BLD AUTO: 1.8 %
ERYTHROCYTE [DISTWIDTH] IN BLOOD BY AUTOMATED COUNT: 14.6 % (ref 11–15)
GLOBULIN PLAS-MCNC: 2.2 G/DL (ref 2–3.5)
GLUCOSE BLD-MCNC: 86 MG/DL (ref 70–99)
HCT VFR BLD AUTO: 35.5 %
HGB BLD-MCNC: 12.6 G/DL
IMM GRANULOCYTES # BLD AUTO: 0.04 X10(3) UL (ref 0–1)
IMM GRANULOCYTES NFR BLD: 1.2 %
LYMPHOCYTES # BLD AUTO: 0.59 X10(3) UL (ref 1–4)
LYMPHOCYTES NFR BLD AUTO: 17.5 %
MCH RBC QN AUTO: 37.3 PG (ref 26–34)
MCHC RBC AUTO-ENTMCNC: 35.5 G/DL (ref 31–37)
MCV RBC AUTO: 105 FL
MONOCYTES # BLD AUTO: 0.5 X10(3) UL (ref 0.1–1)
MONOCYTES NFR BLD AUTO: 14.8 %
NEUTROPHILS # BLD AUTO: 2.17 X10 (3) UL (ref 1.5–7.7)
NEUTROPHILS # BLD AUTO: 2.17 X10(3) UL (ref 1.5–7.7)
NEUTROPHILS NFR BLD AUTO: 64.1 %
OSMOLALITY SERPL CALC.SUM OF ELEC: 302 MOSM/KG (ref 275–295)
PLATELET # BLD AUTO: 127 10(3)UL (ref 150–450)
POTASSIUM SERPL-SCNC: 3.7 MMOL/L (ref 3.5–5.1)
PROT SERPL-MCNC: 6.4 G/DL (ref 5.7–8.2)
RBC # BLD AUTO: 3.38 X10(6)UL
SODIUM SERPL-SCNC: 144 MMOL/L (ref 136–145)
WBC # BLD AUTO: 3.4 X10(3) UL (ref 4–11)

## 2024-10-02 PROCEDURE — G2211 COMPLEX E/M VISIT ADD ON: HCPCS | Performed by: INTERNAL MEDICINE

## 2024-10-02 PROCEDURE — 99215 OFFICE O/P EST HI 40 MIN: CPT | Performed by: INTERNAL MEDICINE

## 2024-10-02 PROCEDURE — 80053 COMPREHEN METABOLIC PANEL: CPT

## 2024-10-02 PROCEDURE — 85025 COMPLETE CBC W/AUTO DIFF WBC: CPT

## 2024-10-02 PROCEDURE — 36591 DRAW BLOOD OFF VENOUS DEVICE: CPT

## 2024-10-02 RX ORDER — ONDANSETRON 8 MG/1
8 TABLET, FILM COATED ORAL EVERY 8 HOURS PRN
Qty: 30 TABLET | Refills: 3 | Status: SHIPPED | OUTPATIENT
Start: 2024-10-02

## 2024-10-02 NOTE — PROGRESS NOTES
Called and spoke with Adriana from [employer] to assist with scheduling the patient's cardiac echo for the soonest available appointment. She stated there's availability for Friday, 10/04 at 10:00 am at [place], but asked if authorization will be done in time before then. Told her I will take that appointment and notify our prior authorization department to work on authorization ASAP. She stated understanding and provided appointment instructions and information. Appointment scheduled. I thanked her for her help.     Called and spoke with Noemi from our prior authorization team. Asked her to work on this authorization asap and let me know if it doesn't get approved in time. She stated understanding.

## 2024-10-02 NOTE — TELEPHONE ENCOUNTER
Called and spoke with Aleisha. Told her I was able to schedule her cardiac echo test for Friday, 10/04 at 10:00 am at [place], [facility]. Told her she needs to arrive 15 min before (9:45 am), park in the blue parking lot, drink plenty of fluids, wear a 2-piece outfit, not a dress and check in at [facility]. Also, told her once her new treatment plan is approved by her insurance, we will call and schedule her. She stated understanding. She stated she had her brand new capecitabine medication delivery show up today and asked since we changed treatment, if she can bring it in. Told her yes, as long as it's unopened we can donate it or repurpose the medication. She stated understanding and thanked me for the call.

## 2024-10-02 NOTE — PROGRESS NOTES
HPI   Aleisha Carlin is a 64 year old female here for follow up of   Encounter Diagnoses   Name Primary?    Malignant neoplasm of upper-outer quadrant of right breast in female, estrogen receptor positive (HCC) Yes    Malignant neoplasm metastatic to liver (HCC)     Metastasis to bone (HCC)     Chemotherapy follow-up examination        Seventh line capecitabine initiated on 1/22/24    Currently s/p cycle 9 capecitabine tolerating well.         Fatigue:  Yes, states about the same.   States when getting groceries states uses the cart and gets tired and fatigued.  Some HERRERA.      Fevers:  No     Appetite/taste changes:  No.  However, hard to eat with TMJ flare up on the Left.  She is doing PT for TMJ.  States improving, but still on PT.  Eating soft foods.    Mucositis:  Yes, one in the lower buccal mucosa healing.     Weight changes:  stable.    Nausea/vomiting:  No, controlled with prochlorperazine if needed       Diarrhea:  No, one loose stool today.    Constipation:  No    Peripheral neuropathy:  Yes, Per history, occasionally R last two fingers and does not last long. No complaint today .      PPE:  States using lotion in the hands all the time, no PPE.  Some dry skin on the soles.        ECOG PS 1    Review of Systems:     Review of Systems   HENT:           Runny nose   Eyes:         R watery eyes   Respiratory:  Positive for shortness of breath (Some HERRERA with walking a lot in the store. No longer hears crackles). Negative for cough.    Cardiovascular:  Positive for leg swelling (BLE edema R>L, from surgeries on the R foot and leg.  Taking lasix prn.). Negative for chest pain and palpitations.   Gastrointestinal:  Negative for abdominal pain.   Genitourinary:  Negative for dysuria and frequency.    Musculoskeletal:  Positive for arthralgias (mostly due to TMJ.). Negative for back pain and neck pain.   Skin:         NF, hyperpigmented hands and feet    Neurological:  Positive for dizziness (at times) and  numbness (to L hand- sl betterd). Negative for headaches.   Hematological:  Negative for adenopathy. Bruises/bleeds easily.   Psychiatric/Behavioral:  Positive for sleep disturbance (improved with sl mirtazapine.  Still takes zolpidem and gummies).          Current Outpatient Medications   Medication Sig Dispense Refill    LORazepam 0.5 MG Oral Tab Take 1 tablet (0.5 mg total) by mouth every 6 (six) hours as needed for Anxiety. 30 tablet 1    zolpidem 10 MG Oral Tab Take 1 tablet (10 mg total) by mouth nightly as needed. 60 tablet 0    HYDROcodone-acetaminophen  MG Oral Tab Take 1 tablet by mouth every 6 (six) hours as needed for Pain. 90 tablet 0    minocycline 50 MG Oral Cap Take 1 capsule (50 mg total) by mouth daily. 90 capsule 3    capecitabine 500 MG Oral tab Take 3 tablets (1,500 mg total) by mouth 2 (two) times daily Take daily for 7 days, then stop for 7 days.  Continue this pattern until instructed otherwise. 4 weeks is one cycle 84 tablet 11    mirtazapine 15 MG Oral Tablet Dispersible Take 1 tablet (15 mg total) by mouth nightly. 90 tablet 3    furosemide 20 MG Oral Tab Take 1 tablet (20 mg total) by mouth daily.      prochlorperazine (COMPAZINE) 10 mg tablet Take 1 tablet (10 mg total) by mouth every 6 (six) hours as needed for Nausea. 90 tablet 2    folic acid 1 MG Oral Tab Take 1 tablet (1 mg total) by mouth daily. 90 tablet 3    guaiFENesin-codeine 100-10 MG/5ML Oral Solution Take 5 mL by mouth every 6 (six) hours as needed for cough. 473 mL 0    albuterol 108 (90 Base) MCG/ACT Inhalation Aero Soln Inhale 1 puff into the lungs every 6 (six) hours as needed for Wheezing. 8 g 3    Zoledronic Acid (ZOMETA IV) Inject 4 mg into the vein every 6 (six) months.      Cholecalciferol (VITAMIN D3) 250 MCG (67631 UT) Oral Cap Take 1 capsule by mouth daily.      atorvastatin 20 MG Oral Tab Take 1 tablet (20 mg total) by mouth daily. (Patient not taking: Reported on 9/4/2024)       Allergies:   No Known  Allergies    Past Medical History:    Breast cancer (HCC)    MASTECTOMY / RECONSTRUCTION    Chemotherapy-induced neutropenia (HCC)    Encounter for insertion of venous access port    portacath insertion / venous access    Glaucoma    History of breast cancer in female    Hyperlipidemia    Malignant neoplasm of overlapping sites of breast in female, estrogen receptor positive (HCC)    Malignant neoplasm of upper-outer quadrant of right breast in female, estrogen receptor positive (HCC)    Malignant pleural effusion (HCC)    Menorrhagia    HYSTEROSCOPY / D&C / endomentrial biopsy (08/28/2007)    Metastatic breast cancer    Neurofibromatosis (HCC)    Osteopenia of multiple sites    Osteopenia of multiple sites    Osteoporosis screening    Osteoporosis screening    Per [employer]    Other osteoporosis without current pathological fracture    Postmenopausal bleeding    Psychophysiological insomnia    Tumor, foot    tumor right foot / excision/excision of bone spur/arthrodesis w/screw fixation     Past Surgical History:   Procedure Laterality Date    Breast reconstruction Right 2008    right breast reconstructed - ductal, BRCA neg    Breast surgery      Chemotherapy  2007    Foot surgery Right     tumor rt foot / excision/excision of bone spur/arthrodesis w/screw fixation    Ir port a cath procedure  2007    Right subclavian port of cath.    Mastectomy right      2012    Mastectomy,simple  2007     Social History     Socioeconomic History    Marital status: Single   Tobacco Use    Smoking status: Never    Smokeless tobacco: Never   Substance and Sexual Activity    Alcohol use: Yes     Alcohol/week: 5.0 standard drinks of alcohol     Types: 5 Standard drinks or equivalent per week     Comment: Socially.  (Per [employer]:  5 days weekly.)    Drug use: No   Other Topics Concern    Caffeine Concern Yes     Comment: tea     Social Determinants of Health     Food Insecurity: No Food Insecurity (12/31/2023)    Food Insecurity     Food  Insecurity: Never true   Transportation Needs: No Transportation Needs (2023)    Transportation Needs     Lack of Transportation: No   Housing Stability: Low Risk  (2023)    Housing Stability     Housing Instability: No       Family History   Problem Relation Age of Onset    Breast Cancer Mother 49        bilateral mastectomy    Genetic Disease Mother         NF-1    Breast Cancer Maternal Aunt 75    Breast Cancer Paternal Grandmother     Breast Cancer Self 51    Genetic Disease Self         NF-1    Cancer Paternal Aunt         throat ca    Cancer Maternal Uncle         prostate ca    Genetic Disease Brother         NF-1    Genetic Disease Brother         NF-1    Genetic Disease Brother         NF-1    Cancer Maternal Uncle         bladder ca    Breast Cancer Maternal Cousin Female     Cancer Maternal Cousin Female         leukemia;  childhood    Cancer Paternal Cousin Male         throat ca         PHYSICAL EXAM:    BP (!) 145/96 (BP Location: Left arm, Patient Position: Sitting, Cuff Size: adult)   Pulse 57   Temp 98.4 °F (36.9 °C) (Oral)   Resp 16   Ht 1.524 m (5')   Wt 51.6 kg (113 lb 12.8 oz)   SpO2 94%   BMI 22.23 kg/m²   Wt Readings from Last 6 Encounters:   10/02/24 51.6 kg (113 lb 12.8 oz)   24 52.6 kg (116 lb)   24 50.8 kg (111 lb 14.4 oz)   07/10/24 50.1 kg (110 lb 6.4 oz)   24 51.7 kg (114 lb)   24 53.2 kg (117 lb 3.2 oz)     Physical Exam  General: Patient is alert, not in acute distress.  HEENT: EOMs intact. Anicteric.  Nystagmus with gaze to the right.    Neck: No JVD. No palpable lymphadenopathy. Neck is supple.  Chest: Clear to auscultation.  Heart: Regular rate and rhythm.   Abdomen: Soft, non tender with good bowel sounds.    Extremities: +1 edema R>L chronic, attn shoulders - no edema, erythema or ecchymosis.  Nontender.   Neurological: Grossly intact. Normal AROM shoulders.    Psych/Depression: nl  Skin:  NF lesions on the back        ASSESSMENT/PLAN:     1. Malignant neoplasm of upper-outer quadrant of right breast in female, estrogen receptor positive (HCC)    2. Malignant neoplasm metastatic to liver (HCC)    3. Metastasis to bone (HCC)    4. Chemotherapy follow-up examination      Breast cancer history of ER/TX positive metastatic breast cancer to pleura, liver, bones. Initially diagnosed 2007 with DCIS, treated with lumpectomy, however MRI showed progression in the breast and she had a complete right mastectomy with axillary dissection followed by chemo in 2008: .pT1,N1. ER 9%, PgR 8%, Her-2 0, Ki 39%. she then presented with recurrence in 2020 right neck/supraclav ( ER 98% TX 0  her2 1+)  Pleura (Er 20%, Her2 1+) and liver:  (ER 38%, her 0)     Treatment history  9/11/2007:  right breast lumpectomy: Extensive DCIS ER 90%. Mri with progression  11/16/2007:  right mastectomy with axillary node dissection: pT1 N1 M0  2/16/2008:  4 cycles of Taxotere and Cytoxan   9/18/2020: Right neck recurrence,  left pleural,  liver-   10/1/2020: My risk Myriad negative. Started Pablociclib 125mg d1-21 w/ faslodex   4/15/2021: Neutropenic fever. Pablociclib changed to q 21 d with 14 day off cycle   1/27/2022: Palbo at 100mg D1-21 q 28 day schedule stared per Dr Soriano  7/15/22: changed to abemaciclib. Progress in the pleura bx: TNBC, Her2 0 CPS 0  9/22/22-11/2022: Sacituzumab w/o response   11/17/22-1/2023: Abraxane with initial response, liver bx nondx (too small)  2/9/23: started gem/carbo due to SOB with response  4/26/23: imaging with continues response in pleura, progression in liver Er 65%, TX 0, Her2 0, bx: NGS below  07/6/23-1/10/24 on eribulin s/p 8 cycles prior to progression.  01/22/24- present Capecitabine     NGS:  9/2020: Foundation: NF1, [id number], FGFR1 and MYC amplification  9/2022: G360: [id number] 0.5%. Tempus pleura: NF1, TP53, FGFR1 amp  2/2023: G360:  [id number] 0.9%, APC 0.2 with several new VUS  4/2023 G360: [id number] 0.4%. APC 0.3 with  several new VUS. Tempus liver:           ESR 1 mutation is negative.    Patient will proceed with 7th line therapy with capecitabine week on/week off and after 3 months (before next appt) will have imaging.  If still has liver lesion, will consider Y-90 embolization, vs continue with treatment w/o Y-90.      S/p cycle 9 capecitabine. started capecitabine single agent on 1/22/24.    Most recent tumor assessment compared to CT on July 2024 with progression in the lungs with possible lymphangitic spread and effusions.  Will discontinue the capecitabine and proceed with doxil as never had anthracycline.  After progression with doxil will proceed with ixempra. D/w patient SE of doxil and provided written information for both agents.    Patient has flareup of TMJ: X-ray of TMJ ordered, as well as a physical therapy order placed.    PPE- lotion to hands and feet     Cancer-related pain- RUQ discomfort Norco 10/325 prn,     Zometa - last dose March 2023. Completed 2 years, no further needed     Added folic acid 1mg daily and changed B12 SL 1000mcg daily.     Bilateral shoulder pain, likely DJD per C-spine MRI.  Not related to capecitabine.    Call prn.  Follow-up in 4 weeks      MDM high risk  Continuity of complex medical care.  No orders of the defined types were placed in this encounter.      Results From Past 48 Hours:      Recent Results (from the past 48 hour(s))   CBC W/DIFF [E]    Collection Time: 10/02/24 11:28 AM   Result Value Ref Range    WBC 3.4 (L) 4.0 - 11.0 x10(3) uL    RBC 3.38 (L) 3.80 - 5.30 x10(6)uL    HGB 12.6 12.0 - 16.0 g/dL    HCT 35.5 35.0 - 48.0 %    .0 (H) 80.0 - 100.0 fL    MCH 37.3 (H) 26.0 - 34.0 pg    MCHC 35.5 31.0 - 37.0 g/dL    RDW-SD 54.9 (H) 35.1 - 46.3 fL    RDW 14.6 11.0 - 15.0 %    .0 (L) 150.0 - 450.0 10(3)uL    Neutrophil Absolute Prelim 2.17 1.50 - 7.70 x10 (3) uL    Neutrophil Absolute 2.17 1.50 - 7.70 x10(3) uL    Lymphocyte Absolute 0.59 (L) 1.00 - 4.00 x10(3) uL     Monocyte Absolute 0.50 0.10 - 1.00 x10(3) uL    Eosinophil Absolute 0.06 0.00 - 0.70 x10(3) uL    Basophil Absolute 0.02 0.00 - 0.20 x10(3) uL    Immature Granulocyte Absolute 0.04 0.00 - 1.00 x10(3) uL    Neutrophil % 64.1 %    Lymphocyte % 17.5 %    Monocyte % 14.8 %    Eosinophil % 1.8 %    Basophil % 0.6 %    Immature Granulocyte % 1.2 %   COMP METABOLIC PANEL [E]    Collection Time: 10/02/24 11:28 AM   Result Value Ref Range    Glucose 86 70 - 99 mg/dL    Sodium 144 136 - 145 mmol/L    Potassium 3.7 3.5 - 5.1 mmol/L    Chloride 110 98 - 112 mmol/L    CO2 27.0 21.0 - 32.0 mmol/L    Anion Gap 7 0 - 18 mmol/L    BUN 27 (H) 9 - 23 mg/dL    Creatinine 1.25 (H) 0.55 - 1.02 mg/dL    BUN/CREA Ratio 21.6 (H) 10.0 - 20.0    Calcium, Total 9.5 8.7 - 10.4 mg/dL    Calculated Osmolality 302 (H) 275 - 295 mOsm/kg    eGFR-Cr 48 (L) >=60 mL/min/1.73m2    ALT 10 10 - 49 U/L    AST 21 <34 U/L    Alkaline Phosphatase 62 50 - 130 U/L    Bilirubin, Total 1.3 (H) 0.2 - 1.1 mg/dL    Total Protein 6.4 5.7 - 8.2 g/dL    Albumin 4.2 3.2 - 4.8 g/dL    Globulin  2.2 2.0 - 3.5 g/dL    A/G Ratio 1.9 1.0 - 2.0    Patient Fasting for CMP? Patient not present              Imaging & Referrals:  None   No orders of the defined types were placed in this encounter.      Narrative  PROCEDURE: CT CHEST ABDOMEN PELVIS (ALL CONTRAST ONLY) (CPT=71260/27179)     COMPARISON: [facility], CT CHEST+ABDOMEN+PELVIS(ALL CNTRST ONLY)(CPT=71260/24287), 7/16/2024, 11:21 AM.  [facility], CT CHEST+ABDOMEN+PELVIS(ALL CNTRST ONLY)(CPT=71260/21631), 4/16/2024, 1:08 PM.    [facility], CT CHEST+ABDOMEN+PELVIS(ALL CNTRST ONLY)(CPT=71260/84217), 1/05/2024, 8:43 AM.     INDICATIONS: Metastasis to bone. Malignant neoplasm of upper-outer quadrant of right breast in female, estrogen receptor positive (HCC) C50.411 Malignant *     TECHNIQUE:   CT images of the chest, abdomen and pelvis were obtained with  intravenous contrast material.  Automated exposure control for dose reduction was used. Adjustment of the mA and/or kV was done based on the patient's size. Use of iterative  reconstruction technique for dose reduction was used.  Dose information is transmitted to the ACR (American College of Radiology) NRDR (National Radiology Data Registry) which includes the Dose Index Registry.     FINDINGS:  AIRWAYS: Central airways are patent.  LUNGS/PLEURA:   Small left pleural effusion which has attenuation above water attenuation and probably reflects a complex effusion similar to the prior study.  There is a small right pleural effusion.  There are some areas of probable subpleural and  dependent atelectasis in both lungs.  Few punctate subpleural nodules in the right lung are not significantly changed.  There is bilateral interlobular septal thickening left greater than right most significant in the lung bases.  No pneumothorax.  CARDIAC:   No cardiomegaly.  Trace pericardial effusion with suggestion of anterior pericardial enhancement.  There is coronary artery calcification.  MEDIASTINUM/BRANDY:   No mass or enlarged adenopathy.    VASCULAR: Thoracic aorta is normal in caliber.  CHEST WALL: Status post bilateral mastectomy.  No axillary mass or enlarged adenopathy.  Bilateral breast implants noted.  Intracapsular rupture of the right implant again appears to be present.  Left implant is grossly intact.  Stable right chest wall  port.  Innumerable nodular cutaneous foci again seen.  Multinodular heterogeneous thyroid.     LIVER:   Previously described segment 8 hypodense lesion is less conspicuous currently but measures approximately 1.9 x 1.5 cm grossly similar to the previous examination.  SPLEEN:   No enlargement or focal lesion.    PANCREAS:   There is a small enhancing focus in the pancreatic head measuring 0.8 x 1.0 cm image 111 series 2. This does not appear to be significantly changed in size.    GALLBLADDER:  Normal size and appearance.  ADRENALS:   Stable 0.9 cm right adrenal nodule.  Left adrenal gland is unremarkable.  KIDNEYS:   Enhance symmetrically without hydronephrosis.  AORTA/VASCULAR:   Abdominal aorta is normal in caliber with mild atherosclerosis.  ABDOMINAL NODES:   No mass or enlarged adenopathy.    BOWEL/MESENTERY:   Bowel is nondilated.  No bowel wall thickening.  No pneumatosis or pneumoperitoneum.  Appendix is unremarkable.  There is colonic diverticulosis.  ABDOMINAL WALL:   No mass or hernia.  URINARY BLADDER:   No visible focal wall thickening, lesion, or calculus.    PELVIC ORGANS:   Uterus is present.  PELVIC NODES:   0.8 cm short axis nodular focus in the right perirectal region image 165 series 2 is unchanged in size.  BONES: Spondylosis thoracolumbar spine.  Stable 0.9 cm sclerotic focus T11 vertebral body.  Tiny sclerotic foci in the L3, L4 and L4 vertebral bodies grossly unchanged.  OTHER:   Negative.              Impression  CONCLUSION:     Left greater than right bilateral interlobular septal thickening which overall has increased since the prior examination July 2024. Small left pleural effusion similar to the prior study.  New/increased right pleural effusion.  Trace pericardial effusion   with suggestion of anterior pericardial enhancement.  The findings are suspicious for lymphangitic spread of neoplasm.     Subtle hepatic lesion segment 8 grossly unchanged in size since the prior examination.     1 cm enhancing focus pancreatic head not significantly changed in size.     Grossly unchanged scattered sclerotic osseous metastatic disease.     Lesser incidental findings as above.                 Dictated by (CST): Odin Ivory MD on 9/27/2024 at 1:39 PM      Finalized by (CST): Odin Ivory MD on 9/27/2024 at 2:05 PM

## 2024-10-04 ENCOUNTER — OFFICE VISIT (OUTPATIENT)
Dept: PHYSICAL THERAPY | Facility: HOSPITAL | Age: 64
End: 2024-10-04
Attending: INTERNAL MEDICINE
Payer: MEDICARE

## 2024-10-04 ENCOUNTER — HOSPITAL ENCOUNTER (OUTPATIENT)
Dept: CV DIAGNOSTICS | Facility: HOSPITAL | Age: 64
Discharge: HOME | End: 2024-10-04
Attending: INTERNAL MEDICINE
Payer: MEDICARE

## 2024-10-04 DIAGNOSIS — Z79.899 ENCOUNTER FOR MONITORING CARDIOTOXIC DRUG THERAPY: ICD-10-CM

## 2024-10-04 DIAGNOSIS — Z51.81 ENCOUNTER FOR MONITORING CARDIOTOXIC DRUG THERAPY: ICD-10-CM

## 2024-10-04 PROCEDURE — 97110 THERAPEUTIC EXERCISES: CPT

## 2024-10-04 PROCEDURE — 93306 TTE W/DOPPLER COMPLETE: CPT | Performed by: INTERNAL MEDICINE

## 2024-10-04 PROCEDURE — 93356 MYOCRD STRAIN IMG SPCKL TRCK: CPT | Performed by: INTERNAL MEDICINE

## 2024-10-04 PROCEDURE — 97140 MANUAL THERAPY 1/> REGIONS: CPT

## 2024-10-04 NOTE — PROGRESS NOTES
Diagnosis:   L side TMJ pain      Referring Provider: Ami Farias  Date of Evaluation:    8.30.24    Precautions:  Cancer Next MD visit:   none scheduled  Date of Surgery: n/a   Insurance Primary/Secondary: MEDICARE / COMMERCIAL     # Auth Visits: 10            Subjective: when opening mouth, feels like a rubber band is going to pull her jaw back.  Painful when biting into a frozen candy bar.      Pain: 6/10 at worst, 2/10 current      Objective:   opening 25 mm without pain, 28 mm with pain // pre-treatment.  36 mm without pain after.      9/18/24: Opening of 33 mm at start, 36 mm after with minimal pain      Assessment: is progressing as expected.  R jaw mobilization was added, which increased maximum opening to 38 mm with no pain.      Goals:   Goals: (to be met in 10 visits)  Pt to have opening of 35 mm to chew food - In progress, 90% met  Pt to report <3/10 pain when eating - In progress  Pt to report 0/10 pain at rest - 80% MET  Pt to have <3/10 pain with R side cotton roll test    Plan: continue with B TMJ mobilization  Date: 9/3/2024  TX#: 2/10 Date:                 TX#: 3/ Date:                 TX#: 4/10 Date:                 TX#: 5/ Date:   Tx#: 6/   MT: 25'  STM to L lateral/medial pterygoids, intraoral masseter massage.  Grade III anterior and inferior glides L TMJ MT: 25'  STM to L lateral/medial pterygoids, intraoral masseter massage.  Grade III anterior and inferior glides L TMJ MT: 25'  STM to R masseter, gentle with effleurage  Sustained pressure to R temporalis  Grade IV inferior glide L TMJ  Grade III anterior glide L TMJ MT: 25;  STM to L temporalis  Grade IV anterior, inferior glides to B TMJ MT: 15'  STM to L temporalis  Grade IV anterior, inferior glides to B TMJ   TE: 15'  Jaw lateral deviation with tongue depressors, protrusion  Goldfish bite exercise, x 10 // worsened pain  Controlled bite, x 10  16 tongue depressor stretch, 3 x 90'' TE: 15'  16 tongue depressor stretch, 3 x  90''  Controlled bite, x 10  Isometric lateral deviation TE: 15'  18 tongue depressor stretch, 3 x 90''  Isometric lateral deviation TE:  20 tongue depressor stretch, 3 x 2'  Isometric lateral deviation TE: 25'    22 tongue depressor stretch, 2 x 3'  Isometric lateral deviation  Jaw lateral devation with sticks  Jaw protrusion AROM with sticks                 HEP: Long-duration stretch, deviation with tongue depressors    Charges: 2 MT, 1 TE       Total Timed Treatment: 40 min  Total Treatment Time: 45 min

## 2024-10-08 ENCOUNTER — OFFICE VISIT (OUTPATIENT)
Dept: PHYSICAL THERAPY | Facility: HOSPITAL | Age: 64
End: 2024-10-08
Attending: INTERNAL MEDICINE
Payer: MEDICARE

## 2024-10-08 ENCOUNTER — OFFICE VISIT (OUTPATIENT)
Dept: HEMATOLOGY/ONCOLOGY | Facility: HOSPITAL | Age: 64
End: 2024-10-08
Attending: INTERNAL MEDICINE
Payer: MEDICARE

## 2024-10-08 DIAGNOSIS — C78.7 MALIGNANT NEOPLASM METASTATIC TO LIVER (HCC): Primary | ICD-10-CM

## 2024-10-08 DIAGNOSIS — C50.411 MALIGNANT NEOPLASM OF UPPER-OUTER QUADRANT OF RIGHT BREAST IN FEMALE, ESTROGEN RECEPTOR POSITIVE (HCC): Primary | ICD-10-CM

## 2024-10-08 DIAGNOSIS — Z17.0 MALIGNANT NEOPLASM OF UPPER-OUTER QUADRANT OF RIGHT BREAST IN FEMALE, ESTROGEN RECEPTOR POSITIVE (HCC): Primary | ICD-10-CM

## 2024-10-08 DIAGNOSIS — Z17.0 MALIGNANT NEOPLASM OF UPPER-OUTER QUADRANT OF RIGHT BREAST IN FEMALE, ESTROGEN RECEPTOR POSITIVE (HCC): ICD-10-CM

## 2024-10-08 DIAGNOSIS — C50.411 MALIGNANT NEOPLASM OF UPPER-OUTER QUADRANT OF RIGHT BREAST IN FEMALE, ESTROGEN RECEPTOR POSITIVE (HCC): ICD-10-CM

## 2024-10-08 LAB
ALBUMIN SERPL-MCNC: 4.3 G/DL (ref 3.2–4.8)
ALBUMIN/GLOB SERPL: 2 {RATIO} (ref 1–2)
ALP LIVER SERPL-CCNC: 69 U/L
ALT SERPL-CCNC: 11 U/L
ANION GAP SERPL CALC-SCNC: 7 MMOL/L (ref 0–18)
AST SERPL-CCNC: 19 U/L (ref ?–34)
BASOPHILS # BLD AUTO: 0.02 X10(3) UL (ref 0–0.2)
BASOPHILS NFR BLD AUTO: 0.5 %
BILIRUB SERPL-MCNC: 1.7 MG/DL (ref 0.2–1.1)
BUN BLD-MCNC: 26 MG/DL (ref 9–23)
BUN/CREAT SERPL: 19.5 (ref 10–20)
CALCIUM BLD-MCNC: 9.9 MG/DL (ref 8.7–10.4)
CHLORIDE SERPL-SCNC: 108 MMOL/L (ref 98–112)
CO2 SERPL-SCNC: 29 MMOL/L (ref 21–32)
CREAT BLD-MCNC: 1.33 MG/DL
DEPRECATED RDW RBC AUTO: 58 FL (ref 35.1–46.3)
EGFRCR SERPLBLD CKD-EPI 2021: 45 ML/MIN/1.73M2 (ref 60–?)
EOSINOPHIL # BLD AUTO: 0.03 X10(3) UL (ref 0–0.7)
EOSINOPHIL NFR BLD AUTO: 0.7 %
ERYTHROCYTE [DISTWIDTH] IN BLOOD BY AUTOMATED COUNT: 14.4 % (ref 11–15)
FASTING STATUS PATIENT QL REPORTED: NO
GLOBULIN PLAS-MCNC: 2.2 G/DL (ref 2–3.5)
GLUCOSE BLD-MCNC: 124 MG/DL (ref 70–99)
HCT VFR BLD AUTO: 38.5 %
HGB BLD-MCNC: 13 G/DL
IMM GRANULOCYTES # BLD AUTO: 0.02 X10(3) UL (ref 0–1)
IMM GRANULOCYTES NFR BLD: 0.5 %
LYMPHOCYTES # BLD AUTO: 0.41 X10(3) UL (ref 1–4)
LYMPHOCYTES NFR BLD AUTO: 10.1 %
MCH RBC QN AUTO: 36.6 PG (ref 26–34)
MCHC RBC AUTO-ENTMCNC: 33.8 G/DL (ref 31–37)
MCV RBC AUTO: 108.5 FL
MONOCYTES # BLD AUTO: 0.39 X10(3) UL (ref 0.1–1)
MONOCYTES NFR BLD AUTO: 9.7 %
NEUTROPHILS # BLD AUTO: 3.17 X10 (3) UL (ref 1.5–7.7)
NEUTROPHILS # BLD AUTO: 3.17 X10(3) UL (ref 1.5–7.7)
NEUTROPHILS NFR BLD AUTO: 78.5 %
OSMOLALITY SERPL CALC.SUM OF ELEC: 304 MOSM/KG (ref 275–295)
PLATELET # BLD AUTO: 136 10(3)UL (ref 150–450)
POTASSIUM SERPL-SCNC: 4.6 MMOL/L (ref 3.5–5.1)
PROT SERPL-MCNC: 6.5 G/DL (ref 5.7–8.2)
RBC # BLD AUTO: 3.55 X10(6)UL
SODIUM SERPL-SCNC: 144 MMOL/L (ref 136–145)
WBC # BLD AUTO: 4 X10(3) UL (ref 4–11)

## 2024-10-08 PROCEDURE — 85025 COMPLETE CBC W/AUTO DIFF WBC: CPT

## 2024-10-08 PROCEDURE — 99214 OFFICE O/P EST MOD 30 MIN: CPT | Performed by: NURSE PRACTITIONER

## 2024-10-08 PROCEDURE — 97110 THERAPEUTIC EXERCISES: CPT

## 2024-10-08 PROCEDURE — 36415 COLL VENOUS BLD VENIPUNCTURE: CPT

## 2024-10-08 PROCEDURE — 97140 MANUAL THERAPY 1/> REGIONS: CPT

## 2024-10-08 PROCEDURE — 80053 COMPREHEN METABOLIC PANEL: CPT

## 2024-10-08 NOTE — PROGRESS NOTES
Medication Education Record: IV Therapy    Learner:  Patient    Barriers / Limitations:  None    Psychosocial Assessment:  patient psychosocial response appropriate    Diagnosis:   Breast cancer    IV Cancer Treatment Name(s): Doxorubicin (Doxil)  IV Cancer Treatment Frequency Every 28 days    Number of cycles planned Based on tolerance and response  Plan for appointments and lab testing Prior to each cycle  Verified Consent to Chemotherapy/Biotherapy Cancer Treatment form signed by patient and provider:  Yes    Confirm patient informs his/her Cancer Care team of any treatment received in a setting other than at the [facility] (such as inpatient or outpatient at another hospital or clinic, locally or out of state) so that this medical information can accurately be reflected in his/her medical record.  This vital information will provide an accurate picture for the physician prescribing the current cancer treatment.Yes  Cancer Treatment Side Effects (refer to Chemo Care Handouts for further information):  Allergic reactions  Constipation  Diarrhea  Eye disorders  Fatigue  Hair loss  Heart effects  Kidney / Bladder effects  Liver effects  Loss of appetite  Low red blood cell count / Anemia  Low White Blood Cell Count/Risk of infection  Low Platelet Count/Risk of Bleeding  Lung Effects  Mouth or Throat Sores  Muscle / Bone Effects  Nausea / Vomiting  Nerve Effects  Reproductive / Fertility Effects  Sexual Effects  Skin Effects  Taste Changes    IV administration risks:  Potential leaking of drug outside of vein during administration   Signs/symptoms include redness, swelling, pain, burning at the site of administration  Notify Infusion Nurse immediately if any of these symptoms occur during or after the infusion  Allergic reaction: there is a chance for allergic reaction with some medications.  If your prescribed therapy has a higher risk for this, steps will be taken to prevent and minimize this  from occurring.    Recommended Anti-nausea medications (as directed by your provider):  Prochloperazine (Compazine) 10 mg every 6 hours and Ondansetron (Zofran) 8 mg every 8 hours  Take as needed    Helpful hints during cancer treatment:    Diet:  Avoid greasy or spicy foods on days surrounding chemotherapy  Eat small frequent meals per day (6-7 meals) rather than 3 large meals  Choose high calorie/high protein foods (chicken, hard cooked eggs, peanut butter, cheese)  If nauseated, try dry foods, such as toast, crackers or pretzels; light or bland foods, such as applesauce or oatmeal.    Fluid intake:  Drink 8-10 cups of liquid a day and take a water bottle wherever you go.  Any fluid is acceptable, but caffeinated products do not count towards your intake and should be limited to 1-2 drinks/day.    Physical Activity    If your doctor approves, be as physically active as you can, but start out slowly, and increase your activity over time as you feel stronger.  Listen to your body and rest when you need to.  Do what you can when you feel up to it.      Oral Care  Keep your mouth clean.  Rinse your mouth before and after meals with plain water or with a mild mouth rinse (made with 1 quart water, 1 teaspoon salt, and 1 teaspoon baking soda, shake before using)   Avoid commercial mouthwashes that contain alcohol, alcoholic or acidic drinks or tobacco  An acceptable mouthwash is Biotene®   If soreness or sores develop, contact the office.    Diarrhea or Constipation    Imodium A-D use for diarrhea:  Take 2 tabs (4mg) at the first sign of diarrhea; then take 1 tab (2mg) every 2 hours until you have had no diarrhea for at least 12 hours; during the night take 2 tabs (4mg) every 4 hours as needed.  Maximum of 8 tablets in 24 hours.                  Constipation: Over-the-counter recommendations include: Senokot, Ducolax, Milk of Magnesia or Miralax (generics are ok)    If you have persistent diarrhea or constipation, please  contact the triage nurse for further instructions.  Skin Care  Avoid direct sunlight.  Wear a broad-spectrum sunscreen with an SPF of 30 or higher on any skin exposed to the sun.  Re-apply every 2 hours if in the sun and after bathing or sweating.  For dry skin, use an alcohol-free lotion twice per day, especially after baths.  If scalp hair loss has occurred, protect the scalp from the sun by wearing a hat and use sunscreen.  Apply alcohol-free moisturizer as needed.    When to call the doctor:  Fever of 100.4 or greater or shaking chills  Nausea/vomiting not controlled with anti-nausea medications: Unable to drink for 24 hours or have signs of dehydration: tiredness, thirst, dry mouth, dark and decreased amount of urine  Diarrhea - not controlled with Imodium AD or more than 6 episodes in 24 hours  Constipation -no bowel movement x 3 days, no response to stool softeners or laxatives  Mouth sores, sore throat or blisters on the lips affecting oral intake  Difficulty breathing, chest pain, or new cough  Excessive tiredness or weakness, confusion or loss of balance  New rash  Tingling or burning, redness, swelling of the palms of hands or soles of feet  Sudden new unexpected symptom -such as change in vision, swelling in arm or leg  Increase in numbness and tingling of hands or feet  Unusual bleeding (nose bleeds, blood in urine, stool or phlegm)  Pain with urination  Persistent mood changes, depression, nervousness, difficulty sleeping   Pain, redness, swelling or blistering at the IV site  If you go to Emergency Room for any reason or seek medical attention elsewhere  If you should need to cancel or reschedule any visit, it is important that you contact the office    What Phone Number to Call:  Cancer Center [phone] / Triage Nurse    Teaching Materials Provided:   Chemo Care chemotherapy information sheets     Please refer to the following link if you are interested in additional information about  chemotherapy for yourself or family members: [email or web address]        Safety and Handling of Chemotherapy  While you or your family member is receiving chemotherapy, whether in the clinic or at home, the following precautions must be taken to lessen any exposure to the medication.     Handling Body Waste:   Caregivers must wear gloves if exposed to the patient’s blood, urine, stool or vomit.  Dispose of the used gloves after each use and wash hands with soap and water.  Any sheets or clothes soiled with the bodily fluids should be machine washed twice in hot water with regular laundry detergent.  Do not wash soiled garments with hands.  If the soiled garments cannot be washed right away, place them in a sealed plastic bag until they can be washed.   Absorbable undergarments, or any other items contaminated with chemotherapy, should be placed in a sealed plastic bag for disposal, separate from other trash.  Toilets should be flushed twice with the lid closed while taking this medication and for 48 hours after the last dose.  Wash your hands after using the toilet.  Wash any skin area that has come in contact with urine or stool.      Safety for my family and friends:  Due to safety concerns and the nature of this treatment environment, children are not permitted in the infusion center.  Please make appropriate arrangements.   Hugging and kissing is safe for you and your partner or family members.  You can visit, sit with, hug and kiss the children in your life.    You can be around pregnant women, though (if possible) they should not clean up any of your body fluids after you have treatment.   Sexual activity is safe while throughout treatment.  It is possible that traces of the oral chemotherapy may be present in vaginal fluid or semen for 48 hours after taking.  A condom should be used during this time.  Effective birth control should be used throughout treatment to prevent pregnancy  while on these medications and for several months or years after therapy.  Chemotherapy can have harmful side effects to the fetus, especially in the first trimester.  In addition, menstrual cycles can become irregular during and after treatment, so you may not know if you are at a time in your cycle when you could become pregnant or if you are actually pregnant.    Cancer treatment education, including treatment plan, supportive medications, and post-treatment care was provided to the patient.  The patient/support person  was attentive during education, verbalized understanding, all questions were answered and patient was instructed to call with further questions.     Reinforcement of education is needed at next visit? Yes  30 minute appointment spent on education and counseling on above plan along with supportive care

## 2024-10-08 NOTE — PROGRESS NOTES
Diagnosis:   L side TMJ pain      Referring Provider: Ami Farias  Date of Evaluation:    8.30.24    Precautions:  Cancer Next MD visit:   none scheduled  Date of Surgery: n/a   Insurance Primary/Secondary: MEDICARE / COMMERCIAL     # Auth Visits: 10            Subjective: when opening mouth, feels like a rubber band is going to pull her jaw back.  Painful when biting into a frozen candy bar.      Pain: 6/10 at worst, 2/10 current      Objective:   opening 25 mm without pain, 28 mm with pain // pre-treatment.  36 mm without pain after.      9/18/24: Opening of 33 mm at start, 36 mm after with minimal pain    10/8/24: 35 opening without pain, 38 maximal with 2/10 pain.  L deflection at end-range in supine assessment      Assessment: L deflection is still present.  STM to the L temporalis reduced excursion distance at end of session.      Goals:   Goals: (to be met in 10 visits)  Pt to have opening of 35 mm to chew food - In progress, 90% met  Pt to report <3/10 pain when eating - In progress  Pt to report 0/10 pain at rest - MET  Pt to have <3/10 pain with R side cotton roll test -     Plan: continue with STM and joint mobillization plus ROM exercises  Date: 9/3/2024  TX#: 2/10 Date:                 TX#: 3/ Date:                 TX#: 4/10 Date:                 TX#: 5/10 Date:   Tx#: 6/10 Date: 10/8/24  Tx: 7/10   MT: 25'  STM to L lateral/medial pterygoids, intraoral masseter massage.  Grade III anterior and inferior glides L TMJ MT: 25'  STM to L lateral/medial pterygoids, intraoral masseter massage.  Grade III anterior and inferior glides L TMJ MT: 25'  STM to R masseter, gentle with effleurage  Sustained pressure to R temporalis  Grade IV inferior glide L TMJ  Grade III anterior glide L TMJ MT: 25;  STM to L temporalis  Grade IV anterior, inferior glides to B TMJ MT: 15'  STM to L temporalis  Grade IV anterior, inferior glides to B TMJ MT: 15'  STM to L temporalis  Grade IV anterior, inferior glide to B TMJ    TE: 15'  Jaw lateral deviation with tongue depressors, protrusion  Goldfish bite exercise, x 10 // worsened pain  Controlled bite, x 10  16 tongue depressor stretch, 3 x 90'' TE: 15'  16 tongue depressor stretch, 3 x 90''  Controlled bite, x 10  Isometric lateral deviation TE: 15'  18 tongue depressor stretch, 3 x 90''  Isometric lateral deviation TE:  20 tongue depressor stretch, 3 x 2'  Isometric lateral deviation TE: 25'    22 tongue depressor stretch, 2 x 3'  Isometric lateral deviation  Jaw lateral devation with sticks  Jaw protrusion AROM with sticks TE: 25'    22 tongue depressor stretch, 2 x 3'  Isometric lateral deviation  Jaw lateral devation with sticks  Jaw protrusion AROM with sticks                   HEP: Long-duration stretch, deviation with tongue depressors    Charges: 2 MT, 1 TE       Total Timed Treatment: 40 min  Total Treatment Time: 45 min

## 2024-10-08 NOTE — PATIENT INSTRUCTIONS
Medication Education Record: IV Therapy    Learner:  Patient    Barriers / Limitations:  None    Psychosocial Assessment:  patient psychosocial response appropriate    Diagnosis:   Breast cancer    IV Cancer Treatment Name(s): Doxorubicin (Doxil)  IV Cancer Treatment Frequency Every 28 days    Number of cycles planned Based on tolerance and response  Plan for appointments and lab testing Prior to each cycle  Verified Consent to Chemotherapy/Biotherapy Cancer Treatment form signed by patient and provider:  Yes    Confirm patient informs his/her Cancer Care team of any treatment received in a setting other than at the [facility] (such as inpatient or outpatient at another hospital or clinic, locally or out of state) so that this medical information can accurately be reflected in his/her medical record.  This vital information will provide an accurate picture for the physician prescribing the current cancer treatment.Yes  Cancer Treatment Side Effects (refer to Chemo Care Handouts for further information):  Allergic reactions  Constipation  Diarrhea  Eye disorders  Fatigue  Hair loss  Heart effects  Kidney / Bladder effects  Liver effects  Loss of appetite  Low red blood cell count / Anemia  Low White Blood Cell Count/Risk of infection  Low Platelet Count/Risk of Bleeding  Lung Effects  Mouth or Throat Sores  Muscle / Bone Effects  Nausea / Vomiting  Nerve Effects  Reproductive / Fertility Effects  Sexual Effects  Skin Effects  Taste Changes    IV administration risks:  Potential leaking of drug outside of vein during administration   Signs/symptoms include redness, swelling, pain, burning at the site of administration  Notify Infusion Nurse immediately if any of these symptoms occur during or after the infusion  Allergic reaction: there is a chance for allergic reaction with some medications.  If your prescribed therapy has a higher risk for this, steps will be taken to prevent and minimize this  from occurring.    Recommended Anti-nausea medications (as directed by your provider):  Prochloperazine (Compazine) 10 mg every 6 hours and Ondansetron (Zofran) 8 mg every 8 hours  Take as needed    Helpful hints during cancer treatment:    Diet:  Avoid greasy or spicy foods on days surrounding chemotherapy  Eat small frequent meals per day (6-7 meals) rather than 3 large meals  Choose high calorie/high protein foods (chicken, hard cooked eggs, peanut butter, cheese)  If nauseated, try dry foods, such as toast, crackers or pretzels; light or bland foods, such as applesauce or oatmeal.    Fluid intake:  Drink 8-10 cups of liquid a day and take a water bottle wherever you go.  Any fluid is acceptable, but caffeinated products do not count towards your intake and should be limited to 1-2 drinks/day.    Physical Activity    If your doctor approves, be as physically active as you can, but start out slowly, and increase your activity over time as you feel stronger.  Listen to your body and rest when you need to.  Do what you can when you feel up to it.      Oral Care  Keep your mouth clean.  Rinse your mouth before and after meals with plain water or with a mild mouth rinse (made with 1 quart water, 1 teaspoon salt, and 1 teaspoon baking soda, shake before using)   Avoid commercial mouthwashes that contain alcohol, alcoholic or acidic drinks or tobacco  An acceptable mouthwash is Biotene®   If soreness or sores develop, contact the office.    Diarrhea or Constipation    Imodium A-D use for diarrhea:  Take 2 tabs (4mg) at the first sign of diarrhea; then take 1 tab (2mg) every 2 hours until you have had no diarrhea for at least 12 hours; during the night take 2 tabs (4mg) every 4 hours as needed.  Maximum of 8 tablets in 24 hours.                  Constipation: Over-the-counter recommendations include: Senokot, Ducolax, Milk of Magnesia or Miralax (generics are ok)    If you have persistent diarrhea or constipation, please  contact the triage nurse for further instructions.  Skin Care  Avoid direct sunlight.  Wear a broad-spectrum sunscreen with an SPF of 30 or higher on any skin exposed to the sun.  Re-apply every 2 hours if in the sun and after bathing or sweating.  For dry skin, use an alcohol-free lotion twice per day, especially after baths.  If scalp hair loss has occurred, protect the scalp from the sun by wearing a hat and use sunscreen.  Apply alcohol-free moisturizer as needed.    When to call the doctor:  Fever of 100.4 or greater or shaking chills  Nausea/vomiting not controlled with anti-nausea medications: Unable to drink for 24 hours or have signs of dehydration: tiredness, thirst, dry mouth, dark and decreased amount of urine  Diarrhea - not controlled with Imodium AD or more than 6 episodes in 24 hours  Constipation -no bowel movement x 3 days, no response to stool softeners or laxatives  Mouth sores, sore throat or blisters on the lips affecting oral intake  Difficulty breathing, chest pain, or new cough  Excessive tiredness or weakness, confusion or loss of balance  New rash  Tingling or burning, redness, swelling of the palms of hands or soles of feet  Sudden new unexpected symptom -such as change in vision, swelling in arm or leg  Increase in numbness and tingling of hands or feet  Unusual bleeding (nose bleeds, blood in urine, stool or phlegm)  Pain with urination  Persistent mood changes, depression, nervousness, difficulty sleeping   Pain, redness, swelling or blistering at the IV site  If you go to Emergency Room for any reason or seek medical attention elsewhere  If you should need to cancel or reschedule any visit, it is important that you contact the office    What Phone Number to Call:  Cancer Center [phone] / Triage Nurse    Teaching Materials Provided:   Chemo Care chemotherapy information sheets     Please refer to the following link if you are interested in additional information about  chemotherapy for yourself or family members: [email or web address]        Safety and Handling of Chemotherapy  While you or your family member is receiving chemotherapy, whether in the clinic or at home, the following precautions must be taken to lessen any exposure to the medication.     Handling Body Waste:   Caregivers must wear gloves if exposed to the patient’s blood, urine, stool or vomit.  Dispose of the used gloves after each use and wash hands with soap and water.  Any sheets or clothes soiled with the bodily fluids should be machine washed twice in hot water with regular laundry detergent.  Do not wash soiled garments with hands.  If the soiled garments cannot be washed right away, place them in a sealed plastic bag until they can be washed.   Absorbable undergarments, or any other items contaminated with chemotherapy, should be placed in a sealed plastic bag for disposal, separate from other trash.  Toilets should be flushed twice with the lid closed while taking this medication and for 48 hours after the last dose.  Wash your hands after using the toilet.  Wash any skin area that has come in contact with urine or stool.      Safety for my family and friends:  Due to safety concerns and the nature of this treatment environment, children are not permitted in the infusion center.  Please make appropriate arrangements.   Hugging and kissing is safe for you and your partner or family members.  You can visit, sit with, hug and kiss the children in your life.    You can be around pregnant women, though (if possible) they should not clean up any of your body fluids after you have treatment.   Sexual activity is safe while throughout treatment.  It is possible that traces of the oral chemotherapy may be present in vaginal fluid or semen for 48 hours after taking.  A condom should be used during this time.  Effective birth control should be used throughout treatment to prevent pregnancy  while on these medications and for several months or years after therapy.  Chemotherapy can have harmful side effects to the fetus, especially in the first trimester.  In addition, menstrual cycles can become irregular during and after treatment, so you may not know if you are at a time in your cycle when you could become pregnant or if you are actually pregnant.

## 2024-10-09 ENCOUNTER — NURSE ONLY (OUTPATIENT)
Dept: HEMATOLOGY/ONCOLOGY | Facility: HOSPITAL | Age: 64
End: 2024-10-09
Attending: INTERNAL MEDICINE
Payer: MEDICARE

## 2024-10-09 ENCOUNTER — SOCIAL WORK SERVICES (OUTPATIENT)
Dept: HEMATOLOGY/ONCOLOGY | Facility: HOSPITAL | Age: 64
End: 2024-10-09

## 2024-10-09 VITALS
HEIGHT: 60 IN | DIASTOLIC BLOOD PRESSURE: 83 MMHG | WEIGHT: 117 LBS | BODY MASS INDEX: 22.97 KG/M2 | OXYGEN SATURATION: 98 % | RESPIRATION RATE: 16 BRPM | TEMPERATURE: 98 F | SYSTOLIC BLOOD PRESSURE: 135 MMHG | HEART RATE: 59 BPM

## 2024-10-09 DIAGNOSIS — C50.411 MALIGNANT NEOPLASM OF UPPER-OUTER QUADRANT OF RIGHT BREAST IN FEMALE, ESTROGEN RECEPTOR POSITIVE (HCC): ICD-10-CM

## 2024-10-09 DIAGNOSIS — Z51.11 CHEMOTHERAPY MANAGEMENT, ENCOUNTER FOR: ICD-10-CM

## 2024-10-09 DIAGNOSIS — C78.7 MALIGNANT NEOPLASM METASTATIC TO LIVER (HCC): Primary | ICD-10-CM

## 2024-10-09 DIAGNOSIS — C50.919 PRIMARY MALIGNANT NEOPLASM OF BREAST WITH METASTASIS (HCC): ICD-10-CM

## 2024-10-09 DIAGNOSIS — Z17.0 MALIGNANT NEOPLASM OF UPPER-OUTER QUADRANT OF RIGHT BREAST IN FEMALE, ESTROGEN RECEPTOR POSITIVE (HCC): ICD-10-CM

## 2024-10-09 PROCEDURE — 96367 TX/PROPH/DG ADDL SEQ IV INF: CPT

## 2024-10-09 PROCEDURE — 96413 CHEMO IV INFUSION 1 HR: CPT

## 2024-10-09 NOTE — PATIENT INSTRUCTIONS
Medication Education Record: IV Therapy  What Phone Number to Call:  [facility] [phone] / Triage Nurse    Recommended Anti-nausea medications (as directed by your provider):    Prochlorperazine (Compazine) 10 mg every 6 hours as needed  Ondansetron (Zofran) 8 mg every 8 hours as needed    Helpful hints during cancer treatment:    Diet:  Avoid greasy or spicy foods on days surrounding chemotherapy  Eat small frequent meals per day (6-7 meals) rather than 3 large meals  Choose high calorie/high protein foods (chicken, hard cooked eggs, peanut butter, cheese)  If nauseated, try dry foods, such as toast, crackers or pretzels; light or bland foods, such as applesauce or oatmeal.    Fluid intake:  Drink 8-10 cups of liquid a day and take a water bottle wherever you go.  Any fluid is acceptable, but caffeinated products do not count towards your intake and should be limited to 1-2 drinks/day.  Physical Activity    If your doctor approves, be as physically active as you can, but start out slowly, and increase your activity over time as you feel stronger.  Listen to your body and rest when you need to.  Do what you can when you feel up to it.      Oral Care  Keep your mouth clean.  Rinse your mouth before and after meals with plain water or with a mild mouth rinse (made with 1 quart water, 1 teaspoon salt, and 1 teaspoon baking soda, shake before using)   Avoid commercial mouthwashes that contain alcohol, alcoholic or acidic drinks or tobacco  An acceptable mouthwash is Biotene®   If soreness or sores develop, contact the office.    Diarrhea or Constipation    Imodium A-D use for diarrhea:  Take 2 tabs (4mg) at the first sign of diarrhea; then take 1 tab (2mg) every 2 hours until you have had no diarrhea for at least 12 hours; during the night take 2 tabs (4mg) every 4 hours as needed.  Maximum of 8 tablets in 24 hours.                  Constipation: Over-the-counter recommendations include: Senokot, Ducolax, Milk  of Magnesia or Miralax (generics are ok)    If you have persistent diarrhea or constipation, please contact the triage nurse for further instructions.  Skin Care  Avoid direct sunlight.  Wear a broad-spectrum sunscreen with an SPF of 30 or higher on any skin exposed to the sun.  Re-apply every 2 hours if in the sun and after bathing or sweating.  For dry skin, use an alcohol-free lotion twice per day, especially after baths.  If scalp hair loss has occurred, protect the scalp from the sun by wearing a hat and use sunscreen.  Apply alcohol-free moisturizer as needed.    When to call the doctor:  Fever of 100.4 or greater or shaking chills  Nausea/vomiting not controlled with anti-nausea medications: Unable to drink for 24 hours or have signs of dehydration: tiredness, thirst, dry mouth, dark and decreased amount of urine  Diarrhea - not controlled with Imodium AD or more than 6 episodes in 24 hours  Constipation -no bowel movement x 3 days, no response to stool softeners or laxatives  Mouth sores, sore throat or blisters on the lips affecting oral intake  Difficulty breathing, chest pain, or new cough  Excessive tiredness or weakness, confusion or loss of balance  New rash  Tingling or burning, redness, swelling of the palms of hands or soles of feet  Sudden new unexpected symptom -such as change in vision, swelling in arm or leg  Increase in numbness and tingling of hands or feet  Unusual bleeding (nose bleeds, blood in urine, stool or phlegm)  Pain with urination  Persistent mood changes, depression, nervousness, difficulty sleeping   Pain, redness, swelling or blistering at the IV site  If you go to Emergency Room for any reason or seek medical attention elsewhere  If you should need to cancel or reschedule any visit, it is important that you contact the office    What Phone Number to Call:  [facility] Center [phone] / Triage Nurse  Safety and Handling of Chemotherapy  While you or your family member is  receiving chemotherapy, whether in the clinic or at home, the following precautions must be taken to lessen any exposure to the medication.     Handling Body Waste:   Caregivers must wear gloves if exposed to the patient’s blood, urine, stool or vomit.  Dispose of the used gloves after each use and wash hands with soap and water.  Any sheets or clothes soiled with the bodily fluids should be machine washed twice in hot water with regular laundry detergent.  Do not wash soiled garments with hands.  If the soiled garments cannot be washed right away, place them in a sealed plastic bag until they can be washed.   Absorbable undergarments, or any other items contaminated with chemotherapy, should be placed in a sealed plastic bag for disposal, separate from other trash.  Toilets should be flushed twice with the lid closed while taking this medication and for 48 hours after the last dose.  Wash your hands after using the toilet.  Wash any skin area that has come in contact with urine or stool.      Safety for my family and friends:  Due to safety concerns and the nature of this treatment environment, children are not permitted in the infusion center.  Please make appropriate arrangements.   Hugging and kissing is safe for you and your partner or family members.  You can visit, sit with, hug and kiss the children in your life.    You can be around pregnant women, though (if possible) they should not clean up any of your body fluids after you have treatment.   Sexual activity is safe while throughout treatment.  It is possible that traces of the oral chemotherapy may be present in vaginal fluid or semen for 48 hours after taking.  A condom should be used during this time.  Effective birth control should be used throughout treatment to prevent pregnancy while on these medications and for several months or years after therapy.  Chemotherapy can have harmful side effects to the fetus, especially in the first trimester.  In  addition, menstrual cycles can become irregular during and after treatment, so you may not know if you are at a time in your cycle when you could become pregnant or if you are actually pregnant.

## 2024-10-09 NOTE — PROGRESS NOTES
SW completed an assessment with pt to provide support and encouragement due to new chemo starting today.     Pt is a 65 y/o woman who lives lives in [place], [place] alone.    Patient does not have any children.    Patient is retired    Social determinants of health assessment completed with pt and no needs identified at this time.    Pt presents with appropriate affect and mood. Patient did not identify any feelings of anxiety about starting treatment today.      SW reviewed cancer support resources provided by [facility]. SW provided a pack of resources including information on transportation assistance, homecare/home health agencies and counseling resources. SW reviewed Advance Directives and importance of completion. SW explained role of SW in Cancer Center and provided Bringing [facility] gift bag. SW contact information given. Coping skills reviewed and support services encouraged due to new cancer dx.

## 2024-10-09 NOTE — PROGRESS NOTES
Aleisha to infusion today for C1 D1 DOXIL.  Arrives Ambulating independently, accompanied by Self     Patient was evaluated today by Treatment Nurse. Consent noted to be signed under media tab    Oral medications included in this regimen:  no    Patient confirms comprehension of cancer treatment schedule:  yes    Pregnancy screening:  Denies possibility of pregnancy    Modifications in dose or schedule:  No    Medications appearance and physical integrity checked by RN: yes.    Chemotherapy IV pump settings verified by 2 RNs:  Yes.  Frequency of blood return and site check throughout administration: Prior to administration, Prior to each drug, and At completion of therapy     Infusion/treatment outcome:  patient tolerated treatment without incident. Ran at 1/2 rate for 15 mins, and then increased to full rate for remainder of infusion    Education Record    Learner:  Patient  Barriers / Limitations:  None  Method:  Discussion, Printed material, and Reinforcement  Education / instructions given:  Plan of care, antiemetic use, side effects & symptom management, triage RN phone number and when to call  Outcome:  Shows understanding    Discharged Home, Ambulating independently, accompanied by:Self    Patient/family verbalized understanding of future appointments: by printed AVS

## 2024-10-10 ENCOUNTER — TELEPHONE (OUTPATIENT)
Dept: HEMATOLOGY/ONCOLOGY | Facility: HOSPITAL | Age: 64
End: 2024-10-10

## 2024-10-10 RX ORDER — LORAZEPAM 0.5 MG/1
0.5 TABLET ORAL EVERY 6 HOURS PRN
Qty: 30 TABLET | Refills: 1 | Status: SHIPPED | OUTPATIENT
Start: 2024-10-10 | End: 2024-11-04

## 2024-10-10 NOTE — TELEPHONE ENCOUNTER
CALLED BOTH NUMBERS TO R/S MISSED APT - PT NOT AVAILABLE   Toxicities: C1 D1 LiposomalDoxorubicin on 10/9/2024    I attempted to reach Aleisha to see how she is feeling after her first treatment. I left a voice mail message asking her to please return my call at her earliest convenience.

## 2024-10-14 ENCOUNTER — OFFICE VISIT (OUTPATIENT)
Dept: PHYSICAL THERAPY | Facility: HOSPITAL | Age: 64
End: 2024-10-14
Attending: INTERNAL MEDICINE
Payer: MEDICARE

## 2024-10-14 PROCEDURE — 97110 THERAPEUTIC EXERCISES: CPT

## 2024-10-14 PROCEDURE — 97140 MANUAL THERAPY 1/> REGIONS: CPT

## 2024-10-14 NOTE — PROGRESS NOTES
Diagnosis:   L side TMJ pain      Referring Provider: Ami Farias  Date of Evaluation:    8.30.24    Precautions:  Cancer Next MD visit:   none scheduled  Date of Surgery: n/a   Insurance Primary/Secondary: MEDICARE / COMMERCIAL     # Auth Visits: 10            Subjective: when opening mouth, feels like a rubber band is going to pull her jaw back.  Painful when biting into a frozen candy bar.      Pain: 6/10 at worst, 2/10 current      Objective:   opening 25 mm without pain, 28 mm with pain // pre-treatment.  36 mm without pain after.      9/18/24: Opening of 33 mm at start, 36 mm after with minimal pain    10/8/24: 35 opening without pain, 38 maximal with 2/10 pain.  L deflection at end-range in supine assessment      Assessment: L deflection is still present.  STM to the L temporalis reduced excursion distance at end of session.      Goals:   Goals: (to be met in 10 visits)  Pt to have opening of 35 mm to chew food - In progress, 90% met  Pt to report <3/10 pain when eating - In progress  Pt to report 0/10 pain at rest - MET  Pt to have <3/10 pain with R side cotton roll test -     Plan: continue with STM and joint mobillization plus ROM exercises  Date: 9/3/2024  TX#: 2/10 Date:                 TX#: 3/ Date:                 TX#: 4/10 Date:                 TX#: 5/10 Date:   Tx#: 6/10 Date: 10/8/24  Tx: 7/10 Date: 10/14/24  Tx: 8/10   MT: 25'  STM to L lateral/medial pterygoids, intraoral masseter massage.  Grade III anterior and inferior glides L TMJ MT: 25'  STM to L lateral/medial pterygoids, intraoral masseter massage.  Grade III anterior and inferior glides L TMJ MT: 25'  STM to R masseter, gentle with effleurage  Sustained pressure to R temporalis  Grade IV inferior glide L TMJ  Grade III anterior glide L TMJ MT: 25;  STM to L temporalis  Grade IV anterior, inferior glides to B TMJ MT: 15'  STM to L temporalis  Grade IV anterior, inferior glides to B TMJ MT: 15'  STM to L temporalis  Grade IV anterior,  inferior glide to B TMJ MT: 25'  STM to L temporalis  Grade IV anterior and inferior glide to L TMJ  Grade III medial glide, L TMJ  DN to L TMJ   TE: 15'  Jaw lateral deviation with tongue depressors, protrusion  Goldfish bite exercise, x 10 // worsened pain  Controlled bite, x 10  16 tongue depressor stretch, 3 x 90'' TE: 15'  16 tongue depressor stretch, 3 x 90''  Controlled bite, x 10  Isometric lateral deviation TE: 15'  18 tongue depressor stretch, 3 x 90''  Isometric lateral deviation TE:  20 tongue depressor stretch, 3 x 2'  Isometric lateral deviation TE: 25'    22 tongue depressor stretch, 2 x 3'  Isometric lateral deviation  Jaw lateral devation with sticks  Jaw protrusion AROM with sticks TE: 25'    22 tongue depressor stretch, 2 x 3'  Isometric lateral deviation  Jaw lateral devation with sticks  Jaw protrusion AROM with sticks TE:  22 tongue depressor stretch, 2 x 3'  Jaw lateral deviation with sticks                     HEP: Long-duration stretch, deviation with tongue depressors    Charges: 2 MT, 1 TE       Total Timed Treatment: 40 min  Total Treatment Time: 45 min

## 2024-10-21 ENCOUNTER — OFFICE VISIT (OUTPATIENT)
Dept: PHYSICAL THERAPY | Facility: HOSPITAL | Age: 64
End: 2024-10-21
Attending: INTERNAL MEDICINE
Payer: MEDICARE

## 2024-10-21 PROCEDURE — 97110 THERAPEUTIC EXERCISES: CPT

## 2024-10-21 PROCEDURE — 97140 MANUAL THERAPY 1/> REGIONS: CPT

## 2024-10-22 DIAGNOSIS — G47.00 INSOMNIA, UNSPECIFIED TYPE: ICD-10-CM

## 2024-10-22 RX ORDER — ZOLPIDEM TARTRATE 10 MG/1
10 TABLET ORAL NIGHTLY PRN
Qty: 60 TABLET | Refills: 0 | Status: SHIPPED | OUTPATIENT
Start: 2024-10-22 | End: 2024-12-17

## 2024-10-22 NOTE — PROGRESS NOTES
Diagnosis:   L side TMJ pain      Referring Provider: Ami Farias  Date of Evaluation:    8.30.24    Precautions:  Cancer Next MD visit:   none scheduled  Date of Surgery: n/a   Insurance Primary/Secondary: MEDICARE / COMMERCIAL     # Auth Visits: 10            Subjective: when opening mouth, feels like a rubber band is going to pull her jaw back.  Painful when biting into a frozen candy bar.      Pain: 6/10 at worst, 2/10 current      Objective:   opening 25 mm without pain, 28 mm with pain // pre-treatment.  36 mm without pain after.      9/18/24: Opening of 33 mm at start, 36 mm after with minimal pain    10/8/24: 35 opening without pain, 38 maximal with 2/10 pain.  L deflection at end-range in supine assessment      Assessment: L deflection was much less, and soft tissue in the temporarlis produced minimal pain      Goals:   Goals: (to be met in 10 visits)  Pt to have opening of 35 mm to chew food - In progress, 90% met  Pt to report <3/10 pain when eating - In progress  Pt to report 0/10 pain at rest - MET  Pt to have <3/10 pain with R side cotton roll test -     Plan: continue with STM and joint mobillization plus ROM exercises  Date: 9/3/2024  TX#: 2/10 Date:                 TX#: 3/ Date:                 TX#: 4/10 Date:                 TX#: 5/10 Date:   Tx#: 6/10 Date: 10/8/24  Tx: 7/10 Date: 10/14/24  Tx: 8/10 10/21/24  9/10   MT: 25'  STM to L lateral/medial pterygoids, intraoral masseter massage.  Grade III anterior and inferior glides L TMJ MT: 25'  STM to L lateral/medial pterygoids, intraoral masseter massage.  Grade III anterior and inferior glides L TMJ MT: 25'  STM to R masseter, gentle with effleurage  Sustained pressure to R temporalis  Grade IV inferior glide L TMJ  Grade III anterior glide L TMJ MT: 25;  STM to L temporalis  Grade IV anterior, inferior glides to B TMJ MT: 15'  STM to L temporalis  Grade IV anterior, inferior glides to B TMJ MT: 15'  STM to L temporalis  Grade IV anterior,  inferior glide to B TMJ MT: 25'  STM to L temporalis  Grade IV anterior and inferior glide to L TMJ  Grade III medial glide, L TMJ  DN to L TMJ MT:  STM to L temporalis  Grade IV anterior and inferior glide to L TMJ  Grade III medial glide, L TMJ     TE: 15'  Jaw lateral deviation with tongue depressors, protrusion  Goldfish bite exercise, x 10 // worsened pain  Controlled bite, x 10  16 tongue depressor stretch, 3 x 90'' TE: 15'  16 tongue depressor stretch, 3 x 90''  Controlled bite, x 10  Isometric lateral deviation TE: 15'  18 tongue depressor stretch, 3 x 90''  Isometric lateral deviation TE:  20 tongue depressor stretch, 3 x 2'  Isometric lateral deviation TE: 25'    22 tongue depressor stretch, 2 x 3'  Isometric lateral deviation  Jaw lateral devation with sticks  Jaw protrusion AROM with sticks TE: 25'    22 tongue depressor stretch, 2 x 3'  Isometric lateral deviation  Jaw lateral devation with sticks  Jaw protrusion AROM with sticks TE:  22 tongue depressor stretch, 2 x 3'  Jaw lateral deviation with sticks TE:  23 tongue depressor stretch, 4 x 90''  Jaw lateral deviation with sticks AROM                       HEP: Long-duration stretch, deviation with tongue depressors    Charges: 2 MT, 1 TE       Total Timed Treatment: 40 min  Total Treatment Time: 45 min

## 2024-10-28 ENCOUNTER — OFFICE VISIT (OUTPATIENT)
Dept: PHYSICAL THERAPY | Facility: HOSPITAL | Age: 64
End: 2024-10-28
Attending: INTERNAL MEDICINE
Payer: MEDICARE

## 2024-10-28 PROCEDURE — 97140 MANUAL THERAPY 1/> REGIONS: CPT

## 2024-10-28 PROCEDURE — 97110 THERAPEUTIC EXERCISES: CPT

## 2024-10-29 NOTE — PROGRESS NOTES
Diagnosis:   L side TMJ pain      Referring Provider: Ami Farias  Date of Evaluation:    8.30.24    Precautions:  Cancer Next MD visit:   none scheduled  Date of Surgery: n/a   Insurance Primary/Secondary: MEDICARE / COMMERCIAL     # Auth Visits: 10            Subjective: overall, feels 80% improvement in jaw pain.  Eating has become less troublesome, but she does have some difficulty with tougher foods.    Pain: 3/10 at worst, 2/10 current      Objective:   opening 25 mm without pain, 28 mm with pain // pre-treatment.  36 mm without pain after.      9/18/24: Opening of 33 mm at start, 36 mm after with minimal pain    10/8/24: 35 opening without pain, 38 maximal with 2/10 pain.  L deflection at end-range in supine assessment    10/28/24: 38 opening without pain, 40 maximal with 2/10 pain. Minimal deflection at end-range.      Assessment: L deflection was much less, and soft tissue in the temporarlis produced minimal pain      Goals:   Goals: (to be met in 10 visits)  Pt to have opening of 35 mm to chew food - In progress, 90% met  Pt to report <3/10 pain when eating - MET  Pt to report 0/10 pain at rest - MET  Pt to have <3/10 pain with R side cotton roll test - 80% improvement    Plan: continue with STM and joint mobillization plus ROM exercises  Date: 9/3/2024  TX#: 2/10 Date:                 TX#: 3/ Date:                 TX#: 4/10 Date:                 TX#: 5/10 Date:   Tx#: 6/10 Date: 10/8/24  Tx: 7/10 Date: 10/14/24  Tx: 8/10 10/21/24  9/10 Date: 10/28/24  10/10   MT: 25'  STM to L lateral/medial pterygoids, intraoral masseter massage.  Grade III anterior and inferior glides L TMJ MT: 25'  STM to L lateral/medial pterygoids, intraoral masseter massage.  Grade III anterior and inferior glides L TMJ MT: 25'  STM to R masseter, gentle with effleurage  Sustained pressure to R temporalis  Grade IV inferior glide L TMJ  Grade III anterior glide L TMJ MT: 25;  STM to L temporalis  Grade IV anterior, inferior glides  to B TMJ MT: 15'  STM to L temporalis  Grade IV anterior, inferior glides to B TMJ MT: 15'  STM to L temporalis  Grade IV anterior, inferior glide to B TMJ MT: 25'  STM to L temporalis  Grade IV anterior and inferior glide to L TMJ  Grade III medial glide, L TMJ  DN to L TMJ MT:  STM to L temporalis  Grade IV anterior and inferior glide to L TMJ  Grade III medial glide, L TMJ   MT:  STM to L temporalis  Grade IV anterior and inferior glide to L TMJ  Grade III medial glide, L TMJ   TE: 15'  Jaw lateral deviation with tongue depressors, protrusion  Goldfish bite exercise, x 10 // worsened pain  Controlled bite, x 10  16 tongue depressor stretch, 3 x 90'' TE: 15'  16 tongue depressor stretch, 3 x 90''  Controlled bite, x 10  Isometric lateral deviation TE: 15'  18 tongue depressor stretch, 3 x 90''  Isometric lateral deviation TE:  20 tongue depressor stretch, 3 x 2'  Isometric lateral deviation TE: 25'    22 tongue depressor stretch, 2 x 3'  Isometric lateral deviation  Jaw lateral devation with sticks  Jaw protrusion AROM with sticks TE: 25'    22 tongue depressor stretch, 2 x 3'  Isometric lateral deviation  Jaw lateral devation with sticks  Jaw protrusion AROM with sticks TE:  22 tongue depressor stretch, 2 x 3'  Jaw lateral deviation with sticks TE:  23 tongue depressor stretch, 4 x 90''  Jaw lateral deviation with sticks AROM TE:  23 tongue depressor stretch, 4 x 90''  Jaw lateral deviation with sticks AROM                         HEP: Long-duration stretch, deviation with tongue depressors      Plan: Continue skilled Physical Therapy 1 x/week or a total of 2 visits over a 90 day period. Treatment will include: manual therapy, therapeutic exercise and activities.       Patient/Family/Caregiver was advised of these findings, precautions, and treatment options and has agreed to actively participate in planning and for this course of care.    Thank you for your referral. If you have any questions, please contact me  at Dept: [phone].    Sincerely,  Electronically signed by therapist: Tavon Gee PT     Physician's certification required:  Yes  Please co-sign or sign and return this letter via fax as soon as possible to [phone].   I certify the need for these services furnished under this plan of treatment and while under my care.    X___________________________________________________ Date____________________    Certification From: 10/28/2024  To:1/26/2025     Charges: 2 MT, 1 TE       Total Timed Treatment: 40 min  Total Treatment Time: 45 min

## 2024-11-04 DIAGNOSIS — Z51.11 CHEMOTHERAPY MANAGEMENT, ENCOUNTER FOR: ICD-10-CM

## 2024-11-04 DIAGNOSIS — C50.919 PRIMARY MALIGNANT NEOPLASM OF BREAST WITH METASTASIS (HCC): ICD-10-CM

## 2024-11-04 RX ORDER — LORAZEPAM 0.5 MG/1
0.5 TABLET ORAL EVERY 6 HOURS PRN
Qty: 30 TABLET | Refills: 0 | Status: SHIPPED | OUTPATIENT
Start: 2024-11-04 | End: 2025-01-08

## 2024-11-06 ENCOUNTER — OFFICE VISIT (OUTPATIENT)
Dept: HEMATOLOGY/ONCOLOGY | Facility: HOSPITAL | Age: 64
End: 2024-11-06
Attending: INTERNAL MEDICINE
Payer: MEDICARE

## 2024-11-06 VITALS
HEART RATE: 89 BPM | BODY MASS INDEX: 21.79 KG/M2 | RESPIRATION RATE: 16 BRPM | SYSTOLIC BLOOD PRESSURE: 139 MMHG | TEMPERATURE: 98 F | DIASTOLIC BLOOD PRESSURE: 86 MMHG | HEIGHT: 60 IN | OXYGEN SATURATION: 95 % | WEIGHT: 111 LBS

## 2024-11-06 DIAGNOSIS — C50.919 STAGE IV BREAST CANCER IN FEMALE (HCC): Primary | ICD-10-CM

## 2024-11-06 DIAGNOSIS — Z17.0 MALIGNANT NEOPLASM OF UPPER-OUTER QUADRANT OF RIGHT BREAST IN FEMALE, ESTROGEN RECEPTOR POSITIVE (HCC): ICD-10-CM

## 2024-11-06 DIAGNOSIS — Z51.11 CHEMOTHERAPY MANAGEMENT, ENCOUNTER FOR: ICD-10-CM

## 2024-11-06 DIAGNOSIS — C78.7 MALIGNANT NEOPLASM METASTATIC TO LIVER (HCC): Primary | ICD-10-CM

## 2024-11-06 DIAGNOSIS — R11.0 CHEMOTHERAPY-INDUCED NAUSEA: ICD-10-CM

## 2024-11-06 DIAGNOSIS — C50.411 MALIGNANT NEOPLASM OF UPPER-OUTER QUADRANT OF RIGHT BREAST IN FEMALE, ESTROGEN RECEPTOR POSITIVE (HCC): ICD-10-CM

## 2024-11-06 DIAGNOSIS — M54.50 CHRONIC LOW BACK PAIN WITHOUT SCIATICA, UNSPECIFIED BACK PAIN LATERALITY: ICD-10-CM

## 2024-11-06 DIAGNOSIS — C78.7 MALIGNANT NEOPLASM METASTATIC TO LIVER (HCC): ICD-10-CM

## 2024-11-06 DIAGNOSIS — G89.29 CHRONIC LOW BACK PAIN WITHOUT SCIATICA, UNSPECIFIED BACK PAIN LATERALITY: ICD-10-CM

## 2024-11-06 DIAGNOSIS — T45.1X5A CHEMOTHERAPY-INDUCED NAUSEA: ICD-10-CM

## 2024-11-06 LAB
ALBUMIN SERPL-MCNC: 4.2 G/DL (ref 3.2–4.8)
ALBUMIN/GLOB SERPL: 1.8 {RATIO} (ref 1–2)
ALP LIVER SERPL-CCNC: 100 U/L
ALT SERPL-CCNC: 22 U/L
ANION GAP SERPL CALC-SCNC: 7 MMOL/L (ref 0–18)
AST SERPL-CCNC: 27 U/L (ref ?–34)
BASOPHILS # BLD AUTO: 0.02 X10(3) UL (ref 0–0.2)
BASOPHILS NFR BLD AUTO: 0.6 %
BILIRUB SERPL-MCNC: 1.4 MG/DL (ref 0.2–1.1)
BUN BLD-MCNC: 27 MG/DL (ref 9–23)
BUN/CREAT SERPL: 24.1 (ref 10–20)
CALCIUM BLD-MCNC: 10.2 MG/DL (ref 8.7–10.4)
CHLORIDE SERPL-SCNC: 103 MMOL/L (ref 98–112)
CO2 SERPL-SCNC: 28 MMOL/L (ref 21–32)
CREAT BLD-MCNC: 1.12 MG/DL
DEPRECATED RDW RBC AUTO: 49.5 FL (ref 35.1–46.3)
EGFRCR SERPLBLD CKD-EPI 2021: 55 ML/MIN/1.73M2 (ref 60–?)
EOSINOPHIL # BLD AUTO: 0.02 X10(3) UL (ref 0–0.7)
EOSINOPHIL NFR BLD AUTO: 0.6 %
ERYTHROCYTE [DISTWIDTH] IN BLOOD BY AUTOMATED COUNT: 13.2 % (ref 11–15)
GLOBULIN PLAS-MCNC: 2.4 G/DL (ref 2–3.5)
GLUCOSE BLD-MCNC: 83 MG/DL (ref 70–99)
HCT VFR BLD AUTO: 41.8 %
HGB BLD-MCNC: 13.9 G/DL
IMM GRANULOCYTES # BLD AUTO: 0.04 X10(3) UL (ref 0–1)
IMM GRANULOCYTES NFR BLD: 1.1 %
LYMPHOCYTES # BLD AUTO: 0.43 X10(3) UL (ref 1–4)
LYMPHOCYTES NFR BLD AUTO: 12.1 %
MCH RBC QN AUTO: 33.7 PG (ref 26–34)
MCHC RBC AUTO-ENTMCNC: 33.3 G/DL (ref 31–37)
MCV RBC AUTO: 101.2 FL
MONOCYTES # BLD AUTO: 0.85 X10(3) UL (ref 0.1–1)
MONOCYTES NFR BLD AUTO: 23.9 %
NEUTROPHILS # BLD AUTO: 2.2 X10 (3) UL (ref 1.5–7.7)
NEUTROPHILS # BLD AUTO: 2.2 X10(3) UL (ref 1.5–7.7)
NEUTROPHILS NFR BLD AUTO: 61.7 %
OSMOLALITY SERPL CALC.SUM OF ELEC: 290 MOSM/KG (ref 275–295)
PLATELET # BLD AUTO: 203 10(3)UL (ref 150–450)
POTASSIUM SERPL-SCNC: 4 MMOL/L (ref 3.5–5.1)
PROT SERPL-MCNC: 6.6 G/DL (ref 5.7–8.2)
RBC # BLD AUTO: 4.13 X10(6)UL
SODIUM SERPL-SCNC: 138 MMOL/L (ref 136–145)
WBC # BLD AUTO: 3.6 X10(3) UL (ref 4–11)

## 2024-11-06 PROCEDURE — 85025 COMPLETE CBC W/AUTO DIFF WBC: CPT

## 2024-11-06 PROCEDURE — 96413 CHEMO IV INFUSION 1 HR: CPT

## 2024-11-06 PROCEDURE — 80053 COMPREHEN METABOLIC PANEL: CPT

## 2024-11-06 PROCEDURE — 96375 TX/PRO/DX INJ NEW DRUG ADDON: CPT

## 2024-11-06 PROCEDURE — 99215 OFFICE O/P EST HI 40 MIN: CPT | Performed by: PHYSICIAN ASSISTANT

## 2024-11-06 RX ORDER — IBUPROFEN 200 MG
600 TABLET ORAL ONCE
Status: COMPLETED | OUTPATIENT
Start: 2024-11-06 | End: 2024-11-06

## 2024-11-06 RX ORDER — IBUPROFEN 200 MG
600 TABLET ORAL ONCE
Status: CANCELLED
Start: 2024-11-06 | End: 2024-11-06

## 2024-11-06 RX ORDER — IBUPROFEN 200 MG
TABLET ORAL
Status: DISPENSED
Start: 2024-11-06 | End: 2024-11-07

## 2024-11-06 RX ADMIN — IBUPROFEN 600 MG: 200 MG TABLET ORAL at 13:54:00

## 2024-11-06 NOTE — PROGRESS NOTES
HPI   Aleisha Carlin is a 64 year old female here for follow up of   Encounter Diagnoses   Name Primary?    Stage IV breast cancer in female (HCC) Yes    Malignant neoplasm metastatic to liver (HCC)     Malignant neoplasm of upper-outer quadrant of right breast in female, estrogen receptor positive (HCC)     Chemotherapy management, encounter for     Chemotherapy-induced nausea        S/p 9 cycles seventh line capecitabine initiated on 1/22/24.  9/27/24 restaging CT shows progression in the lungs with possible lymphangitic spread and effusions.      Now on eighth line liposomal doxorubicin s/p cycle 1       Fatigue:  Yes, states about the same.   States when getting groceries states uses the cart and gets tired and fatigued.  Some HERRERA.      Fevers:  No     Appetite/taste changes:  No.  However, hard to eat with TMJ flare up on the Left.  She is doing PT for TMJ.  States improving, but still on PT.  Eating soft foods.    Mucositis:  No    Weight changes:  stable.    Nausea/vomiting:  Yes, D4-5, controlled with ondansetron       Diarrhea:  No,     Constipation:  Yes, controlled with stool softener    Peripheral neuropathy:  Yes, Per history, occasionally R last two fingers and does not last long. No complaint today .      PPE:  States using lotion in the hands all the time, no PPE.  Some dry skin on the soles.        ECOG PS 1    Review of Systems:     Review of Systems   Constitutional:  Positive for fatigue. Negative for appetite change, fever and unexpected weight change.   HENT:   Negative for mouth sores.         Runny nose   Eyes:         R watery eyes   Respiratory:  Positive for shortness of breath (Some HERRERA with walking a lot in the store. No longer hears crackles). Negative for cough.    Cardiovascular:  Positive for leg swelling (BLE edema R>L, from surgeries on the R foot and leg.  Taking lasix prn.). Negative for chest pain and palpitations.   Gastrointestinal:  Positive for constipation, diarrhea and  nausea. Negative for abdominal pain and vomiting.   Genitourinary:  Negative for dysuria and frequency.    Musculoskeletal:  Positive for arthralgias (mostly due to TMJ.). Negative for back pain and neck pain.   Skin:         NF, hyperpigmented hands and feet    Neurological:  Positive for dizziness (at times) and numbness (to L hand- sl betterd). Negative for extremity weakness and headaches.   Hematological:  Negative for adenopathy. Bruises/bleeds easily.   Psychiatric/Behavioral:  Positive for sleep disturbance (improved with sl mirtazapine.  Still takes zolpidem and gummies).          Current Outpatient Medications   Medication Sig Dispense Refill    LORazepam 0.5 MG Oral Tab Take 1 tablet (0.5 mg total) by mouth every 6 (six) hours as needed for Anxiety. 30 tablet 0    zolpidem 10 MG Oral Tab Take 1 tablet (10 mg total) by mouth nightly as needed. 60 tablet 0    ondansetron (ZOFRAN) 8 MG tablet Take 1 tablet (8 mg total) by mouth every 8 (eight) hours as needed for Nausea. 30 tablet 3    HYDROcodone-acetaminophen  MG Oral Tab Take 1 tablet by mouth every 6 (six) hours as needed for Pain. 90 tablet 0    minocycline 50 MG Oral Cap Take 1 capsule (50 mg total) by mouth daily. 90 capsule 3    mirtazapine 15 MG Oral Tablet Dispersible Take 1 tablet (15 mg total) by mouth nightly. 90 tablet 3    furosemide 20 MG Oral Tab Take 1 tablet (20 mg total) by mouth daily.      prochlorperazine (COMPAZINE) 10 mg tablet Take 1 tablet (10 mg total) by mouth every 6 (six) hours as needed for Nausea. 90 tablet 2    folic acid 1 MG Oral Tab Take 1 tablet (1 mg total) by mouth daily. 90 tablet 3    guaiFENesin-codeine 100-10 MG/5ML Oral Solution Take 5 mL by mouth every 6 (six) hours as needed for cough. 473 mL 0    albuterol 108 (90 Base) MCG/ACT Inhalation Aero Soln Inhale 1 puff into the lungs every 6 (six) hours as needed for Wheezing. 8 g 3    Cholecalciferol (VITAMIN D3) 250 MCG (07931 UT) Oral Cap Take 1 capsule by  mouth daily.      Zoledronic Acid (ZOMETA IV) Inject 4 mg into the vein every 6 (six) months. (Patient not taking: Reported on 11/6/2024)       Allergies:   No Known Allergies    Past Medical History:    Breast cancer (HCC)    MASTECTOMY / RECONSTRUCTION    Chemotherapy-induced neutropenia (HCC)    Encounter for insertion of venous access port    portacath insertion / venous access    Glaucoma    History of breast cancer in female    Hyperlipidemia    Malignant neoplasm of overlapping sites of breast in female, estrogen receptor positive (HCC)    Malignant neoplasm of upper-outer quadrant of right breast in female, estrogen receptor positive (HCC)    Malignant pleural effusion (HCC)    Menorrhagia    HYSTEROSCOPY / D&C / endomentrial biopsy (08/28/2007)    Metastatic breast cancer    Neurofibromatosis (HCC)    Osteopenia of multiple sites    Osteopenia of multiple sites    Osteoporosis screening    Osteoporosis screening    Per [employer]    Other osteoporosis without current pathological fracture    Postmenopausal bleeding    Psychophysiological insomnia    Tumor, foot    tumor right foot / excision/excision of bone spur/arthrodesis w/screw fixation     Past Surgical History:   Procedure Laterality Date    Breast reconstruction Right 2008    right breast reconstructed - ductal, BRCA neg    Breast surgery      Chemotherapy  2007    Foot surgery Right     tumor rt foot / excision/excision of bone spur/arthrodesis w/screw fixation    Ir port a cath procedure  2007    Right subclavian port of cath.    Mastectomy right      2012    Mastectomy,simple  2007     Social History     Socioeconomic History    Marital status: Single   Tobacco Use    Smoking status: Never    Smokeless tobacco: Never   Substance and Sexual Activity    Alcohol use: Yes     Alcohol/week: 5.0 standard drinks of alcohol     Types: 5 Standard drinks or equivalent per week     Comment: Socially.  (Per [employer]:  5 days weekly.)    Drug use: No   Other  Topics Concern    Caffeine Concern Yes     Comment: tea     Social Drivers of Health     Food Insecurity: No Food Insecurity (2023)    Food Insecurity     Food Insecurity: Never true   Transportation Needs: No Transportation Needs (2023)    Transportation Needs     Lack of Transportation: No   Housing Stability: Low Risk  (2023)    Housing Stability     Housing Instability: No       Family History   Problem Relation Age of Onset    Breast Cancer Mother 49        bilateral mastectomy    Genetic Disease Mother         NF-1    Breast Cancer Maternal Aunt 75    Breast Cancer Paternal Grandmother     Breast Cancer Self 51    Genetic Disease Self         NF-1    Cancer Paternal Aunt         throat ca    Cancer Maternal Uncle         prostate ca    Genetic Disease Brother         NF-1    Genetic Disease Brother         NF-1    Genetic Disease Brother         NF-1    Cancer Maternal Uncle         bladder ca    Breast Cancer Maternal Cousin Female     Cancer Maternal Cousin Female         leukemia;  childhood    Cancer Paternal Cousin Male         throat ca         PHYSICAL EXAM:    /86 (BP Location: Left arm, Patient Position: Sitting, Cuff Size: adult)   Pulse 89   Temp 97.9 °F (36.6 °C) (Oral)   Resp 16   Ht 1.524 m (5')   Wt 50.3 kg (111 lb)   SpO2 95%   BMI 21.68 kg/m²   Wt Readings from Last 6 Encounters:   24 50.3 kg (111 lb)   10/09/24 53.1 kg (117 lb)   10/02/24 51.6 kg (113 lb 12.8 oz)   24 52.6 kg (116 lb)   24 50.8 kg (111 lb 14.4 oz)   07/10/24 50.1 kg (110 lb 6.4 oz)     Physical Exam  General: Patient is alert, not in acute distress.  HEENT: EOMs intact. Anicteric.  Nystagmus with gaze to the right.    Neck: No JVD. No palpable lymphadenopathy. Neck is supple.  Chest: Clear to auscultation.  Heart: Regular rate and rhythm.   Abdomen: Soft, non tender with good bowel sounds.    Extremities: +1 edema R>L chronic, attn shoulders - no edema, erythema or  ecchymosis.  Nontender.   Neurological: Grossly intact. Normal AROM shoulders.    Psych/Depression: nl  Skin:  NF lesions on the back       ASSESSMENT/PLAN:     1. Stage IV breast cancer in female (HCC)    2. Malignant neoplasm metastatic to liver (HCC)    3. Malignant neoplasm of upper-outer quadrant of right breast in female, estrogen receptor positive (HCC)    4. Chemotherapy management, encounter for    5. Chemotherapy-induced nausea      Breast cancer history of ER/NC positive metastatic breast cancer to pleura, liver, bones. Initially diagnosed 2007 with DCIS, treated with lumpectomy, however MRI showed progression in the breast and she had a complete right mastectomy with axillary dissection followed by chemo in 2008: .pT1,N1. ER 9%, PgR 8%, Her-2 0, Ki 39%. she then presented with recurrence in 2020 right neck/supraclav ( ER 98% NC 0  her2 1+)  Pleura (Er 20%, Her2 1+) and liver:  (ER 38%, her 0)     Treatment history  9/11/2007:  right breast lumpectomy: Extensive DCIS ER 90%. Mri with progression  11/16/2007:  right mastectomy with axillary node dissection: pT1 N1 M0  2/16/2008:  4 cycles of Taxotere and Cytoxan   9/18/2020: Right neck recurrence,  left pleural,  liver-   10/1/2020: My risk Myriad negative. Started Pablociclib 125mg d1-21 w/ faslodex   4/15/2021: Neutropenic fever. Pablociclib changed to q 21 d with 14 day off cycle   1/27/2022: Palbo at 100mg D1-21 q 28 day schedule stared per Dr Soriano  7/15/22: changed to abemaciclib. Progress in the pleura bx: TNBC, Her2 0 CPS 0  9/22/22-11/2022: Sacituzumab w/o response   11/17/22-1/2023: Abraxane with initial response, liver bx nondx (too small)  2/9/23: started gem/carbo due to SOB with response  4/26/23: imaging with continues response in pleura, progression in liver Er 65%, NC 0, Her2 0, bx: NGS below  07/6/23-1/10/24 on eribulin s/p 8 cycles prior to progression.  01/22/24- present Capecitabine     NGS:  9/2020: Foundation: NF1, [id number], FGFR1 and  MYC amplification  9/2022: G360: [id number] 0.5%. Tempus pleura: NF1, TP53, FGFR1 amp  2/2023: G360:  [id number] 0.9%, APC 0.2 with several new VUS  4/2023 G360: [id number] 0.4%. APC 0.3 with several new VUS. Tempus liver:           ESR 1 mutation is negative.    Patient will proceed with 7th line therapy with capecitabine week on/week off and after 3 months (before next appt) will have imaging.  If still has liver lesion, will consider Y-90 embolization, vs continue with treatment w/o Y-90.      S/p 9 cycles seventh line capecitabine, initiated on 1/22/24.  9/27/24 restaging CT shows progression in the lungs with possible lymphangitic spread and effusions.      Now on eighth line liposomal doxorubicin s/p cycle 1 on 10/9/24, dose reduced to 37.5 mg/m2    Proceed with cycle 2 with 50 mg/m2 given tolerability to C1    After progression with doxil will proceed with ixempra.     TMJ: in PT    H/o PPE- lotion to hands and feet.  No issues currenly.    MARCELLO:  Ondansetron prn.  Start evening of D4    Constipation, gr. 1:  Stool softener prn    Cancer-related pain- resolved.  No longer using Norco    Zometa - last dose March 2023. Completed 2 years, no further needed     Added folic acid 1mg daily and changed B12 SL 1000mcg daily.     Bilateral shoulder pain, likely DJD per C-spine MRI.  Not related to capecitabine.    Call prn.  Follow-up in 4 weeks      MDM high risk  Continuity of complex medical care.  No orders of the defined types were placed in this encounter.      Results From Past 48 Hours:      Recent Results (from the past 48 hours)   CBC W Differential W Platelet    Collection Time: 11/06/24 11:30 AM   Result Value Ref Range    WBC 3.6 (L) 4.0 - 11.0 x10(3) uL    RBC 4.13 3.80 - 5.30 x10(6)uL    HGB 13.9 12.0 - 16.0 g/dL    HCT 41.8 35.0 - 48.0 %    .2 (H) 80.0 - 100.0 fL    MCH 33.7 26.0 - 34.0 pg    MCHC 33.3 31.0 - 37.0 g/dL    RDW-SD 49.5 (H) 35.1 - 46.3 fL    RDW 13.2 11.0 - 15.0 %    .0 150.0 -  450.0 10(3)uL    Neutrophil Absolute Prelim 2.20 1.50 - 7.70 x10 (3) uL    Neutrophil Absolute 2.20 1.50 - 7.70 x10(3) uL    Lymphocyte Absolute 0.43 (L) 1.00 - 4.00 x10(3) uL    Monocyte Absolute 0.85 0.10 - 1.00 x10(3) uL    Eosinophil Absolute 0.02 0.00 - 0.70 x10(3) uL    Basophil Absolute 0.02 0.00 - 0.20 x10(3) uL    Immature Granulocyte Absolute 0.04 0.00 - 1.00 x10(3) uL    Neutrophil % 61.7 %    Lymphocyte % 12.1 %    Monocyte % 23.9 %    Eosinophil % 0.6 %    Basophil % 0.6 %    Immature Granulocyte % 1.1 %   Comp Metabolic Panel (14)    Collection Time: 11/06/24 11:30 AM   Result Value Ref Range    Glucose 83 70 - 99 mg/dL    Sodium 138 136 - 145 mmol/L    Potassium 4.0 3.5 - 5.1 mmol/L    Chloride 103 98 - 112 mmol/L    CO2 28.0 21.0 - 32.0 mmol/L    Anion Gap 7 0 - 18 mmol/L    BUN 27 (H) 9 - 23 mg/dL    Creatinine 1.12 (H) 0.55 - 1.02 mg/dL    BUN/CREA Ratio 24.1 (H) 10.0 - 20.0    Calcium, Total 10.2 8.7 - 10.4 mg/dL    Calculated Osmolality 290 275 - 295 mOsm/kg    eGFR-Cr 55 (L) >=60 mL/min/1.73m2    ALT 22 10 - 49 U/L    AST 27 <34 U/L    Alkaline Phosphatase 100 50 - 130 U/L    Bilirubin, Total 1.4 (H) 0.2 - 1.1 mg/dL    Total Protein 6.6 5.7 - 8.2 g/dL    Albumin 4.2 3.2 - 4.8 g/dL    Globulin  2.4 2.0 - 3.5 g/dL    A/G Ratio 1.8 1.0 - 2.0    Patient Fasting for CMP? Patient not present              Imaging & Referrals:  None   No orders of the defined types were placed in this encounter.      Narrative  PROCEDURE: CT CHEST ABDOMEN PELVIS (ALL CONTRAST ONLY) (CPT=71260/10324)     COMPARISON: [facility], CT CHEST+ABDOMEN+PELVIS(ALL CNTRST ONLY)(CPT=71260/27881), 7/16/2024, 11:21 AM.  [facility], CT CHEST+ABDOMEN+PELVIS(ALL CNTRST ONLY)(CPT=71260/70171), 4/16/2024, 1:08 PM.    [facility], CT CHEST+ABDOMEN+PELVIS(ALL CNTRST ONLY)(CPT=71260/87211), 1/05/2024, 8:43 AM.     INDICATIONS: Metastasis to bone. Malignant neoplasm of  upper-outer quadrant of right breast in female, estrogen receptor positive (HCC) C50.411 Malignant *     TECHNIQUE:   CT images of the chest, abdomen and pelvis were obtained with intravenous contrast material.  Automated exposure control for dose reduction was used. Adjustment of the mA and/or kV was done based on the patient's size. Use of iterative  reconstruction technique for dose reduction was used.  Dose information is transmitted to the ACR (American College of Radiology) NRDR (National Radiology Data Registry) which includes the Dose Index Registry.     FINDINGS:  AIRWAYS: Central airways are patent.  LUNGS/PLEURA:   Small left pleural effusion which has attenuation above water attenuation and probably reflects a complex effusion similar to the prior study.  There is a small right pleural effusion.  There are some areas of probable subpleural and  dependent atelectasis in both lungs.  Few punctate subpleural nodules in the right lung are not significantly changed.  There is bilateral interlobular septal thickening left greater than right most significant in the lung bases.  No pneumothorax.  CARDIAC:   No cardiomegaly.  Trace pericardial effusion with suggestion of anterior pericardial enhancement.  There is coronary artery calcification.  MEDIASTINUM/BRANDY:   No mass or enlarged adenopathy.    VASCULAR: Thoracic aorta is normal in caliber.  CHEST WALL: Status post bilateral mastectomy.  No axillary mass or enlarged adenopathy.  Bilateral breast implants noted.  Intracapsular rupture of the right implant again appears to be present.  Left implant is grossly intact.  Stable right chest wall  port.  Innumerable nodular cutaneous foci again seen.  Multinodular heterogeneous thyroid.     LIVER:   Previously described segment 8 hypodense lesion is less conspicuous currently but measures approximately 1.9 x 1.5 cm grossly similar to the previous examination.  SPLEEN:   No enlargement or focal lesion.    PANCREAS:    There is a small enhancing focus in the pancreatic head measuring 0.8 x 1.0 cm image 111 series 2. This does not appear to be significantly changed in size.    GALLBLADDER: Normal size and appearance.  ADRENALS:   Stable 0.9 cm right adrenal nodule.  Left adrenal gland is unremarkable.  KIDNEYS:   Enhance symmetrically without hydronephrosis.  AORTA/VASCULAR:   Abdominal aorta is normal in caliber with mild atherosclerosis.  ABDOMINAL NODES:   No mass or enlarged adenopathy.    BOWEL/MESENTERY:   Bowel is nondilated.  No bowel wall thickening.  No pneumatosis or pneumoperitoneum.  Appendix is unremarkable.  There is colonic diverticulosis.  ABDOMINAL WALL:   No mass or hernia.  URINARY BLADDER:   No visible focal wall thickening, lesion, or calculus.    PELVIC ORGANS:   Uterus is present.  PELVIC NODES:   0.8 cm short axis nodular focus in the right perirectal region image 165 series 2 is unchanged in size.  BONES: Spondylosis thoracolumbar spine.  Stable 0.9 cm sclerotic focus T11 vertebral body.  Tiny sclerotic foci in the L3, L4 and L4 vertebral bodies grossly unchanged.  OTHER:   Negative.              Impression  CONCLUSION:     Left greater than right bilateral interlobular septal thickening which overall has increased since the prior examination July 2024. Small left pleural effusion similar to the prior study.  New/increased right pleural effusion.  Trace pericardial effusion   with suggestion of anterior pericardial enhancement.  The findings are suspicious for lymphangitic spread of neoplasm.     Subtle hepatic lesion segment 8 grossly unchanged in size since the prior examination.     1 cm enhancing focus pancreatic head not significantly changed in size.     Grossly unchanged scattered sclerotic osseous metastatic disease.     Lesser incidental findings as above.                 Dictated by (CST): Odin Ivory MD on 9/27/2024 at 1:39 PM      Finalized by (CST): Odin Ivory MD on 9/27/2024 at  2:05 PM

## 2024-11-06 NOTE — PROGRESS NOTES
Aleisha to infusion today for C2D1 DOXIL.  Arrives Ambulating independently, accompanied by Self     Patient was evaluated today by DOMINICK.     Oral medications included in this regimen:  no    Patient confirms comprehension of cancer treatment schedule:  yes    Pregnancy screening:  Denies possibility of pregnancy    Modifications in dose or schedule:  No. 75% of dose d/t elevated bili.     Medications appearance and physical integrity checked by RN: yes.    Chemotherapy IV pump settings verified by 2 RNs:  Yes.  Frequency of blood return and site check throughout administration: Prior to administration, Prior to each drug, and At completion of therapy     Infusion/treatment outcome:  patient tolerated treatment without incident.     Education Record    Learner:  Patient  Barriers / Limitations:  None  Method:  Discussion, Printed material, and Reinforcement  Outcome:  Shows understanding    Discharged Home, Ambulating independently, accompanied by:Self    Patient/family verbalized understanding of future appointments: by printed AVS

## 2024-11-06 NOTE — PATIENT INSTRUCTIONS
Proceed with cycle 2  Start Ondansetron on day 4/Saturday night  Continue stool softener as needed  Call as needed  Follow-up prior to cycle 3

## 2024-11-19 ENCOUNTER — OFFICE VISIT (OUTPATIENT)
Dept: PHYSICAL THERAPY | Facility: HOSPITAL | Age: 64
End: 2024-11-19
Attending: INTERNAL MEDICINE
Payer: MEDICARE

## 2024-11-19 PROCEDURE — 97140 MANUAL THERAPY 1/> REGIONS: CPT

## 2024-11-19 PROCEDURE — 97110 THERAPEUTIC EXERCISES: CPT

## 2024-11-19 NOTE — PROGRESS NOTES
Diagnosis:   L side TMJ pain      Referring Provider: Ami Farias  Date of Evaluation:    8.30.24    Precautions:  Cancer Next MD visit:   none scheduled  Date of Surgery: n/a   Insurance Primary/Secondary: MEDICARE / COMMERCIAL     # Auth Visits: 10            Subjective: has made 100% progress    Pain: 0/10      Objective:   opening 25 mm without pain, 28 mm with pain // pre-treatment.  36 mm without pain after.      9/18/24: Opening of 33 mm at start, 36 mm after with minimal pain    10/8/24: 35 opening without pain, 38 maximal with 2/10 pain.  L deflection at end-range in supine assessment    10/28/24: 38 opening without pain, 40 maximal with 2/10 pain. Minimal deflection at end-range.      Assessment: Pain is abolished.      Goals:   Goals: (to be met in 10 visits)  Pt to have opening of 35 mm to chew food - In progress, 90% met  Pt to report <3/10 pain when eating - MET  Pt to report 0/10 pain at rest - MET  Pt to have <3/10 pain with R side cotton roll test - 80% improvement    Plan: Discharge PT   Date:                 TX#: 3/ Date:                 TX#: 4/10 Date:                 TX#: 5/10 Date:   Tx#: 6/10 Date: 10/8/24  Tx: 7/10 Date: 10/14/24  Tx: 8/10 10/21/24  9/10 Date: 10/28/24  10/10   MT:  STM to L temporalis  Grade IV anterior and inferior glide to L TMJ  Grade III medial glide, L TMJ MT: 25'  STM to L lateral/medial pterygoids, intraoral masseter massage.  Grade III anterior and inferior glides L TMJ MT: 25'  STM to R masseter, gentle with effleurage  Sustained pressure to R temporalis  Grade IV inferior glide L TMJ  Grade III anterior glide L TMJ MT: 25;  STM to L temporalis  Grade IV anterior, inferior glides to B TMJ MT: 15'  STM to L temporalis  Grade IV anterior, inferior glides to B TMJ MT: 15'  STM to L temporalis  Grade IV anterior, inferior glide to B TMJ MT: 25'  STM to L temporalis  Grade IV anterior and inferior glide to L TMJ  Grade III medial glide, L TMJ  DN to L TMJ MT:  STM to L  temporalis  Grade IV anterior and inferior glide to L TMJ  Grade III medial glide, L TMJ   MT:  STM to L temporalis  Grade IV anterior and inferior glide to L TMJ  Grade III medial glide, L TMJ   TE:  23 tongue depressor stretch, 4 x 90''  Jaw lateral deviation with sticks AROM TE: 15'  16 tongue depressor stretch, 3 x 90''  Controlled bite, x 10  Isometric lateral deviation TE: 15'  18 tongue depressor stretch, 3 x 90''  Isometric lateral deviation TE:  20 tongue depressor stretch, 3 x 2'  Isometric lateral deviation TE: 25'    22 tongue depressor stretch, 2 x 3'  Isometric lateral deviation  Jaw lateral devation with sticks  Jaw protrusion AROM with sticks TE: 25'    22 tongue depressor stretch, 2 x 3'  Isometric lateral deviation  Jaw lateral devation with sticks  Jaw protrusion AROM with sticks TE:  22 tongue depressor stretch, 2 x 3'  Jaw lateral deviation with sticks TE:  23 tongue depressor stretch, 4 x 90''  Jaw lateral deviation with sticks AROM TE:  23 tongue depressor stretch, 4 x 90''  Jaw lateral deviation with sticks AROM                             Charges: 2 MT, 1 TE       Total Timed Treatment: 40 min  Total Treatment Time: 45 min

## 2024-11-26 ENCOUNTER — APPOINTMENT (OUTPATIENT)
Dept: PHYSICAL THERAPY | Facility: HOSPITAL | Age: 64
End: 2024-11-26
Attending: INTERNAL MEDICINE
Payer: MEDICARE

## 2024-12-02 RX ORDER — MIRTAZAPINE 15 MG/1
15 TABLET, ORALLY DISINTEGRATING ORAL NIGHTLY
Qty: 90 TABLET | Refills: 3 | Status: SHIPPED | OUTPATIENT
Start: 2024-12-02

## 2024-12-04 ENCOUNTER — HOSPITAL ENCOUNTER (OUTPATIENT)
Dept: GENERAL RADIOLOGY | Facility: HOSPITAL | Age: 64
Discharge: HOME | End: 2024-12-04
Attending: NURSE PRACTITIONER
Payer: MEDICARE

## 2024-12-04 ENCOUNTER — OFFICE VISIT (OUTPATIENT)
Dept: HEMATOLOGY/ONCOLOGY | Facility: HOSPITAL | Age: 64
End: 2024-12-04
Attending: INTERNAL MEDICINE
Payer: MEDICARE

## 2024-12-04 VITALS
DIASTOLIC BLOOD PRESSURE: 74 MMHG | RESPIRATION RATE: 16 BRPM | SYSTOLIC BLOOD PRESSURE: 131 MMHG | TEMPERATURE: 98 F | WEIGHT: 110 LBS | HEIGHT: 60 IN | HEART RATE: 89 BPM | BODY MASS INDEX: 21.6 KG/M2 | OXYGEN SATURATION: 100 %

## 2024-12-04 DIAGNOSIS — Z79.899 ENCOUNTER FOR MONITORING CARDIOTOXIC DRUG THERAPY: ICD-10-CM

## 2024-12-04 DIAGNOSIS — Z17.0 MALIGNANT NEOPLASM OF UPPER-OUTER QUADRANT OF RIGHT BREAST IN FEMALE, ESTROGEN RECEPTOR POSITIVE (HCC): ICD-10-CM

## 2024-12-04 DIAGNOSIS — Z17.0 MALIGNANT NEOPLASM OF UPPER-OUTER QUADRANT OF RIGHT BREAST IN FEMALE, ESTROGEN RECEPTOR POSITIVE (HCC): Primary | ICD-10-CM

## 2024-12-04 DIAGNOSIS — C50.411 MALIGNANT NEOPLASM OF UPPER-OUTER QUADRANT OF RIGHT BREAST IN FEMALE, ESTROGEN RECEPTOR POSITIVE (HCC): ICD-10-CM

## 2024-12-04 DIAGNOSIS — Z51.81 ENCOUNTER FOR MONITORING CARDIOTOXIC DRUG THERAPY: ICD-10-CM

## 2024-12-04 DIAGNOSIS — R06.02 SHORTNESS OF BREATH: ICD-10-CM

## 2024-12-04 DIAGNOSIS — C79.51 METASTASIS TO BONE (HCC): ICD-10-CM

## 2024-12-04 DIAGNOSIS — C78.7 MALIGNANT NEOPLASM METASTATIC TO LIVER (HCC): Primary | ICD-10-CM

## 2024-12-04 DIAGNOSIS — C50.919 METASTASIS FROM BREAST CANCER (HCC): ICD-10-CM

## 2024-12-04 DIAGNOSIS — C50.411 MALIGNANT NEOPLASM OF UPPER-OUTER QUADRANT OF RIGHT BREAST IN FEMALE, ESTROGEN RECEPTOR POSITIVE (HCC): Primary | ICD-10-CM

## 2024-12-04 DIAGNOSIS — C79.9 METASTASIS FROM BREAST CANCER (HCC): ICD-10-CM

## 2024-12-04 DIAGNOSIS — T45.1X5A CHEMOTHERAPY INDUCED NEUTROPENIA (HCC): ICD-10-CM

## 2024-12-04 DIAGNOSIS — C50.919 STAGE IV BREAST CANCER IN FEMALE (HCC): ICD-10-CM

## 2024-12-04 DIAGNOSIS — D70.1 CHEMOTHERAPY INDUCED NEUTROPENIA (HCC): ICD-10-CM

## 2024-12-04 DIAGNOSIS — C78.7 MALIGNANT NEOPLASM METASTATIC TO LIVER (HCC): ICD-10-CM

## 2024-12-04 LAB
ALBUMIN SERPL-MCNC: 3.7 G/DL (ref 3.2–4.8)
ALBUMIN/GLOB SERPL: 1.9 {RATIO} (ref 1–2)
ALP LIVER SERPL-CCNC: 100 U/L
ALT SERPL-CCNC: 30 U/L
ANION GAP SERPL CALC-SCNC: 8 MMOL/L (ref 0–18)
AST SERPL-CCNC: 34 U/L (ref ?–34)
BASOPHILS # BLD AUTO: 0.04 X10(3) UL (ref 0–0.2)
BASOPHILS NFR BLD AUTO: 1.1 %
BILIRUB SERPL-MCNC: 0.7 MG/DL (ref 0.2–1.1)
BUN BLD-MCNC: 18 MG/DL (ref 9–23)
BUN/CREAT SERPL: 16.1 (ref 10–20)
CALCIUM BLD-MCNC: 9.7 MG/DL (ref 8.7–10.4)
CHLORIDE SERPL-SCNC: 106 MMOL/L (ref 98–112)
CO2 SERPL-SCNC: 28 MMOL/L (ref 21–32)
CREAT BLD-MCNC: 1.12 MG/DL
DEPRECATED RDW RBC AUTO: 51.1 FL (ref 35.1–46.3)
EGFRCR SERPLBLD CKD-EPI 2021: 55 ML/MIN/1.73M2 (ref 60–?)
EOSINOPHIL # BLD AUTO: 0.03 X10(3) UL (ref 0–0.7)
EOSINOPHIL NFR BLD AUTO: 0.8 %
ERYTHROCYTE [DISTWIDTH] IN BLOOD BY AUTOMATED COUNT: 14 % (ref 11–15)
GLOBULIN PLAS-MCNC: 2 G/DL (ref 2–3.5)
GLUCOSE BLD-MCNC: 99 MG/DL (ref 70–99)
HCT VFR BLD AUTO: 39.1 %
HGB BLD-MCNC: 12.9 G/DL
IMM GRANULOCYTES # BLD AUTO: 0.02 X10(3) UL (ref 0–1)
IMM GRANULOCYTES NFR BLD: 0.6 %
LYMPHOCYTES # BLD AUTO: 0.33 X10(3) UL (ref 1–4)
LYMPHOCYTES NFR BLD AUTO: 9.2 %
MCH RBC QN AUTO: 32.3 PG (ref 26–34)
MCHC RBC AUTO-ENTMCNC: 33 G/DL (ref 31–37)
MCV RBC AUTO: 98 FL
MONOCYTES # BLD AUTO: 0.77 X10(3) UL (ref 0.1–1)
MONOCYTES NFR BLD AUTO: 21.6 %
NEUTROPHILS # BLD AUTO: 2.38 X10 (3) UL (ref 1.5–7.7)
NEUTROPHILS # BLD AUTO: 2.38 X10(3) UL (ref 1.5–7.7)
NEUTROPHILS NFR BLD AUTO: 66.7 %
NT-PROBNP SERPL-MCNC: 301 PG/ML (ref ?–125)
OSMOLALITY SERPL CALC.SUM OF ELEC: 296 MOSM/KG (ref 275–295)
PLATELET # BLD AUTO: 215 10(3)UL (ref 150–450)
POTASSIUM SERPL-SCNC: 3.4 MMOL/L (ref 3.5–5.1)
PROT SERPL-MCNC: 5.7 G/DL (ref 5.7–8.2)
RBC # BLD AUTO: 3.99 X10(6)UL
SODIUM SERPL-SCNC: 142 MMOL/L (ref 136–145)
WBC # BLD AUTO: 3.6 X10(3) UL (ref 4–11)

## 2024-12-04 PROCEDURE — 85025 COMPLETE CBC W/AUTO DIFF WBC: CPT

## 2024-12-04 PROCEDURE — 99215 OFFICE O/P EST HI 40 MIN: CPT | Performed by: NURSE PRACTITIONER

## 2024-12-04 PROCEDURE — 36591 DRAW BLOOD OFF VENOUS DEVICE: CPT

## 2024-12-04 PROCEDURE — 83880 ASSAY OF NATRIURETIC PEPTIDE: CPT

## 2024-12-04 PROCEDURE — 71046 X-RAY EXAM CHEST 2 VIEWS: CPT | Performed by: NURSE PRACTITIONER

## 2024-12-04 PROCEDURE — 80053 COMPREHEN METABOLIC PANEL: CPT

## 2024-12-04 RX ORDER — POTASSIUM CHLORIDE 750 MG/1
20 TABLET, EXTENDED RELEASE ORAL 2 TIMES DAILY
Qty: 60 TABLET | Refills: 0 | Status: SHIPPED | OUTPATIENT
Start: 2024-12-04

## 2024-12-04 NOTE — ADDENDUM NOTE
Addended by: PASTOR TORRES on: 12/4/2024 05:20 PM     Modules accepted: Orders, Level of Service

## 2024-12-04 NOTE — PROGRESS NOTES
Patient planned for C3 D1 Doxil, seen by YASIR LAGUNA today. Sent for chest xray due to increased shortness of breath and persistent cough.Treatment deferred x1wk, infusion scheduling notified. Appts adjusted accordingly.  PAC deaccesed, gauze and paper tape applied to site. Patient discharged stable from clinic by RN via wheelchair, to [facility] xray.

## 2024-12-04 NOTE — PROGRESS NOTES
HPI   Aleisha Carlin is a 64 year old female here for follow up of   Encounter Diagnoses   Name Primary?    Malignant neoplasm of upper-outer quadrant of right breast in female, estrogen receptor positive (HCC) Yes    Malignant neoplasm metastatic to liver (HCC)     Chemotherapy induced neutropenia (HCC)        S/p 9 cycles seventh line capecitabine initiated on 1/22/24.  9/27/24 restaging CT shows progression in the lungs with possible lymphangitic spread and effusions.      Now on eighth line liposomal doxorubicin s/p cycle 2     Increased cough and sob with activity x 1 week. Dry cough nonproductive, tickle cough. Needs a lozenge or tessalon. Using albuterol inhaler average 1 per day.   Has to sit and catch her breath after going upstairs before she can lay down. No fevers or chills. No sinus congestion.     Ankle edema sl increase to R ankle.       Fatigue:  Yes, states about the same.   States when getting groceries states uses the cart and gets tired and fatigued.  Some HERRERA.      Fevers:  No     Appetite/taste changes:  No.  However, hard to eat with TMJ flare up on the Left.  She is doing PT for TMJ.  States improving, but still on PT.  Eating soft foods.    Mucositis:  No    Weight changes:  stable.    Nausea/vomiting:  Yes, D4-5, controlled with ondansetron       Diarrhea:  No,     Constipation:  Yes, controlled with stool softener    Peripheral neuropathy:  Yes, Per history, occasionally R last two fingers and does not last long. No complaint today .      PPE:  States using lotion in the hands all the time, no PPE.  Some dry skin on the soles.        ECOG PS 1    Review of Systems:     Review of Systems   Constitutional:  Positive for fatigue. Negative for appetite change, fever and unexpected weight change.   HENT:   Negative for mouth sores.         Runny nose   Eyes:         R watery eyes   Respiratory:  Positive for cough (dry nonproductive) and shortness of breath (HERRERA worse over last week).     Cardiovascular:  Positive for leg swelling (BLE edema R>L, from surgeries on the R foot and leg.  Taking lasix prn.). Negative for chest pain and palpitations.   Gastrointestinal:  Positive for constipation (after chemo) and nausea. Negative for abdominal pain, diarrhea and vomiting.   Genitourinary:  Negative for dysuria and frequency.    Musculoskeletal:  Positive for arthralgias (mostly due to TMJ.). Negative for back pain and neck pain.   Skin:         NF, hyperpigmented hands and feet    Neurological:  Positive for dizziness (at times) and numbness (to L hand- sl betterd). Negative for extremity weakness and headaches.   Hematological:  Negative for adenopathy. Bruises/bleeds easily.   Psychiatric/Behavioral:  Positive for sleep disturbance (improved with sl mirtazapine.  Still takes zolpidem and gummies).          Current Outpatient Medications   Medication Sig Dispense Refill    mirtazapine 15 MG Oral Tablet Dispersible Take 1 tablet (15 mg total) by mouth nightly. 90 tablet 3    LORazepam 0.5 MG Oral Tab Take 1 tablet (0.5 mg total) by mouth every 6 (six) hours as needed for Anxiety. 30 tablet 0    zolpidem 10 MG Oral Tab Take 1 tablet (10 mg total) by mouth nightly as needed. 60 tablet 0    ondansetron (ZOFRAN) 8 MG tablet Take 1 tablet (8 mg total) by mouth every 8 (eight) hours as needed for Nausea. 30 tablet 3    HYDROcodone-acetaminophen  MG Oral Tab Take 1 tablet by mouth every 6 (six) hours as needed for Pain. 90 tablet 0    minocycline 50 MG Oral Cap Take 1 capsule (50 mg total) by mouth daily. 90 capsule 3    furosemide 20 MG Oral Tab Take 1 tablet (20 mg total) by mouth daily.      prochlorperazine (COMPAZINE) 10 mg tablet Take 1 tablet (10 mg total) by mouth every 6 (six) hours as needed for Nausea. 90 tablet 2    folic acid 1 MG Oral Tab Take 1 tablet (1 mg total) by mouth daily. 90 tablet 3    guaiFENesin-codeine 100-10 MG/5ML Oral Solution Take 5 mL by mouth every 6 (six) hours as  needed for cough. 473 mL 0    albuterol 108 (90 Base) MCG/ACT Inhalation Aero Soln Inhale 1 puff into the lungs every 6 (six) hours as needed for Wheezing. 8 g 3    Zoledronic Acid (ZOMETA IV) Inject 4 mg into the vein every 6 (six) months. (Patient not taking: Reported on 11/6/2024)      Cholecalciferol (VITAMIN D3) 250 MCG (70525 UT) Oral Cap Take 1 capsule by mouth daily.       Allergies:   No Known Allergies    Past Medical History:    Breast cancer (HCC)    MASTECTOMY / RECONSTRUCTION    Chemotherapy-induced neutropenia (HCC)    Encounter for insertion of venous access port    portacath insertion / venous access    Glaucoma    History of breast cancer in female    Hyperlipidemia    Malignant neoplasm of overlapping sites of breast in female, estrogen receptor positive (HCC)    Malignant neoplasm of upper-outer quadrant of right breast in female, estrogen receptor positive (HCC)    Malignant pleural effusion (HCC)    Menorrhagia    HYSTEROSCOPY / D&C / endomentrial biopsy (08/28/2007)    Metastatic breast cancer    Neurofibromatosis (HCC)    Osteopenia of multiple sites    Osteopenia of multiple sites    Osteoporosis screening    Osteoporosis screening    Per [employer]    Other osteoporosis without current pathological fracture    Postmenopausal bleeding    Psychophysiological insomnia    Tumor, foot    tumor right foot / excision/excision of bone spur/arthrodesis w/screw fixation     Past Surgical History:   Procedure Laterality Date    Breast reconstruction Right 2008    right breast reconstructed - ductal, BRCA neg    Breast surgery      Chemotherapy  2007    Foot surgery Right     tumor rt foot / excision/excision of bone spur/arthrodesis w/screw fixation    Ir port a cath procedure  2007    Right subclavian port of cath.    Mastectomy right      2012    Mastectomy,simple  2007     Social History     Socioeconomic History    Marital status: Single   Tobacco Use    Smoking status: Never    Smokeless tobacco:  Never   Substance and Sexual Activity    Alcohol use: Yes     Alcohol/week: 5.0 standard drinks of alcohol     Types: 5 Standard drinks or equivalent per week     Comment: Socially.  (Per [employer]:  5 days weekly.)    Drug use: No   Other Topics Concern    Caffeine Concern Yes     Comment: tea     Social Drivers of Health     Food Insecurity: No Food Insecurity (2023)    Food Insecurity     Food Insecurity: Never true   Transportation Needs: No Transportation Needs (2023)    Transportation Needs     Lack of Transportation: No   Housing Stability: Low Risk  (2023)    Housing Stability     Housing Instability: No       Family History   Problem Relation Age of Onset    Breast Cancer Mother 49        bilateral mastectomy    Genetic Disease Mother         NF-1    Breast Cancer Maternal Aunt 75    Breast Cancer Paternal Grandmother     Breast Cancer Self 51    Genetic Disease Self         NF-1    Cancer Paternal Aunt         throat ca    Cancer Maternal Uncle         prostate ca    Genetic Disease Brother         NF-1    Genetic Disease Brother         NF-1    Genetic Disease Brother         NF-1    Cancer Maternal Uncle         bladder ca    Breast Cancer Maternal Cousin Female     Cancer Maternal Cousin Female         leukemia;  childhood    Cancer Paternal Cousin Male         throat ca         PHYSICAL EXAM:    /74 (BP Location: Left arm, Patient Position: Sitting, Cuff Size: adult)   Pulse 89   Temp 98.2 °F (36.8 °C) (Oral)   Resp 16   Ht 1.524 m (5')   Wt 49.9 kg (110 lb)   SpO2 100%   BMI 21.48 kg/m²   Wt Readings from Last 6 Encounters:   24 50.3 kg (111 lb)   10/09/24 53.1 kg (117 lb)   10/02/24 51.6 kg (113 lb 12.8 oz)   24 52.6 kg (116 lb)   24 50.8 kg (111 lb 14.4 oz)   07/10/24 50.1 kg (110 lb 6.4 oz)     Physical Exam  General: Patient is alert, not in acute distress.  HEENT: EOMs intact. Anicteric.  Nystagmus with gaze to the right.    Neck: No JVD. No  palpable lymphadenopathy. Neck is supple.  Chest: Clear to auscultation. Decreased at bases  Heart: Regular rate and rhythm.   Abdomen: Soft, non tender with good bowel sounds.    Extremities: +1 edema RLE>L chronic, R ankle increased edema    Neurological: Grossly intact. Normal AROM shoulders.    Psych/Depression: nl  Skin:  NF lesions on the back       ASSESSMENT/PLAN:     1. Malignant neoplasm of upper-outer quadrant of right breast in female, estrogen receptor positive (HCC)    2. Malignant neoplasm metastatic to liver (HCC)    3. Chemotherapy induced neutropenia (HCC)      Breast cancer history of ER/HI positive metastatic breast cancer to pleura, liver, bones. Initially diagnosed 2007 with DCIS, treated with lumpectomy, however MRI showed progression in the breast and she had a complete right mastectomy with axillary dissection followed by chemo in 2008: .pT1,N1. ER 9%, PgR 8%, Her-2 0, Ki 39%. she then presented with recurrence in 2020 right neck/supraclav ( ER 98% HI 0  her2 1+)  Pleura (Er 20%, Her2 1+) and liver:  (ER 38%, her 0)     Treatment history  9/11/2007:  right breast lumpectomy: Extensive DCIS ER 90%. Mri with progression  11/16/2007:  right mastectomy with axillary node dissection: pT1 N1 M0  2/16/2008:  4 cycles of Taxotere and Cytoxan   9/18/2020: Right neck recurrence,  left pleural,  liver-   10/1/2020: My risk Myriad negative. Started Pablociclib 125mg d1-21 w/ faslodex   4/15/2021: Neutropenic fever. Pablociclib changed to q 21 d with 14 day off cycle   1/27/2022: Palbo at 100mg D1-21 q 28 day schedule stared per Dr Soriano  7/15/22: changed to abemaciclib. Progress in the pleura bx: TNBC, Her2 0 CPS 0  9/22/22-11/2022: Sacituzumab w/o response   11/17/22-1/2023: Abraxane with initial response, liver bx nondx (too small)  2/9/23: started gem/carbo due to SOB with response  4/26/23: imaging with continues response in pleura, progression in liver Er 65%, HI 0, Her2 0, bx: NGS  below  07/6/23-1/10/24 on eribulin s/p 8 cycles prior to progression.  01/22/24- present Capecitabine     NGS:  9/2020: Foundation: NF1, [id number], FGFR1 and MYC amplification  9/2022: G360: [id number] 0.5%. Tempus pleura: NF1, TP53, FGFR1 amp  2/2023: G360:  [id number] 0.9%, APC 0.2 with several new VUS  4/2023 G360: [id number] 0.4%. APC 0.3 with several new VUS. Tempus liver:           ESR 1 mutation is negative.    Patient will proceed with 7th line therapy with capecitabine week on/week off and after 3 months (before next appt) will have imaging.  If still has liver lesion, will consider Y-90 embolization, vs continue with treatment w/o Y-90.      S/p 9 cycles seventh line capecitabine, initiated on 1/22/24.  9/27/24 restaging CT shows progression in the lungs with possible lymphangitic spread and effusions.      Now on eighth line liposomal doxorubicin s/p cycle 1 on 10/9/24, dose reduced to 37.5 mg/m2  S/p cycle 2 with 50 mg/m2 given tolerability to C1  HOLD cycle 3     Chest Xray for increased SOB and persistent cough. If inconclusive may need CT and /or repeat echo   Pulse ox 100% after sitting and taking a few deep breaths   Discussed with Dr Farias - add lasix 20 mg x 2 for 3 days. Add potassium supplement Rx sent         After progression with doxil will proceed with ixempra.     TMJ: in PT    H/o PPE- lotion to hands and feet.  No issues currenly.    MARCELLO:  Ondansetron prn.  Start evening of D4    Constipation, gr. 1:  Stool softener prn    Cancer-related pain- resolved.  No longer using Norco    Zometa - last dose March 2023. Completed 2 years, no further needed     Added folic acid 1mg daily and changed B12 SL 1000mcg daily.     Bilateral shoulder pain, likely DJD per C-spine MRI.  Not related to capecitabine.    Call prn.  Follow-up in 4 weeks      MDM high risk  Continuity of complex medical care.  No orders of the defined types were placed in this encounter.      Results From Past 48 Hours:      Recent Results  (from the past 48 hours)   CBC W Differential W Platelet    Collection Time: 12/04/24 10:12 AM   Result Value Ref Range    WBC 3.6 (L) 4.0 - 11.0 x10(3) uL    RBC 3.99 3.80 - 5.30 x10(6)uL    HGB 12.9 12.0 - 16.0 g/dL    HCT 39.1 35.0 - 48.0 %    MCV 98.0 80.0 - 100.0 fL    MCH 32.3 26.0 - 34.0 pg    MCHC 33.0 31.0 - 37.0 g/dL    RDW-SD 51.1 (H) 35.1 - 46.3 fL    RDW 14.0 11.0 - 15.0 %    .0 150.0 - 450.0 10(3)uL    Neutrophil Absolute Prelim 2.38 1.50 - 7.70 x10 (3) uL    Neutrophil Absolute 2.38 1.50 - 7.70 x10(3) uL    Lymphocyte Absolute 0.33 (L) 1.00 - 4.00 x10(3) uL    Monocyte Absolute 0.77 0.10 - 1.00 x10(3) uL    Eosinophil Absolute 0.03 0.00 - 0.70 x10(3) uL    Basophil Absolute 0.04 0.00 - 0.20 x10(3) uL    Immature Granulocyte Absolute 0.02 0.00 - 1.00 x10(3) uL    Neutrophil % 66.7 %    Lymphocyte % 9.2 %    Monocyte % 21.6 %    Eosinophil % 0.8 %    Basophil % 1.1 %    Immature Granulocyte % 0.6 %   Comp Metabolic Panel (14)    Collection Time: 12/04/24 10:12 AM   Result Value Ref Range    Glucose 99 70 - 99 mg/dL    Sodium 142 136 - 145 mmol/L    Potassium 3.4 (L) 3.5 - 5.1 mmol/L    Chloride 106 98 - 112 mmol/L    CO2 28.0 21.0 - 32.0 mmol/L    Anion Gap 8 0 - 18 mmol/L    BUN 18 9 - 23 mg/dL    Creatinine 1.12 (H) 0.55 - 1.02 mg/dL    BUN/CREA Ratio 16.1 10.0 - 20.0    Calcium, Total 9.7 8.7 - 10.4 mg/dL    Calculated Osmolality 296 (H) 275 - 295 mOsm/kg    eGFR-Cr 55 (L) >=60 mL/min/1.73m2    ALT 30 10 - 49 U/L    AST 34 (H) <34 U/L    Alkaline Phosphatase 100 50 - 130 U/L    Bilirubin, Total 0.7 0.2 - 1.1 mg/dL    Total Protein 5.7 5.7 - 8.2 g/dL    Albumin 3.7 3.2 - 4.8 g/dL    Globulin  2.0 2.0 - 3.5 g/dL    A/G Ratio 1.9 1.0 - 2.0    Patient Fasting for CMP? Patient not present    Pro Beta Natriuretic Peptide    Collection Time: 12/04/24 10:12 AM   Result Value Ref Range    Pro-Beta Natriuretic Peptide 301 (H) <125 pg/mL               Imaging & Referrals:  None   No orders of the defined  types were placed in this encounter.    Narrative   PROCEDURE: XR CHEST PA + LAT CHEST (CPT=71046)     COMPARISON: [facility], XR CHEST AP PORTABLE (CPT=71045), 8/12/2023, 8:56 PM.     INDICATIONS: Malignant neoplasm of upper-outer quadrant of right breast in female, estrogen receptor positive (HCC) C50.411 Malignant neoplasm of upper-outer quadrant*     TECHNIQUE:   Two views.       FINDINGS:     The heart mediastinal structures are minimally enlarged.  Pulmonary vascularity is slightly increased.  The findings are suggestive of very slight fluid overload.     Lung volumes are normal.  There are small streaky opacities at the lung bases likely representing atelectasis in addition to small bilateral pleural effusions.     There is a right subclavian Port-A-Cath               Impression   CONCLUSION:     1. Slight cardiac enlargement with secondary signs of minimal fluid overload.     2. Streaky opacities at the lung bases likely representing atelectasis in addition to very small bilateral pleural effusions           Dictated by (CST): Fer Lentz MD on 12/04/2024 at 2:03 PM      Finalized by (CST): Fer Lentz MD on 12/04/2024 at 2:04 PM           Narrative  PROCEDURE: CT CHEST ABDOMEN PELVIS (ALL CONTRAST ONLY) (CPT=71260/46209)     COMPARISON: [facility], CT CHEST+ABDOMEN+PELVIS(ALL CNTRST ONLY)(CPT=71260/11666), 7/16/2024, 11:21 AM.  [facility], CT CHEST+ABDOMEN+PELVIS(ALL CNTRST ONLY)(CPT=71260/24643), 4/16/2024, 1:08 PM.    [facility], CT CHEST+ABDOMEN+PELVIS(ALL CNTRST ONLY)(CPT=71260/30185), 1/05/2024, 8:43 AM.     INDICATIONS: Metastasis to bone. Malignant neoplasm of upper-outer quadrant of right breast in female, estrogen receptor positive (HCC) C50.411 Malignant *     TECHNIQUE:   CT images of the chest, abdomen and pelvis were obtained with intravenous contrast material.  Automated exposure control for dose  reduction was used. Adjustment of the mA and/or kV was done based on the patient's size. Use of iterative  reconstruction technique for dose reduction was used.  Dose information is transmitted to the ACR (American College of Radiology) NRDR (National Radiology Data Registry) which includes the Dose Index Registry.     FINDINGS:  AIRWAYS: Central airways are patent.  LUNGS/PLEURA:   Small left pleural effusion which has attenuation above water attenuation and probably reflects a complex effusion similar to the prior study.  There is a small right pleural effusion.  There are some areas of probable subpleural and  dependent atelectasis in both lungs.  Few punctate subpleural nodules in the right lung are not significantly changed.  There is bilateral interlobular septal thickening left greater than right most significant in the lung bases.  No pneumothorax.  CARDIAC:   No cardiomegaly.  Trace pericardial effusion with suggestion of anterior pericardial enhancement.  There is coronary artery calcification.  MEDIASTINUM/BRANDY:   No mass or enlarged adenopathy.    VASCULAR: Thoracic aorta is normal in caliber.  CHEST WALL: Status post bilateral mastectomy.  No axillary mass or enlarged adenopathy.  Bilateral breast implants noted.  Intracapsular rupture of the right implant again appears to be present.  Left implant is grossly intact.  Stable right chest wall  port.  Innumerable nodular cutaneous foci again seen.  Multinodular heterogeneous thyroid.     LIVER:   Previously described segment 8 hypodense lesion is less conspicuous currently but measures approximately 1.9 x 1.5 cm grossly similar to the previous examination.  SPLEEN:   No enlargement or focal lesion.    PANCREAS:   There is a small enhancing focus in the pancreatic head measuring 0.8 x 1.0 cm image 111 series 2. This does not appear to be significantly changed in size.    GALLBLADDER: Normal size and appearance.  ADRENALS:   Stable 0.9 cm right adrenal  nodule.  Left adrenal gland is unremarkable.  KIDNEYS:   Enhance symmetrically without hydronephrosis.  AORTA/VASCULAR:   Abdominal aorta is normal in caliber with mild atherosclerosis.  ABDOMINAL NODES:   No mass or enlarged adenopathy.    BOWEL/MESENTERY:   Bowel is nondilated.  No bowel wall thickening.  No pneumatosis or pneumoperitoneum.  Appendix is unremarkable.  There is colonic diverticulosis.  ABDOMINAL WALL:   No mass or hernia.  URINARY BLADDER:   No visible focal wall thickening, lesion, or calculus.    PELVIC ORGANS:   Uterus is present.  PELVIC NODES:   0.8 cm short axis nodular focus in the right perirectal region image 165 series 2 is unchanged in size.  BONES: Spondylosis thoracolumbar spine.  Stable 0.9 cm sclerotic focus T11 vertebral body.  Tiny sclerotic foci in the L3, L4 and L4 vertebral bodies grossly unchanged.  OTHER:   Negative.              Impression  CONCLUSION:     Left greater than right bilateral interlobular septal thickening which overall has increased since the prior examination July 2024. Small left pleural effusion similar to the prior study.  New/increased right pleural effusion.  Trace pericardial effusion   with suggestion of anterior pericardial enhancement.  The findings are suspicious for lymphangitic spread of neoplasm.     Subtle hepatic lesion segment 8 grossly unchanged in size since the prior examination.     1 cm enhancing focus pancreatic head not significantly changed in size.     Grossly unchanged scattered sclerotic osseous metastatic disease.     Lesser incidental findings as above.                 Dictated by (CST): Odin Ivory MD on 9/27/2024 at 1:39 PM      Finalized by (CST): Odin Ivory MD on 9/27/2024 at 2:05 PM

## 2024-12-06 ENCOUNTER — HOSPITAL ENCOUNTER (OUTPATIENT)
Dept: CV DIAGNOSTICS | Facility: HOSPITAL | Age: 64
Discharge: HOME | End: 2024-12-06
Attending: NURSE PRACTITIONER
Payer: MEDICARE

## 2024-12-06 DIAGNOSIS — Z17.0 MALIGNANT NEOPLASM OF UPPER-OUTER QUADRANT OF RIGHT BREAST IN FEMALE, ESTROGEN RECEPTOR POSITIVE (HCC): ICD-10-CM

## 2024-12-06 DIAGNOSIS — Z79.899 ENCOUNTER FOR MONITORING CARDIOTOXIC DRUG THERAPY: ICD-10-CM

## 2024-12-06 DIAGNOSIS — Z51.81 ENCOUNTER FOR MONITORING CARDIOTOXIC DRUG THERAPY: ICD-10-CM

## 2024-12-06 DIAGNOSIS — C50.411 MALIGNANT NEOPLASM OF UPPER-OUTER QUADRANT OF RIGHT BREAST IN FEMALE, ESTROGEN RECEPTOR POSITIVE (HCC): ICD-10-CM

## 2024-12-06 PROCEDURE — 93306 TTE W/DOPPLER COMPLETE: CPT | Performed by: NURSE PRACTITIONER

## 2024-12-10 ENCOUNTER — HOSPITAL ENCOUNTER (OUTPATIENT)
Dept: CT IMAGING | Facility: HOSPITAL | Age: 64
Discharge: HOME | End: 2024-12-10
Attending: NURSE PRACTITIONER
Payer: MEDICARE

## 2024-12-10 DIAGNOSIS — C50.411 MALIGNANT NEOPLASM OF UPPER-OUTER QUADRANT OF RIGHT BREAST IN FEMALE, ESTROGEN RECEPTOR POSITIVE (HCC): ICD-10-CM

## 2024-12-10 DIAGNOSIS — Z17.0 MALIGNANT NEOPLASM OF UPPER-OUTER QUADRANT OF RIGHT BREAST IN FEMALE, ESTROGEN RECEPTOR POSITIVE (HCC): ICD-10-CM

## 2024-12-10 DIAGNOSIS — C78.7 MALIGNANT NEOPLASM METASTATIC TO LIVER (HCC): ICD-10-CM

## 2024-12-10 DIAGNOSIS — C79.51 METASTASIS TO BONE (HCC): ICD-10-CM

## 2024-12-10 PROCEDURE — 74177 CT ABD & PELVIS W/CONTRAST: CPT | Performed by: NURSE PRACTITIONER

## 2024-12-10 PROCEDURE — 71260 CT THORAX DX C+: CPT | Performed by: NURSE PRACTITIONER

## 2024-12-11 ENCOUNTER — NURSE ONLY (OUTPATIENT)
Dept: HEMATOLOGY/ONCOLOGY | Facility: HOSPITAL | Age: 64
End: 2024-12-11
Attending: INTERNAL MEDICINE
Payer: MEDICARE

## 2024-12-11 ENCOUNTER — TELEPHONE (OUTPATIENT)
Dept: HEMATOLOGY/ONCOLOGY | Facility: HOSPITAL | Age: 64
End: 2024-12-11

## 2024-12-11 VITALS
TEMPERATURE: 99 F | RESPIRATION RATE: 18 BRPM | HEART RATE: 83 BPM | OXYGEN SATURATION: 94 % | DIASTOLIC BLOOD PRESSURE: 79 MMHG | BODY MASS INDEX: 21.4 KG/M2 | HEIGHT: 60 IN | WEIGHT: 109 LBS | SYSTOLIC BLOOD PRESSURE: 114 MMHG

## 2024-12-11 DIAGNOSIS — Z17.0 MALIGNANT NEOPLASM OF UPPER-OUTER QUADRANT OF RIGHT BREAST IN FEMALE, ESTROGEN RECEPTOR POSITIVE (HCC): ICD-10-CM

## 2024-12-11 DIAGNOSIS — C50.411 MALIGNANT NEOPLASM OF UPPER-OUTER QUADRANT OF RIGHT BREAST IN FEMALE, ESTROGEN RECEPTOR POSITIVE (HCC): Primary | ICD-10-CM

## 2024-12-11 DIAGNOSIS — C78.7 MALIGNANT NEOPLASM METASTATIC TO LIVER (HCC): ICD-10-CM

## 2024-12-11 DIAGNOSIS — R05.9 COUGH, UNSPECIFIED TYPE: ICD-10-CM

## 2024-12-11 DIAGNOSIS — Z17.0 MALIGNANT NEOPLASM OF UPPER-OUTER QUADRANT OF RIGHT BREAST IN FEMALE, ESTROGEN RECEPTOR POSITIVE (HCC): Primary | ICD-10-CM

## 2024-12-11 DIAGNOSIS — C50.411 MALIGNANT NEOPLASM OF UPPER-OUTER QUADRANT OF RIGHT BREAST IN FEMALE, ESTROGEN RECEPTOR POSITIVE (HCC): ICD-10-CM

## 2024-12-11 DIAGNOSIS — C78.7 MALIGNANT NEOPLASM METASTATIC TO LIVER (HCC): Primary | ICD-10-CM

## 2024-12-11 DIAGNOSIS — C50.919 STAGE IV BREAST CANCER IN FEMALE (HCC): ICD-10-CM

## 2024-12-11 LAB
ALBUMIN SERPL-MCNC: 4 G/DL (ref 3.2–4.8)
ALBUMIN/GLOB SERPL: 1.8 {RATIO} (ref 1–2)
ALP LIVER SERPL-CCNC: 107 U/L
ALT SERPL-CCNC: 39 U/L
ANION GAP SERPL CALC-SCNC: 7 MMOL/L (ref 0–18)
AST SERPL-CCNC: 42 U/L (ref ?–34)
BASOPHILS # BLD AUTO: 0.04 X10(3) UL (ref 0–0.2)
BASOPHILS NFR BLD AUTO: 0.8 %
BILIRUB SERPL-MCNC: 0.8 MG/DL (ref 0.2–1.1)
BUN BLD-MCNC: 22 MG/DL (ref 9–23)
BUN/CREAT SERPL: 20 (ref 10–20)
CALCIUM BLD-MCNC: 9.8 MG/DL (ref 8.7–10.4)
CHLORIDE SERPL-SCNC: 102 MMOL/L (ref 98–112)
CO2 SERPL-SCNC: 31 MMOL/L (ref 21–32)
CREAT BLD-MCNC: 1.1 MG/DL
DEPRECATED RDW RBC AUTO: 47.2 FL (ref 35.1–46.3)
EGFRCR SERPLBLD CKD-EPI 2021: 56 ML/MIN/1.73M2 (ref 60–?)
EOSINOPHIL # BLD AUTO: 0.04 X10(3) UL (ref 0–0.7)
EOSINOPHIL NFR BLD AUTO: 0.8 %
ERYTHROCYTE [DISTWIDTH] IN BLOOD BY AUTOMATED COUNT: 13.2 % (ref 11–15)
GLOBULIN PLAS-MCNC: 2.2 G/DL (ref 2–3.5)
GLUCOSE BLD-MCNC: 86 MG/DL (ref 70–99)
HCT VFR BLD AUTO: 41.6 %
HGB BLD-MCNC: 13.9 G/DL
IMM GRANULOCYTES # BLD AUTO: 0.02 X10(3) UL (ref 0–1)
IMM GRANULOCYTES NFR BLD: 0.4 %
LYMPHOCYTES # BLD AUTO: 0.51 X10(3) UL (ref 1–4)
LYMPHOCYTES NFR BLD AUTO: 9.9 %
MCH RBC QN AUTO: 32.6 PG (ref 26–34)
MCHC RBC AUTO-ENTMCNC: 33.4 G/DL (ref 31–37)
MCV RBC AUTO: 97.7 FL
MONOCYTES # BLD AUTO: 0.87 X10(3) UL (ref 0.1–1)
MONOCYTES NFR BLD AUTO: 16.9 %
NEUTROPHILS # BLD AUTO: 3.68 X10 (3) UL (ref 1.5–7.7)
NEUTROPHILS # BLD AUTO: 3.68 X10(3) UL (ref 1.5–7.7)
NEUTROPHILS NFR BLD AUTO: 71.2 %
OSMOLALITY SERPL CALC.SUM OF ELEC: 293 MOSM/KG (ref 275–295)
PLATELET # BLD AUTO: 198 10(3)UL (ref 150–450)
POTASSIUM SERPL-SCNC: 3.8 MMOL/L (ref 3.5–5.1)
PROT SERPL-MCNC: 6.2 G/DL (ref 5.7–8.2)
RBC # BLD AUTO: 4.26 X10(6)UL
SODIUM SERPL-SCNC: 140 MMOL/L (ref 136–145)
WBC # BLD AUTO: 5.2 X10(3) UL (ref 4–11)

## 2024-12-11 PROCEDURE — 36591 DRAW BLOOD OFF VENOUS DEVICE: CPT

## 2024-12-11 PROCEDURE — G2211 COMPLEX E/M VISIT ADD ON: HCPCS | Performed by: NURSE PRACTITIONER

## 2024-12-11 PROCEDURE — 80053 COMPREHEN METABOLIC PANEL: CPT

## 2024-12-11 PROCEDURE — 99215 OFFICE O/P EST HI 40 MIN: CPT | Performed by: NURSE PRACTITIONER

## 2024-12-11 PROCEDURE — 85025 COMPLETE CBC W/AUTO DIFF WBC: CPT

## 2024-12-11 RX ORDER — CODEINE PHOSPHATE AND GUAIFENESIN 10; 100 MG/5ML; MG/5ML
5 SOLUTION ORAL EVERY 6 HOURS PRN
Qty: 473 ML | Refills: 1 | Status: SHIPPED | OUTPATIENT
Start: 2024-12-11

## 2024-12-11 RX ORDER — CODEINE PHOSPHATE AND GUAIFENESIN 10; 100 MG/5ML; MG/5ML
5 SOLUTION ORAL EVERY 6 HOURS PRN
Qty: 473 ML | Refills: 1 | Status: SHIPPED | OUTPATIENT
Start: 2024-12-11 | End: 2024-12-11

## 2024-12-11 NOTE — PROGRESS NOTES
HPI   Aleisha Carlin is a 64 year old female here for follow up of   Encounter Diagnoses   Name Primary?    Malignant neoplasm of upper-outer quadrant of right breast in female, estrogen receptor positive (HCC) Yes    Malignant neoplasm metastatic to liver (HCC)        S/p 9 cycles seventh line capecitabine initiated on 1/22/24.  9/27/24 restaging CT shows progression in the lungs with possible lymphangitic spread and effusions.      Now on eighth line liposomal doxorubicin s/p cycle 2     Increased cough and sob with activity x 1 week. Dry cough nonproductive, tickle cough. Needs a lozenge or tessalon. Using albuterol inhaler average 1 per day.   Has to sit and catch her breath after going upstairs before she can lay down. No fevers or chills. No sinus congestion.     Ankle edema sl increase to R ankle.     Today SOB sl better on 40 mg lasix , still with dry cough. Asking for cough med with codeine.  Able to walk from car to building with less SOB.    No pain. Ankle at times. Less swelling than last week.       Fatigue:  Yes, states about the same.   States when getting groceries states uses the cart and gets tired and fatigued.  Some HERRERA.      Fevers:  No     Appetite/taste changes:  No.  However, hard to eat with TMJ flare up on the Left.  She is doing PT for TMJ.  States improving, but still on PT.  Eating soft foods.    Mucositis:  No    Weight changes:  stable.    Nausea/vomiting:  Yes, D4-5, controlled with ondansetron       Diarrhea:  No,     Constipation:  Yes, controlled with stool softener    Peripheral neuropathy:  Yes, Per history, occasionally R last two fingers and does not last long. No complaint today .      PPE:  States using lotion in the hands all the time, no PPE.  Some dry skin on the soles.        ECOG PS 1    Review of Systems:     Review of Systems   Constitutional:  Positive for fatigue. Negative for appetite change, fever and unexpected weight change.   HENT:   Negative for mouth  sores.         Runny nose   Eyes:         R watery eyes   Respiratory:  Positive for cough (dry nonproductive) and shortness of breath (HERRERA present but improved over last week).    Cardiovascular:  Positive for leg swelling (BLE edema R>L, from surgeries on the R foot and leg. Better with lasix 40 mgix prn.). Negative for chest pain and palpitations.   Gastrointestinal:  Positive for constipation (after chemo) and nausea. Negative for abdominal pain, diarrhea and vomiting.   Genitourinary:  Negative for dysuria and frequency.    Musculoskeletal:  Positive for arthralgias (mostly due to TMJ.). Negative for back pain and neck pain.   Skin:         NF, hyperpigmented hands and feet    Neurological:  Positive for dizziness (at times) and numbness (to L hand- sl betterd). Negative for extremity weakness and headaches.   Hematological:  Negative for adenopathy. Bruises/bleeds easily.   Psychiatric/Behavioral:  Positive for sleep disturbance (improved with sl mirtazapine.  Still takes zolpidem and gummies).          Current Outpatient Medications   Medication Sig Dispense Refill    Potassium Chloride ER 10 MEQ Oral Tab CR Take 2 tablets (20 mEq total) by mouth 2 (two) times daily. 60 tablet 0    mirtazapine 15 MG Oral Tablet Dispersible Take 1 tablet (15 mg total) by mouth nightly. 90 tablet 3    LORazepam 0.5 MG Oral Tab Take 1 tablet (0.5 mg total) by mouth every 6 (six) hours as needed for Anxiety. 30 tablet 0    zolpidem 10 MG Oral Tab Take 1 tablet (10 mg total) by mouth nightly as needed. 60 tablet 0    ondansetron (ZOFRAN) 8 MG tablet Take 1 tablet (8 mg total) by mouth every 8 (eight) hours as needed for Nausea. 30 tablet 3    HYDROcodone-acetaminophen  MG Oral Tab Take 1 tablet by mouth every 6 (six) hours as needed for Pain. 90 tablet 0    minocycline 50 MG Oral Cap Take 1 capsule (50 mg total) by mouth daily. 90 capsule 3    furosemide 20 MG Oral Tab Take 1 tablet (20 mg total) by mouth daily.       prochlorperazine (COMPAZINE) 10 mg tablet Take 1 tablet (10 mg total) by mouth every 6 (six) hours as needed for Nausea. 90 tablet 2    folic acid 1 MG Oral Tab Take 1 tablet (1 mg total) by mouth daily. 90 tablet 3    guaiFENesin-codeine 100-10 MG/5ML Oral Solution Take 5 mL by mouth every 6 (six) hours as needed for cough. 473 mL 0    albuterol 108 (90 Base) MCG/ACT Inhalation Aero Soln Inhale 1 puff into the lungs every 6 (six) hours as needed for Wheezing. 8 g 3    Zoledronic Acid (ZOMETA IV) Inject 4 mg into the vein every 6 (six) months. (Patient not taking: Reported on 12/4/2024)      Cholecalciferol (VITAMIN D3) 250 MCG (41200 UT) Oral Cap Take 1 capsule by mouth daily.       Allergies:   No Known Allergies    Past Medical History:    Breast cancer (HCC)    MASTECTOMY / RECONSTRUCTION    Chemotherapy-induced neutropenia (HCC)    Encounter for insertion of venous access port    portacath insertion / venous access    Glaucoma    History of breast cancer in female    Hyperlipidemia    Malignant neoplasm of overlapping sites of breast in female, estrogen receptor positive (HCC)    Malignant neoplasm of upper-outer quadrant of right breast in female, estrogen receptor positive (HCC)    Malignant pleural effusion (HCC)    Menorrhagia    HYSTEROSCOPY / D&C / endomentrial biopsy (08/28/2007)    Metastatic breast cancer    Neurofibromatosis (HCC)    Osteopenia of multiple sites    Osteopenia of multiple sites    Osteoporosis screening    Osteoporosis screening    Per [employer]    Other osteoporosis without current pathological fracture    Postmenopausal bleeding    Psychophysiological insomnia    Tumor, foot    tumor right foot / excision/excision of bone spur/arthrodesis w/screw fixation     Past Surgical History:   Procedure Laterality Date    Breast reconstruction Right 2008    right breast reconstructed - ductal, BRCA neg    Breast surgery      Chemotherapy  2007    Foot surgery Right     tumor rt foot /  excision/excision of bone spur/arthrodesis w/screw fixation    Ir port a cath procedure  2007    Right subclavian port of cath.    Mastectomy right      2012    Mastectomy,simple  2007     Social History     Socioeconomic History    Marital status: Single   Tobacco Use    Smoking status: Never    Smokeless tobacco: Never   Substance and Sexual Activity    Alcohol use: Yes     Alcohol/week: 5.0 standard drinks of alcohol     Types: 5 Standard drinks or equivalent per week     Comment: Socially.  (Per [employer]:  5 days weekly.)    Drug use: No   Other Topics Concern    Caffeine Concern Yes     Comment: tea     Social Drivers of Health     Food Insecurity: No Food Insecurity (2023)    Food Insecurity     Food Insecurity: Never true   Transportation Needs: No Transportation Needs (2023)    Transportation Needs     Lack of Transportation: No   Housing Stability: Low Risk  (2023)    Housing Stability     Housing Instability: No       Family History   Problem Relation Age of Onset    Breast Cancer Mother 49        bilateral mastectomy    Genetic Disease Mother         NF-1    Breast Cancer Maternal Aunt 75    Breast Cancer Paternal Grandmother     Breast Cancer Self 51    Genetic Disease Self         NF-1    Cancer Paternal Aunt         throat ca    Cancer Maternal Uncle         prostate ca    Genetic Disease Brother         NF-1    Genetic Disease Brother         NF-1    Genetic Disease Brother         NF-1    Cancer Maternal Uncle         bladder ca    Breast Cancer Maternal Cousin Female     Cancer Maternal Cousin Female         leukemia;  childhood    Cancer Paternal Cousin Male         throat ca         PHYSICAL EXAM:    /79 (BP Location: Left arm, Patient Position: Sitting, Cuff Size: adult)   Pulse 83   Temp 98.6 °F (37 °C) (Oral)   Resp 18   Ht 1.524 m (5')   Wt 49.4 kg (109 lb)   SpO2 94%   BMI 21.29 kg/m²   Wt Readings from Last 6 Encounters:   24 49.9 kg (110 lb)    11/06/24 50.3 kg (111 lb)   10/09/24 53.1 kg (117 lb)   10/02/24 51.6 kg (113 lb 12.8 oz)   09/04/24 52.6 kg (116 lb)   08/07/24 50.8 kg (111 lb 14.4 oz)     Physical Exam  General: Patient is alert, not in acute distress.  HEENT: EOMs intact. Anicteric.  Nystagmus with gaze to the right.    Neck: No JVD. No palpable lymphadenopathy. Neck is supple.  Chest: Clear to auscultation. Decreased at bases  Heart: Regular rate and rhythm.   Abdomen: Soft, non tender with good bowel sounds.    Extremities: +1 edema RLE>L chronic, R ankle increased edema  better than last week  Neurological: Grossly intact. Normal AROM shoulders.    Psych/Depression: nl  Skin:  NF lesions on the back       ASSESSMENT/PLAN:     1. Malignant neoplasm of upper-outer quadrant of right breast in female, estrogen receptor positive (HCC)    2. Malignant neoplasm metastatic to liver (HCC)      Breast cancer history of ER/SC positive metastatic breast cancer to pleura, liver, bones. Initially diagnosed 2007 with DCIS, treated with lumpectomy, however MRI showed progression in the breast and she had a complete right mastectomy with axillary dissection followed by chemo in 2008: .pT1,N1. ER 9%, PgR 8%, Her-2 0, Ki 39%. she then presented with recurrence in 2020 right neck/supraclav ( ER 98% SC 0  her2 1+)  Pleura (Er 20%, Her2 1+) and liver:  (ER 38%, her 0)     Treatment history  9/11/2007:  right breast lumpectomy: Extensive DCIS ER 90%. Mri with progression  11/16/2007:  right mastectomy with axillary node dissection: pT1 N1 M0  2/16/2008:  4 cycles of Taxotere and Cytoxan   9/18/2020: Right neck recurrence,  left pleural,  liver-   10/1/2020: My risk Myriad negative. Started Pablociclib 125mg d1-21 w/ faslodex   4/15/2021: Neutropenic fever. Pablociclib changed to q 21 d with 14 day off cycle   1/27/2022: Palbo at 100mg D1-21 q 28 day schedule stared per Dr Soriano  7/15/22: changed to abemaciclib. Progress in the pleura bx: TNBC, Her2 0 CPS  0  9/22/22-11/2022: Sacituzumab w/o response   11/17/22-1/2023: Abraxane with initial response, liver bx nondx (too small)  2/9/23: started gem/carbo due to SOB with response  4/26/23: imaging with continues response in pleura, progression in liver Er 65%, KS 0, Her2 0, bx: NGS below  07/6/23-1/10/24 on eribulin s/p 8 cycles prior to progression.  01/22/24- present Capecitabine     NGS:  9/2020: Foundation: NF1, [id number], FGFR1 and MYC amplification  9/2022: G360: [id number] 0.5%. Tempus pleura: NF1, TP53, FGFR1 amp  2/2023: G360:  [id number] 0.9%, APC 0.2 with several new VUS  4/2023 G360: [id number] 0.4%. APC 0.3 with several new VUS. Tempus liver:           ESR 1 mutation is negative.    Patient will proceed with 7th line therapy with capecitabine week on/week off and after 3 months (before next appt) will have imaging.  If still has liver lesion, will consider Y-90 embolization, vs continue with treatment w/o Y-90.      S/p 9 cycles seventh line capecitabine, initiated on 1/22/24.  9/27/24 restaging CT shows progression in the lungs with possible lymphangitic spread and effusions.      Now on eighth line liposomal doxorubicin s/p cycle 1 on 10/9/24, dose reduced to 37.5 mg/m2  S/p cycle 2 with 50 mg/m2 given tolerability to C1  HOLD cycle 3 Cancel     Reviewed CT scan and Echo with patient and DrGil  Progression of disease lymphangitis and liver       Change of therapy to Ixempra    Chest Xray for increased SOB and persistent cough. If inconclusive may need CT and /or repeat echo   Pulse ox 100% after sitting and taking a few deep breaths   Discussed with Dr Farias - add lasix 20 mg x 2 for 3 days. Add potassium supplement Rx sent     Some skin itching- eucerin     Cough- persistent dry cough, difficult to talk any length of time  tessalon perles  Add guaifenesin / codeine cough Rx sent         After progression with doxil will proceed with ixempra.     TMJ: in PT    H/o PPE- lotion to hands and feet.  No issues  malachi.    MARCELLO:  Ondansetron prn.  Start evening of D4    Constipation, gr. 1:  Stool softener prn    Cancer-related pain- resolved.  No longer using Norco    Zometa - last dose 2023. Completed 2 years, no further needed     Added folic acid 1mg daily and changed B12 SL 1000mcg daily.     Bilateral shoulder pain, likely DJD per C-spine MRI.  Not related to capecitabine.    Call prn.  Follow-up in 4 weeks    Emotional support given       MDM high risk  Continuity of complex medical care.      No orders of the defined types were placed in this encounter.    Reading Physician Reading Date Result Priority   Javier Mo MD  [phone] 2024      Narrative  Transthoracic Echocardiogram    Name:Aleisha Carlin    Date: 2024 :  1960 Ht:  (60in)  BP: 110 / 70  MRN:  [id number]     Age:  64years    Wt:  (110lb) HR: 95bpm  Loc:  [facility]       Gndr: F          BSA: 1.45m^2  Sonographer: ESTEBAN Ayala    Ordering:    Aiyana Jauregui    Referring:   Aiyana Jauregui    ----------------------------------------------------------------------------  History/Indications:  Breast Cancer. The patient is undergoing chemotherapy.    ----------------------------------------------------------------------------  Procedure information:  A transthoracic complete 2D study was performed.  Additional evaluation included M-mode, complete spectral Doppler, and color  Doppler.  Patient status:  Outpatient.  Location:  Echo laboratory.  Comparison was made to the study of 10/04/2024.    This was a routine study.  Transthoracic echocardiography for diagnosis and ventricular function  evaluation. Image quality was adequate.    ECG rhythm:   Normal sinus    ----------------------------------------------------------------------------    Conclusions:    1. Left ventricle: The cavity size was normal. Wall thickness was normal.     Systolic function was normal. The estimated ejection fraction was 55-60%,     by visual assessment.  No diagnostic evidence for regional wall motion     abnormalities. Left ventricular diastolic function parameters were     normal. The Global Longitudinal Strain (GLS) was -21.40%. Normal strain.  2. Mitral valve: There was mild regurgitation.  3. Pulmonary arteries: Systolic pressure was within the normal range, in the     range of 25mm Hg to 30mm Hg. Estimated pulmonary artery diastolic     pressure was 13mm Hg.  Impressions:  This study is compared with previous dated 10/04/2024: No  significant change since prior study.  *    ----------------------------------------------------------------------------  *  Findings:  Left ventricle:  The cavity size was normal. Wall thickness was normal.  Systolic function was normal. The estimated ejection fraction was 55-60%, by  visual assessment. No diagnostic evidence for regional wall motion  abnormalities. The Global Longitudinal Strain (GLS) was -21.40%. Left  ventricular diastolic function parameters were normal.  Left atrium:  The atrium was normal in size.  Right ventricle:  The cavity size was normal. Systolic function was normal.  Right atrium:  The atrium was normal in size.  Mitral valve:  The valve was structurally normal. Leaflet separation was  normal.  Doppler:  Transvalvular velocity was within the normal range. There  was no evidence for stenosis. There was mild regurgitation.  Aortic valve:  The valve was structurally normal. The valve was trileaflet.  Cusp separation was normal.  Doppler:  Transvalvular velocity was within the  normal range. There was no evidence for stenosis. There was no  regurgitation.  Tricuspid valve:  The valve is structurally normal. Leaflet separation was  normal.  Doppler:  Transvalvular velocity was within the normal range. There  was no evidence for stenosis. There was mild regurgitation.  Pulmonic valve:   The valve is structurally normal. Cusp separation was  normal.  Doppler:  Transvalvular velocity was within the normal  range. There  was no evidence for stenosis. There was trivial regurgitation.  Pericardium:  A trivial pericardial effusion was identified anterior to the  heart.  Aorta:  Aortic root: The aortic root was normal.  Pulmonary arteries:  Systolic pressure was within the normal range, in the range of 25mm Hg to  30mm Hg. Estimated pulmonary artery diastolic pressure was 13mm Hg.  Systemic veins:  Central venous respirophasic diameter changes are in the  normal range (>50%).  Inferior vena cava: The IVC was normal-sized.    ----------------------------------------------------------------------------  Measurements     Left ventricle        Value         Ref       10/04/2024   GLS, 2D               -21.40 %      --------- ----------   IVS thickness,        0.7    cm     0.6 - 0.9 0.7   ED, PLAX   LV ID, ED, PLAX   (L) 3.2    cm     3.8 - 5.2 3.8   LV ID, ES, PLAX       2.2    cm     2.2 - 3.5 2.3   LV PW thickness,      0.7    cm     0.6 - 0.9 0.8   ED, PLAX   IVS/LV PW ratio,      1.00          --------- 0.88   ED, PLAX   LV PW/LV ID           0.22          --------- 0.21   ratio, ED, PLAX   LV ejection           61     %      54 - 74   71   fraction   Stroke                29     ml/m^2 --------- ----------   volume/bsa, 2D   LV e', lateral    (L) 5.3    cm/sec >=10.0    6.5   LV E/e', lateral      8             <=13      7   LV e', medial     (L) 5.1    cm/sec >=7.0     5.3   LV E/e', medial       8             --------- 9   LV e', average        5.2    cm/sec --------- 5.9   LV E/e', average      8             <=14      8     LVOT                  Value         Ref       10/04/2024   LVOT ID               2.1    cm     --------- 2.1   LVOT peak             0.8    m/sec  --------- ----------   velocity, S   LVOT VTI, S           12.0   cm     --------- ----------   LVOT peak             3      mm Hg  --------- ----------   gradient, S   LVOT mean             1      mm Hg  --------- ----------   gradient, S   Stroke  volume         42     ml     --------- ----------   (SV), LVOT DP   Stroke index          29     ml/m^2 --------- ----------   (SV/bsa), LVOT DP     Aortic root           Value         Ref       10/04/2024   Aortic root ID        3.4    cm     2.3 - 3.7 3.4     Left atrium           Value         Ref       10/04/2024   LA ID, A-P, ES    (L) 2.4    cm     2.7 - 3.8 2.3   LA/aortic root        0.71          --------- 0.68   ratio     Mitral valve          Value         Ref       10/04/2024   Mitral E-wave         0.43   m/sec  --------- 0.47   peak velocity   Mitral A-wave         0.63   m/sec  --------- 0.52   peak velocity   Mitral E/A ratio,     0.7           --------- 0.9   peak     Pulmonary artery      Value         Ref       10/04/2024   PA pressure, S,       29     mm Hg  --------- ----------   DP   PA pressure, ED,      13     mm Hg  --------- ----------   DP     Tricuspid valve       Value         Ref       10/04/2024   Tricuspid regurg      2.53   m/sec  <=2.8     2.82   peak velocity   Tricuspid peak        26     mm Hg  --------- 32   RV-RA gradient     Systemic veins        Value         Ref       10/04/2024   Estimated CVP         3      mm Hg  --------- 3     Inferior vena         Value         Ref       10/04/2024   cava   ID                    1.2    cm     <=2.1     1.3     Right ventricle       Value         Ref       10/04/2024   RV pressure, S,       29     mm Hg  --------- 35   DP   RV s', lateral        10.5   cm/sec >=9.5     15.5     Pulmonic valve        Value         Ref       10/04/2024   Pulmonic regurg       1.62   m/sec  --------- ----------   velocity, ED   Pulmonic regurg       10     mm Hg  --------- ----------   gradient, ED  Legend:  (L)  and  (H)  bandar values outside specified reference range.    ----------------------------------------------------------------------------    Prepared and electronically signed by  Javier Mo  12/06/2024 15:25      PROCEDURE: CT CHEST ABDOMEN  PELVIS (ALL CONTRAST ONLY) (CPT=71260/75532)     COMPARISON: [facility], CT CHEST+ABDOMEN+PELVIS(ALL CNTRST ONLY)(CPT=71260/73457), 7/16/2024, 11:21 AM.  [facility], CT CHEST+ABDOMEN+PELVIS(ALL CNTRST ONLY)(CPT=71260/90898), 4/16/2024, 1:08 PM.    [facility], XR CHEST PA + LAT CHEST (CPT=71046), 12/04/2024, 12:18 PM.  [facility], CT CHEST+ABDOMEN+PELVIS(ALL CNTRST ONLY)(CPT=71260/26021), 9/27/2024, 12:45 PM.     INDICATIONS: Constant coughing. Malignant neoplasm of upper-outer quadrant of right breast in female breast metastatic to the liver and bone.     TECHNIQUE:   CT images of the chest, abdomen and pelvis were obtained with intravenous contrast material.  Automated exposure control for dose reduction was used. Adjustment of the mA and/or kV was done based on the patient's size. Use of iterative  reconstruction technique for dose reduction was used.  Dose information is transmitted to the ACR (American College of Radiology) NRDR (National Radiology Data Registry) which includes the Dose Index Registry.     CHEST FINDINGS:  CARDIAC: Small stable pericardial effusion.  Mild coronary artery calcification.  MEDIASTINUM/BRANDY: No mass or adenopathy.    PULMONARY ARTERIES: Normal main pulmonary arteries.  AORTA: Atherosclerotic calcification.  No aneurysm or dissection.  LUNGS/PLEURA: More pronounced thickening the interlobular septi bilaterally.  Small loculated right basilar pleural effusion without a significant change.  Left-sided pleural thickening with associated volume loss similar to previous and associated trace   pleural effusion.  Few scattered unchanged small peripheral pulmonary nodules.  Slightly more pronounced compressive atelectasis in the right lower lobe and perifissural region of the inferior lingula, and left lower lobe.  CHEST WALL: Bilateral mass cystectomy with reconstruction implants and a  chronic rupture of the right implant.  OTHER: Negative.     ABDOMEN/PELVIS FINDINGS:  LIVER: Increased size and number of hepatic metastases.  Segment 8 lesion which previously measured 19 x 15 mm measures 38 x 32 mm.  A new lesion bridging the caudate lobe and segment 8 measures 44 x 28 mm.  A new segment 4 B lesion measures 15 x 15 mm.  BILIARY: No evidence for cholecystitis or biliary dilatation.  SPLEEN: Normal.    PANCREAS: Stable approximately 11 mm enhancing lesion in the posterior aspect of the pancreatic head near its interface with the duodenum.  Stable chronic pancreatic volume loss.  ADRENALS: Normal.      KIDNEYS: Normal.  AORTA/VASCULAR: Atherosclerotic calcification.  No aneurysm or dissection.    LYMPHADENOPATHY: None.  GI/MESENTERY: Normal appendix.  Chronic prominent submucosal fat in the distal ileum and ascending colon with probable pseudo thickening related to luminal underdistention.  Colonic diverticulosis.  No visible mass, obstruction, or bowel wall thickening.      ABDOMINAL WALL: Normal.  No mass or hernia.    URINARY BLADDER: Normal  ASCITES:                        None.  PELVIC ORGANS: No suspect pelvic mass.  OTHER: Negative.     BONES: No acute fracture.                       Stable scattered sclerotic small foci in L3, L4 and T11 vertebral bodies no new osseous lesions.              Impression  CONCLUSION:  1. Small right greater than left pleural effusions with pleural thickening.  More pronounced thickening of the interlobular septal thickening in both lungs concerning for lymphangitis spread of tumor.  2. Increased size and number of hepatic metastases.  3. Stable 11 mm enhancing pancreatic lesion .  4. Stable small sclerotic metastases.  5. Chronic prominent submucosal fat deposition in the distal ileum and colon probably related to remote inflammatory process.  6. Stable small pericardial effusion.           Dictated by (CST): Wayne Reaves MD on 12/10/2024 at 5:38 PM       Finalized by (CST): Wayne Reaves MD on 12/10/2024 at 6:07 PM            Recent Results (from the past 48 hours)   CBC W Differential W Platelet    Collection Time: 12/11/24  9:17 AM   Result Value Ref Range    WBC 5.2 4.0 - 11.0 x10(3) uL    RBC 4.26 3.80 - 5.30 x10(6)uL    HGB 13.9 12.0 - 16.0 g/dL    HCT 41.6 35.0 - 48.0 %    MCV 97.7 80.0 - 100.0 fL    MCH 32.6 26.0 - 34.0 pg    MCHC 33.4 31.0 - 37.0 g/dL    RDW-SD 47.2 (H) 35.1 - 46.3 fL    RDW 13.2 11.0 - 15.0 %    .0 150.0 - 450.0 10(3)uL    Neutrophil Absolute Prelim 3.68 1.50 - 7.70 x10 (3) uL    Neutrophil Absolute 3.68 1.50 - 7.70 x10(3) uL    Lymphocyte Absolute 0.51 (L) 1.00 - 4.00 x10(3) uL    Monocyte Absolute 0.87 0.10 - 1.00 x10(3) uL    Eosinophil Absolute 0.04 0.00 - 0.70 x10(3) uL    Basophil Absolute 0.04 0.00 - 0.20 x10(3) uL    Immature Granulocyte Absolute 0.02 0.00 - 1.00 x10(3) uL    Neutrophil % 71.2 %    Lymphocyte % 9.9 %    Monocyte % 16.9 %    Eosinophil % 0.8 %    Basophil % 0.8 %    Immature Granulocyte % 0.4 %               Imaging & Referrals:  None   No orders of the defined types were placed in this encounter.    Narrative   PROCEDURE: XR CHEST PA + LAT CHEST (CPT=71046)     COMPARISON: [facility], XR CHEST AP PORTABLE (CPT=71045), 8/12/2023, 8:56 PM.     INDICATIONS: Malignant neoplasm of upper-outer quadrant of right breast in female, estrogen receptor positive (HCC) C50.411 Malignant neoplasm of upper-outer quadrant*     TECHNIQUE:   Two views.       FINDINGS:     The heart mediastinal structures are minimally enlarged.  Pulmonary vascularity is slightly increased.  The findings are suggestive of very slight fluid overload.     Lung volumes are normal.  There are small streaky opacities at the lung bases likely representing atelectasis in addition to small bilateral pleural effusions.     There is a right subclavian Port-A-Cath               Impression   CONCLUSION:     1. Slight cardiac enlargement  with secondary signs of minimal fluid overload.     2. Streaky opacities at the lung bases likely representing atelectasis in addition to very small bilateral pleural effusions           Dictated by (CST): Fer Lentz MD on 12/04/2024 at 2:03 PM      Finalized by (CST): Fer Lentz MD on 12/04/2024 at 2:04 PM           Narrative  PROCEDURE: CT CHEST ABDOMEN PELVIS (ALL CONTRAST ONLY) (CPT=71260/16666)     COMPARISON: [facility], CT CHEST+ABDOMEN+PELVIS(ALL CNTRST ONLY)(CPT=71260/64310), 7/16/2024, 11:21 AM.  [facility], CT CHEST+ABDOMEN+PELVIS(ALL CNTRST ONLY)(CPT=71260/15615), 4/16/2024, 1:08 PM.    [facility], CT CHEST+ABDOMEN+PELVIS(ALL CNTRST ONLY)(CPT=71260/34022), 1/05/2024, 8:43 AM.     INDICATIONS: Metastasis to bone. Malignant neoplasm of upper-outer quadrant of right breast in female, estrogen receptor positive (HCC) C50.411 Malignant *     TECHNIQUE:   CT images of the chest, abdomen and pelvis were obtained with intravenous contrast material.  Automated exposure control for dose reduction was used. Adjustment of the mA and/or kV was done based on the patient's size. Use of iterative  reconstruction technique for dose reduction was used.  Dose information is transmitted to the ACR (American College of Radiology) NRDR (National Radiology Data Registry) which includes the Dose Index Registry.     FINDINGS:  AIRWAYS: Central airways are patent.  LUNGS/PLEURA:   Small left pleural effusion which has attenuation above water attenuation and probably reflects a complex effusion similar to the prior study.  There is a small right pleural effusion.  There are some areas of probable subpleural and  dependent atelectasis in both lungs.  Few punctate subpleural nodules in the right lung are not significantly changed.  There is bilateral interlobular septal thickening left greater than right most significant in the lung bases.  No  pneumothorax.  CARDIAC:   No cardiomegaly.  Trace pericardial effusion with suggestion of anterior pericardial enhancement.  There is coronary artery calcification.  MEDIASTINUM/BRANDY:   No mass or enlarged adenopathy.    VASCULAR: Thoracic aorta is normal in caliber.  CHEST WALL: Status post bilateral mastectomy.  No axillary mass or enlarged adenopathy.  Bilateral breast implants noted.  Intracapsular rupture of the right implant again appears to be present.  Left implant is grossly intact.  Stable right chest wall  port.  Innumerable nodular cutaneous foci again seen.  Multinodular heterogeneous thyroid.     LIVER:   Previously described segment 8 hypodense lesion is less conspicuous currently but measures approximately 1.9 x 1.5 cm grossly similar to the previous examination.  SPLEEN:   No enlargement or focal lesion.    PANCREAS:   There is a small enhancing focus in the pancreatic head measuring 0.8 x 1.0 cm image 111 series 2. This does not appear to be significantly changed in size.    GALLBLADDER: Normal size and appearance.  ADRENALS:   Stable 0.9 cm right adrenal nodule.  Left adrenal gland is unremarkable.  KIDNEYS:   Enhance symmetrically without hydronephrosis.  AORTA/VASCULAR:   Abdominal aorta is normal in caliber with mild atherosclerosis.  ABDOMINAL NODES:   No mass or enlarged adenopathy.    BOWEL/MESENTERY:   Bowel is nondilated.  No bowel wall thickening.  No pneumatosis or pneumoperitoneum.  Appendix is unremarkable.  There is colonic diverticulosis.  ABDOMINAL WALL:   No mass or hernia.  URINARY BLADDER:   No visible focal wall thickening, lesion, or calculus.    PELVIC ORGANS:   Uterus is present.  PELVIC NODES:   0.8 cm short axis nodular focus in the right perirectal region image 165 series 2 is unchanged in size.  BONES: Spondylosis thoracolumbar spine.  Stable 0.9 cm sclerotic focus T11 vertebral body.  Tiny sclerotic foci in the L3, L4 and L4 vertebral bodies grossly unchanged.  OTHER:    Negative.              Impression  CONCLUSION:     Left greater than right bilateral interlobular septal thickening which overall has increased since the prior examination July 2024. Small left pleural effusion similar to the prior study.  New/increased right pleural effusion.  Trace pericardial effusion   with suggestion of anterior pericardial enhancement.  The findings are suspicious for lymphangitic spread of neoplasm.     Subtle hepatic lesion segment 8 grossly unchanged in size since the prior examination.     1 cm enhancing focus pancreatic head not significantly changed in size.     Grossly unchanged scattered sclerotic osseous metastatic disease.     Lesser incidental findings as above.                 Dictated by (CST): Odin Ivory MD on 9/27/2024 at 1:39 PM      Finalized by (CST): Odin Ivory MD on 9/27/2024 at 2:05 PM

## 2024-12-11 NOTE — PROGRESS NOTES
Patient planned to receive C3 D1 Doxil today however no treatment per YASIR LAGUNA. Patient to infusion for PAC deaccess, needle removed. Gauze and paper tape applied to site. Infusion scheduling adjusting future appts, patient to return next week for first dose of Ixempra. No additional lab or provider visits needed prior. Patient discharged stable from infusion.

## 2024-12-11 NOTE — TELEPHONE ENCOUNTER
Aleisha called the guaifenesin-codeine Aiyana order was sent to [facility]'s pharmacy and they are out of it.  Please send script for med to [facility] in [facility] on [address] - [phone] as they have supple of medication    She is requesting a call when done please.

## 2024-12-17 DIAGNOSIS — G47.00 INSOMNIA, UNSPECIFIED TYPE: ICD-10-CM

## 2024-12-17 RX ORDER — FAMOTIDINE 10 MG/ML
20 INJECTION, SOLUTION INTRAVENOUS ONCE
Status: CANCELLED | OUTPATIENT
Start: 2024-12-18

## 2024-12-17 RX ORDER — DIPHENHYDRAMINE HYDROCHLORIDE 50 MG/ML
50 INJECTION INTRAMUSCULAR; INTRAVENOUS ONCE
Status: CANCELLED | OUTPATIENT
Start: 2024-12-18

## 2024-12-17 RX ORDER — ZOLPIDEM TARTRATE 10 MG/1
10 TABLET ORAL NIGHTLY PRN
Qty: 60 TABLET | Refills: 0 | Status: SHIPPED | OUTPATIENT
Start: 2024-12-17 | End: 2025-01-08

## 2024-12-18 ENCOUNTER — APPOINTMENT (OUTPATIENT)
Dept: HEMATOLOGY/ONCOLOGY | Facility: HOSPITAL | Age: 64
End: 2024-12-18
Attending: INTERNAL MEDICINE
Payer: MEDICARE

## 2024-12-18 ENCOUNTER — APPOINTMENT (OUTPATIENT)
Age: 64
End: 2024-12-18
Attending: INTERNAL MEDICINE
Payer: MEDICARE

## 2024-12-19 ENCOUNTER — TELEPHONE (OUTPATIENT)
Age: 64
End: 2024-12-19

## 2024-12-19 NOTE — TELEPHONE ENCOUNTER
Toxicities: C1 D1 Ixabepilone on 12/18/2024    I attempted to reach Aleisha to see how she is feeling after her first treatment. I left a voice mail message asking her to please return my call at her earliest convenience.

## 2024-12-19 NOTE — TELEPHONE ENCOUNTER
Aleisha reports she feels \"ok.\" No side effects from her treatment yesterday. She continues to have a dry cough. She did not take her lasix yesterday, so she feels \"a little more short of breath today.\" She confirmed she did take it this morning. She also used her albuterol inhaler. I encouraged her to please call the office if she is not feeling well or she has any questions or concerns. She agreed and thanked me for checking on her.

## 2025-01-01 ENCOUNTER — APPOINTMENT (OUTPATIENT)
Dept: GENERAL RADIOLOGY | Facility: HOSPITAL | Age: 65
End: 2025-01-01
Payer: MEDICARE

## 2025-01-01 ENCOUNTER — HOSPITAL ENCOUNTER (INPATIENT)
Facility: HOSPITAL | Age: 65
LOS: 1 days | End: 2025-01-01
Attending: STUDENT IN AN ORGANIZED HEALTH CARE EDUCATION/TRAINING PROGRAM | Admitting: STUDENT IN AN ORGANIZED HEALTH CARE EDUCATION/TRAINING PROGRAM
Payer: OTHER MISCELLANEOUS

## 2025-01-01 ENCOUNTER — TELEPHONE (OUTPATIENT)
Age: 65
End: 2025-01-01

## 2025-01-01 ENCOUNTER — APPOINTMENT (OUTPATIENT)
Age: 65
End: 2025-01-01
Attending: INTERNAL MEDICINE
Payer: MEDICARE

## 2025-01-01 ENCOUNTER — HOSPITAL ENCOUNTER (INPATIENT)
Facility: HOSPITAL | Age: 65
LOS: 2 days | Discharge: HOSPICE | End: 2025-01-01
Attending: EMERGENCY MEDICINE | Admitting: INTERNAL MEDICINE
Payer: MEDICARE

## 2025-01-01 ENCOUNTER — TELEPHONE (OUTPATIENT)
Facility: LOCATION | Age: 65
End: 2025-01-01

## 2025-01-01 VITALS
SYSTOLIC BLOOD PRESSURE: 37 MMHG | HEART RATE: 49 BPM | RESPIRATION RATE: 14 BRPM | TEMPERATURE: 99 F | WEIGHT: 97 LBS | BODY MASS INDEX: 19 KG/M2 | OXYGEN SATURATION: 87 % | DIASTOLIC BLOOD PRESSURE: 28 MMHG

## 2025-01-01 VITALS
OXYGEN SATURATION: 93 % | DIASTOLIC BLOOD PRESSURE: 79 MMHG | HEART RATE: 124 BPM | BODY MASS INDEX: 19 KG/M2 | TEMPERATURE: 98 F | SYSTOLIC BLOOD PRESSURE: 109 MMHG | RESPIRATION RATE: 18 BRPM | WEIGHT: 99 LBS

## 2025-01-01 DIAGNOSIS — C79.9 METASTASIS FROM BREAST CANCER (HCC): ICD-10-CM

## 2025-01-01 DIAGNOSIS — G47.00 INSOMNIA, UNSPECIFIED TYPE: ICD-10-CM

## 2025-01-01 DIAGNOSIS — F41.9 ANXIETY: ICD-10-CM

## 2025-01-01 DIAGNOSIS — R62.7 FAILURE TO THRIVE IN ADULT: Primary | ICD-10-CM

## 2025-01-01 DIAGNOSIS — G89.3 CANCER RELATED PAIN: ICD-10-CM

## 2025-01-01 DIAGNOSIS — C50.919 METASTASIS FROM BREAST CANCER (HCC): ICD-10-CM

## 2025-01-01 DIAGNOSIS — R07.9 CHEST PAIN OF UNCERTAIN ETIOLOGY: ICD-10-CM

## 2025-01-01 LAB
ALBUMIN SERPL-MCNC: 3.1 G/DL (ref 3.2–4.8)
ALBUMIN SERPL-MCNC: 3.4 G/DL (ref 3.2–4.8)
ALBUMIN/GLOB SERPL: 1.6 {RATIO} (ref 1–2)
ALP LIVER SERPL-CCNC: 1183 U/L (ref 50–130)
ALP LIVER SERPL-CCNC: 1239 U/L (ref 50–130)
ALT SERPL-CCNC: 276 U/L (ref 10–49)
ALT SERPL-CCNC: 280 U/L (ref 10–49)
AMMONIA PLAS-MCNC: 28 UMOL/L (ref 11–32)
ANION GAP SERPL CALC-SCNC: 10 MMOL/L (ref 0–18)
ANION GAP SERPL CALC-SCNC: 12 MMOL/L (ref 0–18)
AST SERPL-CCNC: 290 U/L (ref ?–34)
AST SERPL-CCNC: 305 U/L (ref ?–34)
ATRIAL RATE: 127 BPM
BASOPHILS # BLD AUTO: 0.01 X10(3) UL (ref 0–0.2)
BASOPHILS # BLD AUTO: 0.03 X10(3) UL (ref 0–0.2)
BASOPHILS NFR BLD AUTO: 0.1 %
BASOPHILS NFR BLD AUTO: 0.2 %
BILIRUB DIRECT SERPL-MCNC: 4 MG/DL (ref ?–0.3)
BILIRUB SERPL-MCNC: 5.2 MG/DL (ref 0.2–1.1)
BILIRUB SERPL-MCNC: 5.6 MG/DL (ref 0.2–1.1)
BUN BLD-MCNC: 37 MG/DL (ref 9–23)
BUN BLD-MCNC: 38 MG/DL (ref 9–23)
BUN/CREAT SERPL: 34.6 (ref 10–20)
BUN/CREAT SERPL: 36.9 (ref 10–20)
CALCIUM BLD-MCNC: 11.3 MG/DL (ref 8.7–10.4)
CALCIUM BLD-MCNC: 12.7 MG/DL (ref 8.7–10.4)
CHLORIDE SERPL-SCNC: 100 MMOL/L (ref 98–112)
CHLORIDE SERPL-SCNC: 98 MMOL/L (ref 98–112)
CO2 SERPL-SCNC: 23 MMOL/L (ref 21–32)
CO2 SERPL-SCNC: 25 MMOL/L (ref 21–32)
CREAT BLD-MCNC: 1.03 MG/DL (ref 0.55–1.02)
CREAT BLD-MCNC: 1.07 MG/DL (ref 0.55–1.02)
DEPRECATED RDW RBC AUTO: 72.4 FL (ref 35.1–46.3)
DEPRECATED RDW RBC AUTO: 77.2 FL (ref 35.1–46.3)
EGFRCR SERPLBLD CKD-EPI 2021: 58 ML/MIN/1.73M2 (ref 60–?)
EGFRCR SERPLBLD CKD-EPI 2021: 61 ML/MIN/1.73M2 (ref 60–?)
EOSINOPHIL # BLD AUTO: 0.01 X10(3) UL (ref 0–0.7)
EOSINOPHIL # BLD AUTO: 0.01 X10(3) UL (ref 0–0.7)
EOSINOPHIL NFR BLD AUTO: 0.1 %
EOSINOPHIL NFR BLD AUTO: 0.1 %
ERYTHROCYTE [DISTWIDTH] IN BLOOD BY AUTOMATED COUNT: 23.2 % (ref 11–15)
ERYTHROCYTE [DISTWIDTH] IN BLOOD BY AUTOMATED COUNT: 24.3 % (ref 11–15)
GLOBULIN PLAS-MCNC: 2 G/DL (ref 2–3.5)
GLUCOSE BLD-MCNC: 66 MG/DL (ref 70–99)
GLUCOSE BLD-MCNC: 83 MG/DL (ref 70–99)
HCT VFR BLD AUTO: 34.3 % (ref 35–48)
HCT VFR BLD AUTO: 35.9 % (ref 35–48)
HGB BLD-MCNC: 10.9 G/DL (ref 12–16)
HGB BLD-MCNC: 11.5 G/DL (ref 12–16)
IMM GRANULOCYTES # BLD AUTO: 0.06 X10(3) UL (ref 0–1)
IMM GRANULOCYTES # BLD AUTO: 0.09 X10(3) UL (ref 0–1)
IMM GRANULOCYTES NFR BLD: 0.7 %
IMM GRANULOCYTES NFR BLD: 0.8 %
LYMPHOCYTES # BLD AUTO: 0.19 X10(3) UL (ref 1–4)
LYMPHOCYTES # BLD AUTO: 0.3 X10(3) UL (ref 1–4)
LYMPHOCYTES NFR BLD AUTO: 2.3 %
LYMPHOCYTES NFR BLD AUTO: 2.6 %
MAGNESIUM SERPL-MCNC: 1.9 MG/DL (ref 1.6–2.6)
MCH RBC QN AUTO: 28 PG (ref 26–34)
MCH RBC QN AUTO: 28.2 PG (ref 26–34)
MCHC RBC AUTO-ENTMCNC: 31.8 G/DL (ref 31–37)
MCHC RBC AUTO-ENTMCNC: 32 G/DL (ref 31–37)
MCV RBC AUTO: 87.6 FL (ref 80–100)
MCV RBC AUTO: 88.9 FL (ref 80–100)
MONOCYTES # BLD AUTO: 0.15 X10(3) UL (ref 0.1–1)
MONOCYTES # BLD AUTO: 0.91 X10(3) UL (ref 0.1–1)
MONOCYTES NFR BLD AUTO: 2.1 %
MONOCYTES NFR BLD AUTO: 7 %
NEUTROPHILS # BLD AUTO: 11.74 X10 (3) UL (ref 1.5–7.7)
NEUTROPHILS # BLD AUTO: 11.74 X10(3) UL (ref 1.5–7.7)
NEUTROPHILS # BLD AUTO: 6.89 X10 (3) UL (ref 1.5–7.7)
NEUTROPHILS # BLD AUTO: 6.89 X10(3) UL (ref 1.5–7.7)
NEUTROPHILS NFR BLD AUTO: 89.7 %
NEUTROPHILS NFR BLD AUTO: 94.3 %
OSMOLALITY SERPL CALC.SUM OF ELEC: 284 MOSM/KG (ref 275–295)
OSMOLALITY SERPL CALC.SUM OF ELEC: 287 MOSM/KG (ref 275–295)
P AXIS: 51 DEGREES
P-R INTERVAL: 176 MS
PLATELET # BLD AUTO: 182 10(3)UL (ref 150–450)
PLATELET # BLD AUTO: 226 10(3)UL (ref 150–450)
PLATELET MORPHOLOGY: NORMAL
POTASSIUM SERPL-SCNC: 4.7 MMOL/L (ref 3.5–5.1)
POTASSIUM SERPL-SCNC: 4.9 MMOL/L (ref 3.5–5.1)
PROT SERPL-MCNC: 5.1 G/DL (ref 5.7–8.2)
PROT SERPL-MCNC: 5.6 G/DL (ref 5.7–8.2)
Q-T INTERVAL: 288 MS
QRS DURATION: 66 MS
QTC CALCULATION (BEZET): 418 MS
R AXIS: 245 DEGREES
RBC # BLD AUTO: 3.86 X10(6)UL (ref 3.8–5.3)
RBC # BLD AUTO: 4.1 X10(6)UL (ref 3.8–5.3)
SODIUM SERPL-SCNC: 133 MMOL/L (ref 136–145)
SODIUM SERPL-SCNC: 135 MMOL/L (ref 136–145)
T AXIS: 64 DEGREES
TROPONIN I SERPL HS-MCNC: 3 NG/L (ref ?–34)
VENTRICULAR RATE: 127 BPM
WBC # BLD AUTO: 13.1 X10(3) UL (ref 4–11)
WBC # BLD AUTO: 7.3 X10(3) UL (ref 4–11)

## 2025-01-01 PROCEDURE — 99239 HOSP IP/OBS DSCHRG MGMT >30: CPT | Performed by: STUDENT IN AN ORGANIZED HEALTH CARE EDUCATION/TRAINING PROGRAM

## 2025-01-01 PROCEDURE — 99233 SBSQ HOSP IP/OBS HIGH 50: CPT | Performed by: STUDENT IN AN ORGANIZED HEALTH CARE EDUCATION/TRAINING PROGRAM

## 2025-01-01 PROCEDURE — 99223 1ST HOSP IP/OBS HIGH 75: CPT | Performed by: INTERNAL MEDICINE

## 2025-01-01 PROCEDURE — 71045 X-RAY EXAM CHEST 1 VIEW: CPT | Performed by: EMERGENCY MEDICINE

## 2025-01-01 PROCEDURE — 99222 1ST HOSP IP/OBS MODERATE 55: CPT | Performed by: STUDENT IN AN ORGANIZED HEALTH CARE EDUCATION/TRAINING PROGRAM

## 2025-01-01 RX ORDER — MORPHINE SULFATE 2 MG/ML
2 INJECTION, SOLUTION INTRAMUSCULAR; INTRAVENOUS EVERY 2 HOUR PRN
Status: DISCONTINUED | OUTPATIENT
Start: 2025-01-01 | End: 2025-01-01

## 2025-01-01 RX ORDER — LORAZEPAM 0.5 MG/1
0.5 TABLET ORAL EVERY 6 HOURS PRN
Status: DISCONTINUED | OUTPATIENT
Start: 2025-01-01 | End: 2025-01-01

## 2025-01-01 RX ORDER — MORPHINE SULFATE 4 MG/ML
4 INJECTION, SOLUTION INTRAMUSCULAR; INTRAVENOUS EVERY 2 HOUR PRN
Status: CANCELLED | OUTPATIENT
Start: 2025-01-01

## 2025-01-01 RX ORDER — ACETAMINOPHEN 325 MG/1
650 TABLET ORAL EVERY 4 HOURS PRN
Status: DISCONTINUED | OUTPATIENT
Start: 2025-01-01 | End: 2025-01-01

## 2025-01-01 RX ORDER — HEPARIN SODIUM 5000 [USP'U]/ML
5000 INJECTION, SOLUTION INTRAVENOUS; SUBCUTANEOUS EVERY 12 HOURS SCHEDULED
Status: DISCONTINUED | OUTPATIENT
Start: 2025-01-01 | End: 2025-01-01

## 2025-01-01 RX ORDER — PROCHLORPERAZINE EDISYLATE 5 MG/ML
5 INJECTION INTRAMUSCULAR; INTRAVENOUS EVERY 8 HOURS PRN
Status: DISCONTINUED | OUTPATIENT
Start: 2025-01-01 | End: 2025-01-01

## 2025-01-01 RX ORDER — LORAZEPAM 2 MG/ML
1 INJECTION INTRAMUSCULAR EVERY 4 HOURS PRN
Status: DISCONTINUED | OUTPATIENT
Start: 2025-01-01 | End: 2025-01-01

## 2025-01-01 RX ORDER — SODIUM PHOSPHATE, DIBASIC AND SODIUM PHOSPHATE, MONOBASIC 7; 19 G/230ML; G/230ML
1 ENEMA RECTAL ONCE AS NEEDED
Status: CANCELLED | OUTPATIENT
Start: 2025-01-01

## 2025-01-01 RX ORDER — ALBUTEROL SULFATE 0.83 MG/ML
2.5 SOLUTION RESPIRATORY (INHALATION) EVERY 6 HOURS PRN
Status: DISCONTINUED | OUTPATIENT
Start: 2025-01-01 | End: 2025-01-01

## 2025-01-01 RX ORDER — ACETAMINOPHEN 325 MG/1
650 TABLET ORAL EVERY 4 HOURS PRN
Status: CANCELLED | OUTPATIENT
Start: 2025-01-01

## 2025-01-01 RX ORDER — MORPHINE SULFATE 4 MG/ML
4 INJECTION, SOLUTION INTRAMUSCULAR; INTRAVENOUS EVERY 2 HOUR PRN
Status: DISCONTINUED | OUTPATIENT
Start: 2025-01-01 | End: 2025-01-01

## 2025-01-01 RX ORDER — ZOLPIDEM TARTRATE 10 MG/1
10 TABLET ORAL NIGHTLY PRN
Qty: 60 TABLET | Refills: 0 | Status: SHIPPED | OUTPATIENT
Start: 2025-01-01 | End: 2025-05-31

## 2025-01-01 RX ORDER — FUROSEMIDE 40 MG/1
40 TABLET ORAL EVERY 8 HOURS PRN
Status: DISCONTINUED | OUTPATIENT
Start: 2025-01-01 | End: 2025-01-01

## 2025-01-01 RX ORDER — MORPHINE SULFATE 2 MG/ML
1 INJECTION, SOLUTION INTRAMUSCULAR; INTRAVENOUS EVERY 2 HOUR PRN
Status: DISCONTINUED | OUTPATIENT
Start: 2025-01-01 | End: 2025-01-01

## 2025-01-01 RX ORDER — ONDANSETRON 2 MG/ML
4 INJECTION INTRAMUSCULAR; INTRAVENOUS EVERY 6 HOURS PRN
Status: CANCELLED | OUTPATIENT
Start: 2025-01-01

## 2025-01-01 RX ORDER — BISACODYL 10 MG
10 SUPPOSITORY, RECTAL RECTAL
Status: CANCELLED | OUTPATIENT
Start: 2025-01-01

## 2025-01-01 RX ORDER — ZOLPIDEM TARTRATE 5 MG/1
5 TABLET ORAL NIGHTLY PRN
Status: DISCONTINUED | OUTPATIENT
Start: 2025-01-01 | End: 2025-01-01

## 2025-01-01 RX ORDER — LORAZEPAM 0.5 MG/1
0.5 TABLET ORAL EVERY 6 HOURS PRN
Qty: 60 TABLET | Refills: 0 | Status: SHIPPED | OUTPATIENT
Start: 2025-01-01 | End: 2025-05-31

## 2025-01-01 RX ORDER — BISACODYL 10 MG
10 SUPPOSITORY, RECTAL RECTAL
Status: DISCONTINUED | OUTPATIENT
Start: 2025-01-01 | End: 2025-01-01

## 2025-01-01 RX ORDER — ZOLPIDEM TARTRATE 10 MG/1
10 TABLET ORAL NIGHTLY PRN
Qty: 60 TABLET | Refills: 0 | Status: CANCELLED | OUTPATIENT
Start: 2025-01-01

## 2025-01-01 RX ORDER — ACETAMINOPHEN 500 MG
500 TABLET ORAL EVERY 4 HOURS PRN
Status: DISCONTINUED | OUTPATIENT
Start: 2025-01-01 | End: 2025-01-01

## 2025-01-01 RX ORDER — LORAZEPAM 0.5 MG/1
0.5 TABLET ORAL EVERY 6 HOURS PRN
Status: CANCELLED | OUTPATIENT
Start: 2025-01-01

## 2025-01-01 RX ORDER — HYDROCODONE BITARTRATE AND ACETAMINOPHEN 5; 325 MG/1; MG/1
2 TABLET ORAL EVERY 4 HOURS PRN
Status: DISCONTINUED | OUTPATIENT
Start: 2025-01-01 | End: 2025-01-01

## 2025-01-01 RX ORDER — SODIUM CHLORIDE 9 MG/ML
INJECTION, SOLUTION INTRAVENOUS CONTINUOUS
Status: DISCONTINUED | OUTPATIENT
Start: 2025-01-01 | End: 2025-01-01

## 2025-01-01 RX ORDER — LORAZEPAM 0.5 MG/1
0.5 TABLET ORAL EVERY 4 HOURS PRN
Status: DISCONTINUED | OUTPATIENT
Start: 2025-01-01 | End: 2025-01-01

## 2025-01-01 RX ORDER — MORPHINE SULFATE 2 MG/ML
2 INJECTION, SOLUTION INTRAMUSCULAR; INTRAVENOUS EVERY 2 HOUR PRN
Status: CANCELLED | OUTPATIENT
Start: 2025-01-01

## 2025-01-01 RX ORDER — ONDANSETRON 2 MG/ML
4 INJECTION INTRAMUSCULAR; INTRAVENOUS EVERY 6 HOURS PRN
Status: DISCONTINUED | OUTPATIENT
Start: 2025-01-01 | End: 2025-01-01

## 2025-01-01 RX ORDER — HALOPERIDOL 5 MG/ML
2 INJECTION INTRAMUSCULAR
Status: DISCONTINUED | OUTPATIENT
Start: 2025-01-01 | End: 2025-01-01

## 2025-01-01 RX ORDER — POLYETHYLENE GLYCOL 3350 17 G/17G
17 POWDER, FOR SOLUTION ORAL DAILY PRN
Status: CANCELLED | OUTPATIENT
Start: 2025-01-01

## 2025-01-01 RX ORDER — SCOPOLAMINE 1 MG/3D
1 PATCH, EXTENDED RELEASE TRANSDERMAL
Status: DISCONTINUED | OUTPATIENT
Start: 2025-01-01 | End: 2025-01-01

## 2025-01-01 RX ORDER — SENNOSIDES 8.6 MG
17.2 TABLET ORAL NIGHTLY PRN
Status: CANCELLED | OUTPATIENT
Start: 2025-01-01

## 2025-01-01 RX ORDER — SENNOSIDES 8.6 MG
17.2 TABLET ORAL NIGHTLY PRN
Status: DISCONTINUED | OUTPATIENT
Start: 2025-01-01 | End: 2025-01-01

## 2025-01-01 RX ORDER — LORAZEPAM 2 MG/ML
2 INJECTION INTRAMUSCULAR EVERY 4 HOURS PRN
Status: DISCONTINUED | OUTPATIENT
Start: 2025-01-01 | End: 2025-01-01

## 2025-01-01 RX ORDER — LORAZEPAM 0.5 MG/1
2 TABLET ORAL EVERY 4 HOURS PRN
Status: DISCONTINUED | OUTPATIENT
Start: 2025-01-01 | End: 2025-01-01

## 2025-01-01 RX ORDER — HYDROCODONE BITARTRATE AND ACETAMINOPHEN 5; 325 MG/1; MG/1
2 TABLET ORAL EVERY 4 HOURS PRN
Refills: 0 | Status: CANCELLED | OUTPATIENT
Start: 2025-01-01

## 2025-01-01 RX ORDER — HYDROCODONE BITARTRATE AND ACETAMINOPHEN 10; 325 MG/1; MG/1
1 TABLET ORAL EVERY 6 HOURS PRN
Qty: 90 TABLET | Refills: 0 | Status: SHIPPED | OUTPATIENT
Start: 2025-01-01 | End: 2025-01-01 | Stop reason: CLARIF

## 2025-01-01 RX ORDER — FUROSEMIDE 10 MG/ML
40 INJECTION INTRAMUSCULAR; INTRAVENOUS EVERY 8 HOURS PRN
Status: DISCONTINUED | OUTPATIENT
Start: 2025-01-01 | End: 2025-01-01

## 2025-01-01 RX ORDER — HYDROCODONE BITARTRATE AND ACETAMINOPHEN 5; 325 MG/1; MG/1
1 TABLET ORAL EVERY 4 HOURS PRN
Refills: 0 | Status: CANCELLED | OUTPATIENT
Start: 2025-01-01

## 2025-01-01 RX ORDER — HALOPERIDOL 5 MG/ML
1 INJECTION INTRAMUSCULAR
Status: DISCONTINUED | OUTPATIENT
Start: 2025-01-01 | End: 2025-01-01

## 2025-01-01 RX ORDER — ONDANSETRON 4 MG/1
4 TABLET, ORALLY DISINTEGRATING ORAL EVERY 6 HOURS PRN
Status: DISCONTINUED | OUTPATIENT
Start: 2025-01-01 | End: 2025-01-01

## 2025-01-01 RX ORDER — ATROPINE SULFATE 10 MG/ML
2 SOLUTION/ DROPS OPHTHALMIC EVERY 2 HOUR PRN
Status: DISCONTINUED | OUTPATIENT
Start: 2025-01-01 | End: 2025-01-01

## 2025-01-01 RX ORDER — POLYETHYLENE GLYCOL 3350 17 G/17G
17 POWDER, FOR SOLUTION ORAL DAILY PRN
Status: DISCONTINUED | OUTPATIENT
Start: 2025-01-01 | End: 2025-01-01

## 2025-01-01 RX ORDER — ZOLPIDEM TARTRATE 10 MG/1
10 TABLET ORAL NIGHTLY PRN
Qty: 60 TABLET | Refills: 0 | Status: SHIPPED | OUTPATIENT
Start: 2025-01-01 | End: 2025-01-01

## 2025-01-01 RX ORDER — MORPHINE SULFATE 15 MG/1
15 TABLET, FILM COATED, EXTENDED RELEASE ORAL 2 TIMES DAILY
Refills: 0 | Status: DISCONTINUED | OUTPATIENT
Start: 2025-01-01 | End: 2025-01-01

## 2025-01-01 RX ORDER — LORAZEPAM 0.5 MG/1
1 TABLET ORAL EVERY 4 HOURS PRN
Status: DISCONTINUED | OUTPATIENT
Start: 2025-01-01 | End: 2025-01-01

## 2025-01-01 RX ORDER — LORAZEPAM 0.5 MG/1
0.5 TABLET ORAL EVERY 6 HOURS PRN
Qty: 60 TABLET | Refills: 0 | Status: SHIPPED | OUTPATIENT
Start: 2025-01-01 | End: 2025-01-01

## 2025-01-01 RX ORDER — SODIUM PHOSPHATE, DIBASIC AND SODIUM PHOSPHATE, MONOBASIC 7; 19 G/230ML; G/230ML
1 ENEMA RECTAL ONCE AS NEEDED
Status: DISCONTINUED | OUTPATIENT
Start: 2025-01-01 | End: 2025-01-01

## 2025-01-01 RX ORDER — HYDROCODONE BITARTRATE AND ACETAMINOPHEN 5; 325 MG/1; MG/1
1 TABLET ORAL EVERY 4 HOURS PRN
Status: DISCONTINUED | OUTPATIENT
Start: 2025-01-01 | End: 2025-01-01

## 2025-01-01 RX ORDER — SODIUM CHLORIDE 0.9 % (FLUSH) 0.9 %
10 SYRINGE (ML) INJECTION AS NEEDED
Status: DISCONTINUED | OUTPATIENT
Start: 2025-01-01 | End: 2025-01-01

## 2025-01-01 RX ORDER — LORAZEPAM 0.5 MG/1
0.5 TABLET ORAL EVERY 6 HOURS PRN
Qty: 30 TABLET | Refills: 0 | Status: SHIPPED | OUTPATIENT
Start: 2025-01-01 | End: 2025-01-01

## 2025-01-01 RX ORDER — PROCHLORPERAZINE EDISYLATE 5 MG/ML
5 INJECTION INTRAMUSCULAR; INTRAVENOUS EVERY 8 HOURS PRN
Status: CANCELLED | OUTPATIENT
Start: 2025-01-01

## 2025-01-01 RX ORDER — MORPHINE SULFATE 2 MG/ML
1 INJECTION, SOLUTION INTRAMUSCULAR; INTRAVENOUS EVERY 2 HOUR PRN
Status: CANCELLED | OUTPATIENT
Start: 2025-01-01

## 2025-01-01 RX ORDER — GLYCOPYRROLATE 0.2 MG/ML
0.4 INJECTION, SOLUTION INTRAMUSCULAR; INTRAVENOUS
Status: DISCONTINUED | OUTPATIENT
Start: 2025-01-01 | End: 2025-01-01

## 2025-01-02 ENCOUNTER — APPOINTMENT (OUTPATIENT)
Dept: HEMATOLOGY/ONCOLOGY | Facility: HOSPITAL | Age: 65
End: 2025-01-02
Attending: INTERNAL MEDICINE
Payer: MEDICARE

## 2025-01-02 ENCOUNTER — APPOINTMENT (OUTPATIENT)
Dept: HEMATOLOGY/ONCOLOGY | Facility: HOSPITAL | Age: 65
End: 2025-01-02
Attending: NURSE PRACTITIONER
Payer: MEDICARE

## 2025-01-08 ENCOUNTER — OFFICE VISIT (OUTPATIENT)
Age: 65
End: 2025-01-08
Attending: INTERNAL MEDICINE
Payer: MEDICARE

## 2025-01-08 ENCOUNTER — APPOINTMENT (OUTPATIENT)
Dept: HEMATOLOGY/ONCOLOGY | Facility: HOSPITAL | Age: 65
End: 2025-01-08
Attending: INTERNAL MEDICINE
Payer: MEDICARE

## 2025-01-08 ENCOUNTER — NURSE ONLY (OUTPATIENT)
Age: 65
End: 2025-01-08
Attending: INTERNAL MEDICINE
Payer: MEDICARE

## 2025-01-08 ENCOUNTER — OFFICE VISIT (OUTPATIENT)
Age: 65
End: 2025-01-08
Attending: NURSE PRACTITIONER
Payer: MEDICARE

## 2025-01-08 ENCOUNTER — TELEPHONE (OUTPATIENT)
Age: 65
End: 2025-01-08

## 2025-01-08 VITALS
DIASTOLIC BLOOD PRESSURE: 76 MMHG | RESPIRATION RATE: 16 BRPM | HEIGHT: 60 IN | SYSTOLIC BLOOD PRESSURE: 107 MMHG | BODY MASS INDEX: 20.81 KG/M2 | HEART RATE: 64 BPM | TEMPERATURE: 98 F | OXYGEN SATURATION: 98 % | WEIGHT: 106 LBS

## 2025-01-08 VITALS
RESPIRATION RATE: 18 BRPM | OXYGEN SATURATION: 98 % | HEART RATE: 74 BPM | SYSTOLIC BLOOD PRESSURE: 114 MMHG | TEMPERATURE: 98 F | DIASTOLIC BLOOD PRESSURE: 84 MMHG

## 2025-01-08 DIAGNOSIS — C50.411 MALIGNANT NEOPLASM OF UPPER-OUTER QUADRANT OF RIGHT BREAST IN FEMALE, ESTROGEN RECEPTOR POSITIVE (HCC): Primary | ICD-10-CM

## 2025-01-08 DIAGNOSIS — C50.919 PRIMARY MALIGNANT NEOPLASM OF BREAST WITH METASTASIS (HCC): ICD-10-CM

## 2025-01-08 DIAGNOSIS — Z17.0 MALIGNANT NEOPLASM OF UPPER-OUTER QUADRANT OF RIGHT BREAST IN FEMALE, ESTROGEN RECEPTOR POSITIVE (HCC): Primary | ICD-10-CM

## 2025-01-08 DIAGNOSIS — G47.00 INSOMNIA, UNSPECIFIED TYPE: ICD-10-CM

## 2025-01-08 DIAGNOSIS — C78.7 MALIGNANT NEOPLASM METASTATIC TO LIVER (HCC): ICD-10-CM

## 2025-01-08 DIAGNOSIS — Z51.11 CHEMOTHERAPY MANAGEMENT, ENCOUNTER FOR: ICD-10-CM

## 2025-01-08 LAB
ALBUMIN SERPL-MCNC: 3.8 G/DL (ref 3.2–4.8)
ALBUMIN/GLOB SERPL: 2 {RATIO} (ref 1–2)
ALP LIVER SERPL-CCNC: 105 U/L
ALT SERPL-CCNC: 28 U/L
ANION GAP SERPL CALC-SCNC: 9 MMOL/L (ref 0–18)
AST SERPL-CCNC: 31 U/L (ref ?–34)
BASOPHILS # BLD AUTO: 0.03 X10(3) UL (ref 0–0.2)
BASOPHILS NFR BLD AUTO: 0.9 %
BILIRUB SERPL-MCNC: 0.7 MG/DL (ref 0.2–1.1)
BUN BLD-MCNC: 16 MG/DL (ref 9–23)
BUN/CREAT SERPL: 16.3 (ref 10–20)
CALCIUM BLD-MCNC: 9.4 MG/DL (ref 8.7–10.4)
CHLORIDE SERPL-SCNC: 107 MMOL/L (ref 98–112)
CO2 SERPL-SCNC: 26 MMOL/L (ref 21–32)
CREAT BLD-MCNC: 0.98 MG/DL
DEPRECATED RDW RBC AUTO: 46.9 FL (ref 35.1–46.3)
EGFRCR SERPLBLD CKD-EPI 2021: 65 ML/MIN/1.73M2 (ref 60–?)
EOSINOPHIL # BLD AUTO: 0.04 X10(3) UL (ref 0–0.7)
EOSINOPHIL NFR BLD AUTO: 1.2 %
ERYTHROCYTE [DISTWIDTH] IN BLOOD BY AUTOMATED COUNT: 13.7 % (ref 11–15)
GLOBULIN PLAS-MCNC: 1.9 G/DL (ref 2–3.5)
GLUCOSE BLD-MCNC: 80 MG/DL (ref 70–99)
HCT VFR BLD AUTO: 35.6 %
HGB BLD-MCNC: 12 G/DL
IMM GRANULOCYTES # BLD AUTO: 0.14 X10(3) UL (ref 0–1)
IMM GRANULOCYTES NFR BLD: 4.1 %
LYMPHOCYTES # BLD AUTO: 0.45 X10(3) UL (ref 1–4)
LYMPHOCYTES NFR BLD AUTO: 13.2 %
MCH RBC QN AUTO: 31.4 PG (ref 26–34)
MCHC RBC AUTO-ENTMCNC: 33.7 G/DL (ref 31–37)
MCV RBC AUTO: 93.2 FL
MONOCYTES # BLD AUTO: 0.67 X10(3) UL (ref 0.1–1)
MONOCYTES NFR BLD AUTO: 19.6 %
NEUTROPHILS # BLD AUTO: 2.09 X10 (3) UL (ref 1.5–7.7)
NEUTROPHILS # BLD AUTO: 2.09 X10(3) UL (ref 1.5–7.7)
NEUTROPHILS NFR BLD AUTO: 61 %
OSMOLALITY SERPL CALC.SUM OF ELEC: 294 MOSM/KG (ref 275–295)
PLATELET # BLD AUTO: 197 10(3)UL (ref 150–450)
POTASSIUM SERPL-SCNC: 3.6 MMOL/L (ref 3.5–5.1)
PROT SERPL-MCNC: 5.7 G/DL (ref 5.7–8.2)
RBC # BLD AUTO: 3.82 X10(6)UL
SODIUM SERPL-SCNC: 142 MMOL/L (ref 136–145)
WBC # BLD AUTO: 3.4 X10(3) UL (ref 4–11)

## 2025-01-08 RX ORDER — FAMOTIDINE 10 MG/ML
INJECTION, SOLUTION INTRAVENOUS
Status: COMPLETED
Start: 2025-01-08 | End: 2025-01-08

## 2025-01-08 RX ORDER — FAMOTIDINE 10 MG/ML
20 INJECTION, SOLUTION INTRAVENOUS ONCE
Status: COMPLETED | OUTPATIENT
Start: 2025-01-08 | End: 2025-01-08

## 2025-01-08 RX ORDER — LORAZEPAM 0.5 MG/1
0.5 TABLET ORAL EVERY 6 HOURS PRN
Qty: 30 TABLET | Refills: 0 | Status: SHIPPED | OUTPATIENT
Start: 2025-01-08

## 2025-01-08 RX ORDER — FAMOTIDINE 10 MG/ML
20 INJECTION, SOLUTION INTRAVENOUS ONCE
Status: CANCELLED | OUTPATIENT
Start: 2025-01-08

## 2025-01-08 RX ORDER — DIPHENHYDRAMINE HYDROCHLORIDE 50 MG/ML
INJECTION INTRAMUSCULAR; INTRAVENOUS
Status: COMPLETED
Start: 2025-01-08 | End: 2025-01-08

## 2025-01-08 RX ORDER — METHYLPREDNISOLONE SODIUM SUCCINATE 125 MG/2ML
125 INJECTION INTRAMUSCULAR; INTRAVENOUS ONCE
Status: COMPLETED | OUTPATIENT
Start: 2025-01-08 | End: 2025-01-08

## 2025-01-08 RX ORDER — DIPHENHYDRAMINE HYDROCHLORIDE 50 MG/ML
50 INJECTION INTRAMUSCULAR; INTRAVENOUS ONCE
Status: CANCELLED | OUTPATIENT
Start: 2025-01-08

## 2025-01-08 RX ORDER — DIPHENHYDRAMINE HYDROCHLORIDE 50 MG/ML
50 INJECTION INTRAMUSCULAR; INTRAVENOUS ONCE
Status: COMPLETED | OUTPATIENT
Start: 2025-01-08 | End: 2025-01-08

## 2025-01-08 RX ORDER — ONDANSETRON 8 MG/1
8 TABLET, ORALLY DISINTEGRATING ORAL EVERY 8 HOURS PRN
COMMUNITY

## 2025-01-08 RX ORDER — ZOLPIDEM TARTRATE 10 MG/1
10 TABLET ORAL NIGHTLY PRN
Qty: 60 TABLET | Refills: 0 | Status: SHIPPED | OUTPATIENT
Start: 2025-01-08

## 2025-01-08 RX ADMIN — FAMOTIDINE 20 MG: 10 INJECTION, SOLUTION INTRAVENOUS at 12:52:00

## 2025-01-08 RX ADMIN — DIPHENHYDRAMINE HYDROCHLORIDE 50 MG: 50 INJECTION INTRAMUSCULAR; INTRAVENOUS at 12:53:00

## 2025-01-08 RX ADMIN — METHYLPREDNISOLONE SODIUM SUCCINATE 125 MG: 125 INJECTION INTRAMUSCULAR; INTRAVENOUS at 14:05:00

## 2025-01-08 NOTE — TELEPHONE ENCOUNTER
Asked to see pt in infusion center. 20 min into infusion of Ixempra pt became bright red flushed, sob and coughing. Pt c/o feeling lightheaded from benadryl. Some sob but no chest tightness or wheezing. VS stable.  Infusion stopped. Pt had received benadryl, zofran/dex and pepcid IV. Solumedrol 125 mg given IV. Symptoms resolved and infusion restarted. Tolerating well.

## 2025-01-08 NOTE — PROGRESS NOTES
HPI   Aleisha Carlin is a 64 year old female here for follow up of   Encounter Diagnoses   Name Primary?    Malignant neoplasm of upper-outer quadrant of right breast in female, estrogen receptor positive (HCC) Yes    Malignant neoplasm metastatic to liver (HCC)     Chemotherapy management, encounter for        S/p 9 cycles seventh line capecitabine initiated on 1/22/24.  9/27/24 restaging CT shows progression in the lungs with possible lymphangitic spread and effusions.      Now on eighth line liposomal doxorubicin s/p cycle 2     Increased cough and sob with activity x 1 week. Dry cough nonproductive, tickle cough. Needs a lozenge or tessalon. Using albuterol inhaler average 1 per day.   Has to sit and catch her breath after going upstairs before she can lay down. No fevers or chills. No sinus congestion.     Ankle edema sl increase to R ankle.     Today SOB sl better on 40 mg lasix , still with dry cough. Asking for cough med with codeine.  Able to walk from car to building with less SOB.    No pain. Ankle at times. Less swelling than last week.     S/p cycle 1 Ixempra, cough is much improved.   Tolerated treatment well, some constipation.     Fatigue:  Yes, states about the same.   Some HERRERA, feeling good today.      Fevers:  No     Appetite/taste changes:  No.  However, hard to eat with TMJ flare up on the Left.  She is doing PT for TMJ.  States improving, but still on PT.  Eating soft foods. Eating better dtayed with dad who was cooking for her.     Mucositis:  No    Weight changes:  stable.    Nausea/vomiting:  Yes, D4-5, controlled with ondansetron       Diarrhea:  No,     Constipation:  Yes, controlled with stool softener    Peripheral neuropathy:  Yes, Per history, occasionally R last two fingers and does not last long. No complaint today .      PPE:  States using lotion in the hands all the time, no PPE.  Some dry skin on the soles.        Losing her hair.       ECOG PS 1    Review of Systems:      Review of Systems   Constitutional:  Positive for fatigue. Negative for appetite change, fever and unexpected weight change.   HENT:   Negative for mouth sores.         Runny nose   Eyes:         R watery eyes   Respiratory:  Positive for cough (dry nonproductive improved) and shortness of breath (HERRERA present but improved over last week).    Cardiovascular:  Positive for leg swelling (BLE edema R>L, from surgeries on the R foot and leg. Better with lasix 40 mgix prn.). Negative for chest pain and palpitations.   Gastrointestinal:  Positive for constipation (after chemo, add stool softener) and nausea. Negative for abdominal pain, diarrhea and vomiting.   Genitourinary:  Negative for dysuria and frequency.    Musculoskeletal:  Positive for arthralgias (mostly due to TMJ.). Negative for back pain and neck pain.   Skin:         NF, hyperpigmented hands and feet   Dry skin   Neurological:  Positive for dizziness (at times) and numbness (to L hand- sl betterd). Negative for extremity weakness and headaches.   Hematological:  Negative for adenopathy. Bruises/bleeds easily.   Psychiatric/Behavioral:  Positive for sleep disturbance (improved with sl mirtazapine.  Still takes zolpidem and gummies).          Current Outpatient Medications   Medication Sig Dispense Refill    zolpidem 10 MG Oral Tab Take 1 tablet (10 mg total) by mouth nightly as needed. 60 tablet 0    guaiFENesin-codeine 100-10 MG/5ML Oral Solution Take 5 mL by mouth every 6 (six) hours as needed for cough. 473 mL 1    Potassium Chloride ER 10 MEQ Oral Tab CR Take 2 tablets (20 mEq total) by mouth 2 (two) times daily. 60 tablet 0    mirtazapine 15 MG Oral Tablet Dispersible Take 1 tablet (15 mg total) by mouth nightly. 90 tablet 3    LORazepam 0.5 MG Oral Tab Take 1 tablet (0.5 mg total) by mouth every 6 (six) hours as needed for Anxiety. 30 tablet 0    HYDROcodone-acetaminophen  MG Oral Tab Take 1 tablet by mouth every 6 (six) hours as needed for  Pain. 90 tablet 0    minocycline 50 MG Oral Cap Take 1 capsule (50 mg total) by mouth daily. 90 capsule 3    furosemide 20 MG Oral Tab Take 1 tablet (20 mg total) by mouth 2 (two) times daily.      prochlorperazine (COMPAZINE) 10 mg tablet Take 1 tablet (10 mg total) by mouth every 6 (six) hours as needed for Nausea. 90 tablet 2    folic acid 1 MG Oral Tab Take 1 tablet (1 mg total) by mouth daily. (Patient not taking: Reported on 12/11/2024) 90 tablet 3    guaiFENesin-codeine 100-10 MG/5ML Oral Solution Take 5 mL by mouth every 6 (six) hours as needed for cough. 473 mL 0    albuterol 108 (90 Base) MCG/ACT Inhalation Aero Soln Inhale 1 puff into the lungs every 6 (six) hours as needed for Wheezing. 8 g 3    Cholecalciferol (VITAMIN D3) 250 MCG (97283 UT) Oral Cap Take 1 capsule by mouth daily.       Allergies:   No Known Allergies    Past Medical History:    Breast cancer (HCC)    MASTECTOMY / RECONSTRUCTION    Chemotherapy-induced neutropenia (HCC)    Encounter for insertion of venous access port    portacath insertion / venous access    Glaucoma    History of breast cancer in female    Hyperlipidemia    Malignant neoplasm of overlapping sites of breast in female, estrogen receptor positive (HCC)    Malignant neoplasm of upper-outer quadrant of right breast in female, estrogen receptor positive (HCC)    Malignant pleural effusion (HCC)    Menorrhagia    HYSTEROSCOPY / D&C / endomentrial biopsy (08/28/2007)    Metastatic breast cancer    Neurofibromatosis (HCC)    Osteopenia of multiple sites    Osteopenia of multiple sites    Osteoporosis screening    Osteoporosis screening    Per [employer]    Other osteoporosis without current pathological fracture    Postmenopausal bleeding    Psychophysiological insomnia    Tumor, foot    tumor right foot / excision/excision of bone spur/arthrodesis w/screw fixation     Past Surgical History:   Procedure Laterality Date    Breast reconstruction Right 2008    right breast  reconstructed - ductal, BRCA neg    Breast surgery      Chemotherapy  2007    Foot surgery Right     tumor rt foot / excision/excision of bone spur/arthrodesis w/screw fixation    Ir port a cath procedure  2007    Right subclavian port of cath.    Mastectomy right      2012    Mastectomy,simple  2007     Social History     Socioeconomic History    Marital status: Single   Tobacco Use    Smoking status: Never    Smokeless tobacco: Never   Substance and Sexual Activity    Alcohol use: Yes     Alcohol/week: 5.0 standard drinks of alcohol     Types: 5 Standard drinks or equivalent per week     Comment: Socially.  (Per [employer]:  5 days weekly.)    Drug use: No   Other Topics Concern    Caffeine Concern Yes     Comment: tea     Social Drivers of Health     Food Insecurity: No Food Insecurity (2023)    Food Insecurity     Food Insecurity: Never true   Transportation Needs: No Transportation Needs (2023)    Transportation Needs     Lack of Transportation: No   Housing Stability: Low Risk  (2023)    Housing Stability     Housing Instability: No       Family History   Problem Relation Age of Onset    Breast Cancer Mother 49        bilateral mastectomy    Genetic Disease Mother         NF-1    Breast Cancer Maternal Aunt 75    Breast Cancer Paternal Grandmother     Breast Cancer Self 51    Genetic Disease Self         NF-1    Cancer Paternal Aunt         throat ca    Cancer Maternal Uncle         prostate ca    Genetic Disease Brother         NF-1    Genetic Disease Brother         NF-1    Genetic Disease Brother         NF-1    Cancer Maternal Uncle         bladder ca    Breast Cancer Maternal Cousin Female     Cancer Maternal Cousin Female         leukemia;  childhood    Cancer Paternal Cousin Male         throat ca         PHYSICAL EXAM:    /76 (BP Location: Left arm, Patient Position: Sitting, Cuff Size: adult)   Pulse 64   Temp 98 °F (36.7 °C) (Oral)   Resp 16   Ht 1.524 m (5')   Wt 48.1  kg (106 lb)   SpO2 98%   BMI 20.70 kg/m²   Wt Readings from Last 6 Encounters:   12/18/24 49.2 kg (108 lb 6.4 oz)   12/11/24 49.4 kg (109 lb)   12/04/24 49.9 kg (110 lb)   11/06/24 50.3 kg (111 lb)   10/09/24 53.1 kg (117 lb)   10/02/24 51.6 kg (113 lb 12.8 oz)     Physical Exam  General: Patient is alert, not in acute distress.  HEENT: EOMs intact. Anicteric.  Nystagmus with gaze to the right.    Neck: No JVD. No palpable lymphadenopathy. Neck is supple.  Chest: Clear to auscultation.   Heart: Regular rate and rhythm.   Abdomen: Soft, non tender with good bowel sounds.    Extremities: +1 edema RLE>L chronic, R ankle edema    Neurological: Grossly intact. Normal AROM shoulders.    Psych/Depression: nl  Skin:  NF lesions on the back       ASSESSMENT/PLAN:     1. Malignant neoplasm of upper-outer quadrant of right breast in female, estrogen receptor positive (HCC)    2. Malignant neoplasm metastatic to liver (HCC)    3. Chemotherapy management, encounter for      Breast cancer history of ER/MI positive metastatic breast cancer to pleura, liver, bones. Initially diagnosed 2007 with DCIS, treated with lumpectomy, however MRI showed progression in the breast and she had a complete right mastectomy with axillary dissection followed by chemo in 2008: .pT1,N1. ER 9%, PgR 8%, Her-2 0, Ki 39%. she then presented with recurrence in 2020 right neck/supraclav ( ER 98% MI 0  her2 1+)  Pleura (Er 20%, Her2 1+) and liver:  (ER 38%, her 0)     Treatment history  9/11/2007:  right breast lumpectomy: Extensive DCIS ER 90%. Mri with progression  11/16/2007:  right mastectomy with axillary node dissection: pT1 N1 M0  2/16/2008:  4 cycles of Taxotere and Cytoxan   9/18/2020: Right neck recurrence,  left pleural,  liver-   10/1/2020: My risk Myriad negative. Started Pablociclib 125mg d1-21 w/ faslodex   4/15/2021: Neutropenic fever. Pablociclib changed to q 21 d with 14 day off cycle   1/27/2022: Palbo at 100mg D1-21 q 28 day schedule  stared per Dr Soriano  7/15/22: changed to abemaciclib. Progress in the pleura bx: TNBC, Her2 0 CPS 0  9/22/22-11/2022: Sacituzumab w/o response   11/17/22-1/2023: Abraxane with initial response, liver bx nondx (too small)  2/9/23: started gem/carbo due to SOB with response  4/26/23: imaging with continues response in pleura, progression in liver Er 65%, NC 0, Her2 0, bx: NGS below  07/6/23-1/10/24 on eribulin s/p 8 cycles prior to progression.  01/22/24- present Capecitabine     NGS:  9/2020: Foundation: NF1, [id number], FGFR1 and MYC amplification  9/2022: G360: [id number] 0.5%. Tempus pleura: NF1, TP53, FGFR1 amp  2/2023: G360:  [id number] 0.9%, APC 0.2 with several new VUS  4/2023 G360: [id number] 0.4%. APC 0.3 with several new VUS. Tempus liver:           ESR 1 mutation is negative.    Patient will proceed with 7th line therapy with capecitabine week on/week off and after 3 months (before next appt) will have imaging.  If still has liver lesion, will consider Y-90 embolization, vs continue with treatment w/o Y-90.      S/p 9 cycles seventh line capecitabine, initiated on 1/22/24.  9/27/24 restaging CT shows progression in the lungs with possible lymphangitic spread and effusions.      Now on eighth line liposomal doxorubicin s/p cycle 1 on 10/9/24, dose reduced to 37.5 mg/m2  S/p cycle 2 with 50 mg/m2 given tolerability to C1  HOLD cycle 3 Cancel     Reviewed CT scan and Echo with patient and DrGil  Progression of disease lymphangitis and liver       Change of therapy to Ixempra (9th line )    S/p cycle 1 Ixempra- cough improved     Proceed with Cycle 2       Refill sent for zolpidem and lorazepam       Cough- dry cough improvement  tessalon perles prn  Add guaifenesin / codeine cough prn       TMJ: in PT    H/o PPE- lotion to hands and feet.  No issues currenly.    MARCELLO:  Ondansetron prn.  Start evening of D4    Constipation, gr. 1:  Stool softener prn    Cancer-related pain- resolved.  No longer using Norco    Zometa -  last dose March 2023. Completed 2 years, no further needed     Added folic acid 1mg daily and changed B12 SL 1000mcg daily.     Bilateral shoulder pain, likely DJD improved.    Call prn.  Follow-up in 4 weeks    Emotional support given       MDM high risk  Continuity of complex medical care.     Component      Latest Ref Rng 1/8/2025   WBC      4.0 - 11.0 x10(3) uL 3.4 (L)    RBC      3.80 - 5.30 x10(6)uL 3.82    Hemoglobin      12.0 - 16.0 g/dL 12.0    Hematocrit      35.0 - 48.0 % 35.6    MCV      80.0 - 100.0 fL 93.2    MCH      26.0 - 34.0 pg 31.4    MCHC      31.0 - 37.0 g/dL 33.7    RDW-SD      35.1 - 46.3 fL 46.9 (H)    RDW      11.0 - 15.0 % 13.7    Platelet Count      150.0 - 450.0 10(3)uL 197.0    Prelim Neutrophil Abs      1.50 - 7.70 x10 (3) uL 2.09    Neutrophils Absolute      1.50 - 7.70 x10(3) uL 2.09    Lymphocytes Absolute      1.00 - 4.00 x10(3) uL 0.45 (L)    Monocytes Absolute      0.10 - 1.00 x10(3) uL 0.67    Eosinophils Absolute      0.00 - 0.70 x10(3) uL 0.04    Basophils Absolute      0.00 - 0.20 x10(3) uL 0.03    Immature Granulocyte Absolute      0.00 - 1.00 x10(3) uL 0.14    Neutrophils %      % 61.0    Lymphocytes %      % 13.2    Monocytes %      % 19.6    Eosinophils %      % 1.2    Basophils %      % 0.9    Immature Granulocyte %      % 4.1    Glucose      70 - 99 mg/dL 80    Sodium      136 - 145 mmol/L 142    Potassium      3.5 - 5.1 mmol/L 3.6    Chloride      98 - 112 mmol/L 107    Carbon Dioxide, Total      21.0 - 32.0 mmol/L 26.0    ANION GAP      0 - 18 mmol/L 9    BUN      9 - 23 mg/dL 16    CREATININE      0.55 - 1.02 mg/dL 0.98    BUN/CREATININE RATIO      10.0 - 20.0  16.3    CALCIUM      8.7 - 10.4 mg/dL 9.4    CALCULATED OSMOLALITY      275 - 295 mOsm/kg 294    EGFR      >=60 mL/min/1.73m2 65    ALT (SGPT)      10 - 49 U/L 28    AST (SGOT)      <34 U/L 31    ALKALINE PHOSPHATASE      50 - 130 U/L 105    Total Bilirubin      0.2 - 1.1 mg/dL 0.7    PROTEIN, TOTAL      5.7 -  8.2 g/dL 5.7    Albumin      3.2 - 4.8 g/dL 3.8    Globulin      2.0 - 3.5 g/dL 1.9 (L)    A/G Ratio      1.0 - 2.0  2.0    Patient Fasting for CMP? Patient not present           No orders of the defined types were placed in this encounter.    Reading Physician Reading Date Result Priority   Javier Mo MD  [phone] 2024      Narrative  Transthoracic Echocardiogram    Name:Aleisha Carlin    Date: 2024 :  1960 Ht:  (60in)  BP: 110 / 70  MRN:  [id number]     Age:  64years    Wt:  (110lb) HR: 95bpm  Loc:  [facility]       Gndr: F          BSA: 1.45m^2  Sonographer: ESTEBAN Ayala    Ordering:    Aiyana Jauregui    Referring:   Aiyana Jauregui    ----------------------------------------------------------------------------  History/Indications:  Breast Cancer. The patient is undergoing chemotherapy.    ----------------------------------------------------------------------------  Procedure information:  A transthoracic complete 2D study was performed.  Additional evaluation included M-mode, complete spectral Doppler, and color  Doppler.  Patient status:  Outpatient.  Location:  Echo laboratory.  Comparison was made to the study of 10/04/2024.    This was a routine study.  Transthoracic echocardiography for diagnosis and ventricular function  evaluation. Image quality was adequate.    ECG rhythm:   Normal sinus    ----------------------------------------------------------------------------    Conclusions:    1. Left ventricle: The cavity size was normal. Wall thickness was normal.     Systolic function was normal. The estimated ejection fraction was 55-60%,     by visual assessment. No diagnostic evidence for regional wall motion     abnormalities. Left ventricular diastolic function parameters were     normal. The Global Longitudinal Strain (GLS) was -21.40%. Normal strain.  2. Mitral valve: There was mild regurgitation.  3. Pulmonary arteries: Systolic pressure was within the normal range, in the      range of 25mm Hg to 30mm Hg. Estimated pulmonary artery diastolic     pressure was 13mm Hg.  Impressions:  This study is compared with previous dated 10/04/2024: No  significant change since prior study.  *    ----------------------------------------------------------------------------  *  Findings:  Left ventricle:  The cavity size was normal. Wall thickness was normal.  Systolic function was normal. The estimated ejection fraction was 55-60%, by  visual assessment. No diagnostic evidence for regional wall motion  abnormalities. The Global Longitudinal Strain (GLS) was -21.40%. Left  ventricular diastolic function parameters were normal.  Left atrium:  The atrium was normal in size.  Right ventricle:  The cavity size was normal. Systolic function was normal.  Right atrium:  The atrium was normal in size.  Mitral valve:  The valve was structurally normal. Leaflet separation was  normal.  Doppler:  Transvalvular velocity was within the normal range. There  was no evidence for stenosis. There was mild regurgitation.  Aortic valve:  The valve was structurally normal. The valve was trileaflet.  Cusp separation was normal.  Doppler:  Transvalvular velocity was within the  normal range. There was no evidence for stenosis. There was no  regurgitation.  Tricuspid valve:  The valve is structurally normal. Leaflet separation was  normal.  Doppler:  Transvalvular velocity was within the normal range. There  was no evidence for stenosis. There was mild regurgitation.  Pulmonic valve:   The valve is structurally normal. Cusp separation was  normal.  Doppler:  Transvalvular velocity was within the normal range. There  was no evidence for stenosis. There was trivial regurgitation.  Pericardium:  A trivial pericardial effusion was identified anterior to the  heart.  Aorta:  Aortic root: The aortic root was normal.  Pulmonary arteries:  Systolic pressure was within the normal range, in the range of 25mm Hg to  30mm Hg. Estimated  pulmonary artery diastolic pressure was 13mm Hg.  Systemic veins:  Central venous respirophasic diameter changes are in the  normal range (>50%).  Inferior vena cava: The IVC was normal-sized.    ----------------------------------------------------------------------------  Measurements     Left ventricle        Value         Ref       10/04/2024   GLS, 2D               -21.40 %      --------- ----------   IVS thickness,        0.7    cm     0.6 - 0.9 0.7   ED, PLAX   LV ID, ED, PLAX   (L) 3.2    cm     3.8 - 5.2 3.8   LV ID, ES, PLAX       2.2    cm     2.2 - 3.5 2.3   LV PW thickness,      0.7    cm     0.6 - 0.9 0.8   ED, PLAX   IVS/LV PW ratio,      1.00          --------- 0.88   ED, PLAX   LV PW/LV ID           0.22          --------- 0.21   ratio, ED, PLAX   LV ejection           61     %      54 - 74   71   fraction   Stroke                29     ml/m^2 --------- ----------   volume/bsa, 2D   LV e', lateral    (L) 5.3    cm/sec >=10.0    6.5   LV E/e', lateral      8             <=13      7   LV e', medial     (L) 5.1    cm/sec >=7.0     5.3   LV E/e', medial       8             --------- 9   LV e', average        5.2    cm/sec --------- 5.9   LV E/e', average      8             <=14      8     LVOT                  Value         Ref       10/04/2024   LVOT ID               2.1    cm     --------- 2.1   LVOT peak             0.8    m/sec  --------- ----------   velocity, S   LVOT VTI, S           12.0   cm     --------- ----------   LVOT peak             3      mm Hg  --------- ----------   gradient, S   LVOT mean             1      mm Hg  --------- ----------   gradient, S   Stroke volume         42     ml     --------- ----------   (SV), LVOT DP   Stroke index          29     ml/m^2 --------- ----------   (SV/bsa), LVOT DP     Aortic root           Value         Ref       10/04/2024   Aortic root ID        3.4    cm     2.3 - 3.7 3.4     Left atrium           Value         Ref       10/04/2024   LA ID, A-P,  ES    (L) 2.4    cm     2.7 - 3.8 2.3   LA/aortic root        0.71          --------- 0.68   ratio     Mitral valve          Value         Ref       10/04/2024   Mitral E-wave         0.43   m/sec  --------- 0.47   peak velocity   Mitral A-wave         0.63   m/sec  --------- 0.52   peak velocity   Mitral E/A ratio,     0.7           --------- 0.9   peak     Pulmonary artery      Value         Ref       10/04/2024   PA pressure, S,       29     mm Hg  --------- ----------   DP   PA pressure, ED,      13     mm Hg  --------- ----------   DP     Tricuspid valve       Value         Ref       10/04/2024   Tricuspid regurg      2.53   m/sec  <=2.8     2.82   peak velocity   Tricuspid peak        26     mm Hg  --------- 32   RV-RA gradient     Systemic veins        Value         Ref       10/04/2024   Estimated CVP         3      mm Hg  --------- 3     Inferior vena         Value         Ref       10/04/2024   cava   ID                    1.2    cm     <=2.1     1.3     Right ventricle       Value         Ref       10/04/2024   RV pressure, S,       29     mm Hg  --------- 35   DP   RV s', lateral        10.5   cm/sec >=9.5     15.5     Pulmonic valve        Value         Ref       10/04/2024   Pulmonic regurg       1.62   m/sec  --------- ----------   velocity, ED   Pulmonic regurg       10     mm Hg  --------- ----------   gradient, ED  Legend:  (L)  and  (H)  bandar values outside specified reference range.    ----------------------------------------------------------------------------    Prepared and electronically signed by  Javier Mo  12/06/2024 15:25      PROCEDURE: CT CHEST ABDOMEN PELVIS (ALL CONTRAST ONLY) (CPT=71260/33774)     COMPARISON: [facility], CT CHEST+ABDOMEN+PELVIS(ALL CNTRST ONLY)(CPT=71260/81039), 7/16/2024, 11:21 AM.  [facility], CT CHEST+ABDOMEN+PELVIS(ALL CNTRST ONLY)(CPT=71260/26168), 4/16/2024, 1:08 PM.    [facility],  XR CHEST PA + LAT CHEST (CPT=71046), 12/04/2024, 12:18 PM.  [facility], CT CHEST+ABDOMEN+PELVIS(ALL CNTRST ONLY)(CPT=71260/90093), 9/27/2024, 12:45 PM.     INDICATIONS: Constant coughing. Malignant neoplasm of upper-outer quadrant of right breast in female breast metastatic to the liver and bone.     TECHNIQUE:   CT images of the chest, abdomen and pelvis were obtained with intravenous contrast material.  Automated exposure control for dose reduction was used. Adjustment of the mA and/or kV was done based on the patient's size. Use of iterative  reconstruction technique for dose reduction was used.  Dose information is transmitted to the ACR (American College of Radiology) NRDR (National Radiology Data Registry) which includes the Dose Index Registry.     CHEST FINDINGS:  CARDIAC: Small stable pericardial effusion.  Mild coronary artery calcification.  MEDIASTINUM/BRANDY: No mass or adenopathy.    PULMONARY ARTERIES: Normal main pulmonary arteries.  AORTA: Atherosclerotic calcification.  No aneurysm or dissection.  LUNGS/PLEURA: More pronounced thickening the interlobular septi bilaterally.  Small loculated right basilar pleural effusion without a significant change.  Left-sided pleural thickening with associated volume loss similar to previous and associated trace   pleural effusion.  Few scattered unchanged small peripheral pulmonary nodules.  Slightly more pronounced compressive atelectasis in the right lower lobe and perifissural region of the inferior lingula, and left lower lobe.  CHEST WALL: Bilateral mass cystectomy with reconstruction implants and a chronic rupture of the right implant.  OTHER: Negative.     ABDOMEN/PELVIS FINDINGS:  LIVER: Increased size and number of hepatic metastases.  Segment 8 lesion which previously measured 19 x 15 mm measures 38 x 32 mm.  A new lesion bridging the caudate lobe and segment 8 measures 44 x 28 mm.  A new segment 4 B lesion measures 15 x 15  mm.  BILIARY: No evidence for cholecystitis or biliary dilatation.  SPLEEN: Normal.    PANCREAS: Stable approximately 11 mm enhancing lesion in the posterior aspect of the pancreatic head near its interface with the duodenum.  Stable chronic pancreatic volume loss.  ADRENALS: Normal.      KIDNEYS: Normal.  AORTA/VASCULAR: Atherosclerotic calcification.  No aneurysm or dissection.    LYMPHADENOPATHY: None.  GI/MESENTERY: Normal appendix.  Chronic prominent submucosal fat in the distal ileum and ascending colon with probable pseudo thickening related to luminal underdistention.  Colonic diverticulosis.  No visible mass, obstruction, or bowel wall thickening.      ABDOMINAL WALL: Normal.  No mass or hernia.    URINARY BLADDER: Normal  ASCITES:                        None.  PELVIC ORGANS: No suspect pelvic mass.  OTHER: Negative.     BONES: No acute fracture.                       Stable scattered sclerotic small foci in L3, L4 and T11 vertebral bodies no new osseous lesions.              Impression  CONCLUSION:  1. Small right greater than left pleural effusions with pleural thickening.  More pronounced thickening of the interlobular septal thickening in both lungs concerning for lymphangitis spread of tumor.  2. Increased size and number of hepatic metastases.  3. Stable 11 mm enhancing pancreatic lesion .  4. Stable small sclerotic metastases.  5. Chronic prominent submucosal fat deposition in the distal ileum and colon probably related to remote inflammatory process.  6. Stable small pericardial effusion.           Dictated by (CST): Wayne Reaves MD on 12/10/2024 at 5:38 PM      Finalized by (CST): Wayne Reaves MD on 12/10/2024 at 6:07 PM            No results found for this or any previous visit (from the past 48 hours).              Imaging & Referrals:  None   No orders of the defined types were placed in this encounter.    Narrative   PROCEDURE: XR CHEST PA + LAT CHEST (CPT=71046)     COMPARISON: [place]  [facility], XR CHEST AP PORTABLE (CPT=71045), 8/12/2023, 8:56 PM.     INDICATIONS: Malignant neoplasm of upper-outer quadrant of right breast in female, estrogen receptor positive (HCC) C50.411 Malignant neoplasm of upper-outer quadrant*     TECHNIQUE:   Two views.       FINDINGS:     The heart mediastinal structures are minimally enlarged.  Pulmonary vascularity is slightly increased.  The findings are suggestive of very slight fluid overload.     Lung volumes are normal.  There are small streaky opacities at the lung bases likely representing atelectasis in addition to small bilateral pleural effusions.     There is a right subclavian Port-A-Cath               Impression   CONCLUSION:     1. Slight cardiac enlargement with secondary signs of minimal fluid overload.     2. Streaky opacities at the lung bases likely representing atelectasis in addition to very small bilateral pleural effusions           Dictated by (CST): Fer Lentz MD on 12/04/2024 at 2:03 PM      Finalized by (CST): Fer Lentz MD on 12/04/2024 at 2:04 PM           Narrative  PROCEDURE: CT CHEST ABDOMEN PELVIS (ALL CONTRAST ONLY) (CPT=71260/18945)     COMPARISON: [facility], CT CHEST+ABDOMEN+PELVIS(ALL CNTRST ONLY)(CPT=71260/92555), 7/16/2024, 11:21 AM.  [facility], CT CHEST+ABDOMEN+PELVIS(ALL CNTRST ONLY)(CPT=71260/04910), 4/16/2024, 1:08 PM.    [facility], CT CHEST+ABDOMEN+PELVIS(ALL CNTRST ONLY)(CPT=71260/02493), 1/05/2024, 8:43 AM.     INDICATIONS: Metastasis to bone. Malignant neoplasm of upper-outer quadrant of right breast in female, estrogen receptor positive (HCC) C50.411 Malignant *     TECHNIQUE:   CT images of the chest, abdomen and pelvis were obtained with intravenous contrast material.  Automated exposure control for dose reduction was used. Adjustment of the mA and/or kV was done based on the patient's size. Use of iterative  reconstruction  technique for dose reduction was used.  Dose information is transmitted to the ACR (American College of Radiology) NRDR (National Radiology Data Registry) which includes the Dose Index Registry.     FINDINGS:  AIRWAYS: Central airways are patent.  LUNGS/PLEURA:   Small left pleural effusion which has attenuation above water attenuation and probably reflects a complex effusion similar to the prior study.  There is a small right pleural effusion.  There are some areas of probable subpleural and  dependent atelectasis in both lungs.  Few punctate subpleural nodules in the right lung are not significantly changed.  There is bilateral interlobular septal thickening left greater than right most significant in the lung bases.  No pneumothorax.  CARDIAC:   No cardiomegaly.  Trace pericardial effusion with suggestion of anterior pericardial enhancement.  There is coronary artery calcification.  MEDIASTINUM/BRANDY:   No mass or enlarged adenopathy.    VASCULAR: Thoracic aorta is normal in caliber.  CHEST WALL: Status post bilateral mastectomy.  No axillary mass or enlarged adenopathy.  Bilateral breast implants noted.  Intracapsular rupture of the right implant again appears to be present.  Left implant is grossly intact.  Stable right chest wall  port.  Innumerable nodular cutaneous foci again seen.  Multinodular heterogeneous thyroid.     LIVER:   Previously described segment 8 hypodense lesion is less conspicuous currently but measures approximately 1.9 x 1.5 cm grossly similar to the previous examination.  SPLEEN:   No enlargement or focal lesion.    PANCREAS:   There is a small enhancing focus in the pancreatic head measuring 0.8 x 1.0 cm image 111 series 2. This does not appear to be significantly changed in size.    GALLBLADDER: Normal size and appearance.  ADRENALS:   Stable 0.9 cm right adrenal nodule.  Left adrenal gland is unremarkable.  KIDNEYS:   Enhance symmetrically without hydronephrosis.  AORTA/VASCULAR:    Abdominal aorta is normal in caliber with mild atherosclerosis.  ABDOMINAL NODES:   No mass or enlarged adenopathy.    BOWEL/MESENTERY:   Bowel is nondilated.  No bowel wall thickening.  No pneumatosis or pneumoperitoneum.  Appendix is unremarkable.  There is colonic diverticulosis.  ABDOMINAL WALL:   No mass or hernia.  URINARY BLADDER:   No visible focal wall thickening, lesion, or calculus.    PELVIC ORGANS:   Uterus is present.  PELVIC NODES:   0.8 cm short axis nodular focus in the right perirectal region image 165 series 2 is unchanged in size.  BONES: Spondylosis thoracolumbar spine.  Stable 0.9 cm sclerotic focus T11 vertebral body.  Tiny sclerotic foci in the L3, L4 and L4 vertebral bodies grossly unchanged.  OTHER:   Negative.              Impression  CONCLUSION:     Left greater than right bilateral interlobular septal thickening which overall has increased since the prior examination July 2024. Small left pleural effusion similar to the prior study.  New/increased right pleural effusion.  Trace pericardial effusion   with suggestion of anterior pericardial enhancement.  The findings are suspicious for lymphangitic spread of neoplasm.     Subtle hepatic lesion segment 8 grossly unchanged in size since the prior examination.     1 cm enhancing focus pancreatic head not significantly changed in size.     Grossly unchanged scattered sclerotic osseous metastatic disease.     Lesser incidental findings as above.                 Dictated by (CST): Odin Ivory MD on 9/27/2024 at 1:39 PM      Finalized by (CST): Odin Ivory MD on 9/27/2024 at 2:05 PM

## 2025-01-08 NOTE — PROGRESS NOTES
Pt here for C2D1 Drug(s)Ixempra.  Arrives Ambulating independently, accompanied by Self     Patient was evaluated today by DOMINICK.  Oral medications included in this regimen:  no  Patient confirms comprehension of cancer treatment schedule:  yes  Pregnancy screening:  Denies possibility of pregnancy  Modifications in dose or schedule:  No  Medications appearance and physical integrity checked by RN: yes.  Chemotherapy IV pump settings verified by 2 RNs:  Yes.  Frequency of blood return and site check throughout administration: Prior to administration, Prior to each drug, and At completion of therapy   Infusion/treatment outcome:  hypersensitivity protocol initiated - see documentation. In the beginning of the infusion, after about 18ml were infused, patient c/o SOB, trouble breathing, chest tightness and \"feeling weird\" '(like the feeling of after you get benadryl)\". Patient noted to be flushed in the face. BP and HR noted to be elevated compared to pre-infusion vitals. Infusion stopped, SABA Mak at chairside. IVF started and SoluMedrol 125mg administered. Patient re-challenged and tolerated the rest of the infusion well. Ixempra re-started at half rate for 15mins per SABA Bronson. After patient was pre-medicated, this RN waiting 45mins before starting Ixempra.     Education Record    Learner:  Patient  Barriers / Limitations:  None  Method:  Reinforcement  Education / instructions given:  Plan of care.  Outcome:  Shows understanding    Discharged Home, Ambulating independently, accompanied by:Self    Patient/family verbalized understanding of future appointments: by printed AVS

## 2025-01-09 ENCOUNTER — TELEPHONE (OUTPATIENT)
Age: 65
End: 2025-01-09

## 2025-01-14 DIAGNOSIS — C79.9 METASTASIS FROM BREAST CANCER (HCC): ICD-10-CM

## 2025-01-14 DIAGNOSIS — C50.919 METASTASIS FROM BREAST CANCER (HCC): ICD-10-CM

## 2025-01-14 DIAGNOSIS — G89.3 CANCER RELATED PAIN: ICD-10-CM

## 2025-01-14 RX ORDER — HYDROCODONE BITARTRATE AND ACETAMINOPHEN 10; 325 MG/1; MG/1
1 TABLET ORAL EVERY 6 HOURS PRN
Qty: 90 TABLET | Refills: 0 | Status: SHIPPED | OUTPATIENT
Start: 2025-01-14

## 2025-01-15 ENCOUNTER — APPOINTMENT (OUTPATIENT)
Dept: HEMATOLOGY/ONCOLOGY | Facility: HOSPITAL | Age: 65
End: 2025-01-15
Attending: INTERNAL MEDICINE
Payer: MEDICARE

## 2025-01-29 ENCOUNTER — OFFICE VISIT (OUTPATIENT)
Age: 65
End: 2025-01-29
Attending: INTERNAL MEDICINE
Payer: MEDICARE

## 2025-01-29 ENCOUNTER — NURSE ONLY (OUTPATIENT)
Age: 65
End: 2025-01-29
Attending: INTERNAL MEDICINE
Payer: MEDICARE

## 2025-01-29 VITALS
HEART RATE: 102 BPM | HEIGHT: 60 IN | RESPIRATION RATE: 18 BRPM | TEMPERATURE: 99 F | DIASTOLIC BLOOD PRESSURE: 78 MMHG | SYSTOLIC BLOOD PRESSURE: 115 MMHG | WEIGHT: 103 LBS | OXYGEN SATURATION: 97 % | BODY MASS INDEX: 20.22 KG/M2

## 2025-01-29 VITALS
DIASTOLIC BLOOD PRESSURE: 82 MMHG | RESPIRATION RATE: 18 BRPM | OXYGEN SATURATION: 97 % | HEART RATE: 92 BPM | SYSTOLIC BLOOD PRESSURE: 106 MMHG

## 2025-01-29 DIAGNOSIS — C50.411 MALIGNANT NEOPLASM OF UPPER-OUTER QUADRANT OF RIGHT BREAST IN FEMALE, ESTROGEN RECEPTOR POSITIVE (HCC): Primary | ICD-10-CM

## 2025-01-29 DIAGNOSIS — Z17.0 MALIGNANT NEOPLASM OF UPPER-OUTER QUADRANT OF RIGHT BREAST IN FEMALE, ESTROGEN RECEPTOR POSITIVE (HCC): Primary | ICD-10-CM

## 2025-01-29 DIAGNOSIS — C78.7 MALIGNANT NEOPLASM METASTATIC TO LIVER (HCC): ICD-10-CM

## 2025-01-29 DIAGNOSIS — T78.40XA HYPERSENSITIVITY REACTION: ICD-10-CM

## 2025-01-29 DIAGNOSIS — T80.90XD INFUSION REACTION, SUBSEQUENT ENCOUNTER: ICD-10-CM

## 2025-01-29 DIAGNOSIS — Z09 CHEMOTHERAPY FOLLOW-UP EXAMINATION: ICD-10-CM

## 2025-01-29 DIAGNOSIS — C79.51 METASTASIS TO BONE (HCC): ICD-10-CM

## 2025-01-29 PROBLEM — T80.90XA INFUSION REACTION: Status: ACTIVE | Noted: 2025-01-29

## 2025-01-29 PROBLEM — T80.90XA INFUSION REACTION: Status: ACTIVE | Noted: 2025-01-01

## 2025-01-29 LAB
ALBUMIN SERPL-MCNC: 3.8 G/DL (ref 3.2–4.8)
ALBUMIN/GLOB SERPL: 1.9 {RATIO} (ref 1–2)
ALP LIVER SERPL-CCNC: 113 U/L
ALT SERPL-CCNC: 28 U/L
ANION GAP SERPL CALC-SCNC: 7 MMOL/L (ref 0–18)
AST SERPL-CCNC: 28 U/L (ref ?–34)
BASOPHILS # BLD AUTO: 0.04 X10(3) UL (ref 0–0.2)
BASOPHILS NFR BLD AUTO: 1 %
BILIRUB SERPL-MCNC: 1 MG/DL (ref 0.2–1.1)
BUN BLD-MCNC: 17 MG/DL (ref 9–23)
BUN/CREAT SERPL: 17.5 (ref 10–20)
CALCIUM BLD-MCNC: 9.3 MG/DL (ref 8.7–10.4)
CHLORIDE SERPL-SCNC: 107 MMOL/L (ref 98–112)
CO2 SERPL-SCNC: 27 MMOL/L (ref 21–32)
CREAT BLD-MCNC: 0.97 MG/DL
DEPRECATED RDW RBC AUTO: 52.9 FL (ref 35.1–46.3)
EGFRCR SERPLBLD CKD-EPI 2021: 66 ML/MIN/1.73M2 (ref 60–?)
EOSINOPHIL # BLD AUTO: 0.02 X10(3) UL (ref 0–0.7)
EOSINOPHIL NFR BLD AUTO: 0.5 %
ERYTHROCYTE [DISTWIDTH] IN BLOOD BY AUTOMATED COUNT: 15.5 % (ref 11–15)
GLOBULIN PLAS-MCNC: 2 G/DL (ref 2–3.5)
GLUCOSE BLD-MCNC: 85 MG/DL (ref 70–99)
HCT VFR BLD AUTO: 36.7 %
HGB BLD-MCNC: 11.9 G/DL
IMM GRANULOCYTES # BLD AUTO: 0.14 X10(3) UL (ref 0–1)
IMM GRANULOCYTES NFR BLD: 3.6 %
LYMPHOCYTES # BLD AUTO: 0.55 X10(3) UL (ref 1–4)
LYMPHOCYTES NFR BLD AUTO: 14.1 %
MCH RBC QN AUTO: 30.1 PG (ref 26–34)
MCHC RBC AUTO-ENTMCNC: 32.4 G/DL (ref 31–37)
MCV RBC AUTO: 92.9 FL
MONOCYTES # BLD AUTO: 0.84 X10(3) UL (ref 0.1–1)
MONOCYTES NFR BLD AUTO: 21.5 %
NEUTROPHILS # BLD AUTO: 2.32 X10 (3) UL (ref 1.5–7.7)
NEUTROPHILS # BLD AUTO: 2.32 X10(3) UL (ref 1.5–7.7)
NEUTROPHILS NFR BLD AUTO: 59.3 %
OSMOLALITY SERPL CALC.SUM OF ELEC: 293 MOSM/KG (ref 275–295)
PLATELET # BLD AUTO: 236 10(3)UL (ref 150–450)
POTASSIUM SERPL-SCNC: 3.7 MMOL/L (ref 3.5–5.1)
PROT SERPL-MCNC: 5.8 G/DL (ref 5.7–8.2)
RBC # BLD AUTO: 3.95 X10(6)UL
SODIUM SERPL-SCNC: 141 MMOL/L (ref 136–145)
WBC # BLD AUTO: 3.9 X10(3) UL (ref 4–11)

## 2025-01-29 RX ORDER — DIPHENHYDRAMINE HYDROCHLORIDE 50 MG/ML
INJECTION INTRAMUSCULAR; INTRAVENOUS
Status: COMPLETED
Start: 2025-01-29 | End: 2025-01-29

## 2025-01-29 RX ORDER — DIPHENHYDRAMINE HYDROCHLORIDE 50 MG/ML
50 INJECTION INTRAMUSCULAR; INTRAVENOUS ONCE
Status: COMPLETED | OUTPATIENT
Start: 2025-01-29 | End: 2025-01-29

## 2025-01-29 RX ORDER — DEXAMETHASONE 4 MG/1
8 TABLET ORAL 2 TIMES DAILY WITH MEALS
Qty: 12 TABLET | Refills: 5 | Status: SHIPPED | OUTPATIENT
Start: 2025-01-29

## 2025-01-29 RX ORDER — FAMOTIDINE 10 MG/ML
20 INJECTION, SOLUTION INTRAVENOUS ONCE
Status: COMPLETED | OUTPATIENT
Start: 2025-01-29 | End: 2025-01-29

## 2025-01-29 RX ORDER — MONTELUKAST SODIUM 10 MG/1
TABLET ORAL
Status: COMPLETED
Start: 2025-01-29 | End: 2025-01-29

## 2025-01-29 RX ORDER — MONTELUKAST SODIUM 5 MG/1
10 TABLET, CHEWABLE ORAL ONCE
Status: CANCELLED
Start: 2025-01-29 | End: 2025-01-29

## 2025-01-29 RX ORDER — METHYLPREDNISOLONE SODIUM SUCCINATE 125 MG/2ML
125 INJECTION INTRAMUSCULAR; INTRAVENOUS ONCE
Status: COMPLETED | OUTPATIENT
Start: 2025-01-29 | End: 2025-01-29

## 2025-01-29 RX ORDER — MONTELUKAST SODIUM 10 MG/1
10 TABLET ORAL ONCE
Status: COMPLETED | OUTPATIENT
Start: 2025-01-29 | End: 2025-01-29

## 2025-01-29 RX ORDER — FAMOTIDINE 10 MG/ML
INJECTION, SOLUTION INTRAVENOUS
Status: COMPLETED
Start: 2025-01-29 | End: 2025-01-29

## 2025-01-29 RX ORDER — FAMOTIDINE 10 MG/ML
20 INJECTION, SOLUTION INTRAVENOUS ONCE
Status: CANCELLED | OUTPATIENT
Start: 2025-01-29

## 2025-01-29 RX ORDER — DIPHENHYDRAMINE HYDROCHLORIDE 50 MG/ML
50 INJECTION INTRAMUSCULAR; INTRAVENOUS ONCE
Status: CANCELLED | OUTPATIENT
Start: 2025-01-29

## 2025-01-29 RX ADMIN — DIPHENHYDRAMINE HYDROCHLORIDE 50 MG: 50 INJECTION INTRAMUSCULAR; INTRAVENOUS at 11:50:00

## 2025-01-29 RX ADMIN — MONTELUKAST SODIUM 10 MG: 10 TABLET ORAL at 11:50:00

## 2025-01-29 RX ADMIN — FAMOTIDINE 20 MG: 10 INJECTION, SOLUTION INTRAVENOUS at 11:50:00

## 2025-01-29 RX ADMIN — METHYLPREDNISOLONE SODIUM SUCCINATE 125 MG: 125 INJECTION INTRAMUSCULAR; INTRAVENOUS at 13:25:00

## 2025-01-29 NOTE — PROGRESS NOTES
HPI   Aleisha Carlin is a 64 year old female here for follow up of   Encounter Diagnoses   Name Primary?    Malignant neoplasm of upper-outer quadrant of right breast in female, estrogen receptor positive (HCC) Yes    Metastasis to bone (HCC)     Malignant neoplasm metastatic to liver (HCC)     Chemotherapy follow-up examination        S/p 9 cycles seventh line capecitabine initiated on 1/22/24.  9/27/24 restaging CT shows progression in the lungs with possible lymphangitic spread and effusions.      Now on eighth line liposomal doxorubicin s/p cycle 2     Increased cough and sob with activity x 1 week. Dry cough nonproductive, tickle cough. Needs a lozenge or tessalon. Using albuterol inhaler average 1 per day.   Has to sit and catch her breath after going upstairs before she can lay down. No fevers or chills. No sinus congestion.     Ankle edema sl increase to R ankle.     Today SOB sl better on 40 mg lasix , still with dry cough. Asking for cough med with codeine.  Able to walk from car to building with less SOB.    No pain. Ankle at times. Less swelling than last week.     S/p cycle 2 Ixempra, cough is much improved.   States that she had flushing and anxiety this last treatment.  Had infusion reaction.  Resolved with steroids.    Tolerated treatment well, some constipation.     Fatigue:  Yes, states about the same.       Fevers:  No     Appetite/taste changes:  Yes, but making sure she eats .      Mucositis:  No    Weight changes:  lost 3 lbs.    Nausea/vomiting:  Yes, D4-5, controlled with ondansetron prn       Diarrhea:  Yes, after constipation but not a lot ,     Constipation:  Yes, controlled with stool softener. But occasionally has issues.  Does take olive oil at times if needed.     Peripheral neuropathy:  Yes, Per history, occasionally R last two fingers and does not last long. No complaint today .      PPE:  States using lotion in the hands all the time, no PPE.  Some dry skin on the soles.         Losing her hair.       ECOG PS 1    Review of Systems:     Review of Systems   Respiratory:  Positive for cough (sometimes if talks too much) and shortness of breath (HERRERA with walking at the store.).    Cardiovascular:  Positive for chest pain (L lower ribs at times, no aggravating or alleviating factors.  States once a day or so, lasts about an hour, at times takes norco.).   Gastrointestinal:  Negative for abdominal pain.   Genitourinary:  Negative for dysuria and frequency.    Musculoskeletal:  Negative for arthralgias, back pain and neck pain.   Skin:  Negative for rash.        Dry skin on the feet   Neurological:  Negative for dizziness and headaches.   Hematological:  Negative for adenopathy. Bruises/bleeds easily.   Psychiatric/Behavioral:  Positive for sleep disturbance.          Current Outpatient Medications   Medication Sig Dispense Refill    HYDROcodone-acetaminophen  MG Oral Tab Take 1 tablet by mouth every 6 (six) hours as needed for Pain. 90 tablet 0    ondansetron 8 MG Oral Tablet Dispersible Take 1 tablet (8 mg total) by mouth every 8 (eight) hours as needed for Nausea.      zolpidem 10 MG Oral Tab Take 1 tablet (10 mg total) by mouth nightly as needed. 60 tablet 0    LORazepam 0.5 MG Oral Tab Take 1 tablet (0.5 mg total) by mouth every 6 (six) hours as needed for Anxiety. 30 tablet 0    guaiFENesin-codeine 100-10 MG/5ML Oral Solution Take 5 mL by mouth every 6 (six) hours as needed for cough. 473 mL 1    mirtazapine 15 MG Oral Tablet Dispersible Take 1 tablet (15 mg total) by mouth nightly. 90 tablet 3    minocycline 50 MG Oral Cap Take 1 capsule (50 mg total) by mouth daily. 90 capsule 3    furosemide 20 MG Oral Tab Take 1 tablet (20 mg total) by mouth daily.      prochlorperazine (COMPAZINE) 10 mg tablet Take 1 tablet (10 mg total) by mouth every 6 (six) hours as needed for Nausea. 90 tablet 2    folic acid 1 MG Oral Tab Take 1 tablet (1 mg total) by mouth daily. 90 tablet 3     albuterol 108 (90 Base) MCG/ACT Inhalation Aero Soln Inhale 1 puff into the lungs every 6 (six) hours as needed for Wheezing. 8 g 3    Cholecalciferol (VITAMIN D3) 250 MCG (41908 UT) Oral Cap Take 1 capsule by mouth daily.      Potassium Chloride ER 10 MEQ Oral Tab CR Take 2 tablets (20 mEq total) by mouth 2 (two) times daily. (Patient not taking: Reported on 1/29/2025) 60 tablet 0     Allergies:   No Known Allergies    Past Medical History:    Breast cancer (HCC)    MASTECTOMY / RECONSTRUCTION    Chemotherapy-induced neutropenia    Encounter for insertion of venous access port    portacath insertion / venous access    Glaucoma    History of breast cancer in female    Hyperlipidemia    Malignant neoplasm of overlapping sites of breast in female, estrogen receptor positive (HCC)    Malignant neoplasm of upper-outer quadrant of right breast in female, estrogen receptor positive (HCC)    Malignant pleural effusion (HCC)    Menorrhagia    HYSTEROSCOPY / D&C / endomentrial biopsy (08/28/2007)    Metastatic breast cancer    Neurofibromatosis (HCC)    Osteopenia of multiple sites    Osteopenia of multiple sites    Osteoporosis screening    Osteoporosis screening    Per [employer]    Other osteoporosis without current pathological fracture    Postmenopausal bleeding    Psychophysiological insomnia    Tumor, foot    tumor right foot / excision/excision of bone spur/arthrodesis w/screw fixation     Past Surgical History:   Procedure Laterality Date    Breast reconstruction Right 2008    right breast reconstructed - ductal, BRCA neg    Breast surgery      Chemotherapy  2007    Foot surgery Right     tumor rt foot / excision/excision of bone spur/arthrodesis w/screw fixation    Ir port a cath procedure  2007    Right subclavian port of cath.    Mastectomy right      2012    Mastectomy,simple  2007     Social History     Socioeconomic History    Marital status: Single   Tobacco Use    Smoking status: Never    Smokeless tobacco:  Never   Substance and Sexual Activity    Alcohol use: Yes     Alcohol/week: 5.0 standard drinks of alcohol     Types: 5 Standard drinks or equivalent per week     Comment: Socially.  (Per [employer]:  5 days weekly.)    Drug use: No   Other Topics Concern    Caffeine Concern Yes     Comment: tea     Social Drivers of Health     Food Insecurity: No Food Insecurity (2023)    Food Insecurity     Food Insecurity: Never true   Transportation Needs: No Transportation Needs (2023)    Transportation Needs     Lack of Transportation: No   Housing Stability: Low Risk  (2023)    Housing Stability     Housing Instability: No       Family History   Problem Relation Age of Onset    Breast Cancer Mother 49        bilateral mastectomy    Genetic Disease Mother         NF-1    Breast Cancer Maternal Aunt 75    Breast Cancer Paternal Grandmother     Breast Cancer Self 51    Genetic Disease Self         NF-1    Cancer Paternal Aunt         throat ca    Cancer Maternal Uncle         prostate ca    Genetic Disease Brother         NF-1    Genetic Disease Brother         NF-1    Genetic Disease Brother         NF-1    Cancer Maternal Uncle         bladder ca    Breast Cancer Maternal Cousin Female     Cancer Maternal Cousin Female         leukemia;  childhood    Cancer Paternal Cousin Male         throat ca         PHYSICAL EXAM:    /78 (BP Location: Left arm, Patient Position: Sitting, Cuff Size: adult)   Pulse 102   Temp 98.6 °F (37 °C) (Oral)   Resp 18   Ht 1.524 m (5')   Wt 46.7 kg (103 lb)   SpO2 97%   BMI 20.12 kg/m²   Wt Readings from Last 6 Encounters:   25 46.7 kg (103 lb)   25 48.1 kg (106 lb)   24 49.2 kg (108 lb 6.4 oz)   24 49.4 kg (109 lb)   24 49.9 kg (110 lb)   24 50.3 kg (111 lb)     Physical Exam  General: Patient is alert, not in acute distress.  HEENT: EOMs intact. Anicteric.  Nystagmus with gaze to the right.    Neck: No JVD. No palpable  lymphadenopathy. Neck is supple.  Chest: Clear to auscultation.   Heart: Regular rate and rhythm.   Abdomen: Soft, non tender with good bowel sounds.    Extremities: +1 edema RLE>L chronic, R ankle edema    Neurological: Grossly intact. Normal AROM shoulders.    Psych/Depression: nl  Skin:  NF lesions on the back       ASSESSMENT/PLAN:     1. Malignant neoplasm of upper-outer quadrant of right breast in female, estrogen receptor positive (HCC)    2. Metastasis to bone (HCC)    3. Malignant neoplasm metastatic to liver (HCC)    4. Chemotherapy follow-up examination      Breast cancer history of ER/PA positive metastatic breast cancer to pleura, liver, bones. Initially diagnosed 2007 with DCIS, treated with lumpectomy, however MRI showed progression in the breast and she had a complete right mastectomy with axillary dissection followed by chemo in 2008: .pT1,N1. ER 9%, PgR 8%, Her-2 0, Ki 39%. she then presented with recurrence in 2020 right neck/supraclav ( ER 98% PA 0  her2 1+)  Pleura (Er 20%, Her2 1+) and liver:  (ER 38%, her 0)     Treatment history  9/11/2007:  right breast lumpectomy: Extensive DCIS ER 90%. Mri with progression  11/16/2007:  right mastectomy with axillary node dissection: pT1 N1 M0  2/16/2008:  4 cycles of Taxotere and Cytoxan   9/18/2020: Right neck recurrence,  left pleural,  liver-   10/1/2020: My risk Myriad negative. Started Pablociclib 125mg d1-21 w/ faslodex   4/15/2021: Neutropenic fever. Pablociclib changed to q 21 d with 14 day off cycle   1/27/2022: Palbo at 100mg D1-21 q 28 day schedule stared per Dr Soriano  7/15/22: changed to abemaciclib. Progress in the pleura bx: TNBC, Her2 0 CPS 0  9/22/22-11/2022: Sacituzumab w/o response   11/17/22-1/2023: Abraxane with initial response, liver bx nondx (too small)  2/9/23: started gem/carbo due to SOB with response  4/26/23: imaging with continues response in pleura, progression in liver Er 65%, PA 0, Her2 0, bx: NGS below  07/6/23-1/10/24 on  eribulin s/p 8 cycles prior to progression.  01/22/24- present Capecitabine     NGS:  9/2020: Foundation: NF1, [id number], FGFR1 and MYC amplification  9/2022: G360: [id number] 0.5%. Tempus pleura: NF1, TP53, FGFR1 amp  2/2023: G360:  [id number] 0.9%, APC 0.2 with several new VUS  4/2023 G360: [id number] 0.4%. APC 0.3 with several new VUS. Tempus liver:           ESR 1 mutation is negative.    Patient will proceed with 7th line therapy with capecitabine week on/week off and after 3 months (before next appt) will have imaging.  If still has liver lesion, will consider Y-90 embolization, vs continue with treatment w/o Y-90.      S/p 9 cycles seventh line capecitabine, initiated on 1/22/24.  9/27/24 restaging CT shows progression in the lungs with possible lymphangitic spread and effusions.      Now on eighth line liposomal doxorubicin s/p cycle 1 on 10/9/24, dose reduced to 37.5 mg/m2  S/p cycle 2 with 50 mg/m2 given tolerability to C1  HOLD cycle 3 due to disease progression.      Change of therapy to Ixempra (9th line )    S/p cycle 2 Ixempra- cough improved.  Had infusion reaction.  Will add monteleukast and slow infusion rate to 4 hours for cycle 3 and on.       Proceed with Cycle 3, she will have tumor assessment after cycle 3.  Order given for the patient to schedule her CT scan.      Refill sent for zolpidem and lorazepam     H/o PPE- lotion to hands and feet.  No issues currenly.    MARCELLO:  Ondansetron prn.  Start evening of D4    Constipation, gr. 1:  Stool softener prn    Cancer-related pain- resolved.  No longer using Norco    Zometa - last dose March 2023. Completed 2 years, no further needed     Added folic acid 1mg daily and changed B12 SL 1000mcg daily.     FEN:  increase protein and calories.    Call prn.  Follow-up in 3  weeks    Emotional support given       MDM high risk  Continuity of complex medical care.     No orders of the defined types were placed in this encounter.    Recent Results (from the past 48  hours)   CBC W Differential W Platelet    Collection Time: 01/29/25 10:01 AM   Result Value Ref Range    WBC 3.9 (L) 4.0 - 11.0 x10(3) uL    RBC 3.95 3.80 - 5.30 x10(6)uL    HGB 11.9 (L) 12.0 - 16.0 g/dL    HCT 36.7 35.0 - 48.0 %    MCV 92.9 80.0 - 100.0 fL    MCH 30.1 26.0 - 34.0 pg    MCHC 32.4 31.0 - 37.0 g/dL    RDW-SD 52.9 (H) 35.1 - 46.3 fL    RDW 15.5 (H) 11.0 - 15.0 %    .0 150.0 - 450.0 10(3)uL    Neutrophil Absolute Prelim 2.32 1.50 - 7.70 x10 (3) uL    Neutrophil Absolute 2.32 1.50 - 7.70 x10(3) uL    Lymphocyte Absolute 0.55 (L) 1.00 - 4.00 x10(3) uL    Monocyte Absolute 0.84 0.10 - 1.00 x10(3) uL    Eosinophil Absolute 0.02 0.00 - 0.70 x10(3) uL    Basophil Absolute 0.04 0.00 - 0.20 x10(3) uL    Immature Granulocyte Absolute 0.14 0.00 - 1.00 x10(3) uL    Neutrophil % 59.3 %    Lymphocyte % 14.1 %    Monocyte % 21.5 %    Eosinophil % 0.5 %    Basophil % 1.0 %    Immature Granulocyte % 3.6 %   Comp Metabolic Panel (14)    Collection Time: 01/29/25 10:01 AM   Result Value Ref Range    Glucose 85 70 - 99 mg/dL    Sodium 141 136 - 145 mmol/L    Potassium 3.7 3.5 - 5.1 mmol/L    Chloride 107 98 - 112 mmol/L    CO2 27.0 21.0 - 32.0 mmol/L    Anion Gap 7 0 - 18 mmol/L    BUN 17 9 - 23 mg/dL    Creatinine 0.97 0.55 - 1.02 mg/dL    BUN/CREA Ratio 17.5 10.0 - 20.0    Calcium, Total 9.3 8.7 - 10.4 mg/dL    Calculated Osmolality 293 275 - 295 mOsm/kg    eGFR-Cr 66 >=60 mL/min/1.73m2    ALT 28 10 - 49 U/L    AST 28 <34 U/L    Alkaline Phosphatase 113 50 - 130 U/L    Bilirubin, Total 1.0 0.2 - 1.1 mg/dL    Total Protein 5.8 5.7 - 8.2 g/dL    Albumin 3.8 3.2 - 4.8 g/dL    Globulin  2.0 2.0 - 3.5 g/dL    A/G Ratio 1.9 1.0 - 2.0    Patient Fasting for CMP? Patient not present        Component      Latest Ref Rn 12/11/2024 1/8/2025   WBC      4.0 - 11.0 x10(3) uL 5.2  3.4 (L)    RBC      3.80 - 5.30 x10(6)uL 4.26  3.82    Hemoglobin      12.0 - 16.0 g/dL 13.9  12.0    Hematocrit      35.0 - 48.0 % 41.6  35.6     MCV      80.0 - 100.0 fL 97.7  93.2    MCH      26.0 - 34.0 pg 32.6  31.4    MCHC      31.0 - 37.0 g/dL 33.4  33.7    RDW-SD      35.1 - 46.3 fL 47.2 (H)  46.9 (H)    RDW      11.0 - 15.0 % 13.2  13.7    Platelet Count      150.0 - 450.0 10(3)uL 198.0  197.0    Prelim Neutrophil Abs      1.50 - 7.70 x10 (3) uL 3.68  2.09    Neutrophils Absolute      1.50 - 7.70 x10(3) uL 3.68  2.09    Lymphocytes Absolute      1.00 - 4.00 x10(3) uL 0.51 (L)  0.45 (L)    Monocytes Absolute      0.10 - 1.00 x10(3) uL 0.87  0.67    Eosinophils Absolute      0.00 - 0.70 x10(3) uL 0.04  0.04    Basophils Absolute      0.00 - 0.20 x10(3) uL 0.04  0.03    Immature Granulocyte Absolute      0.00 - 1.00 x10(3) uL 0.02  0.14    Neutrophils %      % 71.2  61.0    Lymphocytes %      % 9.9  13.2    Monocytes %      % 16.9  19.6    Eosinophils %      % 0.8  1.2    Basophils %      % 0.8  0.9    Immature Granulocyte %      % 0.4  4.1    Glucose      70 - 99 mg/dL 86  80    Sodium      136 - 145 mmol/L 140  142    Potassium      3.5 - 5.1 mmol/L 3.8  3.6    Chloride      98 - 112 mmol/L 102  107    Carbon Dioxide, Total      21.0 - 32.0 mmol/L 31.0  26.0    ANION GAP      0 - 18 mmol/L 7  9    BUN      9 - 23 mg/dL 22  16    CREATININE      0.55 - 1.02 mg/dL 1.10 (H)  0.98    BUN/CREATININE RATIO      10.0 - 20.0  20.0  16.3    CALCIUM      8.7 - 10.4 mg/dL 9.8  9.4    CALCULATED OSMOLALITY      275 - 295 mOsm/kg 293  294    EGFR      >=60 mL/min/1.73m2 56 (L)  65    ALT (SGPT)      10 - 49 U/L 39  28    AST (SGOT)      <34 U/L 42 (H)  31    ALKALINE PHOSPHATASE      50 - 130 U/L 107  105    Total Bilirubin      0.2 - 1.1 mg/dL 0.8  0.7    PROTEIN, TOTAL      5.7 - 8.2 g/dL 6.2  5.7    Albumin      3.2 - 4.8 g/dL 4.0  3.8    Globulin      2.0 - 3.5 g/dL 2.2  1.9 (L)    A/G Ratio      1.0 - 2.0  1.8  2.0    Patient Fasting for CMP? Patient not present  Patient not present      Component      Latest Ref Rng 1/29/2025   WBC      4.0 - 11.0 x10(3) uL  3.9 (L)    RBC      3.80 - 5.30 x10(6)uL 3.95    Hemoglobin      12.0 - 16.0 g/dL 11.9 (L)    Hematocrit      35.0 - 48.0 % 36.7    MCV      80.0 - 100.0 fL 92.9    MCH      26.0 - 34.0 pg 30.1    MCHC      31.0 - 37.0 g/dL 32.4    RDW-SD      35.1 - 46.3 fL 52.9 (H)    RDW      11.0 - 15.0 % 15.5 (H)    Platelet Count      150.0 - 450.0 10(3)uL 236.0    Prelim Neutrophil Abs      1.50 - 7.70 x10 (3) uL 2.32    Neutrophils Absolute      1.50 - 7.70 x10(3) uL 2.32    Lymphocytes Absolute      1.00 - 4.00 x10(3) uL 0.55 (L)    Monocytes Absolute      0.10 - 1.00 x10(3) uL 0.84    Eosinophils Absolute      0.00 - 0.70 x10(3) uL 0.02    Basophils Absolute      0.00 - 0.20 x10(3) uL 0.04    Immature Granulocyte Absolute      0.00 - 1.00 x10(3) uL 0.14    Neutrophils %      % 59.3    Lymphocytes %      % 14.1    Monocytes %      % 21.5    Eosinophils %      % 0.5    Basophils %      % 1.0    Immature Granulocyte %      % 3.6    Glucose      70 - 99 mg/dL 85    Sodium      136 - 145 mmol/L 141    Potassium      3.5 - 5.1 mmol/L 3.7    Chloride      98 - 112 mmol/L 107    Carbon Dioxide, Total      21.0 - 32.0 mmol/L 27.0    ANION GAP      0 - 18 mmol/L 7    BUN      9 - 23 mg/dL 17    CREATININE      0.55 - 1.02 mg/dL 0.97    BUN/CREATININE RATIO      10.0 - 20.0  17.5    CALCIUM      8.7 - 10.4 mg/dL 9.3    CALCULATED OSMOLALITY      275 - 295 mOsm/kg 293    EGFR      >=60 mL/min/1.73m2 66    ALT (SGPT)      10 - 49 U/L 28    AST (SGOT)      <34 U/L 28    ALKALINE PHOSPHATASE      50 - 130 U/L 113    Total Bilirubin      0.2 - 1.1 mg/dL 1.0    PROTEIN, TOTAL      5.7 - 8.2 g/dL 5.8    Albumin      3.2 - 4.8 g/dL 3.8    Globulin      2.0 - 3.5 g/dL 2.0    A/G Ratio      1.0 - 2.0  1.9    Patient Fasting for CMP? Patient not present       Legend:  (H) High  (L) Low          Imaging & Referrals:  CT CHEST+ABDOMEN+PELVIS(ALL [facility] ONLY)(CPT=71260/88281)   No orders of the defined types were placed in this encounter.

## 2025-01-29 NOTE — PROGRESS NOTES
Asked to see pt in infusion center. Receiving Ixempra 20 min into the infusion became flusshed and c/o sob. Infusion stopped. VSS. Pt received benadryl and pepcis IV as premeds, monteleukast added orally. Rate reduced to 4 hr rate based on previous reaction. Solumedrol 125 mg IV given. Symptoms began to resolve.  Tolerating infusion well with no further issues.

## 2025-01-29 NOTE — PATIENT INSTRUCTIONS
Your [facility] sent a script for decadron-please take 8 mg twice day before (am and pm) and morning of next infusion

## 2025-01-29 NOTE — PROGRESS NOTES
Pt here for C3D1 Drug(s)Ixempra.  Arrives Ambulating independently, accompanied by Self   Feels well today.  PAC from lab with good blood return.  Pt with previous reaction on C2-premedications adjusted per MD with dose to be given over 4 hrs.      20 mins into infusion pt with reddended face and c/o SOB, VSS.  Dose stopped.  YASIR Jauregui notified and to chairside.  Solumedrol 125mg given.  VSS.  Pt states immediate relief of symptoms.  Dose restarted at half rate for 15 mins.  Tolerated remainder of dose at 4 hr rate without complaints.  Pt to take decadron day prior and morning of next infusion-pt states understanding.     Patient was evaluated today by MD, DOMINICK, and Treatment Nurse.    Oral medications included in this regimen:  no    Patient confirms comprehension of cancer treatment schedule:  yes    Pregnancy screening:  Denies possibility of pregnancy    Modifications in dose or schedule:  Yes-singulair added to premed    Medications appearance and physical integrity checked by RN: yes.    Chemotherapy IV pump settings verified by 2 RNs:  Yes.  Frequency of blood return and site check throughout administration: Prior to administration, Prior to each drug, and At completion of therapy     Infusion/treatment outcome:  patient tolerated treatment without incident      PAC decannulated per protocol and pt left without complaints.     Education Record    Learner:  Patient  Barriers / Limitations:  None  Method:  Discussion  Education / instructions given:  reviewed plan of care  Outcome:  Shows understanding    Discharged Home, Ambulating independently, accompanied by:Self    Patient/family verbalized understanding of future appointments: by printed AVS

## 2025-01-30 ENCOUNTER — TELEPHONE (OUTPATIENT)
Age: 65
End: 2025-01-30

## 2025-01-30 NOTE — TELEPHONE ENCOUNTER
1. I reviewed the lipid panel from 10/16/24: , HDL 72, TG 69, LDL 49.   2. LDL is at goal of <70. LDL goal is <70 considering for his very high calcium score   3. Continue atorvastatin 20 mg qd, Zetia 10 mg qd and omega-3 fatty acid capsules 1200 mg qd.  4. I advised the patient to follow a low fat diet.    Scheduled follow up to review CT on 1/27 with John

## 2025-01-30 NOTE — TELEPHONE ENCOUNTER
Toxicities: C3 D1 Ixabepilone on 1/29/2025    Condition Update: Ixabepilone Infusion Reaction - Flushed, Dyspnea    Aleisha reports that her symptoms resolved and she was able to finish her infusion. Today she feels \"good.\" I encouraged her to please call the office if she is not feeling well or she has any questions or concerns. She agreed and thanked me for checking on her.

## 2025-02-05 ENCOUNTER — APPOINTMENT (OUTPATIENT)
Dept: HEMATOLOGY/ONCOLOGY | Facility: HOSPITAL | Age: 65
End: 2025-02-05
Attending: INTERNAL MEDICINE
Payer: MEDICARE

## 2025-02-07 ENCOUNTER — HOSPITAL ENCOUNTER (OUTPATIENT)
Dept: CT IMAGING | Facility: HOSPITAL | Age: 65
Discharge: HOME | End: 2025-02-07
Attending: INTERNAL MEDICINE
Payer: MEDICARE

## 2025-02-07 DIAGNOSIS — C79.51 METASTASIS TO BONE (HCC): ICD-10-CM

## 2025-02-07 DIAGNOSIS — C78.7 MALIGNANT NEOPLASM METASTATIC TO LIVER (HCC): ICD-10-CM

## 2025-02-07 DIAGNOSIS — Z17.0 MALIGNANT NEOPLASM OF UPPER-OUTER QUADRANT OF RIGHT BREAST IN FEMALE, ESTROGEN RECEPTOR POSITIVE (HCC): ICD-10-CM

## 2025-02-07 DIAGNOSIS — C50.411 MALIGNANT NEOPLASM OF UPPER-OUTER QUADRANT OF RIGHT BREAST IN FEMALE, ESTROGEN RECEPTOR POSITIVE (HCC): ICD-10-CM

## 2025-02-07 PROCEDURE — 71260 CT THORAX DX C+: CPT | Performed by: INTERNAL MEDICINE

## 2025-02-07 PROCEDURE — 74177 CT ABD & PELVIS W/CONTRAST: CPT | Performed by: INTERNAL MEDICINE

## 2025-02-19 ENCOUNTER — NURSE ONLY (OUTPATIENT)
Age: 65
End: 2025-02-19
Attending: INTERNAL MEDICINE
Payer: MEDICARE

## 2025-02-19 ENCOUNTER — OFFICE VISIT (OUTPATIENT)
Age: 65
End: 2025-02-19
Attending: INTERNAL MEDICINE
Payer: MEDICARE

## 2025-02-19 VITALS
DIASTOLIC BLOOD PRESSURE: 81 MMHG | RESPIRATION RATE: 18 BRPM | BODY MASS INDEX: 19.63 KG/M2 | OXYGEN SATURATION: 98 % | SYSTOLIC BLOOD PRESSURE: 114 MMHG | WEIGHT: 100 LBS | TEMPERATURE: 98 F | HEIGHT: 60 IN | HEART RATE: 98 BPM

## 2025-02-19 DIAGNOSIS — C79.51 METASTASIS TO BONE (HCC): ICD-10-CM

## 2025-02-19 DIAGNOSIS — C50.411 MALIGNANT NEOPLASM OF UPPER-OUTER QUADRANT OF RIGHT BREAST IN FEMALE, ESTROGEN RECEPTOR POSITIVE (HCC): Primary | ICD-10-CM

## 2025-02-19 DIAGNOSIS — Z17.0 MALIGNANT NEOPLASM OF UPPER-OUTER QUADRANT OF RIGHT BREAST IN FEMALE, ESTROGEN RECEPTOR POSITIVE (HCC): Primary | ICD-10-CM

## 2025-02-19 DIAGNOSIS — C78.7 MALIGNANT NEOPLASM METASTATIC TO LIVER (HCC): ICD-10-CM

## 2025-02-19 DIAGNOSIS — Z09 CHEMOTHERAPY FOLLOW-UP EXAMINATION: ICD-10-CM

## 2025-02-19 DIAGNOSIS — Z45.2 ENCOUNTER FOR CENTRAL LINE CARE: ICD-10-CM

## 2025-02-19 LAB
ALBUMIN SERPL-MCNC: 4 G/DL (ref 3.2–4.8)
ALBUMIN/GLOB SERPL: 1.8 {RATIO} (ref 1–2)
ALP LIVER SERPL-CCNC: 88 U/L
ALT SERPL-CCNC: 27 U/L
ANION GAP SERPL CALC-SCNC: 10 MMOL/L (ref 0–18)
AST SERPL-CCNC: 29 U/L (ref ?–34)
BASOPHILS # BLD AUTO: 0.03 X10(3) UL (ref 0–0.2)
BASOPHILS NFR BLD AUTO: 0.7 %
BILIRUB SERPL-MCNC: 0.5 MG/DL (ref 0.2–1.1)
BUN BLD-MCNC: 18 MG/DL (ref 9–23)
BUN/CREAT SERPL: 17.5 (ref 10–20)
CALCIUM BLD-MCNC: 9.2 MG/DL (ref 8.7–10.4)
CHLORIDE SERPL-SCNC: 96 MMOL/L (ref 98–112)
CO2 SERPL-SCNC: 30 MMOL/L (ref 21–32)
CREAT BLD-MCNC: 1.03 MG/DL
DEPRECATED RDW RBC AUTO: 55.1 FL (ref 35.1–46.3)
EGFRCR SERPLBLD CKD-EPI 2021: 61 ML/MIN/1.73M2 (ref 60–?)
EOSINOPHIL # BLD AUTO: 0 X10(3) UL (ref 0–0.7)
EOSINOPHIL NFR BLD AUTO: 0 %
ERYTHROCYTE [DISTWIDTH] IN BLOOD BY AUTOMATED COUNT: 16.5 % (ref 11–15)
GLOBULIN PLAS-MCNC: 2.2 G/DL (ref 2–3.5)
GLUCOSE BLD-MCNC: 175 MG/DL (ref 70–99)
HCT VFR BLD AUTO: 35.9 %
HGB BLD-MCNC: 11.6 G/DL
IMM GRANULOCYTES # BLD AUTO: 0.21 X10(3) UL (ref 0–1)
IMM GRANULOCYTES NFR BLD: 5 %
LYMPHOCYTES # BLD AUTO: 0.2 X10(3) UL (ref 1–4)
LYMPHOCYTES NFR BLD AUTO: 4.8 %
MCH RBC QN AUTO: 29.3 PG (ref 26–34)
MCHC RBC AUTO-ENTMCNC: 32.3 G/DL (ref 31–37)
MCV RBC AUTO: 90.7 FL
MONOCYTES # BLD AUTO: 0.4 X10(3) UL (ref 0.1–1)
MONOCYTES NFR BLD AUTO: 9.6 %
NEUTROPHILS # BLD AUTO: 3.32 X10 (3) UL (ref 1.5–7.7)
NEUTROPHILS # BLD AUTO: 3.32 X10(3) UL (ref 1.5–7.7)
NEUTROPHILS NFR BLD AUTO: 79.9 %
OSMOLALITY SERPL CALC.SUM OF ELEC: 288 MOSM/KG (ref 275–295)
PLATELET # BLD AUTO: 303 10(3)UL (ref 150–450)
POTASSIUM SERPL-SCNC: 3.6 MMOL/L (ref 3.5–5.1)
PROT SERPL-MCNC: 6.2 G/DL (ref 5.7–8.2)
RBC # BLD AUTO: 3.96 X10(6)UL
SODIUM SERPL-SCNC: 136 MMOL/L (ref 136–145)
WBC # BLD AUTO: 4.2 X10(3) UL (ref 4–11)

## 2025-02-19 RX ORDER — DIPHENHYDRAMINE HYDROCHLORIDE 50 MG/ML
INJECTION INTRAMUSCULAR; INTRAVENOUS
Status: COMPLETED
Start: 2025-02-19 | End: 2025-02-19

## 2025-02-19 RX ORDER — DIPHENHYDRAMINE HYDROCHLORIDE 50 MG/ML
50 INJECTION INTRAMUSCULAR; INTRAVENOUS ONCE
Status: CANCELLED | OUTPATIENT
Start: 2025-02-19

## 2025-02-19 RX ORDER — MONTELUKAST SODIUM 5 MG/1
10 TABLET, CHEWABLE ORAL ONCE
Status: CANCELLED
Start: 2025-02-19 | End: 2025-02-19

## 2025-02-19 RX ORDER — FAMOTIDINE 10 MG/ML
INJECTION, SOLUTION INTRAVENOUS
Status: COMPLETED
Start: 2025-02-19 | End: 2025-02-19

## 2025-02-19 RX ORDER — MONTELUKAST SODIUM 5 MG/1
10 TABLET, CHEWABLE ORAL ONCE
Status: DISCONTINUED | OUTPATIENT
Start: 2025-02-19 | End: 2025-02-19

## 2025-02-19 RX ORDER — DIPHENHYDRAMINE HYDROCHLORIDE 50 MG/ML
50 INJECTION INTRAMUSCULAR; INTRAVENOUS ONCE
Status: COMPLETED | OUTPATIENT
Start: 2025-02-19 | End: 2025-02-19

## 2025-02-19 RX ORDER — WATER 10 ML/10ML
INJECTION INTRAMUSCULAR; INTRAVENOUS; SUBCUTANEOUS
Status: DISCONTINUED
Start: 2025-02-19 | End: 2025-02-19

## 2025-02-19 RX ORDER — SODIUM CHLORIDE 9 MG/ML
10 INJECTION, SOLUTION INTRAMUSCULAR; INTRAVENOUS; SUBCUTANEOUS ONCE
OUTPATIENT
Start: 2025-02-19

## 2025-02-19 RX ORDER — FAMOTIDINE 10 MG/ML
20 INJECTION, SOLUTION INTRAVENOUS ONCE
Status: COMPLETED | OUTPATIENT
Start: 2025-02-19 | End: 2025-02-19

## 2025-02-19 RX ORDER — PROCHLORPERAZINE MALEATE 10 MG
10 TABLET ORAL EVERY 6 HOURS PRN
Qty: 90 TABLET | Refills: 2 | Status: SHIPPED | OUTPATIENT
Start: 2025-02-19

## 2025-02-19 RX ORDER — FAMOTIDINE 10 MG/ML
20 INJECTION, SOLUTION INTRAVENOUS ONCE
Status: CANCELLED | OUTPATIENT
Start: 2025-02-19

## 2025-02-19 RX ORDER — HEPARIN SODIUM (PORCINE) LOCK FLUSH IV SOLN 100 UNIT/ML 100 UNIT/ML
5 SOLUTION INTRAVENOUS ONCE
OUTPATIENT
Start: 2025-02-19

## 2025-02-19 RX ORDER — MONTELUKAST SODIUM 10 MG/1
TABLET ORAL
Status: COMPLETED
Start: 2025-02-19 | End: 2025-02-19

## 2025-02-19 RX ADMIN — FAMOTIDINE 20 MG: 10 INJECTION, SOLUTION INTRAVENOUS at 12:28:00

## 2025-02-19 RX ADMIN — MONTELUKAST SODIUM 10 MG: 10 TABLET ORAL at 12:28:00

## 2025-02-19 RX ADMIN — DIPHENHYDRAMINE HYDROCHLORIDE 50 MG: 50 INJECTION INTRAMUSCULAR; INTRAVENOUS at 12:28:00

## 2025-02-19 NOTE — PROGRESS NOTES
Pt here for C4D1 Drug(s)Ixempra.  Arrives Ambulating independently, accompanied by Self     Patient was evaluated today by MD and Treatment Nurse.    R chest port accessed in lab with no blood return, Tpa infused. Brisk blood return noted upon first attempt in infusion.     Oral medications included in this regimen:  yes - dexamethasone. Patient confirmed she took steroid yesterday, this morning, and will take tonight.    Patient confirms comprehension of cancer treatment schedule:  yes    Pregnancy screening:  Denies possibility of pregnancy    Modifications in dose or schedule:  No    Medications appearance and physical integrity checked by RN: yes.    Chemotherapy IV pump settings verified by 2 RNs:  Yes.  Frequency of blood return and site check throughout administration: Prior to administration, Prior to each drug, and At completion of therapy     Infusion/treatment outcome:  patient tolerated treatment without incident    Port flushed and de-accessed.    Education Record    Learner:  Patient  Barriers / Limitations:  None  Method:  Discussion and Printed material  Education / instructions given:  Medication, plan of care, schedule  Outcome:  Shows understanding    Discharged Home, Ambulating independently, accompanied by:Self    Patient/family verbalized understanding of future appointments: by printed AVS

## 2025-02-19 NOTE — PROGRESS NOTES
HPI   Aleisha Carlin is a 64 year old female here for follow up of   Encounter Diagnoses   Name Primary?    Malignant neoplasm of upper-outer quadrant of right breast in female, estrogen receptor positive (HCC) Yes    Malignant neoplasm metastatic to liver (HCC)     Metastasis to bone (HCC)     Chemotherapy follow-up examination        S/p cycle 3 Ixempra, she had infusion related reaction for which she was treated with Benadryl, Solu-Medrol Pepcid and montelukast.  Now being premedicated with dexamethasone twice daily the day before treatment.  She did forget 1 dose of dexamethasone today as she was staying at her father's home.    Cough is resolved.      Tolerated treatment well, some constipation.     Fatigue:  Yes, states about the same.       Fevers:  No     Appetite/taste changes:  Yes, but making sure she eats .      Mucositis:  No    Weight changes:  lost 3 lbs.    Nausea/vomiting:  Yes, D4-5, controlled with compazine ondansetron prn       Diarrhea:  No,     Constipation:  Yes, controlled with stool softener. But occasionally has issues.  Does take olive oil at times if needed.     Peripheral neuropathy:  Yes, Per history, occasionally R last two fingers and does not last long. No complaint today .      PPE:  States using lotion in the hands all the time, no PPE.  Some dry skin on the soles.          ECOG PS 1    Review of Systems:     Review of Systems   HENT:   Positive for trouble swallowing (states some times).    Respiratory:  Positive for shortness of breath (HERRERA with walking at the store.). Negative for cough.    Cardiovascular:  Positive for chest pain (L lower ribs at times, no aggravating or alleviating factors.  States once a day or so, lasts about an hour.  States same as prior.).   Gastrointestinal:  Negative for abdominal pain.   Genitourinary:  Negative for dysuria and frequency.    Musculoskeletal:  Negative for arthralgias, back pain and neck pain.   Skin:  Negative for rash.        Dry  skin on the feet   Neurological:  Positive for dizziness (once in a while with position changes). Negative for headaches.   Hematological:  Negative for adenopathy. Bruises/bleeds easily.   Psychiatric/Behavioral:  Positive for sleep disturbance.          Current Outpatient Medications   Medication Sig Dispense Refill    dexamethasone (DECADRON) 4 MG tablet Take 2 tablets (8 mg total) by mouth 2 (two) times daily with meals. Take twice the day before and morning of chemotherapy. 12 tablet 5    HYDROcodone-acetaminophen  MG Oral Tab Take 1 tablet by mouth every 6 (six) hours as needed for Pain. 90 tablet 0    ondansetron 8 MG Oral Tablet Dispersible Take 1 tablet (8 mg total) by mouth every 8 (eight) hours as needed for Nausea.      zolpidem 10 MG Oral Tab Take 1 tablet (10 mg total) by mouth nightly as needed. 60 tablet 0    guaiFENesin-codeine 100-10 MG/5ML Oral Solution Take 5 mL by mouth every 6 (six) hours as needed for cough. 473 mL 1    Potassium Chloride ER 10 MEQ Oral Tab CR Take 2 tablets (20 mEq total) by mouth 2 (two) times daily. 60 tablet 0    mirtazapine 15 MG Oral Tablet Dispersible Take 1 tablet (15 mg total) by mouth nightly. 90 tablet 3    minocycline 50 MG Oral Cap Take 1 capsule (50 mg total) by mouth daily. 90 capsule 3    furosemide 20 MG Oral Tab Take 1 tablet (20 mg total) by mouth daily.      prochlorperazine (COMPAZINE) 10 mg tablet Take 1 tablet (10 mg total) by mouth every 6 (six) hours as needed for Nausea. 90 tablet 2    folic acid 1 MG Oral Tab Take 1 tablet (1 mg total) by mouth daily. 90 tablet 3    albuterol 108 (90 Base) MCG/ACT Inhalation Aero Soln Inhale 1 puff into the lungs every 6 (six) hours as needed for Wheezing. 8 g 3    Cholecalciferol (VITAMIN D3) 250 MCG (37062 UT) Oral Cap Take 1 capsule by mouth daily.      LORazepam 0.5 MG Oral Tab Take 1 tablet (0.5 mg total) by mouth every 6 (six) hours as needed for Anxiety. (Patient not taking: Reported on 2/19/2025) 30  tablet 0     Allergies:   No Known Allergies    Past Medical History:    Breast cancer (HCC)    MASTECTOMY / RECONSTRUCTION    Chemotherapy-induced neutropenia    Encounter for insertion of venous access port    portacath insertion / venous access    Glaucoma    History of breast cancer in female    Hyperlipidemia    Malignant neoplasm of overlapping sites of breast in female, estrogen receptor positive (HCC)    Malignant neoplasm of upper-outer quadrant of right breast in female, estrogen receptor positive (HCC)    Malignant pleural effusion (HCC)    Menorrhagia    HYSTEROSCOPY / D&C / endomentrial biopsy (08/28/2007)    Metastatic breast cancer    Neurofibromatosis (HCC)    Osteopenia of multiple sites    Osteopenia of multiple sites    Osteoporosis screening    Osteoporosis screening    Per [employer]    Other osteoporosis without current pathological fracture    Postmenopausal bleeding    Psychophysiological insomnia    Tumor, foot    tumor right foot / excision/excision of bone spur/arthrodesis w/screw fixation     Past Surgical History:   Procedure Laterality Date    Breast reconstruction Right 2008    right breast reconstructed - ductal, BRCA neg    Breast surgery      Chemotherapy  2007    Foot surgery Right     tumor rt foot / excision/excision of bone spur/arthrodesis w/screw fixation    Ir port a cath procedure  2007    Right subclavian port of cath.    Mastectomy right      2012    Mastectomy,simple  2007     Social History     Socioeconomic History    Marital status: Single   Tobacco Use    Smoking status: Never    Smokeless tobacco: Never   Substance and Sexual Activity    Alcohol use: Yes     Alcohol/week: 5.0 standard drinks of alcohol     Types: 5 Standard drinks or equivalent per week     Comment: Socially.  (Per [facility]:  5 days weekly.)    Drug use: No   Other Topics Concern    Caffeine Concern Yes     Comment: tea     Social Drivers of Health     Food Insecurity: No Food Insecurity (12/31/2023)     Food Insecurity     Food Insecurity: Never true   Transportation Needs: No Transportation Needs (2023)    Transportation Needs     Lack of Transportation: No   Housing Stability: Low Risk  (2023)    Housing Stability     Housing Instability: No       Family History   Problem Relation Age of Onset    Breast Cancer Mother 49        bilateral mastectomy    Genetic Disease Mother         NF-1    Breast Cancer Maternal Aunt 75    Breast Cancer Paternal Grandmother     Breast Cancer Self 51    Genetic Disease Self         NF-1    Cancer Paternal Aunt         throat ca    Cancer Maternal Uncle         prostate ca    Genetic Disease Brother         NF-1    Genetic Disease Brother         NF-1    Genetic Disease Brother         NF-1    Cancer Maternal Uncle         bladder ca    Breast Cancer Maternal Cousin Female     Cancer Maternal Cousin Female         leukemia;  childhood    Cancer Paternal Cousin Male         throat ca         PHYSICAL EXAM:    /81 (BP Location: Left arm, Patient Position: Sitting, Cuff Size: child)   Pulse 98   Temp 98 °F (36.7 °C) (Oral)   Resp 18   Ht 1.524 m (5')   Wt 45.4 kg (100 lb)   SpO2 98%   BMI 19.53 kg/m²   Wt Readings from Last 6 Encounters:   25 45.4 kg (100 lb)   25 46.7 kg (103 lb)   25 48.1 kg (106 lb)   24 49.2 kg (108 lb 6.4 oz)   24 49.4 kg (109 lb)   24 49.9 kg (110 lb)     Physical Exam  General: Patient is alert, not in acute distress.  HEENT: EOMs intact. Anicteric.    Neck: No JVD. No palpable lymphadenopathy. Neck is supple.  Chest: Clear to auscultation.   Heart: Regular rate and rhythm.   Abdomen: Soft, non tender with good bowel sounds.    Extremities: +1 edema RLE>L chronic, R ankle edema    Neurological: Grossly intact. Normal AROM shoulders.    Psych/Depression: nl  Skin:  NF lesions on the back       ASSESSMENT/PLAN:     1. Malignant neoplasm of upper-outer quadrant of right breast in female, estrogen  receptor positive (HCC)    2. Malignant neoplasm metastatic to liver (HCC)    3. Metastasis to bone (HCC)    4. Chemotherapy follow-up examination      Breast cancer history of ER/IN positive metastatic breast cancer to pleura, liver, bones. Initially diagnosed 2007 with DCIS, treated with lumpectomy, however MRI showed progression in the breast and she had a complete right mastectomy with axillary dissection followed by chemo in 2008: .pT1,N1. ER 9%, PgR 8%, Her-2 0, Ki 39%. she then presented with recurrence in 2020 right neck/supraclav ( ER 98% IN 0  her2 1+)  Pleura (Er 20%, Her2 1+) and liver:  (ER 38%, her 0)     Treatment history  9/11/2007:  right breast lumpectomy: Extensive DCIS ER 90%. Mri with progression  11/16/2007:  right mastectomy with axillary node dissection: pT1 N1 M0  2/16/2008:  4 cycles of Taxotere and Cytoxan   9/18/2020: Right neck recurrence,  left pleural,  liver-   10/1/2020: My risk Myriad negative. Started Pablociclib 125mg d1-21 w/ faslodex   4/15/2021: Neutropenic fever. Pablociclib changed to q 21 d with 14 day off cycle   1/27/2022: Palbo at 100mg D1-21 q 28 day schedule stared per Dr Soriano  7/15/22: changed to abemaciclib. Progress in the pleura bx: TNBC, Her2 0 CPS 0  9/22/22-11/2022: Sacituzumab w/o response   11/17/22-1/2023: Abraxane with initial response, liver bx nondx (too small)  2/9/23: started gem/carbo due to SOB with response  4/26/23: imaging with continues response in pleura, progression in liver Er 65%, IN 0, Her2 0, bx: NGS below  07/6/23-1/10/24 on eribulin s/p 8 cycles prior to progression.  01/22/24- present Capecitabine     NGS:  9/2020: Foundation: NF1, [id number], FGFR1 and MYC amplification  9/2022: G360: [id number] 0.5%. Tempus pleura: NF1, TP53, FGFR1 amp  2/2023: G360:  [id number] 0.9%, APC 0.2 with several new VUS  4/2023 G360: [id number] 0.4%. APC 0.3 with several new VUS. Tempus liver:           ESR 1 mutation is negative.    Patient will proceed with 7th line  therapy with capecitabine week on/week off and after 3 months (before next appt) will have imaging.  If still has liver lesion, will consider Y-90 embolization, vs continue with treatment w/o Y-90.      S/p 9 cycles seventh line capecitabine, initiated on 1/22/24.  9/27/24 restaging CT shows progression in the lungs with possible lymphangitic spread and effusions.      S/p eighth line liposomal doxorubicin s/p cycle 1 on 10/9/24, dose reduced to 37.5 mg/m2  S/p cycle 2 with 50 mg/m2 given tolerability to C1  HELD cycle 3 due to disease progression.      Change of therapy to Ixempra (9th line )    S/p cycle 3 Ixempra- cough improved.  Had infusion reaction.  Added monteleukast, pepcid and benadryl 50 mg and slow infusion rate to 4 hours for cycle 3 and on.  Taking decadron prior to visits.    Patient had tumor assessment CT on 2/7/2025, which shows evidence of response with decreased right pleural effusion, less pronounced interlobar septal thickening and interval improvement of metastases.  She does have evidence of treated metastatic disease in her bones, but does have a new T6 compression fracture relative to December 2024.    Proceed with Cycle 4, she will have tumor assessment after cycle 6.     H/o PPE- lotion to hands and feet.  No issues currenly.    MARCELLO:  Ondansetron prn.  Start evening of D4    Constipation, gr. 1:  Stool softener prn    Cancer-related pain- resolved.  No longer using Norco    Zometa - last dose March 2023. Completed 2 years, no further needed     Added folic acid 1mg daily and changed B12 SL 1000mcg daily.     FEN:  increase protein and calories.    Call prn.  Follow-up in 3  weeks    Emotional support given       MDM high risk  Continuity of complex medical care.     No orders of the defined types were placed in this encounter.    Recent Results (from the past 48 hours)   CBC W Differential W Platelet    Collection Time: 02/19/25  9:47 AM   Result Value Ref Range    WBC 4.2 4.0 - 11.0  x10(3) uL    RBC 3.96 3.80 - 5.30 x10(6)uL    HGB 11.6 (L) 12.0 - 16.0 g/dL    HCT 35.9 35.0 - 48.0 %    MCV 90.7 80.0 - 100.0 fL    MCH 29.3 26.0 - 34.0 pg    MCHC 32.3 31.0 - 37.0 g/dL    RDW-SD 55.1 (H) 35.1 - 46.3 fL    RDW 16.5 (H) 11.0 - 15.0 %    .0 150.0 - 450.0 10(3)uL    Neutrophil Absolute Prelim 3.32 1.50 - 7.70 x10 (3) uL    Neutrophil Absolute 3.32 1.50 - 7.70 x10(3) uL    Lymphocyte Absolute 0.20 (L) 1.00 - 4.00 x10(3) uL    Monocyte Absolute 0.40 0.10 - 1.00 x10(3) uL    Eosinophil Absolute 0.00 0.00 - 0.70 x10(3) uL    Basophil Absolute 0.03 0.00 - 0.20 x10(3) uL    Immature Granulocyte Absolute 0.21 0.00 - 1.00 x10(3) uL    Neutrophil % 79.9 %    Lymphocyte % 4.8 %    Monocyte % 9.6 %    Eosinophil % 0.0 %    Basophil % 0.7 %    Immature Granulocyte % 5.0 %   Comp Metabolic Panel (14)    Collection Time: 02/19/25  9:47 AM   Result Value Ref Range    Glucose 175 (H) 70 - 99 mg/dL    Sodium 136 136 - 145 mmol/L    Potassium 3.6 3.5 - 5.1 mmol/L    Chloride 96 (L) 98 - 112 mmol/L    CO2 30.0 21.0 - 32.0 mmol/L    Anion Gap 10 0 - 18 mmol/L    BUN 18 9 - 23 mg/dL    Creatinine 1.03 (H) 0.55 - 1.02 mg/dL    BUN/CREA Ratio 17.5 10.0 - 20.0    Calcium, Total 9.2 8.7 - 10.4 mg/dL    Calculated Osmolality 288 275 - 295 mOsm/kg    eGFR-Cr 61 >=60 mL/min/1.73m2    ALT 27 10 - 49 U/L    AST 29 <34 U/L    Alkaline Phosphatase 88 50 - 130 U/L    Bilirubin, Total 0.5 0.2 - 1.1 mg/dL    Total Protein 6.2 5.7 - 8.2 g/dL    Albumin 4.0 3.2 - 4.8 g/dL    Globulin  2.2 2.0 - 3.5 g/dL    A/G Ratio 1.8 1.0 - 2.0    Patient Fasting for CMP? Patient not present          Imaging & Referrals:  None   No orders of the defined types were placed in this encounter.

## 2025-02-21 ENCOUNTER — TELEPHONE (OUTPATIENT)
Age: 65
End: 2025-02-21

## 2025-02-21 NOTE — TELEPHONE ENCOUNTER
Patient called provided office with envelope and stamp to mail application to state for parking placard. Patient informed Dr. Farias signed paper work. Informed will [facility]. Patient verbalizes understanding.

## 2025-03-03 DIAGNOSIS — G47.00 INSOMNIA, UNSPECIFIED TYPE: ICD-10-CM

## 2025-03-03 RX ORDER — ZOLPIDEM TARTRATE 10 MG/1
10 TABLET ORAL NIGHTLY PRN
Qty: 60 TABLET | Refills: 0 | Status: SHIPPED | OUTPATIENT
Start: 2025-03-03

## 2025-03-12 ENCOUNTER — OFFICE VISIT (OUTPATIENT)
Age: 65
End: 2025-03-12
Payer: MEDICARE

## 2025-03-12 ENCOUNTER — NURSE ONLY (OUTPATIENT)
Age: 65
End: 2025-03-12
Attending: INTERNAL MEDICINE
Payer: MEDICARE

## 2025-03-12 ENCOUNTER — OFFICE VISIT (OUTPATIENT)
Age: 65
End: 2025-03-12
Attending: INTERNAL MEDICINE
Payer: MEDICARE

## 2025-03-12 VITALS
BODY MASS INDEX: 19.69 KG/M2 | OXYGEN SATURATION: 98 % | WEIGHT: 100.31 LBS | DIASTOLIC BLOOD PRESSURE: 90 MMHG | HEIGHT: 60 IN | RESPIRATION RATE: 18 BRPM | TEMPERATURE: 98 F | HEART RATE: 109 BPM | SYSTOLIC BLOOD PRESSURE: 118 MMHG

## 2025-03-12 DIAGNOSIS — Z17.0 MALIGNANT NEOPLASM OF UPPER-OUTER QUADRANT OF RIGHT BREAST IN FEMALE, ESTROGEN RECEPTOR POSITIVE (HCC): Primary | ICD-10-CM

## 2025-03-12 DIAGNOSIS — J91.0 MALIGNANT PLEURAL EFFUSION (HCC): ICD-10-CM

## 2025-03-12 DIAGNOSIS — C50.411 MALIGNANT NEOPLASM OF UPPER-OUTER QUADRANT OF RIGHT BREAST IN FEMALE, ESTROGEN RECEPTOR POSITIVE (HCC): Primary | ICD-10-CM

## 2025-03-12 DIAGNOSIS — Z51.11 CHEMOTHERAPY MANAGEMENT, ENCOUNTER FOR: ICD-10-CM

## 2025-03-12 DIAGNOSIS — C78.7 MALIGNANT NEOPLASM METASTATIC TO LIVER (HCC): ICD-10-CM

## 2025-03-12 DIAGNOSIS — C50.919 STAGE IV BREAST CANCER IN FEMALE (HCC): ICD-10-CM

## 2025-03-12 LAB
ALBUMIN SERPL-MCNC: 4.1 G/DL (ref 3.2–4.8)
ALBUMIN/GLOB SERPL: 2.2 {RATIO} (ref 1–2)
ALP LIVER SERPL-CCNC: 121 U/L
ALT SERPL-CCNC: 52 U/L
ANION GAP SERPL CALC-SCNC: 10 MMOL/L (ref 0–18)
AST SERPL-CCNC: 51 U/L (ref ?–34)
BASOPHILS # BLD: 0 X10(3) UL (ref 0–0.2)
BASOPHILS NFR BLD: 0 %
BILIRUB SERPL-MCNC: 0.6 MG/DL (ref 0.2–1.1)
BUN BLD-MCNC: 20 MG/DL (ref 9–23)
BUN/CREAT SERPL: 17.4 (ref 10–20)
CALCIUM BLD-MCNC: 9.2 MG/DL (ref 8.7–10.4)
CHLORIDE SERPL-SCNC: 104 MMOL/L (ref 98–112)
CO2 SERPL-SCNC: 25 MMOL/L (ref 21–32)
CREAT BLD-MCNC: 1.15 MG/DL
DEPRECATED RDW RBC AUTO: 56.1 FL (ref 35.1–46.3)
EGFRCR SERPLBLD CKD-EPI 2021: 53 ML/MIN/1.73M2 (ref 60–?)
EOSINOPHIL # BLD: 0 X10(3) UL (ref 0–0.7)
EOSINOPHIL NFR BLD: 0 %
ERYTHROCYTE [DISTWIDTH] IN BLOOD BY AUTOMATED COUNT: 16.6 % (ref 11–15)
GLOBULIN PLAS-MCNC: 1.9 G/DL (ref 2–3.5)
GLUCOSE BLD-MCNC: 121 MG/DL (ref 70–99)
HCT VFR BLD AUTO: 33.2 %
HGB BLD-MCNC: 11 G/DL
LYMPHOCYTES NFR BLD: 0.49 X10(3) UL (ref 1–4)
LYMPHOCYTES NFR BLD: 6 %
MCH RBC QN AUTO: 30.3 PG (ref 26–34)
MCHC RBC AUTO-ENTMCNC: 33.1 G/DL (ref 31–37)
MCV RBC AUTO: 91.5 FL
METAMYELOCYTES # BLD: 0.24 X10(3) UL
METAMYELOCYTES NFR BLD: 3 %
MONOCYTES # BLD: 0.81 X10(3) UL (ref 0.1–1)
MONOCYTES NFR BLD: 10 %
NEUTROPHILS # BLD AUTO: 6.53 X10 (3) UL (ref 1.5–7.7)
NEUTROPHILS NFR BLD: 79 %
NEUTS BAND NFR BLD: 2 %
NEUTS HYPERSEG # BLD: 6.56 X10(3) UL (ref 1.5–7.7)
OSMOLALITY SERPL CALC.SUM OF ELEC: 292 MOSM/KG (ref 275–295)
PLATELET # BLD AUTO: 263 10(3)UL (ref 150–450)
PLATELET MORPHOLOGY: NORMAL
POTASSIUM SERPL-SCNC: 3.8 MMOL/L (ref 3.5–5.1)
PROT SERPL-MCNC: 6 G/DL (ref 5.7–8.2)
RBC # BLD AUTO: 3.63 X10(6)UL
SODIUM SERPL-SCNC: 139 MMOL/L (ref 136–145)
TOTAL CELLS COUNTED BLD: 100
WBC # BLD AUTO: 8.1 X10(3) UL (ref 4–11)

## 2025-03-12 RX ORDER — FAMOTIDINE 10 MG/ML
20 INJECTION, SOLUTION INTRAVENOUS ONCE
Status: COMPLETED | OUTPATIENT
Start: 2025-03-12 | End: 2025-03-12

## 2025-03-12 RX ORDER — FAMOTIDINE 10 MG/ML
INJECTION, SOLUTION INTRAVENOUS
Status: COMPLETED
Start: 2025-03-12 | End: 2025-03-12

## 2025-03-12 RX ORDER — DEXAMETHASONE 4 MG/1
8 TABLET ORAL 2 TIMES DAILY WITH MEALS
Qty: 12 TABLET | Refills: 5 | Status: SHIPPED | OUTPATIENT
Start: 2025-03-12

## 2025-03-12 RX ORDER — DIPHENHYDRAMINE HYDROCHLORIDE 50 MG/ML
INJECTION INTRAMUSCULAR; INTRAVENOUS
Status: COMPLETED
Start: 2025-03-12 | End: 2025-03-12

## 2025-03-12 RX ORDER — MONTELUKAST SODIUM 10 MG/1
10 TABLET ORAL ONCE
Status: COMPLETED | OUTPATIENT
Start: 2025-03-12 | End: 2025-03-12

## 2025-03-12 RX ORDER — DIPHENHYDRAMINE HYDROCHLORIDE 50 MG/ML
50 INJECTION INTRAMUSCULAR; INTRAVENOUS ONCE
Status: CANCELLED | OUTPATIENT
Start: 2025-03-12

## 2025-03-12 RX ORDER — FAMOTIDINE 10 MG/ML
20 INJECTION, SOLUTION INTRAVENOUS ONCE
Status: CANCELLED | OUTPATIENT
Start: 2025-03-12

## 2025-03-12 RX ORDER — MONTELUKAST SODIUM 5 MG/1
10 TABLET, CHEWABLE ORAL ONCE
Status: CANCELLED
Start: 2025-03-12 | End: 2025-03-12

## 2025-03-12 RX ORDER — MONTELUKAST SODIUM 10 MG/1
TABLET ORAL
Status: COMPLETED
Start: 2025-03-12 | End: 2025-03-12

## 2025-03-12 RX ORDER — DIPHENHYDRAMINE HYDROCHLORIDE 50 MG/ML
50 INJECTION INTRAMUSCULAR; INTRAVENOUS ONCE
Status: COMPLETED | OUTPATIENT
Start: 2025-03-12 | End: 2025-03-12

## 2025-03-12 RX ADMIN — FAMOTIDINE 20 MG: 10 INJECTION, SOLUTION INTRAVENOUS at 12:29:00

## 2025-03-12 RX ADMIN — MONTELUKAST SODIUM 10 MG: 10 TABLET ORAL at 12:25:00

## 2025-03-12 RX ADMIN — DIPHENHYDRAMINE HYDROCHLORIDE 50 MG: 50 INJECTION INTRAMUSCULAR; INTRAVENOUS at 12:30:00

## 2025-03-12 NOTE — PROGRESS NOTES
Name: Aleisha Carlin  : 3/1/1960  Consulting Physician: Dr. Farias  Date: 3/12/25    Chief Complaint: Follow up post C3 Ixempra for metastatic breast cancer  HPI   Aleisha Carlin is a 65 year old female here for follow up of   Encounter Diagnoses   Name Primary?    Malignant neoplasm of upper-outer quadrant of right breast in female, estrogen receptor positive (HCC) Yes    Malignant pleural effusion (HCC)     Malignant neoplasm metastatic to liver (HCC)     Stage IV breast cancer in female (HCC)     Chemotherapy management, encounter for        S/p cycle 4 Ixempra, tolerating well. Previously had infusion related reaction for which she was treated with Benadryl, Solu-Medrol Pepcid and montelukast.  Now being premedicated with oral dexamethasone twice daily the day before treatment.  She did forget 1 dose of dexamethasone this morning . Also get IV dexamethasone in premedications.    Tolerated treatment well, some constipation.     Fatigue:  Yes, states about the same.       Fevers:  No     Appetite/taste changes:  Yes, but making sure she eats .      Mucositis:  No    Weight changes:  lost 3 lbs.    Nausea/vomiting:  Yes, D4-5, controlled with compazine ondansetron prn       Diarrhea:  Yes, reports D4 had loose stool that resolved    Constipation:  Yes, controlled with stool softener. But occasionally has issues.  Does take olive oil at times if needed.  Prn miralax    Peripheral neuropathy:  Yes, Per history, occasionally R last two fingers and does not last long. No complaint today .      PPE:  States using lotion in the hands all the time, no PPE.  Some dry skin on the soles.          ECOG PS 1    Review of Systems:     Review of Systems   HENT:   Negative for trouble swallowing (states some times).    Respiratory:  Negative for cough and shortness of breath (HERRERA with walking at the store.).    Cardiovascular:  Positive for chest pain (L lower ribs at times, no aggravating or alleviating factors.  States once  a day or so, lasts about an hour.  States same as prior.).   Gastrointestinal:  Negative for abdominal pain.   Genitourinary:  Negative for dysuria and frequency.    Musculoskeletal:  Negative for arthralgias, back pain and neck pain.   Skin:  Negative for rash.        Dry skin on the feet   Neurological:  Negative for dizziness (once in a while with position changes) and headaches.   Hematological:  Negative for adenopathy. Bruises/bleeds easily.   Psychiatric/Behavioral:  Positive for sleep disturbance.          Current Outpatient Medications   Medication Sig Dispense Refill    dexamethasone (DECADRON) 4 MG tablet Take 2 tablets (8 mg total) by mouth 2 (two) times daily with meals. Take twice the day before and morning of chemotherapy. 12 tablet 5    zolpidem 10 MG Oral Tab Take 1 tablet (10 mg total) by mouth nightly as needed. 60 tablet 0    prochlorperazine (COMPAZINE) 10 mg tablet Take 1 tablet (10 mg total) by mouth every 6 (six) hours as needed for Nausea. 90 tablet 2    HYDROcodone-acetaminophen  MG Oral Tab Take 1 tablet by mouth every 6 (six) hours as needed for Pain. 90 tablet 0    ondansetron 8 MG Oral Tablet Dispersible Take 1 tablet (8 mg total) by mouth every 8 (eight) hours as needed for Nausea.      guaiFENesin-codeine 100-10 MG/5ML Oral Solution Take 5 mL by mouth every 6 (six) hours as needed for cough. 473 mL 1    Potassium Chloride ER 10 MEQ Oral Tab CR Take 2 tablets (20 mEq total) by mouth 2 (two) times daily. 60 tablet 0    mirtazapine 15 MG Oral Tablet Dispersible Take 1 tablet (15 mg total) by mouth nightly. 90 tablet 3    minocycline 50 MG Oral Cap Take 1 capsule (50 mg total) by mouth daily. 90 capsule 3    furosemide 20 MG Oral Tab Take 1 tablet (20 mg total) by mouth daily.      folic acid 1 MG Oral Tab Take 1 tablet (1 mg total) by mouth daily. 90 tablet 3    albuterol 108 (90 Base) MCG/ACT Inhalation Aero Soln Inhale 1 puff into the lungs every 6 (six) hours as needed for  Wheezing. 8 g 3    Cholecalciferol (VITAMIN D3) 250 MCG (43275 UT) Oral Cap Take 1 capsule by mouth daily.       Allergies:   No Known Allergies    Past Medical History:    Breast cancer (HCC)    MASTECTOMY / RECONSTRUCTION    Chemotherapy-induced neutropenia    Encounter for insertion of venous access port    portacath insertion / venous access    Glaucoma    History of breast cancer in female    Hyperlipidemia    Malignant neoplasm of overlapping sites of breast in female, estrogen receptor positive (HCC)    Malignant neoplasm of upper-outer quadrant of right breast in female, estrogen receptor positive (HCC)    Malignant pleural effusion (HCC)    Menorrhagia    HYSTEROSCOPY / D&C / endomentrial biopsy (08/28/2007)    Metastatic breast cancer    Neurofibromatosis (HCC)    Osteopenia of multiple sites    Osteopenia of multiple sites    Osteoporosis screening    Osteoporosis screening    Per [employer]    Other osteoporosis without current pathological fracture    Postmenopausal bleeding    Psychophysiological insomnia    Tumor, foot    tumor right foot / excision/excision of bone spur/arthrodesis w/screw fixation     Past Surgical History:   Procedure Laterality Date    Breast reconstruction Right 2008    right breast reconstructed - ductal, BRCA neg    Breast surgery      Chemotherapy  2007    Foot surgery Right     tumor rt foot / excision/excision of bone spur/arthrodesis w/screw fixation    Ir port a cath procedure  2007    Right subclavian port of cath.    Mastectomy right      2012    Mastectomy,simple  2007     Social History     Socioeconomic History    Marital status: Single   Tobacco Use    Smoking status: Never    Smokeless tobacco: Never   Substance and Sexual Activity    Alcohol use: Yes     Alcohol/week: 5.0 standard drinks of alcohol     Types: 5 Standard drinks or equivalent per week     Comment: Socially.  (Per [employer]:  5 days weekly.)    Drug use: No   Other Topics Concern    Caffeine Concern Yes      Comment: tea     Social Drivers of Health     Food Insecurity: No Food Insecurity (2023)    Food Insecurity     Food Insecurity: Never true   Transportation Needs: No Transportation Needs (2023)    Transportation Needs     Lack of Transportation: No   Housing Stability: Low Risk  (2023)    Housing Stability     Housing Instability: No       Family History   Problem Relation Age of Onset    Breast Cancer Mother 49        bilateral mastectomy    Genetic Disease Mother         NF-1    Breast Cancer Maternal Aunt 75    Breast Cancer Paternal Grandmother     Breast Cancer Self 51    Genetic Disease Self         NF-1    Cancer Paternal Aunt         throat ca    Cancer Maternal Uncle         prostate ca    Genetic Disease Brother         NF-1    Genetic Disease Brother         NF-1    Genetic Disease Brother         NF-1    Cancer Maternal Uncle         bladder ca    Breast Cancer Maternal Cousin Female     Cancer Maternal Cousin Female         leukemia;  childhood    Cancer Paternal Cousin Male         throat ca         PHYSICAL EXAM:    /90 (BP Location: Left arm, Patient Position: Sitting, Cuff Size: adult)   Pulse 109   Temp 98.1 °F (36.7 °C) (Oral)   Resp 18   Ht 1.524 m (5')   Wt 45.5 kg (100 lb 4.8 oz)   SpO2 98%   BMI 19.59 kg/m²   Wt Readings from Last 6 Encounters:   25 45.5 kg (100 lb 4.8 oz)   25 45.4 kg (100 lb)   25 46.7 kg (103 lb)   25 48.1 kg (106 lb)   24 49.2 kg (108 lb 6.4 oz)   24 49.4 kg (109 lb)     Physical Exam  General: Patient is alert, not in acute distress.  HEENT: EOMs intact. Anicteric.  Oral mucosa moist and pink.  Neck: No JVD. No palpable lymphadenopathy. Neck is supple.  Chest: Clear to auscultation.   Heart: Regular rate and rhythm.   Abdomen: Soft, non tender with good bowel sounds.    Extremities: minimal edema breanne le chronic, breanne le ecchymoses, no active bleeding  Neurological: Grossly intact. Normal AROM  shoulders.    Psych/Depression: nl  Skin:  NF lesions on the chest and back   Breast: No LAD breanne axilla, right mastectomy and implant intact.left breast soft no masses      ASSESSMENT/PLAN:     1. Malignant neoplasm of upper-outer quadrant of right breast in female, estrogen receptor positive (HCC)    2. Malignant pleural effusion (HCC)    3. Malignant neoplasm metastatic to liver (HCC)    4. Stage IV breast cancer in female (HCC)    5. Chemotherapy management, encounter for      Breast cancer history of ER/AL positive metastatic breast cancer to pleura, liver, bones. Initially diagnosed 2007 with DCIS, treated with lumpectomy, however MRI showed progression in the breast and she had a complete right mastectomy with axillary dissection followed by chemo in 2008: .pT1,N1. ER 9%, PgR 8%, Her-2 0, Ki 39%. she then presented with recurrence in 2020 right neck/supraclav ( ER 98% AL 0  her2 1+)  Pleura (Er 20%, Her2 1+) and liver:  (ER 38%, her 0)     Treatment history  9/11/2007:  right breast lumpectomy: Extensive DCIS ER 90%. Mri with progression  11/16/2007:  right mastectomy with axillary node dissection: pT1 N1 M0  2/16/2008:  4 cycles of Taxotere and Cytoxan   9/18/2020: Right neck recurrence,  left pleural,  liver-   10/1/2020: My risk Myriad negative. Started Pablociclib 125mg d1-21 w/ faslodex   4/15/2021: Neutropenic fever. Pablociclib changed to q 21 d with 14 day off cycle   1/27/2022: Palbo at 100mg D1-21 q 28 day schedule stared per Dr Soriano  7/15/22: changed to abemaciclib. Progress in the pleura bx: TNBC, Her2 0 CPS 0  9/22/22-11/2022: Sacituzumab w/o response   11/17/22-1/2023: Abraxane with initial response, liver bx nondx (too small)  2/9/23: started gem/carbo due to SOB with response  4/26/23: imaging with continues response in pleura, progression in liver Er 65%, AL 0, Her2 0, bx: NGS below  07/6/23-1/10/24 on eribulin s/p 8 cycles prior to progression.  01/22/24- present Capecitabine      NGS:  9/2020: Foundation: NF1, [id number], FGFR1 and MYC amplification  9/2022: G360: [id number] 0.5%. Tempus pleura: NF1, TP53, FGFR1 amp  2/2023: G360:  [id number] 0.9%, APC 0.2 with several new VUS  4/2023 G360: [id number] 0.4%. APC 0.3 with several new VUS. Tempus liver:         ESR 1 mutation is negative.    Patient will proceed with 7th line therapy with capecitabine week on/week off and after 3 months (before next appt) will have imaging.  If still has liver lesion, will consider Y-90 embolization, vs continue with treatment w/o Y-90.      S/p 9 cycles seventh line capecitabine, initiated on 1/22/24.  9/27/24 restaging CT shows progression in the lungs with possible lymphangitic spread and effusions.      S/p eighth line liposomal doxorubicin s/p cycle 1 on 10/9/24, dose reduced to 37.5 mg/m2  S/p cycle 2 with 50 mg/m2 given tolerability to C1  HELD cycle 3 due to disease progression.      Change of therapy to Ixempra (9th line )    S/p cycle 3 Ixempra- cough improved.  Had infusion reaction.  Added monteleukast, pepcid and benadryl 50 mg and slow infusion rate to 4 hours for cycle 3 and on.  Taking decadron prior to visits.    Patient had tumor assessment CT on 2/7/2025, which shows evidence of response with decreased right pleural effusion, less pronounced interlobar septal thickening and interval improvement of metastases.  She does have evidence of treated metastatic disease in her bones, but does have a new T6 compression fracture relative to December 2024.  Completed C4 Ixempra tolerating better    Proceed with Cycle 5 Ixempra (40 mg/m2), she will have tumor assessment after cycle 6.     H/o PPE- lotion to hands and feet.  No issues currenly.    MARCELLO:  Ondansetron prn.  Start evening of D4    Constipation, gr. 1:  Stool softener and miralax prn    Cancer-related pain- resolved.   Norco prn    Zometa - last dose March 2023. Completed 2 years, no further needed     cont folic acid 1mg daily and  B12 SL 1000mcg  daily.     FEN:  increase protein and calories.    Call prn.  Follow-up in 3  weeks, 4/2/25 NP Shania, labs and treatment    Emotional support given       MDM high risk  Continuity of complex medical care.    JASE Chadwick       No orders of the defined types were placed in this encounter.    Recent Results (from the past 48 hours)   CBC W Differential W Platelet    Collection Time: 03/12/25 10:50 AM   Result Value Ref Range    WBC 8.1 4.0 - 11.0 x10(3) uL    RBC 3.63 (L) 3.80 - 5.30 x10(6)uL    HGB 11.0 (L) 12.0 - 16.0 g/dL    HCT 33.2 (L) 35.0 - 48.0 %    MCV 91.5 80.0 - 100.0 fL    MCH 30.3 26.0 - 34.0 pg    MCHC 33.1 31.0 - 37.0 g/dL    RDW-SD 56.1 (H) 35.1 - 46.3 fL    RDW 16.6 (H) 11.0 - 15.0 %    .0 150.0 - 450.0 10(3)uL    Neutrophil Absolute Prelim 6.53 1.50 - 7.70 x10 (3) uL   Comp Metabolic Panel (14)    Collection Time: 03/12/25 10:50 AM   Result Value Ref Range    Glucose 121 (H) 70 - 99 mg/dL    Sodium 139 136 - 145 mmol/L    Potassium 3.8 3.5 - 5.1 mmol/L    Chloride 104 98 - 112 mmol/L    CO2 25.0 21.0 - 32.0 mmol/L    Anion Gap 10 0 - 18 mmol/L    BUN 20 9 - 23 mg/dL    Creatinine 1.15 (H) 0.55 - 1.02 mg/dL    BUN/CREA Ratio 17.4 10.0 - 20.0    Calcium, Total 9.2 8.7 - 10.4 mg/dL    Calculated Osmolality 292 275 - 295 mOsm/kg    eGFR-Cr 53 (L) >=60 mL/min/1.73m2    ALT 52 (H) 10 - 49 U/L    AST 51 (H) <34 U/L    Alkaline Phosphatase 121 50 - 130 U/L    Bilirubin, Total 0.6 0.2 - 1.1 mg/dL    Total Protein 6.0 5.7 - 8.2 g/dL    Albumin 4.1 3.2 - 4.8 g/dL    Globulin  1.9 (L) 2.0 - 3.5 g/dL    A/G Ratio 2.2 (H) 1.0 - 2.0    Patient Fasting for CMP? Patient not present          Imaging & Referrals:  None   No orders of the defined types were placed in this encounter.

## 2025-03-12 NOTE — PROGRESS NOTES
Pt here for C5 D1 Ixempra.  Arrives Ambulating independently, accompanied by Self     Patient was evaluated today by DOMINICK.    Oral medications included in this regimen:  yes - dexamethasone. Pt forgot to take morning dose- per JASE Leroy no need to give PO dex, pt receiving IV dex with the premedications.     Patient confirms comprehension of cancer treatment schedule:  yes    Pregnancy screening:  Not applicable    Modifications in dose or schedule:  No    Medications appearance and physical integrity checked by RN: yes.    Chemotherapy IV pump settings verified by 2 RNs:  Yes.  Frequency of blood return and site check throughout administration: Prior to administration, Prior to each drug, and At completion of therapy     Infusion/treatment outcome:  patient tolerated treatment without incident    Education Record    Learner:  Patient  Barriers / Limitations:  None  Method:  Discussion, Printed material, and Reinforcement  Education / instructions given:  Plan of care reviewed  Outcome:  Shows understanding    Discharged Home, Ambulating independently, accompanied by:Self    Patient/family verbalized understanding of future appointments: by printed AVS

## 2025-04-02 ENCOUNTER — OFFICE VISIT (OUTPATIENT)
Age: 65
End: 2025-04-02
Attending: NURSE PRACTITIONER
Payer: MEDICARE

## 2025-04-02 ENCOUNTER — OFFICE VISIT (OUTPATIENT)
Age: 65
End: 2025-04-02
Attending: INTERNAL MEDICINE
Payer: MEDICARE

## 2025-04-02 ENCOUNTER — NURSE ONLY (OUTPATIENT)
Age: 65
End: 2025-04-02
Attending: INTERNAL MEDICINE
Payer: MEDICARE

## 2025-04-02 VITALS
BODY MASS INDEX: 19.04 KG/M2 | DIASTOLIC BLOOD PRESSURE: 82 MMHG | HEIGHT: 60 IN | WEIGHT: 97 LBS | RESPIRATION RATE: 16 BRPM | TEMPERATURE: 98 F | OXYGEN SATURATION: 97 % | SYSTOLIC BLOOD PRESSURE: 121 MMHG | HEART RATE: 118 BPM

## 2025-04-02 DIAGNOSIS — C50.411 MALIGNANT NEOPLASM OF UPPER-OUTER QUADRANT OF RIGHT BREAST IN FEMALE, ESTROGEN RECEPTOR POSITIVE (HCC): Primary | ICD-10-CM

## 2025-04-02 DIAGNOSIS — C78.7 MALIGNANT NEOPLASM METASTATIC TO LIVER (HCC): ICD-10-CM

## 2025-04-02 DIAGNOSIS — Z17.0 MALIGNANT NEOPLASM OF UPPER-OUTER QUADRANT OF RIGHT BREAST IN FEMALE, ESTROGEN RECEPTOR POSITIVE (HCC): Primary | ICD-10-CM

## 2025-04-02 DIAGNOSIS — F41.9 ANXIETY: ICD-10-CM

## 2025-04-02 DIAGNOSIS — Z51.11 CHEMOTHERAPY MANAGEMENT, ENCOUNTER FOR: ICD-10-CM

## 2025-04-02 LAB
ALBUMIN SERPL-MCNC: 4 G/DL (ref 3.2–4.8)
ALBUMIN/GLOB SERPL: 1.8 {RATIO} (ref 1–2)
ALP LIVER SERPL-CCNC: 239 U/L
ALT SERPL-CCNC: 100 U/L
ANION GAP SERPL CALC-SCNC: 11 MMOL/L (ref 0–18)
AST SERPL-CCNC: 94 U/L (ref ?–34)
BASOPHILS # BLD: 0 X10(3) UL (ref 0–0.2)
BASOPHILS NFR BLD: 0 %
BILIRUB SERPL-MCNC: 0.6 MG/DL (ref 0.2–1.1)
BUN BLD-MCNC: 30 MG/DL (ref 9–23)
BUN/CREAT SERPL: 32.3 (ref 10–20)
CALCIUM BLD-MCNC: 9.2 MG/DL (ref 8.7–10.4)
CHLORIDE SERPL-SCNC: 99 MMOL/L (ref 98–112)
CO2 SERPL-SCNC: 27 MMOL/L (ref 21–32)
CREAT BLD-MCNC: 0.93 MG/DL
DEPRECATED RDW RBC AUTO: 51.4 FL (ref 35.1–46.3)
EGFRCR SERPLBLD CKD-EPI 2021: 69 ML/MIN/1.73M2 (ref 60–?)
EOSINOPHIL # BLD: 0 X10(3) UL (ref 0–0.7)
EOSINOPHIL NFR BLD: 0 %
ERYTHROCYTE [DISTWIDTH] IN BLOOD BY AUTOMATED COUNT: 16.1 % (ref 11–15)
FASTING STATUS PATIENT QL REPORTED: NO
GLOBULIN PLAS-MCNC: 2.2 G/DL (ref 2–3.5)
GLUCOSE BLD-MCNC: 122 MG/DL (ref 70–99)
HCT VFR BLD AUTO: 32.1 %
HGB BLD-MCNC: 10.6 G/DL
LYMPHOCYTES NFR BLD: 0.47 X10(3) UL (ref 1–4)
LYMPHOCYTES NFR BLD: 3 %
MCH RBC QN AUTO: 28.6 PG (ref 26–34)
MCHC RBC AUTO-ENTMCNC: 33 G/DL (ref 31–37)
MCV RBC AUTO: 86.8 FL
METAMYELOCYTES # BLD: 0.47 X10(3) UL
METAMYELOCYTES NFR BLD: 3 %
MONOCYTES # BLD: 0.63 X10(3) UL (ref 0.1–1)
MONOCYTES NFR BLD: 4 %
NEUTROPHILS # BLD AUTO: 12.66 X10 (3) UL (ref 1.5–7.7)
NEUTROPHILS NFR BLD: 90 %
NEUTS HYPERSEG # BLD: 14.13 X10(3) UL (ref 1.5–7.7)
OSMOLALITY SERPL CALC.SUM OF ELEC: 291 MOSM/KG (ref 275–295)
PLATELET # BLD AUTO: 312 10(3)UL (ref 150–450)
PLATELET MORPHOLOGY: NORMAL
POTASSIUM SERPL-SCNC: 3.4 MMOL/L (ref 3.5–5.1)
PROT SERPL-MCNC: 6.2 G/DL (ref 5.7–8.2)
RBC # BLD AUTO: 3.7 X10(6)UL
SODIUM SERPL-SCNC: 137 MMOL/L (ref 136–145)
TOTAL CELLS COUNTED BLD: 100
WBC # BLD AUTO: 15.7 X10(3) UL (ref 4–11)

## 2025-04-02 RX ORDER — DIPHENHYDRAMINE HYDROCHLORIDE 50 MG/ML
50 INJECTION, SOLUTION INTRAMUSCULAR; INTRAVENOUS ONCE
Status: CANCELLED | OUTPATIENT
Start: 2025-04-02

## 2025-04-02 RX ORDER — LORAZEPAM 0.5 MG/1
0.5 TABLET ORAL EVERY 6 HOURS PRN
Qty: 30 TABLET | Refills: 0 | Status: SHIPPED | OUTPATIENT
Start: 2025-04-02

## 2025-04-02 RX ORDER — MONTELUKAST SODIUM 5 MG/1
10 TABLET, CHEWABLE ORAL ONCE
Status: CANCELLED
Start: 2025-04-02 | End: 2025-04-02

## 2025-04-02 RX ORDER — FAMOTIDINE 10 MG/ML
20 INJECTION, SOLUTION INTRAVENOUS ONCE
Status: COMPLETED | OUTPATIENT
Start: 2025-04-02 | End: 2025-04-02

## 2025-04-02 RX ORDER — DIPHENHYDRAMINE HYDROCHLORIDE 50 MG/ML
50 INJECTION, SOLUTION INTRAMUSCULAR; INTRAVENOUS ONCE
Status: COMPLETED | OUTPATIENT
Start: 2025-04-02 | End: 2025-04-02

## 2025-04-02 RX ORDER — MONTELUKAST SODIUM 10 MG/1
10 TABLET ORAL ONCE
Status: COMPLETED | OUTPATIENT
Start: 2025-04-02 | End: 2025-04-02

## 2025-04-02 RX ORDER — FAMOTIDINE 10 MG/ML
20 INJECTION, SOLUTION INTRAVENOUS ONCE
Status: CANCELLED | OUTPATIENT
Start: 2025-04-02

## 2025-04-02 RX ORDER — MONTELUKAST SODIUM 5 MG/1
10 TABLET, CHEWABLE ORAL ONCE
Status: DISCONTINUED | OUTPATIENT
Start: 2025-04-02 | End: 2025-04-02

## 2025-04-02 RX ORDER — FAMOTIDINE 10 MG/ML
INJECTION, SOLUTION INTRAVENOUS
Status: COMPLETED
Start: 2025-04-02 | End: 2025-04-02

## 2025-04-02 RX ORDER — MONTELUKAST SODIUM 10 MG/1
TABLET ORAL
Status: COMPLETED
Start: 2025-04-02 | End: 2025-04-02

## 2025-04-02 RX ORDER — DIPHENHYDRAMINE HYDROCHLORIDE 50 MG/ML
INJECTION, SOLUTION INTRAMUSCULAR; INTRAVENOUS
Status: COMPLETED
Start: 2025-04-02 | End: 2025-04-02

## 2025-04-02 RX ADMIN — MONTELUKAST SODIUM 10 MG: 10 TABLET ORAL at 12:13:00

## 2025-04-02 RX ADMIN — FAMOTIDINE 20 MG: 10 INJECTION, SOLUTION INTRAVENOUS at 12:14:00

## 2025-04-02 RX ADMIN — DIPHENHYDRAMINE HYDROCHLORIDE 50 MG: 50 INJECTION, SOLUTION INTRAMUSCULAR; INTRAVENOUS at 12:17:00

## 2025-04-02 NOTE — PROGRESS NOTES
HPI   Aleisha Carlin is a 65 year old female here for follow up of   Encounter Diagnoses   Name Primary?    Malignant neoplasm of upper-outer quadrant of right breast in female, estrogen receptor positive (HCC) Yes    Malignant neoplasm metastatic to liver (HCC)     Chemotherapy management, encounter for        S/p cycle 5 Ixempra, tolerating well. Previously had infusion related reaction for which she was treated with Benadryl, Solu-Medrol Pepcid and montelukast.  Now being premedicated with oral dexamethasone twice daily the day before treatment.  She did forget 1 dose of dexamethasone this morning . Also get IV dexamethasone in premedications.    Tolerated treatment well, some constipation.     Fatigue:  Yes, states about the same.       Fevers:  No     Appetite/taste changes:  Yes, but making sure she eats .      Mucositis:  No    Weight changes:  lost 3 lbs. Decreased appetite 1 protein drink daily.     Nausea/vomiting:  Yes, D4-5, controlled with compazine ondansetron prn       Diarrhea:  Yes, reports D4 had loose stool that resolved    Constipation:  Yes, controlled with stool softener. But occasionally has issues.  Does take olive oil at times if needed.  Prn miralax  Miralax after chemo, stool softener daily   Yesterday was hungry and ate 3 hot dogs.       Peripheral neuropathy:  Yes, Per history, occasionally R last two fingers and does not last long. No complaint today .      PPE:  States using lotion in the hands all the time, no PPE.  Some dry skin on the soles.      Ad pain at times LUQ takes norco which resolves pain.     ECOG PS 1    Review of Systems:     Review of Systems   Constitutional:  Positive for appetite change (decreased).   HENT:   Negative for trouble swallowing (states some times).    Respiratory:  Negative for cough and shortness of breath (HERRERA with walking at the store.).    Cardiovascular:  Positive for chest pain (L lower ribs at times, no aggravating or alleviating factors.   States once a day or so, lasts about an hour.  States same as prior.).   Gastrointestinal:  Positive for constipation. Negative for abdominal pain.   Genitourinary:  Negative for dysuria and frequency.    Musculoskeletal:  Negative for arthralgias, back pain and neck pain.   Skin:  Negative for rash.        Dry skin on the feet   Neurological:  Negative for dizziness (once in a while with position changes) and headaches.   Hematological:  Negative for adenopathy. Bruises/bleeds easily.   Psychiatric/Behavioral:  Positive for sleep disturbance (restless at night at times).          Current Outpatient Medications   Medication Sig Dispense Refill    dexamethasone (DECADRON) 4 MG tablet Take 2 tablets (8 mg total) by mouth 2 (two) times daily with meals. Take twice the day before and morning of chemotherapy. 12 tablet 5    zolpidem 10 MG Oral Tab Take 1 tablet (10 mg total) by mouth nightly as needed. 60 tablet 0    prochlorperazine (COMPAZINE) 10 mg tablet Take 1 tablet (10 mg total) by mouth every 6 (six) hours as needed for Nausea. 90 tablet 2    HYDROcodone-acetaminophen  MG Oral Tab Take 1 tablet by mouth every 6 (six) hours as needed for Pain. 90 tablet 0    ondansetron 8 MG Oral Tablet Dispersible Take 1 tablet (8 mg total) by mouth every 8 (eight) hours as needed for Nausea.      guaiFENesin-codeine 100-10 MG/5ML Oral Solution Take 5 mL by mouth every 6 (six) hours as needed for cough. 473 mL 1    Potassium Chloride ER 10 MEQ Oral Tab CR Take 2 tablets (20 mEq total) by mouth 2 (two) times daily. 60 tablet 0    mirtazapine 15 MG Oral Tablet Dispersible Take 1 tablet (15 mg total) by mouth nightly. 90 tablet 3    minocycline 50 MG Oral Cap Take 1 capsule (50 mg total) by mouth daily. 90 capsule 3    furosemide 20 MG Oral Tab Take 1 tablet (20 mg total) by mouth daily.      folic acid 1 MG Oral Tab Take 1 tablet (1 mg total) by mouth daily. 90 tablet 3    albuterol 108 (90 Base) MCG/ACT Inhalation Aero Soln  Inhale 1 puff into the lungs every 6 (six) hours as needed for Wheezing. 8 g 3    Cholecalciferol (VITAMIN D3) 250 MCG (32304 UT) Oral Cap Take 1 capsule by mouth daily.       Allergies:   No Known Allergies    Past Medical History:    Breast cancer (HCC)    MASTECTOMY / RECONSTRUCTION    Chemotherapy-induced neutropenia    Encounter for insertion of venous access port    portacath insertion / venous access    Glaucoma    History of breast cancer in female    Hyperlipidemia    Malignant neoplasm of overlapping sites of breast in female, estrogen receptor positive (HCC)    Malignant neoplasm of upper-outer quadrant of right breast in female, estrogen receptor positive (HCC)    Malignant pleural effusion (HCC)    Menorrhagia    HYSTEROSCOPY / D&C / endomentrial biopsy (08/28/2007)    Metastatic breast cancer    Neurofibromatosis (HCC)    Osteopenia of multiple sites    Osteopenia of multiple sites    Osteoporosis screening    Osteoporosis screening    Per [employer]    Other osteoporosis without current pathological fracture    Postmenopausal bleeding    Psychophysiological insomnia    Tumor, foot    tumor right foot / excision/excision of bone spur/arthrodesis w/screw fixation     Past Surgical History:   Procedure Laterality Date    Breast reconstruction Right 2008    right breast reconstructed - ductal, BRCA neg    Breast surgery      Chemotherapy  2007    Foot surgery Right     tumor rt foot / excision/excision of bone spur/arthrodesis w/screw fixation    Ir port a cath procedure  2007    Right subclavian port of cath.    Mastectomy right      2012    Mastectomy,simple  2007     Social History     Socioeconomic History    Marital status: Single   Tobacco Use    Smoking status: Never    Smokeless tobacco: Never   Substance and Sexual Activity    Alcohol use: Yes     Alcohol/week: 5.0 standard drinks of alcohol     Types: 5 Standard drinks or equivalent per week     Comment: Socially.  (Per [employer]:  5 days weekly.)     Drug use: No   Other Topics Concern    Caffeine Concern Yes     Comment: tea     Social Drivers of Health     Food Insecurity: No Food Insecurity (2023)    Food Insecurity     Food Insecurity: Never true   Transportation Needs: No Transportation Needs (2023)    Transportation Needs     Lack of Transportation: No   Housing Stability: Low Risk  (2023)    Housing Stability     Housing Instability: No       Family History   Problem Relation Age of Onset    Breast Cancer Mother 49        bilateral mastectomy    Genetic Disease Mother         NF-1    Breast Cancer Maternal Aunt 75    Breast Cancer Paternal Grandmother     Breast Cancer Self 51    Genetic Disease Self         NF-1    Cancer Paternal Aunt         throat ca    Cancer Maternal Uncle         prostate ca    Genetic Disease Brother         NF-1    Genetic Disease Brother         NF-1    Genetic Disease Brother         NF-1    Cancer Maternal Uncle         bladder ca    Breast Cancer Maternal Cousin Female     Cancer Maternal Cousin Female         leukemia;  childhood    Cancer Paternal Cousin Male         throat ca         PHYSICAL EXAM:    /82 (BP Location: Left arm, Patient Position: Sitting, Cuff Size: adult)   Pulse 118   Temp 98.3 °F (36.8 °C) (Oral)   Resp 16   Ht 1.524 m (5')   Wt 44 kg (97 lb)   SpO2 97%   BMI 18.94 kg/m²   Wt Readings from Last 6 Encounters:   25 45.5 kg (100 lb 4.8 oz)   25 45.4 kg (100 lb)   25 46.7 kg (103 lb)   25 48.1 kg (106 lb)   24 49.2 kg (108 lb 6.4 oz)   24 49.4 kg (109 lb)     Physical Exam  General: Patient is alert, not in acute distress.  HEENT: EOMs intact. Anicteric.  Oral mucosa moist and pink.  Neck: No JVD. No palpable lymphadenopathy. Neck is supple.  Chest: Clear to auscultation.   Heart: Regular rate and rhythm.   Abdomen: Soft, non tender with good bowel sounds.    Extremities: minimal edema breanne le chronic, breanne le ecchymoses, no active  bleeding  Neurological: Grossly intact. Normal AROM shoulders.    Psych/Depression: nl  Skin:  NF lesions on the chest and back   Breast: No LAD breanne axilla, right mastectomy and implant intact.left breast soft no masses      ASSESSMENT/PLAN:     1. Malignant neoplasm of upper-outer quadrant of right breast in female, estrogen receptor positive (HCC)    2. Malignant neoplasm metastatic to liver (HCC)    3. Chemotherapy management, encounter for      Breast cancer history of ER/CT positive metastatic breast cancer to pleura, liver, bones. Initially diagnosed 2007 with DCIS, treated with lumpectomy, however MRI showed progression in the breast and she had a complete right mastectomy with axillary dissection followed by chemo in 2008: .pT1,N1. ER 9%, PgR 8%, Her-2 0, Ki 39%. she then presented with recurrence in 2020 right neck/supraclav ( ER 98% CT 0  her2 1+)  Pleura (Er 20%, Her2 1+) and liver:  (ER 38%, her 0)     Treatment history  9/11/2007:  right breast lumpectomy: Extensive DCIS ER 90%. Mri with progression  11/16/2007:  right mastectomy with axillary node dissection: pT1 N1 M0  2/16/2008:  4 cycles of Taxotere and Cytoxan   9/18/2020: Right neck recurrence,  left pleural,  liver-   10/1/2020: My risk Myriad negative. Started Pablociclib 125mg d1-21 w/ faslodex   4/15/2021: Neutropenic fever. Pablociclib changed to q 21 d with 14 day off cycle   1/27/2022: Palbo at 100mg D1-21 q 28 day schedule stared per Dr Soriano  7/15/22: changed to abemaciclib. Progress in the pleura bx: TNBC, Her2 0 CPS 0  9/22/22-11/2022: Sacituzumab w/o response   11/17/22-1/2023: Abraxane with initial response, liver bx nondx (too small)  2/9/23: started gem/carbo due to SOB with response  4/26/23: imaging with continues response in pleura, progression in liver Er 65%, CT 0, Her2 0, bx: NGS below  07/6/23-1/10/24 on eribulin s/p 8 cycles prior to progression.  01/22/24- present Capecitabine     NGS:  9/2020: Foundation: NF1, [id number],  FGFR1 and MYC amplification  9/2022: G360: [id number] 0.5%. Tempus pleura: NF1, TP53, FGFR1 amp  2/2023: G360:  [id number] 0.9%, APC 0.2 with several new VUS  4/2023 G360: [id number] 0.4%. APC 0.3 with several new VUS. Tempus liver:         ESR 1 mutation is negative.    Patient will proceed with 7th line therapy with capecitabine week on/week off and after 3 months (before next appt) will have imaging.  If still has liver lesion, will consider Y-90 embolization, vs continue with treatment w/o Y-90.      S/p 9 cycles seventh line capecitabine, initiated on 1/22/24.  9/27/24 restaging CT shows progression in the lungs with possible lymphangitic spread and effusions.      S/p eighth line liposomal doxorubicin s/p cycle 1 on 10/9/24, dose reduced to 37.5 mg/m2  S/p cycle 2 with 50 mg/m2 given tolerability to C1  HELD cycle 3 due to disease progression.      Change of therapy to Ixempra (9th line )    S/p cycle 3 Ixempra- cough improved.  Had infusion reaction.  Added monteleukast, pepcid and benadryl 50 mg and slow infusion rate to 4 hours for cycle 3 and on.  Taking decadron prior to visits.    Patient had tumor assessment CT on 2/7/2025, which shows evidence of response with decreased right pleural effusion, less pronounced interlobar septal thickening and interval improvement of metastases.  She does have evidence of treated metastatic disease in her bones, but does have a new T6 compression fracture relative to December 2024.  Completed C5 Ixempra tolerating better    Proceed with Cycle 6 Ixempra (dose reduced to 32 mg/m2 from 40 mg/m2 based on liver enzymes ),   she will have tumor assessment after cycle 6.     H/o PPE- lotion to hands and feet.  No issues currenly.    MARCELLO:  Ondansetron prn.  Start evening of D4    Constipation, gr. 1:  Stool softener and miralax prn    Cancer-related pain- resolved.   Norco prn    Anxiety- lorazepam prn     Zometa - last dose March 2023. Completed 2 years, no further needed     cont  folic acid 1mg daily and  B12 SL 1000mcg daily.     FEN:  increase protein and calories.    Call prn.  Follow-up in 3  weeks    Emotional support given       MDM high risk  Continuity of complex medical care.           No orders of the defined types were placed in this encounter.    Recent Results (from the past 48 hours)   CBC W Differential W Platelet    Collection Time: 04/02/25 10:26 AM   Result Value Ref Range    WBC 15.7 (H) 4.0 - 11.0 x10(3) uL    RBC 3.70 (L) 3.80 - 5.30 x10(6)uL    HGB 10.6 (L) 12.0 - 16.0 g/dL    HCT 32.1 (L) 35.0 - 48.0 %    MCV 86.8 80.0 - 100.0 fL    MCH 28.6 26.0 - 34.0 pg    MCHC 33.0 31.0 - 37.0 g/dL    RDW-SD 51.4 (H) 35.1 - 46.3 fL    RDW 16.1 (H) 11.0 - 15.0 %    .0 150.0 - 450.0 10(3)uL    Neutrophil Absolute Prelim 12.66 (H) 1.50 - 7.70 x10 (3) uL   Comp Metabolic Panel (14)    Collection Time: 04/02/25 10:26 AM   Result Value Ref Range    Glucose 122 (H) 70 - 99 mg/dL    Sodium 137 136 - 145 mmol/L    Potassium 3.4 (L) 3.5 - 5.1 mmol/L    Chloride 99 98 - 112 mmol/L    CO2 27.0 21.0 - 32.0 mmol/L    Anion Gap 11 0 - 18 mmol/L    BUN 30 (H) 9 - 23 mg/dL    Creatinine 0.93 0.55 - 1.02 mg/dL    BUN/CREA Ratio 32.3 (H) 10.0 - 20.0    Calcium, Total 9.2 8.7 - 10.4 mg/dL    Calculated Osmolality 291 275 - 295 mOsm/kg    eGFR-Cr 69 >=60 mL/min/1.73m2     (H) 10 - 49 U/L    AST 94 (H) <34 U/L    Alkaline Phosphatase 239 (H) 50 - 130 U/L    Bilirubin, Total 0.6 0.2 - 1.1 mg/dL    Total Protein 6.2 5.7 - 8.2 g/dL    Albumin 4.0 3.2 - 4.8 g/dL    Globulin  2.2 2.0 - 3.5 g/dL    A/G Ratio 1.8 1.0 - 2.0    Patient Fasting for CMP? No    Manual differential    Collection Time: 04/02/25 10:26 AM   Result Value Ref Range    Neutrophil Absolute Manual 14.13 (H) 1.50 - 7.70 x10(3) uL    Lymphocyte Absolute Manual 0.47 (L) 1.00 - 4.00 x10(3) uL    Monocyte Absolute Manual 0.63 0.10 - 1.00 x10(3) uL    Eosinophil Absolute Manual 0.00 0.00 - 0.70 x10(3) uL    Basophil Absolute Manual  0.00 0.00 - 0.20 x10(3) uL    Metamyelocyte Absolute Manual 0.47 (H) 0 x10(3) uL    Neutrophils % Manual 90 %    Lymphocyte % Manual 3 %    Monocyte % Manual 4 %    Eosinophil % Manual 0 %    Basophil % Manual 0 %    Metamyelocyte % 3 %    Total Cells Counted 100     RBC Morphology See morphology below (A) Normal, Slide reviewed, see previous RBC morphology.    Platelet Morphology Normal Normal    Macrocytosis 1+      Microcytosis 1+      Ovalocytes 1+             Imaging & Referrals:  None   No orders of the defined types were placed in this encounter.

## 2025-04-02 NOTE — PROGRESS NOTES
Pt here for C6D1 Drug(s)Ixempra.  Arrives Ambulating independently, accompanied by Self     Patient was evaluated today by DOMINICK.    Oral medications included in this regimen:  yes - dexamethasone    Patient confirms comprehension of cancer treatment schedule:  yes    Pregnancy screening:  Denies possibility of pregnancy    Modifications in dose or schedule:  Yes, dose reduced to 32 mg/m2 due to liver enzymes    Medications appearance and physical integrity checked by RN: yes.    Chemotherapy IV pump settings verified by 2 RNs:  Yes.  Frequency of blood return and site check throughout administration: Prior to administration and At completion of therapy     Infusion/treatment outcome:  patient tolerated treatment without incident    Education Record    Learner:  Patient  Barriers / Limitations:  None  Method:  Discussion  Education / instructions given:  Reviewed medication  Outcome:  Observed demonstration    Discharged Home, Ambulating independently, accompanied by:Self    Patient/family verbalized understanding of future appointments: by printed AVS

## 2025-04-21 ENCOUNTER — HOSPITAL ENCOUNTER (OUTPATIENT)
Dept: CT IMAGING | Facility: HOSPITAL | Age: 65
Discharge: HOME | End: 2025-04-21
Attending: NURSE PRACTITIONER
Payer: MEDICARE

## 2025-04-21 DIAGNOSIS — C50.411 MALIGNANT NEOPLASM OF UPPER-OUTER QUADRANT OF RIGHT BREAST IN FEMALE, ESTROGEN RECEPTOR POSITIVE (HCC): ICD-10-CM

## 2025-04-21 DIAGNOSIS — Z17.0 MALIGNANT NEOPLASM OF UPPER-OUTER QUADRANT OF RIGHT BREAST IN FEMALE, ESTROGEN RECEPTOR POSITIVE (HCC): ICD-10-CM

## 2025-04-21 DIAGNOSIS — C78.7 MALIGNANT NEOPLASM METASTATIC TO LIVER (HCC): ICD-10-CM

## 2025-04-21 PROCEDURE — 71260 CT THORAX DX C+: CPT | Performed by: NURSE PRACTITIONER

## 2025-04-21 PROCEDURE — 74177 CT ABD & PELVIS W/CONTRAST: CPT | Performed by: NURSE PRACTITIONER

## 2025-04-22 RX ORDER — SODIUM CHLORIDE 9 MG/ML
10 INJECTION, SOLUTION INTRAMUSCULAR; INTRAVENOUS; SUBCUTANEOUS ONCE
OUTPATIENT
Start: 2025-04-22

## 2025-04-23 ENCOUNTER — OFFICE VISIT (OUTPATIENT)
Age: 65
End: 2025-04-23
Attending: INTERNAL MEDICINE
Payer: MEDICARE

## 2025-04-23 ENCOUNTER — NURSE ONLY (OUTPATIENT)
Age: 65
End: 2025-04-23
Attending: INTERNAL MEDICINE
Payer: MEDICARE

## 2025-04-23 ENCOUNTER — OFFICE VISIT (OUTPATIENT)
Age: 65
End: 2025-04-23
Attending: NURSE PRACTITIONER
Payer: MEDICARE

## 2025-04-23 VITALS
BODY MASS INDEX: 18.85 KG/M2 | DIASTOLIC BLOOD PRESSURE: 82 MMHG | RESPIRATION RATE: 16 BRPM | HEIGHT: 60 IN | WEIGHT: 96 LBS | HEART RATE: 119 BPM | SYSTOLIC BLOOD PRESSURE: 115 MMHG | OXYGEN SATURATION: 96 % | TEMPERATURE: 99 F

## 2025-04-23 DIAGNOSIS — C78.7 MALIGNANT NEOPLASM METASTATIC TO LIVER (HCC): ICD-10-CM

## 2025-04-23 DIAGNOSIS — Z17.0 MALIGNANT NEOPLASM OF UPPER-OUTER QUADRANT OF RIGHT BREAST IN FEMALE, ESTROGEN RECEPTOR POSITIVE (HCC): Primary | ICD-10-CM

## 2025-04-23 DIAGNOSIS — C50.411 MALIGNANT NEOPLASM OF UPPER-OUTER QUADRANT OF RIGHT BREAST IN FEMALE, ESTROGEN RECEPTOR POSITIVE (HCC): Primary | ICD-10-CM

## 2025-04-23 DIAGNOSIS — Z51.11 CHEMOTHERAPY MANAGEMENT, ENCOUNTER FOR: ICD-10-CM

## 2025-04-23 LAB
ALBUMIN SERPL-MCNC: 4 G/DL (ref 3.2–4.8)
ALBUMIN/GLOB SERPL: 1.7 {RATIO} (ref 1–2)
ALP LIVER SERPL-CCNC: 251 U/L (ref 50–130)
ALT SERPL-CCNC: 270 U/L (ref 10–49)
ANION GAP SERPL CALC-SCNC: 8 MMOL/L (ref 0–18)
AST SERPL-CCNC: 263 U/L (ref ?–34)
BASOPHILS # BLD AUTO: 0.02 X10(3) UL (ref 0–0.2)
BASOPHILS NFR BLD AUTO: 0.2 %
BILIRUB SERPL-MCNC: 0.7 MG/DL (ref 0.2–1.1)
BUN BLD-MCNC: 30 MG/DL (ref 9–23)
BUN/CREAT SERPL: 32.3 (ref 10–20)
CALCIUM BLD-MCNC: 9.6 MG/DL (ref 8.7–10.4)
CHLORIDE SERPL-SCNC: 103 MMOL/L (ref 98–112)
CO2 SERPL-SCNC: 26 MMOL/L (ref 21–32)
CREAT BLD-MCNC: 0.93 MG/DL (ref 0.55–1.02)
DEPRECATED RDW RBC AUTO: 54.6 FL (ref 35.1–46.3)
EGFRCR SERPLBLD CKD-EPI 2021: 69 ML/MIN/1.73M2 (ref 60–?)
EOSINOPHIL # BLD AUTO: 0 X10(3) UL (ref 0–0.7)
EOSINOPHIL NFR BLD AUTO: 0 %
ERYTHROCYTE [DISTWIDTH] IN BLOOD BY AUTOMATED COUNT: 16.8 % (ref 11–15)
GLOBULIN PLAS-MCNC: 2.3 G/DL (ref 2–3.5)
GLUCOSE BLD-MCNC: 138 MG/DL (ref 70–99)
HCT VFR BLD AUTO: 33.6 % (ref 35–48)
HGB BLD-MCNC: 10.9 G/DL (ref 12–16)
IMM GRANULOCYTES # BLD AUTO: 0.26 X10(3) UL (ref 0–1)
IMM GRANULOCYTES NFR BLD: 2.7 %
LYMPHOCYTES # BLD AUTO: 0.4 X10(3) UL (ref 1–4)
LYMPHOCYTES NFR BLD AUTO: 4.1 %
MCH RBC QN AUTO: 28.8 PG (ref 26–34)
MCHC RBC AUTO-ENTMCNC: 32.4 G/DL (ref 31–37)
MCV RBC AUTO: 88.9 FL (ref 80–100)
MONOCYTES # BLD AUTO: 0.68 X10(3) UL (ref 0.1–1)
MONOCYTES NFR BLD AUTO: 7 %
NEUTROPHILS # BLD AUTO: 8.4 X10 (3) UL (ref 1.5–7.7)
NEUTROPHILS # BLD AUTO: 8.4 X10(3) UL (ref 1.5–7.7)
NEUTROPHILS NFR BLD AUTO: 86 %
OSMOLALITY SERPL CALC.SUM OF ELEC: 292 MOSM/KG (ref 275–295)
PLATELET # BLD AUTO: 335 10(3)UL (ref 150–450)
POTASSIUM SERPL-SCNC: 3.8 MMOL/L (ref 3.5–5.1)
PROT SERPL-MCNC: 6.3 G/DL (ref 5.7–8.2)
RBC # BLD AUTO: 3.78 X10(6)UL (ref 3.8–5.3)
SODIUM SERPL-SCNC: 137 MMOL/L (ref 136–145)
WBC # BLD AUTO: 9.8 X10(3) UL (ref 4–11)

## 2025-04-23 RX ORDER — FAMOTIDINE 10 MG/ML
20 INJECTION, SOLUTION INTRAVENOUS ONCE
Status: CANCELLED | OUTPATIENT
Start: 2025-04-23

## 2025-04-23 RX ORDER — DIPHENHYDRAMINE HYDROCHLORIDE 50 MG/ML
INJECTION, SOLUTION INTRAMUSCULAR; INTRAVENOUS
Status: COMPLETED
Start: 2025-04-23 | End: 2025-04-23

## 2025-04-23 RX ORDER — DIPHENHYDRAMINE HYDROCHLORIDE 50 MG/ML
50 INJECTION, SOLUTION INTRAMUSCULAR; INTRAVENOUS ONCE
Status: CANCELLED | OUTPATIENT
Start: 2025-04-23

## 2025-04-23 RX ORDER — FAMOTIDINE 10 MG/ML
INJECTION, SOLUTION INTRAVENOUS
Status: COMPLETED
Start: 2025-04-23 | End: 2025-04-23

## 2025-04-23 RX ORDER — DIPHENHYDRAMINE HYDROCHLORIDE 50 MG/ML
50 INJECTION, SOLUTION INTRAMUSCULAR; INTRAVENOUS ONCE
Status: COMPLETED | OUTPATIENT
Start: 2025-04-23 | End: 2025-04-23

## 2025-04-23 RX ORDER — FAMOTIDINE 10 MG/ML
20 INJECTION, SOLUTION INTRAVENOUS ONCE
Status: COMPLETED | OUTPATIENT
Start: 2025-04-23 | End: 2025-04-23

## 2025-04-23 RX ORDER — MONTELUKAST SODIUM 10 MG/1
10 TABLET ORAL ONCE
Status: CANCELLED
Start: 2025-04-23

## 2025-04-23 RX ORDER — MONTELUKAST SODIUM 10 MG/1
10 TABLET ORAL ONCE
Status: COMPLETED | OUTPATIENT
Start: 2025-04-23 | End: 2025-04-23

## 2025-04-23 RX ORDER — MONTELUKAST SODIUM 10 MG/1
TABLET ORAL
Status: COMPLETED
Start: 2025-04-23 | End: 2025-04-23

## 2025-04-23 RX ADMIN — MONTELUKAST SODIUM 10 MG: 10 TABLET ORAL at 11:46:00

## 2025-04-23 RX ADMIN — DIPHENHYDRAMINE HYDROCHLORIDE 50 MG: 50 INJECTION, SOLUTION INTRAMUSCULAR; INTRAVENOUS at 11:53:00

## 2025-04-23 RX ADMIN — FAMOTIDINE 20 MG: 10 INJECTION, SOLUTION INTRAVENOUS at 11:56:00

## 2025-04-23 NOTE — PROGRESS NOTES
HPI   Aleisha Carlin is a 65 year old female here for follow up of   Encounter Diagnoses   Name Primary?    Malignant neoplasm of upper-outer quadrant of right breast in female, estrogen receptor positive (HCC) Yes    Malignant neoplasm metastatic to liver (HCC)     Chemotherapy management, encounter for        S/p cycle 6 Ixempra, tolerating well. Previously had infusion related reaction for which she was treated with Benadryl, Solu-Medrol Pepcid and montelukast.  Now being premedicated with oral dexamethasone twice daily the day before treatment.  She did forget 1 dose of dexamethasone this morning . Also get IV dexamethasone in premedications.    Tolerated treatment well, some constipation.     Fatigue:  Yes, states about the same.       Fevers:  No     Appetite/taste changes:  Yes, but making sure she eats .      Mucositis:  No    Weight changes:  lost 3 lbs. Decreased appetite 1 protein drink daily.     Nausea/vomiting:  Yes, D4-5, controlled with compazine ondansetron prn       Diarrhea:  Yes, reports D4 had loose stool that resolved    Constipation:  Yes, controlled with stool softener. But occasionally has issues.  Does take olive oil at times if needed.  Prn miralax  Miralax after chemo, stool softener daily       Peripheral neuropathy:  Yes, Per history, occasionally R last two fingers and does not last long. No complaint today .      PPE:  States using lotion in the hands all the time, no PPE.  Some dry skin on the soles.      Abd pain at times LUQ takes norco which resolves pain.     Trying to eat better Taking Premier protein daily.       ECOG PS 1, fatigue, napping in afternoon    Review of Systems:     Review of Systems   Constitutional:  Positive for appetite change (decreased).   HENT:   Negative for trouble swallowing (states some times).    Respiratory:  Negative for cough and shortness of breath (HERRERA with walking at the store.).    Cardiovascular:  Positive for chest pain (L lower ribs at  times, no aggravating or alleviating factors.  States once a day or so, lasts about an hour.  States same as prior.).   Gastrointestinal:  Positive for abdominal pain (had few days ago, took vicodin) and constipation (stool softener).   Genitourinary:  Negative for dysuria and frequency.    Musculoskeletal:  Negative for arthralgias, back pain and neck pain.   Skin:  Negative for rash.        Dry skin on the feet   Neurological:  Positive for extremity weakness. Negative for dizziness (once in a while with position changes) and headaches.   Hematological:  Negative for adenopathy. Bruises/bleeds easily.   Psychiatric/Behavioral:  Positive for sleep disturbance (restless at night at times).          Current Outpatient Medications   Medication Sig Dispense Refill    LORAZEPAM 0.5 MG Oral Tab TAKE 1 TABLET(0.5 MG) BY MOUTH EVERY 6 HOURS AS NEEDED FOR ANXIETY 30 tablet 0    dexamethasone (DECADRON) 4 MG tablet Take 2 tablets (8 mg total) by mouth 2 (two) times daily with meals. Take twice the day before and morning of chemotherapy. 12 tablet 5    zolpidem 10 MG Oral Tab Take 1 tablet (10 mg total) by mouth nightly as needed. 60 tablet 0    prochlorperazine (COMPAZINE) 10 mg tablet Take 1 tablet (10 mg total) by mouth every 6 (six) hours as needed for Nausea. 90 tablet 2    HYDROcodone-acetaminophen  MG Oral Tab Take 1 tablet by mouth every 6 (six) hours as needed for Pain. 90 tablet 0    ondansetron 8 MG Oral Tablet Dispersible Take 1 tablet (8 mg total) by mouth every 8 (eight) hours as needed for Nausea.      guaiFENesin-codeine 100-10 MG/5ML Oral Solution Take 5 mL by mouth every 6 (six) hours as needed for cough. 473 mL 1    Potassium Chloride ER 10 MEQ Oral Tab CR Take 2 tablets (20 mEq total) by mouth 2 (two) times daily. (Patient not taking: Reported on 4/2/2025) 60 tablet 0    mirtazapine 15 MG Oral Tablet Dispersible Take 1 tablet (15 mg total) by mouth nightly. 90 tablet 3    minocycline 50 MG Oral Cap  Take 1 capsule (50 mg total) by mouth daily. 90 capsule 3    furosemide 20 MG Oral Tab Take 1 tablet (20 mg total) by mouth daily.      folic acid 1 MG Oral Tab Take 1 tablet (1 mg total) by mouth daily. 90 tablet 3    albuterol 108 (90 Base) MCG/ACT Inhalation Aero Soln Inhale 1 puff into the lungs every 6 (six) hours as needed for Wheezing. 8 g 3    Cholecalciferol (VITAMIN D3) 250 MCG (50723 UT) Oral Cap Take 1 capsule by mouth daily.       Allergies:   No Known Allergies    Past Medical History:    Breast cancer (HCC)    MASTECTOMY / RECONSTRUCTION    Chemotherapy-induced neutropenia    Encounter for insertion of venous access port    portacath insertion / venous access    Glaucoma    History of breast cancer in female    Hyperlipidemia    Malignant neoplasm of overlapping sites of breast in female, estrogen receptor positive (HCC)    Malignant neoplasm of upper-outer quadrant of right breast in female, estrogen receptor positive (HCC)    Malignant pleural effusion (HCC)    Menorrhagia    HYSTEROSCOPY / D&C / endomentrial biopsy (08/28/2007)    Metastatic breast cancer    Neurofibromatosis (HCC)    Osteopenia of multiple sites    Osteopenia of multiple sites    Osteoporosis screening    Osteoporosis screening    Per [employer]    Other osteoporosis without current pathological fracture    Postmenopausal bleeding    Psychophysiological insomnia    Tumor, foot    tumor right foot / excision/excision of bone spur/arthrodesis w/screw fixation     Past Surgical History:   Procedure Laterality Date    Breast reconstruction Right 2008    right breast reconstructed - ductal, BRCA neg    Breast surgery      Chemotherapy  2007    Foot surgery Right     tumor rt foot / excision/excision of bone spur/arthrodesis w/screw fixation    Ir port a cath procedure  2007    Right subclavian port of cath.    Mastectomy right      2012    Mastectomy,simple  2007     Social History     Socioeconomic History    Marital status: Single    Tobacco Use    Smoking status: Never    Smokeless tobacco: Never   Substance and Sexual Activity    Alcohol use: Yes     Alcohol/week: 5.0 standard drinks of alcohol     Types: 5 Standard drinks or equivalent per week     Comment: Socially.  (Per [employer]:  5 days weekly.)    Drug use: No   Other Topics Concern    Caffeine Concern Yes     Comment: tea     Social Drivers of Health     Food Insecurity: No Food Insecurity (2023)    Food Insecurity     Food Insecurity: Never true   Transportation Needs: No Transportation Needs (2023)    Transportation Needs     Lack of Transportation: No   Housing Stability: Low Risk  (2023)    Housing Stability     Housing Instability: No       Family History   Problem Relation Age of Onset    Breast Cancer Mother 49        bilateral mastectomy    Genetic Disease Mother         NF-1    Breast Cancer Maternal Aunt 75    Breast Cancer Paternal Grandmother     Breast Cancer Self 51    Genetic Disease Self         NF-1    Cancer Paternal Aunt         throat ca    Cancer Maternal Uncle         prostate ca    Genetic Disease Brother         NF-1    Genetic Disease Brother         NF-1    Genetic Disease Brother         NF-1    Cancer Maternal Uncle         bladder ca    Breast Cancer Maternal Cousin Female     Cancer Maternal Cousin Female         leukemia;  childhood    Cancer Paternal Cousin Male         throat ca         PHYSICAL EXAM:    /82 (BP Location: Left arm, Patient Position: Sitting, Cuff Size: adult)   Pulse 119   Temp 98.7 °F (37.1 °C) (Oral)   Resp 16   Ht 1.524 m (5')   Wt 43.5 kg (96 lb)   SpO2 96%   BMI 18.75 kg/m²   Wt Readings from Last 6 Encounters:   25 44 kg (97 lb)   25 45.5 kg (100 lb 4.8 oz)   25 45.4 kg (100 lb)   25 46.7 kg (103 lb)   25 48.1 kg (106 lb)   24 49.2 kg (108 lb 6.4 oz)     Physical Exam  General: Patient is alert, not in acute distress.  HEENT: EOMs intact. Anicteric.  Oral  mucosa moist and pink.  Neck: No JVD. No palpable lymphadenopathy. Neck is supple.  Chest: Clear to auscultation.   Heart: Regular rate and rhythm.   Abdomen: Soft, non tender with good bowel sounds.    Extremities: minimal edema breanne le chronic, breanne le ecchymoses, no active bleeding  Neurological: Grossly intact. Normal AROM shoulders.    Psych/Depression: nl  Skin:  NF lesions on the chest and back   Breast: No LAD breanne axilla, right mastectomy and implant intact.left breast soft no masses      ASSESSMENT/PLAN:     1. Malignant neoplasm of upper-outer quadrant of right breast in female, estrogen receptor positive (HCC)    2. Malignant neoplasm metastatic to liver (HCC)    3. Chemotherapy management, encounter for      Breast cancer history of ER/UT positive metastatic breast cancer to pleura, liver, bones. Initially diagnosed 2007 with DCIS, treated with lumpectomy, however MRI showed progression in the breast and she had a complete right mastectomy with axillary dissection followed by chemo in 2008: .pT1,N1. ER 9%, PgR 8%, Her-2 0, Ki 39%. she then presented with recurrence in 2020 right neck/supraclav ( ER 98% UT 0  her2 1+)  Pleura (Er 20%, Her2 1+) and liver:  (ER 38%, her 0)     Treatment history  9/11/2007:  right breast lumpectomy: Extensive DCIS ER 90%. Mri with progression  11/16/2007:  right mastectomy with axillary node dissection: pT1 N1 M0  2/16/2008:  4 cycles of Taxotere and Cytoxan   9/18/2020: Right neck recurrence,  left pleural,  liver-   10/1/2020: My risk Myriad negative. Started Pablociclib 125mg d1-21 w/ faslodex   4/15/2021: Neutropenic fever. Pablociclib changed to q 21 d with 14 day off cycle   1/27/2022: Palbo at 100mg D1-21 q 28 day schedule stared per Dr Soriano  7/15/22: changed to abemaciclib. Progress in the pleura bx: TNBC, Her2 0 CPS 0  9/22/22-11/2022: Sacituzumab w/o response   11/17/22-1/2023: Abraxane with initial response, liver bx nondx (too small)  2/9/23: started gem/carbo due  to SOB with response  4/26/23: imaging with continues response in pleura, progression in liver Er 65%, HI 0, Her2 0, bx: NGS below  07/6/23-1/10/24 on eribulin s/p 8 cycles prior to progression.  01/22/24- present Capecitabine     NGS:  9/2020: Foundation: NF1, [id number], FGFR1 and MYC amplification  9/2022: G360: [id number] 0.5%. Tempus pleura: NF1, TP53, FGFR1 amp  2/2023: G360:  [id number] 0.9%, APC 0.2 with several new VUS  4/2023 G360: [id number] 0.4%. APC 0.3 with several new VUS. Tempus liver:         ESR 1 mutation is negative.    Patient will proceed with 7th line therapy with capecitabine week on/week off and after 3 months (before next appt) will have imaging.  If still has liver lesion, will consider Y-90 embolization, vs continue with treatment w/o Y-90.      S/p 9 cycles seventh line capecitabine, initiated on 1/22/24.  9/27/24 restaging CT shows progression in the lungs with possible lymphangitic spread and effusions.      S/p eighth line liposomal doxorubicin s/p cycle 1 on 10/9/24, dose reduced to 37.5 mg/m2  S/p cycle 2 with 50 mg/m2 given tolerability to C1  HELD cycle 3 due to disease progression.      Change of therapy to Ixempra (9th line )    S/p cycle 3 Ixempra- cough improved.  Had infusion reaction.  Added monteleukast, pepcid and benadryl 50 mg and slow infusion rate to 4 hours for cycle 3 and on.  Taking decadron prior to visits.    Patient had tumor assessment CT on 2/7/2025, which shows evidence of response with decreased right pleural effusion, less pronounced interlobar septal thickening and interval improvement of metastases.  She does have evidence of treated metastatic disease in her bones, but does have a new T6 compression fracture relative to December 2024.  Completed C6 Ixempra tolerating better    Proceed with Cycle 7 Ixempra (dose reduced to 32 mg/m2 from 40 mg/m2 based on liver enzymes ),  Please give the 250 NS flush bag       She will have tumor assessment after cycle 6. Results  pending    H/o PPE- lotion to hands and feet.  No issues currenly.    MARCELLO:  Ondansetron prn.  Start evening of D4    Constipation, gr. 1:  Stool softener and miralax prn    Cancer-related pain- resolved.   Norco prn    Anxiety- lorazepam prn     Zometa - last dose March 2023. Completed 2 years, no further needed     cont folic acid 1mg daily and  B12 SL 1000mcg daily.     FEN:  increase protein and calories.    Call prn.  Follow-up in 3  weeks    Emotional support given       MDM high risk  Continuity of complex medical care.           No orders of the defined types were placed in this encounter.    Recent Results (from the past 48 hours)   CBC W Differential W Platelet    Collection Time: 04/23/25 10:18 AM   Result Value Ref Range    WBC 9.8 4.0 - 11.0 x10(3) uL    RBC 3.78 (L) 3.80 - 5.30 x10(6)uL    HGB 10.9 (L) 12.0 - 16.0 g/dL    HCT 33.6 (L) 35.0 - 48.0 %    MCV 88.9 80.0 - 100.0 fL    MCH 28.8 26.0 - 34.0 pg    MCHC 32.4 31.0 - 37.0 g/dL    RDW-SD 54.6 (H) 35.1 - 46.3 fL    RDW 16.8 (H) 11.0 - 15.0 %    .0 150.0 - 450.0 10(3)uL    Neutrophil Absolute Prelim 8.40 (H) 1.50 - 7.70 x10 (3) uL    Neutrophil Absolute 8.40 (H) 1.50 - 7.70 x10(3) uL    Lymphocyte Absolute 0.40 (L) 1.00 - 4.00 x10(3) uL    Monocyte Absolute 0.68 0.10 - 1.00 x10(3) uL    Eosinophil Absolute 0.00 0.00 - 0.70 x10(3) uL    Basophil Absolute 0.02 0.00 - 0.20 x10(3) uL    Immature Granulocyte Absolute 0.26 0.00 - 1.00 x10(3) uL    Neutrophil % 86.0 %    Lymphocyte % 4.1 %    Monocyte % 7.0 %    Eosinophil % 0.0 %    Basophil % 0.2 %    Immature Granulocyte % 2.7 %   Comp Metabolic Panel (14)    Collection Time: 04/23/25 10:18 AM   Result Value Ref Range    Glucose 138 (H) 70 - 99 mg/dL    Sodium 137 136 - 145 mmol/L    Potassium 3.8 3.5 - 5.1 mmol/L    Chloride 103 98 - 112 mmol/L    CO2 26.0 21.0 - 32.0 mmol/L    Anion Gap 8 0 - 18 mmol/L    BUN 30 (H) 9 - 23 mg/dL    Creatinine 0.93 0.55 - 1.02 mg/dL    BUN/CREA Ratio 32.3 (H) 10.0  - 20.0    Calcium, Total 9.6 8.7 - 10.4 mg/dL    Calculated Osmolality 292 275 - 295 mOsm/kg    eGFR-Cr 69 >=60 mL/min/1.73m2     (H) 10 - 49 U/L     (H) <34 U/L    Alkaline Phosphatase 251 (H) 50 - 130 U/L    Bilirubin, Total 0.7 0.2 - 1.1 mg/dL    Total Protein 6.3 5.7 - 8.2 g/dL    Albumin 4.0 3.2 - 4.8 g/dL    Globulin  2.3 2.0 - 3.5 g/dL    A/G Ratio 1.7 1.0 - 2.0    Patient Fasting for CMP? Patient not present            Imaging & Referrals:  None   No orders of the defined types were placed in this encounter.

## 2025-04-23 NOTE — PROGRESS NOTES
Pt here for C7D1 Ixempra.  Arrives Ambulating independently, accompanied by Self     Patient was evaluated today by DOMINICK.    Oral medications included in this regimen:  no    Patient confirms comprehension of cancer treatment schedule:  yes    Pregnancy screening:  Not applicable    Modifications in dose or schedule:  No - Dose previously adjusted due to liver enzymes.    Medications appearance and physical integrity checked by RN: yes.    Chemotherapy IV pump settings verified by 2 RNs:  Yes.  Frequency of blood return and site check throughout administration: Prior to administration, Prior to each drug, and At completion of therapy     Infusion/treatment outcome:  patient tolerated treatment without incident    Education Record    Learner:  Patient  Barriers / Limitations:  None  Method:  Discussion  Education / instructions given:  plan of care  Outcome:  Shows understanding    Discharged Home, Ambulating independently, accompanied by:Self    Patient/family verbalized understanding of future appointments: by MyChart messaging

## 2025-04-28 ENCOUNTER — TELEPHONE (OUTPATIENT)
Age: 65
End: 2025-04-28

## 2025-04-28 NOTE — TELEPHONE ENCOUNTER
Patient is calling and asking for results from CT Scan on 4/21. Please contact patient at your earliest convenience.

## 2025-04-28 NOTE — TELEPHONE ENCOUNTER
Returned phone call. Patient informed that CT scan not read yet. Patient would like someone to know it has been over a week and not happy not read yet. Informed would contact radiology and patient experience team.

## 2025-04-30 ENCOUNTER — LAB REQUISITION (OUTPATIENT)
Dept: LAB | Facility: HOSPITAL | Age: 65
End: 2025-04-30
Payer: MEDICARE

## 2025-04-30 DIAGNOSIS — C50.411 MALIGNANT NEOPLASM OF UPPER-OUTER QUADRANT OF RIGHT FEMALE BREAST (HCC): ICD-10-CM

## 2025-04-30 DIAGNOSIS — Z17.0 ESTROGEN RECEPTOR POSITIVE STATUS (ER+): ICD-10-CM

## 2025-04-30 PROCEDURE — 88342 IMHCHEM/IMCYTCHM 1ST ANTB: CPT | Performed by: PATHOLOGY

## 2025-05-12 ENCOUNTER — OFFICE VISIT (OUTPATIENT)
Age: 65
End: 2025-05-12
Attending: INTERNAL MEDICINE
Payer: MEDICARE

## 2025-05-12 VITALS
RESPIRATION RATE: 16 BRPM | HEIGHT: 60 IN | SYSTOLIC BLOOD PRESSURE: 110 MMHG | DIASTOLIC BLOOD PRESSURE: 78 MMHG | TEMPERATURE: 98 F | BODY MASS INDEX: 18.33 KG/M2 | HEART RATE: 126 BPM | WEIGHT: 93.38 LBS | OXYGEN SATURATION: 93 %

## 2025-05-12 VITALS
RESPIRATION RATE: 16 BRPM | WEIGHT: 93 LBS | SYSTOLIC BLOOD PRESSURE: 110 MMHG | DIASTOLIC BLOOD PRESSURE: 78 MMHG | BODY MASS INDEX: 18.26 KG/M2 | HEIGHT: 60 IN | OXYGEN SATURATION: 93 % | HEART RATE: 126 BPM | TEMPERATURE: 98 F

## 2025-05-12 DIAGNOSIS — C78.7 MALIGNANT NEOPLASM METASTATIC TO LIVER (HCC): ICD-10-CM

## 2025-05-12 DIAGNOSIS — R63.4 WEIGHT LOSS: ICD-10-CM

## 2025-05-12 DIAGNOSIS — C50.411 MALIGNANT NEOPLASM OF UPPER-OUTER QUADRANT OF RIGHT BREAST IN FEMALE, ESTROGEN RECEPTOR POSITIVE (HCC): ICD-10-CM

## 2025-05-12 DIAGNOSIS — E86.0 DEHYDRATION: Primary | ICD-10-CM

## 2025-05-12 DIAGNOSIS — G89.3 CANCER RELATED PAIN: ICD-10-CM

## 2025-05-12 DIAGNOSIS — K59.03 THERAPEUTIC OPIOID INDUCED CONSTIPATION: ICD-10-CM

## 2025-05-12 DIAGNOSIS — C50.919 STAGE IV BREAST CANCER IN FEMALE (HCC): ICD-10-CM

## 2025-05-12 DIAGNOSIS — D64.81 ANEMIA DUE TO ANTINEOPLASTIC CHEMOTHERAPY: ICD-10-CM

## 2025-05-12 DIAGNOSIS — R63.8 DECREASED ORAL INTAKE: ICD-10-CM

## 2025-05-12 DIAGNOSIS — C50.411 MALIGNANT NEOPLASM OF UPPER-OUTER QUADRANT OF RIGHT BREAST IN FEMALE, ESTROGEN RECEPTOR POSITIVE (HCC): Primary | ICD-10-CM

## 2025-05-12 DIAGNOSIS — Z71.89 ADVANCE CARE PLANNING: ICD-10-CM

## 2025-05-12 DIAGNOSIS — C79.51 METASTASIS TO BONE (HCC): ICD-10-CM

## 2025-05-12 DIAGNOSIS — G47.9 DIFFICULTY SLEEPING: ICD-10-CM

## 2025-05-12 DIAGNOSIS — T45.1X5A ANEMIA DUE TO ANTINEOPLASTIC CHEMOTHERAPY: ICD-10-CM

## 2025-05-12 DIAGNOSIS — Z51.5 PALLIATIVE CARE ENCOUNTER: ICD-10-CM

## 2025-05-12 DIAGNOSIS — T40.2X5A THERAPEUTIC OPIOID INDUCED CONSTIPATION: ICD-10-CM

## 2025-05-12 DIAGNOSIS — Z09 CHEMOTHERAPY FOLLOW-UP EXAMINATION: ICD-10-CM

## 2025-05-12 DIAGNOSIS — Z71.89 GOALS OF CARE, COUNSELING/DISCUSSION: ICD-10-CM

## 2025-05-12 DIAGNOSIS — Z51.5 PALLIATIVE CARE ENCOUNTER: Primary | ICD-10-CM

## 2025-05-12 DIAGNOSIS — Z17.0 MALIGNANT NEOPLASM OF UPPER-OUTER QUADRANT OF RIGHT BREAST IN FEMALE, ESTROGEN RECEPTOR POSITIVE (HCC): Primary | ICD-10-CM

## 2025-05-12 DIAGNOSIS — E86.0 DEHYDRATION: ICD-10-CM

## 2025-05-12 DIAGNOSIS — Z17.0 MALIGNANT NEOPLASM OF UPPER-OUTER QUADRANT OF RIGHT BREAST IN FEMALE, ESTROGEN RECEPTOR POSITIVE (HCC): ICD-10-CM

## 2025-05-12 DIAGNOSIS — C50.919 STAGE IV BREAST CANCER IN FEMALE (HCC): Primary | ICD-10-CM

## 2025-05-12 DIAGNOSIS — F41.9 ANXIETY: ICD-10-CM

## 2025-05-12 DIAGNOSIS — R63.0 DECREASED APPETITE: ICD-10-CM

## 2025-05-12 LAB
ALBUMIN SERPL-MCNC: 4.1 G/DL (ref 3.2–4.8)
ALBUMIN/GLOB SERPL: 1.8 {RATIO} (ref 1–2)
ALP LIVER SERPL-CCNC: 279 U/L (ref 50–130)
ALT SERPL-CCNC: 136 U/L (ref 10–49)
ANION GAP SERPL CALC-SCNC: 11 MMOL/L (ref 0–18)
ANTIBODY SCREEN: NEGATIVE
AST SERPL-CCNC: 95 U/L (ref ?–34)
BASOPHILS # BLD: 0 X10(3) UL (ref 0–0.2)
BASOPHILS NFR BLD: 0 %
BILIRUB SERPL-MCNC: 0.9 MG/DL (ref 0.2–1.1)
BUN BLD-MCNC: 37 MG/DL (ref 9–23)
BUN/CREAT SERPL: 23.3 (ref 10–20)
CALCIUM BLD-MCNC: 10.8 MG/DL (ref 8.7–10.4)
CHLORIDE SERPL-SCNC: 97 MMOL/L (ref 98–112)
CO2 SERPL-SCNC: 27 MMOL/L (ref 21–32)
CREAT BLD-MCNC: 1.59 MG/DL (ref 0.55–1.02)
DEPRECATED RDW RBC AUTO: 55.4 FL (ref 35.1–46.3)
EGFRCR SERPLBLD CKD-EPI 2021: 36 ML/MIN/1.73M2 (ref 60–?)
EOSINOPHIL # BLD: 0 X10(3) UL (ref 0–0.7)
EOSINOPHIL NFR BLD: 0 %
ERYTHROCYTE [DISTWIDTH] IN BLOOD BY AUTOMATED COUNT: 17.3 % (ref 11–15)
FASTING STATUS PATIENT QL REPORTED: NO
GLOBULIN PLAS-MCNC: 2.3 G/DL (ref 2–3.5)
GLUCOSE BLD-MCNC: 95 MG/DL (ref 70–99)
HCT VFR BLD AUTO: 34.3 % (ref 35–48)
HGB BLD-MCNC: 10.6 G/DL (ref 12–16)
LYMPHOCYTES NFR BLD: 0.34 X10(3) UL (ref 1–4)
LYMPHOCYTES NFR BLD: 4 %
MCH RBC QN AUTO: 27.2 PG (ref 26–34)
MCHC RBC AUTO-ENTMCNC: 30.9 G/DL (ref 31–37)
MCV RBC AUTO: 87.9 FL (ref 80–100)
METAMYELOCYTES # BLD: 0.17 X10(3) UL (ref ?–0.01)
METAMYELOCYTES NFR BLD: 2 %
MONOCYTES # BLD: 1.68 X10(3) UL (ref 0.1–1)
MONOCYTES NFR BLD: 20 %
NEUTROPHILS # BLD AUTO: 5.86 X10 (3) UL (ref 1.5–7.7)
NEUTROPHILS NFR BLD: 70 %
NEUTS BAND NFR BLD: 4 %
NEUTS HYPERSEG # BLD: 6.22 X10(3) UL (ref 1.5–7.7)
OSMOLALITY SERPL CALC.SUM OF ELEC: 288 MOSM/KG (ref 275–295)
PLATELET # BLD AUTO: 330 10(3)UL (ref 150–450)
PLATELET MORPHOLOGY: NORMAL
POTASSIUM SERPL-SCNC: 3.5 MMOL/L (ref 3.5–5.1)
PROT SERPL-MCNC: 6.4 G/DL (ref 5.7–8.2)
RBC # BLD AUTO: 3.9 X10(6)UL (ref 3.8–5.3)
RH BLOOD TYPE: POSITIVE
SODIUM SERPL-SCNC: 135 MMOL/L (ref 136–145)
TOTAL CELLS COUNTED BLD: 100
WBC # BLD AUTO: 8.4 X10(3) UL (ref 4–11)

## 2025-05-12 PROCEDURE — 85060 BLOOD SMEAR INTERPRETATION: CPT

## 2025-05-12 RX ORDER — SODIUM CHLORIDE 9 MG/ML
10 INJECTION, SOLUTION INTRAMUSCULAR; INTRAVENOUS; SUBCUTANEOUS ONCE
OUTPATIENT
Start: 2025-05-12

## 2025-05-12 NOTE — PROGRESS NOTES
HPI   Aleisha Carlin is a 65 year old female here for follow up of   Encounter Diagnoses   Name Primary?    Stage IV breast cancer in female (HCC) Yes    Malignant neoplasm metastatic to liver (HCC)     Metastasis to bone (HCC)     Chemotherapy follow-up examination     Anemia due to antineoplastic chemotherapy        S/p cycle 7 Ixempra    Tolerated treatment well, some constipation.     Fatigue:  Yes, states worse.         Fevers:  No     Appetite/taste changes:  Yes, but making sure she eats but not well taking taking Premier Protein .      Mucositis:  No    Weight changes: Down to 93 lbs.    Nausea/vomiting:  Yes, D4-5, controlled with compazine ondansetron prn   No vomiting.    Diarrhea:  No    Constipation:  Yes, states it is worse and laxatives not working    Peripheral neuropathy:  Yes, Per history, occasionally R last two fingers and does not last long. Has complaint today, states down to the palm too .      PPE:  States using lotion in the hands all the time, no PPE.  Some dry skin on the soles.      Abd pain RUQ on the R back and it radiates down.  She has more pain in the extremities.  Taking more norco, pain.      ECOG PS 1, fatigue, napping in afternoon    Review of Systems:     Review of Systems   HENT:   Negative for trouble swallowing.    Respiratory:  Positive for shortness of breath (HERRERA with walking at the store.). Negative for cough.    Cardiovascular:  Negative for chest pain.   Gastrointestinal:  Positive for abdominal pain (as above), blood in stool (when straining) and constipation (stool softener).   Genitourinary:  Negative for dysuria and frequency.         States urinating a lot and also keeping up with fluids as best as she can   Musculoskeletal:  Positive for back pain and gait problem (states at times and has to hold on to her Dad when up and about). Negative for arthralgias and neck pain.   Skin:  Negative for rash.        Dry skin on the feet   Neurological:  Positive for  dizziness (once in a while with position changes), extremity weakness, gait problem (states at times and has to hold on to her Dad when up and about) and light-headedness (had today.  States when it happens she slows down). Negative for headaches.   Hematological:  Negative for adenopathy. Bruises/bleeds easily (at BLE.).   Psychiatric/Behavioral:  Positive for sleep disturbance (improved with sleeping medication).          Current Outpatient Medications   Medication Sig Dispense Refill    HYDROcodone-acetaminophen  MG Oral Tab Take 1 tablet by mouth every 6 (six) hours as needed for Pain. 90 tablet 0    zolpidem 10 MG Oral Tab Take 1 tablet (10 mg total) by mouth nightly as needed. 60 tablet 0    LORazepam 0.5 MG Oral Tab Take 1 tablet (0.5 mg total) by mouth every 6 (six) hours as needed for Anxiety. 60 tablet 0    dexamethasone (DECADRON) 4 MG tablet Take 2 tablets (8 mg total) by mouth 2 (two) times daily with meals. Take twice the day before and morning of chemotherapy. 12 tablet 5    prochlorperazine (COMPAZINE) 10 mg tablet Take 1 tablet (10 mg total) by mouth every 6 (six) hours as needed for Nausea. 90 tablet 2    ondansetron 8 MG Oral Tablet Dispersible Take 1 tablet (8 mg total) by mouth every 8 (eight) hours as needed for Nausea.      mirtazapine 15 MG Oral Tablet Dispersible Take 1 tablet (15 mg total) by mouth nightly. 90 tablet 3    minocycline 50 MG Oral Cap Take 1 capsule (50 mg total) by mouth daily. 90 capsule 3    furosemide 20 MG Oral Tab Take 1 tablet (20 mg total) by mouth in the morning.      Cholecalciferol (VITAMIN D3) 250 MCG (53333 UT) Oral Cap Take 1 capsule by mouth in the morning.      guaiFENesin-codeine 100-10 MG/5ML Oral Solution Take 5 mL by mouth every 6 (six) hours as needed for cough. (Patient not taking: Reported on 5/12/2025) 473 mL 1    Potassium Chloride ER 10 MEQ Oral Tab CR Take 2 tablets (20 mEq total) by mouth 2 (two) times daily. (Patient not taking: Reported on  4/2/2025) 60 tablet 0    folic acid 1 MG Oral Tab Take 1 tablet (1 mg total) by mouth daily. (Patient not taking: Reported on 5/12/2025) 90 tablet 3    albuterol 108 (90 Base) MCG/ACT Inhalation Aero Soln Inhale 1 puff into the lungs every 6 (six) hours as needed for Wheezing. (Patient not taking: Reported on 5/12/2025) 8 g 3     Allergies:   No Known Allergies    Past Medical History:    Breast cancer (HCC)    MASTECTOMY / RECONSTRUCTION    Chemotherapy-induced neutropenia    Encounter for insertion of venous access port    portacath insertion / venous access    Glaucoma    History of breast cancer in female    Hyperlipidemia    Malignant neoplasm of overlapping sites of breast in female, estrogen receptor positive (HCC)    Malignant neoplasm of upper-outer quadrant of right breast in female, estrogen receptor positive (HCC)    Malignant pleural effusion (HCC)    Menorrhagia    HYSTEROSCOPY / D&C / endomentrial biopsy (08/28/2007)    Metastatic breast cancer    Neurofibromatosis (HCC)    Osteopenia of multiple sites    Osteopenia of multiple sites    Osteoporosis screening    Osteoporosis screening    Per [employer]    Other osteoporosis without current pathological fracture    Postmenopausal bleeding    Psychophysiological insomnia    Tumor, foot    tumor right foot / excision/excision of bone spur/arthrodesis w/screw fixation     Past Surgical History:   Procedure Laterality Date    Breast reconstruction Right 2008    right breast reconstructed - ductal, BRCA neg    Breast surgery      Chemotherapy  2007    Foot surgery Right     tumor rt foot / excision/excision of bone spur/arthrodesis w/screw fixation    Ir port a cath procedure  2007    Right subclavian port of cath.    Mastectomy right      2012    Mastectomy,simple  2007     Social History     Socioeconomic History    Marital status: Single   Tobacco Use    Smoking status: Never    Smokeless tobacco: Never   Substance and Sexual Activity    Alcohol use: Yes      Alcohol/week: 5.0 standard drinks of alcohol     Types: 5 Standard drinks or equivalent per week     Comment: Socially.  (Per [employer]:  5 days weekly.)    Drug use: No   Other Topics Concern    Caffeine Concern Yes     Comment: tea     Social Drivers of Health     Food Insecurity: No Food Insecurity (2023)    Food Insecurity     Food Insecurity: Never true   Transportation Needs: No Transportation Needs (2023)    Transportation Needs     Lack of Transportation: No   Housing Stability: Low Risk  (2023)    Housing Stability     Housing Instability: No       Family History   Problem Relation Age of Onset    Breast Cancer Mother 49        bilateral mastectomy    Genetic Disease Mother         NF-1    Breast Cancer Maternal Aunt 75    Breast Cancer Paternal Grandmother     Breast Cancer Self 51    Genetic Disease Self         NF-1    Cancer Paternal Aunt         throat ca    Cancer Maternal Uncle         prostate ca    Genetic Disease Brother         NF-1    Genetic Disease Brother         NF-1    Genetic Disease Brother         NF-1    Cancer Maternal Uncle         bladder ca    Breast Cancer Maternal Cousin Female     Cancer Maternal Cousin Female         leukemia;  childhood    Cancer Paternal Cousin Male         throat ca         PHYSICAL EXAM:    /78 (BP Location: Left arm, Patient Position: Sitting, Cuff Size: adult)   Pulse (!) 126   Temp 97.6 °F (36.4 °C) (Tympanic)   Resp 16   Ht 1.524 m (5')   Wt 42.4 kg (93 lb 6.4 oz)   SpO2 93%   BMI 18.24 kg/m²   Wt Readings from Last 6 Encounters:   25 42.4 kg (93 lb 6.4 oz)   25 43.5 kg (96 lb)   25 44 kg (97 lb)   25 45.5 kg (100 lb 4.8 oz)   25 45.4 kg (100 lb)   25 46.7 kg (103 lb)     Physical Exam  General: Patient is alert, not in acute distress.  Thin, chronically  ill appearing.  HEENT: EOMs intact. Anicteric.    Neck: No JVD. No palpable lymphadenopathy. Neck is supple.  Chest: Clear to  auscultation.   Heart: Regular rhythm, but tachycardic.  Abdomen: Soft, non tender with good bowel sounds.    Extremities: minimal edema breanne le chronic, breanne le ecchymoses, no active bleeding  Neurological: Grossly intact. Normal AROM shoulders.    Psych/Depression: nl  Skin:  NF lesions on the chest and back   Breast: No LAD breanne axilla, right mastectomy and implant intact.left breast soft no masses      ASSESSMENT/PLAN:     1. Stage IV breast cancer in female (HCC)    2. Malignant neoplasm metastatic to liver (HCC)    3. Metastasis to bone (HCC)    4. Chemotherapy follow-up examination    5. Anemia due to antineoplastic chemotherapy      Breast cancer history of ER/AK positive metastatic breast cancer to pleura, liver, bones. Initially diagnosed 2007 with DCIS, treated with lumpectomy, however MRI showed progression in the breast and she had a complete right mastectomy with axillary dissection followed by chemo in 2008: .pT1,N1. ER 9%, PgR 8%, Her-2 0, Ki 39%. she then presented with recurrence in 2020 right neck/supraclav ( ER 98% AK 0  her2 1+)  Pleura (Er 20%, Her2 1+) and liver:  (ER 38%, her 0)     Treatment history  9/11/2007:  right breast lumpectomy: Extensive DCIS ER 90%. Mri with progression  11/16/2007:  right mastectomy with axillary node dissection: pT1 N1 M0  2/16/2008:  4 cycles of Taxotere and Cytoxan   9/18/2020: Right neck recurrence,  left pleural,  liver-   10/1/2020: My risk Myriad negative. Started Pablociclib 125mg d1-21 w/ faslodex   4/15/2021: Neutropenic fever. Pablociclib changed to q 21 d with 14 day off cycle   1/27/2022: Palbo at 100mg D1-21 q 28 day schedule stared per Dr Soriano  7/15/22: changed to abemaciclib. Progress in the pleura bx: TNBC, Her2 0 CPS 0  9/22/22-11/2022: Sacituzumab w/o response   11/17/22-1/2023: Abraxane with initial response, liver bx nondx (too small)  2/9/23: started gem/carbo due to SOB with response  4/26/23: imaging with continues response in pleura,  progression in liver Er 65%, MI 0, Her2 0, bx: NGS below  07/6/23-1/10/24 on eribulin s/p 8 cycles prior to progression.  01/22/24- present Capecitabine     NGS:  9/2020: Foundation: NF1, [id number], FGFR1 and MYC amplification  9/2022: G360: [id number] 0.5%. Tempus pleura: NF1, TP53, FGFR1 amp  2/2023: G360:  [id number] 0.9%, APC 0.2 with several new VUS  4/2023 G360: [id number] 0.4%. APC 0.3 with several new VUS. Tempus liver:         ESR 1 mutation is negative.    Patient will proceed with 7th line therapy with capecitabine week on/week off and after 3 months (before next appt) will have imaging.  If still has liver lesion, will consider Y-90 embolization, vs continue with treatment w/o Y-90.      S/p 9 cycles seventh line capecitabine, initiated on 1/22/24.  9/27/24 restaging CT shows progression in the lungs with possible lymphangitic spread and effusions.      S/p eighth line liposomal doxorubicin s/p cycle 1 on 10/9/24, dose reduced to 37.5 mg/m2  S/p cycle 2 with 50 mg/m2 given tolerability to C1  HELD cycle 3 due to disease progression.      Currently on Ixempra (9th line)    S/p cycle 3 Ixempra- cough improved.  Had infusion reaction.  Added monteleukast, pepcid and benadryl 50 mg and slow infusion rate to 4 hours for cycle 3 and on.  Taking decadron prior to visits.    Patient had tumor assessment CT on 2/7/2025, which shows evidence of response with decreased right pleural effusion, less pronounced interlobar septal thickening and interval improvement of metastases.  She does have evidence of treated metastatic disease in her bones, but does have a new T6 compression fracture relative to December 2024.    Completed C7 Ixempra.  Tumor assessment in 4/21/2025 with evidence of progression of disease in the liver and bones.    Discussed with patient that this is progression after ninth line of palliative systemic therapy.    I discussed with the patient that based on her NGS results, based on the updated NCCN  guidelines for invasive breast cancer version 4.2025, a category 2B intervention, for which there is data for response and solid tumors for patients with an FGFR 1-3 fusion/mutation on NGS that they may have a response to treatment with erdafitinib.  Discussed with the patient that this is an oral therapy, but will come through specialty pharmacy.  Discussed that side effects include but not limited to alopecia, nail changes, PPE, dry skin, electrolyte derangements including magnesium, potassium, sodium, etc.  Taste changes, poor appetite, nausea/vomiting, diarrhea or constipation.  Discussed that the risk of grade 3-4 diarrhea is only 3 to 5%.  Hematological toxicity such as anemia, but less so neutropenia or thrombocytopenia.  There is also potential for increase in her transaminases and alk phos which are already elevated due to her underlying metastatic disease.  There is no dose adjustment as hepatic clearance is not effective drugs pharmacokinetics.,  Fatigue, ocular side effects as well which would be of dose-limiting toxicities, as well as the hyperphosphatemia.  In addition, need to monitor creatinine closely as there could be increased creatinine.    Starting dose of 8 mg once a day, with assessment of serum phosphatase after 14 to 21 days.  If the serum phosphatase is less than 9 mg/dL and no ocular disorders, may increase dose to 29 mg once daily based on tolerability.  Discussed with patient that again she will continue until disease progression acceptable toxicity.  Patient will also need a baseline ophthalmology evaluation.      Cancer related pain and disease progression: Will refer to palliative care for pain management and discussion.    Dehydration and poor nutrition: Will proceed with 1 L of normal saline today, as patient is orthostatic.  Patient encouraged to continue with increase caloric intake as well as protein.    Constipation: Will have a CMP today to evaluate for calcium source, to rule  out hypercalcemia as contributing to the constipation.  This will be addressed by palliative care as well.    Anemia: CBC and type and screen today.    Zometa - last dose March 2023. Completed 2 years, no further needed, unless she again has hypercalcemia    cont folic acid 1mg daily and  B12 SL 1000mcg daily.     FEN:  increase protein and calories.    Call prn.  Follow-up in 3  weeks    Emotional support given       MDM high risk  Continuity of complex medical care.           No orders of the defined types were placed in this encounter.    No results found for this or any previous visit (from the past 48 hours).          Imaging & Referrals:  None   No orders of the defined types were placed in this encounter.    PROCEDURE: CT CHEST ABDOMEN PELVIS (ALL CONTRAST ONLY) (CPT=71260/13829)     COMPARISON: NM PF BONE SCAN PLANAR, 10/03/2007, 10:10 AM.  PET STANDARD BODY SCAN (CPT=78815), 2/09/2023, 11:59 AM.  MRI LIVER (W+WO) (CPT=74183), 4/14/2023, 6:26 PM.  MRI SPINE CERVICAL (W+WO) (CPT=72156), 1/03/2024, 6:23 AM.  MRI SPINE LUMBAR (W+WO)  (CPT=72158), 12/31/2023, 2:50 PM.  MRI SPINE THORACIC (W+WO) (CPT=72157), 12/31/2023, 2:50 PM.  NM BONE SCAN WB (YQU=53823), 2/22/2022, 11:44 AM.  NM BONE SCAN WB (FPL=11255), 10/22/2021, 12:25 PM.  CT STN/CHST/ABD/PEL W CONTRAST (CPT=70491/84938/54640),   2/01/2021, 2:30 PM.  CT CHEST+ABDOMEN+PELVIS(ALL CNTRST ONLY)(CPT=71260/18537), 10/07/2023, 9:21 AM.  CT CHEST+ABDOMEN+PELVIS(ALL CNTRST ONLY)(CPT=71260/42647), 7/03/2023, 1:07 PM.  CT CHEST+ABDOMEN+PELVIS(ALL CNTRST ONLY)(CPT=71260/25024), 4/11/2023,  6:05 PM.  CT CHEST+ABDOMEN+PELVIS(ALL CNTRST ONLY)(CPT=71260/99561), 1/25/2023, 8:40 AM.  CT CHEST+ABDOMEN+PELVIS(ALL CNTRST ONLY)(CPT=71260/82694), 11/11/2022, 2:29 PM.  CT CHEST+ABDOMEN+PELVIS(ALL CNTRST ONLY)(CPT=71260/34348), 8/30/2022, 2:46 PM.  CT  CHEST+ABDOMEN+PELVIS(ALL CNTRST ONLY)(CPT=71260/66457), 6/24/2022, 10:09 AM.  CT CHEST+ABDOMEN+PELVIS(ALL CNTRST  ONLY)(MWN=48966/00905), 1/25/2022, 10:29 AM.  CT CHEST+ABDOMEN+PELVIS(ALL CNTRST ONLY)(CPT=71260/06784), 9/16/2021, 10:46 AM.  CT  CHEST+ABDOMEN+PELVIS(ALL CNTRST ONLY)(CPT=71260/72578), 7/21/2021, 12:21 PM.  CT CHEST (CPT=71250), 8/03/2022, 8:14 AM.  CT CHEST PE AORTA (IV ONLY) (CPT=71260), 1/26/2022, 2:46 PM.  CT CHEST PE AORTA (IV ONLY) (CPT=71260), 4/15/2021, 10:09 PM.  CT CHEST   PE AORTA (IV ONLY) (CPT=71260), 10/27/2020, 7:12 PM.  CT CHEST PE AORTA (IV ONLY) (CPT=71260), 9/18/2020, 12:55 PM.  CT ABD PELV W CONTRAST, 8/15/2009, 10:37 AM.  CT ABDOMEN + PELVIS (CONTRAST ONLY) (CPT=74177), 10/06/2020, 1:11 PM.  CT ABDOMEN PELVIS  IV CONTRAST NO ORAL (ER), 4/16/2021, 1:07 AM.  CT CHEST+ABDOMEN+PELVIS(ALL CNTRST ONLY)(CPT=71260/46966), 7/16/2024, 11:21 AM.  CT CHEST+ABDOMEN+PELVIS(ALL CNTRST ONLY)(CPT=71260/43812), 4/16/2024, 1:08 PM.  CT CHEST+ABDOMEN+PELVIS(ALL CNTRST  ONLY)(CPT=71260/53158), 1/05/2024, 8:43 AM.  [facility], CT CHEST+ABDOMEN+PELVIS(ALL CNTRST ONLY)(CPT=71260/65812), 2/07/2025, 2:57 PM.  [facility], CT CHEST+ABDOMEN+PELVIS(ALL CNTRST  ONLY)(CPT=71260/88362), 12/10/2024, 2:56 PM.  [facility], CT CHEST+ABDOMEN+PELVIS(ALL CNTRST ONLY)(CPT=71260/54468), 9/27/2024, 12:45 PM.  [facility], CT PF CHEST ABD W CONTRAST, 10/02/2007, 2:34 PM.     INDICATIONS: Estrogen-receptor positive moderately differentiated ductal carcinoma in situ of the upper outer quadrant of the right female breast (initially stage IIA), status post mastectomy and yassine dissection (2008), with subsequent adjuvant  chemotherapy, complicated by disease recurrence with hepatic and osseous metastatic disease; chemotherapy (C78.7, Z17.0).       TECHNIQUE: Multidetector CT images of the chest, abdomen and pelvis were obtained with intravenous contrast material. Automated exposure control for dose reduction was used. Adjustment of the mA  and/or kV was done based on the patient's size. Iterative  reconstruction technique for dose reduction was employed. Dose information was transmitted to the ACR (American College of Radiology) NRDR (National Radiology Data Registry), which includes the Dose Index Registry.       FINDINGS:  DEVICES: There is a right-sided Port-A-Cath with tip terminating near the cavoatrial junction.  CARDIAC: The heart is borderline enlarged. Atherosclerotic vascular calcifications are present in the coronary vessels. Trace pericardial effusion extends into the superior pericardial recess.  VASCULATURE: The thoracic aorta has unremarkable configuration without aneurysm or dissection. Mild atherosclerosis is noted.  LUNGS/PLEURA: Trace nodular biapical pleuroparenchymal scarring is suggested. Mild left pleural thickening is appreciated, similar to prior studies. Small bilateral pleural effusions are seen with associated compressive atelectasis, with or without  superimposed airspace consolidation. Interlobular septal thickening is demonstrated with a basilar predominance, more conspicuous in the left lower lobe than the right. Additional scattered ground-glass and reticular opacities are present and may be  atelectatic in origin.  A few punctate micronodules are redemonstrated, not significantly changed from prior exams.    No pneumothorax is detected.    AIRWAYS: The tracheobronchial tree is without central mass or obstructing lesion. Mild bronchial wall thickening is present.  MEDIASTINUM/BRANDY: No mass or lymphadenopathy.    CHEST WALL: There is minimal residual infiltration of the right supraclavicular fat abutting the right sternocleidomastoid muscle. No axillary mass or lymphadenopathy. There are bilateral retropectoral breast implants with intracapsular rupture of the  right. Innumerable cutaneous and subcutaneous nodular foci are apparent.       LIVER: There has been substantial progression of hepatic metastatic disease with  new and enlarging hepatic metastatic lesions. A reference segment VIII lesion measures 5.0 x 5.4 cm (series 2, image 81) with associated capsular retraction. A posterior  right hepatic lobe lesion measures 3.5 x 2.2 cm (series 2, image 83). A reference hypoenhancing central segment IV mass measures 4.2 x 3.3 cm (series 2, image 88). There is a hypoenhancing 2.1 x 2.2 cm left hepatic lobe lesion (series 2, image 80). A  lesion abutting the falciform ligament measures 2.1 x 2.9 cm (series 2, image 96). Adjacent to the gallbladder fossa, there is a 2.3 x 2.8 cm lesion (series 2, image 101). A partially necrotic segment VI lesion measures 2.1 x 2.7 cm (series 2, image  112).  BILIARY: The gallbladder is present.  PANCREAS: There is mild diffuse fatty replacement without discernible lesion, fluid collection, or ductal dilatation.  SPLEEN: No enlargement.    ADRENALS:   Multifocal nodularity and heterogeneous hypoattenuation of the right adrenal gland is grossly unchanged in morphology. No defined mass or abnormal enlargement.    KIDNEYS:   Symmetric enhancement is seen without evidence of hydronephrosis or underlying solid masses.  GI/MESENTERY: A small hiatal hernia is evident. Minimal retained or refluxed enteric contrast is suggested in the distal esophageal lumen. Apparent gastric wall thickening is likely due to underdistention. There is no evidence of bowel obstruction. A  normal caliber appendix is seen without inflammatory manifestations. Scattered colonic diverticula are present in the sigmoid colon. There is no colonic wall thickening or pericolonic fat stranding.  Small volume perihepatic ascites is present.  URINARY BLADDER: Incompletely distended without visible calculus. Mild circumferential bladder wall thickening may relate to underdistention.  PELVIC NODES: No lymphadenopathy.    PELVIC ORGANS: The uterus is present. There is mild contour deformation of the uterine fundus, suggesting an underlying  intramural fibroid. Deep pelvic calcifications likely represent phleboliths.    VASCULATURE:   Moderate atherosclerotic vascular calcifications of the abdominal aorta are observed. No aneurysm is detected.  RETROPERITONEUM: Nonspecific subtle left retroperitoneal/periaortic infiltration (for example, series 2, images 94-95) is apparent.    BONES:   There is redemonstration of sclerotic thoracolumbar lesions.  Nonspecific sclerotic lesions are seen throughout the vertebral bodies. A large lucent lesion of L2 is seen extending to the anterior cortical margin and superior endplate; this is  increased in conspicuity from previous. Mild multilevel anterior osteophyte formation is seen throughout the spine.  A few scattered sclerotic foci in the proximal femora likely represent bone islands. Nonspecific irregular deformities of the right iliac wing are re-identified. Degenerative changes of the hips and pubic symphysis are present.  ABDOMINAL WALL: Extensive nodular cutaneous and subcutaneous nodules are demonstrated throughout the abdominal wall. Mild infiltration of the gluteal subcutaneous fat is noted.  OTHER: A 1.5 x 1.3 cm heterogeneously enhancing paraisthmic nodule is seen with peripheral calcification. Additional smaller nodules and coarse calcifications are partially visualized.  No free air is seen in the abdomen or pelvis.                 Impression   CONCLUSION:  1. Substantial progression of disease with innumerable hepatic metastatic lesions, increased in number, and with substantial interval enlargement of previous seen lesions.       2. Heterogeneous retroperitoneal infiltration involving the left para-aortic aspect of the retroperitoneum, potentially malignant.     3. Osseous metastatic disease is demonstrated. A lytic lesion of L2 appears increased in conspicuity in the interim.       4. Small volume perihepatic ascites.     5. Small bilateral pleural effusions and associated interlobular septal  thickening, which could reflect pulmonary interstitial edema and/or dissemination of malignancy.     6. Distal colonic diverticulosis without CT evidence acute complication.       7. Postoperative changes of mastectomy with bilateral retropectoral implants.     8. Extensive sequela of neurofibromatosis.     9. Stable appearance of thyroid nodules.     10. Lesser incidental findings as above.           Dictated by (CST): Alfredo Madden MD on 4/28/2025 at 12:42 PM      Finalized by (CST): Alfredo Madden MD on 4/28/2025 at 1:08 PM

## 2025-05-12 NOTE — CONSULTS
Palliative Care Consult Note     Patient Name: Aleisha Carlin   YOB: 1960   Medical Record Number: [id number]   Date of visit: 5/12/2025     The 21st Century Cures Act makes medical notes like these available to patients in the interest of transparency. Please be advised this is a medical document. Medical documents are intended to carry relevant information, facts as evident, and the clinical opinion of the practitioner. The medical note is intended as peer to peer communication and may appear blunt or direct. It is written in medical language and may contain abbreviations or verbiage that are unfamiliar.     Chief Complaint/Reason for Visit:  Chief Complaint   Patient presents with    Palliative Care       Reason for Consultation:   I was asked by Dr. Farias to evaluate patient. Consult requested for evaluation of palliative care needs and Uncontrolled symptoms;Goals of care discussion;Establish palliative care.    Past Medical History/Past Surgical History:   History obtained from [employer] In addition to the patient, PMH/PSH is significant as shown below.    HPI:      Currently, Pt is listed as a FULL CODE in Epic and there are no advance directives on file (Living Will, Healthcare Power of  [HCPOA] or Practitioner Order for Life-Sustaining Treatment [POLST] documentation).     Hospital admissions in past year: 0  ER visits in past year: 0    Patient seen and examined with no family present today. Aleisha is awake, alert, oriented x 4, answers questions appropriately, is a reliable historian, and is in NAD today.    See ROS.    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 I discussed the benefits of palliative care to include help with symptom management needs, provide extra layer of support to patient/family, conduct GOC/wishes discussions, and assistance with advance care planning needs. I discussed the differences between palliative care and hospice. I provided education about the Medicare hospice benefit and philosophy (for future reference). Palliative care brochure provided.     We discussed patient's current clinical condition. I provided education about the typical disease trajectory of metastatic breast CA with associated symptoms and decline over time.     I discussed the importance of advance care planning prior to crisis with Aleisha.     Understanding of diagnosis: Yes, verbalized understanding of metastatic cancer diagnosis    Hopes/goals: Relieve constipation    Fears/concerns: None mentioned      HCPOA/Health Surrogate:    HCPOA document completed on 5/12/2025    I confirmed that patient's HCPOA is: Rafiq Carlin (brother)  #1 successor agent: Vipul Carlin (Father)      Code Status/POLST Documentation:    I explained sections A, B, and C of the POLST form with patient today. All questions were answered to the best of my ability.     Aleisha wishes to remain FULL CODE status at this time.    Aleisha does verbalize that she would not want to be intubated for an  extended period of time and she would NOT want a feeding tube (g-tube or J-tube).    Written patient education materials provided today: [facility]ioid Patient Education, [facility] Teaching Sheet: Palliative Care and Hospice, CAP Constipation algorithm      Medications:  Current Medications[7]    Review of Systems:  Review of Systems   Constitutional:  Positive for malaise/fatigue and weight loss.        Poor appetite  +weight loss  On Mirtazapine 15mg at bedtime  Takes Marijuana gummy at bedtime  Drinks 1 Premier Protein shake daily  Tries to stay well hydrated     HENT: Negative.     Eyes: Negative.    Respiratory:  Negative for cough, hemoptysis, sputum production, shortness of breath and wheezing.    Cardiovascular:  Positive for leg swelling. Negative for chest pain and palpitations.   Gastrointestinal:  Positive for abdominal pain (Cancer related RUQ pain that radiates to R thoracic back; occasional; aching) and constipation (See above). Negative for blood in stool, diarrhea, melena, nausea and vomiting.        Normally alternates between diarrhea and constipation    Has been \"very constipated\" x 3 days  \"I feel the stool, but it won't come out\"    Has tried Glycerin suppositories in the past with no luck; prefers not to administer an enema    Currently takes 1 tab Senna-S BID    Constipation worse since starting Norco    Has noticed blood when wiping after straining to force hard stool out       Genitourinary: Negative.    Musculoskeletal:  Positive for back pain and joint pain (B knee; L shoulder - occasional; aching). Negative for falls and neck pain.        Cancer related generalized body pain reported in lower back, R thoracic back, B knee, L shoulder    -Aleisha reports pain \"jumps around\" and not consistently in same spot  -Taking Norco 10/325mg (~4 tabs/day) which helps reduce pain  -Also taking Ibuprofen 800mg 3-4 times/day since this does not cause constipation  -Although pain is an issue,  constipation is more important to resolve at this time   Skin: Negative.    Neurological:  Positive for dizziness (Occasional; denies LOC) and weakness. Negative for seizures, loss of consciousness and headaches.        Hx of Neurofibromatosis    Psychiatric/Behavioral:  Negative for substance abuse. The patient is nervous/anxious and has insomnia (Taking Ambien, Mirtazapine, Marijuana gummy at bedtime to help with sleep).        Physical Examination:  Physical Exam  Vitals reviewed.   Constitutional:       General: She is not in acute distress.     Appearance: She is ill-appearing.   HENT:      Head: Normocephalic and atraumatic.      Right Ear: External ear normal.      Left Ear: External ear normal.      Nose: Nose normal.      Mouth/Throat:      Mouth: Mucous membranes are moist.      Pharynx: Oropharynx is clear.   Eyes:      General: No scleral icterus.     Conjunctiva/sclera: Conjunctivae normal.   Pulmonary:      Effort: Pulmonary effort is normal. No respiratory distress.   Abdominal:      General: Bowel sounds are normal. There is no distension.      Palpations: Abdomen is soft.      Tenderness: There is no abdominal tenderness.   Musculoskeletal:         General: Normal range of motion.      Cervical back: Normal range of motion and neck supple.      Right lower leg: Edema present.      Left lower leg: Edema present.   Skin:     General: Skin is warm and dry.      Coloration: Skin is pale.      Comments: Multiple skin nodules due to NF     Neurological:      General: No focal deficit present.      Mental Status: She is alert and oriented to person, place, and time. Mental status is at baseline.      Motor: Weakness present.      Gait: Gait normal.   Psychiatric:         Mood and Affect: Mood normal.         Behavior: Behavior normal.         Thought Content: Thought content normal.         Judgment: Judgment normal.       Palliative Care Goals of Care:  Discussed with patient/family today: Yes  Patient's  preference about sharing medical information: Patient and family may receive information  Patient's decision making preferences: Fully involved and speak with family  Code status: FULL CODE  Have advanced directives been discussed with patient or healthcare power of : Yes                         Palliative Care Assessment/Plan:  1. Palliative care encounter    2. Cancer related pain    3. Therapeutic opioid induced constipation    4. Anxiety    5. Difficulty sleeping    6. Decreased appetite    7. Weight loss    8. Decreased oral intake    9. Goals of care, counseling/discussion    10. Advance care planning    11. Malignant neoplasm of upper-outer quadrant of right breast in female, estrogen receptor positive (HCC)    12. Malignant neoplasm metastatic to liver (HCC)    13. Metastasis to bone (HCC)        Cancer Related Pain  -Discussed addiction vs tolerance  -Reviewed IL   -Discussed MOA and explained side effects of pain medications  -Max daily Tylenol limit is 2,000mg  -AVOID ALL NSAIDS - due to elevated kidney function  -STOP Ibuprofen now - Creatinine is elevated  -CONTINUE Hydrocodone 10/325mg - take 1 tablet every 4 hours as needed for pain; MAX DAILY DOSE is 2,000mg  -Please avoid all Acetaminophen (Tylenol) products   -Will plan on adjusting pain medication regimen next visit  -Discussed indication for opioid medications for cancer related pain per NCCN guidelines  -Discussed common SE of opioid meds which include, but are not limited to: drowsiness, n/v, stomach upset, constipation  -Excessive or misuse of opioid medications may cause respiratory depression, CNS depression, and possibly death  -Discussed to NEVER self adjust pain med/opioid doses or stop these meds abruptly as this may lead to opioid withdrawal  -Discussed signs and symptoms of withdrawal which include, but are not limited to: rhinorrhea, diarrhea, irritability, chills, sweating, insomnia, mood swings, anxiety, increased  pain      Therapeutic opioid induced constipation  -Provided CAP-C constipation algorithm   There are 2 short term options for resolving constipation issue ASAP  Take 15mL (1 Tablespoon) of Mineral Oil when you get home, and repeast if no BM by 6pm tonight -OR-  Take Milk of Magnesia (30mL) every 4 hours until you have a bowel movement    After this episode of constipation is resolved, please adjust your bowel regimen to:  -INCREASE Senna-S (Senokot + Colace) - take 2 tabs TWICE daily  -Take 1-2 doses of Miralax daily   -Aleisha states that she will most likely take Mineral Oil instead of MOM       Goals of care counseling/discussion  -Pt is FULL CODE  -Continue supportive medical treatments; pt plans to continue pursuing cancer treatment  -Provided emotional support to pt/family who appear to be coping adequately  -Patient is agreeable to hospitalization, if indicated  -Patient is agreeable to following up with outpatient palliative care  -Ca is high - per Onc, plan to hydrate on Wed and possibly resume Zometa  -See above narrative for further details      Advance care planning counseling/discussion  -Pt is FULL CODE  -HCPOA document completed 5/12/2025  -HCPOA: Rafiq Carlin (brother)  -#1 successor agent: Vipul Carlin (Father)  -Would like to remain FULL CODE, however, states that she would NOT want to be intubated (with ETT or Trach) for an extended period of time  -Aleisha would NOT want feeding tubes (GT or JT)    I personally reviewed most recent imaging reports and lab values    While discussing goals of care with Aleisha, Aleisha voluntarily participated in an advanced care planning discussion.  The following was discussed: code status, HCPOA. 20 minutes were spent discussing advanced care planning.  This time was exclusive of the documented time for this visit.    Palliative Performance Scale 70 %    Emotional support was provided to patient and family today: Yes    Palliative Care Follow-up:  I spent a total of  60  minutes with the patient today, which included all of the following:direct face to face contact, history taking, physical examination, and >50% was spent counseling and coordinating care.    Discussed today's visit with Dr. Farias    Thank you for allowing the Palliative Care Team to participate in the care of your patient. I will continue to follow clinically.    JASE LAY DNP, FNP-BC, [facility]  [phone]  5/12/2025    Number and Complexity of Problems Addressed  1 acute, uncomplicated illness or injury  1 or more chronic illness with exacerbation, progression, or side effects of treatment    Amount and/or Complexity of Data to be Reviewed and Analyzed  3+ review of prior external notes from each unique source  3+ review of the results of each unique test  Independent interpretation of a test performed by another physician/other qualified health care professional (not separately reported)    Risk of Complications and/or Morbidity or Mortality of Patient Management  Moderate    Time  I spent a total of 60 minutes on the day of the visit.      [address]  OFFICE/OUTPT VISIT,SAMEER CEDENO                 [1]   Patient Active Problem List  Diagnosis    Malignant neoplasm of upper-outer quadrant of right breast in female, estrogen receptor positive (HCC)    Acute subdural hematoma (HCC)    Malignant pleural effusion (HCC)    Malignant neoplasm metastatic to liver (HCC)    Chemotherapy-induced neutropenia    Other osteoporosis without current pathological fracture    Neurofibromatosis (HCC)    Vitamin D deficiency    Chemotherapy management, encounter for    Encounter for central line care    Encounter for medication management    Triple negative breast cancer (HCC)    Iron deficiency    B12 deficiency    Malabsorption of iron (HCC)    Anemia due to antineoplastic chemotherapy    Weakness    Multiple falls    Bilateral leg weakness    Bilateral leg edema    Stage IV breast cancer in female (HCC)    Chemotherapy induced  neutropenia    Dysphagia    Neuropathy    Weakness of both lower extremities    Syrinx (HCC)    Myasthenic syndrome (HCC)    Pancytopenia (HCC)    Acute pain of both shoulders    Chemotherapy-induced nausea    Chronic low back pain without sciatica    Infusion reaction    Metastasis to bone (HCC)    Dehydration   [2]   Past Medical History:   Breast cancer (HCC)    MASTECTOMY / RECONSTRUCTION    Chemotherapy-induced neutropenia    Encounter for insertion of venous access port    portacath insertion / venous access    Glaucoma    History of breast cancer in female    Hyperlipidemia    Malignant neoplasm of overlapping sites of breast in female, estrogen receptor positive (HCC)    Malignant neoplasm of upper-outer quadrant of right breast in female, estrogen receptor positive (HCC)    Malignant pleural effusion (HCC)    Menorrhagia    HYSTEROSCOPY / D&C / endomentrial biopsy (08/28/2007)    Metastatic breast cancer    Neurofibromatosis (HCC)    Osteopenia of multiple sites    Osteopenia of multiple sites    Osteoporosis screening    Osteoporosis screening    Per [employer]    Other osteoporosis without current pathological fracture    Postmenopausal bleeding    Psychophysiological insomnia    Tumor, foot    tumor right foot / excision/excision of bone spur/arthrodesis w/screw fixation   [3]   Past Surgical History:  Procedure Laterality Date    Breast reconstruction Right 2008    right breast reconstructed - ductal, BRCA neg    Breast surgery      Chemotherapy  2007    Foot surgery Right     tumor rt foot / excision/excision of bone spur/arthrodesis w/screw fixation    Ir port a cath procedure  2007    Right subclavian port of cath.    Mastectomy right      2012    Mastectomy,simple  2007   [4] No Known Allergies  [5]   Family History  Problem Relation Age of Onset    Breast Cancer Mother 49        bilateral mastectomy    Genetic Disease Mother         NF-1    Breast Cancer Maternal Aunt 75    Breast Cancer Paternal  Grandmother     Breast Cancer Self 52    Genetic Disease Self         NF-1    Cancer Paternal Aunt         throat ca    Cancer Maternal Uncle         prostate ca    Genetic Disease Brother         NF-1    Genetic Disease Brother         NF-1    Genetic Disease Brother         NF-1    Cancer Maternal Uncle         bladder ca    Breast Cancer Maternal Cousin Female     Cancer Maternal Cousin Female         leukemia;  childhood    Cancer Paternal Cousin Male         throat ca   [6]   Social History  Socioeconomic History    Marital status: Single   Tobacco Use    Smoking status: Never    Smokeless tobacco: Never   Substance and Sexual Activity    Alcohol use: Yes     Alcohol/week: 5.0 standard drinks of alcohol     Types: 5 Standard drinks or equivalent per week     Comment: Socially.  (Per [employer]:  5 days weekly.)    Drug use: No   [7]   Current Outpatient Medications   Medication Sig Dispense Refill    Erdafitinib 4 MG Oral Tab Take 8 mg by mouth in the morning. Take two tablets (8 mg) daily. 56 tablet 11    HYDROcodone-acetaminophen  MG Oral Tab Take 1 tablet by mouth every 6 (six) hours as needed for Pain. 90 tablet 0    zolpidem 10 MG Oral Tab Take 1 tablet (10 mg total) by mouth nightly as needed. 60 tablet 0    LORazepam 0.5 MG Oral Tab Take 1 tablet (0.5 mg total) by mouth every 6 (six) hours as needed for Anxiety. 60 tablet 0    dexamethasone (DECADRON) 4 MG tablet Take 2 tablets (8 mg total) by mouth 2 (two) times daily with meals. Take twice the day before and morning of chemotherapy. 12 tablet 5    prochlorperazine (COMPAZINE) 10 mg tablet Take 1 tablet (10 mg total) by mouth every 6 (six) hours as needed for Nausea. 90 tablet 2    ondansetron 8 MG Oral Tablet Dispersible Take 1 tablet (8 mg total) by mouth every 8 (eight) hours as needed for Nausea.      guaiFENesin-codeine 100-10 MG/5ML Oral Solution Take 5 mL by mouth every 6 (six) hours as needed for cough. (Patient not taking: Reported on  5/12/2025) 473 mL 1    Potassium Chloride ER 10 MEQ Oral Tab CR Take 2 tablets (20 mEq total) by mouth 2 (two) times daily. (Patient not taking: Reported on 4/2/2025) 60 tablet 0    mirtazapine 15 MG Oral Tablet Dispersible Take 1 tablet (15 mg total) by mouth nightly. 90 tablet 3    minocycline 50 MG Oral Cap Take 1 capsule (50 mg total) by mouth daily. 90 capsule 3    furosemide 20 MG Oral Tab Take 1 tablet (20 mg total) by mouth in the morning.      folic acid 1 MG Oral Tab Take 1 tablet (1 mg total) by mouth daily. (Patient not taking: Reported on 5/12/2025) 90 tablet 3    albuterol 108 (90 Base) MCG/ACT Inhalation Aero Soln Inhale 1 puff into the lungs every 6 (six) hours as needed for Wheezing. (Patient not taking: Reported on 5/12/2025) 8 g 3

## 2025-05-12 NOTE — PATIENT INSTRUCTIONS
Erdafitinib: Patient drug information  2025© [employer] and its affiliates and/or licensors. All Rights Reserved.  Contributor Disclosures    For additional information see \"Erdafitinib: Drug information\"    You must carefully read the \"Consumer Information Use and Disclaimer\" below in order to understand and correctly use this information.    Brand Names: [place]  Balversa  Brand Names: Mai  Balversa  What is this drug used for?   It is used to treat bladder cancer in some patients.  What do I need to tell my doctor BEFORE I take this drug?   If you are allergic to this drug; any part of this drug; or any other drugs, foods, or substances. Tell your doctor about the allergy and what signs you had.   If you take any drugs (prescription or OTC, natural products, vitamins) that must not be taken with this drug, like certain drugs that are used for HIV, infections, seizures, and others. There are many drugs that must not be taken with this drug.   If you are breast-feeding. Do not breast-feed while you take this drug or for 1 month after your last dose.  This is not a list of all drugs or health problems that interact with this drug.  Tell your doctor and pharmacist about all of your drugs (prescription or OTC, natural products, vitamins) and health problems. You must check to make sure that it is safe for you to take this drug with all of your drugs and health problems. Do not start, stop, or change the dose of any drug without checking with your doctor.  What are some things I need to know or do while I take this drug?   Tell all of your health care providers that you take this drug. This includes your doctors, nurses, pharmacists, and dentists.   Have your blood work checked and eye exams as you have been told by your doctor.   You may need to avoid foods that are high in phosphate. You may also need to avoid other drugs that may increase phosphate levels. Talk with your doctor.   If you are 65 or older, use this  drug with care. You could have more side effects.   This drug may cause you to not be able to get pregnant. Talk with the doctor.   This drug may cause harm to an unborn baby. A pregnancy test will be done before you start this drug to show that you are NOT pregnant.   If you may become pregnant, use birth control while taking this drug. You will also need to use birth control for 1 month after your last dose unless you have been told otherwise. Be sure you know how long to use birth control. If you get pregnant, call your doctor right away.   If your sex partner may become pregnant, use birth control while taking this drug. You will also need to use birth control for 1 month after your last dose unless you have been told otherwise. Be sure you know how long to use birth control. If your partner becomes pregnant, call the doctor right away.  What are some side effects that I need to call my doctor about right away?  WARNING/CAUTION: Even though it may be rare, some people may have very bad and sometimes deadly side effects when taking a drug. Tell your doctor or get medical help right away if you have any of the following signs or symptoms that may be related to a very bad side effect:   Signs of an allergic reaction, like rash; hives; itching; red, swollen, blistered, or peeling skin with or without fever; wheezing; tightness in the chest or throat; trouble breathing, swallowing, or talking; unusual hoarseness; or swelling of the mouth, face, lips, tongue, or throat.   Signs of a urinary tract infection (UTI) like blood in the urine, burning or pain when passing urine, feeling the need to pass urine often or right away, fever, lower stomach pain, or pelvic pain.   Signs of electrolyte problems like mood changes; confusion; muscle pain, cramps, or spasms; weakness; shakiness; change in balance; an abnormal heartbeat; seizures; loss of appetite; or severe upset stomach or throwing up.   Change in eyesight, eye pain,  or severe eye irritation.   Change in nails.   Any skin change.   Redness or irritation of the palms of hands or soles of feet.   Chest pain or pressure.   Shortness of breath.   Not able to pass urine or change in how much urine is passed.   Dark urine or yellow skin or eyes.   High phosphate levels in the blood have happened with this drug. This may lead to a build-up of minerals like calcium in some tissues in the body. Call your doctor right away if you have a painful skin growth, muscle cramps, or numbness or tingling around the mouth.   Low blood cell counts have happened with this drug. If blood cell counts get very low, this can lead to bleeding problems, infections, or anemia. Call your doctor right away if you have signs of infection like fever, chills, or sore throat; any unexplained bruising or bleeding; or if you feel very tired or weak.  What are some other side effects of this drug?  All drugs may cause side effects. However, many people have no side effects or only have minor side effects. Call your doctor or get medical help if any of these side effects or any other side effects bother you or do not go away:   Dry eyes. Using artificial tears may help.   Hair loss.   Constipation, diarrhea, stomach pain, upset stomach, throwing up, or decreased appetite.   Mouth irritation or mouth sores.   Weight loss.   Feeling tired or weak.   Dry skin.   Dry mouth.   Change in taste.   Muscle or joint pain.  These are not all of the side effects that may occur. If you have questions about side effects, call your doctor. Call your doctor for medical advice about side effects.  You may report side effects to your national health agency.  How is this drug best taken?  Use this drug as ordered by your doctor. Read all information given to you. Follow all instructions closely.   Take with or without food.   Swallow whole. Do not chew, break, or crush.   If you throw up after taking a dose, do not repeat the dose.  Take your next dose at your normal time.  What do I do if I miss a dose?   Take a missed dose as soon as you think about it on the same day you missed the dose.   If you do not think about the missed dose until the next day, skip the missed dose and go back to your normal time.   Do not take 2 doses on the same day.  How do I store and/or throw out this drug?   Store at room temperature in a dry place. Do not store in a bathroom.   Keep all drugs in a safe place. Keep all drugs out of the reach of children and pets.   Throw away unused or  drugs. Do not flush down a toilet or pour down a drain unless you are told to do so. Check with your pharmacist if you have questions about the best way to throw out drugs. There may be drug take-back programs in your area.  General drug facts   If your symptoms or health problems do not get better or if they become worse, call your doctor.   Do not share your drugs with others and do not take anyone else's drugs.   Some drugs may have another patient information leaflet. If you have any questions about this drug, please talk with your doctor, nurse, pharmacist, or other health care provider.   If you think there has been an overdose, call your poison control center or get medical care right away. Be ready to tell or show what was taken, how much, and when it happened.  Last Reviewed Date  2024  Consumer Information Use and Disclaimer  This generalized information is a limited summary of diagnosis, treatment, and/or medication information. It is not meant to be comprehensive and should be used as a tool to help the user understand and/or assess potential diagnostic and treatment options. It does NOT include all information about conditions, treatments, medications, side effects, or risks that may apply to a specific patient. It is not intended to be medical advice or a substitute for the medical advice, diagnosis, or treatment of a health care provider based on the  health care provider's examination and assessment of a patient's specific and unique circumstances. Patients must speak with a health care provider for complete information about their health, medical questions, and treatment options, including any risks or benefits regarding use of medications. This information does not endorse any treatments or medications as safe, effective, or approved for treating a specific patient. [employer] and its affiliates disclaim any warranty or liability relating to this information or the use thereof. The use of this information is governed by the Terms of Use, available at https://www.wolterse-channeluwer.com/en/know/clinical-effectiveness-terms.  © 2025 [employer] and its affiliates and/or licensors. All rights reserved.

## 2025-05-12 NOTE — PATIENT INSTRUCTIONS
For pain:  -STOP Ibuprofen now - Creatinine is elevated  -CONTINUE Hydrocodone 10/325mg - take 1 tablet every 4 hours as needed for pain; MAX DAILY DOSE is 2,000mg  -Please avoid all Acetaminophen (Tylenol) products   -Will plan on adjusting pain medication regimen next visit    For constipation:  There are 2 short term options for resolving constipation issue ASAP  Take 15mL (1 Tablespoon) of Mineral Oil when you get home, and repeast if no BM by 6pm tonight -OR-  Take Milk of Magnesia (30mL) every 4 hours until you have a bowel movement    After this episode of constipation is resolved, please adjust your bowel regimen to:  -INCREASE Senna-S (Senokot + Colace) - take 2 tabs TWICE daily  -Take 1-2 doses of Miralax daily

## 2025-05-12 NOTE — PROGRESS NOTES
Patient arrives to infusion for labs and IV hydration. Patient ambulating with standby assistance, c/o mild lightheadedness. 1L NS infused over 2 hrs per orders. CBC, CMP and T/S drawn and sent.      Ordering Provider: Jarrett     Patient appeared to tolerate infusion without difficulty or complaint. Reviewed next apt date/time: 5/14. Patient will have CMP drawn and IV hydration Wed 5/14 at 12pm. Patient will see Debbie Gomes this day as well. Patient verbalizes understanding of upcoming appts. Printed AVS provided.    Education Record  Learner:  Patient  Disease / Diagnosis: breast cancer  Barriers / Limitations:  None  Method:  Reinforcement  General Topics:  Side effects and symptom management and Plan of care reviewed  Outcome:  Shows understanding

## 2025-05-13 ENCOUNTER — TELEPHONE (OUTPATIENT)
Age: 65
End: 2025-05-13

## 2025-05-13 DIAGNOSIS — Z17.0 MALIGNANT NEOPLASM OF UPPER-OUTER QUADRANT OF RIGHT BREAST IN FEMALE, ESTROGEN RECEPTOR POSITIVE (HCC): Primary | ICD-10-CM

## 2025-05-13 DIAGNOSIS — C50.411 MALIGNANT NEOPLASM OF UPPER-OUTER QUADRANT OF RIGHT BREAST IN FEMALE, ESTROGEN RECEPTOR POSITIVE (HCC): Primary | ICD-10-CM

## 2025-05-13 DIAGNOSIS — Z09 CHEMOTHERAPY FOLLOW-UP EXAMINATION: ICD-10-CM

## 2025-05-13 DIAGNOSIS — C50.919 STAGE IV BREAST CANCER IN FEMALE (HCC): ICD-10-CM

## 2025-05-13 NOTE — TELEPHONE ENCOUNTER
Returned phone call. Per patient scheduling appointment for [facility] for tomorrow or next week. Instructed to keep hydration appointment tomorrow and schedule eye appointment next week.

## 2025-05-14 ENCOUNTER — OFFICE VISIT (OUTPATIENT)
Age: 65
End: 2025-05-14
Attending: INTERNAL MEDICINE
Payer: MEDICARE

## 2025-05-14 VITALS
HEART RATE: 127 BPM | RESPIRATION RATE: 18 BRPM | BODY MASS INDEX: 18.26 KG/M2 | DIASTOLIC BLOOD PRESSURE: 73 MMHG | WEIGHT: 93 LBS | SYSTOLIC BLOOD PRESSURE: 116 MMHG | OXYGEN SATURATION: 94 % | TEMPERATURE: 98 F | HEIGHT: 60 IN

## 2025-05-14 VITALS
RESPIRATION RATE: 18 BRPM | OXYGEN SATURATION: 94 % | SYSTOLIC BLOOD PRESSURE: 116 MMHG | DIASTOLIC BLOOD PRESSURE: 73 MMHG | TEMPERATURE: 98 F | HEART RATE: 127 BPM

## 2025-05-14 DIAGNOSIS — K59.03 THERAPEUTIC OPIOID INDUCED CONSTIPATION: ICD-10-CM

## 2025-05-14 DIAGNOSIS — Z71.89 GOALS OF CARE, COUNSELING/DISCUSSION: ICD-10-CM

## 2025-05-14 DIAGNOSIS — G47.9 DIFFICULTY SLEEPING: ICD-10-CM

## 2025-05-14 DIAGNOSIS — Z51.11 CHEMOTHERAPY MANAGEMENT, ENCOUNTER FOR: ICD-10-CM

## 2025-05-14 DIAGNOSIS — C50.919 STAGE IV BREAST CANCER IN FEMALE (HCC): ICD-10-CM

## 2025-05-14 DIAGNOSIS — Z09 CHEMOTHERAPY FOLLOW-UP EXAMINATION: ICD-10-CM

## 2025-05-14 DIAGNOSIS — Z17.0 MALIGNANT NEOPLASM OF UPPER-OUTER QUADRANT OF RIGHT BREAST IN FEMALE, ESTROGEN RECEPTOR POSITIVE (HCC): ICD-10-CM

## 2025-05-14 DIAGNOSIS — R63.0 DECREASED APPETITE: ICD-10-CM

## 2025-05-14 DIAGNOSIS — C79.51 METASTASIS TO BONE (HCC): ICD-10-CM

## 2025-05-14 DIAGNOSIS — C50.411 MALIGNANT NEOPLASM OF UPPER-OUTER QUADRANT OF RIGHT BREAST IN FEMALE, ESTROGEN RECEPTOR POSITIVE (HCC): ICD-10-CM

## 2025-05-14 DIAGNOSIS — C78.7 MALIGNANT NEOPLASM METASTATIC TO LIVER (HCC): ICD-10-CM

## 2025-05-14 DIAGNOSIS — T40.2X5A THERAPEUTIC OPIOID INDUCED CONSTIPATION: ICD-10-CM

## 2025-05-14 DIAGNOSIS — Z51.5 PALLIATIVE CARE ENCOUNTER: Primary | ICD-10-CM

## 2025-05-14 DIAGNOSIS — R63.4 WEIGHT LOSS: ICD-10-CM

## 2025-05-14 DIAGNOSIS — G89.3 CANCER RELATED PAIN: ICD-10-CM

## 2025-05-14 DIAGNOSIS — Z71.89 ADVANCE CARE PLANNING: ICD-10-CM

## 2025-05-14 DIAGNOSIS — F41.9 ANXIETY: ICD-10-CM

## 2025-05-14 DIAGNOSIS — E86.0 DEHYDRATION: Primary | ICD-10-CM

## 2025-05-14 LAB
ALBUMIN SERPL-MCNC: 3.7 G/DL (ref 3.2–4.8)
ALBUMIN/GLOB SERPL: 1.9 {RATIO} (ref 1–2)
ALP LIVER SERPL-CCNC: 255 U/L (ref 50–130)
ALT SERPL-CCNC: 104 U/L (ref 10–49)
ANION GAP SERPL CALC-SCNC: 13 MMOL/L (ref 0–18)
AST SERPL-CCNC: 71 U/L (ref ?–34)
BILIRUB SERPL-MCNC: 0.5 MG/DL (ref 0.2–1.1)
BUN BLD-MCNC: 40 MG/DL (ref 9–23)
BUN/CREAT SERPL: 48.8 (ref 10–20)
CALCIUM BLD-MCNC: 10.6 MG/DL (ref 8.7–10.4)
CHLORIDE SERPL-SCNC: 99 MMOL/L (ref 98–112)
CO2 SERPL-SCNC: 27 MMOL/L (ref 21–32)
CREAT BLD-MCNC: 0.82 MG/DL (ref 0.55–1.02)
EGFRCR SERPLBLD CKD-EPI 2021: 80 ML/MIN/1.73M2 (ref 60–?)
FASTING STATUS PATIENT QL REPORTED: NO
GLOBULIN PLAS-MCNC: 2 G/DL (ref 2–3.5)
GLUCOSE BLD-MCNC: 101 MG/DL (ref 70–99)
OSMOLALITY SERPL CALC.SUM OF ELEC: 298 MOSM/KG (ref 275–295)
POTASSIUM SERPL-SCNC: 2.8 MMOL/L (ref 3.5–5.1)
PROT SERPL-MCNC: 5.7 G/DL (ref 5.7–8.2)
SODIUM SERPL-SCNC: 139 MMOL/L (ref 136–145)

## 2025-05-14 RX ORDER — POTASSIUM CHLORIDE 14.9 MG/ML
20 INJECTION INTRAVENOUS ONCE
Start: 2025-05-14

## 2025-05-14 RX ORDER — SODIUM CHLORIDE 9 MG/ML
10 INJECTION, SOLUTION INTRAMUSCULAR; INTRAVENOUS; SUBCUTANEOUS ONCE
OUTPATIENT
Start: 2025-05-14

## 2025-05-14 RX ORDER — PROCHLORPERAZINE MALEATE 10 MG
10 TABLET ORAL ONCE
Start: 2025-05-14 | End: 2025-05-14

## 2025-05-14 RX ORDER — POTASSIUM CHLORIDE 14.9 MG/ML
INJECTION INTRAVENOUS
Status: COMPLETED
Start: 2025-05-14 | End: 2025-05-14

## 2025-05-14 RX ORDER — PROCHLORPERAZINE MALEATE 10 MG
10 TABLET ORAL ONCE
Status: COMPLETED | OUTPATIENT
Start: 2025-05-14 | End: 2025-05-14

## 2025-05-14 RX ORDER — POTASSIUM CHLORIDE 14.9 MG/ML
20 INJECTION INTRAVENOUS ONCE
Status: COMPLETED | OUTPATIENT
Start: 2025-05-14 | End: 2025-05-14

## 2025-05-14 RX ORDER — PROCHLORPERAZINE MALEATE 10 MG
TABLET ORAL
Status: COMPLETED
Start: 2025-05-14 | End: 2025-05-14

## 2025-05-14 RX ADMIN — POTASSIUM CHLORIDE 20 MEQ: 14.9 INJECTION INTRAVENOUS at 14:18:00

## 2025-05-14 RX ADMIN — PROCHLORPERAZINE MALEATE 10 MG: 10 MG TABLET ORAL at 15:24:00

## 2025-05-14 NOTE — PROGRESS NOTES
Palliative Care Follow Up Note     Patient Name: Aleisha Carlin   YOB: 1960   Medical Record Number: [id number]   Date of visit: 5/14/2025     The 21st Century Cures Act makes medical notes like these available to patients in the interest of transparency. Please be advised this is a medical document. Medical documents are intended to carry relevant information, facts as evident, and the clinical opinion of the practitioner. The medical note is intended as peer to peer communication and may appear blunt or direct. It is written in medical language and may contain abbreviations or verbiage that are unfamiliar.     Chief Complaint/Reason for Visit:  Chief Complaint   Patient presents with    Palliative Care       Reason for Consultation:   I was asked by Dr. Farias to evaluate patient. Consult requested for evaluation of palliative care needs and  .    Past Medical History/Past Surgical History:   History obtained from [employer] In addition to the patient, PMH/PSH is significant as shown below.    HPI:      Currently, Pt is listed as a FULL CODE in Epic and there are no advance directives on file (Living Will, Healthcare Power of  [HCPOA] or Practitioner Order for Life-Sustaining Treatment [POLST] documentation).     Hospital admissions in past year: 0  ER visits in past year: 0    Patient seen and examined with no family present today. Aleisha is awake, alert, oriented x 4, answers questions appropriately, is a reliable historian, and is in NAD today.    See ROS.    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 I discussed the benefits of palliative care to include help with symptom management needs, provide extra layer of support to patient/family, conduct GOC/wishes discussions, and assistance with advance care planning needs. I discussed the differences between palliative care and hospice. I provided education about the Medicare hospice benefit and philosophy (for future reference). Palliative care brochure provided.     We discussed patient's current clinical condition. I provided education about the typical disease trajectory of metastatic breast CA with associated symptoms and decline over time.     I discussed the importance of advance care planning prior to crisis with Aleisha.     Understanding of diagnosis: Yes, verbalized understanding of metastatic cancer diagnosis    Hopes/goals: Relieve constipation    Fears/concerns: None mentioned      HCPOA/Health Surrogate:    HCPOA document completed on 5/12/2025    I confirmed that patient's HCPOA is: Rafiq Carlin (brother)  #1 successor agent: Vipul Tesfaye (Father)      Code Status/POLST Documentation:    I explained sections A, B, and C of the POLST form with patient today. All questions were answered to the best of my ability.     Aleisha wishes to remain FULL CODE status at this time.    Aleisha does verbalize that she would not want to be intubated for an extended period of time and she would NOT want a feeding tube  (g-tube or J-tube).    Written patient education materials provided today: [facility] Opioid Patient Education, HPNA Teaching Sheet: Palliative Care and Hospice, CAPC Constipation algorithm      Medications:  Current Medications[7]    Review of Systems:  Review of Systems   Constitutional:  Positive for malaise/fatigue and weight loss.        Poor appetite  +weight loss  On Mirtazapine 15mg at bedtime  Takes Marijuana gummy at bedtime  Drinks 1 Premier Protein shake daily  Tries to stay well hydrated     HENT: Negative.     Eyes: Negative.    Respiratory:  Negative for cough, hemoptysis, sputum production, shortness of breath and wheezing.    Cardiovascular:  Positive for leg swelling. Negative for chest pain and palpitations.   Gastrointestinal:  Positive for abdominal pain (Cancer related RUQ pain that radiates to R thoracic back; occasional; aching). Negative for blood in stool, constipation (Resolved after taking 3 Tablespoons of Mineral Oil on Monday; now has loose stool today; No longer has pain/pressure in rectum; Mineral Oil and warm bath helped stimulate BM), diarrhea, melena, nausea and vomiting.        Normally alternates between diarrhea and constipation    BM today - loose    Starting tomorrow, will increase Senna-S to 2 tabs  BID    Constipation worse since starting Norco    Denies blood in stool/blood when wiping today         Genitourinary: Negative.    Musculoskeletal:  Positive for back pain and joint pain (B knee; L shoulder - occasional; aching). Negative for falls and neck pain.        Cancer related generalized body pain reported in lower back, R thoracic back, B knee, L shoulder    -Aleisha reports pain \"jumps around\" and not consistently in same spot  -Taking Norco 10/325mg PRN which helps reduce pain  -Advised to discontinue Ibuprofen r/t kidney function  -Has not taken Norco since Tuesday evening; pain less now that she is no longer constipated   Skin: Negative.    Neurological:  Positive  for weakness. Negative for dizziness (Occasional; denies LOC; denies dizziness today), seizures, loss of consciousness and headaches.        Hx of Neurofibromatosis    Psychiatric/Behavioral:  Negative for substance abuse. The patient is nervous/anxious and has insomnia (Taking Ambien, Mirtazapine, Marijuana gummy at bedtime to help with sleep).        Physical Examination:  Physical Exam  Vitals reviewed.   Constitutional:       General: She is not in acute distress.     Appearance: She is ill-appearing.   HENT:      Head: Normocephalic and atraumatic.      Right Ear: External ear normal.      Left Ear: External ear normal.      Nose: Nose normal.      Mouth/Throat:      Mouth: Mucous membranes are moist.      Pharynx: Oropharynx is clear.   Eyes:      General: No scleral icterus.     Conjunctiva/sclera: Conjunctivae normal.   Pulmonary:      Effort: Pulmonary effort is normal. No respiratory distress.   Abdominal:      General: Bowel sounds are normal. There is no distension.      Palpations: Abdomen is soft.      Tenderness: There is no abdominal tenderness.   Musculoskeletal:         General: Normal range of motion.      Cervical back: Normal range of motion and neck supple.      Right lower leg: Edema present.      Left lower leg: Edema present.   Skin:     General: Skin is warm and dry.      Coloration: Skin is pale.      Comments: Multiple skin nodules due to NF     Neurological:      General: No focal deficit present.      Mental Status: She is alert and oriented to person, place, and time. Mental status is at baseline.      Motor: Weakness present.      Gait: Gait normal.   Psychiatric:         Mood and Affect: Mood normal.         Behavior: Behavior normal.         Thought Content: Thought content normal.         Judgment: Judgment normal.       Palliative Care Goals of Care:  Discussed with patient/family today: Yes  Patient's preference about sharing medical information: Patient and family may receive  information  Patient's decision making preferences: Fully involved and speak with family  Code status: FULL CODE  Have advanced directives been discussed with patient or healthcare power of : Yes                         Palliative Care Assessment/Plan:  1. Palliative care encounter    2. Cancer related pain    3. Therapeutic opioid induced constipation    4. Difficulty sleeping    5. Anxiety    6. Decreased appetite    7. Weight loss    8. Goals of care, counseling/discussion    9. Advance care planning    10. Malignant neoplasm of upper-outer quadrant of right breast in female, estrogen receptor positive (HCC)    11. Malignant neoplasm metastatic to liver (HCC)    12. Metastasis to bone (HCC)        Cancer Related Pain  -Discussed addiction vs tolerance  -Reviewed IL   -Discussed MOA and explained side effects of pain medications  -Max daily Tylenol limit is 2,000mg  -AVOID ALL NSAIDS - due to elevated kidney function  -STOP Ibuprofen now - Creatinine is elevated  -CONTINUE Hydrocodone 10/325mg - take 1 tablet every 4 hours as needed for pain; MAX DAILY DOSE is 6 tabs  -Please avoid all Acetaminophen (Tylenol) products   -Discussed indication for opioid medications for cancer related pain per NCCN guidelines  -Discussed common SE of opioid meds which include, but are not limited to: drowsiness, n/v, stomach upset, constipation  -Excessive or misuse of opioid medications may cause respiratory depression, CNS depression, and possibly death  -Discussed to NEVER self adjust pain med/opioid doses or stop these meds abruptly as this may lead to opioid withdrawal  -Discussed signs and symptoms of withdrawal which include, but are not limited to: rhinorrhea, diarrhea, irritability, chills, sweating, insomnia, mood swings, anxiety, increased pain      Therapeutic opioid induced constipation  -Provided CAP-C constipation algorithm   -Constipation resolved since taking Mineral Oil  -Tomorrow increase Senna-S to 2  tabs BID to avoid OIC       Goals of care counseling/discussion  -Pt is FULL CODE  -Continue supportive medical treatments; pt plans to continue pursuing cancer treatment  -Provided emotional support to pt/family who appear to be coping adequately  -Patient is agreeable to hospitalization, if indicated  -Patient is agreeable to following up with outpatient palliative care  -Ca is high - per Onc, plan to hydrate on Wed and possibly resume Zometa  -See above narrative for further details      Advance care planning counseling/discussion  -Pt is FULL CODE  -HCPOA document completed 5/12/2025  -HCPOA: Rafiq Carlin (brother)  -#1 successor agent: Vipul Carlin (Father)  -Would like to remain FULL CODE, however, states that she would NOT want to be intubated (with ETT or Trach) for an extended period of time  -Aleisha would NOT want feeding tubes (GT or JT)    I personally reviewed most recent imaging reports and lab values    Palliative Performance Scale 70 %    Emotional support was provided to patient and family today: Yes    Palliative Care Follow-up:  I spent a total of  30 minutes with the patient today, which included all of the following:direct face to face contact, history taking, physical examination, and >50% was spent counseling and coordinating care.    Thank you for allowing the Palliative Care Team to participate in the care of your patient. I will continue to follow clinically.    JASE LAY DNP, FNP-BC, [facility]  [phone]  5/14/2025      Number and Complexity of Problems Addressed  1 acute, uncomplicated illness or injury  1 or more chronic illness with exacerbation, progression, or side effects of treatment    Risk of Complications and/or Morbidity or Mortality of Patient Management  Moderate    Time  I spent a total of 30 minutes on the day of the visit.      120[address]  OFFICE/OUTPT VISIT,SAMEER GLASER IV                   [1]   Patient Active Problem List  Diagnosis    Malignant neoplasm of upper-outer  quadrant of right breast in female, estrogen receptor positive (HCC)    Acute subdural hematoma (HCC)    Malignant pleural effusion (HCC)    Malignant neoplasm metastatic to liver (HCC)    Chemotherapy-induced neutropenia    Other osteoporosis without current pathological fracture    Neurofibromatosis (HCC)    Vitamin D deficiency    Chemotherapy management, encounter for    Encounter for central line care    Encounter for medication management    Triple negative breast cancer (HCC)    Iron deficiency    B12 deficiency    Malabsorption of iron (HCC)    Anemia due to antineoplastic chemotherapy    Weakness    Multiple falls    Bilateral leg weakness    Bilateral leg edema    Stage IV breast cancer in female (HCC)    Chemotherapy induced neutropenia    Dysphagia    Neuropathy    Weakness of both lower extremities    Syrinx (HCC)    Myasthenic syndrome (HCC)    Pancytopenia (HCC)    Acute pain of both shoulders    Chemotherapy-induced nausea    Chronic low back pain without sciatica    Infusion reaction    Metastasis to bone (HCC)    Dehydration   [2]   Past Medical History:   Breast cancer (HCC)    MASTECTOMY / RECONSTRUCTION    Chemotherapy-induced neutropenia    Encounter for insertion of venous access port    portacath insertion / venous access    Glaucoma    History of breast cancer in female    Hyperlipidemia    Malignant neoplasm of overlapping sites of breast in female, estrogen receptor positive (HCC)    Malignant neoplasm of upper-outer quadrant of right breast in female, estrogen receptor positive (HCC)    Malignant pleural effusion (HCC)    Menorrhagia    HYSTEROSCOPY / D&C / endomentrial biopsy (08/28/2007)    Metastatic breast cancer    Neurofibromatosis (HCC)    Osteopenia of multiple sites    Osteopenia of multiple sites    Osteoporosis screening    Osteoporosis screening    Per [employer]    Other osteoporosis without current pathological fracture    Postmenopausal bleeding    Psychophysiological insomnia     Tumor, foot    tumor right foot / excision/excision of bone spur/arthrodesis w/screw fixation   [3]   Past Surgical History:  Procedure Laterality Date    Breast reconstruction Right 2008    right breast reconstructed - ductal, BRCA neg    Breast surgery      Chemotherapy  2007    Foot surgery Right     tumor rt foot / excision/excision of bone spur/arthrodesis w/screw fixation    Ir port a cath procedure  2007    Right subclavian port of cath.    Mastectomy right      2012    Mastectomy,simple     [4] No Known Allergies  [5]   Family History  Problem Relation Age of Onset    Breast Cancer Mother 49        bilateral mastectomy    Genetic Disease Mother         NF-1    Breast Cancer Maternal Aunt 75    Breast Cancer Paternal Grandmother     Breast Cancer Self 51    Genetic Disease Self         NF-1    Cancer Paternal Aunt         throat ca    Cancer Maternal Uncle         prostate ca    Genetic Disease Brother         NF-1    Genetic Disease Brother         NF-1    Genetic Disease Brother         NF-1    Cancer Maternal Uncle         bladder ca    Breast Cancer Maternal Cousin Female     Cancer Maternal Cousin Female         leukemia;  childhood    Cancer Paternal Cousin Male         throat ca   [6]   Social History  Socioeconomic History    Marital status: Single   Tobacco Use    Smoking status: Never    Smokeless tobacco: Never   Substance and Sexual Activity    Alcohol use: Yes     Alcohol/week: 5.0 standard drinks of alcohol     Types: 5 Standard drinks or equivalent per week     Comment: Socially.  (Per [employer]:  5 days weekly.)    Drug use: No   [7]   Current Outpatient Medications   Medication Sig Dispense Refill    Erdafitinib 4 MG Oral Tab Take 8 mg by mouth in the morning. Take two tablets (8 mg) daily. 56 tablet 11    HYDROcodone-acetaminophen  MG Oral Tab Take 1 tablet by mouth every 6 (six) hours as needed for Pain. 90 tablet 0    zolpidem 10 MG Oral Tab Take 1 tablet (10 mg total) by  mouth nightly as needed. 60 tablet 0    LORazepam 0.5 MG Oral Tab Take 1 tablet (0.5 mg total) by mouth every 6 (six) hours as needed for Anxiety. 60 tablet 0    dexamethasone (DECADRON) 4 MG tablet Take 2 tablets (8 mg total) by mouth 2 (two) times daily with meals. Take twice the day before and morning of chemotherapy. 12 tablet 5    prochlorperazine (COMPAZINE) 10 mg tablet Take 1 tablet (10 mg total) by mouth every 6 (six) hours as needed for Nausea. 90 tablet 2    ondansetron 8 MG Oral Tablet Dispersible Take 1 tablet (8 mg total) by mouth every 8 (eight) hours as needed for Nausea.      guaiFENesin-codeine 100-10 MG/5ML Oral Solution Take 5 mL by mouth every 6 (six) hours as needed for cough. (Patient not taking: Reported on 5/12/2025) 473 mL 1    Potassium Chloride ER 10 MEQ Oral Tab CR Take 2 tablets (20 mEq total) by mouth 2 (two) times daily. (Patient not taking: Reported on 4/2/2025) 60 tablet 0    mirtazapine 15 MG Oral Tablet Dispersible Take 1 tablet (15 mg total) by mouth nightly. 90 tablet 3    minocycline 50 MG Oral Cap Take 1 capsule (50 mg total) by mouth daily. 90 capsule 3    furosemide 20 MG Oral Tab Take 1 tablet (20 mg total) by mouth in the morning.      folic acid 1 MG Oral Tab Take 1 tablet (1 mg total) by mouth daily. (Patient not taking: Reported on 5/12/2025) 90 tablet 3    albuterol 108 (90 Base) MCG/ACT Inhalation Aero Soln Inhale 1 puff into the lungs every 6 (six) hours as needed for Wheezing. (Patient not taking: Reported on 5/12/2025) 8 g 3

## 2025-05-14 NOTE — PROGRESS NOTES
Patient arrives to infusion for labs and IV hydration. Patient ambulating independently , c/o of fatigue and mild nausea      1L NS infused CMP drawn     Potassium 2.8   20meq replacement given  PO compazine given for nausea           Patient appeared to tolerate infusion without difficulty or complaint.   Reviewed next apt date/time:Yes  . Patient verbalizes understanding of upcoming appts.       Education Record  Learner:  Patient  Disease / Diagnosis: breast cancer  Barriers / Limitations:  None  Method:  Reinforcement  General Topics:  Side effects and symptom management and Plan of care reviewed  Outcome:  Shows understanding

## 2025-05-16 DIAGNOSIS — C79.51 METASTASIS TO BONE (HCC): ICD-10-CM

## 2025-05-16 DIAGNOSIS — C78.7 MALIGNANT NEOPLASM METASTATIC TO LIVER (HCC): ICD-10-CM

## 2025-05-16 DIAGNOSIS — C50.919 STAGE IV BREAST CANCER IN FEMALE (HCC): ICD-10-CM

## 2025-05-27 PROBLEM — R62.7 FAILURE TO THRIVE IN ADULT: Status: ACTIVE | Noted: 2025-01-01

## 2025-05-27 PROBLEM — E83.52 HYPERCALCEMIA OF MALIGNANCY: Status: ACTIVE | Noted: 2025-01-01

## 2025-05-27 PROBLEM — R07.9 CHEST PAIN OF UNCERTAIN ETIOLOGY: Status: ACTIVE | Noted: 2025-01-01

## 2025-05-27 NOTE — ED QUICK NOTES
Orders for admission, patient is aware of plan and ready to go upstairs. Any questions, please call ED RN Sulma  at extension [phone] .     Patient Covid vaccination status: Fully vaccinated     COVID Test Ordered in ED: None    COVID Suspicion at Admission: N/A    Running Infusions: Medication Infusions[1] None    Mental Status/LOC at time of transport: A^&ox4.    Other pertinent information:   CIWA score: N/A   NIH score:  N/A             [1]

## 2025-05-27 NOTE — TELEPHONE ENCOUNTER
Called Rafiq, patient having increased weakness, sob today\"panting when returning from [place], color pale,  Denies fever but using blanket most of time, the increased sob, no chest pain.  Nausea - Grade 2-3 and Vomiting Grade 2 - having poor intake and will vomit after taking medications. Weight loss, and weakness  Diarrhea Grade 1  - having diarrhea  Dehydration - Grade 2    Concerned that patient in need of more help, possible palliative or hospice care, need to see where they need to go after today.    Instructed to bring to ER for evaluation.  They agree on plan.  Coming from [facility].

## 2025-05-27 NOTE — TELEPHONE ENCOUNTER
Pt brother Rafiq called he stated the pt is having , weakness, difficulty walking not eating not drinking, shallow breathing frequent sleeping, back and torso pain       Please give pt brother Que call back      Thank you

## 2025-05-27 NOTE — ED INITIAL ASSESSMENT (HPI)
Patient complains of chest pain over the past week, states she has metastatic breast cancer, states she has jazmin

## 2025-05-27 NOTE — H&P
[facility]  part of [facility]  HISTORY AND PHYSICAL       Aleisha Carlin Patient Status:  Emergency    3/1/1960  65 year old CSN [id number]   Location  Attending José Hopkins MD     PCP None Pcp         DATE OF ADMISSION: 2025     CHIEF COMPLAINT: Generalized weakness, poor p.o. intake    HISTORY OF PRESENT ILLNESS  This is a 65 year oldfemale patient brought in by family after patient has been progressively weak and tired.  History limited from patient.  Patient lethargic and easily awakens, but quickly falls back asleep during interview.  Patient was able to state she was having some bilateral side pain.  States it started over the past several days.  Otherwise states been feeling generally weak and tired.  Describes poor appetite.  Patient denied chest pain or shortness of breath.  Unable to elaborate further.  Further history from brother and dad at bedside.  Family state patient has been coming progressively weaker and more tired.  Described worsening appetite and decreased oral intake.  Family states they spoke with their oncologist who recommended she present to the ED for further evaluation.  Family states they are interested in discussing potential options including hospice care.     PAST MEDICAL HISTORY  Past Medical History[1]     PAST SURGICAL HISTORY  Past Surgical History[2]    ALLERGIES   Patient has no known allergies.    MEDICATIONS  Patient's Medications   New Prescriptions    No medications on file   Previous Medications    DEXAMETHASONE (DECADRON) 4 MG TABLET    Take 2 tablets (8 mg total) by mouth 2 (two) times daily with meals. Take twice the day before and morning of chemotherapy.    ERDAFITINIB 4 MG ORAL TAB    Take 8 mg by mouth in the morning. Take two tablets (8 mg) daily.    GUAIFENESIN-CODEINE 100-10 MG/5ML ORAL SOLUTION    Take 5 mL by mouth every 6 (six) hours as needed for cough.    LORAZEPAM 0.5 MG ORAL TAB    Take 1 tablet (0.5 mg total) by  mouth every 6 (six) hours as needed for Anxiety.    MINOCYCLINE 50 MG ORAL CAP    Take 1 capsule (50 mg total) by mouth daily.    MIRTAZAPINE 15 MG ORAL TABLET DISPERSIBLE    Take 1 tablet (15 mg total) by mouth nightly.    POTASSIUM CHLORIDE ER 10 MEQ ORAL TAB CR    Take 2 tablets (20 mEq total) by mouth 2 (two) times daily.    PROCHLORPERAZINE (COMPAZINE) 10 MG TABLET    Take 1 tablet (10 mg total) by mouth every 6 (six) hours as needed for Nausea.    ZOLPIDEM 10 MG ORAL TAB    Take 1 tablet (10 mg total) by mouth nightly as needed.   Modified Medications    No medications on file   Discontinued Medications    ALBUTEROL 108 (90 BASE) MCG/ACT INHALATION AERO SOLN    Inhale 1 puff into the lungs every 6 (six) hours as needed for Wheezing.    FOLIC ACID 1 MG ORAL TAB    Take 1 tablet (1 mg total) by mouth daily.    FUROSEMIDE 20 MG ORAL TAB    Take 1 tablet (20 mg total) by mouth in the morning.    HYDROCODONE-ACETAMINOPHEN  MG ORAL TAB    Take 1 tablet by mouth every 6 (six) hours as needed for Pain.    ONDANSETRON 8 MG ORAL TABLET DISPERSIBLE    Take 1 tablet (8 mg total) by mouth every 8 (eight) hours as needed for Nausea.       SOCIAL HISTORY  Social Hx on file[3]    FAMILY HISTORY  Family History[4]    PHYSICAL EXAM  Vital signs:  /89   Pulse 118   Temp 98 °F (36.7 °C)   Resp 24   Wt 99 lb (44.9 kg)   SpO2 94%   BMI 19.33 kg/m²      GENERAL: Frail female.  Lethargic, but briefly awakens to voice.  , in no acute distress.  HEART:  Regular rhythm.  Tachycardia  LUNGS:  Air entry was decreased.  No increased work of breathing or wheezes   ABDOMEN: Soft and non-tender.    EXTREMITIES: Extremity edema appreciated  PSYCHIATRIC: Flat mood      IMAGING    XR CHEST AP PORTABLE  (CPT=71045)  Result Date: 5/27/2025  CONCLUSION:   Small to moderate right and small left pleural effusions.  Right greater than left patchy bibasilar opacity which could reflect atelectasis with or without superimposed  pneumonia.    Dictated by (CST): Odin Ivory MD on 5/27/2025 at 2:34 PM     Finalized by (CST): Odin Ivory MD on 5/27/2025 at 2:35 PM             Data:  Recent Labs   Lab 05/27/25  1400   RBC 4.10   HGB 11.5*   HCT 35.9   MCV 87.6   MCH 28.0   MCHC 32.0   RDW 23.2*   NEPRELIM 11.74*   WBC 13.1*   .0     Recent Labs   Lab 05/27/25  1400   GLU 83   BUN 37*   CREATSERUM 1.07*   CA 12.7*   ALB 3.4   *   K 4.7   CL 98   CO2 25.0   ALKPHO 1,239*   *   *   BILT 5.6*   TP 5.6*       ASSESSMENT/PLAN      Failure to thrive in adult  -Patient with history of metastatic breast CA presenting due to progressive weakness, poor p.o. intake  - Per family has been progressively declining over the past several days/weeks.  - Family called oncologist who recommended presentation to ED for further evaluation and discussion for potential hospice care.  - Team consulted.    -Starting supportive care  - Starting IV fluids  - Symptom relief as able  -Continue to monitor    Acute Kidney Injury  -Likely related to poor p.o.  - Starting light IVF  - Avoiding Nephrotoxic agents  - Cont to monitor    Elevated liver enzymes  - Patient with known history of metastatic disease to the liver  - Likely related to progression of disease.  - Monitoring at this time.    History of metastatic breast cancer  - The patient has been following with Dr. Farias as outpatient.  - Noted metastatic disease to liver and bone.      Plan of care discussed with patient and family at bedside.  Patient lethargic and intermittently involved in conversation.  Family including father at bedside reported interest in transitioning to hospice care.  Discussed with ED physician and RN. Decision made that pt needs hospitalization for further management/monitoring.      Wilber Pinedo MD    This note was prepared using Dragon Medical voice recognition dictation software. As a result errors may occur. When identified these errors have been  corrected. While every attempt is made to correct errors during dictation discrepancies may still exist         [1]   Past Medical History:   Breast cancer (HCC)    MASTECTOMY / RECONSTRUCTION    Chemotherapy-induced neutropenia    Encounter for insertion of venous access port    portacath insertion / venous access    Glaucoma    History of breast cancer in female    Hyperlipidemia    Malignant neoplasm of overlapping sites of breast in female, estrogen receptor positive (HCC)    Malignant neoplasm of upper-outer quadrant of right breast in female, estrogen receptor positive (HCC)    Malignant pleural effusion (HCC)    Menorrhagia    HYSTEROSCOPY / D&C / endomentrial biopsy (08/28/2007)    Metastatic breast cancer    Neurofibromatosis (HCC)    Osteopenia of multiple sites    Osteopenia of multiple sites    Osteoporosis screening    Osteoporosis screening    Per [employer]    Other osteoporosis without current pathological fracture    Postmenopausal bleeding    Psychophysiological insomnia    Tumor, foot    tumor right foot / excision/excision of bone spur/arthrodesis w/screw fixation   [2]   Past Surgical History:  Procedure Laterality Date    Breast reconstruction Right 2008    right breast reconstructed - ductal, BRCA neg    Breast surgery      Chemotherapy  2007    Foot surgery Right     tumor rt foot / excision/excision of bone spur/arthrodesis w/screw fixation    Ir port a cath procedure  2007    Right subclavian port of cath.    Mastectomy right      2012    Mastectomy,simple  2007   [3]   Social History  Socioeconomic History    Marital status: Single   Tobacco Use    Smoking status: Never    Smokeless tobacco: Never   Substance and Sexual Activity    Alcohol use: Yes     Alcohol/week: 5.0 standard drinks of alcohol     Types: 5 Standard drinks or equivalent per week     Comment: Socially.  (Per [employer]:  5 days weekly.)    Drug use: No   Other Topics Concern    Caffeine Concern Yes     Comment: tea   [4]    Family History  Problem Relation Age of Onset    Breast Cancer Mother 49        bilateral mastectomy    Genetic Disease Mother         NF-1    Breast Cancer Maternal Aunt 75    Breast Cancer Paternal Grandmother     Breast Cancer Self 51    Genetic Disease Self         NF-1    Cancer Paternal Aunt         throat ca    Cancer Maternal Uncle         prostate ca    Genetic Disease Brother         NF-1    Genetic Disease Brother         NF-1    Genetic Disease Brother         NF-1    Cancer Maternal Uncle         bladder ca    Breast Cancer Maternal Cousin Female     Cancer Maternal Cousin Female         leukemia;  childhood    Cancer Paternal Cousin Male         throat ca

## 2025-05-27 NOTE — PLAN OF CARE
Pt admitted with SOB, a/o x 4, vss. Pt and two brothers @ BS, meeting with hospice.   Problem: Patient Centered Care  Goal: Patient preferences are identified and integrated in the patient's plan of care  Description: Interventions:  - What would you like us to know as we care for you?   - Provide timely, complete, and accurate information to patient/family  - Incorporate patient and family knowledge, values, beliefs, and cultural backgrounds into the planning and delivery of care  - Encourage patient/family to participate in care and decision-making at the level they choose  - Honor patient and family perspectives and choices  Outcome: Progressing

## 2025-05-27 NOTE — ED PROVIDER NOTES
Patient Seen in: [facility] Emergency Department    History     Chief Complaint   Patient presents with    Chest Pain       HPI    The patient presents to the ED with her father and brother for complaints of decreased appetite, increasing weakness, shortness of breath and bilateral chest pain for the past week or more.  Patient has end-stage metastatic breast cancer with liver involvement.  Father who is the primary caregiver states that they are interested in pursuing hospice placement.    History reviewed. Past Medical History[1]    History reviewed. Past Surgical History[2]      Medications :  Prescriptions Prior to Admission[3]     Family History[4]    Smoking Status: Social Hx on file[5]    Constitutional and vital signs reviewed.      Social History and Family History elements reviewed from today, pertinent positives to the presenting problem noted.    Physical Exam     ED Triage Vitals   BP 05/27/25 1322 123/85   Pulse 05/27/25 1322 (!) 131   Resp 05/27/25 1322 18   Temp 05/27/25 1322 98 °F (36.7 °C)   Temp src --    SpO2 05/27/25 1322 98 %   O2 Device 05/27/25 1400 None (Room air)       All measures to prevent infection transmission during my interaction with the patient were taken. Handwashing was performed prior to and after the exam.  Stethoscope and any equipment used during my examination was cleaned with super sani-cloth germicidal wipes following the exam.     Physical Exam  Vitals and nursing note reviewed.   Constitutional:       General: She is in acute distress.      Appearance: She is ill-appearing.      Comments: Cachectic.  Somnolent but arousable   HENT:      Head: Normocephalic and atraumatic.   Eyes:      General:         Right eye: No discharge.         Left eye: No discharge.      Conjunctiva/sclera: Conjunctivae normal.   Neck:      Trachea: No tracheal deviation.   Cardiovascular:      Rate and Rhythm: Regular rhythm. Tachycardia present.   Pulmonary:      Effort: Pulmonary effort  is normal. No respiratory distress.      Breath sounds: Normal breath sounds. No stridor.   Abdominal:      General: There is no distension.      Palpations: Abdomen is soft.   Musculoskeletal:         General: No deformity.   Skin:     General: Skin is warm and dry.   Neurological:      Mental Status: She is oriented to person, place, and time.   Psychiatric:         Mood and Affect: Mood normal.         Behavior: Behavior normal.         ED Course        Labs Reviewed   CBC WITH DIFFERENTIAL WITH PLATELET - Abnormal; Notable for the following components:       Result Value    WBC 13.1 (*)     HGB 11.5 (*)     RDW-SD 72.4 (*)     RDW 23.2 (*)     Neutrophil Absolute Prelim 11.74 (*)     Neutrophil Absolute 11.74 (*)     Lymphocyte Absolute 0.30 (*)     All other components within normal limits   BASIC METABOLIC PANEL (8) - Abnormal; Notable for the following components:    Sodium 133 (*)     BUN 37 (*)     Creatinine 1.07 (*)     BUN/CREA Ratio 34.6 (*)     Calcium, Total 12.7 (*)     eGFR-Cr 58 (*)     All other components within normal limits   RBC MORPHOLOGY SCAN - Abnormal; Notable for the following components:    RBC Morphology See morphology below (*)     All other components within normal limits   HEPATIC FUNCTION PANEL (7) - Abnormal; Notable for the following components:     (*)      (*)     Alkaline Phosphatase 1,239 (*)     Bilirubin, Total 5.6 (*)     Bilirubin, Direct 4.0 (*)     Total Protein 5.6 (*)     All other components within normal limits   TROPONIN I HIGH SENSITIVITY - Normal   AMMONIA, PLASMA - Normal   RAINBOW DRAW LAVENDER   RAINBOW DRAW LIGHT GREEN   RAINBOW DRAW BLUE     EKG    Rate, intervals and axes as noted on EKG Report.  Rate: Tachycardic, 127 bpm  Rhythm: Sinus Rhythm  Reading: Low voltage QRS, inferior Q waves, abnormal EKG           As Interpreted by me    Imaging Results Available and Reviewed while in ED: XR CHEST AP PORTABLE  (CPT=71045)  Result Date:  5/27/2025  CONCLUSION:   Small to moderate right and small left pleural effusions.  Right greater than left patchy bibasilar opacity which could reflect atelectasis with or without superimposed pneumonia.    Dictated by (CST): Odin Ivory MD on 5/27/2025 at 2:34 PM     Finalized by (CST): Odin Ivory MD on 5/27/2025 at 2:35 PM          ED Medications Administered:   Medications   LORazepam (Ativan) tab 0.5 mg (has no administration in time range)   sodium chloride 0.9% infusion (has no administration in time range)   heparin (Porcine) 5000 UNIT/ML injection 5,000 Units (has no administration in time range)   acetaminophen (Tylenol Extra Strength) tab 500 mg (has no administration in time range)   acetaminophen (Tylenol) tab 650 mg (has no administration in time range)     Or   HYDROcodone-acetaminophen (Norco) 5-325 MG per tab 1 tablet (has no administration in time range)     Or   HYDROcodone-acetaminophen (Norco) 5-325 MG per tab 2 tablet (has no administration in time range)   morphINE PF 2 MG/ML injection 1 mg (has no administration in time range)     Or   morphINE PF 2 MG/ML injection 2 mg (has no administration in time range)     Or   morphINE PF 4 MG/ML injection 4 mg (has no administration in time range)   ondansetron (Zofran) 4 MG/2ML injection 4 mg (has no administration in time range)   prochlorperazine (Compazine) 10 MG/2ML injection 5 mg (has no administration in time range)   polyethylene glycol (PEG 3350) (Miralax) 17 g oral packet 17 g (has no administration in time range)   sennosides (Senokot) tab 17.2 mg (has no administration in time range)   bisacodyl (Dulcolax) 10 MG rectal suppository 10 mg (has no administration in time range)   fleet enema (Fleet) rectal enema 133 mL (has no administration in time range)   sodium chloride 0.9 % IV bolus 1,000 mL (1,000 mL Intravenous Handoff 5/27/25 1614)         MDM     Vitals:    05/27/25 1600 05/27/25 1630 05/27/25 1700 05/27/25 1708   BP:  109/85 110/85 116/82    Pulse: 120 113 114    Resp: 25 22 20    Temp:       SpO2: 95% 92% 94%    Weight:    44.9 kg     *I personally reviewed and interpreted all ED vitals.    Pulse Ox: 94%, Room air, Normal     Monitor Interpretation:   sinus tachycardia as interpreted by me.  The cardiac monitor was ordered to monitor heart rate.    Differential Diagnosis/ Diagnostic Considerations: Chest wall pain, failure to thrive, pulmonary effusions, metastasis, other    Complicating Factors: The patient already has does not have any pertinent problems on file. to contribute to the complexity of this ED evaluation.    Medical Decision Making  Patient presents to the ED with sitting and failure to thrive and bilateral chest pain.  Ill-appearing on exam.  Suspect chest wall pain causing her chest symptoms.  Low concern for ACS given negative troponin testing in the ED and low concern clinically for PE.  Pain is bilateral and patient's tachycardia is chronic.  No change in heart rate from prior evaluations.  Significant worsening of LFT testing concerning for worsening cancer burden.  I discussed with Dr. Farias for oncology consultation and Dr. Pinedo for admission.  Patient and family requesting hospice consult    Problems Addressed:  Chest pain of uncertain etiology: acute illness or injury  Failure to thrive in adult: acute illness or injury that poses a threat to life or bodily functions    Amount and/or Complexity of Data Reviewed  Labs: ordered. Decision-making details documented in ED Course.  Radiology: ordered and independent interpretation performed. Decision-making details documented in ED Course.     Details: I personally reviewed the patient's chest x-ray image and noted no pneumothorax  ECG/medicine tests: ordered and independent interpretation performed. Decision-making details documented in ED Course.  Discussion of management or test interpretation with external provider(s): I discussed with Dr. Farias for  oncology consultation        Condition upon leaving the department: Stable    Disposition and Plan     Clinical Impression:  1. Failure to thrive in adult    2. Chest pain of uncertain etiology        Disposition:  Admit    Follow-up:  No follow-up provider specified.    Medications Prescribed:  Current Discharge Medication List          Hospital Problems       Present on Admission  Date Reviewed: 5/14/2025          ICD-10-CM Noted POA    * (Principal) Failure to thrive in adult R62.7 5/27/2025 Unknown    Chest pain of uncertain etiology R07.9 5/27/2025 Unknown                       [1]   Past Medical History:   Breast cancer (HCC)    MASTECTOMY / RECONSTRUCTION    Chemotherapy-induced neutropenia    Encounter for insertion of venous access port    portacath insertion / venous access    Glaucoma    History of breast cancer in female    Hyperlipidemia    Malignant neoplasm of overlapping sites of breast in female, estrogen receptor positive (HCC)    Malignant neoplasm of upper-outer quadrant of right breast in female, estrogen receptor positive (HCC)    Malignant pleural effusion (HCC)    Menorrhagia    HYSTEROSCOPY / D&C / endomentrial biopsy (08/28/2007)    Metastatic breast cancer    Neurofibromatosis (HCC)    Osteopenia of multiple sites    Osteopenia of multiple sites    Osteoporosis screening    Osteoporosis screening    Per [employer]    Other osteoporosis without current pathological fracture    Postmenopausal bleeding    Psychophysiological insomnia    Tumor, foot    tumor right foot / excision/excision of bone spur/arthrodesis w/screw fixation   [2]   Past Surgical History:  Procedure Laterality Date    Breast reconstruction Right 2008    right breast reconstructed - ductal, BRCA neg    Breast surgery      Chemotherapy  2007    Foot surgery Right     tumor rt foot / excision/excision of bone spur/arthrodesis w/screw fixation    Ir port a cath procedure  2007    Right subclavian port of cath.    Mastectomy  right      2012    Mastectomy,simple  2007   [3]   Medications Prior to Admission   Medication Sig Dispense Refill Last Dose/Taking    LORazepam 0.5 MG Oral Tab Take 1 tablet (0.5 mg total) by mouth every 6 (six) hours as needed for Anxiety. 60 tablet 0 Past Month    zolpidem 10 MG Oral Tab Take 1 tablet (10 mg total) by mouth nightly as needed. 60 tablet 0 5/26/2025 at  9:00 PM    prochlorperazine (COMPAZINE) 10 mg tablet Take 1 tablet (10 mg total) by mouth every 6 (six) hours as needed for Nausea. 90 tablet 2 Past Week    mirtazapine 15 MG Oral Tablet Dispersible Take 1 tablet (15 mg total) by mouth nightly. 90 tablet 3 5/26/2025 at  9:00 PM    minocycline 50 MG Oral Cap Take 1 capsule (50 mg total) by mouth daily. 90 capsule 3 5/26/2025 at  9:00 PM    Erdafitinib 4 MG Oral Tab Take 8 mg by mouth in the morning. Take two tablets (8 mg) daily. (Patient not taking: Reported on 5/27/2025) 56 tablet 11 Not Taking    dexamethasone (DECADRON) 4 MG tablet Take 2 tablets (8 mg total) by mouth 2 (two) times daily with meals. Take twice the day before and morning of chemotherapy. (Patient not taking: Reported on 5/27/2025) 12 tablet 5 Not Taking    guaiFENesin-codeine 100-10 MG/5ML Oral Solution Take 5 mL by mouth every 6 (six) hours as needed for cough. (Patient not taking: Reported on 5/27/2025) 473 mL 1 Not Taking    Potassium Chloride ER 10 MEQ Oral Tab CR Take 2 tablets (20 mEq total) by mouth 2 (two) times daily. (Patient not taking: Reported on 5/27/2025) 60 tablet 0 Not Taking   [4]   Family History  Problem Relation Age of Onset    Breast Cancer Mother 49        bilateral mastectomy    Genetic Disease Mother         NF-1    Breast Cancer Maternal Aunt 75    Breast Cancer Paternal Grandmother     Breast Cancer Self 51    Genetic Disease Self         NF-1    Cancer Paternal Aunt         throat ca    Cancer Maternal Uncle         prostate ca    Genetic Disease Brother         NF-1    Genetic Disease Brother          NF-1    Genetic Disease Brother         NF-1    Cancer Maternal Uncle         bladder ca    Breast Cancer Maternal Cousin Female     Cancer Maternal Cousin Female         leukemia;  childhood    Cancer Paternal Cousin Male         throat ca   [5]   Social History  Socioeconomic History    Marital status: Single   Tobacco Use    Smoking status: Never    Smokeless tobacco: Never   Substance and Sexual Activity    Alcohol use: Yes     Alcohol/week: 5.0 standard drinks of alcohol     Types: 5 Standard drinks or equivalent per week     Comment: Socially.  (Per [employer]:  5 days weekly.)    Drug use: No   Other Topics Concern    Caffeine Concern Yes     Comment: tea

## 2025-05-27 NOTE — CONSULTS
[facility]    Report of Hematology/Oncology Consultation    Aleisha Carlin Patient Status:  Inpatient    3/1/1960 MRN [id number]   Location 450/450-A Attending Wilber Pinedo MD   Date of admission 2025  1:41 PM   PCP None Pcp     Date of Consult:  25      History of Present Illness:  Aleisha Carlin is a 65 year old female being evaluated at the request of Dr. Wilber Pinedo due to generalized weakness and poor oral intake.    Patient has extensive history of metastatic breast cancer summarized below on the oncology history.  She recently had disease progression in her liver mainly after ninth line palliative systemic therapy.  The patient was to initiate treatment with 10th line therapy with erdafitinib due to FGFR 1-3 fusion/mutation on NGS.  It was discussed with the patient that chances of response were likely low given that she was heavily pretreated.  Patient was noted to be dehydrated at appointment on 2025 with increase in her calcium levels and received IV fluid hydration.  She also met with palliative care that day clarify her healthcare power of  who is her brother Rafiq Carlin and the #1 successor agent is Vipul Carlin also her brother she is still wanted to remain full code at the time but stated she did not want to be intubated for extended periods of time and did not want feeding tubes.  Cancer related pain was addressed at the time as well.  She done return on 2025 for further IV fluid hydration as well as potassium replacement due to hypokalemia.  She did meet with palliative care at that time as well for further symptom management.    Yesterday the patient's brother called the office stating that the patient was having weakness difficulty walking and was not eating or drinking.  He was also concerned that she has shallow breathing and was sleeping frequently as well as was having worsening pain in her back and torso.  Her  brother also had stated that the patient was having shortness of breath and was panting when she was returning from the bathroom, she also was pale.  She had denied fevers but was covered in blankets most of the day with the worsening shortness of breath but no chest pain.  She also was having nausea and vomiting after taking her medications, poor oral intake and weight loss.  Patient also was having issues with mild diarrhea.  The patient's family was concerned that she needed more help positive more palliative care or hospice care.  Given this decline, the patient was recommended to be brought to the emergency room.    At the emergency room, the patient was very weak and lethargic and was not able to elaborate much on her situation as described above by her siblings and father.  At the ED the family stated they were interested in discussion of option of hospice care.  I did meet with the patient and her family at bedside who also had been meeting with hospice RN at the time expressed interest in proceeding with hospice care.  Patient was found to have acute renal insufficiency as well as hypercalcemia with a calcium of 12.7 which is much increased from her prior couple of weeks ago as well as significant worsening of her alkaline phosphatase transaminases as well as hyperbilirubinemia.  Her ammonia level was normal.    Patient was admitted for further symptom management and discussion of hospice.  She was receiving IV fluids last night, as well as pain medications.  She is very somnolent and stated her pain was 7 out of 10 after much prompting.  She needed 2 people assist and walker to go to the bathroom for bowel movement.      ECOG PS:  3    History:   Cancer Staging   Malignant neoplasm of upper-outer quadrant of right breast in female, estrogen receptor positive (HCC)  Staging form: Breast, AJCC V7  - Clinical: Stage IV (TX, NX, M1) - Signed by Dinah Soriano MD on 12/23/2021      Oncology History Overview Note    10/1/2020 genetic testing 35 genes studied myRisk panel through [employer] negative for BRCA1 and BRCA2     Malignant neoplasm of upper-outer quadrant of right breast in female, estrogen receptor positive (HCC)   9/11/2007 Initial Diagnosis    Bilateral diagnostic mammogram, malignant appearing calcifications in the right upper outer quadrant, 1.5 cm nodular density in the left upper outer quadrant  Ultrasound -highly suspicious areas in the right breast upper outer quadrant near the nipple     9/11/2007 Biopsy    Right breast aspirate and core biopsy -ductal carcinoma in situ, solid type     9/26/2007 Surgery    Right breast lumpectomy -multifocal ductal carcinoma in situ 3.8 cm +1.1 cm (below the nipple) extensions of DCIS to the inked anterior and inferior margins, single neurofibroadenoma, Tis Nx Mx, ER 90% MA 8% HER-2/nelson negative Ki-67 39% unfavorable     10/8/2007 Progression    MRI bilateral breasts 4.8 x 2.1 cm lumpectomy defect with irregular parenchymal enhancement around the margins of this operative defect, the enhancement appears more diffuse, thick and irregular than is normally seen, areas of washout suspicious for residual neoplasm at the margins.  8 x 7.3 mm enhancing mass superior to the lumpectomy site in the 12 o'clock position mid right breast suspicious contrast-enhancement indeterminate (CDH)     11/16/2007 Surgery    Completion right mastectomy, axillary node sampling, immediate reconstruction with a tissue expander -2 separate foci of microinvasive ductal carcinoma, moderately differentiated measuring 1 mm in maximum dimension, residual multifocal ductal carcinoma in situ solid and cribriform with multiple areas of comedonecrosis largest focus measuring 1.1 cm, metastatic carcinoma involving 3 lymph nodes out of 5 sampled with the largest focus measuring 1.4 cm with extracapsular extension seen in 1 lymph node     11/16/2007 Cancer Staged    pT1 N1 M0 Stage IIA  10/3/2007 Bone scan - no  definite metastasis, mild plantar fasciitis left foot, traction injuries are mild trochanteric bursitis about the greater trochanters  10/28/2007 CT chest neurofibromatosis, no metastatic disease   9/26/2007 hysteroscopy D&C-Dr. grossman-no definite evidence of endometrial hyperplasia cytologic atypia polyps or malignancy        - 2/16/2008 Chemotherapy    Taxotere plus Cytoxan 4 cycles     4/4/2008 Surgery    Plastic surgery with removal of the right expander implant placement with silicone gel implant, left breast reconstruction with silicone implant, reduction of left nipple complex     9/18/2020 Progression    1. Development of a spiculated soft tissue density in the right supraclavicular region extending to the lower right side of the neck, as detailed above.  There is involvement of the right scalene and sternocleidomastoid muscles with abutment of the right   common carotid artery/internal jugular vein and mild overlying skin thickening.  Given the history of malignancy, this finding could represent new confluent yassine metastases.  Cellulitis/phlegmon formation could have a similar appearance.  2. No drainable subcutaneous abscess.  3. Stable multinodular thyroid gland.   4. Development of findings at the visualized lung apices concerning for CHF/fluid overload.  5. Lesser incidental findings as above.           9/23/2020 Biopsy    Left pleural fluid; Non-gyn cytology:  Adequacy: Satisfactory for evaluation  General Category: Positive for malignancy  Diagnosis:  Cytomorphologic features consistent with metastatic adenocarcinoma, compatible with breast primary      Biopsy    Tumor Markers by Immunostaining (Polymer based detection method) using the ASCO/CAP scoring criteria, performed at [facility] on formalin-fixed paraffin-embedded tissue:      Estrogen receptor (Leica, monoclonal, clone 6 F11) status:  98%  (Positive, strong intensity)  Progesterone receptor (Leica, monoclonal, clone 16) status:   < 1%  (Negative)  HER-2/nelson (Leica, monoclonal, clone CB11) status:  1+  (Negative)    Right supraclavicular mass; core biopsy:  Metastatic adenocarcinoma consistent with patient's known breast primary (see comment).     Comment:  Clinically, the patient has a history of neurofibromatosis, as well as a history of right breast invasive ductal carcinoma with metastasis to three axillary lymph nodes (pT1,N1) ([id number]; 11/16/07).  More recent examination of the patient's left pleural effusion ([id number]; 9/23/20) demonstrated metastatic adenocarcinoma consistent with the patient's known breast primary (ER: 28%, AK: <1%, HER-2/nelson: 0).      Biopsy    Liver biopsy - ultrasound-guided core biopsies:  Fragment of liver parenchyma with metastatic adenocarcinoma, consistent with the patient's known previous breast primary.     10/19/2020 Surgery    Pleurx catheter placement for Dr. Wise     2/1/2021 Cancer Staged    CT scan -neck chest abdomen pelvis with contrast  1. There is a history of metastatic breast carcinoma.  Ill-defined metastatic right supraclavicular mass persists, but has significantly decreased in size since prior neck CT.  Likewise, an ill-defined subcentimeter right hepatic lobe lesion has   decreased in size since prior.  Additional previously noted ill-defined hypoenhancing left hepatic lobe is not definitively identified on current exam.  These findings suggest at least some degree of favorable interval treatment response with respect to   biopsy-proven right supraclavicular and liver metastases.   2. Small to moderate left with a left PleurX catheter.  This is most likely malignant in nature.  Pleural fluid has decreased in extent since prior chest CT imaging from October, 2020. Trace right pleural effusion, which has also decreased in extent   since prior and is likely malignant.   3. Multifocal interlobular septal thickening throughout the left greater than right lung.  There is an additional  ill-defined 4 cm opacity in the left upper lobe, which has decreased in size since prior chest CT imaging.  Although these findings could   relate to interstitial edema with resolving left pneumonia, the possibility of lymphangitic spread of neoplasm is raised.  Overall, these findings have likely slightly improved since prior with no definite progression.  Continued close attention on   anticipated follow-up is advised.   4. Small sclerotic foci throughout the thoracic and lumbar spine, which measure 1 cm or less in size, but are new since prior CT imaging.  These most likely represent intervally treated osseous micrometastases.  Consider nuclear medicine bone scan   correlation to assess for any potential sites of active osseous metastases.   5. Possible subtle eccentric/peripheral filling defect within a right lower lobe subsegmental branch pulmonary artery (series 5, image 74).  This finding is favored to reflect either artifact from partial volume averaging or a chronic subsegmental branch    pulmonary embolism and is of uncertain clinical significance.   6. Numerous cutaneous and subcutaneous nodules throughout the imaged soft tissues of the neck, thorax, abdomen, and pelvis.  These appear similar in comparison to prior and most likely relate to the patient's known neurofibromatosis.   7. Bilateral breast implants.  Intracapsular rupture of the implant on the right is suspected, but suboptimally assessed on this exam.   8. Thyroid nodules, which appear similar in comparison to prior.   9. Small hiatal hernia.   10. Colonic diverticulosis.   11. Dominant 3 cm uterine fundal fibroid.   Repeat CT scans chest abdomen and pelvis 4/16/2021 ER  No clear progression of disease  7/21/2021 CT repeat CT scan chest abdomen and pelvis  No clear progression     12/23/2021 Cancer Staged    Staging form: Breast, AJCC V7  - Clinical: Stage IV (TX, NX, M1)     2/9/2023 - 6/15/2023 Chemotherapy    OP CARBOplatin /  Gemcitabine  Plan Provider: Ami Farias MD  Treatment goal: Palliative  Line of treatment: Second Line     7/20/2023 - 12/21/2023 Chemotherapy    OP BREAST EriBULin  Plan Provider: Ami Farias MD  Treatment goal: Palliative  Line of treatment: [No plan line of treatment]     1/10/2024 - 1/10/2024 Chemotherapy    OP Capecitabine (every other week)  Plan Provider: Ami Farias MD  Treatment goal: Palliative  Line of treatment: [No plan line of treatment]     10/9/2024 - 11/6/2024 Chemotherapy    OP Liposomal DOXOrubicin  Plan Provider: Ami Farias MD  Treatment goal: Palliative  Line of treatment: [No plan line of treatment]     12/18/2024 - 4/23/2025 Chemotherapy    OP BREAST Ixabepilone  Plan Provider: Ami Farias MD  Treatment goal: Palliative  Line of treatment: Third Line     Malignant neoplasm metastatic to liver (HCC)   11/19/2020 Initial Diagnosis    Malignant neoplasm metastatic to liver (HCC)     1/10/2024 - 1/10/2024 Chemotherapy    OP Capecitabine (every other week)  Plan Provider: Ami Farias MD  Treatment goal: Palliative  Line of treatment: [No plan line of treatment]     10/9/2024 - 11/6/2024 Chemotherapy    OP Liposomal DOXOrubicin  Plan Provider: Ami Farias MD  Treatment goal: Palliative  Line of treatment: [No plan line of treatment]     5/12/2025 -  Chemotherapy    OP Erdafitinib  Plan Provider: Ami Farias MD  Treatment goal: Palliative  Line of treatment: [No plan line of treatment]     Triple negative breast cancer (HCC)   9/11/2022 Initial Diagnosis    Triple negative breast cancer (HCC)     9/22/2022 - 11/4/2022 Chemotherapy    OP Sacituzumab govitecan-hziy  Plan Provider: Dinah Soriano MD  Treatment goal: Palliative  Line of treatment: [No plan line of treatment]     Stage IV breast cancer in female (HCC)   1/1/2024 Initial Diagnosis    Stage IV breast cancer in female (HCC)     5/12/2025 -  Chemotherapy    OP Erdafitinib  Plan Provider: Ami Farias MD  Treatment  goal: Palliative  Line of treatment: [No plan line of treatment]     Metastasis to bone (HCC)   5/12/2025 -  Chemotherapy    OP Erdafitinib  Plan Provider: Ami Farias MD  Treatment goal: Palliative  Line of treatment: [No plan line of treatment]     5/12/2025 Initial Diagnosis    Metastasis to bone (HCC)         Past Medical History[1]    Past Surgical History[2]    Allergies:  Patient has no known allergies.    MEDS:  Medications Ordered Prior to Encounter[3]  Scheduled Medications[4]      Family History[5]    Short Social Hx on File[6]    Review of Systems   Constitutional:  Positive for appetite change, fatigue and unexpected weight change. Negative for fever.   Respiratory:  Negative for shortness of breath.    Cardiovascular:  Negative for chest pain.   Gastrointestinal:  Positive for abdominal pain.   Musculoskeletal:  Positive for back pain and gait problem.   Neurological:  Positive for dizziness, extremity weakness and gait problem.   Psychiatric/Behavioral:  Positive for sleep disturbance.    Somewhat limited due to patient's clinical condition      Physical Exam:   Vital Signs: /82   Pulse 114   Temp 98 °F (36.7 °C)   Resp 20   Wt 44.9 kg (98 lb 15.8 oz)   SpO2 94%   BMI 19.33 kg/m²   General: Patient is somnolent, not in acute distress.  HEENT: Icteric sclera  Chest: Clear to auscultation.  Heart: Tachycardic, no murmur  Abdomen: Soft, tender at the right upper and left upper quadrants with good bowel sounds.  Extremities: Bilateral lower extremity edema  Psych/Depression: Unable to fully assess due to her somnolence      Laboratory Data:      Recent Results (from the past 24 hours)   EKG 12 Lead    Collection Time: 05/27/25  1:34 PM   Result Value Ref Range    Ventricular rate 127 BPM    Atrial rate 127 BPM    P-R Interval 176 ms    QRS Duration 66 ms    Q-T Interval 288 ms    QTC Calculation (Bezet) 418 ms    P Axis 51 degrees    R Axis 245 degrees    T Axis 64 degrees   RAINBOW DRAW  LAVENDER    Collection Time: 05/27/25  2:00 PM   Result Value Ref Range    Hold Lavender Auto Resulted    RAINBOW DRAW LIGHT GREEN    Collection Time: 05/27/25  2:00 PM   Result Value Ref Range    Hold Lt Green Auto Resulted    RAINBOW DRAW BLUE    Collection Time: 05/27/25  2:00 PM   Result Value Ref Range    Hold Blue Auto Resulted    CBC With Differential With Platelet    Collection Time: 05/27/25  2:00 PM   Result Value Ref Range    WBC 13.1 (H) 4.0 - 11.0 x10(3) uL    RBC 4.10 3.80 - 5.30 x10(6)uL    HGB 11.5 (L) 12.0 - 16.0 g/dL    HCT 35.9 35.0 - 48.0 %    MCV 87.6 80.0 - 100.0 fL    MCH 28.0 26.0 - 34.0 pg    MCHC 32.0 31.0 - 37.0 g/dL    RDW-SD 72.4 (H) 35.1 - 46.3 fL    RDW 23.2 (H) 11.0 - 15.0 %    .0 150.0 - 450.0 10(3)uL    Neutrophil Absolute Prelim 11.74 (H) 1.50 - 7.70 x10 (3) uL    Neutrophil Absolute 11.74 (H) 1.50 - 7.70 x10(3) uL    Lymphocyte Absolute 0.30 (L) 1.00 - 4.00 x10(3) uL    Monocyte Absolute 0.91 0.10 - 1.00 x10(3) uL    Eosinophil Absolute 0.01 0.00 - 0.70 x10(3) uL    Basophil Absolute 0.03 0.00 - 0.20 x10(3) uL    Immature Granulocyte Absolute 0.09 0.00 - 1.00 x10(3) uL    Neutrophil % 89.7 %    Lymphocyte % 2.3 %    Monocyte % 7.0 %    Eosinophil % 0.1 %    Basophil % 0.2 %    Immature Granulocyte % 0.7 %   Basic Metabolic Panel (8)    Collection Time: 05/27/25  2:00 PM   Result Value Ref Range    Glucose 83 70 - 99 mg/dL    Sodium 133 (L) 136 - 145 mmol/L    Potassium 4.7 3.5 - 5.1 mmol/L    Chloride 98 98 - 112 mmol/L    CO2 25.0 21.0 - 32.0 mmol/L    Anion Gap 10 0 - 18 mmol/L    BUN 37 (H) 9 - 23 mg/dL    Creatinine 1.07 (H) 0.55 - 1.02 mg/dL    BUN/CREA Ratio 34.6 (H) 10.0 - 20.0    Calcium, Total 12.7 (H) 8.7 - 10.4 mg/dL    Calculated Osmolality 284 275 - 295 mOsm/kg    eGFR-Cr 58 (L) >=60 mL/min/1.73m2   Troponin I (High Sensitivity)    Collection Time: 05/27/25  2:00 PM   Result Value Ref Range    Troponin I (High Sensitivity) 3 <=34 ng/L   RBC Morphology Scan     Collection Time: 05/27/25  2:00 PM   Result Value Ref Range    RBC Morphology See morphology below (A) Normal, Slide reviewed, see previous RBC morphology.    Platelet Morphology Normal Normal    Echinocytes, Hysham Cells 1+      Macrocytosis 1+      Microcytosis 1+      Hypochromia 1+      Polychromasia 1+      Ovalocytes 1+      Tear Drop Cells 1+     Hepatic Function Panel (7)    Collection Time: 05/27/25  2:00 PM   Result Value Ref Range     (H) <34 U/L     (H) 10 - 49 U/L    Alkaline Phosphatase 1,239 (H) 50 - 130 U/L    Bilirubin, Total 5.6 (H) 0.2 - 1.1 mg/dL    Bilirubin, Direct 4.0 (H) <=0.3 mg/dL    Total Protein 5.6 (L) 5.7 - 8.2 g/dL    Albumin 3.4 3.2 - 4.8 g/dL   Ammonia, Plasma    Collection Time: 05/27/25  4:02 PM   Result Value Ref Range    Ammonia 28 11 - 32 umol/L         Imaging:  XR CHEST AP PORTABLE  (CPT=71045)  Result Date: 5/27/2025  CONCLUSION:   Small to moderate right and small left pleural effusions.  Right greater than left patchy bibasilar opacity which could reflect atelectasis with or without superimposed pneumonia.    Dictated by (CST): Odin Ivory MD on 5/27/2025 at 2:34 PM     Finalized by (CST): Odin Ivory MD on 5/27/2025 at 2:35 PM              Impression and Plan:      Patient with metastatic breast cancer with significant progression in the liver and bones after ninth line palliative systemic therapy.  She was to initiate 10th line therapy however has had a significant decline in her clinical on performance status, as well as significant worsening of her liver function.    I discussed with the patient, her father and siblings that given this a significant decline and disease progression, despite multiple lines of therapy, it is unlikely her cancer will be controlled any further with the new medication that was ordered.  I discussed and agreed with proceeding with hospice care, and discussed that she may even be a candidate for inpatient hospice.   Recommend to continue with symptom management with IV fluid hydration and pain management with opioid analgesia.  I added dexamethasone 4 mg to help with pain secondary to liver metastases and bone metastases.    Will continue to support the patient with palliative treatments as above until patient transitions to hospice.  The hypercalcemia is being addressed with IV fluid hydration as well as the acute renal insufficiency, at this point, unlikely that she will have a significant response from a biphosphonate for the hypercalcemia as the response from these agents is not immediate and the patient is showing signs of a steep decline which may lead to her demise from the cancer progression.    [facility] high risk    Thank you for allowing me to participate in the care of your patient.        All questions answered to the best of my abilities.    Ami Farias M.D.  [facility]  [place]  [address]  [phone]      05/27/25    This note was created using a voice-recognition transcribing system. Incorrect words or phrases may have been missed during proofreading. Please interpret accordingly.                 [1]   Past Medical History:   Breast cancer (HCC)    MASTECTOMY / RECONSTRUCTION    Chemotherapy-induced neutropenia    Encounter for insertion of venous access port    portacath insertion / venous access    Glaucoma    History of breast cancer in female    Hyperlipidemia    Malignant neoplasm of overlapping sites of breast in female, estrogen receptor positive (HCC)    Malignant neoplasm of upper-outer quadrant of right breast in female, estrogen receptor positive (HCC)    Malignant pleural effusion (HCC)    Menorrhagia    HYSTEROSCOPY / D&C / endomentrial biopsy (08/28/2007)    Metastatic breast cancer    Neurofibromatosis (HCC)    Osteopenia of multiple sites    Osteopenia of multiple sites     Osteoporosis screening    Osteoporosis screening    Per [employer]    Other osteoporosis without current pathological fracture    Postmenopausal bleeding    Psychophysiological insomnia    Tumor, foot    tumor right foot / excision/excision of bone spur/arthrodesis w/screw fixation   [2]   Past Surgical History:  Procedure Laterality Date    Breast reconstruction Right 2008    right breast reconstructed - ductal, BRCA neg    Breast surgery      Chemotherapy  2007    Foot surgery Right     tumor rt foot / excision/excision of bone spur/arthrodesis w/screw fixation    Ir port a cath procedure  2007    Right subclavian port of cath.    Mastectomy right      2012    Mastectomy,simple  2007   [3]   Current Facility-Administered Medications on File Prior to Encounter   Medication Dose Route Frequency Provider Last Rate Last Admin    [COMPLETED] sodium chloride 0.9 % IV bolus 1,000 mL  1,000 mL Intravenous Once Aiyana Jauregui APRN   Stopped at 05/14/25 1400    [COMPLETED] potassium chloride 20 mEq/100mL IVPB premix 20 mEq  20 mEq Intravenous Once Aiyana Jauregui APRN   Stopped at 05/14/25 1618    [COMPLETED] prochlorperazine (Compazine) tab 10 mg  10 mg Oral Once Aiyana Jauregui APRN   10 mg at 05/14/25 1524    [COMPLETED] sodium chloride 0.9 % IV bolus 1,000 mL  1,000 mL Intravenous Once Ami Farias MD   Stopped at 05/12/25 1340     Current Outpatient Medications on File Prior to Encounter   Medication Sig Dispense Refill    LORazepam 0.5 MG Oral Tab Take 1 tablet (0.5 mg total) by mouth every 6 (six) hours as needed for Anxiety. 60 tablet 0    zolpidem 10 MG Oral Tab Take 1 tablet (10 mg total) by mouth nightly as needed. 60 tablet 0    prochlorperazine (COMPAZINE) 10 mg tablet Take 1 tablet (10 mg total) by mouth every 6 (six) hours as needed for Nausea. 90 tablet 2    mirtazapine 15 MG Oral Tablet Dispersible Take 1 tablet (15 mg total) by mouth nightly. 90 tablet 3    minocycline 50 MG Oral Cap Take 1 capsule (50  mg total) by mouth daily. 90 capsule 3    Erdafitinib 4 MG Oral Tab Take 8 mg by mouth in the morning. Take two tablets (8 mg) daily. (Patient not taking: Reported on 2025) 56 tablet 11    dexamethasone (DECADRON) 4 MG tablet Take 2 tablets (8 mg total) by mouth 2 (two) times daily with meals. Take twice the day before and morning of chemotherapy. (Patient not taking: Reported on 2025) 12 tablet 5    guaiFENesin-codeine 100-10 MG/5ML Oral Solution Take 5 mL by mouth every 6 (six) hours as needed for cough. (Patient not taking: Reported on 2025) 473 mL 1    Potassium Chloride ER 10 MEQ Oral Tab CR Take 2 tablets (20 mEq total) by mouth 2 (two) times daily. (Patient not taking: Reported on 2025) 60 tablet 0   [4]    heparin  5,000 Units Subcutaneous Q12H   [5]   Family History  Problem Relation Age of Onset    Breast Cancer Mother 49        bilateral mastectomy    Genetic Disease Mother         NF-1    Breast Cancer Maternal Aunt 75    Breast Cancer Paternal Grandmother     Breast Cancer Self 52    Genetic Disease Self         NF-1    Cancer Paternal Aunt         throat ca    Cancer Maternal Uncle         prostate ca    Genetic Disease Brother         NF-1    Genetic Disease Brother         NF-1    Genetic Disease Brother         NF-1    Cancer Maternal Uncle         bladder ca    Breast Cancer Maternal Cousin Female     Cancer Maternal Cousin Female         leukemia;  childhood    Cancer Paternal Cousin Male         throat ca   [6]   Social History  Socioeconomic History    Marital status: Single   Tobacco Use    Smoking status: Never    Smokeless tobacco: Never   Substance and Sexual Activity    Alcohol use: Yes     Alcohol/week: 5.0 standard drinks of alcohol     Types: 5 Standard drinks or equivalent per week     Comment: Socially.  (Per [employer]:  5 days weekly.)    Drug use: No   Other Topics Concern    Caffeine Concern Yes     Comment: tea     Social Drivers of Health     Food Insecurity:  No Food Insecurity (5/27/2025)    NCSS - Food Insecurity     Worried About Running Out of Food in the Last Year: No     Ran Out of Food in the Last Year: No   Transportation Needs: No Transportation Needs (5/27/2025)    NCSS - Transportation     Lack of Transportation: No   Housing Stability: Not At Risk (5/27/2025)    NCSS - Housing/Utilities     Has Housing: Yes     Worried About Losing Housing: No     Unable to Get Utilities: No

## 2025-05-28 NOTE — PLAN OF CARE
Morphine IVP prn for pain management. IVF infusing. Hospice meeting today with goals of care discussion. Aleisha pivots to rolling chair for bathroom use, very weak. Very poor appetite. Plan is ongoing. Safety plan in place. Family at bedside throughout day.     Problem: Patient Centered Care  Goal: Patient preferences are identified and integrated in the patient's plan of care  Description: Interventions:  - What would you like us to know as we care for you?   - Provide timely, complete, and accurate information to patient/family  - Incorporate patient and family knowledge, values, beliefs, and cultural backgrounds into the planning and delivery of care  - Encourage patient/family to participate in care and decision-making at the level they choose  - Honor patient and family perspectives and choices  Outcome: Progressing     Problem: COPING  Goal: Pt/Family able to verbalize concerns and demonstrate effective coping strategies  Description: INTERVENTIONS:  - Assist patient/family to identify coping skills, available support systems and cultural and spiritual values  - Provide emotional support, including active listening and acknowledgement of concerns of patient and caregivers  - Reduce environmental stimuli, as able  - Instruct patient/family in relaxation techniques, as appropriate  - Assess for spiritual and psychosocial needs and initiate Spiritual Care or Behavioral Health consult as needed  Outcome: Progressing     Problem: DEATH & DYING  Goal: Pt/Family communicate acceptance of impending death and feel psychological comfort and peace  Description: INTERVENTIONS:  - Assess patient/family anxiety and grief process related to end of life issues  - Provide emotional and spiritual support  - Provide information about the patient’s health status with consideration of family and cultural values  - Communicate willingness to discuss death and facilitate grief process  with patient/family as appropriate  - Emphasize  sustaining relationships within family system and community, or pamela/spiritual traditions  - Initiate Spiritual Care consult as needed  Outcome: Progressing     Problem: CHANGE IN BODY IMAGE  Goal: Pt/Family communicate acceptance of loss or change in body image and feel psychological comfort and peace  Description: INTERVENTIONS:  - Assess patient/family anxiety and grief process related to change in body image, loss of functional status, loss of sense of self, and forgiveness  - Provide emotional and spiritual support  - Provide information about the patient’s health status with consideration of family and cultural values  - Communicate willingness to discuss loss and facilitate grief process with patient/family as appropriate  - Emphasize sustaining relationships within family system and community, or pamela/spiritual traditions  - Initiate Spiritual Care consult as needed  Outcome: Progressing     Problem: DECISION MAKING  Goal: Pt/Family able to effectively weigh alternatives and participate in decision making related to treatment and care  Description: INTERVENTIONS:  - Determine when there are differences between patient's view, family's view, and healthcare provider's view of condition  - Facilitate patient and family articulation of goals for care  - Help patient and family identify pros/cons of alternative solutions  - Provide information as requested by patient/family  - Respect patient/family right to receive or not to receive information  - Serve as a liaison between patient and family and health care team  - Initiate Consults from Ethics, Palliative Care or initiate Family Care Conference as is appropriate  Outcome: Progressing     Problem: ANXIETY  Goal: Will report anxiety at manageable levels  Description: INTERVENTIONS:  - Administer medication as ordered  - Teach and rehearse alternative coping skills  - Provide emotional support with 1:1 interaction with staff  Outcome: Progressing     Problem:  PAIN - ADULT  Goal: Verbalizes/displays adequate comfort level or patient's stated pain goal  Description: INTERVENTIONS:  - Encourage pt to monitor pain and request assistance  - Assess pain using appropriate pain scale  - Administer analgesics based on type and severity of pain and evaluate response  - Implement non-pharmacological measures as appropriate and evaluate response  - Consider cultural and social influences on pain and pain management  - Manage/alleviate anxiety  - Utilize distraction and/or relaxation techniques  - Monitor for opioid side effects  - Notify MD/LIP if interventions unsuccessful or patient reports new pain  - Anticipate increased pain with activity and pre-medicate as appropriate  Outcome: Progressing     Problem: SAFETY ADULT - FALL  Goal: Free from fall injury  Description: INTERVENTIONS:  - Assess pt frequently for physical needs  - Identify cognitive and physical deficits and behaviors that affect risk of falls.  - [place] fall precautions as indicated by assessment.  - Educate pt/family on patient safety including physical limitations  - Instruct pt to call for assistance with activity based on assessment  - Modify environment to reduce risk of injury  - Provide assistive devices as appropriate  - Consider OT/PT consult to assist with strengthening/mobility  - Encourage toileting schedule  Outcome: Progressing

## 2025-05-28 NOTE — CM/SW NOTE
MDO for hospice consult.     [facility] liaison has already contacted pt/family.     / to remain available for support and/or discharge planning.     Jennifer Remy RN    Ext [phone]

## 2025-05-28 NOTE — PHYSICAL THERAPY NOTE
PT orders received, chart reviewed. Patient with plans for hospice re-evaluation this date, will defer pending plan of care.     Gaby Carter, PT

## 2025-05-28 NOTE — OCCUPATIONAL THERAPY NOTE
Ongoing OT followup: OT orders rcvd; plan for hospice; if any new needs arise, please re-order OT.    Timothy Fleming, Occupational Therapist, OTR/L ext [phone]

## 2025-05-28 NOTE — HOSPICE RN NOTE
[facility] RN and LSW to bedside for a follow-up visit with POA/brother Rafiq and father Eusebio. They are considering enrolling into hospice tomorrow. DNR/comfort POLST completed by POA/Rafiq and is on the chart for provider signature. Patient appears less dyspneic today. She was initially more alert, but did fall asleep during conversation. RR 24 and still slightly labored. Discussed inpatient hospice to start for symptom management. Then plan for routine hospice with patient's father Eusebio once symptoms are better controlled. Family would like one more day of supportive measures, ie IVFs, labs, etc. and requested [facility] to follow-up tomorrow. Update provided to Lucina HEATON and Dr. Nicole.    Domi BROCKN, RN CHPN  Transitional Nurse Liaison  [facility]  [phone] (After-hours)

## 2025-05-28 NOTE — HOSPICE RN NOTE
[facility] RN met with patient and family to discuss comfort care. Patient is lethargic, oriented x4. Appears dyspneic at rest. RR 24-30 and slightly labored. Patient reports pain to chest and both sides. Informational meeting complete. Hospice philosophy, Medicare benefit, and levels of care discussed. All questions answered. The family would like to time to discuss hospice. They would like [facility] to follow-up tomorrow. [facility] will follow to support the patient and family.    Domi DOYLE, RN [facility]  Transitional Nurse Liaison  [facility]  [phone] (After-hours)

## 2025-05-28 NOTE — PLAN OF CARE
Patient axo x4, delayed responses. Brother  at bedside. IVF infusing. Morphine pushes given for pain. Hospice reevaluation today with family. Refusing bowel regimen. Up x2 with a walker. Safety precautions in place.

## 2025-05-29 PROBLEM — C50.919 BREAST CANCER METASTASIZED TO MULTIPLE SITES (HCC): Status: ACTIVE | Noted: 2025-01-01

## 2025-05-29 NOTE — PLAN OF CARE
Problem: Patient Centered Care  Goal: Patient preferences are identified and integrated in the patient's plan of care  Description: Interventions:  - What would you like us to know as we care for you?   - Provide timely, complete, and accurate information to patient/family  - Incorporate patient and family knowledge, values, beliefs, and cultural backgrounds into the planning and delivery of care  - Encourage patient/family to participate in care and decision-making at the level they choose  - Honor patient and family perspectives and choices  Outcome: Progressing     Problem: COPING  Goal: Pt/Family able to verbalize concerns and demonstrate effective coping strategies  Description: INTERVENTIONS:  - Assist patient/family to identify coping skills, available support systems and cultural and spiritual values  - Provide emotional support, including active listening and acknowledgement of concerns of patient and caregivers  - Reduce environmental stimuli, as able  - Instruct patient/family in relaxation techniques, as appropriate  - Assess for spiritual and psychosocial needs and initiate Spiritual Care or Behavioral Health consult as needed  Outcome: Progressing     Problem: DEATH & DYING  Goal: Pt/Family communicate acceptance of impending death and feel psychological comfort and peace  Description: INTERVENTIONS:  - Assess patient/family anxiety and grief process related to end of life issues  - Provide emotional and spiritual support  - Provide information about the patient’s health status with consideration of family and cultural values  - Communicate willingness to discuss death and facilitate grief process  with patient/family as appropriate  - Emphasize sustaining relationships within family system and community, or pamela/spiritual traditions  - Initiate Spiritual Care consult as needed  Outcome: Progressing     Problem: CHANGE IN BODY IMAGE  Goal: Pt/Family communicate acceptance of loss or change in body  image and feel psychological comfort and peace  Description: INTERVENTIONS:  - Assess patient/family anxiety and grief process related to change in body image, loss of functional status, loss of sense of self, and forgiveness  - Provide emotional and spiritual support  - Provide information about the patient’s health status with consideration of family and cultural values  - Communicate willingness to discuss loss and facilitate grief process with patient/family as appropriate  - Emphasize sustaining relationships within family system and community, or pamela/spiritual traditions  - Initiate Spiritual Care consult as needed  Outcome: Progressing     Problem: DECISION MAKING  Goal: Pt/Family able to effectively weigh alternatives and participate in decision making related to treatment and care  Description: INTERVENTIONS:  - Determine when there are differences between patient's view, family's view, and healthcare provider's view of condition  - Facilitate patient and family articulation of goals for care  - Help patient and family identify pros/cons of alternative solutions  - Provide information as requested by patient/family  - Respect patient/family right to receive or not to receive information  - Serve as a liaison between patient and family and health care team  - Initiate Consults from Ethics, Palliative Care or initiate Family Care Conference as is appropriate  Outcome: Progressing     Problem: ANXIETY  Goal: Will report anxiety at manageable levels  Description: INTERVENTIONS:  - Administer medication as ordered  - Teach and rehearse alternative coping skills  - Provide emotional support with 1:1 interaction with staff  Outcome: Progressing     Problem: PAIN - ADULT  Goal: Verbalizes/displays adequate comfort level or patient's stated pain goal  Description: INTERVENTIONS:  - Encourage pt to monitor pain and request assistance  - Assess pain using appropriate pain scale  - Administer analgesics based on type  and severity of pain and evaluate response  - Implement non-pharmacological measures as appropriate and evaluate response  - Consider cultural and social influences on pain and pain management  - Manage/alleviate anxiety  - Utilize distraction and/or relaxation techniques  - Monitor for opioid side effects  - Notify MD/LIP if interventions unsuccessful or patient reports new pain  - Anticipate increased pain with activity and pre-medicate as appropriate  Outcome: Progressing     Problem: SAFETY ADULT - FALL  Goal: Free from fall injury  Description: INTERVENTIONS:  - Assess pt frequently for physical needs  - Identify cognitive and physical deficits and behaviors that affect risk of falls.  - [place] fall precautions as indicated by assessment.  - Educate pt/family on patient safety including physical limitations  - Instruct pt to call for assistance with activity based on assessment  - Modify environment to reduce risk of injury  - Provide assistive devices as appropriate  - Consider OT/PT consult to assist with strengthening/mobility  - Encourage toileting schedule  Outcome: Progressing   No acute changes. A&Ox3. Forgetful. Patient O2 sats dropped to 80's on room air, 2L of oxygen in place.  Morphine IV push given for pain. IV fluids infusing. No nausea or vomiting. Poor appetite. Ativan given for restlessness and sleep. Up to the bathroom with assist and rolling chair. Fall and safety precaution sin place. Brother at the bedside.

## 2025-05-29 NOTE — CM/SW NOTE
LSW and FLORINA Velazquez follow up with pt regarding hospice. Pt was lying upright in bed and was in and out of alertness. Pt's father Vipul and brothers Rafiq and Eusebio were at bedside. Pt and family are ready to proceed with hospice. Pt's brother/POA Rafiq signed hospice consents. Comfort POLST previously signed. Pt will be admitted inpatient hospice for symptom management. Plan is for pt to transition to routine level of hospice care at her father's house once symptoms become managed. Family in agreement with this plan. LSW informed MD Nicole, FLORINA Murphy and JL Rodriguez of pt admission to inpatient hospice.       Elissa Uribe, MATT  [facility]  [phone]

## 2025-05-29 NOTE — PHYSICAL THERAPY NOTE
Discussed with RN, pt plan is for hospice/comfort care at this time. Will discharge inpatient therapy services at this time. Thank you.     Gaby Carter, PT

## 2025-05-29 NOTE — HOSPICE RN NOTE
[facility] RN admitted the patient to general inpatient hospice per Dr. Nicole and Dr. Crawford. Patient is eligible for general inpatient hospice care for symptom management of pain and nausea requiring frequent nursing assessments and interventions including the administration of IV medications.     Chart flipped.     Comfort order set placed. Morphine ER scheduled for baseline pain.    Regular diet with pleasure feeding.    Aspiration and fall precautions in place.    PPS: 30%    POC discussed with the patient, her family, and Katherine Vasquez RN at bedside. All in agreement. [facility] to follow daily to support the patient and family.    DELMY GarciaN, RN, [facility]  [facility] RN Liaison  [phone] (After-hours)

## 2025-05-29 NOTE — CONSULTS
Spiritual Care Visit Note    Patient Name: Aleisha Carlin Date of Spiritual Care Visit: 25   : 3/1/1960 Primary Dx: <principal problem not specified>       Referred By: Referral From: Care Coordination    Spiritual Care Taxonomy:    Intended Effects: Demonstrate caring and concern    Methods: Collaborate with care team member, Offer support    Interventions: Active listening, Ask guided questions, Silent prayer    Visit Type/Summary:     - Spiritual Care: Responded to a request via the on call phone Consulted with RN prior to visit. Offered empathic listening and emotional support. Family expressed appreciation for  visit. At conclusion of visit family mentioned their  will be coming to anoint patient tomorrow . Provided information regarding how to contact Spiritual Care and left a Spiritual Care information card. Follow up as requested.    ADRIÁN Bates, CAMII, [facility]   [phone]     Spiritual Care support can be requested via an Epic consult. For urgent/immediate needs, please contact the On Call  at: [place]: ext [phone]

## 2025-05-29 NOTE — DISCHARGE SUMMARY
[facility]  part of [facility]    Discharge Summary    Aleisha Carlin Patient Status:  Inpatient    3/1/1960 MRN [id number]   Location [facility] 4W/SW/SE Attending No att. providers found   Hosp Day # 2 PCP None Pcp     Date of Admission: 2025   Date of Discharge: 2025  1:07 PM     Admitting Diagnosis: Failure to thrive in adult [R62.7]  Chest pain of uncertain etiology [R07.9]    Disposition: Inpatient hospice     Discharge Diagnosis: .Principal Problem:    Failure to thrive in adult  Active Problems:    Malignant neoplasm metastatic to liver (HCC)    Stage IV breast cancer in female (HCC)    Metastasis to bone (HCC)    Chest pain of uncertain etiology    Hypercalcemia of malignancy      Hospital Course:   Reason for Admission: FTT     Discharge Physical Exam:     GENERAL: Frail female.  Lethargic, but briefly awakens to voice.  , in no acute distress.  HEART:  Regular rhythm.  Tachycardia  LUNGS:  Air entry was decreased.  No increased work of breathing or wheezes   ABDOMEN: Soft and non-tender.    EXTREMITIES: Extremity edema appreciated  PSYCHIATRIC: Flat mood      Hospital Course:   This is a 65 year old female with hx of metastatic breast cancer, brought in by family after patient has been progressively weak and tired. Patient was lethargic on arrival but easily awakened. Her weakness started several days, brother states poor appetite. Denied chest pain or shortness of breath. Family spoke with their oncologist Dr. Adkins who recommended she present to the ED for further evaluation.  Family states they are interested in discussing potential options including hospice care.   Patient's admission labs showed dehydration with elevated creatinine, elevated liver enzymes, and hypercalcemia so she was started on IV fluids.  Patient's oncologist Dr. Adkins met with family and discussed overall care and prognosis.  Discussed that given significant decline and disease progression,  despite multiple lines of therapy, it is unlikely her cancer will be controlled any further with the new medications that were ordered outpatient.    The patient did improve some with fluid hydration, however overall still weak.  Hospice team met with patient and brother and on 5/29 they decided to proceed with hospice care.  Patient qualified for inpatient hospice and was admitted.      Complications: None    Consultants         Provider   Role Specialty     Ami Farias MD      Consulting Physician Hematology and Oncology                Discharge Plan:   Discharge Condition: Fair    Discharge Medication List as of 5/29/2025  1:07 PM        Home Meds - Unchanged    Details   Erdafitinib 4 MG Oral Tab Take 8 mg by mouth in the morning. Take two tablets (8 mg) daily., Normal, Disp-56 tablet, R-11Breast cancer with FGFR 1-3 fusion/mutation on NGS, with progression of disease after several lines of therapy.      LORazepam 0.5 MG Oral Tab Take 1 tablet (0.5 mg total) by mouth every 6 (six) hours as needed for Anxiety., Normal, Disp-60 tablet, R-0      zolpidem 10 MG Oral Tab Take 1 tablet (10 mg total) by mouth nightly as needed., Normal, Disp-60 tablet, R-0      dexamethasone (DECADRON) 4 MG tablet Take 2 tablets (8 mg total) by mouth 2 (two) times daily with meals. Take twice the day before and morning of chemotherapy., Normal, Disp-12 tablet, R-5      prochlorperazine (COMPAZINE) 10 mg tablet Take 1 tablet (10 mg total) by mouth every 6 (six) hours as needed for Nausea., Normal, Disp-90 tablet, R-2      guaiFENesin-codeine 100-10 MG/5ML Oral Solution Take 5 mL by mouth every 6 (six) hours as needed for cough., Normal, Disp-473 mL, R-1      Potassium Chloride ER 10 MEQ Oral Tab CR Take 2 tablets (20 mEq total) by mouth 2 (two) times daily., Normal, Disp-60 tablet, R-0      mirtazapine 15 MG Oral Tablet Dispersible Take 1 tablet (15 mg total) by mouth nightly., Normal, Disp-90 tablet, R-3      minocycline 50 MG Oral  Cap Take 1 capsule (50 mg total) by mouth daily., Normal, Disp-90 capsule, R-3                 Discharge Diet: As tolerated    Discharge Activity: As tolerated    Follow up:         >30 minutes spent on discharge orders, coordination, and planning.       Ingrid Nicole MD  5/29/2025

## 2025-05-29 NOTE — PLAN OF CARE
Chart flipped to inpatient hospice this afternoon. A&ox3/4 gets confused at times. 3L NC.scopolamine patch behind R ear. PRN zofran and compazine for nausea. Scheduled ms contin for pain.  Has not voided. Bladder scanned at 1330 and holding 299 ml. Decadron to R chest port. Call light in reach. Family at bedside.

## 2025-05-29 NOTE — H&P
History and Physical     Aleisha Carlin Patient Status:  Inpatient    3/1/1960 MRN [id number]   Location [facility] 4W/SW/SE Attending Ingrid Nicole MD   Hosp Day # 0 PCP None Pcp     Chief Complaint: FTT, DEB     Subjective:    Aleisha Carlin is a 65 year old female with metastatic breast cancer, who recently had disease progression in her liver mainly after ninth line palliative systemic therapy. The patient was to initiate treatment with 10th line therapy outpatient with Dr. Adkins.   But was brought in by family after patient became progressively weak and tired. Patient was lethargic on arrival but easily awakened. Her weakness started several days prior, brother states poor appetite, poor intake. Denied chest pain or shortness of breath. Family spoke with their oncologist Dr. Adkins who recommended she present to the ED for further evaluation.  Family was interested in discussing hospice care.   Patient's admission labs showed dehydration with elevated creatinine, elevated liver enzymes, and hypercalcemia so she was started on IV fluids. She did improve some with IV fluids, however overall still weak. Dr. Adkins met with family and discussed overall care and prognosis.  Discussed that given significant decline and disease progression, despite multiple lines of therapy, it is unlikely her cancer will be controlled any further with the new medications that were ordered outpatient.    Hospice team met with patient and brother and on  they decided to proceed with hospice care.  Patient qualified for inpatient hospice and was admitted.      History/Other:      Past Medical History:Past Medical History[1]     Past Surgical History: Past Surgical History[2]    Social History:  reports that she has never smoked. She has never used smokeless tobacco. She reports current alcohol use of about 5.0 standard drinks of alcohol per week. She reports that she does not use drugs.    Family History: Family  History[3]    Allergies: Allergies[4]    Medications:  Medications Ordered Prior to Encounter[5]    Review of Systems:   A comprehensive 14 point review of systems was completed.    Pertinent positives and negatives noted in the HPI.    Objective:     /74 (BP Location: Left arm)   Pulse 109   Temp 98.3 °F (36.8 °C) (Axillary)   Resp 18   Wt 97 lb (44 kg)   SpO2 94%   BMI 18.94 kg/m²     GENERAL: Frail female.  Lethargic, but briefly awakens to voice.  , in no acute distress.  HEART:  Regular rhythm.  Tachycardia  LUNGS:  Air entry was decreased.  No increased work of breathing or wheezes   ABDOMEN: Soft and non-tender.    EXTREMITIES: Extremity edema appreciated  PSYCHIATRIC: Flat mood      Results:      Labs:  Labs Last 24 Hours:   BMP     CBC    Other     Na - Cl - BUN - Glu -   Hb -   PTT - Procal -   K - CO2 - Cr -   WBC - >< PLT -  INR - CRP -   Renal Lytes Endo    Hct -   Trop - D dim -   eGFR - Ca - POC Gluc  -    LFT   pBNP - Lactic -   eGFR AA - PO4 - A1c -   AST - APk - Prot -  LDL -     Mg - TSH -   ALT - T breanne - Alb -          COVID-19 Lab Results    COVID-19  Lab Results   Component Value Date    COVID19 Not Detected 12/31/2023    COVID19 Not Detected 08/12/2023    COVID19 Not Detected 02/01/2023       Pro-Calcitonin  No results for input(s): \"PCT\" in the last 168 hours.    Cardiac  No results for input(s): \"TROP\", \"PBNP\" in the last 168 hours.    Creatinine Kinase  No results for input(s): \"CK\" in the last 168 hours.    Inflammatory Markers  No results for input(s): \"CRP\", \"NO\", \"LDH\", \"DDIMER\" in the last 168 hours.    Imaging: Imaging data reviewed in Epic.    Assessment & Plan:      Comfort Care- End of Life  - Patient with history of metastatic breast cancer, who failed 9 palliative therapy lines, admitted with failure to thrive and pursuing hospice care.  - in agreement with code status change  - d/c all labs, unecessary vitals checks, etc.   - comfort care orders per hospice  team      >55 minutes spent on this admission - examining patient, obtaining history, reviewing previous medical records, going over test results/imaging and discussing plan of care. All questions answered.     Ingrid Nicole MD  5/29/2025    Supplementary Documentation:           [1]   Past Medical History:   Breast cancer (HCC)    MASTECTOMY / RECONSTRUCTION    Chemotherapy-induced neutropenia    Encounter for insertion of venous access port    portacath insertion / venous access    Glaucoma    History of breast cancer in female    Hyperlipidemia    Malignant neoplasm of overlapping sites of breast in female, estrogen receptor positive (HCC)    Malignant neoplasm of upper-outer quadrant of right breast in female, estrogen receptor positive (HCC)    Malignant pleural effusion (HCC)    Menorrhagia    HYSTEROSCOPY / D&C / endomentrial biopsy (08/28/2007)    Metastatic breast cancer    Neurofibromatosis (HCC)    Osteopenia of multiple sites    Osteopenia of multiple sites    Osteoporosis screening    Osteoporosis screening    Per [employer]    Other osteoporosis without current pathological fracture    Postmenopausal bleeding    Psychophysiological insomnia    Tumor, foot    tumor right foot / excision/excision of bone spur/arthrodesis w/screw fixation   [2]   Past Surgical History:  Procedure Laterality Date    Breast reconstruction Right 2008    right breast reconstructed - ductal, BRCA neg    Breast surgery      Chemotherapy  2007    Foot surgery Right     tumor rt foot / excision/excision of bone spur/arthrodesis w/screw fixation    Ir port a cath procedure  2007    Right subclavian port of cath.    Mastectomy right      2012    Mastectomy,simple  2007   [3]   Family History  Problem Relation Age of Onset    Breast Cancer Mother 49        bilateral mastectomy    Genetic Disease Mother         NF-1    Breast Cancer Maternal Aunt 75    Breast Cancer Paternal Grandmother     Breast Cancer Self 51    Genetic Disease  Self         NF-1    Cancer Paternal Aunt         throat ca    Cancer Maternal Uncle         prostate ca    Genetic Disease Brother         NF-1    Genetic Disease Brother         NF-1    Genetic Disease Brother         NF-1    Cancer Maternal Uncle         bladder ca    Breast Cancer Maternal Cousin Female     Cancer Maternal Cousin Female         leukemia;  childhood    Cancer Paternal Cousin Male         throat ca   [4] No Known Allergies  [5]   Current Facility-Administered Medications on File Prior to Encounter   Medication Dose Route Frequency Provider Last Rate Last Admin    [COMPLETED] sodium chloride 0.9 % IV bolus 1,000 mL  1,000 mL Intravenous Once José Hopkins MD 1,000 mL/hr at 25 1604 1,000 mL at 25 1604    [COMPLETED] sodium chloride 0.9 % IV bolus 1,000 mL  1,000 mL Intravenous Once Aiyana Jauregui APRN   Stopped at 25 1400    [COMPLETED] potassium chloride 20 mEq/100mL IVPB premix 20 mEq  20 mEq Intravenous Once Aiyana Jauregui APRN   Stopped at 25 1618    [COMPLETED] prochlorperazine (Compazine) tab 10 mg  10 mg Oral Once Aiyana Jauregui APRN   10 mg at 25 1524    [COMPLETED] sodium chloride 0.9 % IV bolus 1,000 mL  1,000 mL Intravenous Once Ami Farias MD   Stopped at 25 1340    [COMPLETED] ondansetron (Zofran) 8 mg, dexAMETHasone (Decadron) 20 mg in sodium chloride 0.9% 106 mL IVPB   Intravenous Once Aiyana Jauregui APRN   Stopped at 25 1217    [COMPLETED] montelukast (Singulair) tab 10 mg  10 mg Oral Once Aiyana Jauregui APRN   10 mg at 25 1146    [COMPLETED] diphenhydrAMINE (Benadryl) 50 mg/mL  injection 50 mg  50 mg Intravenous Once Aiyana Jauregui APRN   50 mg at 25 1153    [COMPLETED] famotidine (Pepcid) 20 mg/2mL injection 20 mg  20 mg Intravenous Once Aiyana Jauregui APRN   20 mg at 25 1156    [COMPLETED] ixabepilone (Ixempra) 32 mg/m2 = 44 mg in lactated ringers 122 mL infusion  32 mg/m2 (Treatment Plan  Recorded) Intravenous Once Aiyana Jauregui APRN   Stopped at 04/23/25 1716    [COMPLETED] iopamidol 76% (ISOVUE-370) injection for power injector  80 mL Intravenous ONCE PRN Aiyana Jauregui APRN   80 mL at 04/21/25 1125    [COMPLETED] ondansetron (Zofran) 8 mg, dexAMETHasone (Decadron) 20 mg in sodium chloride 0.9% 106 mL IVPB   Intravenous Once Aiyana Jauregui APRN   Stopped at 04/02/25 1238    [COMPLETED] diphenhydrAMINE (Benadryl) 50 mg/mL  injection 50 mg  50 mg Intravenous Once Aiyana Jauregui APRN   50 mg at 04/02/25 1217    [COMPLETED] famotidine (Pepcid) 20 mg/2mL injection 20 mg  20 mg Intravenous Once Aiyana Jauregui APRN   20 mg at 04/02/25 1214    [COMPLETED] ixabepilone (Ixempra) 32 mg/m2 = 44 mg in lactated ringers 122 mL infusion  32 mg/m2 (Treatment Plan Recorded) Intravenous Once Aiyana Jauregui APRN   Stopped at 04/02/25 1644    [COMPLETED] montelukast (Singulair) tab 10 mg  10 mg Oral Once Aiyana Jauregui APRN   10 mg at 04/02/25 1213    [COMPLETED] ondansetron (Zofran) 8 mg, dexAMETHasone (Decadron) 20 mg in sodium chloride 0.9% 106 mL IVPB   Intravenous Once Mariaa Uribe APRN   Stopped at 03/12/25 1249    [COMPLETED] montelukast (Singulair) tab 10 mg  10 mg Oral Once Mariaa Uribe APRN   10 mg at 03/12/25 1225    [COMPLETED] diphenhydrAMINE (Benadryl) 50 mg/mL  injection 50 mg  50 mg Intravenous Once Mariaa Uribe APRN   50 mg at 03/12/25 1230    [COMPLETED] famotidine (Pepcid) 20 mg/2mL injection 20 mg  20 mg Intravenous Once Mariaa Uribe APRN   20 mg at 03/12/25 1229    [COMPLETED] ixabepilone (Ixempra) 40 mg/m2 = 56 mg in lactated ringers 278 mL infusion  40 mg/m2 (Treatment Plan Recorded) Intravenous Once Mariaa Uribe APRN   Stopped at 03/12/25 4826     Current Outpatient Medications on File Prior to Encounter   Medication Sig Dispense Refill    Erdafitinib 4 MG Oral Tab Take 8 mg by mouth in the morning. Take two tablets (8 mg) daily. (Patient not taking:  Reported on 5/27/2025) 56 tablet 11    LORazepam 0.5 MG Oral Tab Take 1 tablet (0.5 mg total) by mouth every 6 (six) hours as needed for Anxiety. 60 tablet 0    zolpidem 10 MG Oral Tab Take 1 tablet (10 mg total) by mouth nightly as needed. 60 tablet 0    dexamethasone (DECADRON) 4 MG tablet Take 2 tablets (8 mg total) by mouth 2 (two) times daily with meals. Take twice the day before and morning of chemotherapy. (Patient not taking: Reported on 5/27/2025) 12 tablet 5    prochlorperazine (COMPAZINE) 10 mg tablet Take 1 tablet (10 mg total) by mouth every 6 (six) hours as needed for Nausea. 90 tablet 2    guaiFENesin-codeine 100-10 MG/5ML Oral Solution Take 5 mL by mouth every 6 (six) hours as needed for cough. (Patient not taking: Reported on 5/27/2025) 473 mL 1    Potassium Chloride ER 10 MEQ Oral Tab CR Take 2 tablets (20 mEq total) by mouth 2 (two) times daily. (Patient not taking: Reported on 5/27/2025) 60 tablet 0    mirtazapine 15 MG Oral Tablet Dispersible Take 1 tablet (15 mg total) by mouth nightly. 90 tablet 3    minocycline 50 MG Oral Cap Take 1 capsule (50 mg total) by mouth daily. 90 capsule 3

## 2025-05-29 NOTE — CM/SW NOTE
LSW follow up with pt and family regarding hospice. Pt was sitting upright in bed and was in and out of alertness. Pt's father Vipul and brothers Rafiq and Eusebio were present at bedside. Pt and family are ready to proceed with hospice. Pt's brother/POA Rafiq signed hospice consents. Comfort POLST previously signed by POA. Pt will be admitted inpatient hospice for symptom management. Plan is to transition pt home with hospice once symptoms become managed. LSW notified MD Nicole, FLORINA Murphy and JL Rodriguez of pt's admission to inpatient hospice.      Elissa Uribe, [facility]  [facility]  [phone]

## 2025-05-29 NOTE — PROGRESS NOTES
Progress Note     Aleisha Carlin Patient Status:  Inpatient    3/1/1960 MRN [id number]   Location [facility] 4W/SW/SE Attending Ingrid Nicole MD   Hosp Day # 1 PCP None Pcp     Subjective:   S: Patient seen this morning with brother at bedside, Weak appearing.   Difficulty with word finding.  Brother states she looks better today after IVFs.    Review of Systems:   10 point ROS completed and was negative, except for pertinent positive and negatives stated in subjective.    Objective:   Vital signs:  Temp:  [97.6 °F (36.4 °C)-98.2 °F (36.8 °C)] 98.2 °F (36.8 °C)  Pulse:  [122] 122  Resp:  [16-18] 18  BP: (126)/(89) 126/89  SpO2:  [83 %-95 %] 95 %    Wt Readings from Last 6 Encounters:   25 98 lb 15.8 oz (44.9 kg)   25 93 lb (42.2 kg)   25 93 lb (42.2 kg)   25 93 lb 6.4 oz (42.4 kg)   25 96 lb (43.5 kg)   25 97 lb (44 kg)         Physical Exam:    General: ill appearing elderly female,   Respiratory: Clear to auscultation bilaterally. No wheezes. No rhonchi.  Cardiovascular: S1, S2. Regular rate and rhythm. No murmurs, rubs or gallops.   Abdomen: Soft, nontender, nondistended.  Positive bowel sounds. No rebound or guarding.  Neurologic: No focal neurological deficits.   Musculoskeletal: Moves all extremities.  Extremities: No edema.    Results:   Diagnostic Data:      Labs:    Labs Last 24 Hours:   BMP     CBC    Other     Na 135 Cl 100 BUN 38 Glu 66   Hb 10.9   PTT - Procal -   K 4.9 CO2 23.0 Cr 1.03   WBC 7.3 >< .0  INR - CRP -   Renal Lytes Endo    Hct 34.3   Trop - D dim -   eGFR - Ca 11.3 POC Gluc  -    LFT   pBNP - Lactic -   eGFR AA - PO4 - A1c -    APk 1,183 Prot 5.1  LDL -     Mg 1.9 TSH -    T breanne 5.2 Alb 3.1        COVID-19 Lab Results    COVID-19  Lab Results   Component Value Date    COVID19 Not Detected 2023    COVID19 Not Detected 2023    COVID19 Not Detected 2023       Pro-Calcitonin  No results for input(s):  \"PCT\" in the last 168 hours.    Cardiac  No results for input(s): \"TROP\", \"PBNP\" in the last 168 hours.    Creatinine Kinase  No results for input(s): \"CK\" in the last 168 hours.    Inflammatory Markers  No results for input(s): \"CRP\", \"NO\", \"LDH\", \"DDIMER\" in the last 168 hours.    Imaging: Imaging data reviewed in Epic.    Medications: Scheduled Medications[1]    Assessment & Plan:   ASSESSMENT / PLAN:      Failure to thrive in adult  -Patient with history of metastatic breast CA presenting due to progressive weakness, poor p.o. intake  - Per family has been progressively declining over the past several days/weeks.  - Family called oncologist who recommended presentation to ED for further evaluation and discussion for potential hospice care.  - Hospice meeting today with C discussion. Family changed code status to DNR/comfort  -Cont supportive care with IVFs  - Symptom relief as able  -Continue to monitor     Acute Kidney Injury  Hypercalcemia  -Likely related to poor p.o.  - cont gentle IVF  - Avoiding Nephrotoxic agents  - Cont to monitor     Elevated liver enzymes  - Patient with known history of metastatic disease to the liver  - Likely related to progression of disease.  - Monitoring at this time.     History of metastatic breast cancer  - The patient has been following with Dr. Farias as outpatient.  - Noted metastatic disease to liver and bone.  - dexamethasone added per oncology        Code: DNR/comfort  Dispo: likely hospice care tomorrow      MDM: High   I personally spent time on chart/note review, review of labs/imaging, discussion with patient, physical exam, discussion with staff, consultants, coordinating care, writing progress note, and discussion of plan of care.     Ingrid Nicole MD    Supplementary Documentation:          [1]    heparin  5,000 Units Subcutaneous Q12H    dexAMETHasone (Decadron) 4 mg in sodium chloride 0.9% 100.4 mL IVPB  4 mg Intravenous Daily

## 2025-05-30 NOTE — HOSPICE RN NOTE
Pt received in bed with father bedside,.  Pt minimally responsive and has pain cues with assessment.  Breathing pattern changes with assessemnt.  P 125, R 29 shallow and labored with use of accessory muscles.  Discussed symptom mgmt with father.  FLORINA Murphy updated on findings and requested her to administer morphine ivp and lorazepam ivp to promote comfort.  Katherine further requested to consider initiating morphine drip given that pt is requiring morphine with increased frequency with inadequate response.  Katherine related urinary retention and intention to straight cath but had been waiting for [place]  to visit.   had visited per father.  PPS10.    Deb Arrington RN  [facility] TNL/SOC RN  [phone]

## 2025-05-30 NOTE — PROGRESS NOTES
Pt  at [facility]. Resusitation not attempted as pt was DNR.       Time of Death:12:12    Family Notified: Father (Eusebio), brother (Rafiq) at bedside.    MD:Ingrid Nicole    [facility] Notified:yes     contacted if applicable: NA

## 2025-05-30 NOTE — SPIRITUAL CARE NOTE
Spiritual Care Visit Note    Patient Name: Aleisha Carlin Date of Spiritual Care Visit: 25   : 3/1/1960 Primary Dx: <principal problem not specified>       Referred By: Referral From:     Spiritual Care Taxonomy:    Intended Effects: Demonstrate caring and concern    Methods: Collaborate with care team member, Offer support    Interventions: Connect someone with their pamela community/clergy    Visit Type/Summary:     - Spiritual Care: Consulted with RN prior to visit. Visiting priest bosch patient.  remains available as needed for follow up.    ADRIÁN Miller, CAMII, [facility]   [id number]     Spiritual Care support can be requested via an Epic consult. For urgent/immediate needs, please contact the On Call  at: [place]: ext [phone]

## 2025-05-31 NOTE — DISCHARGE SUMMARY
[facility]  part of [facility]    Discharge Summary    Aleisha Carlin Patient Status:  Inpatient    3/1/1960 MRN [id number]   Location [facility] 4W/SW/SE Attending No att. providers found   Hosp Day # 1 PCP None Pcp     Date of Admission: 2025   Date of Discharge: 2025 12:12 PM    Admitting Diagnosis: metastatic breast cancer  Breast cancer metastasized to multiple sites (HCC)    Disposition:     Discharge Diagnosis: .Active Problems:    Breast cancer metastasized to multiple sites (HCC)      Hospital Course:   Reason for Admission: Metastatic Breast cancer-- hospice care     Discharge Physical Exam:   Death exam per nursing staff  No breath sounds auscultated  No heart sounds auscultated   No response to touch or pain  No corneal reflex       Hospital Course:    Aleisha Carlin is a 65 year old female with metastatic breast cancer, who recently had disease progression in her liver mainly after ninth line palliative systemic therapy. The patient was to initiate treatment with 10th line therapy outpatient with Dr. Adkins.   But was brought in by family after patient became progressively weak and tired. Patient was lethargic on arrival but easily awakened. Her weakness started several days prior, brother states poor appetite, poor intake. Denied chest pain or shortness of breath. Family spoke with their oncologist Dr. Adkins who recommended she present to the ED for further evaluation.  Family was interested in discussing hospice care.   Patient's admission labs showed dehydration with elevated creatinine, elevated liver enzymes, and hypercalcemia so she was started on IV fluids. She did improve some with IV fluids, however overall still weak. Dr. Adkins met with family and discussed overall care and prognosis.  Discussed that given significant decline and disease progression, despite multiple lines of therapy, it is unlikely her cancer will be controlled any further with  the new medications that were ordered outpatient.    Hospice team met with patient and brother and on  they decided to proceed with hospice care.  Patient qualified for inpatient hospice and was admitted. Patient  on 25 at 1212.            Discharge Plan:   Discharge Condition:     Discharge Medication List as of 2025  3:46 PM        Home Meds - Unchanged    Details   Erdafitinib 4 MG Oral Tab Take 8 mg by mouth in the morning. Take two tablets (8 mg) daily., Normal, Disp-56 tablet, R-11Breast cancer with FGFR 1-3 fusion/mutation on NGS, with progression of disease after several lines of therapy.      LORazepam 0.5 MG Oral Tab Take 1 tablet (0.5 mg total) by mouth every 6 (six) hours as needed for Anxiety., Normal, Disp-60 tablet, R-0      zolpidem 10 MG Oral Tab Take 1 tablet (10 mg total) by mouth nightly as needed., Normal, Disp-60 tablet, R-0      dexamethasone (DECADRON) 4 MG tablet Take 2 tablets (8 mg total) by mouth 2 (two) times daily with meals. Take twice the day before and morning of chemotherapy., Normal, Disp-12 tablet, R-5      prochlorperazine (COMPAZINE) 10 mg tablet Take 1 tablet (10 mg total) by mouth every 6 (six) hours as needed for Nausea., Normal, Disp-90 tablet, R-2      guaiFENesin-codeine 100-10 MG/5ML Oral Solution Take 5 mL by mouth every 6 (six) hours as needed for cough., Normal, Disp-473 mL, R-1      Potassium Chloride ER 10 MEQ Oral Tab CR Take 2 tablets (20 mEq total) by mouth 2 (two) times daily., Normal, Disp-60 tablet, R-0      mirtazapine 15 MG Oral Tablet Dispersible Take 1 tablet (15 mg total) by mouth nightly., Normal, Disp-90 tablet, R-3      minocycline 50 MG Oral Cap Take 1 capsule (50 mg total) by mouth daily., Normal, Disp-90 capsule, R-3                 Discharge Diet: N/A    Discharge Activity: N/A     Follow up:         >30 minutes spent on discharge orders, coordination, and planning.     Ingrid Nicole MD  2025

## 2025-06-03 NOTE — PROGRESS NOTES
[facility]  [address] [place] [address], [address]  [facility] [address]  [phone]          INPATIENT NEUROLOGY   FOLLOW UP CONSULT NOTE       [facility]    Report of Consultation    Aleisha Carlin Patient Status:  Inpatient     3/1/1960 MRN [id number]    Location [facility] 4W/SW/SE Attending Brad Pham MD    Hosp Day # 6 PCP AMAURI ERNANDEZ      Date of Admission:  2023  Date of Consult Follow Up: 24    INTERVAL HPI:   -Low hemoglobin was spurious.  Repeat CT demonstrates metastatic lesions in her liver.  Results of her CT scan were conveyed to the patient.     Dysphagia - denies   Dysarthria - denies   Exertional fatigue -  from anemia  Orthopnea -  denie  Head drop - denies    Subjective:  Spoke with the patient this evening.  She asked about the results of her CT scan.  She states that the oncologist did not discuss the results of her CT scan with her.  She states that she was told that the results will be discussed with Dr. Adkins.  She asked about the results of her CT scan and wanted to know what the results were.  I told her that she had metastatic lesions in her liver.  Patient stated that she was anxious and upset about the results.  I explained that that was why her oncologist who saw her earlier in the day did not fully discuss the results because they were going to defer to Dr. Adkins so that she could give them a plan about the next steps once the patient had been given the bad news regarding the metastatic lesions in her liver.  Advised patient I would contact Dr. Adkins tomorrow morning.  Patient states that she will be discharged tomorrow morning.  I stated that I would get in touch with Dr. Adkins and that I would get in touch with the patient to let her know her thoughts.  I also advised Aleisha that Dr. Adkins may simply state that the patient needs to follow-up with her in clinic.    ?PHYSICAL EXAM:     BP 96/61 (BP Location: Left arm)   Pulse 74    Temp 98.4 °F (36.9 °C) (Oral)   Resp 19   Ht 61\"   Wt 109 lb 4.8 oz (49.6 kg)   SpO2 98%   BMI 20.65 kg/m²   General appearance: Well appearing, and in no acute distress  Skin: skin color.  Head: Normocephalic, atraumatic.  Multiple neurofibromas    Neurological exam:  She is awake and alert, no aphasia  Neck flexors and extensors are full strength  Lower extremities are full strength  No fatigability  No ptosis  Diplopia - none             LABS/DATA:    Lab Results   Component Value Date    WBC 1.9 01/06/2024    HGB 8.6 01/06/2024    HCT 27.2 01/06/2024    .0 01/06/2024       HGBA1C:  No results found for: \"A1C\", \"EAG\"           IMAGING:  CT CHEST+ABDOMEN+PELVIS(ALL CNTRST ONLY)(CPT=71260/70070)    Result Date: 1/5/2024  CONCLUSION:   New or enlarging right hepatic lobe mass suspicious for metastatic disease progression/recurrence.  No other sites of suspected recurrence or metastatic disease.  Additional chronic or incidental findings are described in the body of this report.    Dictated by (CST): Rasta Bass MD on 1/05/2024 at 9:48 AM     Finalized by (CST): Rasta Bass MD on 1/05/2024 at 10:27 AM          MRI SPINE CERVICAL (W+WO) (CPT=72156)    Result Date: 1/3/2024  CONCLUSION:  1. No suspicious marrow replacing or enhancing lesions throughout the cervical spine to suggest osseous metastatic disease. 2. Probable small 2.2 cm maximal diameter syrinx in the upper cervical cord at C1-C2.  No other signal abnormality or enhancement throughout the cervical cord. 3. Scattered multilevel cervical spine degenerative changes as detailed.  Findings result in mild-to-moderate right neural foraminal stenosis at C5-C6.  No other significant neural compromise throughout the cervical spine. 4. Mild levoscoliosis of the cervical spine. 5. Heterogeneous signal 1.2 cm thyroid nodule. 6. Numerous small cutaneous and subcutaneous nodules throughout the posterior neck.  These are grossly unchanged since prior PET  scan from February, 2023 (not metabolic) and likely represent sebaceous cysts or other dermatologic abnormalities. 7. Lesser incidental findings as above.   elm-remote  Dictated by (CST): Desmond Barragan MD on 1/03/2024 at 7:28 AM     Finalized by (CST): Desmond Barragan MD on 1/03/2024 at 7:40 AM          US VENOUS DOPPLER LEG BILAT - DIAG IMG (CPT=93970)    Result Date: 1/1/2024  CONCLUSION: No evidence of acute deep venous thrombosis in the imaged veins of the bilateral lower extremities.    Dictated by (CST): Rasta Bass MD on 1/01/2024 at 9:27 PM     Finalized by (CST): Rasta Bass MD on 1/01/2024 at 9:28 PM          MRI SPINE THORACIC (W+WO) (CPT=72157)    Result Date: 12/31/2023  CONCLUSION:  No high-grade spinal canal or neural foraminal narrowing within the thoracic spine.  No focal suspicious osseous lesion.  1.1 centimeter isthmus thyroid nodule.  Follow-up thyroid ultrasound on a nonemergent basis can be performed for further assessment.  Partially imaged degenerative disease of the cervical spine.     Dictated by (CST): Maura Wilson MD on 12/31/2023 at 5:58 PM     Finalized by (CST): Maura Wilson MD on 12/31/2023 at 6:04 PM          MRI SPINE LUMBAR (W+WO) (CPT=72158)    Result Date: 12/31/2023  CONCLUSION:  No focal suspicious osseous lesions.  No high-grade spinal canal or neural foraminal narrowing.  1.3 centimeter endometrial mass.  Differential considerations include endometrial polyp or other etiologies.  Recommend follow-up pelvic ultrasound on a nonemergent basis for further assessment.   1.7 centimeter intramural uterine fibroid.    Dictated by (CST): Maura Wilson MD on 12/31/2023 at 5:53 PM     Finalized by (CST): Maura Wilson MD on 12/31/2023 at 5:58 PM          MRI BRAIN WO ACUTE (3) SEQUENCE (CPT=70551)    Result Date: 12/31/2023  CONCLUSION:   No restricted diffusion to suggest acute ischemic stroke.  Background of mild chronic small vessel ischemic disease.   No focal  suspicious lesion on noncontrast MRI.  If there is concern for intracranial metastatic disease, complete MRI with contrast is recommended.    Dictated by (CST): Maura Wilson MD on 12/31/2023 at 4:04 PM     Finalized by (CST): Maura Wilson MD on 12/31/2023 at 4:09 PM          CT BRAIN OR HEAD (42486)    Result Date: 12/31/2023  CONCLUSION:   No acute intracranial hemorrhage, hydrocephalus, or mass effect.  If there is clinical concern for acute ischemic stroke, MRI of the brain is recommended.    Dictated by (CST): Maura Wilson MD on 12/31/2023 at 1:20 PM     Finalized by (CST): Maura Wilson MD on 12/31/2023 at 1:27 PM              ASSESSMENT:  The patient is a 63 year old woman with past medical history of breast cancer status post mastectomy on chemotherapy, neurofibromatosis, hyperlipidemia who presented to the ER with weakness resulting in multiple falls.  -MRI of her brain mild chronic microvascular ischemic change but otherwise unremarkable.  -MRI cervical spine with and without no signs of metastatic disease, small 2.2 mm syrinx at upper cervical cord at C1-2, multilevel degenerative disc disease significant neural compromise than at right C5-6  -MRI thoracic spine no significant abnormalities, though partially images cervical spine showed degenerative disc disease   -MRI of her lumbar spine showed no significant stenoses.   -, vitamin B12 612, aldolase 4.9       Her neurological examination is significant for improved proximal upper and lower weakness and a length-dependent peripheral neuropathy.       Eribulin can lead to neuropathy and myasthenia in patients.     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      This note was prepared using Dragon Medical voice recognition dictation software and as a result, errors may occur. When identified, these errors have been corrected. While every attempt is made to correct errors during dictation, discrepancies may still exist    Festus Juarez DO  [facility] Neu[facility]          01/06/24                 Yes

## 2025-06-04 ENCOUNTER — APPOINTMENT (OUTPATIENT)
Age: 65
End: 2025-06-04
Attending: INTERNAL MEDICINE
Payer: MEDICARE

## 2025-06-04 NOTE — PROGRESS NOTES
Provider Clarification    Additional information on the significance of the lab value/diagnostic findings.    Cachexia     This note is part of the patient's medical record.

## 2025-06-25 ENCOUNTER — APPOINTMENT (OUTPATIENT)
Age: 65
End: 2025-06-25
Attending: INTERNAL MEDICINE
Payer: MEDICARE

## (undated) DIAGNOSIS — Z01.818 PREOP EXAMINATION: ICD-10-CM

## (undated) DIAGNOSIS — K57.32 DIVERTICULITIS, COLON: ICD-10-CM

## (undated) DIAGNOSIS — K57.32 DIVERTICULITIS OF LARGE INTESTINE WITHOUT PERFORATION OR ABSCESS WITHOUT BLEEDING: Primary | ICD-10-CM

## (undated) NOTE — LETTER
[address], [place]  Authorization for Invasive Procedures  1.  I hereby authorize Dr. Ab Bartholomew , my physician and whomever may be designated as the doctor's assistant, to perform the following operation and/or procedure:  Carlota Waller 4. Should the need arise during my operation or immediate post-operative period; I also consent to the administration of blood and/or blood products.  Further, I understand that despite careful testing and screening of blood and blood products, I may still 9. Patients having a sterilization procedure: I understand that if the procedure is successful the results will be permanent and it will therefore be impossible for me to inseminate, conceive or bear children.  I also understand that the procedure is intend

## (undated) NOTE — LETTER
[address], [place]  Authorization for Invasive Procedures  1.  I hereby authorize Dr. Opal Dominguez , my physician and whomever may be designated as the doctor's assistant, to perform the following operation and/or procedure:  Pleure 4. Should the need arise during my operation or immediate post-operative period; I also consent to the administration of blood and/or blood products.  Further, I understand that despite careful testing and screening of blood and blood products, I may still 9. Patients having a sterilization procedure: I understand that if the procedure is successful the results will be permanent and it will therefore be impossible for me to inseminate, conceive or bear children.  I also understand that the procedure is intend

## (undated) NOTE — LETTER
[facility], [place]  Authorization for Invasive Procedures  1.  I hereby authorize Dr. Risa Hutchinson, my physician and whomever may be designated as the doctor's assistant, to perform the following operation and/or procedure:  Jeremi Uribe 4. Should the need arise during my operation or immediate post-operative period; I also consent to the administration of blood and/or blood products.  Further, I understand that despite careful testing and screening of blood and blood products, I may still 9. Patients having a sterilization procedure: I understand that if the procedure is successful the results will be permanent and it will therefore be impossible for me to inseminate, conceive or bear children.  I also understand that the procedure is intend

## (undated) NOTE — LETTER
[facility]  [address] [facility] [address]  [phone]  Authorization for Imaging Procedure      1. I hereby authorize Dr. Vikash Yancey, my physician and his/her assistants (if applicable), which may include medical students, residents, and/or fellows, to perform the following procedure and administer such anesthesia as may be determined necessary by my physician: ULTRASOUND GUIDED THORACENTESIS RIGHT LUNG on John Costa. 2.  I recognize that during the procedure, unforeseen conditions may necessitate additional or different procedures than those listed above. I, therefore, further authorize and request that the above-named physician, assistants, or designees perform such procedures as are, in their judgment, necessary and desirable. 3.  My physician has discussed prior to my procedure the potential benefits, risks and side effects of this procedure; the likelihood of achieving goals; and potential problems that might occur during recuperation. They also discussed reasonable alternatives to the procedure, including risks, benefits, and side effects related to the alternatives and risks related to not receiving this procedure. I have had all my questions answered and I acknowledge that no guarantee has been made as to the result that may be obtained. 4.  Should the need arise during my procedure, which includes change of level of care prior to discharge, I also consent to the administration of blood and/or blood products. Further, I understand that despite careful testing and screening of blood or blood products by collecting agencies, I may still be subject to ill effects as a result of receiving a blood transfusion and/or blood products.  The following are some, but not all, of the potential risks that can occur: fever and allergic reactions, hemolytic reactions, transmission of diseases such as Hepatitis, AIDS and Cytomegalovirus (CMV) and fluid overload. In the event that I wish to have an autologous transfusion of my own blood, or a directed donor transfusion, I will discuss this with my physician. Check only if Refusing Blood or Blood Products  I understand refusal of blood or blood products as deemed necessary by my physician may have serious consequences to my condition to include possible death. I hereby assume responsibility for my refusal and release the hospital, its personnel, and my physicians from any responsibility for the consequences of my refusal.   [  ] Patient Refuses Blood      5. I authorize the use of any specimen, organs, tissues, body parts or foreign objects that may be removed from my body during the procedure for diagnosis, research or teaching purposes and their subsequent disposal by hospital authorities. I also authorize the release of specimen test results and/or written reports to my treating physician on the hospital medical staff or other referring or consulting physicians involved in my care, at the discretion of the Pathologist or my treating physician. 6.  I consent to the photographing or videotaping of the procedures to be performed, including appropriate portions of my body for medical, scientific, or educational purposes, provided my identity is not revealed by the pictures or by descriptive texts accompanying them. If the procedure has been photographed/videotaped, the physician will obtain the original picture, image, videotape or CD. The hospital will not be responsible for storage, release or maintenance of the picture, image, tape or CD.   7.  I consent to the presence of a  or observers in the operating room as deemed necessary by my physician or their designees. 8.  I recognize that in the event my procedure results in extended X-Ray/fluoroscopy time, I may develop a skin reaction. 9.   If I have a Do Not Attempt Resuscitation (DNAR) order in place, that status will be suspended while in the operating room, procedural suite, and during the recovery period unless otherwise explicitly stated by me (or a person authorized to consent on my behalf). The performing physician or my attending physician will determine when the applicable recovery period ends for purposes of reinstating the DNAR order. 10.  I acknowledge that my physician has explained sedation/analgesia administration to me including the risk and benefits I consent to the administration of sedation/analgesia as may be necessary or desirable in the judgment of my physician. I CERTIFY THAT I HAVE READ AND FULLY UNDERSTAND THE ABOVE CONSENT FOR THE PROCEDURE.      Signature of Patient: _____________________________________________________________  Responsible person in case of minor, unconscious: ____________________________________  Relationship to patient:  __________________________________________________________    Signature of Witness: _______________________________Date: _________Time: __________    Signature of Provider: _______________________________Date: _________Time: __________    Patient Name: Rosalee Moreno : 3/1/1960  Printed: 2022   Medical Record #: [id number]

## (undated) NOTE — Clinical Note
No big changes/issues, but Do you want CT imaging before she comes back in ~8 weeks? It's been since ~end January. Janee Pérez

## (undated) NOTE — MR AVS SNAPSHOT
After Visit Summary   9/29/2022    Rahel Tamez   MRN: [id number]           Visit Information     Date & Time  9/29/2022 12:30 PM Provider  [facility] [address] [place] [address] Infusion Dept. Phone  [phone]      Allergies as of 9/29/2022  Review status set to In Progress on 9/29/2022   No Known Allergies     Your Current Medications        Dosage    HYDROcodone-acetaminophen  MG Oral Tab Take 1-2 tablets by mouth every 4 (four) hours as needed for Pain. furosemide (LASIX) 20 MG Oral Tab Take 1 tablet (20 mg total) by mouth 2 (two) times daily. guaiFENesin-codeine 100-10 MG/5ML Oral Solution Take 5 mL by mouth every 6 (six) hours as needed for cough. MIRTAZAPINE 15 MG Oral Tab TAKE 1 TABLET BY MOUTH EVERY DAY AT NIGHT    LORazepam 0.5 MG Oral Tab Take 1 tablet (0.5 mg total) by mouth every 6 (six) hours as needed for Anxiety. prochlorperazine (COMPAZINE) 10 mg tablet Take 1 tablet (10 mg total) by mouth every 6 (six) hours as needed for Nausea. ZOLPIDEM 10 MG Oral Tab TAKE 1 TABLET BY MOUTH NIGHTLY AS NEEDED FOR SLEEP    Zoledronic Acid (ZOMETA IV) Inject 4 mg into the vein every 6 (six) months. Calcium Carb-Cholecalciferol (CALCIUM 500 + D) 500-200 MG-UNIT Oral Tab Take by mouth daily. Cholecalciferol (VITAMIN D3) 250 MCG (25499 UT) Oral Cap Take 1 capsule by mouth daily. atorvastatin 20 MG Oral Tab Take 20 mg by mouth daily. Minocycline HCl 50 MG Oral Cap Take 50 mg by mouth 2 (two) times daily. losartan 100 MG Oral Tab Take by mouth daily. Diagnoses for This Visit    Triple negative breast cancer   [9086777]  -  Primary  Malignant pleural effusion   [511.81. ICD-9-CM]    Encounter for central line care   [194038]    Encounter for medication management   [621491]    Chemotherapy management, encounter for   [4227779]             We Ordered the Following     Normal Orders This Visit    Nursing communication [WLC721 CUSTOM]       Future Appointments        Provider Department    10/13/2022 10:45 AM EM Kate [address] - Infusion    [facility]20[facility]    10/13/2022 1:00 PM [facility] [address] - Infusion    10/27/2022 8:45 AM [facility] [address] - Infusion    10[facility]27/20[facility] 9:[facility]    10/27/2022 10:30 AM [facility] [address] - Infusion    11/3/2022 12:30 PM EM Kate [address] - Infusion    11/3/2022 1:15 PM [facility]    11/3/2022 2:30 PM [facility] [address] - Infusion    3/2/2023 8:00 AM EM Kate [address] - Infusion    3/2/2023 9:[facility]    3/2/2023 9:30 AM [facility] [address]                Did you know that [facility] primary care physicians now offer Video Visits through [address] for adult patients for a variety of conditions such as allergies, back pain and cold symptoms? Skip the drive and waiting room and online chat with a doctor face-to-face using your web-cam enabled computer or mobile device wherever you are. Video Visits cost $50 and can be paid hassle-free using a credit, debit, or health savings card. Not active on [employer]? Ask us how to get signed up today! If you receive a survey from [employer], please take a few minutes to complete it and provide feedback. We strive to deliver the best patient experience and are looking for ways to make improvements. Your feedback will help us do so. For more information on [employer], please visit [email or web address]           No text in SmartText           No text in SmartText

## (undated) NOTE — MR AVS SNAPSHOT
After Visit Summary   7/6/2023    John Costa   MRN: [id number]           Visit Information     Date & Time  7/6/2023  1:00 PM Provider  [facility] [address] - Infusion Dept. Phone  [phone]      Allergies as of 7/6/2023  Review status set to In Progress on 7/6/2023   No Known Allergies     Your Current Medications        Dosage    folic acid 1 MG Oral Tab Take 1 tablet (1 mg total) by mouth daily. furosemide 20 MG Oral Tab Take 1 tablet (20 mg total) by mouth 2 (two) times daily. MIRTAZAPINE 15 MG Oral Tab TAKE 1 TABLET(15 MG) BY MOUTH EVERY NIGHT    zolpidem 10 MG Oral Tab Take 1 tablet (10 mg total) by mouth nightly as needed. LORazepam 0.5 MG Oral Tab Take 1 tablet (0.5 mg total) by mouth every 6 (six) hours as needed for Anxiety. guaiFENesin-codeine 100-10 MG/5ML Oral Solution Take 5 mL by mouth every 6 (six) hours as needed for cough. albuterol 108 (90 Base) MCG/ACT Inhalation Aero Soln Inhale 1 puff into the lungs every 6 (six) hours as needed for Wheezing. HYDROcodone-acetaminophen  MG Oral Tab Take 1-2 tablets by mouth every 4 (four) hours as needed for Pain.    prochlorperazine (COMPAZINE) 10 mg tablet Take 1 tablet (10 mg total) by mouth every 6 (six) hours as needed for Nausea. Zoledronic Acid (ZOMETA IV) Inject 4 mg into the vein every 6 (six) months. Calcium Carb-Cholecalciferol (CALCIUM 500 + D) 500-200 MG-UNIT Oral Tab Take by mouth daily. Cholecalciferol (VITAMIN D3) 250 MCG (71810 UT) Oral Cap Take 1 capsule by mouth daily. atorvastatin 20 MG Oral Tab Take 20 mg by mouth daily. losartan 100 MG Oral Tab Take by mouth daily.       Diagnoses for This Visit    Encounter for central line care   [730373]  -  Primary           Future Appointments        Provider Department    7/26/2023 2:00 PM [facility] [address] - Infusion    8/17/2023 2:30 PM EM Kate Poll[facility] - Infusion    8/23/2023 2:00 PM [facility]2 [address] - Infusion                Did you know that [facility] primary care physicians now offer Video Visits through [address] for adult patients for a variety of conditions such as allergies, back pain and cold symptoms? Skip the drive and waiting room and online chat with a doctor face-to-face using your web-cam enabled computer or mobile device wherever you are. Video Visits cost $50 and can be paid hassle-free using a credit, debit, or health savings card. Not active on [employer]? Ask us how to get signed up today! If you receive a survey from [employer], please take a few minutes to complete it and provide feedback. We strive to deliver the best patient experience and are looking for ways to make improvements. Your feedback will help us do so. For more information on [employer], please visit [email or web address]           No text in SmartText           No text in SmartText

## (undated) NOTE — MR AVS SNAPSHOT
After Visit Summary   10/12/2023    Jyotsna Pascual   MRN: [id number]           Visit Information     Date & Time  10/12/2023  1:00 PM Provider  [facility] Department  [address] [place] [address] Dept. Phone  [phone]      Allergies as of 10/12/2023  Review status set to In Progress on 10/12/2023   No Known Allergies     Your Current Medications        Dosage    LORazepam 0.5 MG Oral Tab Take 1 tablet (0.5 mg total) by mouth every 6 (six) hours as needed for Anxiety. prochlorperazine (COMPAZINE) 10 mg tablet Take 1 tablet (10 mg total) by mouth every 6 (six) hours as needed for Nausea. furosemide 20 MG Oral Tab Take 1 tablet (20 mg total) by mouth 2 (two) times daily. folic acid 1 MG Oral Tab Take 1 tablet (1 mg total) by mouth daily. MIRTAZAPINE 15 MG Oral Tab TAKE 1 TABLET(15 MG) BY MOUTH EVERY NIGHT    zolpidem 10 MG Oral Tab Take 1 tablet (10 mg total) by mouth nightly as needed. guaiFENesin-codeine 100-10 MG/5ML Oral Solution Take 5 mL by mouth every 6 (six) hours as needed for cough. albuterol 108 (90 Base) MCG/ACT Inhalation Aero Soln Inhale 1 puff into the lungs every 6 (six) hours as needed for Wheezing. HYDROcodone-acetaminophen  MG Oral Tab Take 1-2 tablets by mouth every 4 (four) hours as needed for Pain. Zoledronic Acid (ZOMETA IV) Inject 4 mg into the vein every 6 (six) months. Calcium Carb-Cholecalciferol (CALCIUM 500 + D) 500-200 MG-UNIT Oral Tab Take by mouth daily. Cholecalciferol (VITAMIN D3) 250 MCG (50014 UT) Oral Cap Take 1 capsule by mouth daily. atorvastatin 20 MG Oral Tab Take 1 tablet (20 mg total) by mouth daily. losartan 100 MG Oral Tab Take by mouth daily. Diagnoses for This Visit    Malignant neoplasm of upper-outer quadrant of right breast in female, estrogen receptor positive   [5121518]  -  Primary  Malignant pleural effusion   [511.81. ICD-9-CM]    Encounter for central line care   [487717]             We Ordered the Following     Normal Orders This Visit    Nursing communication (specify) Debbie Christy       Future Appointments        Provider Department    10/19/2023 1:30 PM [facility] [address] - Infusion    10/19/2023 2:00 PM [facility] [address] - Infusion    11/2/2023 12:00 PM [facility] [address] - Infusion    11/2/2023 1:00 PM [address] [facility] Hematology Oncology    11/2/2023 2:00 PM [facility] [address] - Infusion    11/9/2023 2:30 PM [facility] [address] - Infusion    11/9/2023 3:00 PM [facility] [address] - Infusion                Did you know that [facility] primary care physicians now offer Video Visits through [address] for adult patients for a variety of conditions such as allergies, back pain and cold symptoms? Skip the drive and waiting room and online chat with a doctor face-to-face using your web-cam enabled computer or mobile device wherever you are. Video Visits cost $50 and can be paid hassle-free using a credit, debit, or health savings card. Not active on [employer]? Ask us how to get signed up today! If you receive a survey from [employer], please take a few minutes to complete it and provide feedback. We strive to deliver the best patient experience and are looking for ways to make improvements. Your feedback will help us do so. For more information on [employer], please visit [email or web address]           No text in SmartText           No text in SmartText

## (undated) NOTE — MR AVS SNAPSHOT
[facility]  [address]  [phone]               Thank you for choosing us for your health care visit with Melquiades Hamm. DO Shimon.   We are glad to serve you and happy to provide you with this summ inject 10 Milliliter by Intravenous route for routine portacath care as needed           Tamoxifen Citrate 20 MG Tabs   Take 1 tablet (20 mg total) by mouth daily. There will be no additional refills unless seen by Dr. Sriram Calabrese.    Commonly known as:  Sam Celaya Specimen:    Endometrium                                                                                Final Diagnosis:   Endometrium, biopsy:  · Superficial strips of endometrial glandular tissue with mucus and hemorrhage (see comment).   · No evidence of [employer] will allow you to access patient instructions from your recent visit,  view other health information, and more. To sign up or find more information, go to [email or web address] and click on the Sign Up Now link in the [employer] box.      Enter 2 ½ hours per week – spread out over time Use a melanie to keep you motivated   Don’t forget strength training with weights and resistance Set goals and track your progress   You don’t need to join a gym. Home exercises work great.  Put more priority on exe

## (undated) NOTE — Clinical Note
AUTHORIZATION FOR SURGICAL OPERATION OR OTHER PROCEDURE    1.  I hereby authorize Dr. Ruth Jamison, and [facility] assigned to my case to perform the following operation and/or procedure at the [facility]  __ Time:  ________ A. M.  P.M.        Patient Name:  ______________________________________________________  (please print)      Patient signature:  ___________________________________________________             Relationship to Patient:

## (undated) NOTE — LETTER
[address], [place]  Authorization for Invasive Procedures  1.  I hereby authorize Dr. Toby Mar, my physician and whomever may be designated as the doctor's assistant, to perform the following operation and/or procedure:  Thorace 4. Should the need arise during my operation or immediate post-operative period; I also consent to the administration of blood and/or blood products.  Further, I understand that despite careful testing and screening of blood and blood products, I may still 9. Patients having a sterilization procedure: I understand that if the procedure is successful the results will be permanent and it will therefore be impossible for me to inseminate, conceive or bear children.  I also understand that the procedure is intend

## (undated) NOTE — LETTER
[facility], [place]  Authorization for Invasive Procedures  1.  I hereby authorize Dr. Jim Aguilar , my physician and whomever may be designated as the doctor's assistant, to perform the following operation and/or procedure:  Ultrasound 4. Should the need arise during my operation or immediate post-operative period; I also consent to the administration of blood and/or blood products.  Further, I understand that despite careful testing and screening of blood and blood products, I may still 9. Patients having a sterilization procedure: I understand that if the procedure is successful the results will be permanent and it will therefore be impossible for me to inseminate, conceive or bear children.  I also understand that the procedure is intend